# Patient Record
Sex: MALE | Race: WHITE | NOT HISPANIC OR LATINO | Employment: OTHER | URBAN - METROPOLITAN AREA
[De-identification: names, ages, dates, MRNs, and addresses within clinical notes are randomized per-mention and may not be internally consistent; named-entity substitution may affect disease eponyms.]

---

## 2019-01-04 ENCOUNTER — APPOINTMENT (OUTPATIENT)
Dept: CT IMAGING | Facility: HOSPITAL | Age: 64
End: 2019-01-04

## 2019-01-04 ENCOUNTER — HOSPITAL ENCOUNTER (INPATIENT)
Facility: HOSPITAL | Age: 64
LOS: 2 days | Discharge: HOME | End: 2019-01-06
Attending: EMERGENCY MEDICINE | Admitting: INTERNAL MEDICINE

## 2019-01-04 ENCOUNTER — APPOINTMENT (OUTPATIENT)
Dept: GENERAL RADIOLOGY | Facility: HOSPITAL | Age: 64
End: 2019-01-04

## 2019-01-04 DIAGNOSIS — N28.9 RENAL INSUFFICIENCY: ICD-10-CM

## 2019-01-04 DIAGNOSIS — A41.9 SEPSIS, DUE TO UNSPECIFIED ORGANISM: ICD-10-CM

## 2019-01-04 DIAGNOSIS — Z87.19 HISTORY OF CIRRHOSIS OF LIVER: ICD-10-CM

## 2019-01-04 DIAGNOSIS — E87.20 LACTIC ACIDOSIS: ICD-10-CM

## 2019-01-04 DIAGNOSIS — R73.9 HYPERGLYCEMIA: ICD-10-CM

## 2019-01-04 DIAGNOSIS — R40.0 SOMNOLENCE: Primary | ICD-10-CM

## 2019-01-04 PROBLEM — D69.6 THROMBOCYTOPENIA (HCC): Status: ACTIVE | Noted: 2019-01-04

## 2019-01-04 PROBLEM — G47.33 OSA (OBSTRUCTIVE SLEEP APNEA): Status: ACTIVE | Noted: 2019-01-04

## 2019-01-04 PROBLEM — R50.9 FUO (FEVER OF UNKNOWN ORIGIN): Status: ACTIVE | Noted: 2019-01-04

## 2019-01-04 PROBLEM — E11.65 TYPE 2 DIABETES MELLITUS WITH HYPERGLYCEMIA (HCC): Status: ACTIVE | Noted: 2019-01-04

## 2019-01-04 PROBLEM — L97.419 DIABETIC ULCER OF RIGHT HEEL (HCC): Status: ACTIVE | Noted: 2019-01-04

## 2019-01-04 PROBLEM — R79.89 ELEVATED LFTS: Status: ACTIVE | Noted: 2019-01-04

## 2019-01-04 PROBLEM — K74.60 CIRRHOSIS OF LIVER (HCC): Status: ACTIVE | Noted: 2019-01-04

## 2019-01-04 PROBLEM — N17.9 AKI (ACUTE KIDNEY INJURY) (HCC): Status: ACTIVE | Noted: 2019-01-04

## 2019-01-04 PROBLEM — E11.621 DIABETIC ULCER OF RIGHT HEEL (HCC): Status: ACTIVE | Noted: 2019-01-04

## 2019-01-04 PROBLEM — I73.9 PVD (PERIPHERAL VASCULAR DISEASE) (HCC): Status: ACTIVE | Noted: 2019-01-04

## 2019-01-04 PROBLEM — I48.91 A-FIB (HCC): Status: ACTIVE | Noted: 2019-01-04

## 2019-01-04 PROBLEM — E86.0 DEHYDRATION: Status: ACTIVE | Noted: 2019-01-04

## 2019-01-04 PROBLEM — G47.419 NARCOLEPSY: Status: ACTIVE | Noted: 2019-01-04

## 2019-01-04 PROBLEM — I50.9 CHRONIC CONGESTIVE HEART FAILURE (HCC): Status: ACTIVE | Noted: 2019-01-04

## 2019-01-04 LAB
ALBUMIN SERPL-MCNC: 4.67 G/DL (ref 3.2–4.8)
ALBUMIN/GLOB SERPL: 1.2 G/DL (ref 1.5–2.5)
ALP SERPL-CCNC: 174 U/L (ref 25–100)
ALT SERPL W P-5'-P-CCNC: 44 U/L (ref 7–40)
AMMONIA BLD-SCNC: 26 UMOL/L (ref 19–60)
AMPHET+METHAMPHET UR QL: POSITIVE
AMPHETAMINES UR QL: NEGATIVE
ANION GAP SERPL CALCULATED.3IONS-SCNC: 14 MMOL/L (ref 3–11)
ARTERIAL PATENCY WRIST A: ABNORMAL
AST SERPL-CCNC: 37 U/L (ref 0–33)
ATMOSPHERIC PRESS: ABNORMAL MMHG
BARBITURATES UR QL SCN: NEGATIVE
BASE EXCESS BLDA CALC-SCNC: 3.4 MMOL/L (ref 0–2)
BASOPHILS # BLD AUTO: 0.02 10*3/MM3 (ref 0–0.2)
BASOPHILS NFR BLD AUTO: 0.2 % (ref 0–1)
BDY SITE: ABNORMAL
BENZODIAZ UR QL SCN: NEGATIVE
BILIRUB SERPL-MCNC: 1.7 MG/DL (ref 0.3–1.2)
BILIRUB UR QL STRIP: NEGATIVE
BODY TEMPERATURE: 37 C
BUN BLD-MCNC: 70 MG/DL (ref 9–23)
BUN/CREAT SERPL: 34.1 (ref 7–25)
BUPRENORPHINE SERPL-MCNC: NEGATIVE NG/ML
CALCIUM SPEC-SCNC: 10.7 MG/DL (ref 8.7–10.4)
CANNABINOIDS SERPL QL: NEGATIVE
CHLORIDE SERPL-SCNC: 80 MMOL/L (ref 99–109)
CLARITY UR: CLEAR
CO2 BLDA-SCNC: 28.6 MMOL/L (ref 23–27)
CO2 SERPL-SCNC: 29 MMOL/L (ref 20–31)
COCAINE UR QL: NEGATIVE
COHGB MFR BLD: 1.2 % (ref 0–2)
COLOR UR: YELLOW
CREAT BLD-MCNC: 2.05 MG/DL (ref 0.6–1.3)
D-LACTATE SERPL-SCNC: 2.9 MMOL/L (ref 0.5–2)
DEPRECATED RDW RBC AUTO: 44 FL (ref 37–54)
EOSINOPHIL # BLD AUTO: 0.01 10*3/MM3 (ref 0–0.3)
EOSINOPHIL NFR BLD AUTO: 0.1 % (ref 0–3)
ERYTHROCYTE [DISTWIDTH] IN BLOOD BY AUTOMATED COUNT: 14.8 % (ref 11.3–14.5)
FLUAV AG NPH QL: NEGATIVE
FLUBV AG NPH QL IA: NEGATIVE
GFR SERPL CREATININE-BSD FRML MDRD: 33 ML/MIN/1.73
GLOBULIN UR ELPH-MCNC: 3.9 GM/DL
GLUCOSE BLD-MCNC: 580 MG/DL (ref 70–100)
GLUCOSE UR STRIP-MCNC: ABNORMAL MG/DL
HCO3 BLDA-SCNC: 27.4 MMOL/L (ref 20–26)
HCT VFR BLD AUTO: 48.2 % (ref 38.9–50.9)
HCT VFR BLD CALC: 51.3 %
HGB BLD-MCNC: 16.7 G/DL (ref 13.1–17.5)
HGB BLDA-MCNC: 16.7 G/DL (ref 13.5–17.5)
HGB UR QL STRIP.AUTO: NEGATIVE
HOROWITZ INDEX BLD+IHG-RTO: 21 %
IMM GRANULOCYTES # BLD AUTO: 0.06 10*3/MM3 (ref 0–0.03)
IMM GRANULOCYTES NFR BLD AUTO: 0.5 % (ref 0–0.6)
KETONES UR QL STRIP: NEGATIVE
LEUKOCYTE ESTERASE UR QL STRIP.AUTO: NEGATIVE
LYMPHOCYTES # BLD AUTO: 0.85 10*3/MM3 (ref 0.6–4.8)
LYMPHOCYTES NFR BLD AUTO: 7.2 % (ref 24–44)
MCH RBC QN AUTO: 28.4 PG (ref 27–31)
MCHC RBC AUTO-ENTMCNC: 34.6 G/DL (ref 32–36)
MCV RBC AUTO: 81.8 FL (ref 80–99)
METHADONE UR QL SCN: NEGATIVE
METHGB BLD QL: 1 % (ref 0–1.5)
MODALITY: ABNORMAL
MONOCYTES # BLD AUTO: 0.4 10*3/MM3 (ref 0–1)
MONOCYTES NFR BLD AUTO: 3.4 % (ref 0–12)
NEUTROPHILS # BLD AUTO: 10.6 10*3/MM3 (ref 1.5–8.3)
NEUTROPHILS NFR BLD AUTO: 89.1 % (ref 41–71)
NITRITE UR QL STRIP: NEGATIVE
NOTE: ABNORMAL
OPIATES UR QL: NEGATIVE
OXYCODONE UR QL SCN: NEGATIVE
OXYHGB MFR BLDV: 88.7 % (ref 94–99)
PCO2 BLDA: 38.5 MM HG
PCO2 TEMP ADJ BLD: 38.5 MM HG (ref 35–48)
PCP UR QL SCN: NEGATIVE
PH BLDA: 7.46 PH UNITS (ref 7.35–7.45)
PH UR STRIP.AUTO: 5.5 [PH] (ref 5–8)
PH, TEMP CORRECTED: 7.46 PH UNITS
PLATELET # BLD AUTO: 131 10*3/MM3 (ref 150–450)
PMV BLD AUTO: 12 FL (ref 6–12)
PO2 BLDA: 61.2 MM HG (ref 83–108)
PO2 TEMP ADJ BLD: 61.2 MM HG (ref 83–108)
POTASSIUM BLD-SCNC: 3.7 MMOL/L (ref 3.5–5.5)
PROCALCITONIN SERPL-MCNC: 0.58 NG/ML
PROPOXYPH UR QL: NEGATIVE
PROT SERPL-MCNC: 8.6 G/DL (ref 5.7–8.2)
PROT UR QL STRIP: NEGATIVE
RBC # BLD AUTO: 5.89 10*6/MM3 (ref 4.2–5.76)
SODIUM BLD-SCNC: 123 MMOL/L (ref 132–146)
SP GR UR STRIP: 1.02 (ref 1–1.03)
TRICYCLICS UR QL SCN: NEGATIVE
TROPONIN I SERPL-MCNC: 0.04 NG/ML (ref 0–0.07)
UROBILINOGEN UR QL STRIP: ABNORMAL
VENTILATOR MODE: ABNORMAL
WBC NRBC COR # BLD: 11.88 10*3/MM3 (ref 3.5–10.8)

## 2019-01-04 PROCEDURE — 81003 URINALYSIS AUTO W/O SCOPE: CPT | Performed by: EMERGENCY MEDICINE

## 2019-01-04 PROCEDURE — 82140 ASSAY OF AMMONIA: CPT | Performed by: EMERGENCY MEDICINE

## 2019-01-04 PROCEDURE — 87150 DNA/RNA AMPLIFIED PROBE: CPT | Performed by: EMERGENCY MEDICINE

## 2019-01-04 PROCEDURE — 70450 CT HEAD/BRAIN W/O DYE: CPT

## 2019-01-04 PROCEDURE — 80306 DRUG TEST PRSMV INSTRMNT: CPT | Performed by: EMERGENCY MEDICINE

## 2019-01-04 PROCEDURE — 71045 X-RAY EXAM CHEST 1 VIEW: CPT

## 2019-01-04 PROCEDURE — 87186 SC STD MICRODIL/AGAR DIL: CPT | Performed by: EMERGENCY MEDICINE

## 2019-01-04 PROCEDURE — 87804 INFLUENZA ASSAY W/OPTIC: CPT | Performed by: EMERGENCY MEDICINE

## 2019-01-04 PROCEDURE — 99285 EMERGENCY DEPT VISIT HI MDM: CPT

## 2019-01-04 PROCEDURE — 84484 ASSAY OF TROPONIN QUANT: CPT

## 2019-01-04 PROCEDURE — 85007 BL SMEAR W/DIFF WBC COUNT: CPT | Performed by: EMERGENCY MEDICINE

## 2019-01-04 PROCEDURE — 36415 COLL VENOUS BLD VENIPUNCTURE: CPT | Performed by: EMERGENCY MEDICINE

## 2019-01-04 PROCEDURE — 25010000002 PIPERACILLIN SOD-TAZOBACTAM PER 1 G: Performed by: EMERGENCY MEDICINE

## 2019-01-04 PROCEDURE — 82962 GLUCOSE BLOOD TEST: CPT

## 2019-01-04 PROCEDURE — 36600 WITHDRAWAL OF ARTERIAL BLOOD: CPT

## 2019-01-04 PROCEDURE — 99223 1ST HOSP IP/OBS HIGH 75: CPT | Performed by: INTERNAL MEDICINE

## 2019-01-04 PROCEDURE — 87147 CULTURE TYPE IMMUNOLOGIC: CPT | Performed by: EMERGENCY MEDICINE

## 2019-01-04 PROCEDURE — 93005 ELECTROCARDIOGRAM TRACING: CPT | Performed by: EMERGENCY MEDICINE

## 2019-01-04 PROCEDURE — 25010000002 VANCOMYCIN 10 G RECONSTITUTED SOLUTION: Performed by: EMERGENCY MEDICINE

## 2019-01-04 PROCEDURE — 83605 ASSAY OF LACTIC ACID: CPT | Performed by: EMERGENCY MEDICINE

## 2019-01-04 PROCEDURE — 82805 BLOOD GASES W/O2 SATURATION: CPT

## 2019-01-04 PROCEDURE — 85025 COMPLETE CBC W/AUTO DIFF WBC: CPT | Performed by: EMERGENCY MEDICINE

## 2019-01-04 PROCEDURE — 80053 COMPREHEN METABOLIC PANEL: CPT | Performed by: EMERGENCY MEDICINE

## 2019-01-04 PROCEDURE — 84145 PROCALCITONIN (PCT): CPT | Performed by: EMERGENCY MEDICINE

## 2019-01-04 PROCEDURE — 87040 BLOOD CULTURE FOR BACTERIA: CPT | Performed by: EMERGENCY MEDICINE

## 2019-01-04 RX ORDER — POTASSIUM CHLORIDE 750 MG/1
10 TABLET, FILM COATED, EXTENDED RELEASE ORAL 2 TIMES DAILY
COMMUNITY
End: 2019-01-30 | Stop reason: SDUPTHER

## 2019-01-04 RX ORDER — GABAPENTIN 300 MG/1
300 CAPSULE ORAL 2 TIMES DAILY
COMMUNITY
End: 2019-01-30 | Stop reason: SDUPTHER

## 2019-01-04 RX ORDER — BUMETANIDE 2 MG/1
2 TABLET ORAL 2 TIMES DAILY
COMMUNITY
End: 2019-01-30 | Stop reason: SDUPTHER

## 2019-01-04 RX ORDER — LEVOTHYROXINE SODIUM 0.07 MG/1
75 TABLET ORAL DAILY
COMMUNITY
End: 2019-01-30 | Stop reason: SDUPTHER

## 2019-01-04 RX ORDER — ASPIRIN 81 MG/1
81 TABLET, CHEWABLE ORAL DAILY
COMMUNITY
End: 2019-01-30

## 2019-01-04 RX ORDER — MORPHINE SULFATE 30 MG/1
30 TABLET ORAL EVERY 6 HOURS PRN
COMMUNITY
End: 2019-01-30

## 2019-01-04 RX ORDER — ACETAMINOPHEN 500 MG
1000 TABLET ORAL ONCE
Status: COMPLETED | OUTPATIENT
Start: 2019-01-04 | End: 2019-01-04

## 2019-01-04 RX ORDER — METOLAZONE 5 MG/1
5 TABLET ORAL DAILY PRN
COMMUNITY
End: 2019-01-30 | Stop reason: SDUPTHER

## 2019-01-04 RX ORDER — PRAMIPEXOLE DIHYDROCHLORIDE 1 MG/1
1 TABLET ORAL 2 TIMES DAILY
COMMUNITY
End: 2019-01-30 | Stop reason: SDUPTHER

## 2019-01-04 RX ORDER — LEVOCETIRIZINE DIHYDROCHLORIDE 5 MG/1
5 TABLET, FILM COATED ORAL EVERY EVENING
COMMUNITY
End: 2019-01-30 | Stop reason: SDUPTHER

## 2019-01-04 RX ORDER — SPIRONOLACTONE 100 MG/1
100 TABLET, FILM COATED ORAL DAILY
COMMUNITY
End: 2019-01-30 | Stop reason: SDUPTHER

## 2019-01-04 RX ORDER — TRAZODONE HYDROCHLORIDE 100 MG/1
100 TABLET ORAL NIGHTLY PRN
COMMUNITY
End: 2019-01-30 | Stop reason: SDUPTHER

## 2019-01-04 RX ORDER — SODIUM CHLORIDE 0.9 % (FLUSH) 0.9 %
10 SYRINGE (ML) INJECTION AS NEEDED
Status: DISCONTINUED | OUTPATIENT
Start: 2019-01-04 | End: 2019-01-06 | Stop reason: HOSPADM

## 2019-01-04 RX ADMIN — VANCOMYCIN HYDROCHLORIDE 2750 MG: 10 INJECTION, POWDER, LYOPHILIZED, FOR SOLUTION INTRAVENOUS at 23:02

## 2019-01-04 RX ADMIN — SODIUM CHLORIDE 1000 ML: 9 INJECTION, SOLUTION INTRAVENOUS at 21:45

## 2019-01-04 RX ADMIN — ACETAMINOPHEN 1000 MG: 500 TABLET, FILM COATED ORAL at 22:00

## 2019-01-04 RX ADMIN — TAZOBACTAM SODIUM AND PIPERACILLIN SODIUM 3.38 G: 375; 3 INJECTION, SOLUTION INTRAVENOUS at 22:08

## 2019-01-05 ENCOUNTER — APPOINTMENT (OUTPATIENT)
Dept: CT IMAGING | Facility: HOSPITAL | Age: 64
End: 2019-01-05

## 2019-01-05 ENCOUNTER — APPOINTMENT (OUTPATIENT)
Dept: CARDIOLOGY | Facility: HOSPITAL | Age: 64
End: 2019-01-05
Attending: INTERNAL MEDICINE

## 2019-01-05 PROBLEM — G93.40 ENCEPHALOPATHY ACUTE: Status: ACTIVE | Noted: 2019-01-05

## 2019-01-05 LAB
ALBUMIN SERPL-MCNC: 3.92 G/DL (ref 3.2–4.8)
ALBUMIN/GLOB SERPL: 1.2 G/DL (ref 1.5–2.5)
ALP SERPL-CCNC: 122 U/L (ref 25–100)
ALT SERPL W P-5'-P-CCNC: 32 U/L (ref 7–40)
ANION GAP SERPL CALCULATED.3IONS-SCNC: 12 MMOL/L (ref 3–11)
AST SERPL-CCNC: 31 U/L (ref 0–33)
BACTERIA BLD CULT: ABNORMAL
BASOPHILS # BLD AUTO: 0.01 10*3/MM3 (ref 0–0.2)
BASOPHILS NFR BLD AUTO: 0.1 % (ref 0–1)
BH CV ECHO MEAS - AO ROOT AREA (BSA CORRECTED): 1.2
BH CV ECHO MEAS - AO ROOT AREA: 7.5 CM^2
BH CV ECHO MEAS - AO ROOT DIAM: 3.1 CM
BH CV ECHO MEAS - BSA(HAYCOCK): 2.7 M^2
BH CV ECHO MEAS - BSA: 2.6 M^2
BH CV ECHO MEAS - BZI_BMI: 35.9 KILOGRAMS/M^2
BH CV ECHO MEAS - BZI_METRIC_HEIGHT: 190.5 CM
BH CV ECHO MEAS - BZI_METRIC_WEIGHT: 130.2 KG
BH CV ECHO MEAS - EDV(CUBED): 65 ML
BH CV ECHO MEAS - EDV(MOD-SP4): 111 ML
BH CV ECHO MEAS - EDV(TEICH): 70.9 ML
BH CV ECHO MEAS - EF(CUBED): 62.8 %
BH CV ECHO MEAS - EF(MOD-SP4): 40.5 %
BH CV ECHO MEAS - EF(TEICH): 54.9 %
BH CV ECHO MEAS - ESV(CUBED): 24.2 ML
BH CV ECHO MEAS - ESV(MOD-SP4): 66 ML
BH CV ECHO MEAS - ESV(TEICH): 32 ML
BH CV ECHO MEAS - FS: 28.1 %
BH CV ECHO MEAS - IVS/LVPW: 0.6
BH CV ECHO MEAS - IVSD: 0.7 CM
BH CV ECHO MEAS - LA DIMENSION: 3.3 CM
BH CV ECHO MEAS - LA/AO: 1.1
BH CV ECHO MEAS - LAD MAJOR: 6.3 CM
BH CV ECHO MEAS - LAT PEAK E' VEL: 5.7 CM/SEC
BH CV ECHO MEAS - LATERAL E/E' RATIO: 17.6
BH CV ECHO MEAS - LV DIASTOLIC VOL/BSA (35-75): 43.4 ML/M^2
BH CV ECHO MEAS - LV MASS(C)D: 117.1 GRAMS
BH CV ECHO MEAS - LV MASS(C)DI: 45.8 GRAMS/M^2
BH CV ECHO MEAS - LV MAX PG: 2.9 MMHG
BH CV ECHO MEAS - LV MEAN PG: 1.2 MMHG
BH CV ECHO MEAS - LV SYSTOLIC VOL/BSA (12-30): 25.8 ML/M^2
BH CV ECHO MEAS - LV V1 MAX: 85.4 CM/SEC
BH CV ECHO MEAS - LV V1 MEAN: 48.2 CM/SEC
BH CV ECHO MEAS - LV V1 VTI: 13.5 CM
BH CV ECHO MEAS - LVIDD: 4 CM
BH CV ECHO MEAS - LVIDS: 2.9 CM
BH CV ECHO MEAS - LVLD AP4: 8 CM
BH CV ECHO MEAS - LVLS AP4: 7.1 CM
BH CV ECHO MEAS - LVOT AREA (M): 3.1 CM^2
BH CV ECHO MEAS - LVOT AREA: 3.1 CM^2
BH CV ECHO MEAS - LVOT DIAM: 2 CM
BH CV ECHO MEAS - LVPWD: 1.2 CM
BH CV ECHO MEAS - MED PEAK E' VEL: 5.3 CM/SEC
BH CV ECHO MEAS - MEDIAL E/E' RATIO: 19
BH CV ECHO MEAS - MV A MAX VEL: 68.6 CM/SEC
BH CV ECHO MEAS - MV E MAX VEL: 102.2 CM/SEC
BH CV ECHO MEAS - MV E/A: 1.5
BH CV ECHO MEAS - RVDD: 2 CM
BH CV ECHO MEAS - SI(CUBED): 16 ML/M^2
BH CV ECHO MEAS - SI(LVOT): 16.4 ML/M^2
BH CV ECHO MEAS - SI(MOD-SP4): 17.6 ML/M^2
BH CV ECHO MEAS - SI(TEICH): 15.2 ML/M^2
BH CV ECHO MEAS - SV(CUBED): 40.9 ML
BH CV ECHO MEAS - SV(LVOT): 42 ML
BH CV ECHO MEAS - SV(MOD-SP4): 45 ML
BH CV ECHO MEAS - SV(TEICH): 38.9 ML
BH CV ECHO MEASUREMENTS AVERAGE E/E' RATIO: 18.58
BH CV XLRA - RV BASE: 3.4 CM
BH CV XLRA - RV LENGTH: 6.3 CM
BH CV XLRA - RV MID: 2.6 CM
BILIRUB SERPL-MCNC: 1.1 MG/DL (ref 0.3–1.2)
BUN BLD-MCNC: 57 MG/DL (ref 9–23)
BUN/CREAT SERPL: 37.7 (ref 7–25)
CALCIUM SPEC-SCNC: 9.8 MG/DL (ref 8.7–10.4)
CHLORIDE SERPL-SCNC: 95 MMOL/L (ref 99–109)
CO2 SERPL-SCNC: 28 MMOL/L (ref 20–31)
CREAT BLD-MCNC: 1.51 MG/DL (ref 0.6–1.3)
CREAT UR-MCNC: 50.6 MG/DL
D-LACTATE SERPL-SCNC: 1.9 MMOL/L (ref 0.5–2)
DEPRECATED RDW RBC AUTO: 43.9 FL (ref 37–54)
EOSINOPHIL # BLD AUTO: 0.02 10*3/MM3 (ref 0–0.3)
EOSINOPHIL NFR BLD AUTO: 0.2 % (ref 0–3)
EOSINOPHIL SPEC QL MICRO: 0 % EOS/100 CELLS (ref 0–0)
ERYTHROCYTE [DISTWIDTH] IN BLOOD BY AUTOMATED COUNT: 14.7 % (ref 11.3–14.5)
GFR SERPL CREATININE-BSD FRML MDRD: 47 ML/MIN/1.73
GLOBULIN UR ELPH-MCNC: 3.3 GM/DL
GLUCOSE BLD-MCNC: 163 MG/DL (ref 70–100)
GLUCOSE BLDC GLUCOMTR-MCNC: 122 MG/DL (ref 70–130)
GLUCOSE BLDC GLUCOMTR-MCNC: 124 MG/DL (ref 70–130)
GLUCOSE BLDC GLUCOMTR-MCNC: 217 MG/DL (ref 70–130)
GLUCOSE BLDC GLUCOMTR-MCNC: 219 MG/DL (ref 70–130)
GLUCOSE BLDC GLUCOMTR-MCNC: 222 MG/DL (ref 70–130)
GLUCOSE BLDC GLUCOMTR-MCNC: 237 MG/DL (ref 70–130)
GLUCOSE BLDC GLUCOMTR-MCNC: 293 MG/DL (ref 70–130)
GLUCOSE BLDC GLUCOMTR-MCNC: 356 MG/DL (ref 70–130)
GLUCOSE BLDC GLUCOMTR-MCNC: 383 MG/DL (ref 70–130)
GLUCOSE BLDC GLUCOMTR-MCNC: 398 MG/DL (ref 70–130)
GLUCOSE BLDC GLUCOMTR-MCNC: 408 MG/DL (ref 70–130)
GLUCOSE BLDC GLUCOMTR-MCNC: 78 MG/DL (ref 70–130)
HBA1C MFR BLD: 11.7 % (ref 4.8–5.6)
HCT VFR BLD AUTO: 45.9 % (ref 38.9–50.9)
HGB BLD-MCNC: 15.7 G/DL (ref 13.1–17.5)
HOLD SPECIMEN: NORMAL
IMM GRANULOCYTES # BLD AUTO: 0.05 10*3/MM3 (ref 0–0.03)
IMM GRANULOCYTES NFR BLD AUTO: 0.4 % (ref 0–0.6)
LYMPHOCYTES # BLD AUTO: 0.73 10*3/MM3 (ref 0.6–4.8)
LYMPHOCYTES NFR BLD AUTO: 6.3 % (ref 24–44)
MAXIMAL PREDICTED HEART RATE: 156 BPM
MCH RBC QN AUTO: 27.9 PG (ref 27–31)
MCHC RBC AUTO-ENTMCNC: 34.2 G/DL (ref 32–36)
MCV RBC AUTO: 81.7 FL (ref 80–99)
MONOCYTES # BLD AUTO: 0.63 10*3/MM3 (ref 0–1)
MONOCYTES NFR BLD AUTO: 5.5 % (ref 0–12)
MRSA DNA SPEC QL NAA+PROBE: NEGATIVE
NEUTROPHILS # BLD AUTO: 10.09 10*3/MM3 (ref 1.5–8.3)
NEUTROPHILS NFR BLD AUTO: 87.5 % (ref 41–71)
OSMOLALITY UR: 407 MOSM/KG (ref 300–1100)
PLATELET # BLD AUTO: 112 10*3/MM3 (ref 150–450)
PMV BLD AUTO: 10.6 FL (ref 6–12)
POTASSIUM BLD-SCNC: 3.3 MMOL/L (ref 3.5–5.5)
PROCALCITONIN SERPL-MCNC: 0.66 NG/ML
PROT SERPL-MCNC: 7.2 G/DL (ref 5.7–8.2)
PROT UR-MCNC: 22 MG/DL (ref 1–14)
RBC # BLD AUTO: 5.62 10*6/MM3 (ref 4.2–5.76)
SODIUM BLD-SCNC: 135 MMOL/L (ref 132–146)
SODIUM UR-SCNC: 19 MMOL/L (ref 30–90)
STRESS TARGET HR: 133 BPM
TROPONIN I SERPL-MCNC: 0.05 NG/ML
TROPONIN I SERPL-MCNC: 0.08 NG/ML
WBC NRBC COR # BLD: 11.53 10*3/MM3 (ref 3.5–10.8)

## 2019-01-05 PROCEDURE — 25010000002 HEPARIN (PORCINE) PER 1000 UNITS: Performed by: INTERNAL MEDICINE

## 2019-01-05 PROCEDURE — 82570 ASSAY OF URINE CREATININE: CPT | Performed by: NURSE PRACTITIONER

## 2019-01-05 PROCEDURE — 99233 SBSQ HOSP IP/OBS HIGH 50: CPT | Performed by: INTERNAL MEDICINE

## 2019-01-05 PROCEDURE — 93306 TTE W/DOPPLER COMPLETE: CPT | Performed by: INTERNAL MEDICINE

## 2019-01-05 PROCEDURE — 84300 ASSAY OF URINE SODIUM: CPT | Performed by: NURSE PRACTITIONER

## 2019-01-05 PROCEDURE — 82962 GLUCOSE BLOOD TEST: CPT

## 2019-01-05 PROCEDURE — 85025 COMPLETE CBC W/AUTO DIFF WBC: CPT | Performed by: INTERNAL MEDICINE

## 2019-01-05 PROCEDURE — 83935 ASSAY OF URINE OSMOLALITY: CPT | Performed by: NURSE PRACTITIONER

## 2019-01-05 PROCEDURE — 92610 EVALUATE SWALLOWING FUNCTION: CPT

## 2019-01-05 PROCEDURE — 25010000002 SULFUR HEXAFLUORIDE MICROSPH 60.7-25 MG RECONSTITUTED SUSPENSION: Performed by: INTERNAL MEDICINE

## 2019-01-05 PROCEDURE — 93306 TTE W/DOPPLER COMPLETE: CPT

## 2019-01-05 PROCEDURE — 87147 CULTURE TYPE IMMUNOLOGIC: CPT | Performed by: PHYSICIAN ASSISTANT

## 2019-01-05 PROCEDURE — 83605 ASSAY OF LACTIC ACID: CPT | Performed by: EMERGENCY MEDICINE

## 2019-01-05 PROCEDURE — 86335 IMMUNFIX E-PHORSIS/URINE/CSF: CPT | Performed by: NURSE PRACTITIONER

## 2019-01-05 PROCEDURE — 74176 CT ABD & PELVIS W/O CONTRAST: CPT

## 2019-01-05 PROCEDURE — 83036 HEMOGLOBIN GLYCOSYLATED A1C: CPT | Performed by: INTERNAL MEDICINE

## 2019-01-05 PROCEDURE — 84156 ASSAY OF PROTEIN URINE: CPT | Performed by: NURSE PRACTITIONER

## 2019-01-05 PROCEDURE — 87070 CULTURE OTHR SPECIMN AEROBIC: CPT | Performed by: PHYSICIAN ASSISTANT

## 2019-01-05 PROCEDURE — 87077 CULTURE AEROBIC IDENTIFY: CPT | Performed by: PHYSICIAN ASSISTANT

## 2019-01-05 PROCEDURE — 87075 CULTR BACTERIA EXCEPT BLOOD: CPT | Performed by: PHYSICIAN ASSISTANT

## 2019-01-05 PROCEDURE — 63710000001 INSULIN DETEMIR PER 5 UNITS: Performed by: INTERNAL MEDICINE

## 2019-01-05 PROCEDURE — 84145 PROCALCITONIN (PCT): CPT | Performed by: INTERNAL MEDICINE

## 2019-01-05 PROCEDURE — 87186 SC STD MICRODIL/AGAR DIL: CPT | Performed by: PHYSICIAN ASSISTANT

## 2019-01-05 PROCEDURE — 25010000003 AMPICILLIN-SULBACTAM PER 1.5 G: Performed by: PHYSICIAN ASSISTANT

## 2019-01-05 PROCEDURE — 80053 COMPREHEN METABOLIC PANEL: CPT | Performed by: INTERNAL MEDICINE

## 2019-01-05 PROCEDURE — 87205 SMEAR GRAM STAIN: CPT | Performed by: NURSE PRACTITIONER

## 2019-01-05 PROCEDURE — 84484 ASSAY OF TROPONIN QUANT: CPT | Performed by: INTERNAL MEDICINE

## 2019-01-05 PROCEDURE — 25010000002 PIPERACILLIN SOD-TAZOBACTAM PER 1 G

## 2019-01-05 PROCEDURE — 63710000001 INSULIN LISPRO (HUMAN) PER 5 UNITS: Performed by: INTERNAL MEDICINE

## 2019-01-05 PROCEDURE — 87641 MR-STAPH DNA AMP PROBE: CPT | Performed by: PHYSICIAN ASSISTANT

## 2019-01-05 PROCEDURE — 71250 CT THORAX DX C-: CPT

## 2019-01-05 PROCEDURE — 25010000002 VANCOMYCIN 10 G RECONSTITUTED SOLUTION

## 2019-01-05 PROCEDURE — 87205 SMEAR GRAM STAIN: CPT | Performed by: PHYSICIAN ASSISTANT

## 2019-01-05 PROCEDURE — 25010000003 POTASSIUM CHLORIDE 10 MEQ/100ML SOLUTION: Performed by: INTERNAL MEDICINE

## 2019-01-05 RX ORDER — ONDANSETRON HYDROCHLORIDE 8 MG/1
8 TABLET, FILM COATED ORAL EVERY 8 HOURS PRN
COMMUNITY
End: 2019-01-30 | Stop reason: SDUPTHER

## 2019-01-05 RX ORDER — LEVOTHYROXINE SODIUM 0.07 MG/1
75 TABLET ORAL DAILY
Status: DISCONTINUED | OUTPATIENT
Start: 2019-01-05 | End: 2019-01-06 | Stop reason: HOSPADM

## 2019-01-05 RX ORDER — POTASSIUM CHLORIDE 1.5 G/1.77G
40 POWDER, FOR SOLUTION ORAL AS NEEDED
Status: DISCONTINUED | OUTPATIENT
Start: 2019-01-05 | End: 2019-01-06 | Stop reason: HOSPADM

## 2019-01-05 RX ORDER — LACTULOSE 10 G/15ML
30 SOLUTION ORAL 3 TIMES DAILY
Status: DISCONTINUED | OUTPATIENT
Start: 2019-01-05 | End: 2019-01-06

## 2019-01-05 RX ORDER — BACITRACIN ZINC 500 [USP'U]/G
OINTMENT TOPICAL DAILY PRN
COMMUNITY
End: 2020-01-24

## 2019-01-05 RX ORDER — POTASSIUM CHLORIDE 7.45 MG/ML
10 INJECTION INTRAVENOUS
Status: DISCONTINUED | OUTPATIENT
Start: 2019-01-05 | End: 2019-01-06 | Stop reason: HOSPADM

## 2019-01-05 RX ORDER — ACETAMINOPHEN 325 MG/1
650 TABLET ORAL EVERY 4 HOURS PRN
Status: DISCONTINUED | OUTPATIENT
Start: 2019-01-05 | End: 2019-01-06 | Stop reason: HOSPADM

## 2019-01-05 RX ORDER — DEXTROAMPHETAMINE SACCHARATE, AMPHETAMINE ASPARTATE, DEXTROAMPHETAMINE SULFATE AND AMPHETAMINE SULFATE 7.5; 7.5; 7.5; 7.5 MG/1; MG/1; MG/1; MG/1
60 TABLET ORAL
Status: DISCONTINUED | OUTPATIENT
Start: 2019-01-05 | End: 2019-01-05

## 2019-01-05 RX ORDER — DEXTROAMPHETAMINE SACCHARATE, AMPHETAMINE ASPARTATE, DEXTROAMPHETAMINE SULFATE AND AMPHETAMINE SULFATE 7.5; 7.5; 7.5; 7.5 MG/1; MG/1; MG/1; MG/1
15 TABLET ORAL EVERY EVENING
Status: DISCONTINUED | OUTPATIENT
Start: 2019-01-05 | End: 2019-01-05

## 2019-01-05 RX ORDER — MAGNESIUM SULFATE HEPTAHYDRATE 40 MG/ML
4 INJECTION, SOLUTION INTRAVENOUS AS NEEDED
Status: DISCONTINUED | OUTPATIENT
Start: 2019-01-05 | End: 2019-01-06 | Stop reason: HOSPADM

## 2019-01-05 RX ORDER — SODIUM CHLORIDE 9 MG/ML
75 INJECTION, SOLUTION INTRAVENOUS ONCE
Status: COMPLETED | OUTPATIENT
Start: 2019-01-05 | End: 2019-01-05

## 2019-01-05 RX ORDER — DEXTROAMPHETAMINE SACCHARATE, AMPHETAMINE ASPARTATE MONOHYDRATE, DEXTROAMPHETAMINE SULFATE AND AMPHETAMINE SULFATE 3.75; 3.75; 3.75; 3.75 MG/1; MG/1; MG/1; MG/1
30 CAPSULE, EXTENDED RELEASE ORAL EVERY EVENING
Status: DISCONTINUED | OUTPATIENT
Start: 2019-01-05 | End: 2019-01-05

## 2019-01-05 RX ORDER — NICOTINE POLACRILEX 4 MG
15 LOZENGE BUCCAL
Status: DISCONTINUED | OUTPATIENT
Start: 2019-01-05 | End: 2019-01-06 | Stop reason: HOSPADM

## 2019-01-05 RX ORDER — SODIUM CHLORIDE 0.9 % (FLUSH) 0.9 %
3-10 SYRINGE (ML) INJECTION AS NEEDED
Status: DISCONTINUED | OUTPATIENT
Start: 2019-01-05 | End: 2019-01-06 | Stop reason: HOSPADM

## 2019-01-05 RX ORDER — DEXTROAMPHETAMINE SACCHARATE, AMPHETAMINE ASPARTATE, DEXTROAMPHETAMINE SULFATE AND AMPHETAMINE SULFATE 7.5; 7.5; 7.5; 7.5 MG/1; MG/1; MG/1; MG/1
60 TABLET ORAL
Status: DISCONTINUED | OUTPATIENT
Start: 2019-01-06 | End: 2019-01-05

## 2019-01-05 RX ORDER — ACETAMINOPHEN 650 MG
TABLET, EXTENDED RELEASE ORAL DAILY
Status: DISCONTINUED | OUTPATIENT
Start: 2019-01-05 | End: 2019-01-06 | Stop reason: HOSPADM

## 2019-01-05 RX ORDER — MAGNESIUM SULFATE HEPTAHYDRATE 40 MG/ML
2 INJECTION, SOLUTION INTRAVENOUS AS NEEDED
Status: DISCONTINUED | OUTPATIENT
Start: 2019-01-05 | End: 2019-01-06 | Stop reason: HOSPADM

## 2019-01-05 RX ORDER — POTASSIUM CHLORIDE 750 MG/1
40 CAPSULE, EXTENDED RELEASE ORAL AS NEEDED
Status: DISCONTINUED | OUTPATIENT
Start: 2019-01-05 | End: 2019-01-06 | Stop reason: HOSPADM

## 2019-01-05 RX ORDER — VENLAFAXINE 37.5 MG/1
150 TABLET ORAL 2 TIMES DAILY WITH MEALS
Status: DISCONTINUED | OUTPATIENT
Start: 2019-01-05 | End: 2019-01-06 | Stop reason: HOSPADM

## 2019-01-05 RX ORDER — DEXTROAMPHETAMINE SACCHARATE, AMPHETAMINE ASPARTATE, DEXTROAMPHETAMINE SULFATE AND AMPHETAMINE SULFATE 7.5; 7.5; 7.5; 7.5 MG/1; MG/1; MG/1; MG/1
30 TABLET ORAL
Status: DISCONTINUED | OUTPATIENT
Start: 2019-01-06 | End: 2019-01-05

## 2019-01-05 RX ORDER — DEXTROSE MONOHYDRATE 25 G/50ML
25-50 INJECTION, SOLUTION INTRAVENOUS
Status: DISCONTINUED | OUTPATIENT
Start: 2019-01-05 | End: 2019-01-06 | Stop reason: HOSPADM

## 2019-01-05 RX ORDER — SODIUM CHLORIDE 0.9 % (FLUSH) 0.9 %
3 SYRINGE (ML) INJECTION EVERY 12 HOURS SCHEDULED
Status: DISCONTINUED | OUTPATIENT
Start: 2019-01-05 | End: 2019-01-06 | Stop reason: HOSPADM

## 2019-01-05 RX ORDER — DEXTROAMPHETAMINE SACCHARATE, AMPHETAMINE ASPARTATE MONOHYDRATE, DEXTROAMPHETAMINE SULFATE AND AMPHETAMINE SULFATE 3.75; 3.75; 3.75; 3.75 MG/1; MG/1; MG/1; MG/1
30 CAPSULE, EXTENDED RELEASE ORAL EVERY EVENING
Status: DISCONTINUED | OUTPATIENT
Start: 2019-01-05 | End: 2019-01-06 | Stop reason: HOSPADM

## 2019-01-05 RX ORDER — SIMETHICONE 125 MG
125 TABLET,CHEWABLE ORAL EVERY 8 HOURS PRN
COMMUNITY
End: 2020-01-24

## 2019-01-05 RX ORDER — DEXTROSE MONOHYDRATE 25 G/50ML
25 INJECTION, SOLUTION INTRAVENOUS
Status: DISCONTINUED | OUTPATIENT
Start: 2019-01-05 | End: 2019-01-06 | Stop reason: HOSPADM

## 2019-01-05 RX ORDER — PRAMIPEXOLE DIHYDROCHLORIDE 0.25 MG/1
1 TABLET ORAL 3 TIMES DAILY
Status: DISCONTINUED | OUTPATIENT
Start: 2019-01-05 | End: 2019-01-06 | Stop reason: HOSPADM

## 2019-01-05 RX ORDER — GABAPENTIN 300 MG/1
300 CAPSULE ORAL 2 TIMES DAILY
Status: DISCONTINUED | OUTPATIENT
Start: 2019-01-05 | End: 2019-01-06 | Stop reason: HOSPADM

## 2019-01-05 RX ORDER — HEPARIN SODIUM 5000 [USP'U]/ML
5000 INJECTION, SOLUTION INTRAVENOUS; SUBCUTANEOUS EVERY 8 HOURS SCHEDULED
Status: DISCONTINUED | OUTPATIENT
Start: 2019-01-05 | End: 2019-01-06 | Stop reason: HOSPADM

## 2019-01-05 RX ORDER — ASPIRIN 81 MG/1
81 TABLET, CHEWABLE ORAL DAILY
Status: DISCONTINUED | OUTPATIENT
Start: 2019-01-05 | End: 2019-01-06 | Stop reason: HOSPADM

## 2019-01-05 RX ORDER — DEXTROAMPHETAMINE SACCHARATE, AMPHETAMINE ASPARTATE, DEXTROAMPHETAMINE SULFATE AND AMPHETAMINE SULFATE 7.5; 7.5; 7.5; 7.5 MG/1; MG/1; MG/1; MG/1
30 TABLET ORAL EVERY EVENING
Status: DISCONTINUED | OUTPATIENT
Start: 2019-01-05 | End: 2019-01-05

## 2019-01-05 RX ORDER — DEXTROAMPHETAMINE SACCHARATE, AMPHETAMINE ASPARTATE MONOHYDRATE, DEXTROAMPHETAMINE SULFATE AND AMPHETAMINE SULFATE 3.75; 3.75; 3.75; 3.75 MG/1; MG/1; MG/1; MG/1
60 CAPSULE, EXTENDED RELEASE ORAL EVERY MORNING
Status: DISCONTINUED | OUTPATIENT
Start: 2019-01-06 | End: 2019-01-06 | Stop reason: HOSPADM

## 2019-01-05 RX ADMIN — DEXTROAMPHETAMINE SACCHARATE, AMPHETAMINE ASPARTATE MONOHYDRATE, DEXTROAMPHETAMINE SULFATE AND AMPHETAMINE SULFATE 60 MG: 3.75; 3.75; 3.75; 3.75 CAPSULE, EXTENDED RELEASE ORAL at 12:52

## 2019-01-05 RX ADMIN — HEPARIN SODIUM 5000 UNITS: 5000 INJECTION INTRAVENOUS; SUBCUTANEOUS at 05:05

## 2019-01-05 RX ADMIN — SODIUM CHLORIDE, PRESERVATIVE FREE 3 ML: 5 INJECTION INTRAVENOUS at 10:11

## 2019-01-05 RX ADMIN — LEVOTHYROXINE SODIUM 75 MCG: 75 TABLET ORAL at 05:05

## 2019-01-05 RX ADMIN — SODIUM CHLORIDE, PRESERVATIVE FREE 3 ML: 5 INJECTION INTRAVENOUS at 22:04

## 2019-01-05 RX ADMIN — SULFUR HEXAFLUORIDE 3 ML: KIT at 17:15

## 2019-01-05 RX ADMIN — VANCOMYCIN HYDROCHLORIDE 1500 MG: 10 INJECTION, POWDER, LYOPHILIZED, FOR SOLUTION INTRAVENOUS at 13:12

## 2019-01-05 RX ADMIN — POTASSIUM CHLORIDE 40 MEQ: 750 CAPSULE, EXTENDED RELEASE ORAL at 16:43

## 2019-01-05 RX ADMIN — VENLAFAXINE 150 MG: 37.5 TABLET ORAL at 16:43

## 2019-01-05 RX ADMIN — SODIUM CHLORIDE 1000 ML: 9 INJECTION, SOLUTION INTRAVENOUS at 00:55

## 2019-01-05 RX ADMIN — HEPARIN SODIUM 5000 UNITS: 5000 INJECTION INTRAVENOUS; SUBCUTANEOUS at 21:58

## 2019-01-05 RX ADMIN — PRAMIPEXOLE DIHYDROCHLORIDE 1 MG: 0.25 TABLET ORAL at 14:41

## 2019-01-05 RX ADMIN — INSULIN DETEMIR 20 UNITS: 100 INJECTION, SOLUTION SUBCUTANEOUS at 22:07

## 2019-01-05 RX ADMIN — SODIUM CHLORIDE 4 UNITS/HR: 9 INJECTION, SOLUTION INTRAVENOUS at 02:03

## 2019-01-05 RX ADMIN — PRAMIPEXOLE DIHYDROCHLORIDE 1 MG: 0.25 TABLET ORAL at 21:58

## 2019-01-05 RX ADMIN — TAZOBACTAM SODIUM AND PIPERACILLIN SODIUM 4.5 G: 500; 4 INJECTION, SOLUTION INTRAVENOUS at 13:13

## 2019-01-05 RX ADMIN — AMPICILLIN SODIUM AND SULBACTAM SODIUM 3 G: 2; 1 INJECTION, POWDER, FOR SOLUTION INTRAMUSCULAR; INTRAVENOUS at 17:46

## 2019-01-05 RX ADMIN — SODIUM CHLORIDE 75 ML/HR: 9 INJECTION, SOLUTION INTRAVENOUS at 02:02

## 2019-01-05 RX ADMIN — HEPARIN SODIUM 5000 UNITS: 5000 INJECTION INTRAVENOUS; SUBCUTANEOUS at 14:41

## 2019-01-05 RX ADMIN — LACTULOSE 30 G: 10 SOLUTION ORAL at 14:40

## 2019-01-05 RX ADMIN — POTASSIUM CHLORIDE 10 MEQ: 7.46 INJECTION, SOLUTION INTRAVENOUS at 11:18

## 2019-01-05 RX ADMIN — POVIDONE IODINE 10%: 100 LIQUID TOPICAL at 12:09

## 2019-01-05 RX ADMIN — INSULIN LISPRO 6 UNITS: 100 INJECTION, SOLUTION INTRAVENOUS; SUBCUTANEOUS at 16:41

## 2019-01-05 RX ADMIN — TAZOBACTAM SODIUM AND PIPERACILLIN SODIUM 4.5 G: 500; 4 INJECTION, SOLUTION INTRAVENOUS at 05:06

## 2019-01-05 RX ADMIN — LACTULOSE 30 G: 10 SOLUTION ORAL at 21:57

## 2019-01-05 RX ADMIN — SODIUM CHLORIDE, PRESERVATIVE FREE 3 ML: 5 INJECTION INTRAVENOUS at 02:03

## 2019-01-05 RX ADMIN — PRAMIPEXOLE DIHYDROCHLORIDE 1 MG: 0.25 TABLET ORAL at 02:04

## 2019-01-05 RX ADMIN — GABAPENTIN 300 MG: 300 CAPSULE ORAL at 21:58

## 2019-01-05 RX ADMIN — POTASSIUM CHLORIDE 20 MEQ: 750 CAPSULE, EXTENDED RELEASE ORAL at 21:57

## 2019-01-05 RX ADMIN — DEXTROAMPHETAMINE SACCHARATE, AMPHETAMINE ASPARTATE MONOHYDRATE, DEXTROAMPHETAMINE SULFATE AND AMPHETAMINE SULFATE 30 MG: 3.75; 3.75; 3.75; 3.75 CAPSULE, EXTENDED RELEASE ORAL at 16:43

## 2019-01-05 NOTE — THERAPY EVALUATION
Acute Care - Speech Language Pathology   Swallow Initial Evaluation [facility]     Patient Name: Abe Phillips  : 1955  MRN: [id number]  Today's Date: 2019               Admit Date: 2019    Visit Dx:     ICD-10-CM ICD-9-CM   1. Somnolence R40.0 780.09   2. Hyperglycemia R73.9 790.29   3. Lactic acidosis E87.2 276.2   4. Sepsis, due to unspecified organism (CMS/HCC) A41.9 038.9     995.91   5. Renal insufficiency N28.9 593.9   6. History of cirrhosis of liver Z87.19 V12.79     Patient Active Problem List   Diagnosis   • Sepsis (CMS/HCC)   • FUO (fever of unknown origin)   • Type 2 diabetes mellitus with hyperglycemia (CMS/HCC)   • OMAR (acute kidney injury) (CMS/HCC)   • Lactic acidosis   • Dehydration   • Elevated LFTs   • Thrombocytopenia (CMS/HCC)   • PVD (peripheral vascular disease) (CMS/HCC)   • JAIME (obstructive sleep apnea)   • A-fib (CMS/HCC)   • Cirrhosis of liver (CMS/HCC)   • Chronic congestive heart failure (CMS/HCC)   • Diabetic ulcer of right heel (CMS/HCC)   • Narcolepsy   • Encephalopathy acute     Past Medical History:   Diagnosis Date   • A-fib (CMS/HCC)    • ADD (attention deficit disorder)    • Atrial flutter (CMS/HCC)    • Cellulitis    • CHF (congestive heart failure) (CMS/HCC)    • Cirrhosis of liver (CMS/HCC)    • Constipation    • Depression    • Diabetes mellitus (CMS/HCC)    • Diabetic ulcer of right foot (CMS/HCC)    • Disease of thyroid gland    • Edema    • Fatty liver    • Hepatitis B    • Hypokalemia    • Insomnia    • Lymphedema    • Narcolepsy    • Neuropathy    • Obesity    • JAIME on CPAP    • PVD (peripheral vascular disease) (CMS/HCC)    • RLS (restless legs syndrome)    • Skin cancer    • Tardive dyskinesia    • Tremor of both hands    • Yeast infection      Past Surgical History:   Procedure Laterality Date   • ACHILLES TENDON REPAIR     • CARDIAC ABLATION     • CHOLECYSTECTOMY     • LASER ABLATION      VEIN RLE        SWALLOW EVALUATION (last 72 hours)      SLP  Adult Swallow Evaluation     Row Name 01/05/19 1200                   Rehab Evaluation    Document Type  evaluation  -AW        Subjective Information  no complaints  -AW        Patient Observations  alert;cooperative  -AW        Patient/Family Observations  wife and dtr present  -AW        Patient Effort  excellent  -AW        Symptoms Noted During/After Treatment  none  -AW           General Information    Patient Profile Reviewed  yes  -AW        Pertinent History Of Current Problem  sepsis  -AW        Current Method of Nutrition  NPO  -AW        Precautions/Limitations, Vision  WFL  -AW        Precautions/Limitations, Hearing  WFL  -AW        Prior Level of Function-Communication  WFL  -AW        Prior Level of Function-Swallowing  no diet consistency restrictions  -AW        Plans/Goals Discussed with  patient and family  -AW        Barriers to Rehab  none identified  -AW        Patient's Goals for Discharge  return to PO diet  -AW           Pain Assessment    Additional Documentation  Pain Scale: Numbers Pre/Post-Treatment (Group)  -AW           Pain Scale: Numbers Pre/Post-Treatment    Pain Scale: Numbers, Pretreatment  0/10 - no pain  -AW        Pain Scale: Numbers, Post-Treatment  0/10 - no pain  -AW           Oral Motor and Function    Dentition Assessment  natural, present and adequate  -AW        Secretion Management  WNL/WFL  -AW        Mucosal Quality  moist, healthy  -AW        Volitional Swallow  WFL  -AW        Volitional Cough  WFL  -AW           Oral Musculature and Cranial Nerve Assessment    Oral Motor General Assessment  WFL  -AW           General Eating/Swallowing Observations    Respiratory Support Currently in Use  room air  -AW        Eating/Swallowing Skills  self-fed  -AW        Positioning During Eating  upright 90 degree;upright in bed  -AW        Utensils Used  cup;straw;spoon  -AW        Consistencies Trialed  regular textures;pureed;thin liquids  -AW           Clinical Swallow Eval     Oral Prep Phase  WFL  -AW        Oral Transit  WFL  -AW        Oral Residue  WFL  -AW        Pharyngeal Phase  WFL  -AW        Esophageal Phase  unremarkable  -AW        Clinical Swallow Evaluation Summary  Pt shows no s/s pharyngeal dysphagia. Resume regular/thin diet.  -AW           Clinical Impression    SLP Swallowing Diagnosis  functional oral phase;functional pharyngeal phase  -AW        Functional Impact  no impact on function  -AW        Swallow Criteria for Skilled Therapeutic Interventions Met  no problems identified which require skilled intervention  -AW           Recommendations    Therapy Frequency (Swallow)  evaluation only  -AW        SLP Diet Recommendation  regular textures;thin liquids  -AW        SLP Rec. for Method of Medication Administration  meds whole;with thin liquids  -AW          User Key  (r) = Recorded By, (t) = Taken By, (c) = Cosigned By    Initials Name Effective Dates    Anisa Norris MS CCC-SLP 06/22/15 -           EDUCATION  The patient has been educated in the following areas:   Dysphagia (Swallowing Impairment).    SLP Recommendation and Plan  SLP Swallowing Diagnosis: functional oral phase, functional pharyngeal phase  SLP Diet Recommendation: regular textures, thin liquids              Swallow Criteria for Skilled Therapeutic Interventions Met: no problems identified which require skilled intervention        Therapy Frequency (Swallow): evaluation only          Plan of Care Reviewed With: patient, family  Plan of Care Review  Plan of Care Reviewed With: patient, family  Progress: improving           Time Calculation:   Time Calculation- SLP     Row Name 01/05/19 1218             Time Calculation- SLP    SLP Start Time  1130  -      SLP Received On  01/05/19  -        User Key  (r) = Recorded By, (t) = Taken By, (c) = Cosigned By    Initials Name Provider Type    Anisa Norris MS CCC-SLP Speech and Language Pathologist          Therapy Charges for Today      Code Description Service Date Service Provider Modifiers Qty    [id number]  ST EVAL ORAL PHARYNG SWALLOW 3 1/5/2019 Anisa Sampson, MS CCC-SLP GN 1               Anisa Sampson MS CCC-SLP  1/5/2019

## 2019-01-05 NOTE — PLAN OF CARE
Problem: Patient Care Overview  Goal: Plan of Care Review  Outcome: Ongoing (interventions implemented as appropriate)   01/05/19 1015   Coping/Psychosocial   Plan of Care Reviewed With patient;other (see comments)  (Daniel RN)   Plan of Care Review   Progress no change   OTHER   Outcome Summary WOC nurse consulted to assess wound right heel. Pt has chronic diabetic ulcer right heel. Wound cleaned with NS, painted with Betadine, covered with foam dressing. Black heel boots to be applied. Pt has mild redness bilateral groin regions; Z-guard in place at this time. Suggest using Inter-dry to groin areas, changing Q 5 days and prn soiling. Pt on waffle mattress. WOC nurse will f/u. Please contact WOC nurse as needed for concerns.

## 2019-01-05 NOTE — PLAN OF CARE
Problem: Patient Care Overview  Goal: Plan of Care Review  Outcome: Ongoing (interventions implemented as appropriate)   01/05/19 1217   Coping/Psychosocial   Plan of Care Reviewed With patient;family   Plan of Care Review   Progress improving   SLP evaluation completed. Will sign-off dysphagia. Please see note for further details and recommendations.

## 2019-01-05 NOTE — PROGRESS NOTES
[facility] Medicine Services  PROGRESS NOTE    Patient Name: Abe Phillips  : 1955  MRN: [id number]    Date of Admission: 2019  Length of Stay: 1  Primary Care Physician: Provider, No Known    Subjective   Subjective     CC:  sepsis    HPI:  Patient somnolent this morning. Febrile on presentation but none since admission. Tachycardia improving. Patient very somnolent this morning.     Review of Systems   Unable to obtain due AMS    Objective   Objective     Vital Signs:   Temp:  [96.5 °F (35.8 °C)-101.3 °F (38.5 °C)] 96.5 °F (35.8 °C)  Heart Rate:  [] 80  Resp:  [16-18] 16  BP: ()/(54-91) 123/66        Physical Exam:  General: drowsy and falls asleep during exam/talking  Head: AT/NC  Eyes: no scleral icterus, PERRL  Neck: trachea midline  CV: RRR, no murmurs, no LE edema  Lungs: CTAB, no wheezing or crackles. Normal respiratory effort  Abd: Soft, non-tender, non-distedned, bowel sounds normoactive.  Neuro: No gross focal deficits. CN II-XII intact  Psych: Oriented to person and place.  Falls asleep during conversation.   Skin: no lesions or rashes noted on exposed arms and legs      Results Reviewed:  I have personally reviewed current lab, radiology, and data and agree.    Results from last 7 days   Lab Units  19   0816  19   2142   WBC 10*3/mm3  11.53*  11.88*   HEMOGLOBIN g/dL  15.7  16.7   HEMATOCRIT %  45.9  48.2   PLATELETS 10*3/mm3  112*  131*     Results from last 7 days   Lab Units  19   0816  19   2156  19   2142   SODIUM mmol/L  135   --   123*   POTASSIUM mmol/L  3.3*   --   3.7   CHLORIDE mmol/L  95*   --   80*   CO2 mmol/L  28.0   --   29.0   BUN mg/dL  57*   --   70*   CREATININE mg/dL  1.51*   --   2.05*   GLUCOSE mg/dL  163*   --   580*   CALCIUM mg/dL  9.8   --   10.7*   ALT (SGPT) U/L  32   --   44*   AST (SGOT) U/L  31   --   37*   TROPONIN I ng/mL  0.083*  0.04   --      Estimated Creatinine Clearance: 72.7 mL/min (A)  (by C-G formula based on SCr of 1.51 mg/dL (H)).    No results found for: BNP    Microbiology Results Abnormal     Procedure Component Value - Date/Time    Blood Culture ID, PCR - Blood, Arm, Right [[id number]]  (Abnormal) Collected:  01/04/19 2130    Lab Status:  Final result Specimen:  Blood from Arm, Right Updated:  01/05/19 1108     BCID, PCR Streptococcus agalactiae (Group B). Identification by BCID PCR.    Blood Culture - Blood, Arm, Left [[id number]] Collected:  01/04/19 2135    Lab Status:  Preliminary result Specimen:  Blood from Arm, Left Updated:  01/05/19 1000     Blood Culture No growth at less than 24 hours    Blood Culture - Blood, Arm, Right [[id number]]  (Abnormal) Collected:  01/04/19 2130    Lab Status:  Preliminary result Specimen:  Blood from Arm, Right Updated:  01/05/19 0957     Blood Culture Abnormal Stain     Gram Stain Anaerobic Bottle Gram positive cocci in chains    Influenza Antigen, Rapid - Swab, Nasopharynx [[id number]]  (Normal) Collected:  01/04/19 2159    Lab Status:  Final result Specimen:  Swab from Nasopharynx Updated:  01/04/19 2225     Influenza A Ag, EIA Negative     Influenza B Ag, EIA Negative          Imaging Results (last 24 hours)     Procedure Component Value Units Date/Time    CT Abdomen Pelvis Without Contrast [[id number]] Collected:  01/05/19 0025     Updated:  01/05/19 0108    Narrative:       EXAM:    CT Abdomen and Pelvis Without Contrast     EXAM DATE/TIME:    1/5/2019 12:25 AM     CLINICAL HISTORY:    63 years old, male; Sepsis, unspecified organism; Acidosis; Disorder of kidney   and ureter, unspecified; Somnolence; Hyperglycemia, unspecified; Personal   history of other diseases of the digestive system; Signs and symptoms; Fever;   Additional info: Fuo, abdomen with diffuse tenderness     TECHNIQUE:    Axial computed tomography images of the abdomen and pelvis without contrast.    All CT scans at this facility use at least one of these dose optimization    techniques: automated exposure control; mA and/or kV adjustment per patient   size (includes targeted exams where dose is matched to clinical indication); or   iterative reconstruction.    Coronal and sagittal reformatted images were created and reviewed.     COMPARISON:    No relevant prior studies available.     FINDINGS:    Lower thorax:  Lung bases and chest findings reported separately.     ABDOMEN:    Liver:  Hepatic cirrhosis. No definite focal hepatic lesion on noncontrast   imaging.    Gallbladder and bile ducts:  Previous cholecystectomy. No biliary ductal   dilatation.     Pancreas: No acute abnormality. No ductal dilation.    Spleen:  Prominent spleen measuring 16 cm in length.    Adrenals: No acute abnormality. No mass.    Kidneys and ureters: No acute abnormality. No obstructing calculi. No   hydronephrosis.    Stomach and bowel:  No significant large or small bowel distention. Moderate   amount of stool within the proximal colon. No evidence of diverticulitis.    Appendix: No findings to suggest acute appendicitis.     PELVIS:    Bladder: No acute abnormality. No stones.    Reproductive: Unremarkable as visualized.     ABDOMEN and PELVIS:    Intraperitoneal space: No significant fluid collection. No free air.    Bones/joints: No acute osseous abnormality. No dislocation.    Soft tissues:  Bilateral gynecomastia.    Vasculature:  Gastroesophageal varices. Upper abdominal venous collaterals.   Recanalized umbilical vein. Aorta has normal caliber.   Lymph nodes: No enlarged lymph nodes.       Impression:       1. Hepatic cirrhosis and portal hypertension with splenomegaly and venous   collaterals.   2. Previous cholecystectomy.   3. Additional findings as described above.     THIS DOCUMENT HAS BEEN ELECTRONICALLY SIGNED BY JOY HAUSER JR. MD    CT Chest Without Contrast [[id number]] Collected:  01/05/19 0025     Updated:  01/05/19 0102    Narrative:       EXAM:    CT Chest Without Contrast     EXAM  DATE/TIME:    1/5/2019 12:25 AM     CLINICAL HISTORY:    63 years old, male; Sepsis, unspecified organism; Acidosis; Disorder of kidney   and ureter, unspecified; Somnolence; Hyperglycemia, unspecified; Personal   history of other diseases of the digestive system; Signs and symptoms; Fever;   Additional info: Fuo     TECHNIQUE:    Axial computed tomography images of the chest without intravenous contrast.    All CT scans at this facility use at least one of these dose optimization   techniques: automated exposure control; mA and/or kV adjustment per patient   size (includes targeted exams where dose is matched to clinical indication); or   iterative reconstruction.    Coronal and sagittal reformatted images were created and reviewed.     COMPARISON:    CR XR CHEST 1 VW 1/4/2019 9:58 PM     FINDINGS:    Lungs: No significant or acute findings. No consolidation.     Pleural space: No pneumothorax. No significant pleural effusion.    Heart:  Heart size is normal. Pericardial calcifications suggestive of prior   pericarditis.    Aorta: No acute abnormality. No aortic aneurysm.    Lymph nodes: No enlarged lymph nodes.    Bones/joints: No acute osseous abnormality. No acute fracture.    Soft tissues:  Bilateral gynecomastia.       Impression:       1. No evidence of acute cardiopulmonary disease.   2. Pericardial calcifications suggestive of prior pericarditis.     THIS DOCUMENT HAS BEEN ELECTRONICALLY SIGNED BY JOY HAUSER JR. MD    CT Head Without Contrast [[id number]] Collected:  01/04/19 2239     Updated:  01/04/19 2300    Narrative:       EXAM:    CT Head Without Contrast     EXAM DATE/TIME:    1/4/2019 10:39 PM     CLINICAL HISTORY:    63 years old, male; Signs and symptoms; Altered mental status/memory loss;   Confusion or disorientation; Additional info: Confusion/delirium, altered loc,   unexplained     TECHNIQUE:    Axial computed tomography images of the head/brain without contrast.    All CT scans at this  facility use at least one of these dose optimization   techniques: automated exposure control; mA and/or kV adjustment per patient   size (includes targeted exams where dose is matched to clinical indication); or   iterative reconstruction.     COMPARISON:    No relevant prior studies available.     FINDINGS:    Brain: No acute abnormality. Mild diffuse atrophy. No edema or mass effect. No   hemorrhage.     Ventricles: No acute abnormality. No significant ventriculomegaly.    Bones/joints: No acute osseous abnormality. No acute fracture.    Sinuses: No significant or acute abnormality. No air-fluid levels.    Mastoid air cells: No acute abnormality. No significant mastoid effusion.    Soft tissues: No significant soft tissue abnormalities.       Impression:       No evidence of acute intracranial abnormality.     THIS DOCUMENT HAS BEEN ELECTRONICALLY SIGNED BY JOY HAUSER JR. MD    XR Chest 1 View [[id number]] Collected:  01/04/19 2204     Updated:  01/04/19 2256    Narrative:       EXAM:    XR Chest, 1 View     EXAM DATE/TIME:    1/4/2019 10:04 PM     CLINICAL HISTORY:    63 years old, male; Signs and symptoms; Other: Tachycardia     TECHNIQUE:    XR of the chest, 1 view.     COMPARISON:    No relevant prior studies available.     FINDINGS:    Tubes, catheters and devices:  Monitor leads project over the chest.    Lungs: No significant or acute findings. No consolidation.    Pleural space: Unremarkable. No pleural effusion. No pneumothorax.    Heart/Mediastinum: Heart size is normal.    Bones/joints: No acute osseous abnormality.       Impression:       No evidence of acute cardiopulmonary disease.     THIS DOCUMENT HAS BEEN ELECTRONICALLY SIGNED BY JOY HAUSER JR. MD               I have reviewed the medications:    Current Facility-Administered Medications:   •  [START ON 1/6/2019] ! Vancomycin trough 1/6 at 1130.  Please hold vancomycin dose 1/6 at 1200 until pharmacy can evaluate level, , Does not apply, Once,  Shaan Mahoney, [facility]  •  acetaminophen (TYLENOL) tablet 650 mg, 650 mg, Oral, Q4H PRN, Amber Butler DO  •  amphetamine-dextroamphetamine XR (ADDERALL XR) 24 hr capsule 30 mg (PATIENT SUPPLED), 30 mg, Oral, Q PM, Diamond Woo MD  •  amphetamine-dextroamphetamine XR (ADDERALL XR) 24 hr capsule 30 mg (PATIENT SUPPLIED), 30 mg, Oral, Q PM, Raghu Mendez [facility]  •  [START ON 1/6/2019] amphetamine-dextroamphetamine XR (ADDERALL XR) 24 hr capsule 60 mg (PATIENT SUPPLIED), 60 mg, Oral, QAM, Diamond Woo MD, 60 mg at 01/05/19 1252  •  aspirin chewable tablet 81 mg, 81 mg, Oral, Daily, Amber Butler DO, Stopped at 01/05/19 0935  •  dextrose (D50W) 25 g/ 50mL Intravenous Solution 25 g, 25 g, Intravenous, Q15 Min PRN, Diamond Woo MD  •  dextrose (D50W) 25 g/ 50mL Intravenous Solution 25-50 mL, 25-50 mL, Intravenous, Q30 Min PRN, Amber Butler DO  •  dextrose (GLUTOSE) oral gel 15 g, 15 g, Oral, Q15 Min PRN, Diamond Woo MD  •  gabapentin (NEURONTIN) capsule 300 mg, 300 mg, Oral, BID, Amber Butler DO, Stopped at 01/05/19 0935  •  glucagon (human recombinant) (GLUCAGEN DIAGNOSTIC) injection 1 mg, 1 mg, Subcutaneous, PRN, Diamond Woo MD  •  heparin (porcine) 5000 UNIT/ML injection 5,000 Units, 5,000 Units, Subcutaneous, Q8H, Amber Butler DO, 5,000 Units at 01/05/19 0505  •  Influenza Vac Subunit Quad (FLUCELVAX) injection 0.5 mL, 0.5 mL, Intramuscular, During Hospitalization, Amber Butler DO  •  insulin regular (humuLIN R,novoLIN R) injection 0-7 Units, 0-7 Units, Subcutaneous, Q6H, Diamond Woo MD  •  lactulose enema, 300 mL, Rectal, TID, Diamond Woo MD  •  levothyroxine (SYNTHROID, LEVOTHROID) tablet 75 mcg, 75 mcg, Oral, Daily, Amber Butler DO, 75 mcg at 01/05/19 0505  •  Magnesium Sulfate 2 gram Bolus, followed by 8 gram infusion (total Mg dose 10 grams)- Mg less than or equal to 1mg/dL, 2 g, Intravenous, PRN **OR** Magnesium Sulfate 2 gram / 50mL  Infusion (GIVE X 3 BAGS TO EQUAL 6GM TOTAL DOSE) - Mg 1.1 - 1.5 mg/dl, 2 g, Intravenous, PRN **OR** Magnesium Sulfate 4 gram infusion- Mg 1.6-1.9 mg/dL, 4 g, Intravenous, PRN, Carrie, Amber G, DO  •  Pharmacy to Dose Vancomycin + Zosyn, , Does not apply, Continuous PRN, Carrie, Amber G, DO  •  piperacillin-tazobactam (ZOSYN) 4.5 g in iso-osmotic dextrose 100 mL IVPB (premix), 4.5 g, Intravenous, Q8H, Shaan Mahoney, RPH, 4.5 g at 01/05/19 1313  •  potassium chloride (MICRO-K) CR capsule 40 mEq, 40 mEq, Oral, PRN **OR** potassium chloride (KLOR-CON) packet 40 mEq, 40 mEq, Oral, PRN **OR** potassium chloride 10 mEq in 100 mL IVPB, 10 mEq, Intravenous, Q1H PRN, Carrie, Amber G, DO, Last Rate: 100 mL/hr at 01/05/19 1118, 10 mEq at 01/05/19 1118  •  povidone-iodine (BETADINE) external solution, , Topical, Daily, Diamond Woo MD  •  pramipexole (MIRAPEX) tablet 1 mg, 1 mg, Oral, TID, Carrie, Amber G, DO, Stopped at 01/05/19 0936  •  [COMPLETED] Insert peripheral IV, , , Once **AND** sodium chloride 0.9 % flush 10 mL, 10 mL, Intravenous, PRN, Ruth Ann Felder MD  •  sodium chloride 0.9 % flush 3 mL, 3 mL, Intravenous, Q12H, Carrie, Amber G, DO, 3 mL at 01/05/19 1011  •  sodium chloride 0.9 % flush 3-10 mL, 3-10 mL, Intravenous, PRN, Carrie, Amber G, DO  •  vancomycin 1500 mg/500 mL 0.9% NS IVPB ([facility]), 12 mg/kg, Intravenous, Q12H, Shaan Mahoney, RPH, 1,500 mg at 01/05/19 1312  •  venlafaxine (EFFEXOR) tablet 150 mg, 150 mg, Oral, BID With Meals, Amber Butler DO, Stopped at 01/05/19 1010      Assessment/Plan   Assessment / Plan     Active Hospital Problems    Diagnosis Date Noted   • **Sepsis (CMS/[facility]) [A41.9] 01/04/2019   • Encephalopathy acute [G93.40] 01/05/2019   • FUO (fever of unknown origin) [R50.9] 01/04/2019   • Type 2 diabetes mellitus with hyperglycemia (CMS/[facility]) [E11.65] 01/04/2019   • OMAR (acute kidney injury) (CMS/[facility]) [N17.9] 01/04/2019   • Lactic acidosis [E87.2] 01/04/2019   •  Dehydration [E86.0] 01/04/2019   • Elevated LFTs [R94.5] 01/04/2019   • Thrombocytopenia (CMS/HCC) [D69.6] 01/04/2019   • PVD (peripheral vascular disease) (CMS/HCC) [I73.9] 01/04/2019   • JAIME (obstructive sleep apnea) [G47.33] 01/04/2019   • A-fib (CMS/HCC) [I48.91] 01/04/2019   • Cirrhosis of liver (CMS/HCC) [K74.60] 01/04/2019   • Chronic congestive heart failure (CMS/HCC) [I50.9] 01/04/2019   • Diabetic ulcer of right heel (CMS/HCC) [E11.621, L97.419] 01/04/2019   • Narcolepsy [G47.419] 01/04/2019          Brief Hospital Course to date:  Abe Phillips is a 63 y.o. male with cirrhosis who presents with encephalopathy and hyperglycemia.     Sepsis  - Noted to be febrile, hypotensive and tachycardic on presentation. WBC was elevated. Procal elevated.  - Unknown source. Blood cultures growing gram positive cocci x 1 bottle.   - Continue vanc and zosyn.   - CT chest/abd/pelvis negative for source of infection.  - Echo ordered to evaluate for endocarditis   - Consult ID.     Encephalopathy  Narcolepsy  - Sepsis vs. Hepatic encephalopathy vs. Narcolepsy. Wife states that patient does when he is having narcolepsy. Has been out of some of his amphetamines for this since he recently moved here.   - Will continue lactulose given history of cirrhosis.   - Will start patient supplied amphetamines. Limited quantity as patient needs refills and not in formulary. May need to start ritalin while in the hospital     Decompensated cirrhosis with varices and ascites  Chronic hepatitis B  - Patient reports chronic hep B as cause of cirrhosis.   - On diuretics at home. Will hold in setting of sepsis.   - Start lactulose for encephalopathy.     OMAR:  - Creatinine elevated. No records available for comparison as patient recently moved form [place].   - repeat BMP in the am.   - will attempt to obtain records from OSH.     Lactic acidosis  - resolved with fluids, treatment.     Hyperglycemia  - Discontinued to insulin ggt. Will start FSBG  qAC/hs.   - SSI.  - Levemir tonight. May require significant does of insulin as patient on 115u qam and 120uqpm    JAIME  - CPAP    DVT Prophylaxis:  SQH    Disposition: I expect the patient to be discharged TBD    CODE STATUS:   Code Status and Medical Interventions:   Ordered at: 01/05/19 0030     Level Of Support Discussed With:    Patient     Code Status:    CPR     Medical Interventions (Level of Support Prior to Arrest):    Full         Electronically signed by Diamond Woo MD, 01/05/19, 1:32 PM.

## 2019-01-05 NOTE — H&P
[facility] Medicine Services  HISTORY AND PHYSICAL    Patient Name: Abe Phillips  : 1955  MRN: [id number]  Primary Care Physician: Provider, No Known  Date of admission: 2019      Subjective   Subjective     Chief Complaint:  Altered Mental Status and Hyperglycemia    HPI:  Abe Phillips is a 63 y.o. male who presented to the ED with complaints of altered mental status and hyperglycemia.  All information for H&P obtained from son-in law.  Family reports that patient moved from [place] to [place] last week to be near his daughter.  Yesterday he developed some urinary incontinence that has continued today.  Today he went shopping with his family and was doing well until around 1900 when he became confused, generalized pallor, developed tremors in his upper extremities, and became more somnolent.  Family recorded a blood glucose of 597 and gave him some of his wife's insulin (30 units of Novolog at 1915).  Family states that he forgot to bring his insulin when he moved, and has not taken any insulin in the last four days.  CT of the head is negative, chest xray is negative, UA is negative.  Upon arrival patient had a fever of 101.3.  Family is unsure of any sick contacts.  He triggered sepsis in the ED and was started on vancomycin and zosyn.  Patient is being admitted to the Hospitalist for further evaluation and management.    Review of Systems   Unable to perform ROS: Mental status change   Constitutional: Positive for fever.   Genitourinary:        Incontinence   Psychiatric/Behavioral: Positive for confusion.        Otherwise 10-system ROS reviewed and is negative except as mentioned in the HPI.    Personal History     Past Medical History:   Diagnosis Date   • A-fib (CMS/HCC)    • ADD (attention deficit disorder)    • Atrial flutter (CMS/HCC)    • Cellulitis    • CHF (congestive heart failure) (CMS/HCC)    • Cirrhosis of liver (CMS/HCC)    • Constipation    • Depression    •  Diabetes mellitus (CMS/HCC)    • Diabetic ulcer of right foot (CMS/HCC)    • Disease of thyroid gland    • Edema    • Fatty liver    • Hepatitis B    • Hypokalemia    • Insomnia    • Lymphedema    • Narcolepsy    • Neuropathy    • Obesity    • JAIME on CPAP    • PVD (peripheral vascular disease) (CMS/HCC)    • RLS (restless legs syndrome)    • Skin cancer    • Tardive dyskinesia    • Tremor of both hands    • Yeast infection        Past Surgical History:   Procedure Laterality Date   • ACHILLES TENDON REPAIR     • CARDIAC ABLATION     • CHOLECYSTECTOMY     • LASER ABLATION      VEIN RLE       Family History: family history is not on file. Otherwise pertinent FHx was reviewed and unremarkable.     Social History:  reports that  has never smoked. He does not have any smokeless tobacco history on file. He reports that he drinks alcohol. He reports that he does not use drugs.  Social History     Social History Narrative   • Not on file       Medications:    Available home medication information reviewed.    (Not in a hospital admission)    No Known Allergies    Objective   Objective     Vital Signs:   Temp:  [98.3 °F (36.8 °C)-101.3 °F (38.5 °C)] 101.3 °F (38.5 °C)  Heart Rate:  [100-111] 100  Resp:  [16] 16  BP: (104-135)/(54-91) 104/74        Physical Exam   Constitutional: He is oriented to person, place, and time. He appears well-developed. No distress.   HENT:   Head: Normocephalic.   Eyes: Pupils are equal, round, and reactive to light.   Neck: Normal range of motion.   Cardiovascular: Regular rhythm, normal heart sounds and intact distal pulses. Exam reveals no gallop and no friction rub.   No murmur heard.  tachycardia   Pulmonary/Chest: Effort normal and breath sounds normal. No stridor. No respiratory distress. He has no wheezes. He has no rales.   Abdominal: Soft. Bowel sounds are normal. He exhibits no distension and no mass. There is no tenderness. There is no rebound and no guarding.   Musculoskeletal:  Normal range of motion. He exhibits edema. He exhibits no tenderness or deformity.   Neurological: He is oriented to person, place, and time.   Somnolent, will open eyes to voice and immediately falls back to sleep.  Is oriented x 3 but is unable to carry on a conversation, follows simple commands, moves all extremities   Skin: Skin is warm and dry. No rash noted. He is not diaphoretic.   chronic venostasis dermatitis and chronic edema to BLE.  Left heel with a quarter sized ulcer, no erythema or drainage.        Results Reviewed:  I have personally reviewed current lab, radiology, and data and agree.    Results from last 7 days   Lab Units  01/04/19   2142   WBC 10*3/mm3  11.88*   HEMOGLOBIN g/dL  16.7   HEMATOCRIT %  48.2   PLATELETS 10*3/mm3  131*     Results from last 7 days   Lab Units  01/04/19   2156  01/04/19   2142   SODIUM mmol/L   --   123*   POTASSIUM mmol/L   --   3.7   CHLORIDE mmol/L   --   80*   CO2 mmol/L   --   29.0   BUN mg/dL   --   70*   CREATININE mg/dL   --   2.05*   GLUCOSE mg/dL   --   580*   CALCIUM mg/dL   --   10.7*   ALT (SGPT) U/L   --   44*   AST (SGOT) U/L   --   37*   TROPONIN I ng/mL  0.04   --      Estimated Creatinine Clearance: 54.1 mL/min (A) (by C-G formula based on SCr of 2.05 mg/dL (H)).  Brief Urine Lab Results  (Last result in the past 365 days)      Color   Clarity   Blood   Leuk Est   Nitrite   Protein   CREAT   Urine HCG        01/04/19 2155 Yellow Clear Negative Negative Negative Negative             No results found for: BNP  Imaging Results (last 24 hours)     Procedure Component Value Units Date/Time    CT Head Without Contrast [[id number]] Collected:  01/04/19 2239     Updated:  01/04/19 2300    Narrative:       EXAM:    CT Head Without Contrast     EXAM DATE/TIME:    1/4/2019 10:39 PM     CLINICAL HISTORY:    63 years old, male; Signs and symptoms; Altered mental status/memory loss;   Confusion or disorientation; Additional info: Confusion/delirium, altered loc,    unexplained     TECHNIQUE:    Axial computed tomography images of the head/brain without contrast.    All CT scans at this facility use at least one of these dose optimization   techniques: automated exposure control; mA and/or kV adjustment per patient   size (includes targeted exams where dose is matched to clinical indication); or   iterative reconstruction.     COMPARISON:    No relevant prior studies available.     FINDINGS:    Brain: No acute abnormality. Mild diffuse atrophy. No edema or mass effect. No   hemorrhage.     Ventricles: No acute abnormality. No significant ventriculomegaly.    Bones/joints: No acute osseous abnormality. No acute fracture.    Sinuses: No significant or acute abnormality. No air-fluid levels.    Mastoid air cells: No acute abnormality. No significant mastoid effusion.    Soft tissues: No significant soft tissue abnormalities.       Impression:       No evidence of acute intracranial abnormality.     THIS DOCUMENT HAS BEEN ELECTRONICALLY SIGNED BY JOY HAUSER JR. MD    XR Chest 1 View [[id number]] Collected:  01/04/19 2204     Updated:  01/04/19 2256    Narrative:       EXAM:    XR Chest, 1 View     EXAM DATE/TIME:    1/4/2019 10:04 PM     CLINICAL HISTORY:    63 years old, male; Signs and symptoms; Other: Tachycardia     TECHNIQUE:    XR of the chest, 1 view.     COMPARISON:    No relevant prior studies available.     FINDINGS:    Tubes, catheters and devices:  Monitor leads project over the chest.    Lungs: No significant or acute findings. No consolidation.    Pleural space: Unremarkable. No pleural effusion. No pneumothorax.    Heart/Mediastinum: Heart size is normal.    Bones/joints: No acute osseous abnormality.       Impression:       No evidence of acute cardiopulmonary disease.     THIS DOCUMENT HAS BEEN ELECTRONICALLY SIGNED BY JOY HAUSER JR. MD             Assessment/Plan   Assessment / Plan     Active Hospital Problems    Diagnosis Date Noted   • **Sepsis (CMS/[facility])  [A41.9] 01/04/2019   • FUO (fever of unknown origin) [R50.9] 01/04/2019     Priority: High   • OMAR (acute kidney injury) (CMS/HCC) [N17.9] 01/04/2019     Priority: High   • Type 2 diabetes mellitus with hyperglycemia (CMS/HCC) [E11.65] 01/04/2019     Priority: Medium   • Lactic acidosis [E87.2] 01/04/2019     Priority: Medium   • Dehydration [E86.0] 01/04/2019     Priority: Medium   • Encephalopathy acute [G93.40] 01/05/2019   • Elevated LFTs [R94.5] 01/04/2019   • Thrombocytopenia (CMS/HCC) [D69.6] 01/04/2019   • PVD (peripheral vascular disease) (CMS/HCC) [I73.9] 01/04/2019   • JAIME (obstructive sleep apnea) [G47.33] 01/04/2019   • A-fib (CMS/HCC) [I48.91] 01/04/2019   • Cirrhosis of liver (CMS/HCC) [K74.60] 01/04/2019   • Chronic congestive heart failure (CMS/HCC) [I50.9] 01/04/2019   • Diabetic ulcer of right heel (CMS/HCC) [E11.621, L97.419] 01/04/2019   • Narcolepsy [G47.419] 01/04/2019       ASSESSMENT AND PLAN:    1.  SIRS   -BC x 2   -vancomycin and zosyn   -IV fluids   -procalcitonin and lactic acid stat   -cbc, cmp in the am    2.  FUO   -consult ID in the am   -vancomycin and zosyn   -BC x 2   -CT of the chest   -CT of the abdomen and pelvis   -am labs   -??secondary to mirapex withdrawal.  Restart home meds    3.  Encephalopathy   -CT of the head is negative   -neuro checks   -npo    4.  OMAR   -was bolused 2 liters in the ED   -IV fluids   -urine studies   -cmp in the am     5.  Dehydration   -was bolused 2 liters of NS in the ED   -IV fluids   -am labs    6.  Lactic Acidosis   -2 liters of NS in the ED   -recheck lactic acid   -IV fluids    7.  Hyperglycemia with T2DM   -insulin gtt   -a1c in the am   -fsbg q 1 hour    8.  Elevated LFT's   -CT of the abdomen and pelvis    9.  History of cirrhosis of the liver   -ammonia wnl    10.  JAIME   -cpap   -hypoxic on abg but satting 94% on room air, mixed sample??    11.  Narcolepsy   -adderall    12.  Diabetic ulcer of the right heel   -consult WOC in the  am    13.  Afib   -not on anticoagulation    14.  Thrombocytopenia   -cbc in the am and monitor   -likely secondary to cirrhosis    15.  Chronic CHF   -strict I&O's      DVT prophylaxis:  heparin    CODE STATUS:    Code Status and Medical Interventions:   Ordered at: 01/05/19 0030     Level Of Support Discussed With:    Patient     Code Status:    CPR     Medical Interventions (Level of Support Prior to Arrest):    Full       Admission Status:  I believe this patient meets INPATIENT status due to the need for care which can only be reasonably provided in an hospital setting such as possible need for aggressive/expedited ancillary services and/or consultation services, IV medications, close physician monitoring, and/or procedures.  In such, I feel patient’s risk for adverse outcomes and need for care warrant INPATIENT evaluation and predict the patient’s care encounter to likely last beyond 2 midnights.      Electronically signed by ELMA Barker, 01/04/19, 11:55 PM.        Brief Attending Admission Attestation     I have seen and examined the patient, performing an independent face-to-face diagnostic evaluation with plan of care reviewed and developed with the advanced practice clinician (APC).      Brief Summary Statement/HPI:   Abe Phillips is a 63 y.o. male a past medical history significant for type 2 diabetes mellitus, hypothyroidism on narcolepsy, restless leg syndrome on Mirapex, cirrhosis of the liver, congestive heart failure who presents to the ED due to altered mental status and elevated blood glucose.  Patient is encephalopathic and largely noncontributory to history of present illness, history of present illness provided by son-in-law at bedside.  Son-in-law states that the patient has recently moved from [place] within the last few days.  Patient has reportedly not been taking any of his home medication since his move.  Yesterday, the patient was noted to have urinary incontinence which is  atypical.  Today the patient was noted to have tremors, confusion and urinary incontinence.  Patient's blood glucose was measured at home and he was found to have reading of 597.  The patient was given 30 units of NovoLog (prescribed to spouse) brought to the ED.    Attending Physical Exam:  Constitutional: Confused, awake  Eyes: PERRLA, sclerae anicteric, no conjunctival injection  HENT: NCAT, mucous membranes dry  Neck: Supple, no thyromegaly, no lymphadenopathy, trachea midline  Respiratory: Clear to auscultation bilaterally, nonlabored respirations   Cardiovascular: RRR, no murmurs, rubs, or gallops, palpable pedal pulses bilaterally  Gastrointestinal: Positive bowel sounds, soft, tender to palpation in the lower abdominal region, obese abdomen  Musculoskeletal: Bilateral ankle edema, no clubbing or cyanosis to extremities  Psychiatric: Confused  Neurologic: Oriented to person only, able to move upper and lower extremities spontaneously, otherwise unable to cooperate with neurologic exam. Cranial Nerves grossly intact, speech unintelligible  Skin: Chronic venous stasis findings on lower extremities, erythematous, cold to touch.  Ulcer to right heel without drainage        Brief Assessment/Plan :  See above for further detailed assessment and plan developed with APC which I have reviewed and/or edited.      Electronically signed by Amber Butler DO, 01/05/19, 12:39 AM.

## 2019-01-05 NOTE — ED PROVIDER NOTES
Subjective   Mr. Abe Phillips is a 63-year-old male presenting to the emergency department with complaints of hyperglycemia. The patient has slightly altered mental status, and thus the family provides the majority of the history. They state that the patient recently moved from [place] to [place] in order to be closer to his daughter, as his medical condition has been gradually declining. He forgot to bring his insulin, and as a result had not taken any in four days. He had a relatively normal day today prior to around 19:00. Earlier today, he had run some errands, eaten regularly, and displayed normal mentation, per family. However, around 19:00, he became incontinent and somnolent. His family also reports noticing tremors in the upper extremities and generalized pallor. They recorded a blood glucose level of 597 mg/dL, prompting them to give him a dosage of his wife's insulin (different from his current insulin treatment), 30 units of Novolog at 19:15. He denies any recent fevers, cough, dysuria, nausea, vomiting, abdominal pain, or chest pain. He has a history of fatty liver and liver cirrhosis. There are no other acute complaints at this time.        History provided by:  Patient  History limited by:  Mental status change  Hyperglycemia   Blood sugar level PTA:  597  Severity:  Moderate  Onset quality:  Gradual  Duration:  4 days  Timing:  Constant  Progression:  Worsening  Chronicity:  New  Diabetes status:  Controlled with insulin  Time since last antidiabetic medication:  4 days  Context: noncompliance    Relieved by:  None tried  Ineffective treatments:  None tried  Associated symptoms: fatigue and fever    Associated symptoms: no abdominal pain, no chest pain, no dysuria, no nausea and no vomiting    Risk factors: obesity        Review of Systems   Unable to perform ROS: Mental status change   Constitutional: Positive for fatigue and fever.   HENT: Negative.    Respiratory: Negative.  Negative for cough.   "  Cardiovascular: Negative.  Negative for chest pain.   Gastrointestinal: Negative.  Negative for abdominal pain, nausea and vomiting.   Genitourinary: Negative for dysuria.        Urinary incontinence   Musculoskeletal: Negative.    Skin: Positive for pallor.   Neurological: Positive for tremors.   Psychiatric/Behavioral:        Somnolent       Past Medical History:   Diagnosis Date   • A-fib (CMS/HCC)    • ADD (attention deficit disorder)    • Atrial flutter (CMS/HCC)    • Cellulitis    • CHF (congestive heart failure) (CMS/HCC)    • Cirrhosis of liver (CMS/HCC)    • Constipation    • Depression    • Diabetes mellitus (CMS/HCC)    • Diabetic ulcer of right foot (CMS/HCC)    • Disease of thyroid gland    • Edema    • Fatty liver    • Hepatitis B    • Hypokalemia    • Insomnia    • Lymphedema    • Narcolepsy    • Neuropathy    • Obesity    • JAIME on CPAP    • PVD (peripheral vascular disease) (CMS/HCC)    • RLS (restless legs syndrome)    • Skin cancer    • Tardive dyskinesia    • Tremor of both hands    • Yeast infection        No Known Allergies    Past Surgical History:   Procedure Laterality Date   • ACHILLES TENDON REPAIR     • CARDIAC ABLATION     • CHOLECYSTECTOMY     • LASER ABLATION      VEIN RLE       History reviewed. No pertinent family history.    Social History     Socioeconomic History   • Marital status:      Spouse name: Not on file   • Number of children: Not on file   • Years of education: Not on file   • Highest education level: Not on file   Tobacco Use   • Smoking status: Never Smoker   Substance and Sexual Activity   • Alcohol use: Yes     Comment: \"3 SCOTCH AND 2 BEERS PER WEEK\"   • Drug use: No         Objective   Physical Exam   Constitutional: He is oriented to person, place, and time. He appears well-developed and well-nourished. No distress.   Patient is somnolent, intermittently awake and answers questions. Often have to ask questions multiple times before the patient answers.  "   HENT:   Head: Normocephalic and atraumatic.   Nose: Nose normal.   Eyes: Conjunctivae are normal. No scleral icterus.   Neck: Normal range of motion. Neck supple.   Cardiovascular: Regular rhythm and normal heart sounds. Tachycardia present.   No murmur heard.  Pulmonary/Chest: Effort normal and breath sounds normal. No respiratory distress.   Abdominal: Soft. Bowel sounds are normal. There is no tenderness.   Musculoskeletal: Normal range of motion.   To the bilateral lower extremities, there is chronic venostasis dermatitis and chronic edema likely secondary to diabetes.   Neurological: He is alert and oriented to person, place, and time.   Patient answers all orientation questions correctly. Intermittent shaking of the upper extremities, particularly during episodes of increased somnolence.   Skin: Skin is warm and dry.   Extremities are all cool, most likely secondary to exposure. Dressing on the right foot is soaked, son-in-law states that this is urine. The underlying wound does not appear infected. There is no redness, swelling, or purulent drainage.   Psychiatric: He has a normal mood and affect. His behavior is normal.   Nursing note and vitals reviewed.      Critical Care  Performed by: Ruth Ann Felder MD  Authorized by: Ruth Ann Felder MD     Critical care provider statement:     Critical care time (minutes):  35    Critical care time was exclusive of:  Separately billable procedures and treating other patients    Critical care was necessary to treat or prevent imminent or life-threatening deterioration of the following conditions:  Sepsis and dehydration    Critical care was time spent personally by me on the following activities:  Blood draw for specimens, development of treatment plan with patient or surrogate, discussions with consultants, evaluation of patient's response to treatment, examination of patient, obtaining history from patient or surrogate, ordering and performing treatments  and interventions, ordering and review of laboratory studies, ordering and review of radiographic studies, pulse oximetry, re-evaluation of patient's condition and review of old charts             ED Course     Recent Results (from the past 24 hour(s))   Comprehensive Metabolic Panel    Collection Time: 01/04/19  9:42 PM   Result Value Ref Range    Glucose 580 (C) 70 - 100 mg/dL    BUN 70 (H) 9 - 23 mg/dL    Creatinine 2.05 (H) 0.60 - 1.30 mg/dL    Sodium 123 (L) 132 - 146 mmol/L    Potassium 3.7 3.5 - 5.5 mmol/L    Chloride 80 (C) 99 - 109 mmol/L    CO2 29.0 20.0 - 31.0 mmol/L    Calcium 10.7 (H) 8.7 - 10.4 mg/dL    Total Protein 8.6 (H) 5.7 - 8.2 g/dL    Albumin 4.67 3.20 - 4.80 g/dL    ALT (SGPT) 44 (H) 7 - 40 U/L    AST (SGOT) 37 (H) 0 - 33 U/L    Alkaline Phosphatase 174 (H) 25 - 100 U/L    Total Bilirubin 1.7 (H) 0.3 - 1.2 mg/dL    eGFR Non African Amer 33 (L) >60 mL/min/1.73    Globulin 3.9 gm/dL    A/G Ratio 1.2 (L) 1.5 - 2.5 g/dL    BUN/Creatinine Ratio 34.1 (H) 7.0 - 25.0    Anion Gap 14.0 (H) 3.0 - 11.0 mmol/L   CBC Auto Differential    Collection Time: 01/04/19  9:42 PM   Result Value Ref Range    WBC 11.88 (H) 3.50 - 10.80 10*3/mm3    RBC 5.89 (H) 4.20 - 5.76 10*6/mm3    Hemoglobin 16.7 13.1 - 17.5 g/dL    Hematocrit 48.2 38.9 - 50.9 %    MCV 81.8 80.0 - 99.0 fL    MCH 28.4 27.0 - 31.0 pg    MCHC 34.6 32.0 - 36.0 g/dL    RDW 14.8 (H) 11.3 - 14.5 %    RDW-SD 44.0 37.0 - 54.0 fl    MPV 12.0 6.0 - 12.0 fL    Platelets 131 (L) 150 - 450 10*3/mm3    Neutrophil % 89.1 (H) 41.0 - 71.0 %    Lymphocyte % 7.2 (L) 24.0 - 44.0 %    Monocyte % 3.4 0.0 - 12.0 %    Eosinophil % 0.1 0.0 - 3.0 %    Basophil % 0.2 0.0 - 1.0 %    Immature Grans % 0.5 0.0 - 0.6 %    Neutrophils, Absolute 10.60 (H) 1.50 - 8.30 10*3/mm3    Lymphocytes, Absolute 0.85 0.60 - 4.80 10*3/mm3    Monocytes, Absolute 0.40 0.00 - 1.00 10*3/mm3    Eosinophils, Absolute 0.01 0.00 - 0.30 10*3/mm3    Basophils, Absolute 0.02 0.00 - 0.20 10*3/mm3     Immature Grans, Absolute 0.06 (H) 0.00 - 0.03 10*3/mm3   Lactic Acid, Plasma    Collection Time: 01/04/19  9:42 PM   Result Value Ref Range    Lactate 2.9 (C) 0.5 - 2.0 mmol/L   Procalcitonin    Collection Time: 01/04/19  9:42 PM   Result Value Ref Range    Procalcitonin 0.58 (H) <=0.25 ng/mL   Ammonia    Collection Time: 01/04/19  9:42 PM   Result Value Ref Range    Ammonia 26 19 - 60 umol/L   Blood Gas, Arterial With Co-Ox    Collection Time: 01/04/19  9:46 PM   Result Value Ref Range    Site Left Radial     Cam's Test N/A     pH, Arterial 7.460 (H) 7.350 - 7.450 pH units    pCO2, Arterial 38.5 mm Hg    pO2, Arterial 61.2 (L) 83.0 - 108.0 mm Hg    HCO3, Arterial 27.4 (H) 20.0 - 26.0 mmol/L    Base Excess, Arterial 3.4 (H) 0.0 - 2.0 mmol/L    Hemoglobin, Blood Gas 16.7 13.5 - 17.5 g/dL    Hematocrit, Blood Gas 51.3 %    Oxyhemoglobin 88.7 (L) 94 - 99 %    Methemoglobin 1.00 0.00 - 1.50 %    Carboxyhemoglobin 1.2 0 - 2 %    CO2 Content 28.6 (H) 23 - 27 mmol/L    Temperature 37.0 C    Barometric Pressure for Blood Gas  mmHg    Modality Room Air     FIO2 21 %    Ventilator Mode       Note      pH, Temp Corrected 7.460 pH Units    pCO2, Temperature Corrected 38.5 35 - 48 mm Hg    pO2, Temperature Corrected 61.2 (L) 83 - 108 mm Hg   Urinalysis With Culture If Indicated - Urine, Catheter    Collection Time: 01/04/19  9:55 PM   Result Value Ref Range    Color, UA Yellow Yellow, Straw    Appearance, UA Clear Clear    pH, UA 5.5 5.0 - 8.0    Specific Gravity, UA 1.019 1.001 - 1.030    Glucose, UA >=1000 mg/dL (3+) (A) Negative    Ketones, UA Negative Negative    Bilirubin, UA Negative Negative    Blood, UA Negative Negative    Protein, UA Negative Negative    Leuk Esterase, UA Negative Negative    Nitrite, UA Negative Negative    Urobilinogen, UA 0.2 E.U./dL 0.2 - 1.0 E.U./dL   Urine Drug Screen - Urine, Catheter    Collection Time: 01/04/19  9:55 PM   Result Value Ref Range    THC, Screen, Urine Negative Negative     Phencyclidine (PCP), Urine Negative Negative    Cocaine Screen, Urine Negative Negative    Methamphetamine, Urine Negative Negative    Opiate Screen Negative Negative    Amphetamine Screen, Urine Positive (A) Negative    Benzodiazepine Screen, Urine Negative Negative    Tricyclic Antidepressants Screen Negative Negative    Methadone Screen, Urine Negative Negative    Barbiturates Screen, Urine Negative Negative    Oxycodone Screen, Urine Negative Negative    Propoxyphene Screen Negative Negative    Buprenorphine, Screen, Urine Negative Negative   POC Troponin, Rapid    Collection Time: 01/04/19  9:56 PM   Result Value Ref Range    Troponin I 0.04 0.00 - 0.07 ng/mL   Influenza Antigen, Rapid - Swab, Nasopharynx    Collection Time: 01/04/19  9:59 PM   Result Value Ref Range    Influenza A Ag, EIA Negative Negative    Influenza B Ag, EIA Negative Negative     Note: In addition to lab results from this visit, the labs listed above may include labs taken at another facility or during a different encounter within the last 24 hours. Please correlate lab times with ED admission and discharge times for further clarification of the services performed during this visit.    CT Head Without Contrast   Final Result   No evidence of acute intracranial abnormality.       THIS DOCUMENT HAS BEEN ELECTRONICALLY SIGNED BY JOY HAUSER JR. MD      XR Chest 1 View   Final Result   No evidence of acute cardiopulmonary disease.       THIS DOCUMENT HAS BEEN ELECTRONICALLY SIGNED BY JOY HAUSER JR. MD        Vitals:    01/04/19 2200 01/04/19 2215 01/04/19 2230 01/04/19 2300   BP: 112/54  110/84 104/74   BP Location:       Patient Position:       Pulse: 101  100    Resp:       Temp:       TempSrc:       SpO2: 97% 97% 100%    Weight:       Height:         Medications   sodium chloride 0.9 % flush 10 mL (not administered)   vancomycin 2750 mg/500 mL 0.9% NS IVPB ([facility]) (2,750 mg Intravenous New Bag 1/4/19 2302)   sodium chloride 0.9 % bolus 1,000  mL (not administered)   sodium chloride 0.9 % bolus 1,000 mL (0 mL Intravenous Stopped 1/4/19 2245)   acetaminophen (TYLENOL) tablet 1,000 mg (1,000 mg Oral Given 1/4/19 2200)   piperacillin-tazobactam (ZOSYN) 3.375 g in iso-osmotic dextrose 50 ml (premix) (0 g Intravenous Stopped 1/4/19 2238)     ECG/EMG Results (last 24 hours)     ** No results found for the last 24 hours. **                        MDM  Number of Diagnoses or Management Options  History of cirrhosis of liver: new and requires workup  Hyperglycemia: new and requires workup  Lactic acidosis: new and requires workup  Renal insufficiency: new and requires workup  Sepsis, due to unspecified organism (CMS/HCC): new and requires workup  Somnolence: new and requires workup  Diagnosis management comments: No definitive acute infectious source was identified to account for the patient's fever.    Temperature was recorded 101.3 rectally.    Influenza screen is negative, chest x-ray does not show any acute disease, and urine does not appear consistent with infection.    Laboratory evaluation shows hyperglycemia, the patient is not in DKA.    Lab evaluation does show a lactic acidosis.    CT scan of the head does not show any acute abnormalities.     Blood cultures have been ordered, 2 L of IV fluids ordered, and broad-spectrum antibodies in the form of Zosyn and vancomycin.    I discussed the patient with the hospitalist, Dr. Mcleod, who will admit to telemetry.       Amount and/or Complexity of Data Reviewed  Clinical lab tests: reviewed and ordered  Tests in the radiology section of CPT®: ordered and reviewed  Obtain history from someone other than the patient: yes  Review and summarize past medical records: yes  Discuss the patient with other providers: yes  Independent visualization of images, tracings, or specimens: yes    Risk of Complications, Morbidity, and/or Mortality  Presenting problems: high  Diagnostic procedures: high  Management options:  high    Critical Care  Total time providing critical care: 30-74 minutes    Patient Progress  Patient progress: stable      Final diagnoses:   Somnolence   Hyperglycemia   Lactic acidosis   Sepsis, due to unspecified organism (CMS/HCC)   Renal insufficiency   History of cirrhosis of liver       Documentation assistance provided by jemima Bailey.  Information recorded by the scribe was done at my direction and has been verified and validated by me.     Gustavo Bailey  01/04/19 2202       Gustavo Bailey  01/04/19 2202       Ruth Ann Felder MD  01/04/19 6786

## 2019-01-05 NOTE — PROGRESS NOTES
"Pharmacokinetic Consult - Vancomycin Dosing  Abe Phillips is a 63 y.o. male who has been consulted for vancomycin dosing for sepsis (goal trough 15-20 mcg/mL).  Ht - 193 cm (75.98\")  Wt - 131 kg (287 lb 14.4 oz)    Current Antimicrobial Therapy  Zosyn 4.5 grams IV q8h (extended infusion)   Vancomycin 1500 mg IV q12h    Allergies  Patient has no known allergies.    Microbiology and Radiology  Microbiology Results (last 10 days)       Procedure Component Value - Date/Time    Influenza Antigen, Rapid - Swab, Nasopharynx [[id number]]  (Normal) Collected:  01/04/19 2159    Lab Status:  Final result Specimen:  Swab from Nasopharynx Updated:  01/04/19 2225     Influenza A Ag, EIA Negative     Influenza B Ag, EIA Negative          Relevant clinical data and objective history reviewed:  Results from last 7 days   Lab Units  01/04/19   2142   BUN mg/dL  70*   CREATININE mg/dL  2.05*    Estimated Creatinine Clearance: 53.6 mL/min (A) (by C-G formula based on SCr of 2.05 mg/dL (H)).   Intake & Output (last 3 days)         01/02 0701 - 01/03 0700 01/03 0701 - 01/04 0700 01/04 0701 - 01/05 0700    IV Piggyback   1050    Total Intake(mL/kg)   1050 (8)    Net   +1050                 Asessment/Plan  1. Will give vancomycin 2750 mg IV once (given in ED 1/4 at 2302)                                                followed by      Vancomycin 1500 mg IV q 12 hours    2. Vancomycin trough is scheduled 1/6 at 1130 prior to the 4th dose    Shaan Mahoney, PharmD, BCPS  1/5/2019  1:38 AM   "

## 2019-01-05 NOTE — PLAN OF CARE
Problem: Patient Care Overview  Goal: Plan of Care Review  Outcome: Ongoing (interventions implemented as appropriate)   01/05/19 5293   Coping/Psychosocial   Plan of Care Reviewed With patient   Plan of Care Review   Progress improving   OTHER   Outcome Summary Pt's mentation much better later in the day. Pt very lethargic this AM. Pt's insulin gtt d/c'd this afternoon. MD Chilo added lactulose. Adderall taken for narcolepsy. Pt on waffle mattress and has black boots on. Potassium replaced today. VSS. RN will continue to monitor.        Problem: Fall Risk (Adult)  Goal: Identify Related Risk Factors and Signs and Symptoms  Outcome: Ongoing (interventions implemented as appropriate)    Goal: Absence of Fall  Outcome: Ongoing (interventions implemented as appropriate)      Problem: Skin Injury Risk (Adult)  Goal: Identify Related Risk Factors and Signs and Symptoms  Outcome: Ongoing (interventions implemented as appropriate)    Goal: Skin Health and Integrity  Outcome: Ongoing (interventions implemented as appropriate)      Problem: Infection, Risk/Actual (Adult)  Goal: Identify Related Risk Factors and Signs and Symptoms  Outcome: Ongoing (interventions implemented as appropriate)    Goal: Infection Prevention/Resolution  Outcome: Ongoing (interventions implemented as appropriate)
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
## 2019-01-06 VITALS
HEIGHT: 75 IN | OXYGEN SATURATION: 100 % | WEIGHT: 292.8 LBS | SYSTOLIC BLOOD PRESSURE: 120 MMHG | HEART RATE: 65 BPM | TEMPERATURE: 98 F | BODY MASS INDEX: 36.41 KG/M2 | RESPIRATION RATE: 19 BRPM | DIASTOLIC BLOOD PRESSURE: 79 MMHG

## 2019-01-06 LAB
ALBUMIN SERPL-MCNC: 3.33 G/DL (ref 3.2–4.8)
ALBUMIN/GLOB SERPL: 1.2 G/DL (ref 1.5–2.5)
ALP SERPL-CCNC: 111 U/L (ref 25–100)
ALT SERPL W P-5'-P-CCNC: 32 U/L (ref 7–40)
ANION GAP SERPL CALCULATED.3IONS-SCNC: 9 MMOL/L (ref 3–11)
AST SERPL-CCNC: 32 U/L (ref 0–33)
BILIRUB SERPL-MCNC: 1 MG/DL (ref 0.3–1.2)
BUN BLD-MCNC: 38 MG/DL (ref 9–23)
BUN/CREAT SERPL: 31.7 (ref 7–25)
CALCIUM SPEC-SCNC: 8.4 MG/DL (ref 8.7–10.4)
CHLORIDE SERPL-SCNC: 94 MMOL/L (ref 99–109)
CO2 SERPL-SCNC: 22 MMOL/L (ref 20–31)
CREAT BLD-MCNC: 1.2 MG/DL (ref 0.6–1.3)
CRP SERPL-MCNC: 14.43 MG/DL (ref 0–1)
DEPRECATED RDW RBC AUTO: 45.1 FL (ref 37–54)
ERYTHROCYTE [DISTWIDTH] IN BLOOD BY AUTOMATED COUNT: 15 % (ref 11.3–14.5)
ERYTHROCYTE [SEDIMENTATION RATE] IN BLOOD: 83 MM/HR (ref 0–20)
GFR SERPL CREATININE-BSD FRML MDRD: 61 ML/MIN/1.73
GLOBULIN UR ELPH-MCNC: 2.9 GM/DL
GLUCOSE BLD-MCNC: 261 MG/DL (ref 70–100)
GLUCOSE BLDC GLUCOMTR-MCNC: 266 MG/DL (ref 70–130)
GLUCOSE BLDC GLUCOMTR-MCNC: 275 MG/DL (ref 70–130)
GLUCOSE BLDC GLUCOMTR-MCNC: 279 MG/DL (ref 70–130)
HCT VFR BLD AUTO: 39.2 % (ref 38.9–50.9)
HGB BLD-MCNC: 13.3 G/DL (ref 13.1–17.5)
MCH RBC QN AUTO: 28.1 PG (ref 27–31)
MCHC RBC AUTO-ENTMCNC: 33.9 G/DL (ref 32–36)
MCV RBC AUTO: 82.7 FL (ref 80–99)
PLATELET # BLD AUTO: 115 10*3/MM3 (ref 150–450)
PMV BLD AUTO: 11.8 FL (ref 6–12)
POTASSIUM BLD-SCNC: 3.5 MMOL/L (ref 3.5–5.5)
POTASSIUM BLD-SCNC: 3.5 MMOL/L (ref 3.5–5.5)
PROT SERPL-MCNC: 6.2 G/DL (ref 5.7–8.2)
RBC # BLD AUTO: 4.74 10*6/MM3 (ref 4.2–5.76)
SODIUM BLD-SCNC: 125 MMOL/L (ref 132–146)
WBC NRBC COR # BLD: 8.48 10*3/MM3 (ref 3.5–10.8)

## 2019-01-06 PROCEDURE — 80053 COMPREHEN METABOLIC PANEL: CPT | Performed by: INTERNAL MEDICINE

## 2019-01-06 PROCEDURE — 63710000001 INSULIN LISPRO (HUMAN) PER 5 UNITS: Performed by: INTERNAL MEDICINE

## 2019-01-06 PROCEDURE — 94799 UNLISTED PULMONARY SVC/PX: CPT

## 2019-01-06 PROCEDURE — 82962 GLUCOSE BLOOD TEST: CPT

## 2019-01-06 PROCEDURE — 99239 HOSP IP/OBS DSCHRG MGMT >30: CPT | Performed by: INTERNAL MEDICINE

## 2019-01-06 PROCEDURE — 85652 RBC SED RATE AUTOMATED: CPT | Performed by: PHYSICIAN ASSISTANT

## 2019-01-06 PROCEDURE — 86140 C-REACTIVE PROTEIN: CPT | Performed by: PHYSICIAN ASSISTANT

## 2019-01-06 PROCEDURE — 85027 COMPLETE CBC AUTOMATED: CPT | Performed by: INTERNAL MEDICINE

## 2019-01-06 PROCEDURE — 84132 ASSAY OF SERUM POTASSIUM: CPT | Performed by: INTERNAL MEDICINE

## 2019-01-06 PROCEDURE — 25010000003 AMPICILLIN-SULBACTAM PER 1.5 G: Performed by: PHYSICIAN ASSISTANT

## 2019-01-06 PROCEDURE — 25010000002 HEPARIN (PORCINE) PER 1000 UNITS: Performed by: INTERNAL MEDICINE

## 2019-01-06 RX ORDER — AMOXICILLIN AND CLAVULANATE POTASSIUM 875; 125 MG/1; MG/1
1 TABLET, FILM COATED ORAL 2 TIMES DAILY
Qty: 24 TABLET | Refills: 0 | Status: SHIPPED | OUTPATIENT
Start: 2019-01-06 | End: 2019-01-18

## 2019-01-06 RX ORDER — SIMETHICONE 80 MG
40 TABLET,CHEWABLE ORAL 3 TIMES DAILY PRN
Status: DISCONTINUED | OUTPATIENT
Start: 2019-01-06 | End: 2019-01-06 | Stop reason: HOSPADM

## 2019-01-06 RX ADMIN — AMPICILLIN SODIUM AND SULBACTAM SODIUM 3 G: 2; 1 INJECTION, POWDER, FOR SOLUTION INTRAMUSCULAR; INTRAVENOUS at 00:11

## 2019-01-06 RX ADMIN — AMPICILLIN SODIUM AND SULBACTAM SODIUM 3 G: 2; 1 INJECTION, POWDER, FOR SOLUTION INTRAMUSCULAR; INTRAVENOUS at 11:19

## 2019-01-06 RX ADMIN — GABAPENTIN 300 MG: 300 CAPSULE ORAL at 08:10

## 2019-01-06 RX ADMIN — POVIDONE IODINE 10%: 100 LIQUID TOPICAL at 08:12

## 2019-01-06 RX ADMIN — POTASSIUM CHLORIDE 40 MEQ: 750 CAPSULE, EXTENDED RELEASE ORAL at 06:14

## 2019-01-06 RX ADMIN — SIMETHICONE CHEW TAB 80 MG 40 MG: 80 TABLET ORAL at 15:32

## 2019-01-06 RX ADMIN — INSULIN LISPRO 5 UNITS: 100 INJECTION, SOLUTION INTRAVENOUS; SUBCUTANEOUS at 08:10

## 2019-01-06 RX ADMIN — AMPICILLIN SODIUM AND SULBACTAM SODIUM 3 G: 2; 1 INJECTION, POWDER, FOR SOLUTION INTRAMUSCULAR; INTRAVENOUS at 06:14

## 2019-01-06 RX ADMIN — PRAMIPEXOLE DIHYDROCHLORIDE 1 MG: 0.25 TABLET ORAL at 08:10

## 2019-01-06 RX ADMIN — POTASSIUM CHLORIDE 40 MEQ: 1.5 POWDER, FOR SOLUTION ORAL at 11:17

## 2019-01-06 RX ADMIN — HEPARIN SODIUM 5000 UNITS: 5000 INJECTION INTRAVENOUS; SUBCUTANEOUS at 06:14

## 2019-01-06 RX ADMIN — INSULIN LISPRO 4 UNITS: 100 INJECTION, SOLUTION INTRAVENOUS; SUBCUTANEOUS at 08:11

## 2019-01-06 RX ADMIN — DEXTROAMPHETAMINE SACCHARATE, AMPHETAMINE ASPARTATE MONOHYDRATE, DEXTROAMPHETAMINE SULFATE AND AMPHETAMINE SULFATE 60 MG: 3.75; 3.75; 3.75; 3.75 CAPSULE, EXTENDED RELEASE ORAL at 06:17

## 2019-01-06 RX ADMIN — VENLAFAXINE 150 MG: 37.5 TABLET ORAL at 08:10

## 2019-01-06 RX ADMIN — ASPIRIN 81 MG 81 MG: 81 TABLET ORAL at 08:09

## 2019-01-06 RX ADMIN — INSULIN LISPRO 5 UNITS: 100 INJECTION, SOLUTION INTRAVENOUS; SUBCUTANEOUS at 11:58

## 2019-01-06 RX ADMIN — SODIUM CHLORIDE, PRESERVATIVE FREE 3 ML: 5 INJECTION INTRAVENOUS at 08:13

## 2019-01-06 RX ADMIN — HEPARIN SODIUM 5000 UNITS: 5000 INJECTION INTRAVENOUS; SUBCUTANEOUS at 13:32

## 2019-01-06 RX ADMIN — INSULIN LISPRO 4 UNITS: 100 INJECTION, SOLUTION INTRAVENOUS; SUBCUTANEOUS at 11:58

## 2019-01-06 RX ADMIN — SIMETHICONE CHEW TAB 80 MG 40 MG: 80 TABLET ORAL at 11:19

## 2019-01-06 RX ADMIN — LEVOTHYROXINE SODIUM 75 MCG: 75 TABLET ORAL at 06:14

## 2019-01-06 NOTE — PROGRESS NOTES
"[facility] Progress Note    Date of Admission: 2019      Antibiotics:  Unasyn    CC:   Chief Complaint   Patient presents with   • Hyperglycemia       S: No further fever,  Some pain and swelling R leg.  Wants to go home. No n/v/d. Hemodynamically stable.    O:  /72   Pulse 72   Temp 97.9 °F (36.6 °C) (Oral)   Resp 18   Ht 190.5 cm (75\")   Wt 133 kg (292 lb 12.8 oz)   SpO2 100%   BMI 36.60 kg/m²   Temp (24hrs), Av.9 °F (36.6 °C), Min:97.6 °F (36.4 °C), Max:98.2 °F (36.8 °C)      PE:     GENERAL: Awake and alert, in no acute distress.   HEENT: Normocephalic, atraumatic.  PERRL. EOMI. No conjunctival injection. No icterus. Oropharynx clear without evidence of thrush or exudate. No evidence of peridontal disease.    NECK: Supple without nuchal rigidity. No mass.  LYMPH: No cervical, axillary or inguinal lymphadenopathy.  HEART: RRR; No murmur, rubs, gallops.   LUNGS: Clear to auscultation bilaterally without wheezing, rales, rhonchi. Normal respiratory effort. Nonlabored. No dullness.  ABDOMEN: Soft, nontender, nondistended. Positive bowel sounds. No rebound or guarding. NO mass or HSM.  EXT:  No cyanosis, clubbing or edema. No cord.  : Genitalia generally unremarkable.  Without Krishnamurthy catheter.  MSK: Bilateral lower legs with erythema, warmth, tenderness, lymphedema and venous stasis disease.  SKIN: abdominal wounds healed with scarring.  Right heel with ulcer through dermis, no drainage, nor surrounding erythema. No purulence , some warmth and swelling and tenderness of RLE.  NEURO: Oriented to PPT. No focal deficits on motor/sensory exam at arms/legs.  PSYCHIATRIC: Normal insight and judgement. Cooperative with PE      Laboratory Data    Results from last 7 days   Lab Units  19   0150  19   0816  19   2142   WBC 10*3/mm3  8.48  11.53*  11.88*   HEMOGLOBIN g/dL  13.3  15.7  16.7   HEMATOCRIT %  39.2  45.9  48.2   PLATELETS 10*3/mm3  115*  112*  131*     Results from last 7 days   Lab " Units  01/06/19   0150   SODIUM mmol/L  125*   POTASSIUM mmol/L  3.5  3.5   CHLORIDE mmol/L  94*   CO2 mmol/L  22.0   BUN mg/dL  38*   CREATININE mg/dL  1.20   GLUCOSE mg/dL  261*   CALCIUM mg/dL  8.4*     Results from last 7 days   Lab Units  01/06/19   0150   ALK PHOS U/L  111*   BILIRUBIN mg/dL  1.0   ALT (SGPT) U/L  32   AST (SGOT) U/L  32     Results from last 7 days   Lab Units  01/06/19   0150   SED RATE mm/hr  83*     Results from last 7 days   Lab Units  01/06/19   0150   CRP mg/dL  14.43*       Estimated Creatinine Clearance: 91.5 mL/min (by C-G formula based on SCr of 1.2 mg/dL).      Microbiology:  Blood cx 1/2 with GBS  Wound cx scant gnr    Radiology:  Imaging Results (last 24 hours)     ** No results found for the last 24 hours. **          PROBLEM LIST:   -Severe Sepsis POA with acute hypoxemic respiratory failure, ARF, Fever, leukocytosis.  Likely secondary to bacteremia  -Grp B Strep bacteremia.  Source unclear possibly from right heel wound vs UTI vs GI source.  More likely skin source from chronic right heel diabetic wound  -Poorly healing right heel diabetic ulcer through dermis  -BLE lymphedema/venous stasis disease/?cellulitis  -Acute hypoxemic respiratory failure, secondary to above  -ARF with Unclear CKD 3, secondary to above, improved  -Leukocytosis/neutrophilia, secondary to above, improved  -Fever, secondary to above, imrpoved  -Liver cirrhosis with thrombocytopenia/elevated LFTs secondary to Hep B  -H/o Hep B/no active infection--maybe 5 alcoholic drinks a week.  -Afib/flutter/baby ASA  -H/o viral pericarditis 30 years ago  -ADD/Adderall  -Obesity  -T2DM/uncontrolled  -Narcolepsy  -JAIME/CPAP  -Tardive dyskinesia/tremors  -PVD  -Obesity     ASSESSMENT: Patient is a 63 y.o. male with h/o Afib/flutter/baby ASA, ADD/Adderall, CHF, liver cirrhosis, T2DM, chronic lymphedema, Hep B, Narcolepsy, JAIME/CPAP, Obesity, Tardive dyskinesia/tremors, and PVD who presented to [facility] ED with altered mental  status changes and hyperglycemia.  He had a hectic fever on arrival of 102.8.  His radiographic studies were negative for acute process.  His blood cultures are positive in 1 of 2 sets with Grp B Strep.  He has bilateral LE lymphedema/venous stasis disease,    Much improved today with normal wbc and no fevers.    Further exam today c/w RLE cellulitis and feel source of GBS bacteremia and GNR in wound cx just colonization.  Prior h/o osteo other foot.  Review of R heel wound doesn't appear c/w osteomyelitis but he does have elevated ESR/crp and can do oral augmentin 875/ 125 po bid x 12 days and f/u with me 2-3 weeks.     PLAN/RECOMMENDATIONS:     Stable on Unasyn 3 GM IV Q6H and ok to switch to po augmentin 875/125 mg po bid x 12 days upon d/c.  -Consider MRI of right foot as outpt if wound worsens    Can f/u with Dr. Levy 2-3 weeks    UM:  D/w Dr. Woo and d/c planning on po abx.    I spent 40 min on case today with 30 min counseling and coordination of care in treatment of sepsis/ GBS bacteremia d/w pt, Dr. Woo and d/c planning and treatment plan discussed.        Pavan Levy MD  1/6/2019

## 2019-01-06 NOTE — PLAN OF CARE
Problem: Patient Care Overview  Goal: Plan of Care Review  Outcome: Ongoing (interventions implemented as appropriate)   01/06/19 7766   Coping/Psychosocial   Plan of Care Reviewed With patient   Plan of Care Review   Progress improving   OTHER   Outcome Summary Pt rested during the night, VSS. NP notified of pt's FSBG >400. See orders. Pt had BM after c/o constipation. IV abx continued. Wearing home CPAP while asleep. Will continue to monitor.

## 2019-01-06 NOTE — DISCHARGE SUMMARY
[facility] Medicine Services  DISCHARGE SUMMARY    Patient Name: Abe Phillips  : 1955  MRN: [id number]    Date of Admission: 2019  Date of Discharge:  2018  Primary Care Physician: Provider, No Known    Consults     Date and Time Order Name Status Description    2019 0126 Inpatient Infectious Diseases Consult Completed           Hospital Course     Presenting Problem:   Sepsis (CMS/[facility]) [A41.9]    Active Hospital Problems    Diagnosis Date Noted   • **Sepsis (CMS/[facility]) [A41.9] 2019   • Encephalopathy acute [G93.40] 2019   • FUO (fever of unknown origin) [R50.9] 2019   • Type 2 diabetes mellitus with hyperglycemia (CMS/[facility]) [E11.65] 2019   • OMAR (acute kidney injury) (CMS/HCC) [N17.9] 2019   • Lactic acidosis [E87.2] 2019   • Dehydration [E86.0] 2019   • Elevated LFTs [R94.5] 2019   • Thrombocytopenia (CMS/[facility]) [D69.6] 2019   • PVD (peripheral vascular disease) (CMS/HCC) [I73.9] 2019   • JAIME (obstructive sleep apnea) [G47.33] 2019   • A-fib (CMS/HCC) [I48.91] 2019   • Cirrhosis of liver (CMS/HCC) [K74.60] 2019   • Chronic congestive heart failure (CMS/HCC) [I50.9] 2019   • Diabetic ulcer of right heel (CMS/HCC) [E11.621, L97.419] 2019   • Narcolepsy [G47.419] 2019      Resolved Hospital Problems   No resolved problems to display.          Hospital Course:  Abe Phillips is a 63 y.o. male with history of cirrhosis, type II DM, atrial fibrillation, narcolepsy, heart failure and diabetic ulcer presents with fever and hypotension. He was noted to be somnolent and confused on presentation. He was started on broad spectrum antibiotics. Admission labs were notable for elevated procal, leukocytosis, elevated troponin and hyperglycemia. EKG was without ST or T wave changes and patient was without any chest pain so likely secondary to sepsis. He was fluid recussitated with resolution of his  hypotension. Fever resolved. He was started on IV insulin for his sugars with improvement and was transitioned to long acting insulin when appropriate. Restarted home amphetamines for narcolepsy with improvement in mental status and somnolence. CT abd pelvis was negative for intra abd source of infection. CT chest negative for pneumonia. UA negative for UTI.     Blood cultures were positive for Strep Agalactiae. Echo was negative for endocarditis. ID was consulted and he was switched to unasyn. ESR and CRP were elevated and that is concerning for osteomyelitis given diabetic foot ulcer though it did not appear to be infected on exam. Wound culture growing GNR which is likely colonization per ID. Suspect that the cause of the patient's underlying sepsis was cellulitis as patient had some increased warmth on exam. ID recommended transition to PO Augmentin x 12 days for total course of 14 days. Okay for discharge per ID.     On discharge, patient is stable and afebrile. He is at his baseline mental status. He has an appointment with Dr. Womack for his narcolepsy and JAIME on 1/7/2018. He also has scheduled appointment with Dr. Alvarez on 1/14/19 to establish care. He will need follow-up with Dr. Levy in 2 weeks for follow-up of sepsis and diabetic foot ulcer. He will consider MRI as outpatient at that time.       Discharge Follow Up Recommendations for labs/diagnostics:  --- Follow-up with Dr. Womack on 1/7/19 for narcolepsy and JAIME  --- Follow-up with Dr. Alvarez to establish care with PCP on 1/14/2018  --- Follow-up with Dr. Levy for follow-up of sepsis, cellulitis    Day of Discharge     HPI:   Mental status improved with amphetamines. Multiple BMS after lactulose and now has some gas. Requesting simethicone. Denies any fevers or chills. Feeling much better.     Review of Systems  General: denies fevers or chills  CV: denies chest pain  Resp: denies shortness of breath  Abd: denies abd pain,  nausea      Otherwise ROS is negative except as mentioned in the HPI.    Vital Signs:   Temp:  [97.6 °F (36.4 °C)-98.2 °F (36.8 °C)] 97.9 °F (36.6 °C)  Heart Rate:  [70-87] 72  Resp:  [16-18] 18  BP: (111-126)/(61-74) 122/72  FiO2 (%):  [21 %] 21 %     Physical Exam:  General: No acute distress, much more awake and alert than yesterday  Eyes: no scleral icterus  ENT: moist mucous membranes  CV: RRR, no murmurs, no LE edema  Lungs: CTAB, no wheezing or crackles. Normal respiratory effort  Abd: Soft, non-tender, non-distedned, bowel sounds normoactive   Neuro: No gross focal deficits. CN II-XII intact  Psych: Alert and oriented x 3, following commands  Skin: Bilateral chronic venous stasis, discoloration. No significant erythema noted. No tenderness with palpation. Right heel ulceration without erythema or purulent drainage.     Pertinent  and/or Most Recent Results     Results from last 7 days   Lab Units  01/06/19   0150  01/05/19   0816  01/04/19   2142   WBC 10*3/mm3  8.48  11.53*  11.88*   HEMOGLOBIN g/dL  13.3  15.7  16.7   HEMATOCRIT %  39.2  45.9  48.2   PLATELETS 10*3/mm3  115*  112*  131*   SODIUM mmol/L  125*  135  123*   POTASSIUM mmol/L  3.5  3.5  3.3*  3.7   CHLORIDE mmol/L  94*  95*  80*   CO2 mmol/L  22.0  28.0  29.0   BUN mg/dL  38*  57*  70*   CREATININE mg/dL  1.20  1.51*  2.05*   GLUCOSE mg/dL  261*  163*  580*   CALCIUM mg/dL  8.4*  9.8  10.7*     Results from last 7 days   Lab Units  01/06/19   0150 01/05/19   0816  01/04/19   2142   BILIRUBIN mg/dL  1.0  1.1  1.7*   ALK PHOS U/L  111*  122*  174*   ALT (SGPT) U/L  32  32  44*   AST (SGOT) U/L  32  31  37*           Invalid input(s): TG, LDLCALC, LDLREALC  Results from last 7 days   Lab Units  01/05/19   1502  01/05/19   0816  01/04/19   2156   HEMOGLOBIN A1C %   --   11.70*   --    TROPONIN I ng/mL  0.052*  0.083*  0.04       Brief Urine Lab Results  (Last result in the past 365 days)      Color   Clarity   Blood   Leuk Est   Nitrite   Protein    CREAT   Urine HCG        01/05/19 1344             50.6             Microbiology Results Abnormal     Procedure Component Value - Date/Time    Anaerobic Culture - Swab, Foot, Right [[id number]] Collected:  01/05/19 1730    Lab Status:  Preliminary result Specimen:  Swab from Foot, Right Updated:  01/06/19 1438     Culture No anaerobes isolated    Wound Culture - Wound, Foot, Right [[id number]]  (Abnormal) Collected:  01/05/19 1731    Lab Status:  Preliminary result Specimen:  Wound from Foot, Right Updated:  01/06/19 1102     Wound Culture Scant growth (1+) Gram Negative Bacilli     Gram Stain Few (2+) WBCs seen      Few (2+) Epithelial cells seen      Moderate (3+) Gram positive cocci in pairs    Blood Culture - Blood, Arm, Right [[id number]]  (Abnormal) Collected:  01/04/19 2130    Lab Status:  Preliminary result Specimen:  Blood from Arm, Right Updated:  01/06/19 0803     Blood Culture Streptococcus agalactiae (Group B)     Isolated from Anaerobic Bottle     STREP GROUPING B     Gram Stain Anaerobic Bottle Gram positive cocci in chains    Blood Culture - Blood, Arm, Left [[id number]] Collected:  01/04/19 2135    Lab Status:  Preliminary result Specimen:  Blood from Arm, Left Updated:  01/05/19 2200     Blood Culture No growth at 24 hours    MRSA Screen, PCR - Swab, Nares [[id number]]  (Normal) Collected:  01/05/19 1730    Lab Status:  Final result Specimen:  Swab from Nares Updated:  01/05/19 1906     MRSA, PCR Negative    Narrative:       MRSA Negative    Eosinophil Smear - Urine, Urine, Clean Catch [[id number]]  (Normal) Collected:  01/05/19 1344    Lab Status:  Final result Specimen:  Urine, Clean Catch Updated:  01/05/19 1505     Eosinophil Smear 0 % EOS/100 Cells     Narrative:       No eosinophil seen    Blood Culture ID, PCR - Blood, Arm, Right [[id number]]  (Abnormal) Collected:  01/04/19 2130    Lab Status:  Final result Specimen:  Blood from Arm, Right Updated:  01/05/19 1108     BCID, PCR Streptococcus  agalactiae (Group B). Identification by BCID PCR.    Influenza Antigen, Rapid - Swab, Nasopharynx [[id number]]  (Normal) Collected:  01/04/19 2159    Lab Status:  Final result Specimen:  Swab from Nasopharynx Updated:  01/04/19 2225     Influenza A Ag, EIA Negative     Influenza B Ag, EIA Negative          Imaging Results (all)     Procedure Component Value Units Date/Time    CT Abdomen Pelvis Without Contrast [[id number]] Collected:  01/05/19 0025     Updated:  01/05/19 0108    Narrative:       EXAM:    CT Abdomen and Pelvis Without Contrast     EXAM DATE/TIME:    1/5/2019 12:25 AM     CLINICAL HISTORY:    63 years old, male; Sepsis, unspecified organism; Acidosis; Disorder of kidney   and ureter, unspecified; Somnolence; Hyperglycemia, unspecified; Personal   history of other diseases of the digestive system; Signs and symptoms; Fever;   Additional info: Fuo, abdomen with diffuse tenderness     TECHNIQUE:    Axial computed tomography images of the abdomen and pelvis without contrast.    All CT scans at this facility use at least one of these dose optimization   techniques: automated exposure control; mA and/or kV adjustment per patient   size (includes targeted exams where dose is matched to clinical indication); or   iterative reconstruction.    Coronal and sagittal reformatted images were created and reviewed.     COMPARISON:    No relevant prior studies available.     FINDINGS:    Lower thorax:  Lung bases and chest findings reported separately.     ABDOMEN:    Liver:  Hepatic cirrhosis. No definite focal hepatic lesion on noncontrast   imaging.    Gallbladder and bile ducts:  Previous cholecystectomy. No biliary ductal   dilatation.     Pancreas: No acute abnormality. No ductal dilation.    Spleen:  Prominent spleen measuring 16 cm in length.    Adrenals: No acute abnormality. No mass.    Kidneys and ureters: No acute abnormality. No obstructing calculi. No   hydronephrosis.    Stomach and bowel:  No significant  large or small bowel distention. Moderate   amount of stool within the proximal colon. No evidence of diverticulitis.    Appendix: No findings to suggest acute appendicitis.     PELVIS:    Bladder: No acute abnormality. No stones.    Reproductive: Unremarkable as visualized.     ABDOMEN and PELVIS:    Intraperitoneal space: No significant fluid collection. No free air.    Bones/joints: No acute osseous abnormality. No dislocation.    Soft tissues:  Bilateral gynecomastia.    Vasculature:  Gastroesophageal varices. Upper abdominal venous collaterals.   Recanalized umbilical vein. Aorta has normal caliber.   Lymph nodes: No enlarged lymph nodes.       Impression:       1. Hepatic cirrhosis and portal hypertension with splenomegaly and venous   collaterals.   2. Previous cholecystectomy.   3. Additional findings as described above.     THIS DOCUMENT HAS BEEN ELECTRONICALLY SIGNED BY JOY HAUSER JR. MD    CT Chest Without Contrast [[id number]] Collected:  01/05/19 0025     Updated:  01/05/19 0102    Narrative:       EXAM:    CT Chest Without Contrast     EXAM DATE/TIME:    1/5/2019 12:25 AM     CLINICAL HISTORY:    63 years old, male; Sepsis, unspecified organism; Acidosis; Disorder of kidney   and ureter, unspecified; Somnolence; Hyperglycemia, unspecified; Personal   history of other diseases of the digestive system; Signs and symptoms; Fever;   Additional info: Fuo     TECHNIQUE:    Axial computed tomography images of the chest without intravenous contrast.    All CT scans at this facility use at least one of these dose optimization   techniques: automated exposure control; mA and/or kV adjustment per patient   size (includes targeted exams where dose is matched to clinical indication); or   iterative reconstruction.    Coronal and sagittal reformatted images were created and reviewed.     COMPARISON:    CR XR CHEST 1 VW 1/4/2019 9:58 PM     FINDINGS:    Lungs: No significant or acute findings. No consolidation.      Pleural space: No pneumothorax. No significant pleural effusion.    Heart:  Heart size is normal. Pericardial calcifications suggestive of prior   pericarditis.    Aorta: No acute abnormality. No aortic aneurysm.    Lymph nodes: No enlarged lymph nodes.    Bones/joints: No acute osseous abnormality. No acute fracture.    Soft tissues:  Bilateral gynecomastia.       Impression:       1. No evidence of acute cardiopulmonary disease.   2. Pericardial calcifications suggestive of prior pericarditis.     THIS DOCUMENT HAS BEEN ELECTRONICALLY SIGNED BY JOY HAUSER JR. MD    CT Head Without Contrast [[id number]] Collected:  01/04/19 2239     Updated:  01/04/19 2300    Narrative:       EXAM:    CT Head Without Contrast     EXAM DATE/TIME:    1/4/2019 10:39 PM     CLINICAL HISTORY:    63 years old, male; Signs and symptoms; Altered mental status/memory loss;   Confusion or disorientation; Additional info: Confusion/delirium, altered loc,   unexplained     TECHNIQUE:    Axial computed tomography images of the head/brain without contrast.    All CT scans at this facility use at least one of these dose optimization   techniques: automated exposure control; mA and/or kV adjustment per patient   size (includes targeted exams where dose is matched to clinical indication); or   iterative reconstruction.     COMPARISON:    No relevant prior studies available.     FINDINGS:    Brain: No acute abnormality. Mild diffuse atrophy. No edema or mass effect. No   hemorrhage.     Ventricles: No acute abnormality. No significant ventriculomegaly.    Bones/joints: No acute osseous abnormality. No acute fracture.    Sinuses: No significant or acute abnormality. No air-fluid levels.    Mastoid air cells: No acute abnormality. No significant mastoid effusion.    Soft tissues: No significant soft tissue abnormalities.       Impression:       No evidence of acute intracranial abnormality.     THIS DOCUMENT HAS BEEN ELECTRONICALLY SIGNED BY JOY  JR. KINJAL HAUSER    XR Chest 1 View [[id number]] Collected:  01/04/19 2204     Updated:  01/04/19 2256    Narrative:       EXAM:    XR Chest, 1 View     EXAM DATE/TIME:    1/4/2019 10:04 PM     CLINICAL HISTORY:    63 years old, male; Signs and symptoms; Other: Tachycardia     TECHNIQUE:    XR of the chest, 1 view.     COMPARISON:    No relevant prior studies available.     FINDINGS:    Tubes, catheters and devices:  Monitor leads project over the chest.    Lungs: No significant or acute findings. No consolidation.    Pleural space: Unremarkable. No pleural effusion. No pneumothorax.    Heart/Mediastinum: Heart size is normal.    Bones/joints: No acute osseous abnormality.       Impression:       No evidence of acute cardiopulmonary disease.     THIS DOCUMENT HAS BEEN ELECTRONICALLY SIGNED BY JOY HAUSER JR. MD                    Results for orders placed during the hospital encounter of 01/04/19   Adult Transthoracic Echo Complete W/ Cont if Necessary Per Protocol    Narrative · Left ventricular systolic function is hyperdynamic (EF > 70).           Order Current Status    Immunofixation, Urine - Urine, Clean Catch In process    Anaerobic Culture - Swab, Foot, Right Preliminary result    Blood Culture - Blood, Arm, Left Preliminary result    Blood Culture - Blood, Arm, Right Preliminary result    Wound Culture - Wound, Foot, Right Preliminary result        Discharge Details        Discharge Medications      New Medications      Instructions Start Date   amoxicillin-clavulanate 875-125 MG per tablet  Commonly known as:  AUGMENTIN   1 tablet, Oral, 2 Times Daily         Continue These Medications      Instructions Start Date   ADDERALL PO   Oral, 2 Times Daily, 2 TAB - 30MG IN AM 2 TAB - 15MG IN PM       aspirin 81 MG chewable tablet   81 mg, Oral, Daily, 2 TABLETS       bacitracin 500 UNIT/GM ointment   Topical, Daily PRN      bumetanide 2 MG tablet  Commonly known as:  BUMEX   2 mg, Oral, 2 Times Daily      Charcoal  260 MG capsule   Oral, As Needed      gabapentin 300 MG capsule  Commonly known as:  NEURONTIN   300 mg, Oral, 2 Times Daily      insulin regular 100 UNIT/ML injection  Commonly known as:  humuLIN R,novoLIN R   Subcutaneous, 2 Times Daily Before Meals, 115 UNITS IN  UNITS IN PM       levocetirizine 5 MG tablet  Commonly known as:  XYZAL   5 mg, Oral, Every Evening      levothyroxine 75 MCG tablet  Commonly known as:  SYNTHROID, LEVOTHROID   75 mcg, Oral, Daily      metOLazone 5 MG tablet  Commonly known as:  ZAROXOLYN   5 mg, Oral, Daily PRN      Morphine 30 MG tablet  Commonly known as:  MSIR   30 mg, Oral, Every 6 Hours PRN      ondansetron 8 MG tablet  Commonly known as:  ZOFRAN   8 mg, Oral, Every 8 Hours PRN      potassium chloride 10 MEQ CR tablet  Commonly known as:  K-DUR   10 mEq, Oral, 2 Times Daily, 3 TABLETS       pramipexole 1 MG tablet  Commonly known as:  MIRAPEX   1 mg, Oral, 2 Times Daily      simethicone 125 MG chewable tablet  Commonly known as:  MYLICON   125 mg, Oral, Every 8 Hours PRN      spironolactone 100 MG tablet  Commonly known as:  ALDACTONE   100 mg, Oral, Daily      traZODone 100 MG tablet  Commonly known as:  DESYREL   100 mg, Oral, Nightly PRN      VENLAFAXINE HCL PO   150 mg, Oral, Every Night at Bedtime             No Known Allergies      Discharge Disposition:  Home or Self Care    Discharge Diet:  Diet Order   Procedures   • Diet Regular; Thin; Cardiac, Consistent Carbohydrate         Discharge Activity:   Activity Instructions     Activity as Tolerated          CODE STATUS:    Code Status and Medical Interventions:   Ordered at: 01/05/19 0030     Level Of Support Discussed With:    Patient     Code Status:    CPR     Medical Interventions (Level of Support Prior to Arrest):    Full         Future Appointments   Date Time Provider Department Center   1/7/2019 10:30 AM Fernando Womack MD [facility] None   1/14/2019 10:45 AM Erika Alvarez MD [facility] None       Additional Instructions for the Follow-ups that You Need to Schedule     Discharge Follow-up with Specified Provider: Dr. Laura Levy; 2 Weeks   As directed      To:  Dr. Laura Levy    Follow Up:  2 Weeks               Time Spent on Discharge:  45 minutes    Electronically signed by Diamond Woo MD, 01/06/19, 2:52 PM.

## 2019-01-07 ENCOUNTER — CONSULT (OUTPATIENT)
Dept: SLEEP MEDICINE | Facility: HOSPITAL | Age: 64
End: 2019-01-07

## 2019-01-07 ENCOUNTER — READMISSION MANAGEMENT (OUTPATIENT)
Dept: CALL CENTER | Facility: HOSPITAL | Age: 64
End: 2019-01-07

## 2019-01-07 ENCOUNTER — HOSPITAL ENCOUNTER (OUTPATIENT)
Dept: SLEEP MEDICINE | Facility: HOSPITAL | Age: 64
Discharge: HOME | End: 2019-01-07
Attending: INTERNAL MEDICINE | Admitting: INTERNAL MEDICINE

## 2019-01-07 VITALS
WEIGHT: 296.2 LBS | SYSTOLIC BLOOD PRESSURE: 120 MMHG | DIASTOLIC BLOOD PRESSURE: 62 MMHG | OXYGEN SATURATION: 92 % | HEIGHT: 76 IN | HEART RATE: 72 BPM | BODY MASS INDEX: 36.07 KG/M2

## 2019-01-07 VITALS
WEIGHT: 295.42 LBS | OXYGEN SATURATION: 87 % | HEART RATE: 85 BPM | HEIGHT: 76 IN | DIASTOLIC BLOOD PRESSURE: 85 MMHG | SYSTOLIC BLOOD PRESSURE: 131 MMHG | BODY MASS INDEX: 35.97 KG/M2

## 2019-01-07 DIAGNOSIS — G47.33 OSA (OBSTRUCTIVE SLEEP APNEA): ICD-10-CM

## 2019-01-07 DIAGNOSIS — G25.81 RESTLESS LEGS SYNDROME (RLS): Primary | ICD-10-CM

## 2019-01-07 DIAGNOSIS — G47.419 PRIMARY NARCOLEPSY WITHOUT CATAPLEXY: ICD-10-CM

## 2019-01-07 DIAGNOSIS — E66.01 MORBIDLY OBESE (HCC): ICD-10-CM

## 2019-01-07 PROBLEM — N17.9 AKI (ACUTE KIDNEY INJURY) (HCC): Status: RESOLVED | Noted: 2019-01-04 | Resolved: 2019-01-07

## 2019-01-07 PROBLEM — G93.40 ENCEPHALOPATHY ACUTE: Status: RESOLVED | Noted: 2019-01-05 | Resolved: 2019-01-07

## 2019-01-07 PROBLEM — E86.0 DEHYDRATION: Status: RESOLVED | Noted: 2019-01-04 | Resolved: 2019-01-07

## 2019-01-07 PROBLEM — I10 ESSENTIAL HYPERTENSION: Status: ACTIVE | Noted: 2019-01-07

## 2019-01-07 PROBLEM — R50.9 FUO (FEVER OF UNKNOWN ORIGIN): Status: RESOLVED | Noted: 2019-01-04 | Resolved: 2019-01-07

## 2019-01-07 PROBLEM — E87.20 LACTIC ACIDOSIS: Status: RESOLVED | Noted: 2019-01-04 | Resolved: 2019-01-07

## 2019-01-07 PROBLEM — A41.9 SEPSIS (HCC): Status: RESOLVED | Noted: 2019-01-04 | Resolved: 2019-01-07

## 2019-01-07 LAB
BACTERIA SPEC AEROBE CULT: ABNORMAL
BACTERIA SPEC AEROBE CULT: ABNORMAL
GLUCOSE BLDC GLUCOMTR-MCNC: >599 MG/DL (ref 70–130)
GRAM STN SPEC: ABNORMAL
STREP GROUPING: ABNORMAL

## 2019-01-07 PROCEDURE — 95810 POLYSOM 6/> YRS 4/> PARAM: CPT

## 2019-01-07 PROCEDURE — 95810 POLYSOM 6/> YRS 4/> PARAM: CPT | Performed by: INTERNAL MEDICINE

## 2019-01-07 PROCEDURE — 99204 OFFICE O/P NEW MOD 45 MIN: CPT | Performed by: INTERNAL MEDICINE

## 2019-01-07 RX ORDER — DEXTROAMPHETAMINE SACCHARATE, AMPHETAMINE ASPARTATE, DEXTROAMPHETAMINE SULFATE AND AMPHETAMINE SULFATE 7.5; 7.5; 7.5; 7.5 MG/1; MG/1; MG/1; MG/1
60 TABLET ORAL EVERY MORNING
Qty: 60 TABLET | Refills: 0 | Status: SHIPPED | OUTPATIENT
Start: 2019-02-07 | End: 2019-02-15 | Stop reason: SDUPTHER

## 2019-01-07 RX ORDER — DEXTROAMPHETAMINE SACCHARATE, AMPHETAMINE ASPARTATE, DEXTROAMPHETAMINE SULFATE AND AMPHETAMINE SULFATE 3.75; 3.75; 3.75; 3.75 MG/1; MG/1; MG/1; MG/1
30 TABLET ORAL DAILY
Qty: 60 TABLET | Refills: 0 | Status: SHIPPED | OUTPATIENT
Start: 2019-02-07 | End: 2019-01-30

## 2019-01-07 RX ORDER — DEXTROAMPHETAMINE SACCHARATE, AMPHETAMINE ASPARTATE, DEXTROAMPHETAMINE SULFATE AND AMPHETAMINE SULFATE 7.5; 7.5; 7.5; 7.5 MG/1; MG/1; MG/1; MG/1
60 TABLET ORAL EVERY MORNING
Qty: 60 TABLET | Refills: 0 | Status: SHIPPED | OUTPATIENT
Start: 2019-01-07 | End: 2019-01-30

## 2019-01-07 RX ORDER — DEXTROAMPHETAMINE SACCHARATE, AMPHETAMINE ASPARTATE, DEXTROAMPHETAMINE SULFATE AND AMPHETAMINE SULFATE 3.75; 3.75; 3.75; 3.75 MG/1; MG/1; MG/1; MG/1
30 TABLET ORAL DAILY
Qty: 60 TABLET | Refills: 0 | Status: SHIPPED | OUTPATIENT
Start: 2019-01-07 | End: 2019-02-06
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
## 2019-01-08 ENCOUNTER — READMISSION MANAGEMENT (OUTPATIENT)
Dept: CALL CENTER | Facility: HOSPITAL | Age: 64
End: 2019-01-08

## 2019-01-08 NOTE — OUTREACH NOTE
Sepsis Week 1 Survey      Responses   Facility patient discharged from?  [facility]   Does the patient have one of the following disease processes/diagnoses(primary or secondary)?  Sepsis   Is there a successful TCM telephone encounter documented?  No   Week 1 attempt successful?  Yes   Call start time  1122   Call end time  1127   Discharge diagnosis  fever unknown origin, sepsis, acute encephalopathy, T2DM, dehydration   Is patient permission given to speak with other caregiver?  Yes   List who call center can speak with  Caryn, spouse   Person spoke with today (if not patient) and relationship  Caryn   Meds reviewed with patient/caregiver?  Yes   Is the patient having any side effects they believe may be caused by any medication additions or changes?  No   Does the patient have all medications related to this admission filled (includes all antibiotics, inhalers, nebulizers,steroids,etc.)  Yes   Is the patient taking all medications as directed (includes completed medication regime)?  Yes   Does the patient have a primary care provider?   Yes   Comments regarding PCP  Dr. Alvarez   Does the patient have an appointment with their PCP within 7 days of discharge?  Greater than 7 days   What is preventing the patient from scheduling follow up appointments within 7 days of discharge?  Earlier appointment not available   Nursing Interventions  Verified appointment date/time/provider   Has the patient kept scheduled appointments due by today?  Yes   Comments  Had 2 appts 1/7,  had sleep study last night   Has home health visited the patient within 72 hours of discharge?  N/A   Psychosocial issues?  No   Did the patient receive a copy of their discharge instructions?  Yes   Nursing interventions  Reviewed instructions with patient   What is the patient's perception of their health status since discharge?  Improving   Nursing interventions  Nurse provided patient education   Is the patient/caregiver able to teach back  Sepsis?  S - Shivering,fever or very cold, E - Extreme pain or generalized discomfort (worst ever,especially abdomen), P - Pale or discolored skin, S - Sleepy, difficult to arouse,confused, I -   I feel like I might die-a feeling of hopelessness, S - Short of breath   Nursing interventions  Nurse provided reassurance to patient   Is patient/caregiver able to teach back steps to recovery at home?  Set small, achievable goals for return to baseline health, Rest and regain strength, Talk about feelings with family/friends   Is the patient/caregiver able to teach back signs and symptoms of worsening condition:  Fever, Hyperthermia, Rapid heart rate (>90), Shortness of breath/rapid respiratory rate, Altered mental status(confusion/coma), Edema   Is the patient/caregiver able to teach back the hierarchy of who to call/visit for symptoms/problems? PCP, Specialist, Home health nurse, Urgent Care, ED, 911  Yes   Week 1 call completed?  Yes   Wrap up additional comments  Daughter is a nurse practitioner.          Zoey Min RN

## 2019-01-08 NOTE — OUTREACH NOTE
Prep Survey      Responses   Facility patient discharged from?  [facility]   Is patient eligible?  Yes   Discharge diagnosis  fever unknown origin, sepsis, acute encephalopathy, T2DM, dehydration   Does the patient have one of the following disease processes/diagnoses(primary or secondary)?  Sepsis   Does the patient have Home health ordered?  No   Is there a DME ordered?  No   Prep survey completed?  Yes          Kait Jacob RN

## 2019-01-09 LAB
BACTERIA SPEC AEROBE CULT: NORMAL
INTERPRETATION UR IFE-IMP: NORMAL

## 2019-01-10 LAB
BACTERIA SPEC AEROBE CULT: ABNORMAL
GRAM STN SPEC: ABNORMAL
ISOLATED FROM: ABNORMAL
STREP GROUPING: ABNORMAL

## 2019-01-12 DIAGNOSIS — G47.419 PRIMARY NARCOLEPSY WITHOUT CATAPLEXY: ICD-10-CM

## 2019-01-12 DIAGNOSIS — I48.91 ATRIAL FIBRILLATION, UNSPECIFIED TYPE (HCC): ICD-10-CM

## 2019-01-12 DIAGNOSIS — G47.33 OSA (OBSTRUCTIVE SLEEP APNEA): Primary | ICD-10-CM

## 2019-01-12 DIAGNOSIS — I10 ESSENTIAL HYPERTENSION: ICD-10-CM

## 2019-01-12 LAB — BACTERIA SPEC ANAEROBE CULT: NORMAL

## 2019-01-14 ENCOUNTER — TELEPHONE (OUTPATIENT)
Dept: INTERNAL MEDICINE | Facility: CLINIC | Age: 64
End: 2019-01-14

## 2019-01-15 ENCOUNTER — READMISSION MANAGEMENT (OUTPATIENT)
Dept: CALL CENTER | Facility: HOSPITAL | Age: 64
End: 2019-01-15

## 2019-01-15 NOTE — OUTREACH NOTE
Sepsis Week 2 Survey      Responses   Facility patient discharged from?  [facility]   Does the patient have one of the following disease processes/diagnoses(primary or secondary)?  Sepsis   Week 2 attempt successful?  No   Unsuccessful attempts  Attempt 1          Helen Alvarez RN

## 2019-01-16 ENCOUNTER — READMISSION MANAGEMENT (OUTPATIENT)
Dept: CALL CENTER | Facility: HOSPITAL | Age: 64
End: 2019-01-16

## 2019-01-16 RX ORDER — IBUPROFEN 200 MG
TABLET ORAL
Qty: 100 EACH | Refills: 1 | Status: ON HOLD | OUTPATIENT
Start: 2019-01-16 | End: 2020-02-15 | Stop reason: SDUPTHER

## 2019-01-16 RX ORDER — IBUPROFEN 200 MG
TABLET ORAL
Qty: 100 EACH | Refills: 1 | Status: SHIPPED | OUTPATIENT
Start: 2019-01-16 | End: 2019-01-16 | Stop reason: SDUPTHER

## 2019-01-21 ENCOUNTER — READMISSION MANAGEMENT (OUTPATIENT)
Dept: CALL CENTER | Facility: HOSPITAL | Age: 64
End: 2019-01-21

## 2019-01-21 ENCOUNTER — TRANSCRIBE ORDERS (OUTPATIENT)
Dept: LAB | Facility: HOSPITAL | Age: 64
End: 2019-01-21

## 2019-01-21 ENCOUNTER — LAB (OUTPATIENT)
Dept: LAB | Facility: HOSPITAL | Age: 64
End: 2019-01-21

## 2019-01-21 DIAGNOSIS — I89.0 OBLITERATION OF LYMPHATIC VESSEL: ICD-10-CM

## 2019-01-21 DIAGNOSIS — I82.701 CHRONIC EMBOLISM AND THROMBOSIS OF VEIN OF RIGHT UPPER EXTREMITY: ICD-10-CM

## 2019-01-21 DIAGNOSIS — L97.411 ULCER OF RIGHT HEEL, LIMITED TO BREAKDOWN OF SKIN (HCC): ICD-10-CM

## 2019-01-21 DIAGNOSIS — L97.411 ULCER OF RIGHT HEEL, LIMITED TO BREAKDOWN OF SKIN (HCC): Primary | ICD-10-CM

## 2019-01-21 LAB
ALBUMIN SERPL-MCNC: 3.71 G/DL (ref 3.2–4.8)
ALBUMIN/GLOB SERPL: 1.3 G/DL (ref 1.5–2.5)
ALP SERPL-CCNC: 166 U/L (ref 25–100)
ALT SERPL W P-5'-P-CCNC: 33 U/L (ref 7–40)
ANION GAP SERPL CALCULATED.3IONS-SCNC: 6 MMOL/L (ref 3–11)
AST SERPL-CCNC: 35 U/L (ref 0–33)
BASOPHILS # BLD AUTO: 0.02 10*3/MM3 (ref 0–0.2)
BASOPHILS NFR BLD AUTO: 0.3 % (ref 0–1)
BILIRUB SERPL-MCNC: 0.7 MG/DL (ref 0.3–1.2)
BUN BLD-MCNC: 22 MG/DL (ref 9–23)
BUN/CREAT SERPL: 22.7 (ref 7–25)
CALCIUM SPEC-SCNC: 8.7 MG/DL (ref 8.7–10.4)
CHLORIDE SERPL-SCNC: 100 MMOL/L (ref 99–109)
CO2 SERPL-SCNC: 32 MMOL/L (ref 20–31)
CREAT BLD-MCNC: 0.97 MG/DL (ref 0.6–1.3)
CRP SERPL-MCNC: 1.85 MG/DL (ref 0–1)
DEPRECATED RDW RBC AUTO: 49.8 FL (ref 37–54)
EOSINOPHIL # BLD AUTO: 0.14 10*3/MM3 (ref 0–0.3)
EOSINOPHIL NFR BLD AUTO: 2.2 % (ref 0–3)
ERYTHROCYTE [DISTWIDTH] IN BLOOD BY AUTOMATED COUNT: 15.7 % (ref 11.3–14.5)
ERYTHROCYTE [SEDIMENTATION RATE] IN BLOOD: 93 MM/HR (ref 0–20)
GFR SERPL CREATININE-BSD FRML MDRD: 78 ML/MIN/1.73
GLOBULIN UR ELPH-MCNC: 2.9 GM/DL
GLUCOSE BLD-MCNC: 115 MG/DL (ref 70–100)
HCT VFR BLD AUTO: 41.8 % (ref 38.9–50.9)
HGB BLD-MCNC: 13.3 G/DL (ref 13.1–17.5)
IMM GRANULOCYTES # BLD AUTO: 0.07 10*3/MM3 (ref 0–0.03)
IMM GRANULOCYTES NFR BLD AUTO: 1.1 % (ref 0–0.6)
LYMPHOCYTES # BLD AUTO: 1.49 10*3/MM3 (ref 0.6–4.8)
LYMPHOCYTES NFR BLD AUTO: 23.4 % (ref 24–44)
MCH RBC QN AUTO: 27.9 PG (ref 27–31)
MCHC RBC AUTO-ENTMCNC: 31.8 G/DL (ref 32–36)
MCV RBC AUTO: 87.8 FL (ref 80–99)
MONOCYTES # BLD AUTO: 0.53 10*3/MM3 (ref 0–1)
MONOCYTES NFR BLD AUTO: 8.3 % (ref 0–12)
NEUTROPHILS # BLD AUTO: 4.2 10*3/MM3 (ref 1.5–8.3)
NEUTROPHILS NFR BLD AUTO: 65.8 % (ref 41–71)
PLATELET # BLD AUTO: 163 10*3/MM3 (ref 150–450)
PMV BLD AUTO: 10.8 FL (ref 6–12)
POTASSIUM BLD-SCNC: 4.3 MMOL/L (ref 3.5–5.5)
PROT SERPL-MCNC: 6.6 G/DL (ref 5.7–8.2)
RBC # BLD AUTO: 4.76 10*6/MM3 (ref 4.2–5.76)
SODIUM BLD-SCNC: 138 MMOL/L (ref 132–146)
WBC NRBC COR # BLD: 6.38 10*3/MM3 (ref 3.5–10.8)

## 2019-01-21 PROCEDURE — 85652 RBC SED RATE AUTOMATED: CPT

## 2019-01-21 PROCEDURE — 86140 C-REACTIVE PROTEIN: CPT

## 2019-01-21 PROCEDURE — 36415 COLL VENOUS BLD VENIPUNCTURE: CPT

## 2019-01-21 PROCEDURE — 80053 COMPREHEN METABOLIC PANEL: CPT

## 2019-01-21 PROCEDURE — 85025 COMPLETE CBC W/AUTO DIFF WBC: CPT

## 2019-01-21 NOTE — OUTREACH NOTE
Sepsis Week 3 Survey      Responses   Facility patient discharged from?  [facility]   Does the patient have one of the following disease processes/diagnoses(primary or secondary)?  Sepsis   Week 3 attempt successful?  No   Unsuccessful attempts  Attempt 1          Kaye Umanzor RN

## 2019-01-22 ENCOUNTER — READMISSION MANAGEMENT (OUTPATIENT)
Dept: CALL CENTER | Facility: HOSPITAL | Age: 64
End: 2019-01-22

## 2019-01-22 NOTE — OUTREACH NOTE
Sepsis Week 3 Survey      Responses   Facility patient discharged from?  [facility]   Does the patient have one of the following disease processes/diagnoses(primary or secondary)?  Sepsis   Week 3 attempt successful?  No   Unsuccessful attempts  Attempt 2          Isaura Min RN

## 2019-01-30 ENCOUNTER — OFFICE VISIT (OUTPATIENT)
Dept: INTERNAL MEDICINE | Facility: CLINIC | Age: 64
End: 2019-01-30

## 2019-01-30 VITALS
HEIGHT: 76 IN | DIASTOLIC BLOOD PRESSURE: 70 MMHG | BODY MASS INDEX: 35.97 KG/M2 | HEART RATE: 68 BPM | SYSTOLIC BLOOD PRESSURE: 110 MMHG

## 2019-01-30 DIAGNOSIS — I73.9 PVD (PERIPHERAL VASCULAR DISEASE) (HCC): Primary | ICD-10-CM

## 2019-01-30 DIAGNOSIS — R60.9 EDEMA, UNSPECIFIED TYPE: ICD-10-CM

## 2019-01-30 DIAGNOSIS — I50.9 CHRONIC CONGESTIVE HEART FAILURE, UNSPECIFIED HEART FAILURE TYPE (HCC): ICD-10-CM

## 2019-01-30 DIAGNOSIS — D69.6 THROMBOCYTOPENIA (HCC): ICD-10-CM

## 2019-01-30 DIAGNOSIS — I89.0 LYMPHEDEMA: ICD-10-CM

## 2019-01-30 DIAGNOSIS — G25.81 RLS (RESTLESS LEGS SYNDROME): ICD-10-CM

## 2019-01-30 DIAGNOSIS — K75.81 NASH (NONALCOHOLIC STEATOHEPATITIS): ICD-10-CM

## 2019-01-30 DIAGNOSIS — G47.419 PRIMARY NARCOLEPSY WITHOUT CATAPLEXY: ICD-10-CM

## 2019-01-30 DIAGNOSIS — I42.5 RESTRICTIVE CARDIOMYOPATHY (HCC): ICD-10-CM

## 2019-01-30 DIAGNOSIS — R79.89 ELEVATED LFTS: ICD-10-CM

## 2019-01-30 DIAGNOSIS — E11.65 TYPE 2 DIABETES MELLITUS WITH HYPERGLYCEMIA, WITH LONG-TERM CURRENT USE OF INSULIN (HCC): ICD-10-CM

## 2019-01-30 DIAGNOSIS — Z79.4 TYPE 2 DIABETES MELLITUS WITH HYPERGLYCEMIA, WITH LONG-TERM CURRENT USE OF INSULIN (HCC): ICD-10-CM

## 2019-01-30 DIAGNOSIS — Z99.89 OSA ON CPAP: ICD-10-CM

## 2019-01-30 DIAGNOSIS — G47.00 INSOMNIA, UNSPECIFIED TYPE: ICD-10-CM

## 2019-01-30 DIAGNOSIS — I10 ESSENTIAL HYPERTENSION: ICD-10-CM

## 2019-01-30 DIAGNOSIS — K74.60 CIRRHOSIS OF LIVER WITHOUT ASCITES, UNSPECIFIED HEPATIC CIRRHOSIS TYPE (HCC): ICD-10-CM

## 2019-01-30 DIAGNOSIS — I48.91 ATRIAL FIBRILLATION, UNSPECIFIED TYPE (HCC): ICD-10-CM

## 2019-01-30 DIAGNOSIS — K76.0 FATTY LIVER: ICD-10-CM

## 2019-01-30 DIAGNOSIS — G47.33 OSA ON CPAP: ICD-10-CM

## 2019-01-30 LAB
BILIRUB BLD-MCNC: NEGATIVE MG/DL
CLARITY, POC: CLEAR
COLOR UR: YELLOW
GLUCOSE UR STRIP-MCNC: ABNORMAL MG/DL
KETONES UR QL: NEGATIVE
LEUKOCYTE EST, POC: NEGATIVE
NITRITE UR-MCNC: NEGATIVE MG/ML
PH UR: 7 [PH] (ref 5–8)
PROT UR STRIP-MCNC: NEGATIVE MG/DL
RBC # UR STRIP: NEGATIVE /UL
SP GR UR: 1.01 (ref 1–1.03)
UROBILINOGEN UR QL: NORMAL

## 2019-01-30 PROCEDURE — 82043 UR ALBUMIN QUANTITATIVE: CPT | Performed by: INTERNAL MEDICINE

## 2019-01-30 PROCEDURE — 99204 OFFICE O/P NEW MOD 45 MIN: CPT | Performed by: INTERNAL MEDICINE

## 2019-01-30 PROCEDURE — 81003 URINALYSIS AUTO W/O SCOPE: CPT | Performed by: INTERNAL MEDICINE

## 2019-01-30 PROCEDURE — 82570 ASSAY OF URINE CREATININE: CPT | Performed by: INTERNAL MEDICINE

## 2019-01-30 RX ORDER — KETOCONAZOLE 20 MG/G
CREAM TOPICAL 2 TIMES DAILY PRN
Qty: 60 G | Refills: 2 | Status: SHIPPED | OUTPATIENT
Start: 2019-01-30 | End: 2020-07-24

## 2019-01-30 RX ORDER — BUMETANIDE 2 MG/1
4 TABLET ORAL 2 TIMES DAILY
Qty: 360 TABLET | Refills: 1 | Status: SHIPPED | OUTPATIENT
Start: 2019-01-30 | End: 2019-10-29 | Stop reason: SDUPTHER

## 2019-01-30 RX ORDER — PRAMIPEXOLE DIHYDROCHLORIDE 1 MG/1
1 TABLET ORAL 2 TIMES DAILY
Qty: 180 TABLET | Refills: 1 | Status: SHIPPED | OUTPATIENT
Start: 2019-01-30 | End: 2019-07-29 | Stop reason: SDUPTHER

## 2019-01-30 RX ORDER — POTASSIUM CHLORIDE 750 MG/1
30 TABLET, FILM COATED, EXTENDED RELEASE ORAL 2 TIMES DAILY
Qty: 540 TABLET | Refills: 1 | Status: SHIPPED | OUTPATIENT
Start: 2019-01-30 | End: 2019-09-03 | Stop reason: SDUPTHER

## 2019-01-30 RX ORDER — TRAZODONE HYDROCHLORIDE 100 MG/1
100 TABLET ORAL NIGHTLY PRN
Qty: 90 TABLET | Refills: 0 | Status: SHIPPED | OUTPATIENT
Start: 2019-01-30 | End: 2019-05-01 | Stop reason: SDUPTHER

## 2019-01-30 RX ORDER — LEVOTHYROXINE SODIUM 0.07 MG/1
75 TABLET ORAL DAILY
Qty: 90 TABLET | Refills: 1 | Status: SHIPPED | OUTPATIENT
Start: 2019-01-30 | End: 2019-09-11 | Stop reason: SDUPTHER

## 2019-01-30 RX ORDER — LEVOCETIRIZINE DIHYDROCHLORIDE 5 MG/1
5 TABLET, FILM COATED ORAL EVERY EVENING
Qty: 90 TABLET | Refills: 1 | Status: SHIPPED | OUTPATIENT
Start: 2019-01-30 | End: 2021-01-01 | Stop reason: HOSPADM

## 2019-01-30 RX ORDER — ONDANSETRON HYDROCHLORIDE 8 MG/1
8 TABLET, FILM COATED ORAL EVERY 8 HOURS PRN
Qty: 20 TABLET | Refills: 2 | Status: SHIPPED | OUTPATIENT
Start: 2019-01-30 | End: 2020-07-24

## 2019-01-30 RX ORDER — SPIRONOLACTONE 100 MG/1
100 TABLET, FILM COATED ORAL DAILY
Qty: 90 TABLET | Refills: 1 | Status: SHIPPED | OUTPATIENT
Start: 2019-01-30 | End: 2019-07-29 | Stop reason: SDUPTHER

## 2019-01-30 RX ORDER — GABAPENTIN 300 MG/1
900 CAPSULE ORAL 2 TIMES DAILY
Qty: 540 CAPSULE | Refills: 0 | Status: SHIPPED | OUTPATIENT
Start: 2019-01-30 | End: 2019-06-12 | Stop reason: SDUPTHER

## 2019-01-30 RX ORDER — METOLAZONE 5 MG/1
5 TABLET ORAL DAILY PRN
Qty: 90 TABLET | Refills: 1 | Status: SHIPPED | OUTPATIENT
Start: 2019-01-30 | End: 2020-06-10 | Stop reason: SDUPTHER

## 2019-01-30 RX ORDER — VENLAFAXINE HYDROCHLORIDE 150 MG/1
150 CAPSULE, EXTENDED RELEASE ORAL NIGHTLY
Qty: 90 CAPSULE | Refills: 1 | Status: SHIPPED | OUTPATIENT
Start: 2019-01-30 | End: 2019-12-13 | Stop reason: SDUPTHER

## 2019-01-30 NOTE — PROGRESS NOTES
Patient is a 63 y.o. male who is here to establish care for chronic conditions.  Chief Complaint   Patient presents with   • Hypertension   • Diabetes         HPI:    Here to establish care.  Recently in hospital for sepsis sec to cellulitis/diabetic toe ulcer.  Followed by wound care and off antibiotics.  Seen by Dr Levy post hospitalization.     Has had DM for 7 years.  Was seen by Dr Polk yesterday.  Last HBAIC 11.7.  No change in meds.  AM FSBS in the .    Has chronic edema.  Compliant with fluid pills which helps with swelling.    History:    Patient Active Problem List   Diagnosis   • Type 2 diabetes mellitus with hyperglycemia (CMS/HCC)   • Elevated LFTs   • Thrombocytopenia (CMS/HCC)   • PVD (peripheral vascular disease) (CMS/HCC)   • A-fib (CMS/HCC)   • Cirrhosis of liver (CMS/HCC)   • Chronic congestive heart failure (CMS/HCC)   • Diabetic ulcer of right heel (CMS/HCC)   • Primary narcolepsy without cataplexy   • Essential hypertension   • Morbidly obese (CMS/HCC)   • Restless legs syndrome (RLS)   • ADD (attention deficit disorder)   • Depression   • Diabetes mellitus (CMS/HCC)   • Edema   • MCCAULEY (nonalcoholic steatohepatitis)   • Hepatitis B   • Insomnia   • Lymphedema   • Narcolepsy   • Obesity   • JAIME on CPAP   • RLS (restless legs syndrome)   • Tremor of both hands   • Restrictive cardiomyopathy (CMS/HCC)       Past Medical History:   Diagnosis Date   • A-fib (CMS/HCC)    • ADD (attention deficit disorder)    • Atrial flutter (CMS/HCC)    • Cellulitis    • CHF (congestive heart failure) (CMS/HCC)    • Cirrhosis of liver (CMS/HCC)    • Constipation    • Depression    • Diabetes mellitus (CMS/HCC)    • Diabetic ulcer of right foot (CMS/HCC)    • Disease of thyroid gland    • Edema    • Fatty liver    • Hepatitis B    • Hypokalemia    • Insomnia    • Lymphedema    • Narcolepsy    • Neuropathy    • Obesity    • JAIME on CPAP    • PVD (peripheral vascular disease) (CMS/HCC)    • RLS (restless legs  "syndrome)    • Skin cancer    • Tardive dyskinesia    • Tremor of both hands    • Yeast infection        Past Surgical History:   Procedure Laterality Date   • ACHILLES TENDON REPAIR     • CARDIAC ABLATION     • CHOLECYSTECTOMY     • LASER ABLATION      VEIN RLE       Current Outpatient Medications on File Prior to Visit   Medication Sig   • amphetamine-dextroamphetamine (ADDERALL) 15 MG tablet Take 2 tablets by mouth Daily for 30 days. In afternoon   • [START ON 2/7/2019] amphetamine-dextroamphetamine (ADDERALL) 30 MG tablet Take 2 tablets by mouth Every Morning for 30 days.   • bacitracin 500 UNIT/GM ointment Apply  topically to the appropriate area as directed Daily As Needed for Wound Care.   • Charcoal 260 MG capsule Take  by mouth As Needed.   • insulin regular (humuLIN R,novoLIN R) 100 UNIT/ML injection Inject  under the skin into the appropriate area as directed 2 (Two) Times a Day Before Meals. 115 UNITS IN AM  100 UNITS IN PM   • simethicone (MYLICON) 125 MG chewable tablet Chew 125 mg Every 8 (Eight) Hours As Needed for Flatulence.   • Insulin Syringe 29G X 1/2\" 1 ML misc Use one each to inject insulin twice daily Ell.9     No current facility-administered medications on file prior to visit.        No family history on file.    Social History     Socioeconomic History   • Marital status:      Spouse name: Not on file   • Number of children: Not on file   • Years of education: Not on file   • Highest education level: Not on file   Social Needs   • Financial resource strain: Not on file   • Food insecurity - worry: Not on file   • Food insecurity - inability: Not on file   • Transportation needs - medical: Not on file   • Transportation needs - non-medical: Not on file   Occupational History   • Not on file   Tobacco Use   • Smoking status: Never Smoker   • Smokeless tobacco: Never Used   Substance and Sexual Activity   • Alcohol use: Yes     Comment: \"3 SCOTCH AND 2 BEERS PER WEEK\"   • Drug use: No " "  • Sexual activity: Not on file   Other Topics Concern   • Not on file   Social History Narrative   • Not on file         Review of Systems   Constitutional: Positive for fatigue and unexpected weight change. Negative for chills and fever.   HENT: Negative for congestion, ear pain, hearing loss, rhinorrhea, sinus pressure, sore throat and trouble swallowing.    Eyes: Negative for discharge and itching.   Respiratory: Positive for shortness of breath. Negative for cough and chest tightness.    Cardiovascular: Positive for leg swelling. Negative for chest pain and palpitations.   Gastrointestinal: Negative for abdominal pain, blood in stool, constipation, diarrhea and vomiting.        Colonoscopy 3/16   Endocrine: Positive for cold intolerance. Negative for polydipsia and polyuria.   Genitourinary: Negative for difficulty urinating, dysuria, enuresis, frequency, hematuria and urgency.   Musculoskeletal: Positive for arthralgias, back pain and gait problem. Negative for joint swelling.   Skin: Negative for rash and wound.   Allergic/Immunologic: Negative for immunocompromised state.   Neurological: Positive for weakness and light-headedness. Negative for dizziness, syncope, numbness and headaches.   Hematological: Does not bruise/bleed easily.   Psychiatric/Behavioral: Negative for behavioral problems, dysphoric mood and sleep disturbance. The patient is not nervous/anxious.        /70   Pulse 68   Ht 193 cm (75.98\")   BMI 35.97 kg/m²       Physical Exam   Constitutional: He is oriented to person, place, and time. He appears well-developed and well-nourished.   HENT:   Head: Normocephalic and atraumatic.   Right Ear: External ear normal.   Left Ear: External ear normal.   Mouth/Throat: Oropharynx is clear and moist.   Left Cerumen   Eyes: Conjunctivae and EOM are normal.   Neck: Normal range of motion. Neck supple.   Cardiovascular: Normal rate and regular rhythm.   Slight diminished heart sounds " "  Pulmonary/Chest: Effort normal and breath sounds normal.   Abdominal: Soft. Bowel sounds are normal.   Musculoskeletal:   Right Foot in boot, skin not examined, followed by wound care   Lymphadenopathy:     He has no cervical adenopathy.   Neurological: He is alert and oriented to person, place, and time.   Skin: Skin is warm and dry.   Groin yeast   Psychiatric: He has a normal mood and affect. His behavior is normal. Thought content normal.       Procedure:      Discussion/Summary:    DM-per ENDO, check microabumin/Cr  Chronic LE edema-stable on current diuretics  Hx of CHF-\" \"  Hep B hx/FLD-f/u Dr Murcia  Neuropathy-on Gabapentin  Narcolepsy-on Adderall  JAIME-cont CPAP  Hx of Afib-no recurrence s/p Ablation  Hypothyroid-TSH on rtc  Tinea-rx ketoconazole  Left Cerumen-debrox  Other-PSA on rtc    Prior records reviewed  Labs dw patient, will start low dose ARB      Current Outpatient Medications:   •  amphetamine-dextroamphetamine (ADDERALL) 15 MG tablet, Take 2 tablets by mouth Daily for 30 days. In afternoon, Disp: 60 tablet, Rfl: 0  •  [START ON 2/7/2019] amphetamine-dextroamphetamine (ADDERALL) 30 MG tablet, Take 2 tablets by mouth Every Morning for 30 days., Disp: 60 tablet, Rfl: 0  •  bacitracin 500 UNIT/GM ointment, Apply  topically to the appropriate area as directed Daily As Needed for Wound Care., Disp: , Rfl:   •  Charcoal 260 MG capsule, Take  by mouth As Needed., Disp: , Rfl:   •  insulin regular (humuLIN R,novoLIN R) 100 UNIT/ML injection, Inject  under the skin into the appropriate area as directed 2 (Two) Times a Day Before Meals. 115 UNITS IN  UNITS IN PM, Disp: , Rfl:   •  simethicone (MYLICON) 125 MG chewable tablet, Chew 125 mg Every 8 (Eight) Hours As Needed for Flatulence., Disp: , Rfl:   •  aspirin 81 MG tablet, Take 1 tablet by mouth Daily., Disp: 90 tablet, Rfl: 1  •  bumetanide (BUMEX) 2 MG tablet, Take 2 tablets by mouth 2 (Two) Times a Day., Disp: 360 tablet, Rfl: 1  •  gabapentin " "(NEURONTIN) 300 MG capsule, Take 3 capsules by mouth 2 (Two) Times a Day., Disp: 540 capsule, Rfl: 0  •  Insulin Syringe 29G X 1/2\" 1 ML misc, Use one each to inject insulin twice daily Ell.9, Disp: 100 each, Rfl: 1  •  ketoconazole (NIZORAL) 2 % cream, Apply  topically to the appropriate area as directed 2 (Two) Times a Day As Needed for Itching., Disp: 60 g, Rfl: 2  •  levocetirizine (XYZAL) 5 MG tablet, Take 1 tablet by mouth Every Evening., Disp: 90 tablet, Rfl: 1  •  levothyroxine (SYNTHROID, LEVOTHROID) 75 MCG tablet, Take 1 tablet by mouth Daily., Disp: 90 tablet, Rfl: 1  •  losartan (COZAAR) 25 MG tablet, Take 1 tablet by mouth Every Night. Needs kidney test 1-2 weeks after starting, Disp: 90 tablet, Rfl: 3  •  metOLazone (ZAROXOLYN) 5 MG tablet, Take 1 tablet by mouth Daily As Needed (edema/fluid overload)., Disp: 90 tablet, Rfl: 1  •  ondansetron (ZOFRAN) 8 MG tablet, Take 1 tablet by mouth Every 8 (Eight) Hours As Needed for Nausea or Vomiting., Disp: 20 tablet, Rfl: 2  •  potassium chloride (K-DUR) 10 MEQ CR tablet, Take 3 tablets by mouth 2 (Two) Times a Day. 3 TABLETS, Disp: 540 tablet, Rfl: 1  •  pramipexole (MIRAPEX) 1 MG tablet, Take 1 tablet by mouth 2 (Two) Times a Day., Disp: 180 tablet, Rfl: 1  •  spironolactone (ALDACTONE) 100 MG tablet, Take 1 tablet by mouth Daily., Disp: 90 tablet, Rfl: 1  •  traZODone (DESYREL) 100 MG tablet, Take 1 tablet by mouth At Night As Needed for Sleep., Disp: 90 tablet, Rfl: 0  •  venlafaxine XR (EFFEXOR-XR) 150 MG 24 hr capsule, Take 1 capsule by mouth Every Night., Disp: 90 capsule, Rfl: 1        Abe was seen today for hypertension and diabetes.    Diagnoses and all orders for this visit:    PVD (peripheral vascular disease) (CMS/[facility])    Essential hypertension  -     Microalbumin / Creatinine Urine Ratio - Urine, Clean Catch  -     POC Urinalysis Dipstick, Automated    Chronic congestive heart failure, unspecified heart failure type (CMS/HCC)  -     losartan " (COZAAR) 25 MG tablet; Take 1 tablet by mouth Every Night. Needs kidney test 1-2 weeks after starting    Atrial fibrillation, unspecified type (CMS/HCC)    JAIME on CPAP    Fatty liver  -     Ambulatory Referral to Gastroenterology    Cirrhosis of liver without ascites, unspecified hepatic cirrhosis type (CMS/HCC)  -     Ambulatory Referral to Gastroenterology    Type 2 diabetes mellitus with hyperglycemia, with long-term current use of insulin (CMS/HCC)  -     Microalbumin / Creatinine Urine Ratio - Urine, Clean Catch  -     POC Urinalysis Dipstick, Automated  -     losartan (COZAAR) 25 MG tablet; Take 1 tablet by mouth Every Night. Needs kidney test 1-2 weeks after starting  -     Basic Metabolic Panel; Future    Thrombocytopenia (CMS/HCC)    RLS (restless legs syndrome)    Primary narcolepsy without cataplexy    Lymphedema    Insomnia, unspecified type    Elevated LFTs    Edema, unspecified type    Observation and evaluation of  for suspected skin and subcutaneous tissue condition ruled out  -     Ambulatory Referral to Dermatology    Restrictive cardiomyopathy (CMS/HCC)    MCCAULEY (nonalcoholic steatohepatitis)    Other orders  -     ketoconazole (NIZORAL) 2 % cream; Apply  topically to the appropriate area as directed 2 (Two) Times a Day As Needed for Itching.

## 2019-02-01 LAB
CREAT 24H UR-MCNC: 29.5 MG/DL
MICROALBUMIN UR-MCNC: 11.7 UG/ML
MICROALBUMIN/CREAT UR: 39.7 MG/G CREAT (ref 0–30)

## 2019-02-01 RX ORDER — LOSARTAN POTASSIUM 25 MG/1
25 TABLET ORAL NIGHTLY
Qty: 90 TABLET | Refills: 3 | Status: SHIPPED | OUTPATIENT
Start: 2019-02-01 | End: 2019-02-27

## 2019-02-03 ENCOUNTER — HOSPITAL ENCOUNTER (OUTPATIENT)
Dept: SLEEP MEDICINE | Facility: HOSPITAL | Age: 64
Discharge: HOME | End: 2019-02-03
Admitting: INTERNAL MEDICINE

## 2019-02-03 VITALS
HEIGHT: 76 IN | BODY MASS INDEX: 37.67 KG/M2 | SYSTOLIC BLOOD PRESSURE: 141 MMHG | OXYGEN SATURATION: 92 % | HEART RATE: 95 BPM | WEIGHT: 309.38 LBS | DIASTOLIC BLOOD PRESSURE: 69 MMHG

## 2019-02-03 DIAGNOSIS — G47.33 OSA (OBSTRUCTIVE SLEEP APNEA): ICD-10-CM

## 2019-02-03 DIAGNOSIS — I10 ESSENTIAL HYPERTENSION: ICD-10-CM

## 2019-02-03 DIAGNOSIS — G47.419 PRIMARY NARCOLEPSY WITHOUT CATAPLEXY: ICD-10-CM

## 2019-02-03 PROCEDURE — 95811 POLYSOM 6/>YRS CPAP 4/> PARM: CPT | Performed by: INTERNAL MEDICINE

## 2019-02-03 PROCEDURE — 95811 POLYSOM 6/>YRS CPAP 4/> PARM: CPT

## 2019-02-04 DIAGNOSIS — Z99.89 OSA ON CPAP: Primary | ICD-10-CM

## 2019-02-04 DIAGNOSIS — I10 ESSENTIAL HYPERTENSION: ICD-10-CM

## 2019-02-04 DIAGNOSIS — E66.01 MORBIDLY OBESE (HCC): ICD-10-CM

## 2019-02-04 DIAGNOSIS — I48.91 ATRIAL FIBRILLATION, UNSPECIFIED TYPE (HCC): ICD-10-CM

## 2019-02-04 DIAGNOSIS — G47.33 OSA ON CPAP: Primary | ICD-10-CM

## 2019-02-05 NOTE — PROGRESS NOTES
Subjective:     Encounter Date:02/08/2019    Patient ID: Abe Phillips Jr. is a 63 y.o.  white male from [place], [place], retired  and , Robin Capone father.  He recently moved here from [place], [place].    SELF REFERRED  INTERNIST: Anibal Maria MD  PULMONOLOGIST: Fernando Womack MD, [facility]  NEUROLOGIST: ELMA Jenkins  ELECTROPHYSIOLOGIST: Jalen Scott MD ([place])  REMOTE CARDIOLOGIST: Mathew Cox MD ([place])  GASTROENTEROLOGIST: Jalen Rashid MD ([place])  WOUND CARE: [facility], Winifred Pedersen MD  ENDOCRINOLOGIST: Dustin Hendricks MD    Chief Complaint:   Chief Complaint   Patient presents with   • Atrial Fibrillation   • Chronic congestive heart failure     Problem List:  1. Paroxysmal atrial fibrillation:  a. Diagnosed 2016, CHADsVasc 4, anticoagulated with Eliquis  b. ECV, 2016  c. Atrial fibrillation and flutter ablation, [place] ([place]  d. CHADsVasc 4, anticoagulated with ASA  e. NSR on ECG, February 2019  2. Chronic congestive heart failure (HFpEF):  a. Echocardiogram, July 2016, LVEF 0.58 ([place]  b. Echocardiogram, January 2017, LVEF 0.50 ([place]  c. Echocardiogram, 4/12/2018: Mild concentric LVH, EF 55-60 percent, trivial TR  d. Echocardiogram, 1/5/2019: EF greater than 0.70, LV wall thickness is consistent with posterior asymmetric hypertrophy, trace MR, physiologic TR, no pericardial effusion  3. Hypertensive cardiovascular disease:  a. Remote stress test and negative LHC, approximately 2016, in [place]-data deficit  b. Residual class I symptoms  4. Hypertension  5. Hyperlipidemia; not on statin therapy, patient wishes to not take statins  6. Type 2 diabetes mellitus; hemoglobin A1c 7.9% July 2016; 8%, autumn 2018; 11.7%, January 2019 with RLE foot ulcer since May 2018 (seeing wound care)  7. Hypothyroidism on replacement therapy  8. Peripheral vascular disease  9. Severe obstructive sleep apnea with CPAP with sleep study 2/4/2019 pending  10. Restless  leg syndrome  11. Narcolepsy  12. Anxiety and depression  13. Moderate obesity: BMI 35.9  14. Hepatic cirrhosis and portal hypertension with splenomegaly  15. History of hepatitis B antibody 30 years ago  16. History of duodenal ulcer  17. Encephalopathy/sepsis/dehydration with 2-day [facility] admission, January 2019, with fever of unknown origin, echocardiogram negative for endocarditis  18. 60-pound intentional weight loss over past 2 years, February 2019  19. Surgical history:  a. Cholecystectomy  b. LHC  c. Atrial fibrillation/flutter ablation  d. Hemorrhoid surgery  e. Liver biopsy    No Known Allergies      Current Outpatient Medications:   •  amphetamine-dextroamphetamine (ADDERALL) 15 MG tablet, Take 15 mg by mouth Daily. 2 tabs daily, Disp: , Rfl:   •  amphetamine-dextroamphetamine (ADDERALL) 30 MG tablet, Take 2 tablets by mouth Every Morning for 30 days., Disp: 60 tablet, Rfl: 0  •  aspirin 81 MG tablet, Take 1 tablet by mouth Daily. (Patient taking differently: Take 162 mg by mouth Daily.), Disp: 90 tablet, Rfl: 1  •  bacitracin 500 UNIT/GM ointment, Apply  topically to the appropriate area as directed Daily As Needed for Wound Care., Disp: , Rfl:   •  bumetanide (BUMEX) 2 MG tablet, Take 2 tablets by mouth 2 (Two) Times a Day., Disp: 360 tablet, Rfl: 1  •  Charcoal 260 MG capsule, Take 2 tablets by mouth As Needed., Disp: , Rfl:   •  clotrimazole (ANTI-FUNGAL) 1 % cream, Apply 1 application topically to the appropriate area as directed 4 (Four) Times a Day As Needed., Disp: , Rfl:   •  Docusate Calcium (STOOL SOFTENER PO), Take 1 tablet by mouth As Needed., Disp: , Rfl:   •  gabapentin (NEURONTIN) 300 MG capsule, Take 3 capsules by mouth 2 (Two) Times a Day., Disp: 540 capsule, Rfl: 0  •  ibuprofen (ADVIL,MOTRIN) 200 MG tablet, Take 200 mg by mouth Every 6 (Six) Hours As Needed for Mild Pain  (4 tabs every 6 hours prn)., Disp: , Rfl:   •  insulin regular (humuLIN R,novoLIN R) 100 UNIT/ML injection, Inject   "under the skin into the appropriate area as directed 2 (Two) Times a Day Before Meals. 115 UNITS IN  UNITS IN PM, Disp: , Rfl:   •  Insulin Syringe 29G X 1/2\" 1 ML misc, Use one each to inject insulin twice daily Ell.9, Disp: 100 each, Rfl: 1  •  ketoconazole (NIZORAL) 2 % cream, Apply  topically to the appropriate area as directed 2 (Two) Times a Day As Needed for Itching., Disp: 60 g, Rfl: 2  •  levocetirizine (XYZAL) 5 MG tablet, Take 1 tablet by mouth Every Evening., Disp: 90 tablet, Rfl: 1  •  levothyroxine (SYNTHROID, LEVOTHROID) 75 MCG tablet, Take 1 tablet by mouth Daily., Disp: 90 tablet, Rfl: 1  •  losartan (COZAAR) 25 MG tablet, Take 1 tablet by mouth Every Night. Needs kidney test 1-2 weeks after starting, Disp: 90 tablet, Rfl: 3  •  metOLazone (ZAROXOLYN) 5 MG tablet, Take 1 tablet by mouth Daily As Needed (edema/fluid overload)., Disp: 90 tablet, Rfl: 1  •  ondansetron (ZOFRAN) 8 MG tablet, Take 1 tablet by mouth Every 8 (Eight) Hours As Needed for Nausea or Vomiting., Disp: 20 tablet, Rfl: 2  •  polyethylene glycol (MIRALAX) powder, Take 17 g by mouth As Needed., Disp: , Rfl:   •  potassium chloride (K-DUR) 10 MEQ CR tablet, Take 3 tablets by mouth 2 (Two) Times a Day. 3 TABLETS, Disp: 540 tablet, Rfl: 1  •  pramipexole (MIRAPEX) 1 MG tablet, Take 1 tablet by mouth 2 (Two) Times a Day., Disp: 180 tablet, Rfl: 1  •  simethicone (MYLICON) 125 MG chewable tablet, Chew 125 mg Every 8 (Eight) Hours As Needed for Flatulence., Disp: , Rfl:   •  spironolactone (ALDACTONE) 100 MG tablet, Take 1 tablet by mouth Daily., Disp: 90 tablet, Rfl: 1  •  traZODone (DESYREL) 100 MG tablet, Take 1 tablet by mouth At Night As Needed for Sleep., Disp: 90 tablet, Rfl: 0  •  venlafaxine XR (EFFEXOR-XR) 150 MG 24 hr capsule, Take 1 capsule by mouth Every Night., Disp: 90 capsule, Rfl: 1    History of Present Illness  This is a 63-year-old white male who presents to establish cardiology care due to his move to [place] " from [place], [place].  He has a history of atrial fibrillation and was diagnosed in approximately 2016.  He had an external cardioversion and apparently required 3 shocks to convert him to sinus rhythm.  He later went into atrial fibrillation and flutter and had an ablation on 8/30/2016.  He has not had any episodes of atrial fibrillation since that time.  He states that he is very symptomatic with his atrial fibrillation and develops extreme fatigue whenever he is in atrial fibrillation. He has not been on anticoagulation except for aspirin, he says because he has had cirrhosis and some varices from a hepatitis B antibody that was discovered 35 years ago, and there was concern for bleeding if on anticoagulants long-term.  He had viral pericarditis about 30 years ago and was told he has a calcified heart.  He has a history of CHF and continues to have lower extremity edema.  When he was in [place], he would sometimes visit the heart failure clinic for extra diuretics.  He has type 2 diabetes mellitus that is poorly controlled with last hemoglobin A1c of 11.7% in December 2018; several months prior, it was 8%. He is being managed for a RLE foot ulcer that he has had since May 2018. Since moving to [place], he has been followed at [facility] for this.  He had an episode in January 2019 of encephalopathy and fever of unknown origin and was ruled out for endocarditis. He apparently was admitted to the hospital for hyperosmolar nonketotic coma.  At that time, he did not realize that he had become very dehydrated and developed confusion and had a syncopal episode in the restroom.  When EMS came to his home, they were not able to find a blood pressure or pulse, but the patient was able to speak with the EMS staff.  When he was brought to the ED, his blood pressure was found to be 40/30.  His glucose level at that time was greater than 580.  A few days prior to presenting to the ED, he had run out of his insulin  "while traveling. His echocardiogram was acceptable in January 2019.  He has narcolepsy and obstructive sleep apnea and is compliant with CPAP.  He is scheduled to get a new mask for his CPAP machine soon.  He denies any MIs and had a heart catheterization around the time that he was diagnosed with atrial fibrillation and was told that his coronaries were normal-data deficit.  He believes that he had a stress test before that time.  He has known hyperlipidemia but states that he does not want to take a statin medication; he is willing to try a PCSK9 inhibitor.  His father had marked difficulties with lower extremity weakness and apparently even wasting in the setting of chronic lumbar spine disease while on a statin.  He does not engage in much strenuous physical activity but does elaine after his grandchildren around without difficulty.  He denies any smoking history, valvular dysfunction, cardiomegaly, rheumatic fever, COPD, asthma, CVAs, TIAs, seizures, DVTs, PEs, pain, shortness of breath, edema, dictations, presyncope, syncope, or claudication.    Cardiovascular Disease Risk Factors  hyptertension, hyperlipidemia, diabetes mellitus, obesity, increased age, male gender    Social History     Socioeconomic History   • Marital status:      Spouse name: Not on file   • Number of children: 3   • Years of education: Not on file   • Highest education level: Not on file   Social Needs   • Financial resource strain: Not on file   • Food insecurity - worry: Not on file   • Food insecurity - inability: Not on file   • Transportation needs - medical: Not on file   • Transportation needs - non-medical: Not on file   Occupational History   • Not on file   Tobacco Use   • Smoking status: Never Smoker   • Smokeless tobacco: Never Used   Substance and Sexual Activity   • Alcohol use: Yes     Comment: \"3 SCOTCH AND 2 BEERS PER WEEK\"   • Drug use: No   • Sexual activity: Defer   Other Topics Concern   • Not on file   Social " History Narrative   • Not on file       Family History   Problem Relation Age of Onset   • Heart failure Mother         CHF   • Clotting disorder Father    • No Known Problems Sister    • No Known Problems Brother    • No Known Problems Brother    · Father:  from a PE after orthopedic surgery, diabetes mellitus  · Mother:  from CHF  · 2 brothers and one sister: Healthy  · 3 children: Healthy    Review of Systems   Constitution: Positive for malaise/fatigue.   HENT: Negative.    Eyes: Negative.    Cardiovascular: Positive for leg swelling. Negative for chest pain, claudication, dyspnea on exertion, irregular heartbeat, near-syncope, orthopnea, palpitations, paroxysmal nocturnal dyspnea and syncope.   Respiratory: Positive for sleep disturbances due to breathing and snoring. Negative for shortness of breath.    Endocrine:        T2DM with last hemoglobin A1c 11.7%   Hematologic/Lymphatic: Negative.    Skin: Positive for dry skin and poor wound healing.   Musculoskeletal: Positive for muscle weakness.   Gastrointestinal:        History of hepatitis B antibody, cirrhosis   Genitourinary: Positive for bladder incontinence, frequency and urgency.   Neurological: Positive for excessive daytime sleepiness. Negative for focal weakness, light-headedness and seizures.   Psychiatric/Behavioral: The patient is nervous/anxious.    Allergic/Immunologic: Negative.       Obtained and negative except as outlined in problem list and HPI.      ECG 12 Lead  Date/Time: 2019 10:27 AM  Performed by: Ld Orozco MD  Authorized by: Ld Orozco MD   Rhythm comments: Sinus rhythm with first-degree AV block, pulmonary disease pattern, left anterior fascicular block, abnormal ECG, 98 bpm,  ms,  ms,  ms                 Objective:       Vitals:    19 0831 19 0841 19 0842 19 0843   BP: 120/71 115/65 123/69 116/64   BP Location: Left arm Left arm Right arm Right arm   Patient  "Position: Sitting Standing Standing Sitting   Pulse: 98 101 100 98   SpO2: 99%      Weight: 134 kg (295 lb)      Height: 193 cm (76\")        Body mass index is 35.91 kg/m².     Physical Exam   Constitutional: He is oriented to person, place, and time. He appears well-developed and well-nourished.   HENT:   Mouth/Throat: Uvula is midline, oropharynx is clear and moist and mucous membranes are normal. He does not have dentures. No oral lesions. Abnormal dentition (several missing teeth). No dental abscesses, uvula swelling, lacerations or dental caries.   Eyes:   Fundoscopic exam:       The right eye shows AV nicking. The right eye shows no exudate and no hemorrhage.        The left eye shows AV nicking. The left eye shows no exudate and no hemorrhage.   Neck: No JVD present. Carotid bruit is not present. No thyromegaly present.   Cardiovascular: Regular rhythm, S1 normal and S2 normal. Exam reveals no gallop, no S3 and no friction rub.   Murmur heard.   Medium-pitched early systolic murmur is present with a grade of 1/6 at the upper right sternal border.  Pulses:       Carotid pulses are 1+ on the right side, and 1+ on the left side.       Radial pulses are 1+ on the right side, and 1+ on the left side.        Femoral pulses are 1+ on the right side, and 1+ on the left side.       Popliteal pulses are 1+ on the right side, and 1+ on the left side.        Dorsalis pedis pulses are 1+ on the right side, and 1+ on the left side.        Posterior tibial pulses are 1+ on the right side, and 1+ on the left side.   Pulmonary/Chest: Effort normal. He has decreased breath sounds. He has no wheezes. He has no rhonchi. He has no rales.   Abdominal: Soft. He exhibits no mass. There is no hepatosplenomegaly. There is no tenderness. There is no guarding.   Bowel sounds audible x4   Musculoskeletal: Normal range of motion. He exhibits no edema.   Lymphadenopathy:     He has no cervical adenopathy.   Neurological: He is alert and " oriented to person, place, and time.   Skin: Skin is warm, dry and intact. No rash noted.   Vitals reviewed.      Lab Review:   Results for orders placed or performed in visit on 01/30/19   Microalbumin / Creatinine Urine Ratio - Urine, Clean Catch   Result Value Ref Range    Creatinine, Urine 29.5 Not Estab. mg/dL    Microalbumin, Urine 11.7 Not Estab. ug/mL    Microalbumin/Creatinine Ratio 39.7 (H) 0.0 - 30.0 mg/g creat   POC Urinalysis Dipstick, Automated   Result Value Ref Range    Color Yellow Yellow, Straw, Dark Yellow, Dana    Clarity, UA Clear Clear    Specific Gravity  1.010 1.005 - 1.030    pH, Urine 7.0 5.0 - 8.0    Leukocytes Negative Negative    Nitrite, UA Negative Negative    Protein, POC Negative Negative mg/dL    Glucose, UA 50 mg/dL (A) Negative, 1000 mg/dL (3+) mg/dL    Ketones, UA Negative Negative    Urobilinogen, UA Normal Normal    Bilirubin Negative Negative    Blood, UA Negative Negative   · CT chest, 1/5/2019:  1. No evidence of acute cardiopulmonary disease.   2. Pericardial calcifications suggestive of prior pericarditis    · CT abdomen/pelvis, 1/5/2019:  1. Hepatic cirrhosis and portal hypertension with splenomegaly and venous   collaterals.   2. Previous cholecystectomy.   3. Additional findings as described above    · ECG, 4/12/2018: Normal sinus rhythm, left axis deviation, low voltage QRS, cannot rule out anterior infarct, age undetermined, abnormal ECG, 80 bpm,  ms, QRS 90 ms,  ms     · CT head 1/4/2019:No evidence of acute intracranial abnormality.     · Chest x-ray, 1/4/2019: No evidence of acute cardiac pulmonary disease    · 6/21/18: CMP: Glucose 246, sodium 137, potassium 4.2, chloride 96, carbon dioxide 30, June 27, creatinine 1.03, GFR 73, calcium 9.1    Assessment:   Asymptomatic patient with history of atrial fibrillation and is status post atrial fibrillation and flutter ablation in 2016 in [place].  Due to concern regarding possible esophageal varices, the  patient has been anticoagulated with aspirin only since his ablation.  His diabetes mellitus is uncontrolled with hemoglobin A1c of 11.7%.  Hypertension is controlled, and his recent echocardiogram in January 2019 was acceptable.  He has had tachycardia, and we will order Bystolic 2.5 mg daily.  We will also obtain a fasting lipid panel and assess the ASCVD 10-year risk after obtaining the results.  He does not want to take statin medications but would be open to a PCSK9 inhibitor or Zetia.  He is in normal sinus rhythm on ECG in office today.     Diagnosis Plan   1. Atrial fibrillation, unspecified type (CMS/HCC)  NSR on ECG in office today   2. Chronic congestive heart failure, unspecified heart failure type (CMS/HCC)  Stable; No recurrent angina pectoris or CHF on current activity schedule; continue current treatment     3. Essential hypertension  Bystolic 2.5 mg daily   4. Type 2 diabetes mellitus with hyperglycemia, with long-term current use of insulin (CMS/HCC)  Hemoglobin A1c 11.7%    5.      Hyperlipidemia           FLP       Plan:       1. Patient to continue current medications and close follow up with the above providers.  2. Tentative cardiology follow up in June 2019, or patient may return sooner PRN.  3. Bystolic 2.5 mg daily  4. FLP ordered  5. 1 800 card    Scribed for Ld Orozco MD by Elizabeth Gonzalez, ELMA. 2/8/2019  12:36 PM    ILd MD, [facility], personally performed the services described in this documentation as scribed by the above named individual in my presence, and it is both accurate and complete. At 12:51 PM on 02/08/2019

## 2019-02-08 ENCOUNTER — CONSULT (OUTPATIENT)
Dept: CARDIOLOGY | Facility: CLINIC | Age: 64
End: 2019-02-08

## 2019-02-08 VITALS
HEART RATE: 98 BPM | OXYGEN SATURATION: 99 % | HEIGHT: 76 IN | DIASTOLIC BLOOD PRESSURE: 64 MMHG | WEIGHT: 295 LBS | SYSTOLIC BLOOD PRESSURE: 116 MMHG | BODY MASS INDEX: 35.92 KG/M2

## 2019-02-08 DIAGNOSIS — I50.9 CHRONIC CONGESTIVE HEART FAILURE, UNSPECIFIED HEART FAILURE TYPE (HCC): ICD-10-CM

## 2019-02-08 DIAGNOSIS — E11.65 TYPE 2 DIABETES MELLITUS WITH HYPERGLYCEMIA, WITH LONG-TERM CURRENT USE OF INSULIN (HCC): ICD-10-CM

## 2019-02-08 DIAGNOSIS — E78.5 HYPERLIPIDEMIA, UNSPECIFIED HYPERLIPIDEMIA TYPE: ICD-10-CM

## 2019-02-08 DIAGNOSIS — I10 ESSENTIAL HYPERTENSION: ICD-10-CM

## 2019-02-08 DIAGNOSIS — E78.2 MIXED HYPERLIPIDEMIA: ICD-10-CM

## 2019-02-08 DIAGNOSIS — I48.91 ATRIAL FIBRILLATION, UNSPECIFIED TYPE (HCC): Primary | ICD-10-CM

## 2019-02-08 DIAGNOSIS — Z79.4 TYPE 2 DIABETES MELLITUS WITH HYPERGLYCEMIA, WITH LONG-TERM CURRENT USE OF INSULIN (HCC): ICD-10-CM

## 2019-02-08 PROCEDURE — 93000 ELECTROCARDIOGRAM COMPLETE: CPT | Performed by: INTERNAL MEDICINE

## 2019-02-08 PROCEDURE — 99204 OFFICE O/P NEW MOD 45 MIN: CPT | Performed by: INTERNAL MEDICINE

## 2019-02-08 RX ORDER — POLYETHYLENE GLYCOL 3350 17 G/17G
17 POWDER, FOR SOLUTION ORAL AS NEEDED
COMMUNITY
End: 2020-01-01

## 2019-02-08 RX ORDER — CLOTRIMAZOLE 1 %
1 CREAM (GRAM) TOPICAL 4 TIMES DAILY PRN
COMMUNITY
End: 2020-07-24

## 2019-02-08 RX ORDER — DEXTROAMPHETAMINE SACCHARATE, AMPHETAMINE ASPARTATE, DEXTROAMPHETAMINE SULFATE AND AMPHETAMINE SULFATE 3.75; 3.75; 3.75; 3.75 MG/1; MG/1; MG/1; MG/1
15 TABLET ORAL DAILY
COMMUNITY
End: 2019-02-15 | Stop reason: SDUPTHER

## 2019-02-08 RX ORDER — IBUPROFEN 200 MG
200 TABLET ORAL EVERY 6 HOURS PRN
COMMUNITY
End: 2019-03-04 | Stop reason: HOSPADM

## 2019-02-08 RX ORDER — NEBIVOLOL 2.5 MG/1
2.5 TABLET ORAL DAILY
Qty: 30 TABLET | Refills: 11 | Status: SHIPPED | OUTPATIENT
Start: 2019-02-08 | End: 2019-07-11

## 2019-02-15 ENCOUNTER — LAB (OUTPATIENT)
Dept: LAB | Facility: HOSPITAL | Age: 64
End: 2019-02-15

## 2019-02-15 ENCOUNTER — APPOINTMENT (OUTPATIENT)
Dept: LAB | Facility: HOSPITAL | Age: 64
End: 2019-02-15

## 2019-02-15 DIAGNOSIS — I50.9 CHRONIC CONGESTIVE HEART FAILURE, UNSPECIFIED HEART FAILURE TYPE (HCC): ICD-10-CM

## 2019-02-15 DIAGNOSIS — I10 ESSENTIAL HYPERTENSION: ICD-10-CM

## 2019-02-15 DIAGNOSIS — E11.65 TYPE 2 DIABETES MELLITUS WITH HYPERGLYCEMIA, WITH LONG-TERM CURRENT USE OF INSULIN (HCC): ICD-10-CM

## 2019-02-15 DIAGNOSIS — E78.5 HYPERLIPIDEMIA, UNSPECIFIED HYPERLIPIDEMIA TYPE: ICD-10-CM

## 2019-02-15 DIAGNOSIS — Z79.4 TYPE 2 DIABETES MELLITUS WITH HYPERGLYCEMIA, WITH LONG-TERM CURRENT USE OF INSULIN (HCC): ICD-10-CM

## 2019-02-15 DIAGNOSIS — Z79.899 HIGH RISK MEDICATION USE: Primary | ICD-10-CM

## 2019-02-15 DIAGNOSIS — I48.91 ATRIAL FIBRILLATION, UNSPECIFIED TYPE (HCC): ICD-10-CM

## 2019-02-15 LAB
ANION GAP SERPL CALCULATED.3IONS-SCNC: 6 MMOL/L (ref 3–11)
ARTICHOKE IGE QN: 125 MG/DL (ref 0–130)
BUN BLD-MCNC: 42 MG/DL (ref 9–23)
BUN/CREAT SERPL: 27.1 (ref 7–25)
CALCIUM SPEC-SCNC: 9.4 MG/DL (ref 8.7–10.4)
CHLORIDE SERPL-SCNC: 97 MMOL/L (ref 99–109)
CHOLEST SERPL-MCNC: 210 MG/DL (ref 0–200)
CO2 SERPL-SCNC: 26 MMOL/L (ref 20–31)
CREAT BLD-MCNC: 1.55 MG/DL (ref 0.6–1.3)
GFR SERPL CREATININE-BSD FRML MDRD: 45 ML/MIN/1.73
GLUCOSE BLD-MCNC: 221 MG/DL (ref 70–100)
HDLC SERPL-MCNC: 68 MG/DL (ref 40–60)
POTASSIUM BLD-SCNC: 4.4 MMOL/L (ref 3.5–5.5)
SODIUM BLD-SCNC: 129 MMOL/L (ref 132–146)
TRIGL SERPL-MCNC: 124 MG/DL (ref 0–150)

## 2019-02-15 PROCEDURE — 80061 LIPID PANEL: CPT

## 2019-02-15 PROCEDURE — 80048 BASIC METABOLIC PNL TOTAL CA: CPT

## 2019-02-15 PROCEDURE — 36415 COLL VENOUS BLD VENIPUNCTURE: CPT

## 2019-02-15 RX ORDER — DEXTROAMPHETAMINE SACCHARATE, AMPHETAMINE ASPARTATE, DEXTROAMPHETAMINE SULFATE AND AMPHETAMINE SULFATE 7.5; 7.5; 7.5; 7.5 MG/1; MG/1; MG/1; MG/1
60 TABLET ORAL EVERY MORNING
Qty: 60 TABLET | Refills: 0 | Status: SHIPPED | OUTPATIENT
Start: 2019-02-15 | End: 2019-03-17

## 2019-02-15 RX ORDER — DEXTROAMPHETAMINE SACCHARATE, AMPHETAMINE ASPARTATE, DEXTROAMPHETAMINE SULFATE AND AMPHETAMINE SULFATE 3.75; 3.75; 3.75; 3.75 MG/1; MG/1; MG/1; MG/1
30 TABLET ORAL DAILY
Qty: 60 TABLET | Refills: 0 | Status: SHIPPED | OUTPATIENT
Start: 2019-02-15 | End: 2019-04-05 | Stop reason: SDUPTHER

## 2019-02-15 NOTE — TELEPHONE ENCOUNTER
REFILL REQUEST    ADDERALL 45 MG (15 MG AND 30 MG)    LOV 01/07/19  NOV 04/09/19    THALIA IN CHART; PLEASE ADVISE

## 2019-02-18 RX ORDER — EZETIMIBE 10 MG/1
10 TABLET ORAL DAILY
Qty: 30 TABLET | Refills: 11 | Status: SHIPPED | OUTPATIENT
Start: 2019-02-18 | End: 2019-12-27

## 2019-02-25 ENCOUNTER — TELEPHONE (OUTPATIENT)
Dept: INTERNAL MEDICINE | Facility: CLINIC | Age: 64
End: 2019-02-25

## 2019-02-25 ENCOUNTER — TELEPHONE (OUTPATIENT)
Dept: SLEEP MEDICINE | Facility: HOSPITAL | Age: 64
End: 2019-02-25

## 2019-02-25 DIAGNOSIS — Z99.89 OSA ON CPAP: Primary | ICD-10-CM

## 2019-02-25 DIAGNOSIS — G47.33 OSA ON CPAP: Primary | ICD-10-CM

## 2019-02-27 ENCOUNTER — OFFICE VISIT (OUTPATIENT)
Dept: INTERNAL MEDICINE | Facility: CLINIC | Age: 64
End: 2019-02-27

## 2019-02-27 ENCOUNTER — APPOINTMENT (OUTPATIENT)
Dept: LAB | Facility: HOSPITAL | Age: 64
End: 2019-02-27

## 2019-02-27 VITALS
WEIGHT: 315 LBS | HEART RATE: 68 BPM | BODY MASS INDEX: 38.36 KG/M2 | SYSTOLIC BLOOD PRESSURE: 118 MMHG | HEIGHT: 76 IN | DIASTOLIC BLOOD PRESSURE: 60 MMHG

## 2019-02-27 DIAGNOSIS — I10 ESSENTIAL HYPERTENSION: ICD-10-CM

## 2019-02-27 DIAGNOSIS — D69.6 THROMBOCYTOPENIA (HCC): ICD-10-CM

## 2019-02-27 DIAGNOSIS — I48.91 ATRIAL FIBRILLATION, UNSPECIFIED TYPE (HCC): ICD-10-CM

## 2019-02-27 DIAGNOSIS — K74.60 CIRRHOSIS OF LIVER WITHOUT ASCITES, UNSPECIFIED HEPATIC CIRRHOSIS TYPE (HCC): ICD-10-CM

## 2019-02-27 DIAGNOSIS — I89.0 LYMPHEDEMA: ICD-10-CM

## 2019-02-27 DIAGNOSIS — E11.65 TYPE 2 DIABETES MELLITUS WITH HYPERGLYCEMIA, WITH LONG-TERM CURRENT USE OF INSULIN (HCC): ICD-10-CM

## 2019-02-27 DIAGNOSIS — E78.2 MIXED HYPERLIPIDEMIA: ICD-10-CM

## 2019-02-27 DIAGNOSIS — I42.5 RESTRICTIVE CARDIOMYOPATHY (HCC): Primary | ICD-10-CM

## 2019-02-27 DIAGNOSIS — K75.81 NASH (NONALCOHOLIC STEATOHEPATITIS): ICD-10-CM

## 2019-02-27 DIAGNOSIS — I50.9 CHRONIC CONGESTIVE HEART FAILURE, UNSPECIFIED HEART FAILURE TYPE (HCC): ICD-10-CM

## 2019-02-27 DIAGNOSIS — E66.01 MORBIDLY OBESE (HCC): ICD-10-CM

## 2019-02-27 DIAGNOSIS — F51.01 PRIMARY INSOMNIA: ICD-10-CM

## 2019-02-27 DIAGNOSIS — Z79.4 TYPE 2 DIABETES MELLITUS WITH HYPERGLYCEMIA, WITH LONG-TERM CURRENT USE OF INSULIN (HCC): ICD-10-CM

## 2019-02-27 LAB
ANION GAP SERPL CALCULATED.3IONS-SCNC: 11 MMOL/L (ref 3–11)
BUN BLD-MCNC: 51 MG/DL (ref 9–23)
BUN/CREAT SERPL: 31.5 (ref 7–25)
CALCIUM SPEC-SCNC: 9.1 MG/DL (ref 8.7–10.4)
CHLORIDE SERPL-SCNC: 95 MMOL/L (ref 99–109)
CO2 SERPL-SCNC: 27 MMOL/L (ref 20–31)
CREAT BLD-MCNC: 1.62 MG/DL (ref 0.6–1.3)
GFR SERPL CREATININE-BSD FRML MDRD: 43 ML/MIN/1.73
GLUCOSE BLD-MCNC: 286 MG/DL (ref 70–100)
POTASSIUM BLD-SCNC: 3.8 MMOL/L (ref 3.5–5.5)
SODIUM BLD-SCNC: 133 MMOL/L (ref 132–146)

## 2019-02-27 PROCEDURE — 99214 OFFICE O/P EST MOD 30 MIN: CPT | Performed by: INTERNAL MEDICINE

## 2019-02-27 PROCEDURE — 36415 COLL VENOUS BLD VENIPUNCTURE: CPT | Performed by: INTERNAL MEDICINE

## 2019-02-27 PROCEDURE — 80048 BASIC METABOLIC PNL TOTAL CA: CPT | Performed by: INTERNAL MEDICINE

## 2019-02-27 NOTE — PROGRESS NOTES
Patient is a 63 y.o. male who is here for edema.  Chief Complaint   Patient presents with   • Edema         HPI:    Here for f/u.  Despite Bumex 4 mg bid his UOP is decreased and weight is increased.  Breathing is ok.  More MERCADO.  More edema.  FSBS are pretty good.  Still on aldactone.  Occasional dizziness.  No abdominal pains.  Has taken a few doses Ibuprofen.  History:    Patient Active Problem List   Diagnosis   • Type 2 diabetes mellitus with hyperglycemia (CMS/HCC)   • Elevated LFTs   • Thrombocytopenia (CMS/HCC)   • PVD (peripheral vascular disease) (CMS/HCC)   • A-fib (CMS/HCC)   • Cirrhosis of liver (CMS/HCC)   • Chronic congestive heart failure (CMS/HCC)   • Diabetic ulcer of right heel (CMS/HCC)   • Primary narcolepsy without cataplexy   • Essential hypertension   • Morbidly obese (CMS/HCC)   • Restless legs syndrome (RLS)   • ADD (attention deficit disorder)   • Depression   • Diabetes mellitus (CMS/HCC)   • Edema   • MCCAULEY (nonalcoholic steatohepatitis)   • Hepatitis B   • Insomnia   • Lymphedema   • Narcolepsy   • Obesity   • JAIME on CPAP   • RLS (restless legs syndrome)   • Tremor of both hands   • Restrictive cardiomyopathy (CMS/HCC)   • Mixed hyperlipidemia       Past Medical History:   Diagnosis Date   • A-fib (CMS/HCC)    • ADD (attention deficit disorder)    • Atrial flutter (CMS/HCC)    • Cellulitis    • CHF (congestive heart failure) (CMS/HCC)    • Cirrhosis of liver (CMS/HCC)    • Constipation    • Depression    • Diabetes mellitus (CMS/HCC)    • Diabetic ulcer of right foot (CMS/HCC)    • Disease of thyroid gland    • Edema    • Fatty liver    • Hepatitis B    • Hyperlipidemia    • Hypokalemia    • Insomnia    • Lymphedema    • Narcolepsy    • Neuropathy    • Obesity    • JAIME on CPAP    • PVD (peripheral vascular disease) (CMS/HCC)    • RLS (restless legs syndrome)    • Skin cancer    • Tardive dyskinesia    • Tremor of both hands    • Yeast infection        Past Surgical History:   Procedure  Laterality Date   • ACHILLES TENDON REPAIR     • CARDIAC ABLATION     • CHOLECYSTECTOMY     • LASER ABLATION      VEIN RLE       Current Outpatient Medications on File Prior to Visit   Medication Sig   • amphetamine-dextroamphetamine (ADDERALL) 15 MG tablet Take 2 tablets by mouth Daily. 2 tabs daily in afternoon   • amphetamine-dextroamphetamine (ADDERALL) 30 MG tablet Take 2 tablets by mouth Every Morning for 30 days.   • aspirin 81 MG tablet Take 1 tablet by mouth Daily. (Patient taking differently: Take 162 mg by mouth Daily.)   • bacitracin 500 UNIT/GM ointment Apply  topically to the appropriate area as directed Daily As Needed for Wound Care.   • bumetanide (BUMEX) 2 MG tablet Take 2 tablets by mouth 2 (Two) Times a Day.   • Charcoal 260 MG capsule Take 2 tablets by mouth As Needed.   • clotrimazole (ANTI-FUNGAL) 1 % cream Apply 1 application topically to the appropriate area as directed 4 (Four) Times a Day As Needed.   • Docusate Calcium (STOOL SOFTENER PO) Take 1 tablet by mouth As Needed.   • ezetimibe (ZETIA) 10 MG tablet Take 1 tablet by mouth Daily.   • gabapentin (NEURONTIN) 300 MG capsule Take 3 capsules by mouth 2 (Two) Times a Day.   • ibuprofen (ADVIL,MOTRIN) 200 MG tablet Take 200 mg by mouth Every 6 (Six) Hours As Needed for Mild Pain  (4 tabs every 6 hours prn).   • insulin regular (humuLIN R,novoLIN R) 100 UNIT/ML injection Inject  under the skin into the appropriate area as directed 2 (Two) Times a Day Before Meals. 115 UNITS IN AM  100 UNITS IN PM   • ketoconazole (NIZORAL) 2 % cream Apply  topically to the appropriate area as directed 2 (Two) Times a Day As Needed for Itching.   • levocetirizine (XYZAL) 5 MG tablet Take 1 tablet by mouth Every Evening.   • levothyroxine (SYNTHROID, LEVOTHROID) 75 MCG tablet Take 1 tablet by mouth Daily.   • metOLazone (ZAROXOLYN) 5 MG tablet Take 1 tablet by mouth Daily As Needed (edema/fluid overload).   • nebivolol (BYSTOLIC) 2.5 MG tablet Take 1 tablet  "by mouth Daily.   • ondansetron (ZOFRAN) 8 MG tablet Take 1 tablet by mouth Every 8 (Eight) Hours As Needed for Nausea or Vomiting.   • polyethylene glycol (MIRALAX) powder Take 17 g by mouth As Needed.   • potassium chloride (K-DUR) 10 MEQ CR tablet Take 3 tablets by mouth 2 (Two) Times a Day. 3 TABLETS   • pramipexole (MIRAPEX) 1 MG tablet Take 1 tablet by mouth 2 (Two) Times a Day.   • simethicone (MYLICON) 125 MG chewable tablet Chew 125 mg Every 8 (Eight) Hours As Needed for Flatulence.   • spironolactone (ALDACTONE) 100 MG tablet Take 1 tablet by mouth Daily.   • traZODone (DESYREL) 100 MG tablet Take 1 tablet by mouth At Night As Needed for Sleep.   • venlafaxine XR (EFFEXOR-XR) 150 MG 24 hr capsule Take 1 capsule by mouth Every Night.   • Insulin Syringe 29G X 1/2\" 1 ML misc Use one each to inject insulin twice daily Ell.9   • [DISCONTINUED] losartan (COZAAR) 25 MG tablet Take 1 tablet by mouth Every Night. Needs kidney test 1-2 weeks after starting     No current facility-administered medications on file prior to visit.        Family History   Problem Relation Age of Onset   • Heart failure Mother         CHF   • Clotting disorder Father    • No Known Problems Sister    • No Known Problems Brother    • No Known Problems Brother        Social History     Socioeconomic History   • Marital status:      Spouse name: Not on file   • Number of children: Not on file   • Years of education: Not on file   • Highest education level: Not on file   Social Needs   • Financial resource strain: Not on file   • Food insecurity - worry: Not on file   • Food insecurity - inability: Not on file   • Transportation needs - medical: Not on file   • Transportation needs - non-medical: Not on file   Occupational History   • Not on file   Tobacco Use   • Smoking status: Never Smoker   • Smokeless tobacco: Never Used   Substance and Sexual Activity   • Alcohol use: Yes     Comment: \"3 SCOTCH AND 2 BEERS PER WEEK\"   • Drug " "use: No   • Sexual activity: Defer   Other Topics Concern   • Not on file   Social History Narrative   • Not on file         Review of Systems   Constitutional: Positive for fatigue and unexpected weight change. Negative for chills and fever.   HENT: Negative for congestion, ear pain, hearing loss, rhinorrhea, sinus pressure, sore throat and trouble swallowing.    Eyes: Negative for discharge and itching.   Respiratory: Positive for shortness of breath. Negative for cough and chest tightness.    Cardiovascular: Positive for leg swelling. Negative for chest pain and palpitations.   Gastrointestinal: Negative for abdominal pain, blood in stool, constipation, diarrhea and vomiting.        Colonoscopy 3/16   Endocrine: Positive for cold intolerance. Negative for polydipsia and polyuria.   Genitourinary: Negative for difficulty urinating, dysuria, enuresis, frequency, hematuria and urgency.   Musculoskeletal: Positive for arthralgias, back pain and gait problem. Negative for joint swelling.   Skin: Negative for rash and wound.   Allergic/Immunologic: Negative for immunocompromised state.   Neurological: Positive for weakness and light-headedness. Negative for dizziness, syncope, numbness and headaches.   Hematological: Does not bruise/bleed easily.   Psychiatric/Behavioral: Negative for behavioral problems, dysphoric mood and sleep disturbance. The patient is not nervous/anxious.        /60 (BP Location: Left arm, Patient Position: Sitting)   Pulse 68   Ht 193 cm (75.98\")   Wt (!) 144 kg (317 lb)   BMI 38.60 kg/m²       Physical Exam   Constitutional: He is oriented to person, place, and time. He appears well-developed and well-nourished.   HENT:   Head: Normocephalic and atraumatic.   Right Ear: External ear normal.   Left Ear: External ear normal.   Mouth/Throat: Oropharynx is clear and moist.   Left Cerumen   Eyes: Conjunctivae and EOM are normal.   Neck: Normal range of motion. Neck supple. " "  Cardiovascular: Normal rate and regular rhythm.   Slight diminished heart sounds   Pulmonary/Chest: Effort normal and breath sounds normal.   Abdominal: Soft. Bowel sounds are normal.   Musculoskeletal: He exhibits edema.   Lymphadenopathy:     He has no cervical adenopathy.   Neurological: He is alert and oriented to person, place, and time.   Skin: Skin is warm and dry.   Groin yeast   Psychiatric: He has a normal mood and affect. His behavior is normal. Thought content normal.       Procedure:      Discussion/Summary:    DM-per ENDO  Chronic LE edema-dc aldactone and cut Bumex to 4 mg qam with the metolazone, weight qd  Hx of CHF-\" \"  Hep B hx/FLD-f/u Dr Murcia  Neuropathy-on Gabapentin  Narcolepsy-on Adderall  JAIME-cont CPAP  Hx of Afib-no recurrence s/p Ablation  Hypothyroid-TSH on rtc  Tinea-rx ketoconazole  Left Cerumen-debrox prn  Elevated Cr-would have thought that DC of ARB would have improved his Cr,  Will try to improve perfusion pressure to kidneys by dc aldactone and low dose bystolic and recheck next week, if not improved will send to Nephrology  Other-PSA on rtc     Prior records reviewed    Labs noted and dw patient        Current Outpatient Medications:   •  amphetamine-dextroamphetamine (ADDERALL) 15 MG tablet, Take 2 tablets by mouth Daily. 2 tabs daily in afternoon, Disp: 60 tablet, Rfl: 0  •  amphetamine-dextroamphetamine (ADDERALL) 30 MG tablet, Take 2 tablets by mouth Every Morning for 30 days., Disp: 60 tablet, Rfl: 0  •  aspirin 81 MG tablet, Take 1 tablet by mouth Daily. (Patient taking differently: Take 162 mg by mouth Daily.), Disp: 90 tablet, Rfl: 1  •  bacitracin 500 UNIT/GM ointment, Apply  topically to the appropriate area as directed Daily As Needed for Wound Care., Disp: , Rfl:   •  bumetanide (BUMEX) 2 MG tablet, Take 2 tablets by mouth 2 (Two) Times a Day., Disp: 360 tablet, Rfl: 1  •  Charcoal 260 MG capsule, Take 2 tablets by mouth As Needed., Disp: , Rfl:   •  clotrimazole " (ANTI-FUNGAL) 1 % cream, Apply 1 application topically to the appropriate area as directed 4 (Four) Times a Day As Needed., Disp: , Rfl:   •  Docusate Calcium (STOOL SOFTENER PO), Take 1 tablet by mouth As Needed., Disp: , Rfl:   •  ezetimibe (ZETIA) 10 MG tablet, Take 1 tablet by mouth Daily., Disp: 30 tablet, Rfl: 11  •  gabapentin (NEURONTIN) 300 MG capsule, Take 3 capsules by mouth 2 (Two) Times a Day., Disp: 540 capsule, Rfl: 0  •  ibuprofen (ADVIL,MOTRIN) 200 MG tablet, Take 200 mg by mouth Every 6 (Six) Hours As Needed for Mild Pain  (4 tabs every 6 hours prn)., Disp: , Rfl:   •  insulin regular (humuLIN R,novoLIN R) 100 UNIT/ML injection, Inject  under the skin into the appropriate area as directed 2 (Two) Times a Day Before Meals. 115 UNITS IN  UNITS IN PM, Disp: , Rfl:   •  ketoconazole (NIZORAL) 2 % cream, Apply  topically to the appropriate area as directed 2 (Two) Times a Day As Needed for Itching., Disp: 60 g, Rfl: 2  •  levocetirizine (XYZAL) 5 MG tablet, Take 1 tablet by mouth Every Evening., Disp: 90 tablet, Rfl: 1  •  levothyroxine (SYNTHROID, LEVOTHROID) 75 MCG tablet, Take 1 tablet by mouth Daily., Disp: 90 tablet, Rfl: 1  •  metOLazone (ZAROXOLYN) 5 MG tablet, Take 1 tablet by mouth Daily As Needed (edema/fluid overload)., Disp: 90 tablet, Rfl: 1  •  nebivolol (BYSTOLIC) 2.5 MG tablet, Take 1 tablet by mouth Daily., Disp: 30 tablet, Rfl: 11  •  ondansetron (ZOFRAN) 8 MG tablet, Take 1 tablet by mouth Every 8 (Eight) Hours As Needed for Nausea or Vomiting., Disp: 20 tablet, Rfl: 2  •  polyethylene glycol (MIRALAX) powder, Take 17 g by mouth As Needed., Disp: , Rfl:   •  potassium chloride (K-DUR) 10 MEQ CR tablet, Take 3 tablets by mouth 2 (Two) Times a Day. 3 TABLETS, Disp: 540 tablet, Rfl: 1  •  pramipexole (MIRAPEX) 1 MG tablet, Take 1 tablet by mouth 2 (Two) Times a Day., Disp: 180 tablet, Rfl: 1  •  simethicone (MYLICON) 125 MG chewable tablet, Chew 125 mg Every 8 (Eight) Hours As  "Needed for Flatulence., Disp: , Rfl:   •  spironolactone (ALDACTONE) 100 MG tablet, Take 1 tablet by mouth Daily., Disp: 90 tablet, Rfl: 1  •  traZODone (DESYREL) 100 MG tablet, Take 1 tablet by mouth At Night As Needed for Sleep., Disp: 90 tablet, Rfl: 0  •  venlafaxine XR (EFFEXOR-XR) 150 MG 24 hr capsule, Take 1 capsule by mouth Every Night., Disp: 90 capsule, Rfl: 1  •  Insulin Syringe 29G X 1/2\" 1 ML misc, Use one each to inject insulin twice daily Ell.9, Disp: 100 each, Rfl: 1        Abe was seen today for edema.    Diagnoses and all orders for this visit:    Restrictive cardiomyopathy (CMS/HCC)    Mixed hyperlipidemia    Essential hypertension  -     Basic Metabolic Panel; Future    Chronic congestive heart failure, unspecified heart failure type (CMS/HCC)    Atrial fibrillation, unspecified type (CMS/HCC)    MCCAULEY (nonalcoholic steatohepatitis)    Morbidly obese (CMS/HCC)    Cirrhosis of liver without ascites, unspecified hepatic cirrhosis type (CMS/HCC)    Type 2 diabetes mellitus with hyperglycemia, with long-term current use of insulin (CMS/HCC)    Thrombocytopenia (CMS/HCC)    Lymphedema    Primary insomnia        "

## 2019-03-02 PROCEDURE — 99284 EMERGENCY DEPT VISIT MOD MDM: CPT

## 2019-03-02 PROCEDURE — 36415 COLL VENOUS BLD VENIPUNCTURE: CPT

## 2019-03-03 ENCOUNTER — APPOINTMENT (OUTPATIENT)
Dept: GENERAL RADIOLOGY | Facility: HOSPITAL | Age: 64
End: 2019-03-03

## 2019-03-03 ENCOUNTER — APPOINTMENT (OUTPATIENT)
Dept: CARDIOLOGY | Facility: HOSPITAL | Age: 64
End: 2019-03-03

## 2019-03-03 ENCOUNTER — HOSPITAL ENCOUNTER (OUTPATIENT)
Facility: HOSPITAL | Age: 64
Setting detail: OBSERVATION
Discharge: HOME | End: 2019-03-04
Attending: EMERGENCY MEDICINE | Admitting: INTERNAL MEDICINE

## 2019-03-03 DIAGNOSIS — E87.6 HYPOKALEMIA: ICD-10-CM

## 2019-03-03 DIAGNOSIS — M79.89 SWELLING OF RIGHT LOWER EXTREMITY: ICD-10-CM

## 2019-03-03 DIAGNOSIS — Z74.09 IMPAIRED FUNCTIONAL MOBILITY, BALANCE, GAIT, AND ENDURANCE: ICD-10-CM

## 2019-03-03 DIAGNOSIS — L03.115 CELLULITIS OF RIGHT LOWER EXTREMITY: Primary | ICD-10-CM

## 2019-03-03 LAB
ALBUMIN SERPL-MCNC: 4.05 G/DL (ref 3.2–4.8)
ALBUMIN SERPL-MCNC: 4.44 G/DL (ref 3.2–4.8)
ALBUMIN/GLOB SERPL: 1.3 G/DL (ref 1.5–2.5)
ALBUMIN/GLOB SERPL: 1.3 G/DL (ref 1.5–2.5)
ALP SERPL-CCNC: 101 U/L (ref 25–100)
ALP SERPL-CCNC: 114 U/L (ref 25–100)
ALT SERPL W P-5'-P-CCNC: 23 U/L (ref 7–40)
ALT SERPL W P-5'-P-CCNC: 28 U/L (ref 7–40)
ANION GAP SERPL CALCULATED.3IONS-SCNC: 9 MMOL/L (ref 3–11)
ANION GAP SERPL CALCULATED.3IONS-SCNC: 9 MMOL/L (ref 3–11)
AST SERPL-CCNC: 27 U/L (ref 0–33)
AST SERPL-CCNC: 31 U/L (ref 0–33)
BASOPHILS # BLD AUTO: 0.03 10*3/MM3 (ref 0–0.2)
BASOPHILS # BLD AUTO: 0.04 10*3/MM3 (ref 0–0.2)
BASOPHILS NFR BLD AUTO: 0.4 % (ref 0–1)
BASOPHILS NFR BLD AUTO: 0.5 % (ref 0–1)
BILIRUB SERPL-MCNC: 0.9 MG/DL (ref 0.3–1.2)
BILIRUB SERPL-MCNC: 1.4 MG/DL (ref 0.3–1.2)
BILIRUB UR QL STRIP: NEGATIVE
BUN BLD-MCNC: 38 MG/DL (ref 9–23)
BUN BLD-MCNC: 41 MG/DL (ref 9–23)
BUN/CREAT SERPL: 32.8 (ref 7–25)
BUN/CREAT SERPL: 35.8 (ref 7–25)
CALCIUM SPEC-SCNC: 9.2 MG/DL (ref 8.7–10.4)
CALCIUM SPEC-SCNC: 9.8 MG/DL (ref 8.7–10.4)
CHLORIDE SERPL-SCNC: 94 MMOL/L (ref 99–109)
CHLORIDE SERPL-SCNC: 97 MMOL/L (ref 99–109)
CLARITY UR: CLEAR
CO2 SERPL-SCNC: 27 MMOL/L (ref 20–31)
CO2 SERPL-SCNC: 29 MMOL/L (ref 20–31)
COLOR UR: YELLOW
CREAT BLD-MCNC: 1.06 MG/DL (ref 0.6–1.3)
CREAT BLD-MCNC: 1.25 MG/DL (ref 0.6–1.3)
D-LACTATE SERPL-SCNC: 1.1 MMOL/L (ref 0.5–2)
DEPRECATED RDW RBC AUTO: 47.1 FL (ref 37–54)
DEPRECATED RDW RBC AUTO: 47.2 FL (ref 37–54)
EOSINOPHIL # BLD AUTO: 0.06 10*3/MM3 (ref 0–0.3)
EOSINOPHIL # BLD AUTO: 0.09 10*3/MM3 (ref 0–0.3)
EOSINOPHIL NFR BLD AUTO: 0.8 % (ref 0–3)
EOSINOPHIL NFR BLD AUTO: 1.1 % (ref 0–3)
ERYTHROCYTE [DISTWIDTH] IN BLOOD BY AUTOMATED COUNT: 14.6 % (ref 11.3–14.5)
ERYTHROCYTE [DISTWIDTH] IN BLOOD BY AUTOMATED COUNT: 14.7 % (ref 11.3–14.5)
GFR SERPL CREATININE-BSD FRML MDRD: 58 ML/MIN/1.73
GFR SERPL CREATININE-BSD FRML MDRD: 70 ML/MIN/1.73
GLOBULIN UR ELPH-MCNC: 3.2 GM/DL
GLOBULIN UR ELPH-MCNC: 3.4 GM/DL
GLUCOSE BLD-MCNC: 46 MG/DL (ref 70–100)
GLUCOSE BLD-MCNC: 71 MG/DL (ref 70–100)
GLUCOSE BLDC GLUCOMTR-MCNC: 112 MG/DL (ref 70–130)
GLUCOSE BLDC GLUCOMTR-MCNC: 235 MG/DL (ref 70–130)
GLUCOSE BLDC GLUCOMTR-MCNC: 241 MG/DL (ref 70–130)
GLUCOSE BLDC GLUCOMTR-MCNC: 288 MG/DL (ref 70–130)
GLUCOSE BLDC GLUCOMTR-MCNC: 447 MG/DL (ref 70–130)
GLUCOSE BLDC GLUCOMTR-MCNC: 47 MG/DL (ref 70–130)
GLUCOSE BLDC GLUCOMTR-MCNC: 76 MG/DL (ref 70–130)
GLUCOSE UR STRIP-MCNC: NEGATIVE MG/DL
HCT VFR BLD AUTO: 35 % (ref 38.9–50.9)
HCT VFR BLD AUTO: 36.1 % (ref 38.9–50.9)
HGB BLD-MCNC: 11.6 G/DL (ref 13.1–17.5)
HGB BLD-MCNC: 12.2 G/DL (ref 13.1–17.5)
HGB UR QL STRIP.AUTO: NEGATIVE
IMM GRANULOCYTES # BLD AUTO: 0.17 10*3/MM3 (ref 0–0.05)
IMM GRANULOCYTES # BLD AUTO: 0.21 10*3/MM3 (ref 0–0.05)
IMM GRANULOCYTES NFR BLD AUTO: 2.2 % (ref 0–0.6)
IMM GRANULOCYTES NFR BLD AUTO: 2.5 % (ref 0–0.6)
INR PPP: 1.31 (ref 0.85–1.16)
KETONES UR QL STRIP: NEGATIVE
LEUKOCYTE ESTERASE UR QL STRIP.AUTO: NEGATIVE
LYMPHOCYTES # BLD AUTO: 1.02 10*3/MM3 (ref 0.6–4.8)
LYMPHOCYTES # BLD AUTO: 1.28 10*3/MM3 (ref 0.6–4.8)
LYMPHOCYTES NFR BLD AUTO: 12.4 % (ref 24–44)
LYMPHOCYTES NFR BLD AUTO: 16.6 % (ref 24–44)
MAGNESIUM SERPL-MCNC: 2 MG/DL (ref 1.3–2.7)
MCH RBC QN AUTO: 29 PG (ref 27–31)
MCH RBC QN AUTO: 29.8 PG (ref 27–31)
MCHC RBC AUTO-ENTMCNC: 33.1 G/DL (ref 32–36)
MCHC RBC AUTO-ENTMCNC: 33.8 G/DL (ref 32–36)
MCV RBC AUTO: 87.5 FL (ref 80–99)
MCV RBC AUTO: 88 FL (ref 80–99)
MONOCYTES # BLD AUTO: 0.53 10*3/MM3 (ref 0–1)
MONOCYTES # BLD AUTO: 1.02 10*3/MM3 (ref 0–1)
MONOCYTES NFR BLD AUTO: 13.2 % (ref 0–12)
MONOCYTES NFR BLD AUTO: 6.4 % (ref 0–12)
NEUTROPHILS # BLD AUTO: 5.14 10*3/MM3 (ref 1.5–8.3)
NEUTROPHILS # BLD AUTO: 6.35 10*3/MM3 (ref 1.5–8.3)
NEUTROPHILS NFR BLD AUTO: 66.8 % (ref 41–71)
NEUTROPHILS NFR BLD AUTO: 77.1 % (ref 41–71)
NITRITE UR QL STRIP: NEGATIVE
PH UR STRIP.AUTO: 6 [PH] (ref 5–8)
PLATELET # BLD AUTO: 131 10*3/MM3 (ref 150–450)
PLATELET # BLD AUTO: 140 10*3/MM3 (ref 150–450)
PMV BLD AUTO: 10.7 FL (ref 6–12)
PMV BLD AUTO: 10.7 FL (ref 6–12)
POTASSIUM BLD-SCNC: 2.7 MMOL/L (ref 3.5–5.5)
POTASSIUM BLD-SCNC: 2.9 MMOL/L (ref 3.5–5.5)
POTASSIUM BLD-SCNC: 3.3 MMOL/L (ref 3.5–5.5)
POTASSIUM BLD-SCNC: 3.6 MMOL/L (ref 3.5–5.5)
PROCALCITONIN SERPL-MCNC: 0.24 NG/ML
PROT SERPL-MCNC: 7.2 G/DL (ref 5.7–8.2)
PROT SERPL-MCNC: 7.8 G/DL (ref 5.7–8.2)
PROT UR QL STRIP: NEGATIVE
PROTHROMBIN TIME: 15.7 SECONDS (ref 11.2–14.3)
RBC # BLD AUTO: 4 10*6/MM3 (ref 4.2–5.76)
RBC # BLD AUTO: 4.1 10*6/MM3 (ref 4.2–5.76)
SODIUM BLD-SCNC: 132 MMOL/L (ref 132–146)
SODIUM BLD-SCNC: 133 MMOL/L (ref 132–146)
SP GR UR STRIP: 1.01 (ref 1–1.03)
TROPONIN I SERPL-MCNC: 0 NG/ML (ref 0–0.07)
UROBILINOGEN UR QL STRIP: NORMAL
WBC NRBC COR # BLD: 7.7 10*3/MM3 (ref 3.5–10.8)
WBC NRBC COR # BLD: 8.24 10*3/MM3 (ref 3.5–10.8)

## 2019-03-03 PROCEDURE — G0378 HOSPITAL OBSERVATION PER HR: HCPCS

## 2019-03-03 PROCEDURE — 80053 COMPREHEN METABOLIC PANEL: CPT | Performed by: EMERGENCY MEDICINE

## 2019-03-03 PROCEDURE — 94660 CPAP INITIATION&MGMT: CPT

## 2019-03-03 PROCEDURE — 99223 1ST HOSP IP/OBS HIGH 75: CPT | Performed by: INTERNAL MEDICINE

## 2019-03-03 PROCEDURE — 85025 COMPLETE CBC W/AUTO DIFF WBC: CPT | Performed by: INTERNAL MEDICINE

## 2019-03-03 PROCEDURE — 81003 URINALYSIS AUTO W/O SCOPE: CPT | Performed by: EMERGENCY MEDICINE

## 2019-03-03 PROCEDURE — 93971 EXTREMITY STUDY: CPT | Performed by: INTERNAL MEDICINE

## 2019-03-03 PROCEDURE — 25010000002 VANCOMYCIN 10 G RECONSTITUTED SOLUTION

## 2019-03-03 PROCEDURE — 25010000002 ENOXAPARIN PER 10 MG: Performed by: INTERNAL MEDICINE

## 2019-03-03 PROCEDURE — 85025 COMPLETE CBC W/AUTO DIFF WBC: CPT | Performed by: EMERGENCY MEDICINE

## 2019-03-03 PROCEDURE — 25010000002 MAGNESIUM SULFATE IN D5W 1G/100ML (PREMIX) 1-5 GM/100ML-% SOLUTION: Performed by: INTERNAL MEDICINE

## 2019-03-03 PROCEDURE — 82962 GLUCOSE BLOOD TEST: CPT

## 2019-03-03 PROCEDURE — 84132 ASSAY OF SERUM POTASSIUM: CPT | Performed by: INTERNAL MEDICINE

## 2019-03-03 PROCEDURE — 96374 THER/PROPH/DIAG INJ IV PUSH: CPT

## 2019-03-03 PROCEDURE — 83605 ASSAY OF LACTIC ACID: CPT | Performed by: EMERGENCY MEDICINE

## 2019-03-03 PROCEDURE — 96372 THER/PROPH/DIAG INJ SC/IM: CPT

## 2019-03-03 PROCEDURE — 25010000002 VANCOMYCIN 1 G RECONSTITUTED SOLUTION: Performed by: EMERGENCY MEDICINE

## 2019-03-03 PROCEDURE — 63710000001 INSULIN LISPRO (HUMAN) PER 5 UNITS: Performed by: INTERNAL MEDICINE

## 2019-03-03 PROCEDURE — 84145 PROCALCITONIN (PCT): CPT | Performed by: EMERGENCY MEDICINE

## 2019-03-03 PROCEDURE — 25010000003 CEFTRIAXONE PER 250 MG: Performed by: EMERGENCY MEDICINE

## 2019-03-03 PROCEDURE — 84484 ASSAY OF TROPONIN QUANT: CPT

## 2019-03-03 PROCEDURE — 80053 COMPREHEN METABOLIC PANEL: CPT | Performed by: INTERNAL MEDICINE

## 2019-03-03 PROCEDURE — 85610 PROTHROMBIN TIME: CPT | Performed by: EMERGENCY MEDICINE

## 2019-03-03 PROCEDURE — 73590 X-RAY EXAM OF LOWER LEG: CPT

## 2019-03-03 PROCEDURE — 87040 BLOOD CULTURE FOR BACTERIA: CPT | Performed by: EMERGENCY MEDICINE

## 2019-03-03 PROCEDURE — 94799 UNLISTED PULMONARY SVC/PX: CPT

## 2019-03-03 PROCEDURE — 93971 EXTREMITY STUDY: CPT

## 2019-03-03 PROCEDURE — 83735 ASSAY OF MAGNESIUM: CPT | Performed by: INTERNAL MEDICINE

## 2019-03-03 RX ORDER — DOCUSATE SODIUM 100 MG/1
100 CAPSULE, LIQUID FILLED ORAL 2 TIMES DAILY PRN
Status: DISCONTINUED | OUTPATIENT
Start: 2019-03-03 | End: 2019-03-04 | Stop reason: HOSPADM

## 2019-03-03 RX ORDER — NICOTINE POLACRILEX 4 MG
15 LOZENGE BUCCAL
Status: DISCONTINUED | OUTPATIENT
Start: 2019-03-03 | End: 2019-03-04 | Stop reason: HOSPADM

## 2019-03-03 RX ORDER — ASPIRIN 81 MG/1
81 TABLET, CHEWABLE ORAL DAILY
Status: DISCONTINUED | OUTPATIENT
Start: 2019-03-03 | End: 2019-03-04 | Stop reason: HOSPADM

## 2019-03-03 RX ORDER — DEXTROSE MONOHYDRATE 25 G/50ML
25 INJECTION, SOLUTION INTRAVENOUS
Status: DISCONTINUED | OUTPATIENT
Start: 2019-03-03 | End: 2019-03-04 | Stop reason: HOSPADM

## 2019-03-03 RX ORDER — MAGNESIUM SULFATE 1 G/100ML
1 INJECTION INTRAVENOUS AS NEEDED
Status: DISCONTINUED | OUTPATIENT
Start: 2019-03-03 | End: 2019-03-04 | Stop reason: HOSPADM

## 2019-03-03 RX ORDER — DOCUSATE SODIUM 100 MG/1
100 CAPSULE, LIQUID FILLED ORAL DAILY PRN
Status: DISCONTINUED | OUTPATIENT
Start: 2019-03-03 | End: 2019-03-03 | Stop reason: SDUPTHER

## 2019-03-03 RX ORDER — NEBIVOLOL 2.5 MG/1
2.5 TABLET ORAL
Status: DISCONTINUED | OUTPATIENT
Start: 2019-03-03 | End: 2019-03-04 | Stop reason: HOSPADM

## 2019-03-03 RX ORDER — BUMETANIDE 0.25 MG/ML
2 INJECTION INTRAMUSCULAR; INTRAVENOUS DAILY
Status: DISCONTINUED | OUTPATIENT
Start: 2019-03-03 | End: 2019-03-03

## 2019-03-03 RX ORDER — VENLAFAXINE HYDROCHLORIDE 75 MG/1
150 CAPSULE, EXTENDED RELEASE ORAL NIGHTLY
Status: DISCONTINUED | OUTPATIENT
Start: 2019-03-03 | End: 2019-03-04 | Stop reason: HOSPADM

## 2019-03-03 RX ORDER — LEVOTHYROXINE SODIUM 0.07 MG/1
75 TABLET ORAL DAILY
Status: DISCONTINUED | OUTPATIENT
Start: 2019-03-03 | End: 2019-03-04 | Stop reason: HOSPADM

## 2019-03-03 RX ORDER — SPIRONOLACTONE 25 MG/1
100 TABLET ORAL DAILY
Status: DISCONTINUED | OUTPATIENT
Start: 2019-03-03 | End: 2019-03-04 | Stop reason: HOSPADM

## 2019-03-03 RX ORDER — TRAZODONE HYDROCHLORIDE 50 MG/1
50 TABLET ORAL NIGHTLY PRN
Status: DISCONTINUED | OUTPATIENT
Start: 2019-03-03 | End: 2019-03-04

## 2019-03-03 RX ORDER — BUMETANIDE 1 MG/1
4 TABLET ORAL 2 TIMES DAILY
Status: DISCONTINUED | OUTPATIENT
Start: 2019-03-03 | End: 2019-03-04 | Stop reason: HOSPADM

## 2019-03-03 RX ORDER — CEFTRIAXONE SODIUM 1 G/50ML
1 INJECTION, SOLUTION INTRAVENOUS
Status: DISCONTINUED | OUTPATIENT
Start: 2019-03-04 | End: 2019-03-04

## 2019-03-03 RX ORDER — PRAMIPEXOLE DIHYDROCHLORIDE 1 MG/1
1 TABLET ORAL EVERY 12 HOURS SCHEDULED
Status: DISCONTINUED | OUTPATIENT
Start: 2019-03-03 | End: 2019-03-04 | Stop reason: HOSPADM

## 2019-03-03 RX ORDER — CEFTRIAXONE SODIUM 2 G/50ML
2 INJECTION, SOLUTION INTRAVENOUS ONCE
Status: COMPLETED | OUTPATIENT
Start: 2019-03-03 | End: 2019-03-03

## 2019-03-03 RX ORDER — POTASSIUM CHLORIDE 1.5 G/1.77G
40 POWDER, FOR SOLUTION ORAL AS NEEDED
Status: DISCONTINUED | OUTPATIENT
Start: 2019-03-03 | End: 2019-03-04 | Stop reason: HOSPADM

## 2019-03-03 RX ORDER — POTASSIUM CHLORIDE 7.45 MG/ML
10 INJECTION INTRAVENOUS
Status: DISCONTINUED | OUTPATIENT
Start: 2019-03-03 | End: 2019-03-04 | Stop reason: HOSPADM

## 2019-03-03 RX ORDER — GABAPENTIN 300 MG/1
900 CAPSULE ORAL 2 TIMES DAILY
Status: DISCONTINUED | OUTPATIENT
Start: 2019-03-03 | End: 2019-03-04 | Stop reason: HOSPADM

## 2019-03-03 RX ORDER — POTASSIUM CHLORIDE 1.5 G/1.77G
40 POWDER, FOR SOLUTION ORAL ONCE
Status: DISCONTINUED | OUTPATIENT
Start: 2019-03-03 | End: 2019-03-04 | Stop reason: HOSPADM

## 2019-03-03 RX ORDER — PANTOPRAZOLE SODIUM 40 MG/1
40 TABLET, DELAYED RELEASE ORAL
Status: DISCONTINUED | OUTPATIENT
Start: 2019-03-03 | End: 2019-03-04 | Stop reason: HOSPADM

## 2019-03-03 RX ORDER — SODIUM CHLORIDE 0.9 % (FLUSH) 0.9 %
10 SYRINGE (ML) INJECTION AS NEEDED
Status: DISCONTINUED | OUTPATIENT
Start: 2019-03-03 | End: 2019-03-04 | Stop reason: HOSPADM

## 2019-03-03 RX ORDER — POTASSIUM CHLORIDE 750 MG/1
20 CAPSULE, EXTENDED RELEASE ORAL ONCE
Status: COMPLETED | OUTPATIENT
Start: 2019-03-03 | End: 2019-03-03

## 2019-03-03 RX ORDER — SODIUM CHLORIDE 0.9 % (FLUSH) 0.9 %
1-10 SYRINGE (ML) INJECTION AS NEEDED
Status: DISCONTINUED | OUTPATIENT
Start: 2019-03-03 | End: 2019-03-04 | Stop reason: HOSPADM

## 2019-03-03 RX ORDER — MAGNESIUM SULFATE 1 G/100ML
1 INJECTION INTRAVENOUS ONCE
Status: COMPLETED | OUTPATIENT
Start: 2019-03-03 | End: 2019-03-03

## 2019-03-03 RX ORDER — SODIUM CHLORIDE 0.9 % (FLUSH) 0.9 %
3 SYRINGE (ML) INJECTION EVERY 12 HOURS SCHEDULED
Status: DISCONTINUED | OUTPATIENT
Start: 2019-03-03 | End: 2019-03-04 | Stop reason: HOSPADM

## 2019-03-03 RX ORDER — MAGNESIUM SULFATE HEPTAHYDRATE 40 MG/ML
2 INJECTION, SOLUTION INTRAVENOUS AS NEEDED
Status: DISCONTINUED | OUTPATIENT
Start: 2019-03-03 | End: 2019-03-04 | Stop reason: HOSPADM

## 2019-03-03 RX ORDER — POTASSIUM CHLORIDE 750 MG/1
40 CAPSULE, EXTENDED RELEASE ORAL ONCE
Status: DISCONTINUED | OUTPATIENT
Start: 2019-03-03 | End: 2019-03-03

## 2019-03-03 RX ORDER — POTASSIUM CHLORIDE 750 MG/1
40 CAPSULE, EXTENDED RELEASE ORAL AS NEEDED
Status: DISCONTINUED | OUTPATIENT
Start: 2019-03-03 | End: 2019-03-04 | Stop reason: HOSPADM

## 2019-03-03 RX ORDER — MAGNESIUM SULFATE HEPTAHYDRATE 40 MG/ML
4 INJECTION, SOLUTION INTRAVENOUS AS NEEDED
Status: DISCONTINUED | OUTPATIENT
Start: 2019-03-03 | End: 2019-03-04 | Stop reason: HOSPADM

## 2019-03-03 RX ADMIN — MAGNESIUM SULFATE HEPTAHYDRATE 1 G: 1 INJECTION, SOLUTION INTRAVENOUS at 07:14

## 2019-03-03 RX ADMIN — VANCOMYCIN HYDROCHLORIDE 1500 MG: 10 INJECTION, POWDER, LYOPHILIZED, FOR SOLUTION INTRAVENOUS at 17:56

## 2019-03-03 RX ADMIN — VENLAFAXINE HYDROCHLORIDE 150 MG: 75 CAPSULE, EXTENDED RELEASE ORAL at 22:12

## 2019-03-03 RX ADMIN — VANCOMYCIN HYDROCHLORIDE 2750 MG: 1 INJECTION, POWDER, LYOPHILIZED, FOR SOLUTION INTRAVENOUS at 02:53

## 2019-03-03 RX ADMIN — BUMETANIDE 4 MG: 1 TABLET ORAL at 22:12

## 2019-03-03 RX ADMIN — GABAPENTIN 900 MG: 300 CAPSULE ORAL at 08:10

## 2019-03-03 RX ADMIN — BUMETANIDE 2 MG: 0.25 INJECTION INTRAMUSCULAR; INTRAVENOUS at 08:11

## 2019-03-03 RX ADMIN — POTASSIUM CHLORIDE 40 MEQ: 750 CAPSULE, EXTENDED RELEASE ORAL at 07:12

## 2019-03-03 RX ADMIN — POTASSIUM CHLORIDE 20 MEQ: 750 CAPSULE, EXTENDED RELEASE ORAL at 02:59

## 2019-03-03 RX ADMIN — PANTOPRAZOLE SODIUM 40 MG: 40 TABLET, DELAYED RELEASE ORAL at 09:12

## 2019-03-03 RX ADMIN — INSULIN LISPRO 4 UNITS: 100 INJECTION, SOLUTION INTRAVENOUS; SUBCUTANEOUS at 12:25

## 2019-03-03 RX ADMIN — GABAPENTIN 900 MG: 300 CAPSULE ORAL at 22:12

## 2019-03-03 RX ADMIN — POTASSIUM CHLORIDE 40 MEQ: 750 CAPSULE, EXTENDED RELEASE ORAL at 12:25

## 2019-03-03 RX ADMIN — CEFTRIAXONE SODIUM 1 G: 1 INJECTION, SOLUTION INTRAVENOUS at 23:43

## 2019-03-03 RX ADMIN — POTASSIUM CHLORIDE 40 MEQ: 750 CAPSULE, EXTENDED RELEASE ORAL at 17:07

## 2019-03-03 RX ADMIN — SODIUM CHLORIDE, PRESERVATIVE FREE 3 ML: 5 INJECTION INTRAVENOUS at 22:15

## 2019-03-03 RX ADMIN — ASPIRIN 81 MG 81 MG: 81 TABLET ORAL at 08:10

## 2019-03-03 RX ADMIN — CEFTRIAXONE SODIUM 2 G: 2 INJECTION, SOLUTION INTRAVENOUS at 01:09

## 2019-03-03 RX ADMIN — PRAMIPEXOLE DIHYDROCHLORIDE 1 MG: 1 TABLET ORAL at 22:13

## 2019-03-03 RX ADMIN — SPIRONOLACTONE 100 MG: 25 TABLET ORAL at 08:11

## 2019-03-03 RX ADMIN — LEVOTHYROXINE SODIUM 75 MCG: 75 TABLET ORAL at 05:28

## 2019-03-03 RX ADMIN — PRAMIPEXOLE DIHYDROCHLORIDE 1 MG: 1 TABLET ORAL at 08:10

## 2019-03-03 RX ADMIN — ENOXAPARIN SODIUM 40 MG: 40 INJECTION SUBCUTANEOUS at 08:10

## 2019-03-03 RX ADMIN — SODIUM CHLORIDE, PRESERVATIVE FREE 3 ML: 5 INJECTION INTRAVENOUS at 08:14

## 2019-03-04 VITALS
HEIGHT: 76 IN | RESPIRATION RATE: 20 BRPM | DIASTOLIC BLOOD PRESSURE: 67 MMHG | HEART RATE: 76 BPM | BODY MASS INDEX: 37.9 KG/M2 | OXYGEN SATURATION: 94 % | TEMPERATURE: 97.7 F | SYSTOLIC BLOOD PRESSURE: 117 MMHG | WEIGHT: 311.2 LBS

## 2019-03-04 LAB
ANION GAP SERPL CALCULATED.3IONS-SCNC: 12 MMOL/L (ref 3–11)
BH CV ECHO MEAS - BSA(HAYCOCK): 2.8 M^2
BH CV ECHO MEAS - BSA: 2.7 M^2
BH CV ECHO MEAS - BZI_BMI: 37.9 KILOGRAMS/M^2
BH CV ECHO MEAS - BZI_METRIC_HEIGHT: 193 CM
BH CV ECHO MEAS - BZI_METRIC_WEIGHT: 141.1 KG
BH CV LOWER VASCULAR LEFT COMMON FEMORAL AUGMENT: NORMAL
BH CV LOWER VASCULAR LEFT COMMON FEMORAL COMPETENT: NORMAL
BH CV LOWER VASCULAR LEFT COMMON FEMORAL COMPRESS: NORMAL
BH CV LOWER VASCULAR LEFT COMMON FEMORAL PHASIC: NORMAL
BH CV LOWER VASCULAR LEFT COMMON FEMORAL SPONT: NORMAL
BH CV LOWER VASCULAR RIGHT COMMON FEMORAL AUGMENT: NORMAL
BH CV LOWER VASCULAR RIGHT COMMON FEMORAL COMPETENT: NORMAL
BH CV LOWER VASCULAR RIGHT COMMON FEMORAL COMPRESS: NORMAL
BH CV LOWER VASCULAR RIGHT COMMON FEMORAL PHASIC: NORMAL
BH CV LOWER VASCULAR RIGHT COMMON FEMORAL SPONT: NORMAL
BH CV LOWER VASCULAR RIGHT DISTAL FEMORAL COMPRESS: NORMAL
BH CV LOWER VASCULAR RIGHT GASTRONEMIUS COMPRESS: NORMAL
BH CV LOWER VASCULAR RIGHT GREATER SAPH AK COMPRESS: NORMAL
BH CV LOWER VASCULAR RIGHT GREATER SAPH BK COMPRESS: NORMAL
BH CV LOWER VASCULAR RIGHT LESSER SAPH COMPRESS: NORMAL
BH CV LOWER VASCULAR RIGHT MID FEMORAL AUGMENT: NORMAL
BH CV LOWER VASCULAR RIGHT MID FEMORAL COMPETENT: NORMAL
BH CV LOWER VASCULAR RIGHT MID FEMORAL COMPRESS: NORMAL
BH CV LOWER VASCULAR RIGHT MID FEMORAL PHASIC: NORMAL
BH CV LOWER VASCULAR RIGHT MID FEMORAL SPONT: NORMAL
BH CV LOWER VASCULAR RIGHT PERONEAL COMPRESS: NORMAL
BH CV LOWER VASCULAR RIGHT POPLITEAL AUGMENT: NORMAL
BH CV LOWER VASCULAR RIGHT POPLITEAL COMPETENT: NORMAL
BH CV LOWER VASCULAR RIGHT POPLITEAL COMPRESS: NORMAL
BH CV LOWER VASCULAR RIGHT POPLITEAL PHASIC: NORMAL
BH CV LOWER VASCULAR RIGHT POPLITEAL SPONT: NORMAL
BH CV LOWER VASCULAR RIGHT POSTERIOR TIBIAL COMPRESS: NORMAL
BH CV LOWER VASCULAR RIGHT PROFUNDA FEMORAL COMPRESS: NORMAL
BH CV LOWER VASCULAR RIGHT PROXIMAL FEMORAL COMPRESS: NORMAL
BH CV LOWER VASCULAR RIGHT SAPHENOFEMORAL JUNCTION AUGMENT: NORMAL
BH CV LOWER VASCULAR RIGHT SAPHENOFEMORAL JUNCTION COMPETENT: NORMAL
BH CV LOWER VASCULAR RIGHT SAPHENOFEMORAL JUNCTION COMPRESS: NORMAL
BH CV LOWER VASCULAR RIGHT SAPHENOFEMORAL JUNCTION PHASIC: NORMAL
BH CV LOWER VASCULAR RIGHT SAPHENOFEMORAL JUNCTION SPONT: NORMAL
BUN BLD-MCNC: 38 MG/DL (ref 9–23)
BUN/CREAT SERPL: 28.6 (ref 7–25)
CALCIUM SPEC-SCNC: 9.2 MG/DL (ref 8.7–10.4)
CHLORIDE SERPL-SCNC: 94 MMOL/L (ref 99–109)
CO2 SERPL-SCNC: 25 MMOL/L (ref 20–31)
CREAT BLD-MCNC: 1.33 MG/DL (ref 0.6–1.3)
DEPRECATED RDW RBC AUTO: 48.1 FL (ref 37–54)
ERYTHROCYTE [DISTWIDTH] IN BLOOD BY AUTOMATED COUNT: 14.9 % (ref 11.3–14.5)
GFR SERPL CREATININE-BSD FRML MDRD: 54 ML/MIN/1.73
GLUCOSE BLD-MCNC: 215 MG/DL (ref 70–100)
GLUCOSE BLDC GLUCOMTR-MCNC: 228 MG/DL (ref 70–130)
GLUCOSE BLDC GLUCOMTR-MCNC: 251 MG/DL (ref 70–130)
GLUCOSE BLDC GLUCOMTR-MCNC: 305 MG/DL (ref 70–130)
GLUCOSE BLDC GLUCOMTR-MCNC: 321 MG/DL (ref 70–130)
HBA1C MFR BLD: 7.9 % (ref 4.8–5.6)
HCT VFR BLD AUTO: 34.3 % (ref 38.9–50.9)
HGB BLD-MCNC: 11.1 G/DL (ref 13.1–17.5)
MCH RBC QN AUTO: 28.6 PG (ref 27–31)
MCHC RBC AUTO-ENTMCNC: 32.4 G/DL (ref 32–36)
MCV RBC AUTO: 88.4 FL (ref 80–99)
PLATELET # BLD AUTO: 129 10*3/MM3 (ref 150–450)
PMV BLD AUTO: 11.1 FL (ref 6–12)
POTASSIUM BLD-SCNC: 3.7 MMOL/L (ref 3.5–5.5)
RBC # BLD AUTO: 3.88 10*6/MM3 (ref 4.2–5.76)
SODIUM BLD-SCNC: 131 MMOL/L (ref 132–146)
TSH SERPL DL<=0.05 MIU/L-ACNC: 2.17 MIU/ML (ref 0.35–5.35)
WBC NRBC COR # BLD: 5.95 10*3/MM3 (ref 3.5–10.8)

## 2019-03-04 PROCEDURE — 82962 GLUCOSE BLOOD TEST: CPT

## 2019-03-04 PROCEDURE — 25010000002 CEFTRIAXONE PER 250 MG: Performed by: INTERNAL MEDICINE

## 2019-03-04 PROCEDURE — 25010000002 VANCOMYCIN 10 G RECONSTITUTED SOLUTION

## 2019-03-04 PROCEDURE — G0378 HOSPITAL OBSERVATION PER HR: HCPCS

## 2019-03-04 PROCEDURE — 97110 THERAPEUTIC EXERCISES: CPT

## 2019-03-04 PROCEDURE — 83036 HEMOGLOBIN GLYCOSYLATED A1C: CPT | Performed by: INTERNAL MEDICINE

## 2019-03-04 PROCEDURE — 94799 UNLISTED PULMONARY SVC/PX: CPT

## 2019-03-04 PROCEDURE — 96372 THER/PROPH/DIAG INJ SC/IM: CPT

## 2019-03-04 PROCEDURE — 97162 PT EVAL MOD COMPLEX 30 MIN: CPT

## 2019-03-04 PROCEDURE — 97530 THERAPEUTIC ACTIVITIES: CPT

## 2019-03-04 PROCEDURE — 99239 HOSP IP/OBS DSCHRG MGMT >30: CPT | Performed by: INTERNAL MEDICINE

## 2019-03-04 PROCEDURE — 29581 APPL MULTLAYER CMPRN SYS LEG: CPT

## 2019-03-04 PROCEDURE — 63710000001 INSULIN LISPRO (HUMAN) PER 5 UNITS: Performed by: INTERNAL MEDICINE

## 2019-03-04 PROCEDURE — 94660 CPAP INITIATION&MGMT: CPT

## 2019-03-04 PROCEDURE — 97116 GAIT TRAINING THERAPY: CPT

## 2019-03-04 PROCEDURE — 84443 ASSAY THYROID STIM HORMONE: CPT | Performed by: INTERNAL MEDICINE

## 2019-03-04 PROCEDURE — 80048 BASIC METABOLIC PNL TOTAL CA: CPT | Performed by: INTERNAL MEDICINE

## 2019-03-04 PROCEDURE — 25010000002 ENOXAPARIN PER 10 MG: Performed by: INTERNAL MEDICINE

## 2019-03-04 PROCEDURE — 85027 COMPLETE CBC AUTOMATED: CPT | Performed by: INTERNAL MEDICINE

## 2019-03-04 RX ORDER — DEXTROAMPHETAMINE SACCHARATE, AMPHETAMINE ASPARTATE, DEXTROAMPHETAMINE SULFATE AND AMPHETAMINE SULFATE 3.75; 3.75; 3.75; 3.75 MG/1; MG/1; MG/1; MG/1
30 TABLET ORAL DAILY
Status: DISCONTINUED | OUTPATIENT
Start: 2019-03-04 | End: 2019-03-04 | Stop reason: HOSPADM

## 2019-03-04 RX ORDER — DEXTROSE MONOHYDRATE 25 G/50ML
25 INJECTION, SOLUTION INTRAVENOUS
Status: DISCONTINUED | OUTPATIENT
Start: 2019-03-04 | End: 2019-03-04 | Stop reason: HOSPADM

## 2019-03-04 RX ORDER — DEXTROAMPHETAMINE SACCHARATE, AMPHETAMINE ASPARTATE, DEXTROAMPHETAMINE SULFATE AND AMPHETAMINE SULFATE 3.75; 3.75; 3.75; 3.75 MG/1; MG/1; MG/1; MG/1
15 TABLET ORAL DAILY
Status: DISCONTINUED | OUTPATIENT
Start: 2019-03-04 | End: 2019-03-04

## 2019-03-04 RX ORDER — TRAZODONE HYDROCHLORIDE 100 MG/1
100 TABLET ORAL NIGHTLY
Status: DISCONTINUED | OUTPATIENT
Start: 2019-03-04 | End: 2019-03-04 | Stop reason: HOSPADM

## 2019-03-04 RX ORDER — TRAZODONE HYDROCHLORIDE 50 MG/1
50 TABLET ORAL NIGHTLY
Status: DISCONTINUED | OUTPATIENT
Start: 2019-03-04 | End: 2019-03-04

## 2019-03-04 RX ORDER — NICOTINE POLACRILEX 4 MG
15 LOZENGE BUCCAL
Status: DISCONTINUED | OUTPATIENT
Start: 2019-03-04 | End: 2019-03-04 | Stop reason: HOSPADM

## 2019-03-04 RX ORDER — DEXTROAMPHETAMINE SACCHARATE, AMPHETAMINE ASPARTATE, DEXTROAMPHETAMINE SULFATE AND AMPHETAMINE SULFATE 7.5; 7.5; 7.5; 7.5 MG/1; MG/1; MG/1; MG/1
60 TABLET ORAL DAILY
Status: DISCONTINUED | OUTPATIENT
Start: 2019-03-04 | End: 2019-03-04 | Stop reason: HOSPADM

## 2019-03-04 RX ADMIN — SPIRONOLACTONE 100 MG: 25 TABLET ORAL at 08:16

## 2019-03-04 RX ADMIN — PANTOPRAZOLE SODIUM 40 MG: 40 TABLET, DELAYED RELEASE ORAL at 05:15

## 2019-03-04 RX ADMIN — ASPIRIN 81 MG 81 MG: 81 TABLET ORAL at 08:16

## 2019-03-04 RX ADMIN — NEBIVOLOL HYDROCHLORIDE 2.5 MG: 2.5 TABLET ORAL at 08:16

## 2019-03-04 RX ADMIN — ENOXAPARIN SODIUM 40 MG: 40 INJECTION SUBCUTANEOUS at 08:14

## 2019-03-04 RX ADMIN — LEVOTHYROXINE SODIUM 75 MCG: 75 TABLET ORAL at 05:15

## 2019-03-04 RX ADMIN — PRAMIPEXOLE DIHYDROCHLORIDE 1 MG: 1 TABLET ORAL at 08:16

## 2019-03-04 RX ADMIN — SODIUM CHLORIDE, PRESERVATIVE FREE 3 ML: 5 INJECTION INTRAVENOUS at 08:15

## 2019-03-04 RX ADMIN — BUMETANIDE 4 MG: 1 TABLET ORAL at 08:15

## 2019-03-04 RX ADMIN — GABAPENTIN 900 MG: 300 CAPSULE ORAL at 08:16

## 2019-03-04 RX ADMIN — VANCOMYCIN HYDROCHLORIDE 1500 MG: 10 INJECTION, POWDER, LYOPHILIZED, FOR SOLUTION INTRAVENOUS at 05:15

## 2019-03-05 ENCOUNTER — READMISSION MANAGEMENT (OUTPATIENT)
Dept: CALL CENTER | Facility: HOSPITAL | Age: 64
End: 2019-03-05

## 2019-03-05 ENCOUNTER — TRANSITIONAL CARE MANAGEMENT TELEPHONE ENCOUNTER (OUTPATIENT)
Dept: INTERNAL MEDICINE | Facility: CLINIC | Age: 64
End: 2019-03-05

## 2019-03-05 NOTE — OUTREACH NOTE
Prep Survey      Responses   Facility patient discharged from?  [facility]   Is patient eligible?  Yes   Discharge diagnosis  Increasing edema of RLE, Cellulitis of right lower extremity r/o by ID, Chronic congestive heart failure, Depression, Hepatitis B, Multiple other dx   Does the patient have one of the following disease processes/diagnoses(primary or secondary)?  CHF   Does the patient have Home health ordered?  No   Is there a DME ordered?  No   Comments regarding appointments  Multiple follow up appts. Thursday Mar 7, 2019 3:00 PM Dr. Maria.  Needs BMP 3/6 or early on 3/7    Medication alerts for this patient  Stop Ibuprofen.   General alerts for this patient  Get BMP to take results to appt with Dr. Maria on 3/7   Prep survey completed?  Yes          Maddi Baltazar RN

## 2019-03-06 ENCOUNTER — READMISSION MANAGEMENT (OUTPATIENT)
Dept: CALL CENTER | Facility: HOSPITAL | Age: 64
End: 2019-03-06

## 2019-03-06 NOTE — OUTREACH NOTE
CHF Week 1 Survey      Responses   Facility patient discharged from?  [place]   Does the patient have one of the following disease processes/diagnoses(primary or secondary)?  CHF   Is there a successful TCM telephone encounter documented?  No   CHF Week 1 attempt successful?  No   Unsuccessful attempts  Attempt 1          Alexa Avila, RN

## 2019-03-07 ENCOUNTER — LAB (OUTPATIENT)
Dept: LAB | Facility: HOSPITAL | Age: 64
End: 2019-03-07

## 2019-03-07 ENCOUNTER — READMISSION MANAGEMENT (OUTPATIENT)
Dept: CALL CENTER | Facility: HOSPITAL | Age: 64
End: 2019-03-07

## 2019-03-07 ENCOUNTER — OFFICE VISIT (OUTPATIENT)
Dept: INTERNAL MEDICINE | Facility: CLINIC | Age: 64
End: 2019-03-07

## 2019-03-07 VITALS
DIASTOLIC BLOOD PRESSURE: 76 MMHG | SYSTOLIC BLOOD PRESSURE: 126 MMHG | HEIGHT: 76 IN | WEIGHT: 312 LBS | BODY MASS INDEX: 37.99 KG/M2 | HEART RATE: 76 BPM

## 2019-03-07 DIAGNOSIS — I10 ESSENTIAL HYPERTENSION: ICD-10-CM

## 2019-03-07 DIAGNOSIS — K75.81 NASH (NONALCOHOLIC STEATOHEPATITIS): ICD-10-CM

## 2019-03-07 DIAGNOSIS — I73.9 PVD (PERIPHERAL VASCULAR DISEASE) (HCC): ICD-10-CM

## 2019-03-07 DIAGNOSIS — K74.60 CIRRHOSIS OF LIVER WITHOUT ASCITES, UNSPECIFIED HEPATIC CIRRHOSIS TYPE (HCC): ICD-10-CM

## 2019-03-07 DIAGNOSIS — I48.0 PAROXYSMAL ATRIAL FIBRILLATION (HCC): ICD-10-CM

## 2019-03-07 DIAGNOSIS — E11.65 TYPE 2 DIABETES MELLITUS WITH HYPERGLYCEMIA, WITH LONG-TERM CURRENT USE OF INSULIN (HCC): ICD-10-CM

## 2019-03-07 DIAGNOSIS — R60.0 LOCALIZED EDEMA: ICD-10-CM

## 2019-03-07 DIAGNOSIS — Z79.4 TYPE 2 DIABETES MELLITUS WITH HYPERGLYCEMIA, WITH LONG-TERM CURRENT USE OF INSULIN (HCC): ICD-10-CM

## 2019-03-07 DIAGNOSIS — G25.81 RLS (RESTLESS LEGS SYNDROME): ICD-10-CM

## 2019-03-07 DIAGNOSIS — I50.9 CHRONIC CONGESTIVE HEART FAILURE, UNSPECIFIED HEART FAILURE TYPE (HCC): ICD-10-CM

## 2019-03-07 DIAGNOSIS — R79.89 ELEVATED LFTS: ICD-10-CM

## 2019-03-07 DIAGNOSIS — N18.9 CHRONIC KIDNEY DISEASE, UNSPECIFIED CKD STAGE: ICD-10-CM

## 2019-03-07 DIAGNOSIS — G25.81 RESTLESS LEGS SYNDROME (RLS): ICD-10-CM

## 2019-03-07 DIAGNOSIS — G47.419 PRIMARY NARCOLEPSY WITHOUT CATAPLEXY: ICD-10-CM

## 2019-03-07 DIAGNOSIS — I42.5 RESTRICTIVE CARDIOMYOPATHY (HCC): Primary | ICD-10-CM

## 2019-03-07 DIAGNOSIS — F51.01 PRIMARY INSOMNIA: ICD-10-CM

## 2019-03-07 DIAGNOSIS — F98.8 ATTENTION DEFICIT DISORDER (ADD) WITHOUT HYPERACTIVITY: ICD-10-CM

## 2019-03-07 DIAGNOSIS — F32.89 OTHER DEPRESSION: ICD-10-CM

## 2019-03-07 DIAGNOSIS — E66.01 MORBIDLY OBESE (HCC): ICD-10-CM

## 2019-03-07 DIAGNOSIS — E78.2 MIXED HYPERLIPIDEMIA: ICD-10-CM

## 2019-03-07 LAB
ANION GAP SERPL CALCULATED.3IONS-SCNC: 11 MMOL/L (ref 3–11)
BUN BLD-MCNC: 50 MG/DL (ref 9–23)
BUN/CREAT SERPL: 36.5 (ref 7–25)
CALCIUM SPEC-SCNC: 9.2 MG/DL (ref 8.7–10.4)
CHLORIDE SERPL-SCNC: 92 MMOL/L (ref 99–109)
CO2 SERPL-SCNC: 27 MMOL/L (ref 20–31)
CREAT BLD-MCNC: 1.37 MG/DL (ref 0.6–1.3)
GFR SERPL CREATININE-BSD FRML MDRD: 52 ML/MIN/1.73
GLUCOSE BLD-MCNC: 311 MG/DL (ref 70–100)
POTASSIUM BLD-SCNC: 4.4 MMOL/L (ref 3.5–5.5)
SODIUM BLD-SCNC: 130 MMOL/L (ref 132–146)

## 2019-03-07 PROCEDURE — 36415 COLL VENOUS BLD VENIPUNCTURE: CPT

## 2019-03-07 PROCEDURE — 99495 TRANSJ CARE MGMT MOD F2F 14D: CPT | Performed by: INTERNAL MEDICINE

## 2019-03-07 PROCEDURE — 80048 BASIC METABOLIC PNL TOTAL CA: CPT

## 2019-03-07 NOTE — OUTREACH NOTE
CHF Week 1 Survey      Responses   Facility patient discharged from?  [place]   Does the patient have one of the following disease processes/diagnoses(primary or secondary)?  CHF   Is there a successful TCM telephone encounter documented?  No   CHF Week 1 attempt successful?  Yes   Call start time  1140   Call end time  1145   General alerts for this patient  Dtr is Nurse Practioner.    Discharge diagnosis  Increasing edema of RLE, Cellulitis of right lower extremity r/o by ID, Chronic congestive heart failure, Depression, Hepatitis B, Multiple other dx   Meds reviewed with patient/caregiver?  Yes   Is the patient having any side effects they believe may be caused by any medication additions or changes?  No   Is the patient taking all medications as directed (includes completed medication regime)?  Yes   Does the patient have a primary care provider?   Yes   Has the patient kept scheduled appointments due by today?  N/A   Psychosocial issues?  No   Did the patient receive a copy of their discharge instructions?  Yes   Nursing interventions  Reviewed instructions with patient   What is the patient's perception of their health status since discharge?  Returned to baseline/stable   Nursing interventions  Nurse provided patient education   Is the patient weighing daily?  No   Does the patient have scales?  Yes   Daily weight interventions  Education provided on importance of daily weight   Is the patient able to teach back Heart Failure diet management?  Yes   Is the patient able to teach back Heart Failure Zones?  Yes   Is the patient able to teach back signs and symptoms of worsening condition? (i.e. weight gain, shortness of air, etc.)  Yes   Is the patient/caregiver able to teach back the hierarchy of who to call/visit for symptoms/problems? PCP, Specialist, Home health nurse, Urgent Care, ED, 911  Yes    CHF Week 1 call completed?  Yes          Alexa Avila RN

## 2019-03-07 NOTE — PROGRESS NOTES
Transitional Care Follow Up Visit  Subjective     Abe Phillips Jr. is a 63 y.o. male who presents for a transitional care management visit.    Within 48 business hours after discharge our office contacted him via telephone to coordinate his care and needs.      I reviewed and discussed the details of that call along with the discharge summary, hospital problems, inpatient lab results, inpatient diagnostic studies, and consultation reports with Abe.     Current outpatient and discharge medications have been reconciled for the patient.    Date of TCM Phone Call 3/6/2019   [facility]   Date of Admission 3/3/2019   Date of Discharge 3/4/2019   Discharge Disposition Home or Self Care     Risk for Readmission (LACE) Score: 11 (3/4/2019  6:00 AM)      History of Present Illness     Course During Hospital Stay:  Was hospitalized sec to LE edema, R>L.  Initially felt to have recurrent cellulitis but after seen by ID, not felt to be infected but secondary to fluid retention.  Now back on Bumex 4 mg bid.  Weight is down 7 pounds since last seen.  No dizziness or lightheadedness.  No increase in SOB or baseline MERCADO.      The following portions of the patient's history were reviewed and updated as appropriate: allergies, current medications, past family history, past medical history, past social history, past surgical history and problem list.    Review of Systems   Constitutional: Positive for fatigue and unexpected weight change. Negative for chills and fever.   HENT: Negative for congestion, ear pain, hearing loss, rhinorrhea, sinus pressure, sore throat and trouble swallowing.    Eyes: Negative for discharge and itching.   Respiratory: Positive for shortness of breath. Negative for cough and chest tightness.    Cardiovascular: Positive for leg swelling. Negative for chest pain and palpitations.   Gastrointestinal: Negative for abdominal pain, blood in stool, constipation, diarrhea and vomiting.         Colonoscopy 3/16   Endocrine: Positive for cold intolerance. Negative for polydipsia and polyuria.   Genitourinary: Negative for difficulty urinating, dysuria, enuresis, frequency, hematuria and urgency.   Musculoskeletal: Positive for arthralgias, back pain and gait problem. Negative for joint swelling.   Skin: Negative for rash and wound.   Allergic/Immunologic: Negative for immunocompromised state.   Neurological: Positive for weakness and light-headedness. Negative for dizziness, syncope, numbness and headaches.   Hematological: Does not bruise/bleed easily.   Psychiatric/Behavioral: Negative for behavioral problems, dysphoric mood and sleep disturbance. The patient is not nervous/anxious.        Objective   Physical Exam   Constitutional: He is oriented to person, place, and time. He appears well-developed and well-nourished.   HENT:   Head: Normocephalic and atraumatic.   Right Ear: External ear normal.   Left Ear: External ear normal.   Mouth/Throat: Oropharynx is clear and moist.   Left Cerumen   Eyes: Conjunctivae and EOM are normal.   Neck: Normal range of motion. Neck supple.   Cardiovascular: Normal rate and regular rhythm.   Slight diminished heart sounds   Pulmonary/Chest: Effort normal and breath sounds normal.   Abdominal: Soft. Bowel sounds are normal.   Musculoskeletal: He exhibits edema.   Lymphadenopathy:     He has no cervical adenopathy.   Neurological: He is alert and oriented to person, place, and time.   Skin: Skin is warm and dry.   Groin yeast   Psychiatric: He has a normal mood and affect. His behavior is normal. Thought content normal.       Assessment/Plan   Abe was seen today for edema.    Diagnoses and all orders for this visit:    Restrictive cardiomyopathy (CMS/HCC)    PVD (peripheral vascular disease) (CMS/HCC)    Mixed hyperlipidemia    Essential hypertension    Chronic congestive heart failure, unspecified heart failure type (CMS/HCC)    Paroxysmal atrial fibrillation  "(CMS/HCC)    MCCAULEY (nonalcoholic steatohepatitis)    Morbidly obese (CMS/HCC)    Cirrhosis of liver without ascites, unspecified hepatic cirrhosis type (CMS/HCC)    Type 2 diabetes mellitus with hyperglycemia, with long-term current use of insulin (CMS/HCC)    RLS (restless legs syndrome)    Restless legs syndrome (RLS)    Primary narcolepsy without cataplexy    Primary insomnia    Elevated LFTs    Localized edema    Other depression    Attention deficit disorder (ADD) without hyperactivity    Chronic kidney disease, unspecified CKD stage  -     Ambulatory Referral to Nephrology    DM-per ENDO  Chronic LE edema-advised to weigh daily and if needed add metolazone  Hx of CHF-\" \"  Hep B hx/FLD-f/u Dr Murcia  Neuropathy-on Gabapentin  Narcolepsy-on Adderall  JAIME-cont CPAP  Hx of Afib-no recurrence s/p Ablation  Hypothyroid-TSH on rtc  Tinea-rx ketoconazole  Left Cerumen-debrox prn  Elevated Cr-will schedule f/u with Nephrology  Other-PSA on rtc     Prior records reviewed     Labs noted and dw patient                   "

## 2019-03-07 NOTE — OUTREACH NOTE
Multiple attempts have been made to contact pt to complete DIXON call and confirm appt.  All attempts have been documented. The patient has a [facility] hospital follow up scheduled with Dr. Maria 3/7/19 and TCM is applicable. Thank you.

## 2019-03-08 LAB
BACTERIA SPEC AEROBE CULT: NORMAL
BACTERIA SPEC AEROBE CULT: NORMAL

## 2019-03-13 ENCOUNTER — LAB (OUTPATIENT)
Dept: LAB | Facility: HOSPITAL | Age: 64
End: 2019-03-13

## 2019-03-13 ENCOUNTER — TRANSCRIBE ORDERS (OUTPATIENT)
Dept: LAB | Facility: HOSPITAL | Age: 64
End: 2019-03-13

## 2019-03-13 DIAGNOSIS — L97.412 ULCER OF RIGHT HEEL, WITH FAT LAYER EXPOSED (HCC): ICD-10-CM

## 2019-03-13 DIAGNOSIS — L03.115 CELLULITIS OF RIGHT FOOT: Primary | ICD-10-CM

## 2019-03-13 DIAGNOSIS — E11.621 DIABETIC FOOT ULCER WITH OSTEOMYELITIS (HCC): ICD-10-CM

## 2019-03-13 DIAGNOSIS — L97.509 DIABETIC FOOT ULCER WITH OSTEOMYELITIS (HCC): ICD-10-CM

## 2019-03-13 DIAGNOSIS — I89.0 OBLITERATION OF LYMPHATIC VESSEL: ICD-10-CM

## 2019-03-13 DIAGNOSIS — M86.9 DIABETIC FOOT ULCER WITH OSTEOMYELITIS (HCC): ICD-10-CM

## 2019-03-13 DIAGNOSIS — I87.2 PERIPHERAL VENOUS INSUFFICIENCY: ICD-10-CM

## 2019-03-13 DIAGNOSIS — E11.69 DIABETIC FOOT ULCER WITH OSTEOMYELITIS (HCC): ICD-10-CM

## 2019-03-13 DIAGNOSIS — L03.115 CELLULITIS OF RIGHT FOOT: ICD-10-CM

## 2019-03-13 LAB
ANION GAP SERPL CALCULATED.3IONS-SCNC: 7 MMOL/L (ref 3–11)
BASOPHILS # BLD AUTO: 0.04 10*3/MM3 (ref 0–0.2)
BASOPHILS NFR BLD AUTO: 0.7 % (ref 0–1)
BUN BLD-MCNC: 39 MG/DL (ref 9–23)
BUN/CREAT SERPL: 28.7 (ref 7–25)
CALCIUM SPEC-SCNC: 10 MG/DL (ref 8.7–10.4)
CHLORIDE SERPL-SCNC: 96 MMOL/L (ref 99–109)
CO2 SERPL-SCNC: 32 MMOL/L (ref 20–31)
CREAT BLD-MCNC: 1.36 MG/DL (ref 0.6–1.3)
CRP SERPL-MCNC: 1.39 MG/DL (ref 0–1)
DEPRECATED RDW RBC AUTO: 45.5 FL (ref 37–54)
EOSINOPHIL # BLD AUTO: 0.07 10*3/MM3 (ref 0–0.3)
EOSINOPHIL NFR BLD AUTO: 1.2 % (ref 0–3)
ERYTHROCYTE [DISTWIDTH] IN BLOOD BY AUTOMATED COUNT: 14.1 % (ref 11.3–14.5)
ERYTHROCYTE [SEDIMENTATION RATE] IN BLOOD: 73 MM/HR (ref 0–20)
GFR SERPL CREATININE-BSD FRML MDRD: 53 ML/MIN/1.73
GLUCOSE BLD-MCNC: 309 MG/DL (ref 70–100)
HCT VFR BLD AUTO: 39.1 % (ref 38.9–50.9)
HGB BLD-MCNC: 12.9 G/DL (ref 13.1–17.5)
IMM GRANULOCYTES # BLD AUTO: 0.09 10*3/MM3 (ref 0–0.05)
IMM GRANULOCYTES NFR BLD AUTO: 1.6 % (ref 0–0.6)
LYMPHOCYTES # BLD AUTO: 1.28 10*3/MM3 (ref 0.6–4.8)
LYMPHOCYTES NFR BLD AUTO: 22.8 % (ref 24–44)
MCH RBC QN AUTO: 29.1 PG (ref 27–31)
MCHC RBC AUTO-ENTMCNC: 33 G/DL (ref 32–36)
MCV RBC AUTO: 88.3 FL (ref 80–99)
MONOCYTES # BLD AUTO: 0.57 10*3/MM3 (ref 0–1)
MONOCYTES NFR BLD AUTO: 10.2 % (ref 0–12)
NEUTROPHILS # BLD AUTO: 3.56 10*3/MM3 (ref 1.5–8.3)
NEUTROPHILS NFR BLD AUTO: 63.5 % (ref 41–71)
PLATELET # BLD AUTO: 174 10*3/MM3 (ref 150–450)
PMV BLD AUTO: 9.9 FL (ref 6–12)
POTASSIUM BLD-SCNC: 4.5 MMOL/L (ref 3.5–5.5)
RBC # BLD AUTO: 4.43 10*6/MM3 (ref 4.2–5.76)
SODIUM BLD-SCNC: 135 MMOL/L (ref 132–146)
WBC NRBC COR # BLD: 5.61 10*3/MM3 (ref 3.5–10.8)

## 2019-03-13 PROCEDURE — 85025 COMPLETE CBC W/AUTO DIFF WBC: CPT

## 2019-03-13 PROCEDURE — 80048 BASIC METABOLIC PNL TOTAL CA: CPT

## 2019-03-13 PROCEDURE — 86140 C-REACTIVE PROTEIN: CPT

## 2019-03-13 PROCEDURE — 36415 COLL VENOUS BLD VENIPUNCTURE: CPT

## 2019-03-15 ENCOUNTER — READMISSION MANAGEMENT (OUTPATIENT)
Dept: CALL CENTER | Facility: HOSPITAL | Age: 64
End: 2019-03-15

## 2019-03-15 NOTE — OUTREACH NOTE
CHF Week 2 Survey      Responses   Facility patient discharged from?  [place]   Does the patient have one of the following disease processes/diagnoses(primary or secondary)?  CHF   Week 2 attempt successful?  No   Unsuccessful attempts  Attempt 1          Sobia Pinedo RN

## 2019-03-18 ENCOUNTER — READMISSION MANAGEMENT (OUTPATIENT)
Dept: CALL CENTER | Facility: HOSPITAL | Age: 64
End: 2019-03-18

## 2019-03-18 NOTE — OUTREACH NOTE
CHF Week 2 Survey      Responses   Facility patient discharged from?  [place]   Does the patient have one of the following disease processes/diagnoses(primary or secondary)?  CHF   Week 2 attempt successful?  Yes   Call start time  0757   Call end time  0759   Meds reviewed with patient/caregiver?  Yes   Is the patient taking all medications as directed (includes completed medication regime)?  Yes   Has the patient kept scheduled appointments due by today?  Yes   What is the patient's perception of their health status since discharge?  Improving   Is the patient weighing daily?  Yes   Is the patient able to teach back Heart Failure Zones?  Yes   CHF Week 2 call completed?  Yes          Mari Bernard, RN

## 2019-03-25 ENCOUNTER — READMISSION MANAGEMENT (OUTPATIENT)
Dept: CALL CENTER | Facility: HOSPITAL | Age: 64
End: 2019-03-25

## 2019-03-25 NOTE — OUTREACH NOTE
CHF Week 3 Survey      Responses   Facility patient discharged from?  [place]   Does the patient have one of the following disease processes/diagnoses(primary or secondary)?  CHF   Week 3 attempt successful?  Yes   Call start time  1648   Call end time  1651   Meds reviewed with patient/caregiver?  Yes   Is the patient taking all medications as directed (includes completed medication regime)?  Yes   Has the patient kept scheduled appointments due by today?  Yes   What is the patient's perception of their health status since discharge?  Improving   Is the patient weighing daily?  Yes   Is the patient able to teach back Heart Failure Zones?  Yes [green zone]   CHF Week 3 call completed?  Yes   Revoked  No further contact(revokes)-requires comment   Wrap up additional comments  he is doing very well, no new issues daughter is nurse practitioner          Sobia Pinedo RN

## 2019-04-01 ENCOUNTER — TELEPHONE (OUTPATIENT)
Dept: PULMONOLOGY | Facility: CLINIC | Age: 64
End: 2019-04-01

## 2019-04-05 RX ORDER — DEXTROAMPHETAMINE SACCHARATE, AMPHETAMINE ASPARTATE, DEXTROAMPHETAMINE SULFATE AND AMPHETAMINE SULFATE 3.75; 3.75; 3.75; 3.75 MG/1; MG/1; MG/1; MG/1
30 TABLET ORAL DAILY
Qty: 60 TABLET | Refills: 0 | Status: SHIPPED | OUTPATIENT
Start: 2019-04-05 | End: 2019-06-06 | Stop reason: SDUPTHER

## 2019-04-05 RX ORDER — DEXTROAMPHETAMINE SACCHARATE, AMPHETAMINE ASPARTATE, DEXTROAMPHETAMINE SULFATE AND AMPHETAMINE SULFATE 7.5; 7.5; 7.5; 7.5 MG/1; MG/1; MG/1; MG/1
60 TABLET ORAL EVERY MORNING
Qty: 60 TABLET | Refills: 0 | Status: SHIPPED | OUTPATIENT
Start: 2019-04-05 | End: 2019-06-06 | Stop reason: SDUPTHER

## 2019-04-09 ENCOUNTER — OFFICE VISIT (OUTPATIENT)
Dept: SLEEP MEDICINE | Facility: HOSPITAL | Age: 64
End: 2019-04-09

## 2019-04-09 VITALS
WEIGHT: 315 LBS | HEIGHT: 76 IN | DIASTOLIC BLOOD PRESSURE: 65 MMHG | OXYGEN SATURATION: 98 % | HEART RATE: 81 BPM | SYSTOLIC BLOOD PRESSURE: 144 MMHG | BODY MASS INDEX: 38.36 KG/M2

## 2019-04-09 DIAGNOSIS — G47.33 OSA (OBSTRUCTIVE SLEEP APNEA): Primary | ICD-10-CM

## 2019-04-09 PROCEDURE — 99213 OFFICE O/P EST LOW 20 MIN: CPT | Performed by: NURSE PRACTITIONER

## 2019-04-09 NOTE — PROGRESS NOTES
Subjective: Follow-up        Chief Complaint:   Chief Complaint   Patient presents with   • Follow-up       HPI:    Abe Phillips Jr. is a 63 y.o. male here for follow-up of obstructive sleep apnea.  Patient was last seen 1/7/2019.  Patient has a long-standing history of severe sleep apnea as well as narcolepsy.  Patient also takes Mirapex for restless legs with relief.  Patient did have another sleep study 2/13/2019 that showed sleep apnea that was well controlled with a CPAP of 13.  Patient is now sleeping 6 hours nightly and does not feel refreshed upon awakening.  Patient has an [place] of 20/24.  Patient's biggest complaint seems to be the pressure.  He did call several times and we did lower the minimum pressure to 9 and this did seem to help.  Have explained that the machine is going up to 14 to try and control his apneas.  He is now using a different mask and thinks he will be able to tolerate.    His Adderall is up-to-date for narcolepsy and he does not need follow-up today.  Patient does have some nights that he did not use his CPAP.  Patient has been moving into a new home and did not take his CPAP with him on all nights.  Patient states he is now in his new home and does have a night stand and can be compliant with CPAP therapy.  Current medications are:   Current Outpatient Medications:   •  amphetamine-dextroamphetamine (ADDERALL) 15 MG tablet, Take 2 tablets by mouth Daily. 2 tabs daily in afternoon, Disp: 60 tablet, Rfl: 0  •  amphetamine-dextroamphetamine (ADDERALL) 30 MG tablet, Take 2 tablets by mouth Every Morning., Disp: 60 tablet, Rfl: 0  •  aspirin 81 MG tablet, Take 1 tablet by mouth Daily. (Patient taking differently: Take 162 mg by mouth Daily.), Disp: 90 tablet, Rfl: 1  •  bacitracin 500 UNIT/GM ointment, Apply  topically to the appropriate area as directed Daily As Needed for Wound Care., Disp: , Rfl:   •  bumetanide (BUMEX) 2 MG tablet, Take 2 tablets by mouth 2 (Two) Times a Day., Disp:  "360 tablet, Rfl: 1  •  Charcoal 260 MG capsule, Take 2 tablets by mouth As Needed., Disp: , Rfl:   •  clotrimazole (ANTI-FUNGAL) 1 % cream, Apply 1 application topically to the appropriate area as directed 4 (Four) Times a Day As Needed., Disp: , Rfl:   •  ezetimibe (ZETIA) 10 MG tablet, Take 1 tablet by mouth Daily., Disp: 30 tablet, Rfl: 11  •  gabapentin (NEURONTIN) 300 MG capsule, Take 3 capsules by mouth 2 (Two) Times a Day., Disp: 540 capsule, Rfl: 0  •  insulin regular (humuLIN R,novoLIN R) 100 UNIT/ML injection, Inject  under the skin into the appropriate area as directed 2 (Two) Times a Day Before Meals. 115 UNITS IN  UNITS IN PM, Disp: , Rfl:   •  Insulin Syringe 29G X 1/2\" 1 ML misc, Use one each to inject insulin twice daily Ell.9, Disp: 100 each, Rfl: 1  •  ketoconazole (NIZORAL) 2 % cream, Apply  topically to the appropriate area as directed 2 (Two) Times a Day As Needed for Itching., Disp: 60 g, Rfl: 2  •  levocetirizine (XYZAL) 5 MG tablet, Take 1 tablet by mouth Every Evening., Disp: 90 tablet, Rfl: 1  •  levothyroxine (SYNTHROID, LEVOTHROID) 75 MCG tablet, Take 1 tablet by mouth Daily., Disp: 90 tablet, Rfl: 1  •  metOLazone (ZAROXOLYN) 5 MG tablet, Take 1 tablet by mouth Daily As Needed (edema/fluid overload)., Disp: 90 tablet, Rfl: 1  •  ondansetron (ZOFRAN) 8 MG tablet, Take 1 tablet by mouth Every 8 (Eight) Hours As Needed for Nausea or Vomiting., Disp: 20 tablet, Rfl: 2  •  polyethylene glycol (MIRALAX) powder, Take 17 g by mouth As Needed., Disp: , Rfl:   •  potassium chloride (K-DUR) 10 MEQ CR tablet, Take 3 tablets by mouth 2 (Two) Times a Day. 3 TABLETS, Disp: 540 tablet, Rfl: 1  •  pramipexole (MIRAPEX) 1 MG tablet, Take 1 tablet by mouth 2 (Two) Times a Day., Disp: 180 tablet, Rfl: 1  •  spironolactone (ALDACTONE) 100 MG tablet, Take 1 tablet by mouth Daily., Disp: 90 tablet, Rfl: 1  •  traZODone (DESYREL) 100 MG tablet, Take 1 tablet by mouth At Night As Needed for Sleep., Disp: " 90 tablet, Rfl: 0  •  venlafaxine XR (EFFEXOR-XR) 150 MG 24 hr capsule, Take 1 capsule by mouth Every Night., Disp: 90 capsule, Rfl: 1  •  Docusate Calcium (STOOL SOFTENER PO), Take 1 tablet by mouth As Needed., Disp: , Rfl:   •  nebivolol (BYSTOLIC) 2.5 MG tablet, Take 1 tablet by mouth Daily., Disp: 30 tablet, Rfl: 11  •  simethicone (MYLICON) 125 MG chewable tablet, Chew 125 mg Every 8 (Eight) Hours As Needed for Flatulence., Disp: , Rfl: .      The patient's relevant past medical, surgical, family and social history were reviewed and updated in Epic as appropriate.       Review of Systems   Respiratory: Positive for apnea and shortness of breath.    Cardiovascular: Positive for palpitations.   Gastrointestinal: Positive for abdominal pain.   Musculoskeletal: Positive for arthralgias.   Neurological: Positive for numbness.   Psychiatric/Behavioral: Positive for dysphoric mood and sleep disturbance.   All other systems reviewed and are negative.        Objective:    Physical Exam   Constitutional: He is oriented to person, place, and time. He appears well-developed and well-nourished.   HENT:   Head: Normocephalic and atraumatic.   Mouth/Throat: Oropharynx is clear and moist.   Mallampati 4 anatomy   Eyes: Conjunctivae are normal.   Neck: Neck supple. No thyromegaly present.   Cardiovascular: Normal rate and regular rhythm.   Pulmonary/Chest: Effort normal and breath sounds normal.   Lymphadenopathy:     He has no cervical adenopathy.   Neurological: He is alert and oriented to person, place, and time.   Skin: Skin is warm and dry.   Psychiatric: He has a normal mood and affect. His behavior is normal. Judgment and thought content normal.   Nursing note and vitals reviewed.    35/55 days of use.  Greater than 4-hour use 43.6%.  90% pressure 14.2 cm H2O.  AHI 3.1.  Download reviewed with patient.    ASSESSMENT/PLAN    Abe was seen today for follow-up.    Diagnoses and all orders for this visit:    JAIME  (obstructive sleep apnea)  -     CPAP Therapy            1. Counseled patient regarding multimodal approach with healthy nutrition, healthy sleep, regular physical activity, social activities, counseling, and medications. Encouraged to practice lateral sleep position. Avoid alcohol and sedatives close to bedtime.  2.   Return to clinic 4 months with Dr. Dr. Womack for follow-up on narcolepsy and Adderall therapy.  I have reviewed the results of my evaluation and impression and discussed my recommendations in detail with the patient.      Signed by  Norma Tyler, APRN    April 9, 2019      CC: Anibal Maria MD          No ref. provider found

## 2019-04-29 ENCOUNTER — LAB (OUTPATIENT)
Dept: LAB | Facility: HOSPITAL | Age: 64
End: 2019-04-29

## 2019-04-29 ENCOUNTER — TRANSCRIBE ORDERS (OUTPATIENT)
Dept: LAB | Facility: HOSPITAL | Age: 64
End: 2019-04-29

## 2019-04-29 DIAGNOSIS — N18.9 CHRONIC KIDNEY DISEASE, UNSPECIFIED CKD STAGE: ICD-10-CM

## 2019-04-29 DIAGNOSIS — N18.9 CHRONIC KIDNEY DISEASE, UNSPECIFIED CKD STAGE: Primary | ICD-10-CM

## 2019-04-29 LAB
ALBUMIN SERPL-MCNC: 3.6 G/DL (ref 3.5–5.2)
ANION GAP SERPL CALCULATED.3IONS-SCNC: 10 MMOL/L
BACTERIA UR QL AUTO: NORMAL /HPF
BASOPHILS # BLD AUTO: 0.02 10*3/MM3 (ref 0–0.2)
BASOPHILS NFR BLD AUTO: 0.4 % (ref 0–1.5)
BUN BLD-MCNC: 31 MG/DL (ref 8–23)
BUN/CREAT SERPL: 28.4 (ref 7–25)
CALCIUM SPEC-SCNC: 9.3 MG/DL (ref 8.6–10.5)
CHLORIDE SERPL-SCNC: 93 MMOL/L (ref 98–107)
CO2 SERPL-SCNC: 30 MMOL/L (ref 22–29)
CREAT BLD-MCNC: 1.09 MG/DL (ref 0.76–1.27)
CREAT UR-MCNC: 52.4 MG/DL
DEPRECATED RDW RBC AUTO: 43.7 FL (ref 37–54)
EOSINOPHIL # BLD AUTO: 0.06 10*3/MM3 (ref 0–0.4)
EOSINOPHIL NFR BLD AUTO: 1.1 % (ref 0.3–6.2)
ERYTHROCYTE [DISTWIDTH] IN BLOOD BY AUTOMATED COUNT: 13.5 % (ref 12.3–15.4)
GFR SERPL CREATININE-BSD FRML MDRD: 68 ML/MIN/1.73
GLUCOSE BLD-MCNC: 273 MG/DL (ref 65–99)
HCT VFR BLD AUTO: 41.3 % (ref 37.5–51)
HGB BLD-MCNC: 13.5 G/DL (ref 13–17.7)
HYALINE CASTS UR QL AUTO: NORMAL /LPF
IMM GRANULOCYTES # BLD AUTO: 0.03 10*3/MM3 (ref 0–0.05)
IMM GRANULOCYTES NFR BLD AUTO: 0.6 % (ref 0–0.5)
LYMPHOCYTES # BLD AUTO: 1.17 10*3/MM3 (ref 0.7–3.1)
LYMPHOCYTES NFR BLD AUTO: 21.5 % (ref 19.6–45.3)
MCH RBC QN AUTO: 28.7 PG (ref 26.6–33)
MCHC RBC AUTO-ENTMCNC: 32.7 G/DL (ref 31.5–35.7)
MCV RBC AUTO: 87.7 FL (ref 79–97)
MONOCYTES # BLD AUTO: 0.43 10*3/MM3 (ref 0.1–0.9)
MONOCYTES NFR BLD AUTO: 7.9 % (ref 5–12)
NEUTROPHILS # BLD AUTO: 3.72 10*3/MM3 (ref 1.7–7)
NEUTROPHILS NFR BLD AUTO: 68.5 % (ref 42.7–76)
PHOSPHATE SERPL-MCNC: 3.2 MG/DL (ref 2.5–4.5)
PLATELET # BLD AUTO: 139 10*3/MM3 (ref 140–450)
PMV BLD AUTO: 11.7 FL (ref 6–12)
POTASSIUM BLD-SCNC: 4.4 MMOL/L (ref 3.5–5.2)
PROT UR-MCNC: 8 MG/DL
RBC # BLD AUTO: 4.71 10*6/MM3 (ref 4.14–5.8)
RBC # UR: NORMAL /HPF
REF LAB TEST METHOD: NORMAL
SODIUM BLD-SCNC: 133 MMOL/L (ref 136–145)
SQUAMOUS #/AREA URNS HPF: NORMAL /HPF
WBC NRBC COR # BLD: 5.43 10*3/MM3 (ref 3.4–10.8)
WBC UR QL AUTO: NORMAL /HPF

## 2019-04-29 PROCEDURE — 81015 MICROSCOPIC EXAM OF URINE: CPT

## 2019-04-29 PROCEDURE — 80069 RENAL FUNCTION PANEL: CPT

## 2019-04-29 PROCEDURE — 81003 URINALYSIS AUTO W/O SCOPE: CPT

## 2019-04-29 PROCEDURE — 84156 ASSAY OF PROTEIN URINE: CPT

## 2019-04-29 PROCEDURE — 85025 COMPLETE CBC W/AUTO DIFF WBC: CPT

## 2019-04-29 PROCEDURE — 82570 ASSAY OF URINE CREATININE: CPT

## 2019-04-29 PROCEDURE — 36415 COLL VENOUS BLD VENIPUNCTURE: CPT

## 2019-04-30 LAB
BILIRUB UR QL STRIP: NEGATIVE
CLARITY UR: CLEAR
COLOR UR: YELLOW
GLUCOSE UR STRIP-MCNC: ABNORMAL MG/DL
HGB UR QL STRIP.AUTO: NEGATIVE
KETONES UR QL STRIP: NEGATIVE
LEUKOCYTE ESTERASE UR QL STRIP.AUTO: NEGATIVE
NITRITE UR QL STRIP: NEGATIVE
PH UR STRIP.AUTO: 6 [PH] (ref 5–8)
PROT UR QL STRIP: NEGATIVE
SP GR UR STRIP: 1.01 (ref 1–1.03)
UROBILINOGEN UR QL STRIP: ABNORMAL

## 2019-05-01 RX ORDER — TRAZODONE HYDROCHLORIDE 100 MG/1
100 TABLET ORAL NIGHTLY PRN
Qty: 90 TABLET | Refills: 0 | Status: SHIPPED | OUTPATIENT
Start: 2019-05-01 | End: 2019-08-30 | Stop reason: SDUPTHER

## 2019-05-10 ENCOUNTER — OFFICE VISIT (OUTPATIENT)
Dept: INTERNAL MEDICINE | Facility: CLINIC | Age: 64
End: 2019-05-10

## 2019-05-10 VITALS
SYSTOLIC BLOOD PRESSURE: 124 MMHG | HEIGHT: 76 IN | HEART RATE: 68 BPM | WEIGHT: 315 LBS | BODY MASS INDEX: 38.36 KG/M2 | DIASTOLIC BLOOD PRESSURE: 64 MMHG

## 2019-05-10 DIAGNOSIS — Z79.4 TYPE 2 DIABETES MELLITUS WITH OTHER SPECIFIED COMPLICATION, WITH LONG-TERM CURRENT USE OF INSULIN (HCC): ICD-10-CM

## 2019-05-10 DIAGNOSIS — F98.8 ATTENTION DEFICIT DISORDER (ADD) WITHOUT HYPERACTIVITY: ICD-10-CM

## 2019-05-10 DIAGNOSIS — I73.9 PVD (PERIPHERAL VASCULAR DISEASE) (HCC): ICD-10-CM

## 2019-05-10 DIAGNOSIS — D69.6 THROMBOCYTOPENIA (HCC): ICD-10-CM

## 2019-05-10 DIAGNOSIS — E78.2 MIXED HYPERLIPIDEMIA: ICD-10-CM

## 2019-05-10 DIAGNOSIS — I50.9 CHRONIC CONGESTIVE HEART FAILURE, UNSPECIFIED HEART FAILURE TYPE (HCC): ICD-10-CM

## 2019-05-10 DIAGNOSIS — G25.81 RESTLESS LEGS SYNDROME (RLS): ICD-10-CM

## 2019-05-10 DIAGNOSIS — E11.69 TYPE 2 DIABETES MELLITUS WITH OTHER SPECIFIED COMPLICATION, WITH LONG-TERM CURRENT USE OF INSULIN (HCC): ICD-10-CM

## 2019-05-10 DIAGNOSIS — K75.81 NASH (NONALCOHOLIC STEATOHEPATITIS): ICD-10-CM

## 2019-05-10 DIAGNOSIS — Z12.5 SCREENING FOR PROSTATE CANCER: ICD-10-CM

## 2019-05-10 DIAGNOSIS — I48.0 PAROXYSMAL ATRIAL FIBRILLATION (HCC): ICD-10-CM

## 2019-05-10 DIAGNOSIS — R60.0 LOCALIZED EDEMA: ICD-10-CM

## 2019-05-10 DIAGNOSIS — F32.89 OTHER DEPRESSION: ICD-10-CM

## 2019-05-10 DIAGNOSIS — I10 ESSENTIAL HYPERTENSION: ICD-10-CM

## 2019-05-10 DIAGNOSIS — I42.5 RESTRICTIVE CARDIOMYOPATHY (HCC): Primary | ICD-10-CM

## 2019-05-10 DIAGNOSIS — K74.60 CIRRHOSIS OF LIVER WITHOUT ASCITES, UNSPECIFIED HEPATIC CIRRHOSIS TYPE (HCC): ICD-10-CM

## 2019-05-10 DIAGNOSIS — I89.0 LYMPHEDEMA: ICD-10-CM

## 2019-05-10 PROBLEM — L03.115 CELLULITIS OF RIGHT LOWER EXTREMITY: Status: RESOLVED | Noted: 2019-03-03 | Resolved: 2019-05-10

## 2019-05-10 LAB
ALBUMIN SERPL-MCNC: 4.1 G/DL (ref 3.5–5.2)
ALBUMIN/GLOB SERPL: 1.1 G/DL
ALP SERPL-CCNC: 106 U/L (ref 39–117)
ALT SERPL W P-5'-P-CCNC: 26 U/L (ref 1–41)
ANION GAP SERPL CALCULATED.3IONS-SCNC: 13 MMOL/L
AST SERPL-CCNC: 32 U/L (ref 1–40)
BILIRUB SERPL-MCNC: 0.6 MG/DL (ref 0.2–1.2)
BUN BLD-MCNC: 31 MG/DL (ref 8–23)
BUN/CREAT SERPL: 26.5 (ref 7–25)
CALCIUM SPEC-SCNC: 9.3 MG/DL (ref 8.6–10.5)
CHLORIDE SERPL-SCNC: 92 MMOL/L (ref 98–107)
CHOLEST SERPL-MCNC: 168 MG/DL (ref 0–200)
CO2 SERPL-SCNC: 29 MMOL/L (ref 22–29)
CREAT BLD-MCNC: 1.17 MG/DL (ref 0.76–1.27)
DEPRECATED RDW RBC AUTO: 44.5 FL (ref 37–54)
ERYTHROCYTE [DISTWIDTH] IN BLOOD BY AUTOMATED COUNT: 13.7 % (ref 12.3–15.4)
GFR SERPL CREATININE-BSD FRML MDRD: 63 ML/MIN/1.73
GLOBULIN UR ELPH-MCNC: 3.8 GM/DL
GLUCOSE BLD-MCNC: 92 MG/DL (ref 65–99)
HCT VFR BLD AUTO: 44.9 % (ref 37.5–51)
HDLC SERPL-MCNC: 65 MG/DL (ref 40–60)
HGB BLD-MCNC: 14 G/DL (ref 13–17.7)
LDLC SERPL CALC-MCNC: 84 MG/DL (ref 0–100)
LDLC/HDLC SERPL: 1.3 {RATIO}
MCH RBC QN AUTO: 28.1 PG (ref 26.6–33)
MCHC RBC AUTO-ENTMCNC: 31.2 G/DL (ref 31.5–35.7)
MCV RBC AUTO: 90.2 FL (ref 79–97)
PLATELET # BLD AUTO: 164 10*3/MM3 (ref 140–450)
PMV BLD AUTO: 12.3 FL (ref 6–12)
POTASSIUM BLD-SCNC: 4.1 MMOL/L (ref 3.5–5.2)
PROT SERPL-MCNC: 7.9 G/DL (ref 6–8.5)
PSA SERPL-MCNC: 0.13 NG/ML (ref 0–4)
RBC # BLD AUTO: 4.98 10*6/MM3 (ref 4.14–5.8)
SODIUM BLD-SCNC: 134 MMOL/L (ref 136–145)
TRIGL SERPL-MCNC: 93 MG/DL (ref 0–150)
TSH SERPL DL<=0.05 MIU/L-ACNC: 2.88 MIU/ML (ref 0.27–4.2)
VIT B12 BLD-MCNC: 702 PG/ML (ref 211–946)
VLDLC SERPL-MCNC: 18.6 MG/DL (ref 5–40)
WBC NRBC COR # BLD: 7.27 10*3/MM3 (ref 3.4–10.8)

## 2019-05-10 PROCEDURE — 85027 COMPLETE CBC AUTOMATED: CPT | Performed by: INTERNAL MEDICINE

## 2019-05-10 PROCEDURE — 80053 COMPREHEN METABOLIC PANEL: CPT | Performed by: INTERNAL MEDICINE

## 2019-05-10 PROCEDURE — 84443 ASSAY THYROID STIM HORMONE: CPT | Performed by: INTERNAL MEDICINE

## 2019-05-10 PROCEDURE — 82607 VITAMIN B-12: CPT | Performed by: INTERNAL MEDICINE

## 2019-05-10 PROCEDURE — G0103 PSA SCREENING: HCPCS | Performed by: INTERNAL MEDICINE

## 2019-05-10 PROCEDURE — 99214 OFFICE O/P EST MOD 30 MIN: CPT | Performed by: INTERNAL MEDICINE

## 2019-05-10 PROCEDURE — 80061 LIPID PANEL: CPT | Performed by: INTERNAL MEDICINE

## 2019-05-10 NOTE — PROGRESS NOTES
Patient is a 63 y.o. male who is here for a follow up of chronic conditions.  Chief Complaint   Patient presents with   • Hyperlipidemia   • Hypertension   • Diabetes         HPI:    Here for f/u.  Doing pretty good.  Last HBAIC was 9 on last check.  Energy level is ok.  Breathing well.  Edema is improved.  No nausea or emesis.  Some constipation. Depression under good control.     History:    Patient Active Problem List   Diagnosis   • Type 2 diabetes mellitus with hyperglycemia (CMS/HCC)   • Elevated LFTs   • Thrombocytopenia (CMS/HCC)   • PVD (peripheral vascular disease) (CMS/HCC)   • A-fib (CMS/HCC)   • Cirrhosis of liver (CMS/HCC)   • Chronic congestive heart failure (CMS/HCC)   • Diabetic ulcer of right heel (CMS/HCC)   • Primary narcolepsy without cataplexy   • Essential hypertension   • Morbidly obese (CMS/HCC)   • Restless legs syndrome (RLS)   • ADD (attention deficit disorder)   • Depression   • Diabetes mellitus (CMS/HCC)   • Edema   • MCCAULEY (nonalcoholic steatohepatitis)   • Hepatitis B   • Insomnia   • Lymphedema   • Narcolepsy   • Obesity   • JAIME on CPAP   • RLS (restless legs syndrome)   • Tremor of both hands   • Restrictive cardiomyopathy (CMS/HCC)   • Mixed hyperlipidemia       Past Medical History:   Diagnosis Date   • A-fib (CMS/HCC)    • ADD (attention deficit disorder)    • Atrial flutter (CMS/HCC)    • Cellulitis    • CHF (congestive heart failure) (CMS/HCC)    • Cirrhosis of liver (CMS/HCC)    • Constipation    • Depression    • Diabetes mellitus (CMS/HCC)    • Diabetic ulcer of right foot (CMS/HCC)    • Disease of thyroid gland    • Edema    • Fatty liver    • Hepatitis B    • Hyperlipidemia    • Hypokalemia    • Insomnia    • Lymphedema    • Narcolepsy    • Neuropathy    • Obesity    • JAIME on CPAP    • PVD (peripheral vascular disease) (CMS/HCC)    • RLS (restless legs syndrome)    • Skin cancer    • Tardive dyskinesia    • Tremor of both hands    • Yeast infection        Past Surgical  History:   Procedure Laterality Date   • ACHILLES TENDON REPAIR     • CARDIAC ABLATION     • CHOLECYSTECTOMY     • LASER ABLATION      VEIN RLE       Current Outpatient Medications on File Prior to Visit   Medication Sig   • amphetamine-dextroamphetamine (ADDERALL) 15 MG tablet Take 2 tablets by mouth Daily. 2 tabs daily in afternoon   • amphetamine-dextroamphetamine (ADDERALL) 30 MG tablet Take 2 tablets by mouth Every Morning.   • aspirin 81 MG tablet Take 1 tablet by mouth Daily. (Patient taking differently: Take 162 mg by mouth Daily.)   • bacitracin 500 UNIT/GM ointment Apply  topically to the appropriate area as directed Daily As Needed for Wound Care.   • bumetanide (BUMEX) 2 MG tablet Take 2 tablets by mouth 2 (Two) Times a Day.   • Charcoal 260 MG capsule Take 2 tablets by mouth As Needed.   • clotrimazole (ANTI-FUNGAL) 1 % cream Apply 1 application topically to the appropriate area as directed 4 (Four) Times a Day As Needed.   • Docusate Calcium (STOOL SOFTENER PO) Take 1 tablet by mouth As Needed.   • ezetimibe (ZETIA) 10 MG tablet Take 1 tablet by mouth Daily.   • gabapentin (NEURONTIN) 300 MG capsule Take 3 capsules by mouth 2 (Two) Times a Day.   • insulin regular (humuLIN R,novoLIN R) 100 UNIT/ML injection Inject  under the skin into the appropriate area as directed 2 (Two) Times a Day Before Meals. 115 UNITS IN AM  100 UNITS IN PM   • ketoconazole (NIZORAL) 2 % cream Apply  topically to the appropriate area as directed 2 (Two) Times a Day As Needed for Itching.   • levocetirizine (XYZAL) 5 MG tablet Take 1 tablet by mouth Every Evening.   • levothyroxine (SYNTHROID, LEVOTHROID) 75 MCG tablet Take 1 tablet by mouth Daily.   • metOLazone (ZAROXOLYN) 5 MG tablet Take 1 tablet by mouth Daily As Needed (edema/fluid overload).   • nebivolol (BYSTOLIC) 2.5 MG tablet Take 1 tablet by mouth Daily.   • ondansetron (ZOFRAN) 8 MG tablet Take 1 tablet by mouth Every 8 (Eight) Hours As Needed for Nausea or  "Vomiting.   • polyethylene glycol (MIRALAX) powder Take 17 g by mouth As Needed.   • potassium chloride (K-DUR) 10 MEQ CR tablet Take 3 tablets by mouth 2 (Two) Times a Day. 3 TABLETS   • pramipexole (MIRAPEX) 1 MG tablet Take 1 tablet by mouth 2 (Two) Times a Day.   • simethicone (MYLICON) 125 MG chewable tablet Chew 125 mg Every 8 (Eight) Hours As Needed for Flatulence.   • spironolactone (ALDACTONE) 100 MG tablet Take 1 tablet by mouth Daily.   • traZODone (DESYREL) 100 MG tablet TAKE 1 TABLET BY MOUTH AT NIGHT AS NEEDED FOR SLEEP.   • venlafaxine XR (EFFEXOR-XR) 150 MG 24 hr capsule Take 1 capsule by mouth Every Night.   • Insulin Syringe 29G X 1/2\" 1 ML misc Use one each to inject insulin twice daily Ell.9     No current facility-administered medications on file prior to visit.        Family History   Problem Relation Age of Onset   • Heart failure Mother         CHF   • Clotting disorder Father    • No Known Problems Sister    • No Known Problems Brother    • No Known Problems Brother        Social History     Socioeconomic History   • Marital status:      Spouse name: Not on file   • Number of children: Not on file   • Years of education: Not on file   • Highest education level: Not on file   Tobacco Use   • Smoking status: Never Smoker   • Smokeless tobacco: Never Used   Substance and Sexual Activity   • Alcohol use: Yes     Comment: \"3 SCOTCH AND 2 BEERS PER WEEK\"   • Drug use: No   • Sexual activity: Defer         Review of Systems   Constitutional: Positive for fatigue and unexpected weight change. Negative for chills and fever.   HENT: Negative for congestion, ear pain, hearing loss, rhinorrhea, sinus pressure, sore throat and trouble swallowing.    Eyes: Negative for discharge and itching.   Respiratory: Positive for shortness of breath. Negative for cough and chest tightness.    Cardiovascular: Positive for leg swelling. Negative for chest pain and palpitations.   Gastrointestinal: Negative for " "abdominal pain, blood in stool, constipation, diarrhea and vomiting.        Colonoscopy 3/16   Endocrine: Positive for cold intolerance. Negative for polydipsia and polyuria.   Genitourinary: Negative for difficulty urinating, dysuria, enuresis, frequency, hematuria and urgency.   Musculoskeletal: Positive for arthralgias, back pain and gait problem. Negative for joint swelling.   Skin: Negative for rash and wound.   Allergic/Immunologic: Negative for immunocompromised state.   Neurological: Positive for weakness and light-headedness. Negative for dizziness, syncope, numbness and headaches.   Hematological: Does not bruise/bleed easily.   Psychiatric/Behavioral: Negative for behavioral problems, dysphoric mood and sleep disturbance. The patient is not nervous/anxious.        /64 (BP Location: Left arm, Patient Position: Sitting)   Pulse 68   Ht 193 cm (75.98\")   Wt (!) 143 kg (316 lb)   BMI 38.48 kg/m²       Physical Exam   Constitutional: He is oriented to person, place, and time. He appears well-developed and well-nourished.   HENT:   Head: Normocephalic and atraumatic.   Right Ear: External ear normal.   Left Ear: External ear normal.   Mouth/Throat: Oropharynx is clear and moist.   Left Cerumen   Eyes: Conjunctivae and EOM are normal.   Neck: Normal range of motion. Neck supple.   Cardiovascular: Normal rate and regular rhythm.   Slight diminished heart sounds   Pulmonary/Chest: Effort normal and breath sounds normal.   Abdominal: Soft. Bowel sounds are normal.   Musculoskeletal: He exhibits edema.   Lymphadenopathy:     He has no cervical adenopathy.   Neurological: He is alert and oriented to person, place, and time.   Skin: Skin is warm and dry.   Psychiatric: He has a normal mood and affect. His behavior is normal. Thought content normal.       Procedure:      Discussion/Summary:    DM-per ENDO  Chronic LE edema-advised to weigh daily and if needed add metolazone  Hx of CHF-\" \"  Hep B hx/FLD-f/u  " [place]  Neuropathy-on Gabapentin  Narcolepsy-on Adderall  JAIME-cont CPAP  Hx of Afib-no recurrence s/p Ablation  Hypothyroid-TSH today  Left Cerumen-debrox prn  Elevated Cr-will schedule f/u with Nephrology  Other-PSA      Prior records reviewed     Labs noted and dw patient, will add pravachol          Current Outpatient Medications:   •  amphetamine-dextroamphetamine (ADDERALL) 15 MG tablet, Take 2 tablets by mouth Daily. 2 tabs daily in afternoon, Disp: 60 tablet, Rfl: 0  •  amphetamine-dextroamphetamine (ADDERALL) 30 MG tablet, Take 2 tablets by mouth Every Morning., Disp: 60 tablet, Rfl: 0  •  aspirin 81 MG tablet, Take 1 tablet by mouth Daily. (Patient taking differently: Take 162 mg by mouth Daily.), Disp: 90 tablet, Rfl: 1  •  bacitracin 500 UNIT/GM ointment, Apply  topically to the appropriate area as directed Daily As Needed for Wound Care., Disp: , Rfl:   •  bumetanide (BUMEX) 2 MG tablet, Take 2 tablets by mouth 2 (Two) Times a Day., Disp: 360 tablet, Rfl: 1  •  Charcoal 260 MG capsule, Take 2 tablets by mouth As Needed., Disp: , Rfl:   •  clotrimazole (ANTI-FUNGAL) 1 % cream, Apply 1 application topically to the appropriate area as directed 4 (Four) Times a Day As Needed., Disp: , Rfl:   •  Docusate Calcium (STOOL SOFTENER PO), Take 1 tablet by mouth As Needed., Disp: , Rfl:   •  ezetimibe (ZETIA) 10 MG tablet, Take 1 tablet by mouth Daily., Disp: 30 tablet, Rfl: 11  •  gabapentin (NEURONTIN) 300 MG capsule, Take 3 capsules by mouth 2 (Two) Times a Day., Disp: 540 capsule, Rfl: 0  •  insulin regular (humuLIN R,novoLIN R) 100 UNIT/ML injection, Inject  under the skin into the appropriate area as directed 2 (Two) Times a Day Before Meals. 115 UNITS IN  UNITS IN PM, Disp: , Rfl:   •  ketoconazole (NIZORAL) 2 % cream, Apply  topically to the appropriate area as directed 2 (Two) Times a Day As Needed for Itching., Disp: 60 g, Rfl: 2  •  levocetirizine (XYZAL) 5 MG tablet, Take 1 tablet by mouth Every  "Evening., Disp: 90 tablet, Rfl: 1  •  levothyroxine (SYNTHROID, LEVOTHROID) 75 MCG tablet, Take 1 tablet by mouth Daily., Disp: 90 tablet, Rfl: 1  •  metOLazone (ZAROXOLYN) 5 MG tablet, Take 1 tablet by mouth Daily As Needed (edema/fluid overload)., Disp: 90 tablet, Rfl: 1  •  nebivolol (BYSTOLIC) 2.5 MG tablet, Take 1 tablet by mouth Daily., Disp: 30 tablet, Rfl: 11  •  ondansetron (ZOFRAN) 8 MG tablet, Take 1 tablet by mouth Every 8 (Eight) Hours As Needed for Nausea or Vomiting., Disp: 20 tablet, Rfl: 2  •  polyethylene glycol (MIRALAX) powder, Take 17 g by mouth As Needed., Disp: , Rfl:   •  potassium chloride (K-DUR) 10 MEQ CR tablet, Take 3 tablets by mouth 2 (Two) Times a Day. 3 TABLETS, Disp: 540 tablet, Rfl: 1  •  pramipexole (MIRAPEX) 1 MG tablet, Take 1 tablet by mouth 2 (Two) Times a Day., Disp: 180 tablet, Rfl: 1  •  simethicone (MYLICON) 125 MG chewable tablet, Chew 125 mg Every 8 (Eight) Hours As Needed for Flatulence., Disp: , Rfl:   •  spironolactone (ALDACTONE) 100 MG tablet, Take 1 tablet by mouth Daily., Disp: 90 tablet, Rfl: 1  •  traZODone (DESYREL) 100 MG tablet, TAKE 1 TABLET BY MOUTH AT NIGHT AS NEEDED FOR SLEEP., Disp: 90 tablet, Rfl: 0  •  venlafaxine XR (EFFEXOR-XR) 150 MG 24 hr capsule, Take 1 capsule by mouth Every Night., Disp: 90 capsule, Rfl: 1  •  Insulin Syringe 29G X 1/2\" 1 ML misc, Use one each to inject insulin twice daily Ell.9, Disp: 100 each, Rfl: 1  •  pravastatin (PRAVACHOL) 20 MG tablet, Take 1 tablet by mouth Every Night., Disp: 90 tablet, Rfl: 3        Abe was seen today for hyperlipidemia, hypertension and diabetes.    Diagnoses and all orders for this visit:    Restrictive cardiomyopathy (CMS/HCC)    PVD (peripheral vascular disease) (CMS/HCC)    Mixed hyperlipidemia  -     pravastatin (PRAVACHOL) 20 MG tablet; Take 1 tablet by mouth Every Night.    Essential hypertension  -     CBC (No Diff)  -     Comprehensive Metabolic Panel  -     Lipid Panel  -     " TSH    Chronic congestive heart failure, unspecified heart failure type (CMS/HCC)    Paroxysmal atrial fibrillation (CMS/HCC)    MCCAULEY (nonalcoholic steatohepatitis)    Cirrhosis of liver without ascites, unspecified hepatic cirrhosis type (CMS/HCC)    Type 2 diabetes mellitus with other specified complication, with long-term current use of insulin (CMS/HCC)    Thrombocytopenia (CMS/HCC)    Restless legs syndrome (RLS)    Lymphedema    Localized edema    Other depression  -     Vitamin B12    Attention deficit disorder (ADD) without hyperactivity  -     Vitamin B12    Screening for prostate cancer  -     PSA Screen

## 2019-05-11 RX ORDER — PRAVASTATIN SODIUM 20 MG
20 TABLET ORAL NIGHTLY
Qty: 90 TABLET | Refills: 3 | Status: SHIPPED | OUTPATIENT
Start: 2019-05-11 | End: 2019-09-21 | Stop reason: SDUPTHER

## 2019-06-06 ENCOUNTER — TELEPHONE (OUTPATIENT)
Dept: PULMONOLOGY | Facility: CLINIC | Age: 64
End: 2019-06-06

## 2019-06-06 RX ORDER — DEXTROAMPHETAMINE SACCHARATE, AMPHETAMINE ASPARTATE, DEXTROAMPHETAMINE SULFATE AND AMPHETAMINE SULFATE 3.75; 3.75; 3.75; 3.75 MG/1; MG/1; MG/1; MG/1
30 TABLET ORAL DAILY
Qty: 60 TABLET | Refills: 0 | Status: SHIPPED | OUTPATIENT
Start: 2019-06-06 | End: 2019-07-22 | Stop reason: SDUPTHER

## 2019-06-06 RX ORDER — DEXTROAMPHETAMINE SACCHARATE, AMPHETAMINE ASPARTATE, DEXTROAMPHETAMINE SULFATE AND AMPHETAMINE SULFATE 7.5; 7.5; 7.5; 7.5 MG/1; MG/1; MG/1; MG/1
60 TABLET ORAL EVERY MORNING
Qty: 60 TABLET | Refills: 0 | Status: SHIPPED | OUTPATIENT
Start: 2019-06-06 | End: 2019-07-11 | Stop reason: SDUPTHER

## 2019-06-06 NOTE — TELEPHONE ENCOUNTER
PT NEEDS REFILLS ON ADDERALL 15MG TAB 1PO QD AND ADDERALL 30MG TAB 1 EVERY MORNING AT On license of [facility] [phone]

## 2019-06-06 NOTE — TELEPHONE ENCOUNTER
PRESCRIPTIONS ESCRIBED TO PHARMACY BY PROVIDER.   CALLED PATIENT TO ADVISE AND PATIENT VERBALIZED UNDERSTANDING  06/06/19 [facility]

## 2019-06-11 RX ORDER — GABAPENTIN 300 MG/1
900 CAPSULE ORAL 2 TIMES DAILY
Qty: 540 CAPSULE | Refills: 0 | Status: CANCELLED | OUTPATIENT
Start: 2019-06-11

## 2019-06-12 RX ORDER — GABAPENTIN 300 MG/1
900 CAPSULE ORAL 2 TIMES DAILY
Qty: 540 CAPSULE | Refills: 0 | Status: SHIPPED | OUTPATIENT
Start: 2019-06-12 | End: 2019-09-18 | Stop reason: SDUPTHER

## 2019-06-14 DIAGNOSIS — F98.8 ATTENTION DEFICIT DISORDER (ADD) WITHOUT HYPERACTIVITY: ICD-10-CM

## 2019-06-14 DIAGNOSIS — I10 ESSENTIAL HYPERTENSION: Primary | ICD-10-CM

## 2019-06-14 DIAGNOSIS — F32.89 OTHER DEPRESSION: ICD-10-CM

## 2019-06-14 DIAGNOSIS — R60.0 LOCALIZED EDEMA: ICD-10-CM

## 2019-06-14 DIAGNOSIS — I48.0 PAROXYSMAL ATRIAL FIBRILLATION (HCC): ICD-10-CM

## 2019-07-01 NOTE — PROGRESS NOTES
Subjective:     Encounter Date:07/11/2019      Patient ID: Abe Phillips Jr. is a 63 y.o.  white male from [place], [place], retired  and , Robin Capone father.  He recently moved here from [place], [place].     SELF REFERRED  INTERNIST: Anibal Maria MD  PULMONOLOGIST: Fernando Womack MD, [facility]  NEUROLOGIST: ELMA Jenkins  ELECTROPHYSIOLOGIST: Jalen Scott MD ([place])  REMOTE CARDIOLOGIST: Mathew Cox MD ([place])  GASTROENTEROLOGIST: Jalen Rashid MD ([place])  WOUND CARE: [facility], Winifred Pedersen MD  ENDOCRINOLOGIST: Dustin Hendricks MD.    Chief Complaint:   Chief Complaint   Patient presents with   • PVD     Problem List:  1. Paroxysmal atrial fibrillation:  a. Diagnosed 2016, CHADsVasc 4, anticoagulated with Eliquis  b. ECV, 2016  c. Atrial fibrillation and flutter ablation, 8/30/2016 ([place])  d. CHADsVasc 4, anticoagulated with ASA  e. NSR on ECG, February 2019  2. Chronic congestive heart failure (HFpEF):  a. Echocardiogram, July 2016, LVEF 0.58 ([place]  b. Echocardiogram, January 2017, LVEF 0.50 ([place]  c. Echocardiogram, 4/12/2018: Mild concentric LVH, EF 55-60 percent, trivial TR  d. Echocardiogram, 1/5/2019: EF greater than 0.70, LV wall thickness is consistent with posterior asymmetric hypertrophy, trace MR, physiologic TR, no pericardial effusion  3. Hypertensive cardiovascular disease:  a. Remote stress test and negative LHC, approximately 2016, in [place]-data deficit  b. Residual class I symptoms  4. Hypertension  5. Hyperlipidemia; not on statin therapy, patient wishes to not take statins  6. Type 2 diabetes mellitus; hemoglobin A1c 7.9% July 2016; 8%, autumn 2018; 11.7%, January 2019 with RLE foot ulcer since May 2018 (seeing wound care), 7.9% March 2019  7. Hypothyroidism on replacement therapy  8. Peripheral vascular disease  9. Obstructive sleep apnea with CPAP with sleep study 2/4/2019: Negative for significant JAIME, but had very little REM  sleep  10. Restless leg syndrome  11. Narcolepsy  12. Anxiety and depression  13. Moderate obesity: BMI 38.5  14. Hepatic cirrhosis and portal hypertension with splenomegaly  15. History of hepatitis B antibody 30 years ago  16. History of duodenal ulcer  17. Encephalopathy/sepsis/dehydration with 2-day [facility] admission, January 2019, with fever of unknown origin, echocardiogram negative for endocarditis  18. 60-pound intentional weight loss over past 2 years, February 2019  19. Surgical history:  a. Cholecystectomy  b. LHC  c. Atrial fibrillation/flutter ablation  d. Hemorrhoid surgery  e. Liver biopsy  No Known Allergies      Current Outpatient Medications:   •  amphetamine-dextroamphetamine (ADDERALL) 15 MG tablet, Take 2 tablets by mouth Daily. 2 tabs daily in afternoon, Disp: 60 tablet, Rfl: 0  •  aspirin 81 MG tablet, Take 1 tablet by mouth Daily. (Patient taking differently: Take 162 mg by mouth Daily.), Disp: 90 tablet, Rfl: 1  •  bacitracin 500 UNIT/GM ointment, Apply  topically to the appropriate area as directed Daily As Needed for Wound Care., Disp: , Rfl:   •  bumetanide (BUMEX) 2 MG tablet, Take 2 tablets by mouth 2 (Two) Times a Day., Disp: 360 tablet, Rfl: 1  •  Charcoal 260 MG capsule, Take 2 tablets by mouth As Needed., Disp: , Rfl:   •  clotrimazole (ANTI-FUNGAL) 1 % cream, Apply 1 application topically to the appropriate area as directed 4 (Four) Times a Day As Needed., Disp: , Rfl:   •  Docusate Calcium (STOOL SOFTENER PO), Take 1 tablet by mouth As Needed., Disp: , Rfl:   •  ezetimibe (ZETIA) 10 MG tablet, Take 1 tablet by mouth Daily., Disp: 30 tablet, Rfl: 11  •  gabapentin (NEURONTIN) 300 MG capsule, Take 3 capsules by mouth 2 (Two) Times a Day., Disp: 540 capsule, Rfl: 0  •  insulin regular (humuLIN R,novoLIN R) 100 UNIT/ML injection, Inject  under the skin into the appropriate area as directed 2 (Two) Times a Day Before Meals. 115 UNITS IN  UNITS IN PM, Disp: , Rfl:   •  Insulin Syringe  "29G X 1/2\" 1 ML misc, Use one each to inject insulin twice daily Ell.9, Disp: 100 each, Rfl: 1  •  ketoconazole (NIZORAL) 2 % cream, Apply  topically to the appropriate area as directed 2 (Two) Times a Day As Needed for Itching., Disp: 60 g, Rfl: 2  •  levocetirizine (XYZAL) 5 MG tablet, Take 1 tablet by mouth Every Evening., Disp: 90 tablet, Rfl: 1  •  levothyroxine (SYNTHROID, LEVOTHROID) 75 MCG tablet, Take 1 tablet by mouth Daily., Disp: 90 tablet, Rfl: 1  •  metOLazone (ZAROXOLYN) 5 MG tablet, Take 1 tablet by mouth Daily As Needed (edema/fluid overload)., Disp: 90 tablet, Rfl: 1  •  ondansetron (ZOFRAN) 8 MG tablet, Take 1 tablet by mouth Every 8 (Eight) Hours As Needed for Nausea or Vomiting., Disp: 20 tablet, Rfl: 2  •  polyethylene glycol (MIRALAX) powder, Take 17 g by mouth As Needed., Disp: , Rfl:   •  potassium chloride (K-DUR) 10 MEQ CR tablet, Take 3 tablets by mouth 2 (Two) Times a Day. 3 TABLETS, Disp: 540 tablet, Rfl: 1  •  pramipexole (MIRAPEX) 1 MG tablet, Take 1 tablet by mouth 2 (Two) Times a Day., Disp: 180 tablet, Rfl: 1  •  pravastatin (PRAVACHOL) 20 MG tablet, Take 1 tablet by mouth Every Night., Disp: 90 tablet, Rfl: 3  •  simethicone (MYLICON) 125 MG chewable tablet, Chew 125 mg Every 8 (Eight) Hours As Needed for Flatulence., Disp: , Rfl:   •  spironolactone (ALDACTONE) 100 MG tablet, Take 1 tablet by mouth Daily., Disp: 90 tablet, Rfl: 1  •  traZODone (DESYREL) 100 MG tablet, TAKE 1 TABLET BY MOUTH AT NIGHT AS NEEDED FOR SLEEP., Disp: 90 tablet, Rfl: 0  •  venlafaxine XR (EFFEXOR-XR) 150 MG 24 hr capsule, Take 1 capsule by mouth Every Night., Disp: 90 capsule, Rfl: 1    HISTORY OF PRESENT ILLNESS:  Patient is here after a 5-month hiatus.  He denies any chest pain, increased shortness of breath, palpitations, dizziness, presyncope, or syncope.  He has lower extremity edema, but he feels that this has been better lately.  He tries to prop up his feet at night if he is watching television.  " "He has some complaints of constipation and was encouraged to use MiraLAX.  He is trying to adhere to a healthy diet eating more fish and vegetables.  He plans on trying to increase his physical activity so he can lose some weight.  His last hemoglobin A1c with his endocrinologist was 8.9% in June 2019.  He will follow-up with him in September 2019. He recently decided to be a  for extra money. He is looking forward to his 64th birthday in a couple weeks.       Review of Systems   Eyes: Positive for blurred vision.   Cardiovascular: Positive for leg swelling.   Gastrointestinal: Positive for constipation.   Neurological: Positive for dizziness.      All other systems reviewed and otherwise negative.    Procedures       Objective:       Vitals:    07/11/19 0929 07/11/19 0935   BP: 142/79 101/76   BP Location: Left arm Left arm   Patient Position: Sitting Standing   Pulse: 77 87   Weight: (!) 142 kg (312 lb)    Height: 193 cm (76\")    Recheck blood pressure left arm sitting was 124/70  Body mass index is 37.98 kg/m².  Last weight February 2019 was 295 pounds  Physical Exam   Constitutional: He is oriented to person, place, and time. He appears well-developed and well-nourished.   Neck: No JVD present. Carotid bruit is not present. No thyromegaly present.   Cardiovascular: Regular rhythm, S1 normal and S2 normal. Exam reveals no gallop, no S3 and no friction rub.   Murmur heard.   Medium-pitched harsh early systolic murmur is present with a grade of 1/6 at the upper right sternal border.  Pulses:       Dorsalis pedis pulses are 2+ on the right side, and 2+ on the left side.        Posterior tibial pulses are 2+ on the right side, and 2+ on the left side.   Pulmonary/Chest: Effort normal. He has decreased breath sounds. He has no wheezes. He has no rhonchi. He has no rales.   Abdominal: Soft. He exhibits no mass. There is no hepatosplenomegaly. There is no tenderness. There is no guarding.   Bowel sounds " audible x4   Musculoskeletal: Normal range of motion. He exhibits edema (1+).   Lymphadenopathy:     He has no cervical adenopathy.   Neurological: He is alert and oriented to person, place, and time.   Skin: Skin is warm, dry and intact. No rash noted.   Vitals reviewed.        Lab Review:   Lab Results   Component Value Date    GLUCOSE 92 05/10/2019    BUN 31 (H) 05/10/2019    CREATININE 1.17 05/10/2019    EGFRIFNONA 63 05/10/2019    BCR 26.5 (H) 05/10/2019    CO2 29.0 05/10/2019    CALCIUM 9.3 05/10/2019    ALBUMIN 4.10 05/10/2019    AST 32 05/10/2019    ALT 26 05/10/2019       Lab Results   Component Value Date    WBC 7.27 05/10/2019    HGB 14.0 05/10/2019    HCT 44.9 05/10/2019    MCV 90.2 05/10/2019     05/10/2019       Lab Results   Component Value Date    HGBA1C 7.90 (H) 03/04/2019       Lab Results   Component Value Date    TSH 2.880 05/10/2019       Lab Results   Component Value Date    CHOL 168 05/10/2019    CHOL 210 (H) 02/15/2019     Lab Results   Component Value Date    TRIG 93 05/10/2019    TRIG 124 02/15/2019     Lab Results   Component Value Date    HDL 65 (H) 05/10/2019    HDL 68 (H) 02/15/2019     Lab Results   Component Value Date    LDL 84 05/10/2019     02/15/2019         Assessment:     Overall continued acceptable course with no interim cardiopulmonary complaints with acceptable functional status. We will defer additional diagnostic or therapeutic intervention from a cardiac perspective at this time.  I encouraged the patient to increase his physical activity and to decrease his portion size and watch carbohydrate intake.  I also encouraged the patient to follow-up with his endocrinologist regarding his elevated hemoglobin A1c levels. I would recommend Jardiance 10mg daily for his uncontrolled T2DM but will defer this to his endocrinologist.        Diagnosis Plan   1. Paroxysmal atrial fibrillation (CMS/HCC)  Stable, continue ASA    2. Chronic congestive heart failure,  unspecified heart failure type (CMS/[facility])  Stable, continue Bumex and spironolactone, metolazone PRN   3. Essential hypertension   Controlled, continue Bumex, spironolactone   4. Type 2 diabetes mellitus with hyperglycemia, with long-term current use of insulin (CMS/[facility])  Recommend Jardiance 10mg daily but will defer this to his endocrinologist          Plan:         1. Patient to continue current medications and close follow up with the above providers.  2. Tentative cardiology follow up in 6 months or patient may return sooner PRN.  3. Recommend Jardiance 10mg daily but will defer this to his endocrinologist  4. Encouraged the patient to adhere to a low carb, low fat diet and to increase physical activity    Electronically signed by ELMA Bradley, 07/11/19, 10:29 AM.

## 2019-07-11 ENCOUNTER — OFFICE VISIT (OUTPATIENT)
Dept: CARDIOLOGY | Facility: CLINIC | Age: 64
End: 2019-07-11

## 2019-07-11 VITALS
SYSTOLIC BLOOD PRESSURE: 101 MMHG | WEIGHT: 312 LBS | BODY MASS INDEX: 37.99 KG/M2 | HEIGHT: 76 IN | HEART RATE: 87 BPM | DIASTOLIC BLOOD PRESSURE: 76 MMHG

## 2019-07-11 DIAGNOSIS — I48.0 PAROXYSMAL ATRIAL FIBRILLATION (HCC): Primary | ICD-10-CM

## 2019-07-11 DIAGNOSIS — E11.65 TYPE 2 DIABETES MELLITUS WITH HYPERGLYCEMIA, WITH LONG-TERM CURRENT USE OF INSULIN (HCC): ICD-10-CM

## 2019-07-11 DIAGNOSIS — Z79.4 TYPE 2 DIABETES MELLITUS WITH HYPERGLYCEMIA, WITH LONG-TERM CURRENT USE OF INSULIN (HCC): ICD-10-CM

## 2019-07-11 DIAGNOSIS — I50.9 CHRONIC CONGESTIVE HEART FAILURE, UNSPECIFIED HEART FAILURE TYPE (HCC): ICD-10-CM

## 2019-07-11 DIAGNOSIS — I10 ESSENTIAL HYPERTENSION: ICD-10-CM

## 2019-07-11 PROCEDURE — 99214 OFFICE O/P EST MOD 30 MIN: CPT | Performed by: NURSE PRACTITIONER

## 2019-07-22 RX ORDER — DEXTROAMPHETAMINE SACCHARATE, AMPHETAMINE ASPARTATE, DEXTROAMPHETAMINE SULFATE AND AMPHETAMINE SULFATE 3.75; 3.75; 3.75; 3.75 MG/1; MG/1; MG/1; MG/1
30 TABLET ORAL DAILY
Qty: 60 TABLET | Refills: 0 | Status: SHIPPED | OUTPATIENT
Start: 2019-07-22 | End: 2019-08-01

## 2019-07-26 ENCOUNTER — LAB (OUTPATIENT)
Dept: LAB | Facility: HOSPITAL | Age: 64
End: 2019-07-26

## 2019-07-26 ENCOUNTER — TRANSCRIBE ORDERS (OUTPATIENT)
Dept: LAB | Facility: HOSPITAL | Age: 64
End: 2019-07-26

## 2019-07-26 DIAGNOSIS — N18.9 CHRONIC KIDNEY DISEASE, UNSPECIFIED CKD STAGE: ICD-10-CM

## 2019-07-26 DIAGNOSIS — N18.9 CHRONIC KIDNEY DISEASE, UNSPECIFIED CKD STAGE: Primary | ICD-10-CM

## 2019-07-26 DIAGNOSIS — E55.9 VITAMIN D DEFICIENCY: ICD-10-CM

## 2019-07-26 PROCEDURE — 36415 COLL VENOUS BLD VENIPUNCTURE: CPT

## 2019-07-26 PROCEDURE — 84166 PROTEIN E-PHORESIS/URINE/CSF: CPT

## 2019-07-26 PROCEDURE — 84165 PROTEIN E-PHORESIS SERUM: CPT

## 2019-07-26 PROCEDURE — 80069 RENAL FUNCTION PANEL: CPT

## 2019-07-26 PROCEDURE — 84155 ASSAY OF PROTEIN SERUM: CPT

## 2019-07-26 PROCEDURE — 83970 ASSAY OF PARATHORMONE: CPT

## 2019-07-26 PROCEDURE — 82306 VITAMIN D 25 HYDROXY: CPT

## 2019-07-26 PROCEDURE — 84156 ASSAY OF PROTEIN URINE: CPT

## 2019-07-26 PROCEDURE — 81001 URINALYSIS AUTO W/SCOPE: CPT

## 2019-07-26 PROCEDURE — 82570 ASSAY OF URINE CREATININE: CPT | Performed by: INTERNAL MEDICINE

## 2019-07-27 LAB
25(OH)D3 SERPL-MCNC: 30 NG/ML (ref 30–100)
ALBUMIN SERPL-MCNC: 3.7 G/DL (ref 3.5–5.2)
ANION GAP SERPL CALCULATED.3IONS-SCNC: 14.8 MMOL/L (ref 5–15)
BACTERIA UR QL AUTO: NORMAL /HPF
BILIRUB UR QL STRIP: NEGATIVE
BUN BLD-MCNC: 38 MG/DL (ref 8–23)
BUN/CREAT SERPL: 26.4 (ref 7–25)
CALCIUM SPEC-SCNC: 9.9 MG/DL (ref 8.6–10.5)
CHLORIDE SERPL-SCNC: 90 MMOL/L (ref 98–107)
CLARITY UR: CLEAR
CO2 SERPL-SCNC: 27.2 MMOL/L (ref 22–29)
COLOR UR: YELLOW
CREAT BLD-MCNC: 1.44 MG/DL (ref 0.76–1.27)
CREAT UR-MCNC: 18.5 MG/DL
GFR SERPL CREATININE-BSD FRML MDRD: 49 ML/MIN/1.73
GLUCOSE BLD-MCNC: 317 MG/DL (ref 65–99)
GLUCOSE UR STRIP-MCNC: NEGATIVE MG/DL
HGB UR QL STRIP.AUTO: NEGATIVE
HYALINE CASTS UR QL AUTO: NORMAL /LPF
KETONES UR QL STRIP: NEGATIVE
LEUKOCYTE ESTERASE UR QL STRIP.AUTO: NEGATIVE
NITRITE UR QL STRIP: NEGATIVE
PH UR STRIP.AUTO: 7 [PH] (ref 5–8)
PHOSPHATE SERPL-MCNC: 4 MG/DL (ref 2.5–4.5)
POTASSIUM BLD-SCNC: 3.7 MMOL/L (ref 3.5–5.2)
PROT UR QL STRIP: NEGATIVE
PROT UR-MCNC: <4 MG/DL
PTH-INTACT SERPL-MCNC: 42 PG/ML (ref 15–65)
RBC # UR: NORMAL /HPF
REF LAB TEST METHOD: NORMAL
SODIUM BLD-SCNC: 132 MMOL/L (ref 136–145)
SP GR UR STRIP: 1.01 (ref 1–1.03)
SQUAMOUS #/AREA URNS HPF: NORMAL /HPF
UROBILINOGEN UR QL STRIP: NORMAL
WBC UR QL AUTO: NORMAL /HPF

## 2019-07-29 ENCOUNTER — TELEPHONE (OUTPATIENT)
Dept: SLEEP MEDICINE | Facility: HOSPITAL | Age: 64
End: 2019-07-29

## 2019-07-29 LAB
ALBUMIN SERPL-MCNC: 3.4 G/DL (ref 2.9–4.4)
ALBUMIN/GLOB SERPL: 0.9 {RATIO} (ref 0.7–1.7)
ALPHA1 GLOB FLD ELPH-MCNC: 0.3 G/DL (ref 0–0.4)
ALPHA2 GLOB SERPL ELPH-MCNC: 0.9 G/DL (ref 0.4–1)
B-GLOBULIN SERPL ELPH-MCNC: 1.4 G/DL (ref 0.7–1.3)
GAMMA GLOB SERPL ELPH-MCNC: 1 G/DL (ref 0.4–1.8)
GLOBULIN SER CALC-MCNC: 3.6 G/DL (ref 2.2–3.9)
Lab: ABNORMAL
M-SPIKE: ABNORMAL G/DL
PROT PATTERN SERPL ELPH-IMP: ABNORMAL
PROT SERPL-MCNC: 7 G/DL (ref 6–8.5)

## 2019-07-29 RX ORDER — ASPIRIN 81 MG/1
TABLET ORAL
Qty: 90 TABLET | Refills: 1 | Status: SHIPPED | OUTPATIENT
Start: 2019-07-29

## 2019-07-29 RX ORDER — SPIRONOLACTONE 100 MG/1
TABLET, FILM COATED ORAL
Qty: 90 TABLET | Refills: 1 | Status: SHIPPED | OUTPATIENT
Start: 2019-07-29 | End: 2020-06-08

## 2019-07-29 RX ORDER — PRAMIPEXOLE DIHYDROCHLORIDE 1 MG/1
TABLET ORAL
Qty: 180 TABLET | Refills: 1 | Status: SHIPPED | OUTPATIENT
Start: 2019-07-29 | End: 2020-03-24 | Stop reason: SDUPTHER

## 2019-07-30 ENCOUNTER — TRANSCRIBE ORDERS (OUTPATIENT)
Dept: ADMINISTRATIVE | Facility: HOSPITAL | Age: 64
End: 2019-07-30

## 2019-07-30 DIAGNOSIS — M54.9 ACUTE BACK PAIN, UNSPECIFIED BACK LOCATION, UNSPECIFIED BACK PAIN LATERALITY: Primary | ICD-10-CM

## 2019-07-30 LAB
ALBUMIN 24H MFR UR ELPH: 60.8 %
ALPHA1 GLOB 24H MFR UR ELPH: 10.1 %
ALPHA2 GLOB 24H MFR UR ELPH: 6.5 %
B-GLOBULIN MFR UR ELPH: 12.2 %
GAMMA GLOB 24H MFR UR ELPH: 10.4 %
Lab: NORMAL
M-SPIKE, UR: NORMAL %
PROT UR-MCNC: <4 MG/DL

## 2019-07-31 ENCOUNTER — TELEPHONE (OUTPATIENT)
Dept: SLEEP MEDICINE | Facility: HOSPITAL | Age: 64
End: 2019-07-31

## 2019-08-01 RX ORDER — DEXTROAMPHETAMINE SACCHARATE, AMPHETAMINE ASPARTATE, DEXTROAMPHETAMINE SULFATE AND AMPHETAMINE SULFATE 7.5; 7.5; 7.5; 7.5 MG/1; MG/1; MG/1; MG/1
TABLET ORAL
Qty: 180 TABLET | Refills: 0 | Status: SHIPPED | OUTPATIENT
Start: 2019-08-01 | End: 2019-08-01 | Stop reason: RX

## 2019-08-01 RX ORDER — DEXTROAMPHETAMINE SACCHARATE, AMPHETAMINE ASPARTATE, DEXTROAMPHETAMINE SULFATE AND AMPHETAMINE SULFATE 3.75; 3.75; 3.75; 3.75 MG/1; MG/1; MG/1; MG/1
30 TABLET ORAL DAILY
Qty: 120 TABLET | Refills: 0 | Status: SHIPPED | OUTPATIENT
Start: 2019-08-01 | End: 2019-09-26

## 2019-08-01 RX ORDER — DEXTROAMPHETAMINE SACCHARATE, AMPHETAMINE ASPARTATE, DEXTROAMPHETAMINE SULFATE AND AMPHETAMINE SULFATE 7.5; 7.5; 7.5; 7.5 MG/1; MG/1; MG/1; MG/1
60 TABLET ORAL EVERY MORNING
Qty: 120 TABLET | Refills: 0 | Status: SHIPPED | OUTPATIENT
Start: 2019-08-01 | End: 2019-09-26

## 2019-08-30 RX ORDER — TRAZODONE HYDROCHLORIDE 100 MG/1
100 TABLET ORAL NIGHTLY PRN
Qty: 90 TABLET | Refills: 0 | Status: SHIPPED | OUTPATIENT
Start: 2019-08-30 | End: 2019-11-17 | Stop reason: SDUPTHER

## 2019-09-03 RX ORDER — POTASSIUM CHLORIDE 750 MG/1
TABLET, FILM COATED, EXTENDED RELEASE ORAL
Qty: 540 TABLET | Refills: 1 | Status: SHIPPED | OUTPATIENT
Start: 2019-09-03 | End: 2019-12-18

## 2019-09-06 ENCOUNTER — HOSPITAL ENCOUNTER (EMERGENCY)
Facility: HOSPITAL | Age: 64
Discharge: HOME | End: 2019-09-07
Attending: EMERGENCY MEDICINE | Admitting: EMERGENCY MEDICINE

## 2019-09-06 ENCOUNTER — APPOINTMENT (OUTPATIENT)
Dept: GENERAL RADIOLOGY | Facility: HOSPITAL | Age: 64
End: 2019-09-06

## 2019-09-06 DIAGNOSIS — N28.9 RENAL INSUFFICIENCY: ICD-10-CM

## 2019-09-06 DIAGNOSIS — E87.6 HYPOKALEMIA: ICD-10-CM

## 2019-09-06 DIAGNOSIS — L97.511 SKIN ULCER OF RIGHT FOOT, LIMITED TO BREAKDOWN OF SKIN (HCC): ICD-10-CM

## 2019-09-06 DIAGNOSIS — I73.9 PERIPHERAL VASCULAR DISEASE (HCC): ICD-10-CM

## 2019-09-06 DIAGNOSIS — E11.65 TYPE 2 DIABETES MELLITUS WITH HYPERGLYCEMIA, WITH LONG-TERM CURRENT USE OF INSULIN (HCC): ICD-10-CM

## 2019-09-06 DIAGNOSIS — Z79.4 TYPE 2 DIABETES MELLITUS WITH HYPERGLYCEMIA, WITH LONG-TERM CURRENT USE OF INSULIN (HCC): ICD-10-CM

## 2019-09-06 DIAGNOSIS — R53.1 GENERAL WEAKNESS: Primary | ICD-10-CM

## 2019-09-06 LAB
ALBUMIN SERPL-MCNC: 3.9 G/DL (ref 3.5–5.2)
ALBUMIN/GLOB SERPL: 1 G/DL
ALP SERPL-CCNC: 154 U/L (ref 39–117)
ALT SERPL W P-5'-P-CCNC: 32 U/L (ref 1–41)
ANION GAP SERPL CALCULATED.3IONS-SCNC: 14 MMOL/L (ref 5–15)
AST SERPL-CCNC: 46 U/L (ref 1–40)
BASOPHILS # BLD AUTO: 0.05 10*3/MM3 (ref 0–0.2)
BASOPHILS NFR BLD AUTO: 0.6 % (ref 0–1.5)
BILIRUB SERPL-MCNC: 0.6 MG/DL (ref 0.2–1.2)
BUN BLD-MCNC: 57 MG/DL (ref 8–23)
BUN/CREAT SERPL: 38 (ref 7–25)
CALCIUM SPEC-SCNC: 9.6 MG/DL (ref 8.6–10.5)
CHLORIDE SERPL-SCNC: 85 MMOL/L (ref 98–107)
CO2 SERPL-SCNC: 31 MMOL/L (ref 22–29)
CREAT BLD-MCNC: 1.5 MG/DL (ref 0.76–1.27)
DEPRECATED RDW RBC AUTO: 42.6 FL (ref 37–54)
EOSINOPHIL # BLD AUTO: 0.12 10*3/MM3 (ref 0–0.4)
EOSINOPHIL NFR BLD AUTO: 1.5 % (ref 0.3–6.2)
ERYTHROCYTE [DISTWIDTH] IN BLOOD BY AUTOMATED COUNT: 13.7 % (ref 12.3–15.4)
GFR SERPL CREATININE-BSD FRML MDRD: 47 ML/MIN/1.73
GLOBULIN UR ELPH-MCNC: 4 GM/DL
GLUCOSE BLD-MCNC: 260 MG/DL (ref 65–99)
HCT VFR BLD AUTO: 43.6 % (ref 37.5–51)
HGB BLD-MCNC: 14.9 G/DL (ref 13–17.7)
IMM GRANULOCYTES # BLD AUTO: 0.31 10*3/MM3 (ref 0–0.05)
IMM GRANULOCYTES NFR BLD AUTO: 3.9 % (ref 0–0.5)
LYMPHOCYTES # BLD AUTO: 1.62 10*3/MM3 (ref 0.7–3.1)
LYMPHOCYTES NFR BLD AUTO: 20.5 % (ref 19.6–45.3)
MCH RBC QN AUTO: 29.3 PG (ref 26.6–33)
MCHC RBC AUTO-ENTMCNC: 34.2 G/DL (ref 31.5–35.7)
MCV RBC AUTO: 85.7 FL (ref 79–97)
MONOCYTES # BLD AUTO: 0.75 10*3/MM3 (ref 0.1–0.9)
MONOCYTES NFR BLD AUTO: 9.5 % (ref 5–12)
NEUTROPHILS # BLD AUTO: 5.06 10*3/MM3 (ref 1.7–7)
NEUTROPHILS NFR BLD AUTO: 64 % (ref 42.7–76)
NRBC BLD AUTO-RTO: 0 /100 WBC (ref 0–0.2)
NT-PROBNP SERPL-MCNC: 108.9 PG/ML (ref 5–900)
PLATELET # BLD AUTO: 165 10*3/MM3 (ref 140–450)
PMV BLD AUTO: 11.6 FL (ref 6–12)
POTASSIUM BLD-SCNC: 3.2 MMOL/L (ref 3.5–5.2)
PROT SERPL-MCNC: 7.9 G/DL (ref 6–8.5)
RBC # BLD AUTO: 5.09 10*6/MM3 (ref 4.14–5.8)
SODIUM BLD-SCNC: 130 MMOL/L (ref 136–145)
TROPONIN T SERPL-MCNC: 0.03 NG/ML (ref 0–0.03)
TSH SERPL DL<=0.05 MIU/L-ACNC: 3.31 UIU/ML (ref 0.27–4.2)
WBC NRBC COR # BLD: 7.91 10*3/MM3 (ref 3.4–10.8)

## 2019-09-06 PROCEDURE — 93005 ELECTROCARDIOGRAM TRACING: CPT | Performed by: EMERGENCY MEDICINE

## 2019-09-06 PROCEDURE — 71045 X-RAY EXAM CHEST 1 VIEW: CPT

## 2019-09-06 PROCEDURE — 84443 ASSAY THYROID STIM HORMONE: CPT | Performed by: EMERGENCY MEDICINE

## 2019-09-06 PROCEDURE — 87070 CULTURE OTHR SPECIMN AEROBIC: CPT | Performed by: EMERGENCY MEDICINE

## 2019-09-06 PROCEDURE — 80053 COMPREHEN METABOLIC PANEL: CPT | Performed by: EMERGENCY MEDICINE

## 2019-09-06 PROCEDURE — 81003 URINALYSIS AUTO W/O SCOPE: CPT | Performed by: EMERGENCY MEDICINE

## 2019-09-06 PROCEDURE — 83880 ASSAY OF NATRIURETIC PEPTIDE: CPT | Performed by: EMERGENCY MEDICINE

## 2019-09-06 PROCEDURE — 87077 CULTURE AEROBIC IDENTIFY: CPT | Performed by: EMERGENCY MEDICINE

## 2019-09-06 PROCEDURE — 99284 EMERGENCY DEPT VISIT MOD MDM: CPT

## 2019-09-06 PROCEDURE — 85025 COMPLETE CBC W/AUTO DIFF WBC: CPT | Performed by: EMERGENCY MEDICINE

## 2019-09-06 PROCEDURE — 36415 COLL VENOUS BLD VENIPUNCTURE: CPT

## 2019-09-06 PROCEDURE — 84484 ASSAY OF TROPONIN QUANT: CPT | Performed by: EMERGENCY MEDICINE

## 2019-09-06 PROCEDURE — 87205 SMEAR GRAM STAIN: CPT | Performed by: EMERGENCY MEDICINE

## 2019-09-06 PROCEDURE — 87186 SC STD MICRODIL/AGAR DIL: CPT | Performed by: EMERGENCY MEDICINE

## 2019-09-06 RX ORDER — PRAMIPEXOLE DIHYDROCHLORIDE 1 MG/1
1 TABLET ORAL ONCE
Status: COMPLETED | OUTPATIENT
Start: 2019-09-06 | End: 2019-09-06

## 2019-09-06 RX ORDER — PRAMIPEXOLE DIHYDROCHLORIDE 1 MG/1
1 TABLET ORAL ONCE
Status: DISCONTINUED | OUTPATIENT
Start: 2019-09-06 | End: 2019-09-06

## 2019-09-06 RX ORDER — POTASSIUM CHLORIDE 750 MG/1
20 CAPSULE, EXTENDED RELEASE ORAL ONCE
Status: COMPLETED | OUTPATIENT
Start: 2019-09-06 | End: 2019-09-06

## 2019-09-06 RX ORDER — SODIUM CHLORIDE 0.9 % (FLUSH) 0.9 %
10 SYRINGE (ML) INJECTION AS NEEDED
Status: DISCONTINUED | OUTPATIENT
Start: 2019-09-06 | End: 2019-09-07 | Stop reason: HOSPADM

## 2019-09-06 RX ADMIN — POTASSIUM CHLORIDE 20 MEQ: 750 CAPSULE, EXTENDED RELEASE ORAL at 23:27

## 2019-09-06 RX ADMIN — PRAMIPEXOLE DIHYDROCHLORIDE 1 MG: 1 TABLET ORAL at 23:27

## 2019-09-07 VITALS
HEIGHT: 76 IN | TEMPERATURE: 98.1 F | WEIGHT: 315 LBS | DIASTOLIC BLOOD PRESSURE: 79 MMHG | RESPIRATION RATE: 16 BRPM | SYSTOLIC BLOOD PRESSURE: 135 MMHG | HEART RATE: 69 BPM | BODY MASS INDEX: 38.36 KG/M2 | OXYGEN SATURATION: 99 %

## 2019-09-07 LAB
BILIRUB UR QL STRIP: NEGATIVE
CLARITY UR: CLEAR
COLOR UR: YELLOW
GLUCOSE UR STRIP-MCNC: NEGATIVE MG/DL
HGB UR QL STRIP.AUTO: NEGATIVE
KETONES UR QL STRIP: NEGATIVE
LEUKOCYTE ESTERASE UR QL STRIP.AUTO: NEGATIVE
NITRITE UR QL STRIP: NEGATIVE
PH UR STRIP.AUTO: 6.5 [PH] (ref 5–8)
PROT UR QL STRIP: NEGATIVE
SP GR UR STRIP: 1.01 (ref 1–1.03)
TROPONIN T SERPL-MCNC: 0.02 NG/ML (ref 0–0.03)
UROBILINOGEN UR QL STRIP: NORMAL

## 2019-09-07 PROCEDURE — 84484 ASSAY OF TROPONIN QUANT: CPT | Performed by: EMERGENCY MEDICINE

## 2019-09-07 NOTE — ED PROVIDER NOTES
"d    [facility] EMERGENCY DEPARTMENT    eMERGENCY dEPARTMENT eNCOUnter      Pt Name: Abe Phillips Jr.  MRN: [id number]  YOB: 1955  Date of evaluation: 9/6/2019  Provider: Shaan Dooley DO    CHIEF COMPLAINT       Chief Complaint   Patient presents with   • Fatigue         HISTORY OF PRESENT ILLNESS  (Location/Symptom, Timing/Onset, Context/Setting, Quality, Duration, Modifying Factors, Severity.)   Abe Phillips Jr. is a 64 y.o. male who presents to the emergency department with c/o fatigue and generalized weakness.  Patient reports that over the past 3-4 days he's been uncharacteristically weak and fatigued.  He states he has just had a \"lack of energy\" and notes he can barely walk anywhere without becoming particularly lethargic.  The patient is unsure what exactly may be causing this.  He denies any associated chest pain, shortness of breath, fevers, chills, cough, or congestion.  He does note that a diabetic foot ulcer on his right heel (that he's been dealing with for the past year and a half with specialists down in [place], [place]) has been oozing a little blood recently.  He also adds that he's been taking more Neurontin than he usual does recently (up to about 1200 mg bid).  Other past medical history includes congestive heart failure, atrial fibrillation status post ablation, hyperlipidemia, obstructive sleep apnea on CPAP, insomnia, cellulitis, and attention deficit disorder.      Nursing notes were reviewed.    REVIEW OF SYSTEMS    (2-9 systems for level 4, 10 or more for level 5)   ROS:  General:  No fevers, no chills, + weakness, + fatigue  Cardiovascular:  No chest pain, no palpitations  Respiratory:  No shortness of breath, no cough, no wheezing  Gastrointestinal:  No pain, no nausea, no vomiting, no diarrhea  Musculoskeletal:  No muscle pain, no joint pain  Skin:  No rash, no easy bruising, + diabetic ulcer (right heel)  Neurologic:  No speech problems, no headache, " no extremity numbness, no extremity tingling  Psychiatric:  No anxiety  Genitourinary:  No dysuria, no hematuria    Except as noted above the remainder of the review of systems was reviewed and negative.       PAST MEDICAL HISTORY     Past Medical History:   Diagnosis Date   • A-fib (CMS/HCC)    • ADD (attention deficit disorder)    • Atrial flutter (CMS/HCC)    • Cellulitis    • CHF (congestive heart failure) (CMS/HCC)    • Cirrhosis of liver (CMS/HCC)    • Constipation    • Depression    • Diabetes mellitus (CMS/HCC)    • Diabetic ulcer of right foot (CMS/HCC)    • Disease of thyroid gland    • Edema    • Fatty liver    • Hepatitis B    • Hyperlipidemia    • Hypokalemia    • Insomnia    • Lymphedema    • Narcolepsy    • Neuropathy    • Obesity    • JAIME on CPAP    • PVD (peripheral vascular disease) (CMS/HCC)    • RLS (restless legs syndrome)    • Skin cancer    • Tardive dyskinesia    • Tremor of both hands    • Yeast infection          SURGICAL HISTORY       Past Surgical History:   Procedure Laterality Date   • ACHILLES TENDON REPAIR     • CARDIAC ABLATION     • CHOLECYSTECTOMY     • LASER ABLATION      VEIN RLE         CURRENT MEDICATIONS     No current facility-administered medications for this encounter.     Current Outpatient Medications:   •  ACCU-CHEK COREY PLUS test strip, 2 (Two) Times a Day., Disp: , Rfl: 4  •  amphetamine-dextroamphetamine (ADDERALL) 15 MG tablet, Take 2 tablets by mouth Daily for 30 days., Disp: 60 tablet, Rfl: 0  •  [START ON 10/24/2019] amphetamine-dextroamphetamine (ADDERALL) 15 MG tablet, Take 2 tablets by mouth Daily for 30 days., Disp: 60 tablet, Rfl: 0  •  [START ON 11/21/2019] amphetamine-dextroamphetamine (ADDERALL) 15 MG tablet, Take 2 tablets by mouth Daily for 30 days., Disp: 60 tablet, Rfl: 0  •  amphetamine-dextroamphetamine (ADDERALL) 30 MG tablet, Take 2 tablets by mouth Daily for 30 days., Disp: 60 tablet, Rfl: 0  •  [START ON 10/24/2019] amphetamine-dextroamphetamine  "(ADDERALL) 30 MG tablet, Take 2 tablets by mouth Daily for 30 days., Disp: 60 tablet, Rfl: 0  •  [START ON 11/21/2019] amphetamine-dextroamphetamine (ADDERALL) 30 MG tablet, Take 2 tablets by mouth Daily for 30 days., Disp: 60 tablet, Rfl: 0  •  aspirin 81 MG EC tablet, TAKE 1 TABLET BY MOUTH EVERY DAY, Disp: 90 tablet, Rfl: 1  •  bacitracin 500 UNIT/GM ointment, Apply  topically to the appropriate area as directed Daily As Needed for Wound Care., Disp: , Rfl:   •  bumetanide (BUMEX) 2 MG tablet, Take 2 tablets by mouth 2 (Two) Times a Day., Disp: 360 tablet, Rfl: 1  •  clotrimazole (ANTI-FUNGAL) 1 % cream, Apply 1 application topically to the appropriate area as directed 4 (Four) Times a Day As Needed., Disp: , Rfl:   •  Docusate Calcium (STOOL SOFTENER PO), Take 1 tablet by mouth As Needed., Disp: , Rfl:   •  ezetimibe (ZETIA) 10 MG tablet, Take 1 tablet by mouth Daily., Disp: 30 tablet, Rfl: 11  •  gabapentin (NEURONTIN) 300 MG capsule, TAKE 3 CAPSULES BY MOUTH 2 (TWO) TIMES A DAY., Disp: 540 capsule, Rfl: 0  •  insulin regular (humuLIN R,novoLIN R) 100 UNIT/ML injection, Inject  under the skin into the appropriate area as directed 2 (Two) Times a Day Before Meals. 115 UNITS IN  UNITS IN PM, Disp: , Rfl:   •  Insulin Syringe 29G X 1/2\" 1 ML misc, Use one each to inject insulin twice daily Ell.9, Disp: 100 each, Rfl: 1  •  ketoconazole (NIZORAL) 2 % cream, Apply  topically to the appropriate area as directed 2 (Two) Times a Day As Needed for Itching., Disp: 60 g, Rfl: 2  •  levocetirizine (XYZAL) 5 MG tablet, Take 1 tablet by mouth Every Evening., Disp: 90 tablet, Rfl: 1  •  levothyroxine (SYNTHROID, LEVOTHROID) 75 MCG tablet, TAKE 1 TABLET BY MOUTH EVERY DAY, Disp: 90 tablet, Rfl: 1  •  metOLazone (ZAROXOLYN) 5 MG tablet, Take 1 tablet by mouth Daily As Needed (edema/fluid overload)., Disp: 90 tablet, Rfl: 1  •  ondansetron (ZOFRAN) 8 MG tablet, Take 1 tablet by mouth Every 8 (Eight) Hours As Needed for " "Nausea or Vomiting., Disp: 20 tablet, Rfl: 2  •  polyethylene glycol (MIRALAX) powder, Take 17 g by mouth As Needed., Disp: , Rfl:   •  potassium chloride (K-DUR) 10 MEQ CR tablet, TAKE 3 TABLETS BY MOUTH 2 (TWO) TIMES A DAY, Disp: 540 tablet, Rfl: 1  •  pramipexole (MIRAPEX) 1 MG tablet, TAKE 1 TABLET BY MOUTH TWICE A DAY, Disp: 180 tablet, Rfl: 1  •  pravastatin (PRAVACHOL) 40 MG tablet, Take 1 tablet by mouth Every Night., Disp: 90 tablet, Rfl: 3  •  simethicone (MYLICON) 125 MG chewable tablet, Chew 125 mg Every 8 (Eight) Hours As Needed for Flatulence., Disp: , Rfl:   •  spironolactone (ALDACTONE) 100 MG tablet, TAKE 1 TABLET BY MOUTH EVERY DAY, Disp: 90 tablet, Rfl: 1  •  traZODone (DESYREL) 100 MG tablet, Take 1 tablet by mouth At Night As Needed for Sleep., Disp: 90 tablet, Rfl: 0  •  venlafaxine XR (EFFEXOR-XR) 150 MG 24 hr capsule, Take 1 capsule by mouth Every Night., Disp: 90 capsule, Rfl: 1    ALLERGIES     Patient has no known allergies.    FAMILY HISTORY       Family History   Problem Relation Age of Onset   • Heart failure Mother         CHF   • Clotting disorder Father    • No Known Problems Sister    • No Known Problems Brother    • No Known Problems Brother           SOCIAL HISTORY       Social History     Socioeconomic History   • Marital status:      Spouse name: Not on file   • Number of children: Not on file   • Years of education: Not on file   • Highest education level: Not on file   Tobacco Use   • Smoking status: Never Smoker   • Smokeless tobacco: Never Used   Substance and Sexual Activity   • Alcohol use: Yes     Comment: \"3 SCOTCH AND 2 BEERS PER WEEK\"   • Drug use: No   • Sexual activity: Defer         PHYSICAL EXAM    (up to 7 for level 4, 8 or more for level 5)     Vitals:    09/07/19 0045 09/07/19 0100 09/07/19 0300 09/07/19 0330   BP:  128/64  135/79   Pulse: 79 72 66 69   Resp:    16   Temp:       TempSrc:       SpO2:    99%   Weight:       Height:           Physical " Exam  General: Patient is awake, alert, oriented, in no acute distress, nontoxic appearing.  He is conversational without dyspnea.  HEENT: Pupils are equally round and reactive to light, EOMI, conjunctivae clear, sclerae white, there is no injection no icterus.  Oral mucosa is moist, no exudate. Uvula is midline  Neck: Neck is supple, full range of motion, trachea midline  Cardiac: Heart regular rate, rhythm, mild 1/6 systolic ejection murmur, no rubs, or gallops  Lungs: Decreased breath sounds bilaterally, Lungs are clear to auscultation, there is no wheezing, rhonchi, or rales. There is no use of accessory muscles.  Chest wall: There is no tenderness to palpation over the chest wall or over ribs  Abdomen: Abdomen is soft, nontender, nondistended. There is no firm or pulsatile masses, no rebound rigidity or guarding.   Musculoskeletal: 5 out of 5 strength in all 4 extremities.  No focal muscle deficits are appreciated  Neuro: No focal neurological deficits.  Motor intact, sensory intact, level of consciousness is normal, cerebellar function is normal, reflexes are grossly normal. Good  strength bilaterally. No unilateral weakness.   Dermatology: Skin is warm and dry. Bilateral lower extremities have venous stasis changes with generalized 1+ edema.  On the right heel there is a small, pinpoint (2 mm) superficial ulceration with very mild drainage.  The left foot is without ulceration.  Psych: Mentation is grossly normal, cognition is grossly normal. Affect is appropriate.      DIAGNOSTIC RESULTS     EKG: All EKG's are interpreted by the Emergency Department Physician who either signs or Co-signs this chart in the absence of a cardiologist.    ECG 12 Lead   Final Result   Test Reason : weakness   Blood Pressure : **/** mmHG   Vent. Rate : 080 BPM     Atrial Rate : 080 BPM      P-R Int : 190 ms          QRS Dur : 100 ms       QT Int : 388 ms       P-R-T Axes : 059 -57 -19 degrees      QTc Int : 447 ms      Sinus  rhythm with premature supraventricular complexes and with occasional       premature ventricular complexes   Left axis deviation   Pulmonary disease pattern   Nonspecific ST abnormality   Abnormal ECG   When compared with ECG of 05-JAN-2019 00:12,   premature ventricular complexes are now present   premature supraventricular complexes are now present   Confirmed by BOGDAN SCOTT MD ([id number]) on 9/6/2019 9:53:58 PM      Referred By:  EDMD           Confirmed By:BOGDAN SCOTT MD          RADIOLOGY:   Non-plain film images such as CT, Ultrasound and MRI are read by the radiologist. Plain radiographic images are visualized and preliminarily interpreted by the emergency physician with the below findings:      [] Radiologist's Report Reviewed:  XR Chest 1 View   Final Result   No acute cardiopulmonary findings.      Signer Name: Mathew Gomez MD    Signed: 9/6/2019 10:11 PM    Workstation Name: [email or web address]     [facility]            ED BEDSIDE ULTRASOUND:   Performed by ED Physician - none    LABS:    I have reviewed and interpreted all of the currently available lab results from this visit (if applicable):  Results for orders placed or performed during the hospital encounter of 09/06/19   Wound Culture - Swab, Foot, Right   Result Value Ref Range    Wound Culture Light growth (2+) Enterobacter cloacae complex (A)     Wound Culture Light growth (2+) Normal Skin Corin     Gram Stain No WBCs or organisms seen        Susceptibility    Enterobacter cloacae complex - LIZZETH     Cefazolin >=64 Resistant ug/ml     Cefepime <=1 Susceptible ug/ml     Ceftazidime <=1 Susceptible ug/ml     Ceftriaxone <=1 Susceptible ug/ml     Gentamicin <=1 Susceptible ug/ml     Levofloxacin <=0.12 Susceptible ug/ml     Piperacillin + Tazobactam <=4 Susceptible ug/ml     Tetracycline 2 Susceptible ug/ml     Trimethoprim + Sulfamethoxazole <=20 Susceptible ug/ml   Comprehensive Metabolic Panel   Result Value Ref Range    Glucose  260 (H) 65 - 99 mg/dL    BUN 57 (H) 8 - 23 mg/dL    Creatinine 1.50 (H) 0.76 - 1.27 mg/dL    Sodium 130 (L) 136 - 145 mmol/L    Potassium 3.2 (L) 3.5 - 5.2 mmol/L    Chloride 85 (L) 98 - 107 mmol/L    CO2 31.0 (H) 22.0 - 29.0 mmol/L    Calcium 9.6 8.6 - 10.5 mg/dL    Total Protein 7.9 6.0 - 8.5 g/dL    Albumin 3.90 3.50 - 5.20 g/dL    ALT (SGPT) 32 1 - 41 U/L    AST (SGOT) 46 (H) 1 - 40 U/L    Alkaline Phosphatase 154 (H) 39 - 117 U/L    Total Bilirubin 0.6 0.2 - 1.2 mg/dL    eGFR Non African Amer 47 (L) >60 mL/min/1.73    Globulin 4.0 gm/dL    A/G Ratio 1.0 g/dL    BUN/Creatinine Ratio 38.0 (H) 7.0 - 25.0    Anion Gap 14.0 5.0 - 15.0 mmol/L   Urinalysis With Microscopic If Indicated (No Culture) - Urine, Clean Catch   Result Value Ref Range    Color, UA Yellow Yellow, Straw    Appearance, UA Clear Clear    pH, UA 6.5 5.0 - 8.0    Specific Gravity, UA 1.010 1.001 - 1.030    Glucose, UA Negative Negative    Ketones, UA Negative Negative    Bilirubin, UA Negative Negative    Blood, UA Negative Negative    Protein, UA Negative Negative    Leuk Esterase, UA Negative Negative    Nitrite, UA Negative Negative    Urobilinogen, UA 1.0 E.U./dL 0.2 - 1.0 E.U./dL   BNP   Result Value Ref Range    proBNP 108.9 5.0 - 900.0 pg/mL   Troponin   Result Value Ref Range    Troponin T 0.031 (C) 0.000 - 0.030 ng/mL   TSH   Result Value Ref Range    TSH 3.310 0.270 - 4.200 uIU/mL   CBC Auto Differential   Result Value Ref Range    WBC 7.91 3.40 - 10.80 10*3/mm3    RBC 5.09 4.14 - 5.80 10*6/mm3    Hemoglobin 14.9 13.0 - 17.7 g/dL    Hematocrit 43.6 37.5 - 51.0 %    MCV 85.7 79.0 - 97.0 fL    MCH 29.3 26.6 - 33.0 pg    MCHC 34.2 31.5 - 35.7 g/dL    RDW 13.7 12.3 - 15.4 %    RDW-SD 42.6 37.0 - 54.0 fl    MPV 11.6 6.0 - 12.0 fL    Platelets 165 140 - 450 10*3/mm3    Neutrophil % 64.0 42.7 - 76.0 %    Lymphocyte % 20.5 19.6 - 45.3 %    Monocyte % 9.5 5.0 - 12.0 %    Eosinophil % 1.5 0.3 - 6.2 %    Basophil % 0.6 0.0 - 1.5 %    Immature Grans  % 3.9 (H) 0.0 - 0.5 %    Neutrophils, Absolute 5.06 1.70 - 7.00 10*3/mm3    Lymphocytes, Absolute 1.62 0.70 - 3.10 10*3/mm3    Monocytes, Absolute 0.75 0.10 - 0.90 10*3/mm3    Eosinophils, Absolute 0.12 0.00 - 0.40 10*3/mm3    Basophils, Absolute 0.05 0.00 - 0.20 10*3/mm3    Immature Grans, Absolute 0.31 (H) 0.00 - 0.05 10*3/mm3    nRBC 0.0 0.0 - 0.2 /100 WBC   Troponin   Result Value Ref Range    Troponin T 0.018 0.000 - 0.030 ng/mL        All other labs were within normal range or not returned as of this dictation.      EMERGENCY DEPARTMENT COURSE and DIFFERENTIAL DIAGNOSIS/MDM:   Vitals:    Vitals:    09/07/19 0045 09/07/19 0100 09/07/19 0300 09/07/19 0330   BP:  128/64  135/79   Pulse: 79 72 66 69   Resp:    16   Temp:       TempSrc:       SpO2:    99%   Weight:       Height:           ED Course as of Oct 02 2047   Fri Sep 06, 2019   2204 Care Team:  INTERNIST: Anibal Maria MD  PULMONOLOGIST: Fernando Womack MD, [facility]  NEUROLOGIST: ELMA Jenkisn  ELECTROPHYSIOLOGIST: Jalen Scott MD ([place])  REMOTE CARDIOLOGIST: Mathew Cox MD ([place])  GASTROENTEROLOGIST: Jalen Rashid MD ([place])  WOUND CARE: [facility], Winifred Pedersen MD  ENDOCRINOLOGIST: Dustin Hendricks MD.  [AP]   Mon Sep 30, 2019   0815 Patient called back secondary to receiving letter regarding wound culture results.  Wound culture positive for Enterobacter cloacae.  He is currently undergoing wound care with [facility] (Dr. Kennedy ), and will be seeing them again today.  He just finished a 10-day course of doxycycline, is beginning to have reddened areas above where his foot is wrapped, he is following up today.  Advised to follow-up with wound care today as scheduled, he was told that Dr. Kennedy could call over for further results today and for sensitivities if necessary.  Return to the emergency department if you change worsening symptoms.  [TG]      ED Course User Index  [AP] Shaan Dooley DO  [TG] Tito Ervin  YVETTE Lucas   !    Patient with generalized weakness, worsened over the last 1 to 2 days, does have multiple chronic comorbidities and follows with multiple specialist.  No active chest pain or difficulty breathing, just has issues with exertional fatigue.  Does have peripheral vascular disease, his right heel does have a small opening concerning for a recurrent ulceration in this area.  He has an appointment on Monday with his podiatrist.  We did obtain IV, labs and imaging, patient does have hyperglycemia, slightly worsening renal insufficiency, hypokalemia which we did replace in the ED.  Patient is on multiple diuretic medications for history of chronic edema which is likely causing his renal insufficiency, could be a little on the dehydrated side.  We did replace his potassium, initial troponin 0 0.031, no active chest pain.  No acute ischemic changes on his EKG.  Patient was updated on these results, we discussed obtaining a repeat troponin which he agrees, repeat troponin is negative in the normal range.  We discussed with his multiple chronic comorbidities admission to the hospital for reevaluation, recheck of his electrolytes and kidney function, gentle fluid hydration.  Patient states he would prefer to go home and follow-up with his PCP for repeat blood work and evaluation, will take half dose of his Bumex in the morning make sure he is taking his potassium supplementation.  I had multiple conversations with the patient regarding admission to the hospital at least for a couple days for reevaluation and therapies, he does not want to stay, understands the risk he is taking by leaving the hospital but he states he will return if he has any worsening symptoms or concerns.  He is a reliable patient I feel he will return if he has a change of mind or any worsening symptoms.  His daughter is a neurology specialist at our facility he will keep a close watch on him as well.  Return precautions discussed, patient  verbally understands the return precautions we discussed at the bedside.  He remains alert and oriented is capable of making his decisions with good insight and clarity. The patient will follow-up with their PCP in 1-2 days for reevaluation.  If the patient or family members have any further concerns or patient has any worsening symptoms they will return to the ED for reevaluation.      MEDICATIONS ADMINISTERED IN ED:  Medications   potassium chloride (MICRO-K) CR capsule 20 mEq (20 mEq Oral Given 9/6/19 2327)   pramipexole (MIRAPEX) tablet 1 mg (1 mg Oral Given 9/6/19 2327)       PROCEDURES:  Procedures    CRITICAL CARE TIME    Total Critical Care time was 0 minutes, excluding separately reportable procedures.   There was a high probability of clinically significant/life threatening deterioration in the patient's condition which required my urgent intervention.      FINAL IMPRESSION      1. General weakness    2. Renal insufficiency    3. Peripheral vascular disease (CMS/[facility])    4. Type 2 diabetes mellitus with hyperglycemia, with long-term current use of insulin (CMS/[facility])    5. Skin ulcer of right foot, limited to breakdown of skin (CMS/[facility])    6. Hypokalemia          DISPOSITION/PLAN     ED Disposition     ED Disposition Condition Comment    Discharge Stable           PATIENT REFERRED TO:  Anibal Maria MD  [address]  [facility] [address]  [place] [address]  [phone]    In 2 days  For reevaluation, recheck of your electrolytes, kidney function    [facility] Emergency Department  [address] [facility] [address] [phone]    If symptoms worsen    Your podiatry specialist    Go to   On Monday as planned for reevaluation of your right heel ulceration      DISCHARGE MEDICATIONS:     Medication List      CONTINUE taking these medications    ANTI-FUNGAL 1 % cream  Generic drug:  clotrimazole     aspirin 81 MG EC tablet  TAKE 1 TABLET BY MOUTH EVERY DAY     bacitracin 500  "UNIT/GM ointment     bumetanide 2 MG tablet  Commonly known as:  BUMEX  Take 2 tablets by mouth 2 (Two) Times a Day.     ezetimibe 10 MG tablet  Commonly known as:  ZETIA  Take 1 tablet by mouth Daily.     insulin regular 100 UNIT/ML injection  Commonly known as:  humuLIN R,novoLIN R     Insulin Syringe 29G X 1/2\" 1 ML misc  Use one each to inject insulin twice daily Ell.9     ketoconazole 2 % cream  Commonly known as:  NIZORAL  Apply  topically to the appropriate area as directed 2 (Two) Times a Day   As Needed for Itching.     levocetirizine 5 MG tablet  Commonly known as:  XYZAL  Take 1 tablet by mouth Every Evening.     metOLazone 5 MG tablet  Commonly known as:  ZAROXOLYN  Take 1 tablet by mouth Daily As Needed (edema/fluid overload).     MIRALAX powder  Generic drug:  polyethylene glycol     ondansetron 8 MG tablet  Commonly known as:  ZOFRAN  Take 1 tablet by mouth Every 8 (Eight) Hours As Needed for Nausea or   Vomiting.     potassium chloride 10 MEQ CR tablet  Commonly known as:  K-DUR  TAKE 3 TABLETS BY MOUTH 2 (TWO) TIMES A DAY     pramipexole 1 MG tablet  Commonly known as:  MIRAPEX  TAKE 1 TABLET BY MOUTH TWICE A DAY     simethicone 125 MG chewable tablet  Commonly known as:  MYLICON     spironolactone 100 MG tablet  Commonly known as:  ALDACTONE  TAKE 1 TABLET BY MOUTH EVERY DAY     STOOL SOFTENER PO     traZODone 100 MG tablet  Commonly known as:  DESYREL  Take 1 tablet by mouth At Night As Needed for Sleep.     venlafaxine  MG 24 hr capsule  Commonly known as:  EFFEXOR-XR  Take 1 capsule by mouth Every Night.            Documentation assistance provided by Chino Kowalski acting as scribe for Dr. Shaan Dooley.     The scribe's documentation has been prepared under my direction and personally reviewed by me in its entirety.  I confirm that the note above accurately reflects all work, treatment, procedures, and medical decision making performed by me.      Comment: Please note this report has " been produced using speech recognition software.      Shaan Dooley DO  Attending Emergency Physician                 Chino Kowalski  09/06/19 2207       Chino Kowalski  09/06/19 2355       Shaan Dooley DO  09/07/19 0321       Shaan Dooley,   10/02/19 2048

## 2019-09-07 NOTE — DISCHARGE INSTRUCTIONS
Please continue to take it easy as discussed, take your Bumex with a half dose during 1 of your twice daily dosages, continue to take your potassium supplementation.  Make sure you follow-up with your PCP as soon as possible for reevaluation, recheck of your blood work, kidney function.  Also follow-up with your foot specialist on Monday as planned.  If you have any worsening symptoms, change your mind, please come back to the emergency department for reevaluation.

## 2019-09-08 ENCOUNTER — TELEPHONE (OUTPATIENT)
Dept: EMERGENCY DEPT | Facility: HOSPITAL | Age: 64
End: 2019-09-08

## 2019-09-08 LAB
BACTERIA SPEC AEROBE CULT: ABNORMAL
BACTERIA SPEC AEROBE CULT: ABNORMAL
GRAM STN SPEC: ABNORMAL

## 2019-09-10 ENCOUNTER — TELEPHONE (OUTPATIENT)
Dept: INTERNAL MEDICINE | Facility: CLINIC | Age: 64
End: 2019-09-10

## 2019-09-10 ENCOUNTER — OFFICE VISIT (OUTPATIENT)
Dept: INTERNAL MEDICINE | Facility: CLINIC | Age: 64
End: 2019-09-10

## 2019-09-10 VITALS
HEIGHT: 76 IN | BODY MASS INDEX: 38.6 KG/M2 | SYSTOLIC BLOOD PRESSURE: 100 MMHG | DIASTOLIC BLOOD PRESSURE: 64 MMHG | HEART RATE: 72 BPM

## 2019-09-10 DIAGNOSIS — R60.0 LOCALIZED EDEMA: ICD-10-CM

## 2019-09-10 DIAGNOSIS — I50.9 CHRONIC CONGESTIVE HEART FAILURE, UNSPECIFIED HEART FAILURE TYPE (HCC): ICD-10-CM

## 2019-09-10 DIAGNOSIS — G25.81 RLS (RESTLESS LEGS SYNDROME): ICD-10-CM

## 2019-09-10 DIAGNOSIS — G47.419 PRIMARY NARCOLEPSY WITHOUT CATAPLEXY: ICD-10-CM

## 2019-09-10 DIAGNOSIS — I42.5 RESTRICTIVE CARDIOMYOPATHY (HCC): Primary | ICD-10-CM

## 2019-09-10 DIAGNOSIS — Z23 NEED FOR INFLUENZA VACCINATION: ICD-10-CM

## 2019-09-10 DIAGNOSIS — E78.2 MIXED HYPERLIPIDEMIA: ICD-10-CM

## 2019-09-10 DIAGNOSIS — F32.89 OTHER DEPRESSION: ICD-10-CM

## 2019-09-10 DIAGNOSIS — E78.2 MIXED HYPERLIPIDEMIA: Primary | ICD-10-CM

## 2019-09-10 DIAGNOSIS — K75.81 NASH (NONALCOHOLIC STEATOHEPATITIS): ICD-10-CM

## 2019-09-10 DIAGNOSIS — F98.8 ATTENTION DEFICIT DISORDER (ADD) WITHOUT HYPERACTIVITY: ICD-10-CM

## 2019-09-10 DIAGNOSIS — Z79.4 TYPE 2 DIABETES MELLITUS WITH HYPERGLYCEMIA, WITH LONG-TERM CURRENT USE OF INSULIN (HCC): ICD-10-CM

## 2019-09-10 DIAGNOSIS — I10 ESSENTIAL HYPERTENSION: ICD-10-CM

## 2019-09-10 DIAGNOSIS — E11.65 TYPE 2 DIABETES MELLITUS WITH HYPERGLYCEMIA, WITH LONG-TERM CURRENT USE OF INSULIN (HCC): ICD-10-CM

## 2019-09-10 PROCEDURE — 90653 IIV ADJUVANT VACCINE IM: CPT | Performed by: INTERNAL MEDICINE

## 2019-09-10 PROCEDURE — 99214 OFFICE O/P EST MOD 30 MIN: CPT | Performed by: INTERNAL MEDICINE

## 2019-09-10 PROCEDURE — G0008 ADMIN INFLUENZA VIRUS VAC: HCPCS | Performed by: INTERNAL MEDICINE

## 2019-09-10 RX ORDER — BLOOD SUGAR DIAGNOSTIC
STRIP MISCELLANEOUS 2 TIMES DAILY
Refills: 4 | COMMUNITY
Start: 2019-08-27 | End: 2020-02-15 | Stop reason: HOSPADM

## 2019-09-10 NOTE — PROGRESS NOTES
"Patient is a 64 y.o. male who is here for a follow up of hypertension and diabetes.  Chief Complaint   Patient presents with   • Hypertension   • Diabetes         HPI:    Here for mgmt of HTN.  Recently in ER for fatigue.  BP readings good.  Fluid mgmt is better with prn metolazone.  Occasional dizziness.  Has baseline MERCADO.  Edema is doing \"pretty good\".  Continues to be followed by multiple specialist.  FSBS \"pretty good\".  Last AIC was 9 per ENDO.  Has a wound on his right foot and now on a boot.      History:     Patient Active Problem List   Diagnosis   • Type 2 diabetes mellitus with hyperglycemia (CMS/HCC)   • Elevated LFTs   • Thrombocytopenia (CMS/HCC)   • PVD (peripheral vascular disease) (CMS/HCC)   • A-fib (CMS/HCC)   • Cirrhosis of liver (CMS/HCC)   • Chronic congestive heart failure (CMS/HCC)   • Diabetic ulcer of right heel (CMS/HCC)   • Primary narcolepsy without cataplexy   • Essential hypertension   • Morbidly obese (CMS/HCC)   • Restless legs syndrome (RLS)   • ADD (attention deficit disorder)   • Depression   • Diabetes mellitus (CMS/HCC)   • Edema   • MCCAULEY (nonalcoholic steatohepatitis)   • Hepatitis B   • Insomnia   • Lymphedema   • Narcolepsy   • Obesity   • JAIME on CPAP   • RLS (restless legs syndrome)   • Tremor of both hands   • Restrictive cardiomyopathy (CMS/HCC)   • Mixed hyperlipidemia       Past Medical History:   Diagnosis Date   • A-fib (CMS/HCC)    • ADD (attention deficit disorder)    • Atrial flutter (CMS/HCC)    • Cellulitis    • CHF (congestive heart failure) (CMS/HCC)    • Cirrhosis of liver (CMS/HCC)    • Constipation    • Depression    • Diabetes mellitus (CMS/HCC)    • Diabetic ulcer of right foot (CMS/HCC)    • Disease of thyroid gland    • Edema    • Fatty liver    • Hepatitis B    • Hyperlipidemia    • Hypokalemia    • Insomnia    • Lymphedema    • Narcolepsy    • Neuropathy    • Obesity    • JAIME on CPAP    • PVD (peripheral vascular disease) (CMS/HCC)    • RLS (restless " legs syndrome)    • Skin cancer    • Tardive dyskinesia    • Tremor of both hands    • Yeast infection        Past Surgical History:   Procedure Laterality Date   • ACHILLES TENDON REPAIR     • CARDIAC ABLATION     • CHOLECYSTECTOMY     • LASER ABLATION      VEIN RLE       Current Outpatient Medications on File Prior to Visit   Medication Sig   • amphetamine-dextroamphetamine (ADDERALL) 15 MG tablet Take 2 tablets by mouth Daily. 2 tablets in afternoon (Patient taking differently: Take 30 mg by mouth. 2 tablets in afternoon)   • amphetamine-dextroamphetamine (ADDERALL) 30 MG tablet Take 2 tablets by mouth Every Morning.   • aspirin 81 MG EC tablet TAKE 1 TABLET BY MOUTH EVERY DAY   • bacitracin 500 UNIT/GM ointment Apply  topically to the appropriate area as directed Daily As Needed for Wound Care.   • bumetanide (BUMEX) 2 MG tablet Take 2 tablets by mouth 2 (Two) Times a Day.   • clotrimazole (ANTI-FUNGAL) 1 % cream Apply 1 application topically to the appropriate area as directed 4 (Four) Times a Day As Needed.   • Docusate Calcium (STOOL SOFTENER PO) Take 1 tablet by mouth As Needed.   • ezetimibe (ZETIA) 10 MG tablet Take 1 tablet by mouth Daily.   • gabapentin (NEURONTIN) 300 MG capsule Take 3 capsules by mouth 2 (Two) Times a Day.   • insulin regular (humuLIN R,novoLIN R) 100 UNIT/ML injection Inject  under the skin into the appropriate area as directed 2 (Two) Times a Day Before Meals. 115 UNITS IN AM  100 UNITS IN PM   • ketoconazole (NIZORAL) 2 % cream Apply  topically to the appropriate area as directed 2 (Two) Times a Day As Needed for Itching.   • levocetirizine (XYZAL) 5 MG tablet Take 1 tablet by mouth Every Evening.   • levothyroxine (SYNTHROID, LEVOTHROID) 75 MCG tablet Take 1 tablet by mouth Daily.   • metOLazone (ZAROXOLYN) 5 MG tablet Take 1 tablet by mouth Daily As Needed (edema/fluid overload).   • ondansetron (ZOFRAN) 8 MG tablet Take 1 tablet by mouth Every 8 (Eight) Hours As Needed for Nausea  "or Vomiting.   • polyethylene glycol (MIRALAX) powder Take 17 g by mouth As Needed.   • potassium chloride (K-DUR) 10 MEQ CR tablet TAKE 3 TABLETS BY MOUTH 2 (TWO) TIMES A DAY   • pramipexole (MIRAPEX) 1 MG tablet TAKE 1 TABLET BY MOUTH TWICE A DAY   • pravastatin (PRAVACHOL) 20 MG tablet Take 1 tablet by mouth Every Night.   • simethicone (MYLICON) 125 MG chewable tablet Chew 125 mg Every 8 (Eight) Hours As Needed for Flatulence.   • spironolactone (ALDACTONE) 100 MG tablet TAKE 1 TABLET BY MOUTH EVERY DAY   • traZODone (DESYREL) 100 MG tablet Take 1 tablet by mouth At Night As Needed for Sleep.   • venlafaxine XR (EFFEXOR-XR) 150 MG 24 hr capsule Take 1 capsule by mouth Every Night.   • ACCU-CHEK COREY PLUS test strip 2 (Two) Times a Day.   • Insulin Syringe 29G X 1/2\" 1 ML misc Use one each to inject insulin twice daily Ell.9   • [DISCONTINUED] Charcoal 260 MG capsule Take 2 tablets by mouth As Needed.     No current facility-administered medications on file prior to visit.        Family History   Problem Relation Age of Onset   • Heart failure Mother         CHF   • Clotting disorder Father    • No Known Problems Sister    • No Known Problems Brother    • No Known Problems Brother        Social History     Socioeconomic History   • Marital status:      Spouse name: Not on file   • Number of children: Not on file   • Years of education: Not on file   • Highest education level: Not on file   Tobacco Use   • Smoking status: Never Smoker   • Smokeless tobacco: Never Used   Substance and Sexual Activity   • Alcohol use: Yes     Comment: \"3 SCOTCH AND 2 BEERS PER WEEK\"   • Drug use: No   • Sexual activity: Defer         Review of Systems   Constitutional: Positive for fatigue. Negative for chills and fever.   HENT: Negative for congestion, ear pain, hearing loss, rhinorrhea, sinus pressure, sore throat and trouble swallowing.    Eyes: Negative for discharge and itching.   Respiratory: Positive for shortness of " "breath. Negative for cough and chest tightness.    Cardiovascular: Positive for leg swelling. Negative for chest pain and palpitations.   Gastrointestinal: Negative for abdominal pain, blood in stool, constipation, diarrhea and vomiting.        Colonoscopy 3/16   Endocrine: Positive for cold intolerance. Negative for polydipsia and polyuria.   Genitourinary: Negative for difficulty urinating, dysuria, enuresis, frequency, hematuria and urgency.   Musculoskeletal: Positive for arthralgias, back pain and gait problem. Negative for joint swelling.   Skin: Positive for wound. Negative for rash.   Allergic/Immunologic: Negative for immunocompromised state.   Neurological: Positive for weakness and light-headedness. Negative for dizziness, syncope, numbness and headaches.   Hematological: Does not bruise/bleed easily.   Psychiatric/Behavioral: Negative for behavioral problems, dysphoric mood and sleep disturbance. The patient is not nervous/anxious.        /64   Pulse 72   Ht 193 cm (75.98\")   BMI 38.60 kg/m²       Physical Exam   Constitutional: He is oriented to person, place, and time. He appears well-developed and well-nourished.   HENT:   Head: Normocephalic and atraumatic.   Right Ear: External ear normal.   Left Ear: External ear normal.   Mouth/Throat: Oropharynx is clear and moist.   Eyes: Conjunctivae and EOM are normal.   Neck: Normal range of motion. Neck supple.   Cardiovascular: Normal rate and regular rhythm.   Slight diminished heart sounds   Pulmonary/Chest: Effort normal and breath sounds normal.   Abdominal: Soft. Bowel sounds are normal.   Musculoskeletal: He exhibits edema.   Has a right LE boot   Lymphadenopathy:     He has no cervical adenopathy.   Neurological: He is alert and oriented to person, place, and time.   Skin: Skin is warm and dry.   Psychiatric: He has a normal mood and affect. His behavior is normal. Thought content normal.       Procedure:      Discussion/Summary:    DM-per " "ENDO, counseled on low carb, uncontrolled  Chronic LE edema-advised to weigh daily and if needed add metolazone, stable  CHF-\" \"  Hep B hx/FLD-f/u Dr Murcia  Neuropathy-on Gabapentin, stable  Narcolepsy-on Adderall, stable  JAIME-cont CPAP  Hx of Afib-no recurrence s/p Ablation  Hypothyroid-TSH 9/6 at goal  Depression-controlled on Effexor  RLS-stable on Mirapex  Elevated Cr-stable advised to ensure adequate hydration  Hyperlipidemia-cont statin, labs on rtc     Labs noted and dw patient, flu shot today    Current Outpatient Medications:   •  amphetamine-dextroamphetamine (ADDERALL) 15 MG tablet, Take 2 tablets by mouth Daily. 2 tablets in afternoon (Patient taking differently: Take 30 mg by mouth. 2 tablets in afternoon), Disp: 120 tablet, Rfl: 0  •  amphetamine-dextroamphetamine (ADDERALL) 30 MG tablet, Take 2 tablets by mouth Every Morning., Disp: 120 tablet, Rfl: 0  •  aspirin 81 MG EC tablet, TAKE 1 TABLET BY MOUTH EVERY DAY, Disp: 90 tablet, Rfl: 1  •  bacitracin 500 UNIT/GM ointment, Apply  topically to the appropriate area as directed Daily As Needed for Wound Care., Disp: , Rfl:   •  bumetanide (BUMEX) 2 MG tablet, Take 2 tablets by mouth 2 (Two) Times a Day., Disp: 360 tablet, Rfl: 1  •  clotrimazole (ANTI-FUNGAL) 1 % cream, Apply 1 application topically to the appropriate area as directed 4 (Four) Times a Day As Needed., Disp: , Rfl:   •  Docusate Calcium (STOOL SOFTENER PO), Take 1 tablet by mouth As Needed., Disp: , Rfl:   •  ezetimibe (ZETIA) 10 MG tablet, Take 1 tablet by mouth Daily., Disp: 30 tablet, Rfl: 11  •  gabapentin (NEURONTIN) 300 MG capsule, Take 3 capsules by mouth 2 (Two) Times a Day., Disp: 540 capsule, Rfl: 0  •  insulin regular (humuLIN R,novoLIN R) 100 UNIT/ML injection, Inject  under the skin into the appropriate area as directed 2 (Two) Times a Day Before Meals. 115 UNITS IN  UNITS IN PM, Disp: , Rfl:   •  ketoconazole (NIZORAL) 2 % cream, Apply  topically to the appropriate " "area as directed 2 (Two) Times a Day As Needed for Itching., Disp: 60 g, Rfl: 2  •  levocetirizine (XYZAL) 5 MG tablet, Take 1 tablet by mouth Every Evening., Disp: 90 tablet, Rfl: 1  •  levothyroxine (SYNTHROID, LEVOTHROID) 75 MCG tablet, Take 1 tablet by mouth Daily., Disp: 90 tablet, Rfl: 1  •  metOLazone (ZAROXOLYN) 5 MG tablet, Take 1 tablet by mouth Daily As Needed (edema/fluid overload)., Disp: 90 tablet, Rfl: 1  •  ondansetron (ZOFRAN) 8 MG tablet, Take 1 tablet by mouth Every 8 (Eight) Hours As Needed for Nausea or Vomiting., Disp: 20 tablet, Rfl: 2  •  polyethylene glycol (MIRALAX) powder, Take 17 g by mouth As Needed., Disp: , Rfl:   •  potassium chloride (K-DUR) 10 MEQ CR tablet, TAKE 3 TABLETS BY MOUTH 2 (TWO) TIMES A DAY, Disp: 540 tablet, Rfl: 1  •  pramipexole (MIRAPEX) 1 MG tablet, TAKE 1 TABLET BY MOUTH TWICE A DAY, Disp: 180 tablet, Rfl: 1  •  pravastatin (PRAVACHOL) 20 MG tablet, Take 1 tablet by mouth Every Night., Disp: 90 tablet, Rfl: 3  •  simethicone (MYLICON) 125 MG chewable tablet, Chew 125 mg Every 8 (Eight) Hours As Needed for Flatulence., Disp: , Rfl:   •  spironolactone (ALDACTONE) 100 MG tablet, TAKE 1 TABLET BY MOUTH EVERY DAY, Disp: 90 tablet, Rfl: 1  •  traZODone (DESYREL) 100 MG tablet, Take 1 tablet by mouth At Night As Needed for Sleep., Disp: 90 tablet, Rfl: 0  •  venlafaxine XR (EFFEXOR-XR) 150 MG 24 hr capsule, Take 1 capsule by mouth Every Night., Disp: 90 capsule, Rfl: 1  •  ACCU-CHEK COREY PLUS test strip, 2 (Two) Times a Day., Disp: , Rfl: 4  •  Insulin Syringe 29G X 1/2\" 1 ML misc, Use one each to inject insulin twice daily Ell.9, Disp: 100 each, Rfl: 1        Abe was seen today for hypertension and diabetes.    Diagnoses and all orders for this visit:    Restrictive cardiomyopathy (CMS/HCC)    Mixed hyperlipidemia    Essential hypertension    Chronic congestive heart failure, unspecified heart failure type (CMS/HCC)    MCCAULEY (nonalcoholic steatohepatitis)    Type 2 " diabetes mellitus with hyperglycemia, with long-term current use of insulin (CMS/[facility])    RLS (restless legs syndrome)    Primary narcolepsy without cataplexy    Localized edema    Other depression    Attention deficit disorder (ADD) without hyperactivity    Need for influenza vaccination  -     Fluad Tri 65yr+

## 2019-09-10 NOTE — TELEPHONE ENCOUNTER
Patient wanted to know if you would put an order in for him to get labs to check prostate and cholesterol? Patient said he will go to a [facility] and have them drawn.

## 2019-09-11 RX ORDER — LEVOTHYROXINE SODIUM 0.07 MG/1
TABLET ORAL
Qty: 90 TABLET | Refills: 1 | Status: SHIPPED | OUTPATIENT
Start: 2019-09-11 | End: 2020-05-19

## 2019-09-18 RX ORDER — GABAPENTIN 300 MG/1
900 CAPSULE ORAL 2 TIMES DAILY
Qty: 540 CAPSULE | Refills: 0 | Status: SHIPPED | OUTPATIENT
Start: 2019-09-18 | End: 2020-04-25 | Stop reason: SDUPTHER

## 2019-09-20 ENCOUNTER — APPOINTMENT (OUTPATIENT)
Dept: LAB | Facility: HOSPITAL | Age: 64
End: 2019-09-20

## 2019-09-20 PROCEDURE — 80061 LIPID PANEL: CPT | Performed by: INTERNAL MEDICINE

## 2019-09-20 PROCEDURE — 36415 COLL VENOUS BLD VENIPUNCTURE: CPT | Performed by: INTERNAL MEDICINE

## 2019-09-20 PROCEDURE — 82607 VITAMIN B-12: CPT | Performed by: INTERNAL MEDICINE

## 2019-09-20 PROCEDURE — 80053 COMPREHEN METABOLIC PANEL: CPT | Performed by: INTERNAL MEDICINE

## 2019-09-20 PROCEDURE — 85027 COMPLETE CBC AUTOMATED: CPT | Performed by: INTERNAL MEDICINE

## 2019-09-20 PROCEDURE — 84443 ASSAY THYROID STIM HORMONE: CPT | Performed by: INTERNAL MEDICINE

## 2019-09-21 DIAGNOSIS — E78.2 MIXED HYPERLIPIDEMIA: ICD-10-CM

## 2019-09-21 LAB
ALBUMIN SERPL-MCNC: 3.7 G/DL (ref 3.5–5.2)
ALBUMIN/GLOB SERPL: 1 G/DL
ALP SERPL-CCNC: 104 U/L (ref 39–117)
ALT SERPL W P-5'-P-CCNC: 21 U/L (ref 1–41)
ANION GAP SERPL CALCULATED.3IONS-SCNC: 12 MMOL/L (ref 5–15)
AST SERPL-CCNC: 29 U/L (ref 1–40)
BILIRUB SERPL-MCNC: 0.5 MG/DL (ref 0.2–1.2)
BUN BLD-MCNC: 22 MG/DL (ref 8–23)
BUN/CREAT SERPL: 19.8 (ref 7–25)
CALCIUM SPEC-SCNC: 9.4 MG/DL (ref 8.6–10.5)
CHLORIDE SERPL-SCNC: 93 MMOL/L (ref 98–107)
CHOLEST SERPL-MCNC: 167 MG/DL (ref 0–200)
CO2 SERPL-SCNC: 29 MMOL/L (ref 22–29)
CREAT BLD-MCNC: 1.11 MG/DL (ref 0.76–1.27)
DEPRECATED RDW RBC AUTO: 44.8 FL (ref 37–54)
ERYTHROCYTE [DISTWIDTH] IN BLOOD BY AUTOMATED COUNT: 13.7 % (ref 12.3–15.4)
GFR SERPL CREATININE-BSD FRML MDRD: 66 ML/MIN/1.73
GLOBULIN UR ELPH-MCNC: 3.7 GM/DL
GLUCOSE BLD-MCNC: 126 MG/DL (ref 65–99)
HCT VFR BLD AUTO: 40.3 % (ref 37.5–51)
HDLC SERPL-MCNC: 52 MG/DL (ref 40–60)
HGB BLD-MCNC: 12.9 G/DL (ref 13–17.7)
LDLC SERPL CALC-MCNC: 93 MG/DL (ref 0–100)
LDLC/HDLC SERPL: 1.78 {RATIO}
MCH RBC QN AUTO: 28.7 PG (ref 26.6–33)
MCHC RBC AUTO-ENTMCNC: 32 G/DL (ref 31.5–35.7)
MCV RBC AUTO: 89.8 FL (ref 79–97)
PLATELET # BLD AUTO: 188 10*3/MM3 (ref 140–450)
PMV BLD AUTO: 10.6 FL (ref 6–12)
POTASSIUM BLD-SCNC: 4.4 MMOL/L (ref 3.5–5.2)
PROT SERPL-MCNC: 7.4 G/DL (ref 6–8.5)
RBC # BLD AUTO: 4.49 10*6/MM3 (ref 4.14–5.8)
SODIUM BLD-SCNC: 134 MMOL/L (ref 136–145)
TRIGL SERPL-MCNC: 112 MG/DL (ref 0–150)
TSH SERPL DL<=0.05 MIU/L-ACNC: 2.52 UIU/ML (ref 0.27–4.2)
VIT B12 BLD-MCNC: 659 PG/ML (ref 211–946)
VLDLC SERPL-MCNC: 22.4 MG/DL (ref 5–40)
WBC NRBC COR # BLD: 5.37 10*3/MM3 (ref 3.4–10.8)

## 2019-09-21 RX ORDER — PRAVASTATIN SODIUM 40 MG
40 TABLET ORAL NIGHTLY
Qty: 90 TABLET | Refills: 3 | Status: SHIPPED | OUTPATIENT
Start: 2019-09-21 | End: 2020-01-01

## 2019-09-26 ENCOUNTER — OFFICE VISIT (OUTPATIENT)
Dept: SLEEP MEDICINE | Facility: HOSPITAL | Age: 64
End: 2019-09-26

## 2019-09-26 VITALS
HEIGHT: 75 IN | OXYGEN SATURATION: 97 % | DIASTOLIC BLOOD PRESSURE: 66 MMHG | SYSTOLIC BLOOD PRESSURE: 126 MMHG | BODY MASS INDEX: 39.17 KG/M2 | HEART RATE: 79 BPM | WEIGHT: 315 LBS

## 2019-09-26 DIAGNOSIS — G47.419 PRIMARY NARCOLEPSY WITHOUT CATAPLEXY: ICD-10-CM

## 2019-09-26 DIAGNOSIS — G47.33 OSA (OBSTRUCTIVE SLEEP APNEA): Primary | ICD-10-CM

## 2019-09-26 PROCEDURE — 99213 OFFICE O/P EST LOW 20 MIN: CPT | Performed by: NURSE PRACTITIONER

## 2019-09-26 RX ORDER — DEXTROAMPHETAMINE SACCHARATE, AMPHETAMINE ASPARTATE, DEXTROAMPHETAMINE SULFATE AND AMPHETAMINE SULFATE 3.75; 3.75; 3.75; 3.75 MG/1; MG/1; MG/1; MG/1
30 TABLET ORAL DAILY
Qty: 60 TABLET | Refills: 0 | Status: SHIPPED | OUTPATIENT
Start: 2019-09-26 | End: 2019-10-26

## 2019-09-26 RX ORDER — DEXTROAMPHETAMINE SACCHARATE, AMPHETAMINE ASPARTATE, DEXTROAMPHETAMINE SULFATE AND AMPHETAMINE SULFATE 7.5; 7.5; 7.5; 7.5 MG/1; MG/1; MG/1; MG/1
60 TABLET ORAL DAILY
Qty: 60 TABLET | Refills: 0 | Status: SHIPPED | OUTPATIENT
Start: 2019-09-26 | End: 2019-10-26

## 2019-09-26 RX ORDER — DEXTROAMPHETAMINE SACCHARATE, AMPHETAMINE ASPARTATE, DEXTROAMPHETAMINE SULFATE AND AMPHETAMINE SULFATE 3.75; 3.75; 3.75; 3.75 MG/1; MG/1; MG/1; MG/1
30 TABLET ORAL DAILY
Qty: 60 TABLET | Refills: 0 | Status: SHIPPED | OUTPATIENT
Start: 2019-11-21 | End: 2019-11-25 | Stop reason: SDUPTHER

## 2019-09-26 RX ORDER — DEXTROAMPHETAMINE SACCHARATE, AMPHETAMINE ASPARTATE, DEXTROAMPHETAMINE SULFATE AND AMPHETAMINE SULFATE 3.75; 3.75; 3.75; 3.75 MG/1; MG/1; MG/1; MG/1
30 TABLET ORAL DAILY
Qty: 60 TABLET | Refills: 0 | Status: SHIPPED | OUTPATIENT
Start: 2019-10-24 | End: 2019-11-23

## 2019-09-26 RX ORDER — DEXTROAMPHETAMINE SACCHARATE, AMPHETAMINE ASPARTATE, DEXTROAMPHETAMINE SULFATE AND AMPHETAMINE SULFATE 7.5; 7.5; 7.5; 7.5 MG/1; MG/1; MG/1; MG/1
60 TABLET ORAL DAILY
Qty: 60 TABLET | Refills: 0 | Status: SHIPPED | OUTPATIENT
Start: 2019-11-21 | End: 2019-11-25 | Stop reason: SDUPTHER

## 2019-09-26 RX ORDER — DEXTROAMPHETAMINE SACCHARATE, AMPHETAMINE ASPARTATE, DEXTROAMPHETAMINE SULFATE AND AMPHETAMINE SULFATE 7.5; 7.5; 7.5; 7.5 MG/1; MG/1; MG/1; MG/1
60 TABLET ORAL DAILY
Qty: 60 TABLET | Refills: 0 | Status: SHIPPED | OUTPATIENT
Start: 2019-10-24 | End: 2019-11-23

## 2019-09-26 NOTE — PROGRESS NOTES
"    Chief Complaint:   Chief Complaint   Patient presents with   • Follow-up       HPI:    Abe Phillips Jr. is a 64 y.o. male here for follow-up of med refill.  Patient was last seen 4/9/2019.  Patient does have a diagnosis of narcolepsy.  Patient sleeps 5 to 8 hours nightly and initially is refreshed upon awakening.  Patient has an [place] score of 12/24.  Patient does take Adderall with good relief to 30 mg tablets in the morning and 215 mg tablets in the afternoon.  Patient is happy with this dose and is having no untoward side effects.        Current medications are:   Current Outpatient Medications:   •  ACCU-CHEK COREY PLUS test strip, 2 (Two) Times a Day., Disp: , Rfl: 4  •  aspirin 81 MG EC tablet, TAKE 1 TABLET BY MOUTH EVERY DAY, Disp: 90 tablet, Rfl: 1  •  bacitracin 500 UNIT/GM ointment, Apply  topically to the appropriate area as directed Daily As Needed for Wound Care., Disp: , Rfl:   •  bumetanide (BUMEX) 2 MG tablet, Take 2 tablets by mouth 2 (Two) Times a Day., Disp: 360 tablet, Rfl: 1  •  clotrimazole (ANTI-FUNGAL) 1 % cream, Apply 1 application topically to the appropriate area as directed 4 (Four) Times a Day As Needed., Disp: , Rfl:   •  Docusate Calcium (STOOL SOFTENER PO), Take 1 tablet by mouth As Needed., Disp: , Rfl:   •  ezetimibe (ZETIA) 10 MG tablet, Take 1 tablet by mouth Daily., Disp: 30 tablet, Rfl: 11  •  gabapentin (NEURONTIN) 300 MG capsule, TAKE 3 CAPSULES BY MOUTH 2 (TWO) TIMES A DAY., Disp: 540 capsule, Rfl: 0  •  insulin regular (humuLIN R,novoLIN R) 100 UNIT/ML injection, Inject  under the skin into the appropriate area as directed 2 (Two) Times a Day Before Meals. 115 UNITS IN  UNITS IN PM, Disp: , Rfl:   •  Insulin Syringe 29G X 1/2\" 1 ML misc, Use one each to inject insulin twice daily Ell.9, Disp: 100 each, Rfl: 1  •  ketoconazole (NIZORAL) 2 % cream, Apply  topically to the appropriate area as directed 2 (Two) Times a Day As Needed for Itching., Disp: 60 g, Rfl: 2  • "  levocetirizine (XYZAL) 5 MG tablet, Take 1 tablet by mouth Every Evening., Disp: 90 tablet, Rfl: 1  •  levothyroxine (SYNTHROID, LEVOTHROID) 75 MCG tablet, TAKE 1 TABLET BY MOUTH EVERY DAY, Disp: 90 tablet, Rfl: 1  •  metOLazone (ZAROXOLYN) 5 MG tablet, Take 1 tablet by mouth Daily As Needed (edema/fluid overload)., Disp: 90 tablet, Rfl: 1  •  ondansetron (ZOFRAN) 8 MG tablet, Take 1 tablet by mouth Every 8 (Eight) Hours As Needed for Nausea or Vomiting., Disp: 20 tablet, Rfl: 2  •  polyethylene glycol (MIRALAX) powder, Take 17 g by mouth As Needed., Disp: , Rfl:   •  potassium chloride (K-DUR) 10 MEQ CR tablet, TAKE 3 TABLETS BY MOUTH 2 (TWO) TIMES A DAY, Disp: 540 tablet, Rfl: 1  •  pramipexole (MIRAPEX) 1 MG tablet, TAKE 1 TABLET BY MOUTH TWICE A DAY, Disp: 180 tablet, Rfl: 1  •  pravastatin (PRAVACHOL) 40 MG tablet, Take 1 tablet by mouth Every Night., Disp: 90 tablet, Rfl: 3  •  simethicone (MYLICON) 125 MG chewable tablet, Chew 125 mg Every 8 (Eight) Hours As Needed for Flatulence., Disp: , Rfl:   •  spironolactone (ALDACTONE) 100 MG tablet, TAKE 1 TABLET BY MOUTH EVERY DAY, Disp: 90 tablet, Rfl: 1  •  traZODone (DESYREL) 100 MG tablet, Take 1 tablet by mouth At Night As Needed for Sleep., Disp: 90 tablet, Rfl: 0  •  venlafaxine XR (EFFEXOR-XR) 150 MG 24 hr capsule, Take 1 capsule by mouth Every Night., Disp: 90 capsule, Rfl: 1  •  amphetamine-dextroamphetamine (ADDERALL) 15 MG tablet, Take 2 tablets by mouth Daily for 30 days., Disp: 60 tablet, Rfl: 0  •  [START ON 10/24/2019] amphetamine-dextroamphetamine (ADDERALL) 15 MG tablet, Take 2 tablets by mouth Daily for 30 days., Disp: 60 tablet, Rfl: 0  •  [START ON 11/21/2019] amphetamine-dextroamphetamine (ADDERALL) 15 MG tablet, Take 2 tablets by mouth Daily for 30 days., Disp: 60 tablet, Rfl: 0  •  amphetamine-dextroamphetamine (ADDERALL) 30 MG tablet, Take 2 tablets by mouth Daily for 30 days., Disp: 60 tablet, Rfl: 0  •  [START ON 10/24/2019]  amphetamine-dextroamphetamine (ADDERALL) 30 MG tablet, Take 2 tablets by mouth Daily for 30 days., Disp: 60 tablet, Rfl: 0  •  [START ON 11/21/2019] amphetamine-dextroamphetamine (ADDERALL) 30 MG tablet, Take 2 tablets by mouth Daily for 30 days., Disp: 60 tablet, Rfl: 0.      The patient's relevant past medical, surgical, family and social history were reviewed and updated in Epic as appropriate.       Review of Systems   Eyes: Positive for visual disturbance.   Respiratory: Positive for apnea and shortness of breath.    Cardiovascular: Positive for chest pain and palpitations.   Musculoskeletal: Positive for arthralgias.   Neurological: Positive for numbness.   Psychiatric/Behavioral: Positive for dysphoric mood and sleep disturbance.   All other systems reviewed and are negative.        Objective:    Physical Exam   Constitutional: He is oriented to person, place, and time. He appears well-developed and well-nourished.   HENT:   Head: Normocephalic and atraumatic.   Mouth/Throat: Oropharynx is clear and moist.   Class 4 airway   Eyes: Conjunctivae are normal.   Neck: Neck supple. No thyromegaly present.   Cardiovascular: Normal rate and regular rhythm.   Pulmonary/Chest: Effort normal and breath sounds normal.   Lymphadenopathy:     He has no cervical adenopathy.   Neurological: He is alert and oriented to person, place, and time.   Skin: Skin is warm and dry.   Psychiatric: He has a normal mood and affect. His behavior is normal. Judgment and thought content normal.   Nursing note and vitals reviewed.        ASSESSMENT/PLAN    Abe was seen today for follow-up.    Diagnoses and all orders for this visit:    JAIME (obstructive sleep apnea)  -     PAP Therapy    Primary narcolepsy without cataplexy            1. Counseled patient regarding multimodal approach with healthy nutrition, healthy sleep, regular physical activity, social activities, counseling, and medications. Encouraged to practice lateral sleep  position. Avoid alcohol and sedatives close to bedtime.  2. Adderall 30 mg 2 tablets in the morning and 2 tablets 50 mg in the afternoon has been sent electronically.  Patient to follow-up in 3 months.    I have reviewed the results of my evaluation and impression and discussed my recommendations in detail with the patient.      Signed by  Norma Tyler, ELMA    September 27, 2019      CC: Anibal Maria MD          No ref. provider found

## 2019-10-29 RX ORDER — BUMETANIDE 2 MG/1
TABLET ORAL
Qty: 348 TABLET | Refills: 1 | Status: SHIPPED | OUTPATIENT
Start: 2019-10-29 | End: 2020-06-03

## 2019-11-18 RX ORDER — TRAZODONE HYDROCHLORIDE 100 MG/1
TABLET ORAL
Qty: 90 TABLET | Refills: 0 | Status: SHIPPED | OUTPATIENT
Start: 2019-11-18 | End: 2020-03-10 | Stop reason: SDUPTHER

## 2019-11-20 ENCOUNTER — LAB (OUTPATIENT)
Dept: LAB | Facility: HOSPITAL | Age: 64
End: 2019-11-20

## 2019-11-20 ENCOUNTER — TRANSCRIBE ORDERS (OUTPATIENT)
Dept: LAB | Facility: HOSPITAL | Age: 64
End: 2019-11-20

## 2019-11-20 DIAGNOSIS — N18.9 CHRONIC KIDNEY DISEASE, UNSPECIFIED CKD STAGE: Primary | ICD-10-CM

## 2019-11-20 DIAGNOSIS — N18.9 CHRONIC KIDNEY DISEASE, UNSPECIFIED CKD STAGE: ICD-10-CM

## 2019-11-20 PROCEDURE — 81001 URINALYSIS AUTO W/SCOPE: CPT

## 2019-11-20 PROCEDURE — 80069 RENAL FUNCTION PANEL: CPT

## 2019-11-20 PROCEDURE — 84166 PROTEIN E-PHORESIS/URINE/CSF: CPT

## 2019-11-20 PROCEDURE — 36415 COLL VENOUS BLD VENIPUNCTURE: CPT

## 2019-11-20 PROCEDURE — 85025 COMPLETE CBC W/AUTO DIFF WBC: CPT

## 2019-11-20 PROCEDURE — 84156 ASSAY OF PROTEIN URINE: CPT

## 2019-11-21 LAB
ALBUMIN SERPL-MCNC: 3.7 G/DL (ref 3.5–5.2)
ANION GAP SERPL CALCULATED.3IONS-SCNC: 16.6 MMOL/L (ref 5–15)
BACTERIA UR QL AUTO: NORMAL /HPF
BASOPHILS # BLD AUTO: 0.06 10*3/MM3 (ref 0–0.2)
BASOPHILS NFR BLD AUTO: 1 % (ref 0–1.5)
BILIRUB UR QL STRIP: NEGATIVE
BUN BLD-MCNC: 28 MG/DL (ref 8–23)
BUN/CREAT SERPL: 24.3 (ref 7–25)
CALCIUM SPEC-SCNC: 9.2 MG/DL (ref 8.6–10.5)
CHLORIDE SERPL-SCNC: 86 MMOL/L (ref 98–107)
CLARITY UR: CLEAR
CO2 SERPL-SCNC: 24.4 MMOL/L (ref 22–29)
COLOR UR: YELLOW
CREAT BLD-MCNC: 1.15 MG/DL (ref 0.76–1.27)
DEPRECATED RDW RBC AUTO: 41.5 FL (ref 37–54)
EOSINOPHIL # BLD AUTO: 0.08 10*3/MM3 (ref 0–0.4)
EOSINOPHIL NFR BLD AUTO: 1.3 % (ref 0.3–6.2)
ERYTHROCYTE [DISTWIDTH] IN BLOOD BY AUTOMATED COUNT: 12.7 % (ref 12.3–15.4)
GFR SERPL CREATININE-BSD FRML MDRD: 64 ML/MIN/1.73
GLUCOSE BLD-MCNC: 405 MG/DL (ref 65–99)
GLUCOSE UR STRIP-MCNC: ABNORMAL MG/DL
HCT VFR BLD AUTO: 44.2 % (ref 37.5–51)
HGB BLD-MCNC: 14.4 G/DL (ref 13–17.7)
HGB UR QL STRIP.AUTO: NEGATIVE
HYALINE CASTS UR QL AUTO: NORMAL /LPF
IMM GRANULOCYTES # BLD AUTO: 0.07 10*3/MM3 (ref 0–0.05)
IMM GRANULOCYTES NFR BLD AUTO: 1.2 % (ref 0–0.5)
KETONES UR QL STRIP: NEGATIVE
LEUKOCYTE ESTERASE UR QL STRIP.AUTO: NEGATIVE
LYMPHOCYTES # BLD AUTO: 1.37 10*3/MM3 (ref 0.7–3.1)
LYMPHOCYTES NFR BLD AUTO: 22.6 % (ref 19.6–45.3)
MCH RBC QN AUTO: 29.2 PG (ref 26.6–33)
MCHC RBC AUTO-ENTMCNC: 32.6 G/DL (ref 31.5–35.7)
MCV RBC AUTO: 89.7 FL (ref 79–97)
MONOCYTES # BLD AUTO: 0.63 10*3/MM3 (ref 0.1–0.9)
MONOCYTES NFR BLD AUTO: 10.4 % (ref 5–12)
NEUTROPHILS # BLD AUTO: 3.86 10*3/MM3 (ref 1.7–7)
NEUTROPHILS NFR BLD AUTO: 63.5 % (ref 42.7–76)
NITRITE UR QL STRIP: NEGATIVE
NRBC BLD AUTO-RTO: 0 /100 WBC (ref 0–0.2)
PH UR STRIP.AUTO: 6.5 [PH] (ref 5–8)
PHOSPHATE SERPL-MCNC: 2.9 MG/DL (ref 2.5–4.5)
PLATELET # BLD AUTO: 142 10*3/MM3 (ref 140–450)
PMV BLD AUTO: 12 FL (ref 6–12)
POTASSIUM BLD-SCNC: 4.2 MMOL/L (ref 3.5–5.2)
PROT UR QL STRIP: NEGATIVE
RBC # BLD AUTO: 4.93 10*6/MM3 (ref 4.14–5.8)
RBC # UR: NORMAL /HPF
REF LAB TEST METHOD: NORMAL
SODIUM BLD-SCNC: 127 MMOL/L (ref 136–145)
SP GR UR STRIP: 1.02 (ref 1–1.03)
SQUAMOUS #/AREA URNS HPF: NORMAL /HPF
UROBILINOGEN UR QL STRIP: ABNORMAL
WBC NRBC COR # BLD: 6.07 10*3/MM3 (ref 3.4–10.8)
WBC UR QL AUTO: NORMAL /HPF

## 2019-11-25 LAB
ALBUMIN 24H MFR UR ELPH: 65.6 %
ALPHA1 GLOB 24H MFR UR ELPH: 5.9 %
ALPHA2 GLOB 24H MFR UR ELPH: 8.4 %
B-GLOBULIN MFR UR ELPH: 14.6 %
GAMMA GLOB 24H MFR UR ELPH: 5.5 %
Lab: NORMAL
M-SPIKE, UR: NORMAL %
PROT UR-MCNC: 7.4 MG/DL

## 2019-11-25 RX ORDER — DEXTROAMPHETAMINE SACCHARATE, AMPHETAMINE ASPARTATE, DEXTROAMPHETAMINE SULFATE AND AMPHETAMINE SULFATE 3.75; 3.75; 3.75; 3.75 MG/1; MG/1; MG/1; MG/1
30 TABLET ORAL DAILY
Qty: 60 TABLET | Refills: 0 | Status: SHIPPED | OUTPATIENT
Start: 2019-11-25 | End: 2019-12-25

## 2019-11-25 RX ORDER — DEXTROAMPHETAMINE SACCHARATE, AMPHETAMINE ASPARTATE, DEXTROAMPHETAMINE SULFATE AND AMPHETAMINE SULFATE 7.5; 7.5; 7.5; 7.5 MG/1; MG/1; MG/1; MG/1
60 TABLET ORAL DAILY
Qty: 60 TABLET | Refills: 0 | Status: SHIPPED | OUTPATIENT
Start: 2019-11-25 | End: 2019-12-25

## 2019-11-25 NOTE — TELEPHONE ENCOUNTER
Adderall was escribed on 19 for effective date of 19 but they  @ 60 days so both medications are  and need renewal. THALIA is in chart.

## 2019-11-26 NOTE — TELEPHONE ENCOUNTER
Advised patient that prescriptions were sent to his pharmacy. Patient verbalized understanding and appreciation.

## 2019-12-13 ENCOUNTER — TELEPHONE (OUTPATIENT)
Dept: INTERNAL MEDICINE | Facility: CLINIC | Age: 64
End: 2019-12-13

## 2019-12-13 RX ORDER — VENLAFAXINE HYDROCHLORIDE 150 MG/1
150 CAPSULE, EXTENDED RELEASE ORAL NIGHTLY
Qty: 90 CAPSULE | Refills: 1 | Status: SHIPPED | OUTPATIENT
Start: 2019-12-13 | End: 2020-06-08

## 2019-12-13 NOTE — TELEPHONE ENCOUNTER
[facility] called and stated that pt has a prescription for effexor xr 150 mg that is at [facility] pharmacy but it is . Pt is requesting a prescription for efexor xr 150 mg to be sent to [facility] in [facility].

## 2019-12-18 RX ORDER — POTASSIUM CHLORIDE 750 MG/1
TABLET, FILM COATED, EXTENDED RELEASE ORAL
Qty: 540 TABLET | Refills: 0 | Status: SHIPPED | OUTPATIENT
Start: 2019-12-18 | End: 2020-04-25 | Stop reason: SDUPTHER

## 2019-12-27 ENCOUNTER — HOSPITAL ENCOUNTER (INPATIENT)
Facility: HOSPITAL | Age: 64
LOS: 2 days | Discharge: HOME | End: 2019-12-29
Attending: EMERGENCY MEDICINE | Admitting: INTERNAL MEDICINE

## 2019-12-27 ENCOUNTER — APPOINTMENT (OUTPATIENT)
Dept: GENERAL RADIOLOGY | Facility: HOSPITAL | Age: 64
End: 2019-12-27

## 2019-12-27 DIAGNOSIS — R53.1 WEAKNESS: ICD-10-CM

## 2019-12-27 DIAGNOSIS — Z79.899 MEDICATION MANAGEMENT: Primary | ICD-10-CM

## 2019-12-27 DIAGNOSIS — E11.65 UNCONTROLLED TYPE 2 DIABETES MELLITUS WITH HYPERGLYCEMIA (HCC): Primary | ICD-10-CM

## 2019-12-27 DIAGNOSIS — E87.1 HYPONATREMIA: ICD-10-CM

## 2019-12-27 DIAGNOSIS — R41.82 ALTERED MENTAL STATUS, UNSPECIFIED ALTERED MENTAL STATUS TYPE: ICD-10-CM

## 2019-12-27 PROBLEM — D72.829 LEUKOCYTOSIS: Status: ACTIVE | Noted: 2019-12-27

## 2019-12-27 PROBLEM — N17.9 AKI (ACUTE KIDNEY INJURY) (HCC): Status: ACTIVE | Noted: 2019-12-27

## 2019-12-27 LAB
ALBUMIN SERPL-MCNC: 3.4 G/DL (ref 3.5–5.2)
ALBUMIN SERPL-MCNC: 3.5 G/DL (ref 3.5–5.2)
ALBUMIN/GLOB SERPL: 0.8 G/DL
ALBUMIN/GLOB SERPL: 0.8 G/DL
ALP SERPL-CCNC: 175 U/L (ref 39–117)
ALP SERPL-CCNC: 195 U/L (ref 39–117)
ALT SERPL W P-5'-P-CCNC: 26 U/L (ref 1–41)
ALT SERPL W P-5'-P-CCNC: 29 U/L (ref 1–41)
ANION GAP SERPL CALCULATED.3IONS-SCNC: 14 MMOL/L (ref 5–15)
ANION GAP SERPL CALCULATED.3IONS-SCNC: 20.2 MMOL/L (ref 5–15)
ARTERIAL PATENCY WRIST A: ABNORMAL
AST SERPL-CCNC: 28 U/L (ref 1–40)
AST SERPL-CCNC: 31 U/L (ref 1–40)
ATMOSPHERIC PRESS: ABNORMAL MM[HG]
B-OH-BUTYR SERPL-SCNC: 0.24 MMOL/L (ref 0.02–0.27)
BASE EXCESS BLDA CALC-SCNC: 5.6 MMOL/L (ref 0–2)
BASOPHILS # BLD AUTO: 0.06 10*3/MM3 (ref 0–0.2)
BASOPHILS # BLD AUTO: 0.07 10*3/MM3 (ref 0–0.2)
BASOPHILS NFR BLD AUTO: 0.4 % (ref 0–1.5)
BASOPHILS NFR BLD AUTO: 0.5 % (ref 0–1.5)
BDY SITE: ABNORMAL
BILIRUB SERPL-MCNC: 1.4 MG/DL (ref 0.2–1.2)
BILIRUB SERPL-MCNC: 1.5 MG/DL (ref 0.2–1.2)
BILIRUB UR QL STRIP: NEGATIVE
BODY TEMPERATURE: 37 C
BUN BLD-MCNC: 40 MG/DL (ref 8–23)
BUN BLD-MCNC: 40 MG/DL (ref 8–23)
BUN/CREAT SERPL: 25.6 (ref 7–25)
BUN/CREAT SERPL: 26.3 (ref 7–25)
CALCIUM SPEC-SCNC: 9.6 MG/DL (ref 8.6–10.5)
CALCIUM SPEC-SCNC: 9.7 MG/DL (ref 8.6–10.5)
CHLORIDE SERPL-SCNC: 74 MMOL/L (ref 98–107)
CHLORIDE SERPL-SCNC: 78 MMOL/L (ref 98–107)
CHOLEST SERPL-MCNC: 160 MG/DL (ref 0–200)
CLARITY UR: CLEAR
CO2 BLDA-SCNC: 32 MMOL/L (ref 22–33)
CO2 SERPL-SCNC: 26.8 MMOL/L (ref 22–29)
CO2 SERPL-SCNC: 29 MMOL/L (ref 22–29)
COHGB MFR BLD: 1.3 % (ref 0–2)
COLOR UR: YELLOW
CREAT BLD-MCNC: 1.52 MG/DL (ref 0.76–1.27)
CREAT BLD-MCNC: 1.56 MG/DL (ref 0.76–1.27)
DEPRECATED RDW RBC AUTO: 39 FL (ref 37–54)
DEPRECATED RDW RBC AUTO: 42.1 FL (ref 37–54)
EOSINOPHIL # BLD AUTO: 0.02 10*3/MM3 (ref 0–0.4)
EOSINOPHIL # BLD AUTO: 0.05 10*3/MM3 (ref 0–0.4)
EOSINOPHIL NFR BLD AUTO: 0.1 % (ref 0.3–6.2)
EOSINOPHIL NFR BLD AUTO: 0.4 % (ref 0.3–6.2)
ERYTHROCYTE [DISTWIDTH] IN BLOOD BY AUTOMATED COUNT: 12.4 % (ref 12.3–15.4)
ERYTHROCYTE [DISTWIDTH] IN BLOOD BY AUTOMATED COUNT: 13.1 % (ref 12.3–15.4)
GFR SERPL CREATININE-BSD FRML MDRD: 45 ML/MIN/1.73
GFR SERPL CREATININE-BSD FRML MDRD: 46 ML/MIN/1.73
GLOBULIN UR ELPH-MCNC: 4.3 GM/DL
GLOBULIN UR ELPH-MCNC: 4.6 GM/DL
GLUCOSE BLD-MCNC: 587 MG/DL (ref 65–99)
GLUCOSE BLD-MCNC: 634 MG/DL (ref 65–99)
GLUCOSE BLDC GLUCOMTR-MCNC: 462 MG/DL (ref 70–130)
GLUCOSE BLDC GLUCOMTR-MCNC: 574 MG/DL (ref 70–130)
GLUCOSE UR STRIP-MCNC: ABNORMAL MG/DL
HBA1C MFR BLD: 13.6 % (ref 4.8–5.6)
HCO3 BLDA-SCNC: 30.6 MMOL/L (ref 20–26)
HCT VFR BLD AUTO: 45.4 % (ref 37.5–51)
HCT VFR BLD AUTO: 45.5 % (ref 37.5–51)
HCT VFR BLD CALC: 45.8 %
HDLC SERPL-MCNC: 60 MG/DL (ref 40–60)
HGB BLD-MCNC: 15 G/DL (ref 13–17.7)
HGB BLD-MCNC: 15.2 G/DL (ref 13–17.7)
HGB BLDA-MCNC: 15 G/DL (ref 13.5–17.5)
HGB UR QL STRIP.AUTO: NEGATIVE
HOLD SPECIMEN: NORMAL
HOLD SPECIMEN: NORMAL
HOROWITZ INDEX BLD+IHG-RTO: 21 %
IMM GRANULOCYTES # BLD AUTO: 0.14 10*3/MM3 (ref 0–0.05)
IMM GRANULOCYTES # BLD AUTO: 0.16 10*3/MM3 (ref 0–0.05)
IMM GRANULOCYTES NFR BLD AUTO: 1 % (ref 0–0.5)
IMM GRANULOCYTES NFR BLD AUTO: 1.1 % (ref 0–0.5)
KETONES UR QL STRIP: NEGATIVE
LDLC SERPL CALC-MCNC: 78 MG/DL (ref 0–100)
LDLC/HDLC SERPL: 1.3 {RATIO}
LEUKOCYTE ESTERASE UR QL STRIP.AUTO: NEGATIVE
LYMPHOCYTES # BLD AUTO: 0.96 10*3/MM3 (ref 0.7–3.1)
LYMPHOCYTES # BLD AUTO: 1.27 10*3/MM3 (ref 0.7–3.1)
LYMPHOCYTES NFR BLD AUTO: 6.6 % (ref 19.6–45.3)
LYMPHOCYTES NFR BLD AUTO: 9.2 % (ref 19.6–45.3)
MAGNESIUM SERPL-MCNC: 1.8 MG/DL (ref 1.6–2.4)
MCH RBC QN AUTO: 29 PG (ref 26.6–33)
MCH RBC QN AUTO: 29.4 PG (ref 26.6–33)
MCHC RBC AUTO-ENTMCNC: 33 G/DL (ref 31.5–35.7)
MCHC RBC AUTO-ENTMCNC: 33.5 G/DL (ref 31.5–35.7)
MCV RBC AUTO: 87.8 FL (ref 79–97)
MCV RBC AUTO: 88 FL (ref 79–97)
METHGB BLD QL: 1 % (ref 0–1.5)
MODALITY: ABNORMAL
MONOCYTES # BLD AUTO: 1.16 10*3/MM3 (ref 0.1–0.9)
MONOCYTES # BLD AUTO: 1.16 10*3/MM3 (ref 0.1–0.9)
MONOCYTES NFR BLD AUTO: 8 % (ref 5–12)
MONOCYTES NFR BLD AUTO: 8.4 % (ref 5–12)
NEUTROPHILS # BLD AUTO: 11.16 10*3/MM3 (ref 1.7–7)
NEUTROPHILS # BLD AUTO: 12.11 10*3/MM3 (ref 1.7–7)
NEUTROPHILS NFR BLD AUTO: 80.5 % (ref 42.7–76)
NEUTROPHILS NFR BLD AUTO: 83.8 % (ref 42.7–76)
NITRITE UR QL STRIP: NEGATIVE
NOTE: ABNORMAL
NRBC BLD AUTO-RTO: 0 /100 WBC (ref 0–0.2)
NRBC BLD AUTO-RTO: 0 /100 WBC (ref 0–0.2)
OXYHGB MFR BLDV: 92.2 % (ref 94–99)
PCO2 BLDA: 44.8 MM HG
PCO2 TEMP ADJ BLD: 44.8 MM HG (ref 35–48)
PH BLDA: 7.44 PH UNITS (ref 7.35–7.45)
PH UR STRIP.AUTO: 6 [PH] (ref 5–8)
PH, TEMP CORRECTED: 7.44 PH UNITS
PLATELET # BLD AUTO: 157 10*3/MM3 (ref 140–450)
PLATELET # BLD AUTO: 164 10*3/MM3 (ref 140–450)
PMV BLD AUTO: 11.7 FL (ref 6–12)
PMV BLD AUTO: 12.4 FL (ref 6–12)
PO2 BLDA: 75.2 MM HG (ref 83–108)
PO2 TEMP ADJ BLD: 75.2 MM HG (ref 83–108)
POTASSIUM BLD-SCNC: 3.9 MMOL/L (ref 3.5–5.2)
POTASSIUM BLD-SCNC: 3.9 MMOL/L (ref 3.5–5.2)
PROCALCITONIN SERPL-MCNC: 0.58 NG/ML (ref 0.1–0.25)
PROT SERPL-MCNC: 7.7 G/DL (ref 6–8.5)
PROT SERPL-MCNC: 8.1 G/DL (ref 6–8.5)
PROT UR QL STRIP: NEGATIVE
RBC # BLD AUTO: 5.17 10*6/MM3 (ref 4.14–5.8)
RBC # BLD AUTO: 5.17 10*6/MM3 (ref 4.14–5.8)
SODIUM BLD-SCNC: 121 MMOL/L (ref 136–145)
SODIUM BLD-SCNC: 121 MMOL/L (ref 136–145)
SP GR UR STRIP: 1.02 (ref 1–1.03)
TRIGL SERPL-MCNC: 109 MG/DL (ref 0–150)
TROPONIN T SERPL-MCNC: 0.04 NG/ML (ref 0–0.03)
TSH SERPL DL<=0.05 MIU/L-ACNC: 0.76 UIU/ML (ref 0.27–4.2)
UROBILINOGEN UR QL STRIP: ABNORMAL
VENTILATOR MODE: ABNORMAL
VLDLC SERPL-MCNC: 21.8 MG/DL (ref 5–40)
WBC NRBC COR # BLD: 13.85 10*3/MM3 (ref 3.4–10.8)
WBC NRBC COR # BLD: 14.47 10*3/MM3 (ref 3.4–10.8)
WHOLE BLOOD HOLD SPECIMEN: NORMAL
WHOLE BLOOD HOLD SPECIMEN: NORMAL

## 2019-12-27 PROCEDURE — 82805 BLOOD GASES W/O2 SATURATION: CPT

## 2019-12-27 PROCEDURE — 80053 COMPREHEN METABOLIC PANEL: CPT | Performed by: NURSE PRACTITIONER

## 2019-12-27 PROCEDURE — 82962 GLUCOSE BLOOD TEST: CPT

## 2019-12-27 PROCEDURE — 85025 COMPLETE CBC W/AUTO DIFF WBC: CPT

## 2019-12-27 PROCEDURE — 83735 ASSAY OF MAGNESIUM: CPT

## 2019-12-27 PROCEDURE — 63710000001 INSULIN LISPRO (HUMAN) PER 5 UNITS: Performed by: EMERGENCY MEDICINE

## 2019-12-27 PROCEDURE — 87070 CULTURE OTHR SPECIMN AEROBIC: CPT | Performed by: NURSE PRACTITIONER

## 2019-12-27 PROCEDURE — 99284 EMERGENCY DEPT VISIT MOD MDM: CPT

## 2019-12-27 PROCEDURE — 80053 COMPREHEN METABOLIC PANEL: CPT

## 2019-12-27 PROCEDURE — 82010 KETONE BODYS QUAN: CPT

## 2019-12-27 PROCEDURE — 87205 SMEAR GRAM STAIN: CPT | Performed by: NURSE PRACTITIONER

## 2019-12-27 PROCEDURE — 93005 ELECTROCARDIOGRAM TRACING: CPT

## 2019-12-27 PROCEDURE — 99223 1ST HOSP IP/OBS HIGH 75: CPT | Performed by: INTERNAL MEDICINE

## 2019-12-27 PROCEDURE — 84484 ASSAY OF TROPONIN QUANT: CPT

## 2019-12-27 PROCEDURE — 99285 EMERGENCY DEPT VISIT HI MDM: CPT

## 2019-12-27 PROCEDURE — 71045 X-RAY EXAM CHEST 1 VIEW: CPT

## 2019-12-27 PROCEDURE — 93005 ELECTROCARDIOGRAM TRACING: CPT | Performed by: EMERGENCY MEDICINE

## 2019-12-27 PROCEDURE — 81003 URINALYSIS AUTO W/O SCOPE: CPT | Performed by: EMERGENCY MEDICINE

## 2019-12-27 PROCEDURE — 80061 LIPID PANEL: CPT | Performed by: NURSE PRACTITIONER

## 2019-12-27 PROCEDURE — 87186 SC STD MICRODIL/AGAR DIL: CPT | Performed by: NURSE PRACTITIONER

## 2019-12-27 PROCEDURE — 83036 HEMOGLOBIN GLYCOSYLATED A1C: CPT | Performed by: PHYSICIAN ASSISTANT

## 2019-12-27 PROCEDURE — 84145 PROCALCITONIN (PCT): CPT | Performed by: PHYSICIAN ASSISTANT

## 2019-12-27 PROCEDURE — 36600 WITHDRAWAL OF ARTERIAL BLOOD: CPT

## 2019-12-27 PROCEDURE — 85025 COMPLETE CBC W/AUTO DIFF WBC: CPT | Performed by: NURSE PRACTITIONER

## 2019-12-27 PROCEDURE — 84443 ASSAY THYROID STIM HORMONE: CPT | Performed by: NURSE PRACTITIONER

## 2019-12-27 RX ORDER — SODIUM CHLORIDE 0.9 % (FLUSH) 0.9 %
10 SYRINGE (ML) INJECTION AS NEEDED
Status: DISCONTINUED | OUTPATIENT
Start: 2019-12-27 | End: 2019-12-29 | Stop reason: HOSPADM

## 2019-12-27 RX ORDER — DEXTROSE MONOHYDRATE 25 G/50ML
12.5 INJECTION, SOLUTION INTRAVENOUS AS NEEDED
Status: DISCONTINUED | OUTPATIENT
Start: 2019-12-27 | End: 2019-12-29 | Stop reason: HOSPADM

## 2019-12-27 RX ORDER — SODIUM CHLORIDE 0.9 % (FLUSH) 0.9 %
10 SYRINGE (ML) INJECTION EVERY 12 HOURS SCHEDULED
Status: CANCELLED | OUTPATIENT
Start: 2019-12-27

## 2019-12-27 RX ORDER — ACETAMINOPHEN 325 MG/1
650 TABLET ORAL EVERY 4 HOURS PRN
Status: CANCELLED | OUTPATIENT
Start: 2019-12-27

## 2019-12-27 RX ORDER — CHOLECALCIFEROL (VITAMIN D3) 125 MCG
5 CAPSULE ORAL NIGHTLY PRN
Status: CANCELLED | OUTPATIENT
Start: 2019-12-27

## 2019-12-27 RX ORDER — ONDANSETRON 2 MG/ML
4 INJECTION INTRAMUSCULAR; INTRAVENOUS EVERY 6 HOURS PRN
Status: CANCELLED | OUTPATIENT
Start: 2019-12-27

## 2019-12-27 RX ORDER — SODIUM CHLORIDE 0.9 % (FLUSH) 0.9 %
10 SYRINGE (ML) INJECTION EVERY 12 HOURS SCHEDULED
Status: DISCONTINUED | OUTPATIENT
Start: 2019-12-27 | End: 2019-12-29 | Stop reason: HOSPADM

## 2019-12-27 RX ORDER — DEXTROSE AND SODIUM CHLORIDE 5; .45 G/100ML; G/100ML
250 INJECTION, SOLUTION INTRAVENOUS CONTINUOUS PRN
Status: DISCONTINUED | OUTPATIENT
Start: 2019-12-27 | End: 2019-12-29 | Stop reason: HOSPADM

## 2019-12-27 RX ORDER — HEPARIN SODIUM 5000 [USP'U]/ML
5000 INJECTION, SOLUTION INTRAVENOUS; SUBCUTANEOUS EVERY 12 HOURS SCHEDULED
Status: DISCONTINUED | OUTPATIENT
Start: 2019-12-28 | End: 2019-12-29 | Stop reason: HOSPADM

## 2019-12-27 RX ORDER — SODIUM CHLORIDE AND POTASSIUM CHLORIDE 300; 900 MG/100ML; MG/100ML
250 INJECTION, SOLUTION INTRAVENOUS CONTINUOUS PRN
Status: DISCONTINUED | OUTPATIENT
Start: 2019-12-27 | End: 2019-12-29 | Stop reason: HOSPADM

## 2019-12-27 RX ORDER — NICOTINE POLACRILEX 4 MG
15 LOZENGE BUCCAL
Status: CANCELLED | OUTPATIENT
Start: 2019-12-27

## 2019-12-27 RX ORDER — SODIUM CHLORIDE 9 MG/ML
75 INJECTION, SOLUTION INTRAVENOUS CONTINUOUS
Status: CANCELLED | OUTPATIENT
Start: 2019-12-27 | End: 2019-12-28

## 2019-12-27 RX ORDER — DEXTROSE, SODIUM CHLORIDE, AND POTASSIUM CHLORIDE 5; .45; .15 G/100ML; G/100ML; G/100ML
250 INJECTION INTRAVENOUS CONTINUOUS PRN
Status: DISCONTINUED | OUTPATIENT
Start: 2019-12-27 | End: 2019-12-29 | Stop reason: HOSPADM

## 2019-12-27 RX ORDER — DEXTROSE, SODIUM CHLORIDE, AND POTASSIUM CHLORIDE 5; .45; .3 G/100ML; G/100ML; G/100ML
250 INJECTION INTRAVENOUS CONTINUOUS PRN
Status: DISCONTINUED | OUTPATIENT
Start: 2019-12-27 | End: 2019-12-29 | Stop reason: HOSPADM

## 2019-12-27 RX ORDER — SODIUM CHLORIDE 450 MG/100ML
250 INJECTION, SOLUTION INTRAVENOUS CONTINUOUS PRN
Status: DISCONTINUED | OUTPATIENT
Start: 2019-12-27 | End: 2019-12-29 | Stop reason: HOSPADM

## 2019-12-27 RX ORDER — SODIUM CHLORIDE 0.9 % (FLUSH) 0.9 %
10 SYRINGE (ML) INJECTION AS NEEDED
Status: CANCELLED | OUTPATIENT
Start: 2019-12-27

## 2019-12-27 RX ORDER — ACETAMINOPHEN 325 MG/1
650 TABLET ORAL EVERY 4 HOURS PRN
Status: DISCONTINUED | OUTPATIENT
Start: 2019-12-27 | End: 2019-12-29 | Stop reason: HOSPADM

## 2019-12-27 RX ORDER — SODIUM CHLORIDE AND POTASSIUM CHLORIDE 150; 450 MG/100ML; MG/100ML
250 INJECTION, SOLUTION INTRAVENOUS CONTINUOUS PRN
Status: DISCONTINUED | OUTPATIENT
Start: 2019-12-27 | End: 2019-12-29 | Stop reason: HOSPADM

## 2019-12-27 RX ORDER — SODIUM CHLORIDE AND POTASSIUM CHLORIDE 150; 900 MG/100ML; MG/100ML
250 INJECTION, SOLUTION INTRAVENOUS CONTINUOUS PRN
Status: DISCONTINUED | OUTPATIENT
Start: 2019-12-27 | End: 2019-12-29 | Stop reason: HOSPADM

## 2019-12-27 RX ORDER — ONDANSETRON 2 MG/ML
4 INJECTION INTRAMUSCULAR; INTRAVENOUS EVERY 6 HOURS PRN
Status: DISCONTINUED | OUTPATIENT
Start: 2019-12-27 | End: 2019-12-29 | Stop reason: HOSPADM

## 2019-12-27 RX ORDER — ACETAMINOPHEN 160 MG/5ML
650 SOLUTION ORAL EVERY 4 HOURS PRN
Status: CANCELLED | OUTPATIENT
Start: 2019-12-27

## 2019-12-27 RX ORDER — ACETAMINOPHEN 160 MG/5ML
650 SOLUTION ORAL EVERY 4 HOURS PRN
Status: DISCONTINUED | OUTPATIENT
Start: 2019-12-27 | End: 2019-12-29 | Stop reason: HOSPADM

## 2019-12-27 RX ORDER — HEPARIN SODIUM 5000 [USP'U]/ML
5000 INJECTION, SOLUTION INTRAVENOUS; SUBCUTANEOUS EVERY 12 HOURS SCHEDULED
Status: CANCELLED | OUTPATIENT
Start: 2019-12-27

## 2019-12-27 RX ORDER — ACETAMINOPHEN 650 MG/1
650 SUPPOSITORY RECTAL EVERY 4 HOURS PRN
Status: DISCONTINUED | OUTPATIENT
Start: 2019-12-27 | End: 2019-12-29 | Stop reason: HOSPADM

## 2019-12-27 RX ORDER — SODIUM CHLORIDE 9 MG/ML
250 INJECTION, SOLUTION INTRAVENOUS CONTINUOUS PRN
Status: DISCONTINUED | OUTPATIENT
Start: 2019-12-27 | End: 2019-12-29 | Stop reason: HOSPADM

## 2019-12-27 RX ORDER — SODIUM CHLORIDE 0.9 % (FLUSH) 0.9 %
10 SYRINGE (ML) INJECTION ONCE AS NEEDED
Status: DISCONTINUED | OUTPATIENT
Start: 2019-12-27 | End: 2019-12-29 | Stop reason: HOSPADM

## 2019-12-27 RX ORDER — DEXTROSE MONOHYDRATE 25 G/50ML
25 INJECTION, SOLUTION INTRAVENOUS
Status: CANCELLED | OUTPATIENT
Start: 2019-12-27

## 2019-12-27 RX ORDER — CHOLECALCIFEROL (VITAMIN D3) 125 MCG
5 CAPSULE ORAL NIGHTLY PRN
Status: DISCONTINUED | OUTPATIENT
Start: 2019-12-27 | End: 2019-12-29 | Stop reason: HOSPADM

## 2019-12-27 RX ORDER — SODIUM CHLORIDE 9 MG/ML
10 INJECTION, SOLUTION INTRAVENOUS CONTINUOUS PRN
Status: DISCONTINUED | OUTPATIENT
Start: 2019-12-27 | End: 2019-12-29 | Stop reason: HOSPADM

## 2019-12-27 RX ADMIN — INSULIN LISPRO 8 UNITS: 100 INJECTION, SOLUTION INTRAVENOUS; SUBCUTANEOUS at 19:42

## 2019-12-27 RX ADMIN — SODIUM CHLORIDE 1000 ML: 9 INJECTION, SOLUTION INTRAVENOUS at 19:44

## 2019-12-28 LAB
ALBUMIN SERPL-MCNC: 3 G/DL (ref 3.5–5.2)
ALBUMIN/GLOB SERPL: 0.7 G/DL
ALP SERPL-CCNC: 174 U/L (ref 39–117)
ALT SERPL W P-5'-P-CCNC: 23 U/L (ref 1–41)
ANION GAP SERPL CALCULATED.3IONS-SCNC: 11 MMOL/L (ref 5–15)
ANION GAP SERPL CALCULATED.3IONS-SCNC: 12 MMOL/L (ref 5–15)
ANION GAP SERPL CALCULATED.3IONS-SCNC: 13 MMOL/L (ref 5–15)
ANION GAP SERPL CALCULATED.3IONS-SCNC: 13 MMOL/L (ref 5–15)
AST SERPL-CCNC: 22 U/L (ref 1–40)
B PARAPERT DNA SPEC QL NAA+PROBE: NOT DETECTED
B PERT DNA SPEC QL NAA+PROBE: NOT DETECTED
BASOPHILS # BLD AUTO: 0.05 10*3/MM3 (ref 0–0.2)
BASOPHILS NFR BLD AUTO: 0.6 % (ref 0–1.5)
BILIRUB SERPL-MCNC: 1 MG/DL (ref 0.2–1.2)
BUN BLD-MCNC: 34 MG/DL (ref 8–23)
BUN BLD-MCNC: 37 MG/DL (ref 8–23)
BUN BLD-MCNC: 37 MG/DL (ref 8–23)
BUN BLD-MCNC: 39 MG/DL (ref 8–23)
BUN/CREAT SERPL: 30 (ref 7–25)
BUN/CREAT SERPL: 30.1 (ref 7–25)
BUN/CREAT SERPL: 31.9 (ref 7–25)
BUN/CREAT SERPL: 35.8 (ref 7–25)
C PNEUM DNA NPH QL NAA+NON-PROBE: NOT DETECTED
CALCIUM SPEC-SCNC: 8.6 MG/DL (ref 8.6–10.5)
CALCIUM SPEC-SCNC: 8.7 MG/DL (ref 8.6–10.5)
CALCIUM SPEC-SCNC: 9 MG/DL (ref 8.6–10.5)
CALCIUM SPEC-SCNC: 9.5 MG/DL (ref 8.6–10.5)
CHLORIDE SERPL-SCNC: 86 MMOL/L (ref 98–107)
CHLORIDE SERPL-SCNC: 91 MMOL/L (ref 98–107)
CHLORIDE SERPL-SCNC: 92 MMOL/L (ref 98–107)
CHLORIDE SERPL-SCNC: 94 MMOL/L (ref 98–107)
CO2 SERPL-SCNC: 23 MMOL/L (ref 22–29)
CO2 SERPL-SCNC: 25 MMOL/L (ref 22–29)
CO2 SERPL-SCNC: 26 MMOL/L (ref 22–29)
CO2 SERPL-SCNC: 30 MMOL/L (ref 22–29)
CREAT BLD-MCNC: 0.95 MG/DL (ref 0.76–1.27)
CREAT BLD-MCNC: 1.16 MG/DL (ref 0.76–1.27)
CREAT BLD-MCNC: 1.23 MG/DL (ref 0.76–1.27)
CREAT BLD-MCNC: 1.3 MG/DL (ref 0.76–1.27)
D-LACTATE SERPL-SCNC: 1 MMOL/L (ref 0.5–2)
DEPRECATED RDW RBC AUTO: 40.8 FL (ref 37–54)
EOSINOPHIL # BLD AUTO: 0.07 10*3/MM3 (ref 0–0.4)
EOSINOPHIL NFR BLD AUTO: 0.8 % (ref 0.3–6.2)
ERYTHROCYTE [DISTWIDTH] IN BLOOD BY AUTOMATED COUNT: 13 % (ref 12.3–15.4)
FLUAV H1 2009 PAND RNA NPH QL NAA+PROBE: NOT DETECTED
FLUAV H1 HA GENE NPH QL NAA+PROBE: NOT DETECTED
FLUAV H3 RNA NPH QL NAA+PROBE: NOT DETECTED
FLUAV SUBTYP SPEC NAA+PROBE: NOT DETECTED
FLUBV RNA ISLT QL NAA+PROBE: NOT DETECTED
GFR SERPL CREATININE-BSD FRML MDRD: 55 ML/MIN/1.73
GFR SERPL CREATININE-BSD FRML MDRD: 59 ML/MIN/1.73
GFR SERPL CREATININE-BSD FRML MDRD: 63 ML/MIN/1.73
GFR SERPL CREATININE-BSD FRML MDRD: 80 ML/MIN/1.73
GLOBULIN UR ELPH-MCNC: 4.3 GM/DL
GLUCOSE BLD-MCNC: 223 MG/DL (ref 65–99)
GLUCOSE BLD-MCNC: 275 MG/DL (ref 65–99)
GLUCOSE BLD-MCNC: 275 MG/DL (ref 65–99)
GLUCOSE BLD-MCNC: 411 MG/DL (ref 65–99)
GLUCOSE BLDC GLUCOMTR-MCNC: 231 MG/DL (ref 70–130)
GLUCOSE BLDC GLUCOMTR-MCNC: 272 MG/DL (ref 70–130)
GLUCOSE BLDC GLUCOMTR-MCNC: 284 MG/DL (ref 70–130)
GLUCOSE BLDC GLUCOMTR-MCNC: 293 MG/DL (ref 70–130)
GLUCOSE BLDC GLUCOMTR-MCNC: 313 MG/DL (ref 70–130)
GLUCOSE BLDC GLUCOMTR-MCNC: 351 MG/DL (ref 70–130)
GLUCOSE BLDC GLUCOMTR-MCNC: 377 MG/DL (ref 70–130)
GLUCOSE BLDC GLUCOMTR-MCNC: 378 MG/DL (ref 70–130)
HADV DNA SPEC NAA+PROBE: NOT DETECTED
HCOV 229E RNA SPEC QL NAA+PROBE: NOT DETECTED
HCOV HKU1 RNA SPEC QL NAA+PROBE: NOT DETECTED
HCOV NL63 RNA SPEC QL NAA+PROBE: NOT DETECTED
HCOV OC43 RNA SPEC QL NAA+PROBE: NOT DETECTED
HCT VFR BLD AUTO: 43.6 % (ref 37.5–51)
HGB BLD-MCNC: 14.5 G/DL (ref 13–17.7)
HMPV RNA NPH QL NAA+NON-PROBE: NOT DETECTED
HPIV1 RNA SPEC QL NAA+PROBE: NOT DETECTED
HPIV2 RNA SPEC QL NAA+PROBE: NOT DETECTED
HPIV3 RNA NPH QL NAA+PROBE: NOT DETECTED
HPIV4 P GENE NPH QL NAA+PROBE: NOT DETECTED
IMM GRANULOCYTES # BLD AUTO: 0.09 10*3/MM3 (ref 0–0.05)
IMM GRANULOCYTES NFR BLD AUTO: 1 % (ref 0–0.5)
LYMPHOCYTES # BLD AUTO: 1.11 10*3/MM3 (ref 0.7–3.1)
LYMPHOCYTES NFR BLD AUTO: 12.6 % (ref 19.6–45.3)
M PNEUMO IGG SER IA-ACNC: NOT DETECTED
MAGNESIUM SERPL-MCNC: 1.9 MG/DL (ref 1.6–2.4)
MAGNESIUM SERPL-MCNC: 1.9 MG/DL (ref 1.6–2.4)
MAGNESIUM SERPL-MCNC: 2 MG/DL (ref 1.6–2.4)
MAGNESIUM SERPL-MCNC: 2 MG/DL (ref 1.6–2.4)
MCH RBC QN AUTO: 29 PG (ref 26.6–33)
MCHC RBC AUTO-ENTMCNC: 33.3 G/DL (ref 31.5–35.7)
MCV RBC AUTO: 87.2 FL (ref 79–97)
MONOCYTES # BLD AUTO: 0.78 10*3/MM3 (ref 0.1–0.9)
MONOCYTES NFR BLD AUTO: 8.8 % (ref 5–12)
NEUTROPHILS # BLD AUTO: 6.73 10*3/MM3 (ref 1.7–7)
NEUTROPHILS NFR BLD AUTO: 76.2 % (ref 42.7–76)
NRBC BLD AUTO-RTO: 0 /100 WBC (ref 0–0.2)
OSMOLALITY SERPL: 302 MOSM/KG (ref 275–295)
PHOSPHATE SERPL-MCNC: 2.6 MG/DL (ref 2.5–4.5)
PHOSPHATE SERPL-MCNC: 2.7 MG/DL (ref 2.5–4.5)
PHOSPHATE SERPL-MCNC: 2.8 MG/DL (ref 2.5–4.5)
PHOSPHATE SERPL-MCNC: 2.8 MG/DL (ref 2.5–4.5)
PLATELET # BLD AUTO: 143 10*3/MM3 (ref 140–450)
PMV BLD AUTO: 11.5 FL (ref 6–12)
POTASSIUM BLD-SCNC: 3.1 MMOL/L (ref 3.5–5.2)
POTASSIUM BLD-SCNC: 3.2 MMOL/L (ref 3.5–5.2)
POTASSIUM BLD-SCNC: 3.3 MMOL/L (ref 3.5–5.2)
POTASSIUM BLD-SCNC: 3.5 MMOL/L (ref 3.5–5.2)
PROT SERPL-MCNC: 7.3 G/DL (ref 6–8.5)
RBC # BLD AUTO: 5 10*6/MM3 (ref 4.14–5.8)
RHINOVIRUS RNA SPEC NAA+PROBE: NOT DETECTED
RSV RNA NPH QL NAA+NON-PROBE: NOT DETECTED
SODIUM BLD-SCNC: 128 MMOL/L (ref 136–145)
SODIUM BLD-SCNC: 128 MMOL/L (ref 136–145)
SODIUM BLD-SCNC: 130 MMOL/L (ref 136–145)
SODIUM BLD-SCNC: 130 MMOL/L (ref 136–145)
TROPONIN T SERPL-MCNC: 0.03 NG/ML (ref 0–0.03)
WBC NRBC COR # BLD: 8.83 10*3/MM3 (ref 3.4–10.8)

## 2019-12-28 PROCEDURE — 99233 SBSQ HOSP IP/OBS HIGH 50: CPT | Performed by: INTERNAL MEDICINE

## 2019-12-28 PROCEDURE — 83605 ASSAY OF LACTIC ACID: CPT | Performed by: PHYSICIAN ASSISTANT

## 2019-12-28 PROCEDURE — 83930 ASSAY OF BLOOD OSMOLALITY: CPT | Performed by: PHYSICIAN ASSISTANT

## 2019-12-28 PROCEDURE — 82962 GLUCOSE BLOOD TEST: CPT

## 2019-12-28 PROCEDURE — 0100U HC BIOFIRE FILMARRAY RESP PANEL 2: CPT | Performed by: PHYSICIAN ASSISTANT

## 2019-12-28 PROCEDURE — 84100 ASSAY OF PHOSPHORUS: CPT | Performed by: PHYSICIAN ASSISTANT

## 2019-12-28 PROCEDURE — 84484 ASSAY OF TROPONIN QUANT: CPT | Performed by: PHYSICIAN ASSISTANT

## 2019-12-28 PROCEDURE — 25810000003 SODIUM CHLORIDE 0.9 % WITH KCL 40 MEQ/L 40-0.9 MEQ/L-% SOLUTION: Performed by: NURSE PRACTITIONER

## 2019-12-28 PROCEDURE — 25010000002 PIPERACILLIN SOD-TAZOBACTAM PER 1 G: Performed by: INTERNAL MEDICINE

## 2019-12-28 PROCEDURE — 63710000001 INSULIN LISPRO (HUMAN) PER 5 UNITS: Performed by: NURSE PRACTITIONER

## 2019-12-28 PROCEDURE — 25010000003 POTASSIUM CHLORIDE 10 MEQ/100ML SOLUTION: Performed by: NURSE PRACTITIONER

## 2019-12-28 PROCEDURE — 25010000002 HEPARIN (PORCINE) PER 1000 UNITS: Performed by: PHYSICIAN ASSISTANT

## 2019-12-28 PROCEDURE — 83735 ASSAY OF MAGNESIUM: CPT | Performed by: PHYSICIAN ASSISTANT

## 2019-12-28 PROCEDURE — 85025 COMPLETE CBC W/AUTO DIFF WBC: CPT | Performed by: PHYSICIAN ASSISTANT

## 2019-12-28 PROCEDURE — 80053 COMPREHEN METABOLIC PANEL: CPT | Performed by: PHYSICIAN ASSISTANT

## 2019-12-28 RX ORDER — POTASSIUM CHLORIDE 7.45 MG/ML
10 INJECTION INTRAVENOUS
Status: DISCONTINUED | OUTPATIENT
Start: 2019-12-28 | End: 2019-12-29 | Stop reason: HOSPADM

## 2019-12-28 RX ORDER — DEXTROSE MONOHYDRATE 25 G/50ML
25 INJECTION, SOLUTION INTRAVENOUS
Status: DISCONTINUED | OUTPATIENT
Start: 2019-12-28 | End: 2019-12-29 | Stop reason: HOSPADM

## 2019-12-28 RX ORDER — GABAPENTIN 300 MG/1
900 CAPSULE ORAL 2 TIMES DAILY
Status: DISCONTINUED | OUTPATIENT
Start: 2019-12-28 | End: 2019-12-29 | Stop reason: HOSPADM

## 2019-12-28 RX ORDER — TRAZODONE HYDROCHLORIDE 50 MG/1
100 TABLET ORAL NIGHTLY PRN
Status: DISCONTINUED | OUTPATIENT
Start: 2019-12-28 | End: 2019-12-29 | Stop reason: HOSPADM

## 2019-12-28 RX ORDER — DEXTROAMPHETAMINE SACCHARATE, AMPHETAMINE ASPARTATE, DEXTROAMPHETAMINE SULFATE AND AMPHETAMINE SULFATE 5; 5; 5; 5 MG/1; MG/1; MG/1; MG/1
60 TABLET ORAL EVERY MORNING
COMMUNITY
End: 2020-01-09 | Stop reason: SDUPTHER

## 2019-12-28 RX ORDER — DEXTROAMPHETAMINE SACCHARATE, AMPHETAMINE ASPARTATE, DEXTROAMPHETAMINE SULFATE AND AMPHETAMINE SULFATE 2.5; 2.5; 2.5; 2.5 MG/1; MG/1; MG/1; MG/1
30 TABLET ORAL DAILY
COMMUNITY
End: 2020-01-09 | Stop reason: SDUPTHER

## 2019-12-28 RX ORDER — ATORVASTATIN CALCIUM 10 MG/1
10 TABLET, FILM COATED ORAL NIGHTLY
Status: DISCONTINUED | OUTPATIENT
Start: 2019-12-28 | End: 2019-12-29 | Stop reason: HOSPADM

## 2019-12-28 RX ORDER — POTASSIUM CHLORIDE 1.5 G/1.77G
40 POWDER, FOR SOLUTION ORAL AS NEEDED
Status: DISCONTINUED | OUTPATIENT
Start: 2019-12-28 | End: 2019-12-29 | Stop reason: HOSPADM

## 2019-12-28 RX ORDER — VENLAFAXINE HYDROCHLORIDE 75 MG/1
150 CAPSULE, EXTENDED RELEASE ORAL NIGHTLY
Status: DISCONTINUED | OUTPATIENT
Start: 2019-12-28 | End: 2019-12-29 | Stop reason: HOSPADM

## 2019-12-28 RX ORDER — PRAMIPEXOLE DIHYDROCHLORIDE 1 MG/1
1 TABLET ORAL 2 TIMES DAILY
Status: DISCONTINUED | OUTPATIENT
Start: 2019-12-28 | End: 2019-12-29 | Stop reason: HOSPADM

## 2019-12-28 RX ORDER — POTASSIUM CHLORIDE 750 MG/1
40 CAPSULE, EXTENDED RELEASE ORAL AS NEEDED
Status: DISCONTINUED | OUTPATIENT
Start: 2019-12-28 | End: 2019-12-29 | Stop reason: HOSPADM

## 2019-12-28 RX ORDER — LEVOTHYROXINE SODIUM 0.07 MG/1
75 TABLET ORAL
Status: DISCONTINUED | OUTPATIENT
Start: 2019-12-28 | End: 2019-12-29 | Stop reason: HOSPADM

## 2019-12-28 RX ORDER — ASPIRIN 81 MG/1
81 TABLET ORAL DAILY
Status: DISCONTINUED | OUTPATIENT
Start: 2019-12-28 | End: 2019-12-29 | Stop reason: HOSPADM

## 2019-12-28 RX ORDER — NICOTINE POLACRILEX 4 MG
15 LOZENGE BUCCAL
Status: DISCONTINUED | OUTPATIENT
Start: 2019-12-28 | End: 2019-12-29 | Stop reason: HOSPADM

## 2019-12-28 RX ORDER — DOCUSATE SODIUM 100 MG/1
100 CAPSULE, LIQUID FILLED ORAL AS NEEDED
Status: DISCONTINUED | OUTPATIENT
Start: 2019-12-28 | End: 2019-12-29 | Stop reason: HOSPADM

## 2019-12-28 RX ORDER — CETIRIZINE HYDROCHLORIDE 10 MG/1
10 TABLET ORAL DAILY
Status: DISCONTINUED | OUTPATIENT
Start: 2019-12-28 | End: 2019-12-29 | Stop reason: HOSPADM

## 2019-12-28 RX ORDER — SODIUM CHLORIDE AND POTASSIUM CHLORIDE 300; 900 MG/100ML; MG/100ML
75 INJECTION, SOLUTION INTRAVENOUS CONTINUOUS
Status: DISCONTINUED | OUTPATIENT
Start: 2019-12-28 | End: 2019-12-29 | Stop reason: HOSPADM

## 2019-12-28 RX ORDER — ASPIRIN 81 MG/1
324 TABLET, CHEWABLE ORAL ONCE
Status: COMPLETED | OUTPATIENT
Start: 2019-12-28 | End: 2019-12-28

## 2019-12-28 RX ADMIN — VENLAFAXINE HYDROCHLORIDE 150 MG: 75 CAPSULE, EXTENDED RELEASE ORAL at 21:28

## 2019-12-28 RX ADMIN — INSULIN LISPRO 6 UNITS: 100 INJECTION, SOLUTION INTRAVENOUS; SUBCUTANEOUS at 09:45

## 2019-12-28 RX ADMIN — TAZOBACTAM SODIUM AND PIPERACILLIN SODIUM 3.38 G: 375; 3 INJECTION, SOLUTION INTRAVENOUS at 15:56

## 2019-12-28 RX ADMIN — ASPIRIN 81 MG: 81 TABLET, COATED ORAL at 09:45

## 2019-12-28 RX ADMIN — GABAPENTIN 900 MG: 300 CAPSULE ORAL at 09:45

## 2019-12-28 RX ADMIN — INSULIN LISPRO 4 UNITS: 100 INJECTION, SOLUTION INTRAVENOUS; SUBCUTANEOUS at 13:48

## 2019-12-28 RX ADMIN — POTASSIUM CHLORIDE 40 MEQ: 750 CAPSULE, EXTENDED RELEASE ORAL at 11:06

## 2019-12-28 RX ADMIN — PRAMIPEXOLE DIHYDROCHLORIDE 1 MG: 1 TABLET ORAL at 09:45

## 2019-12-28 RX ADMIN — PRAMIPEXOLE DIHYDROCHLORIDE 1 MG: 1 TABLET ORAL at 01:43

## 2019-12-28 RX ADMIN — TAZOBACTAM SODIUM AND PIPERACILLIN SODIUM 3.38 G: 375; 3 INJECTION, SOLUTION INTRAVENOUS at 00:53

## 2019-12-28 RX ADMIN — DOXYCYCLINE 100 MG: 100 INJECTION, POWDER, LYOPHILIZED, FOR SOLUTION INTRAVENOUS at 11:06

## 2019-12-28 RX ADMIN — VENLAFAXINE HYDROCHLORIDE 150 MG: 75 CAPSULE, EXTENDED RELEASE ORAL at 00:59

## 2019-12-28 RX ADMIN — INSULIN LISPRO 6 UNITS: 100 INJECTION, SOLUTION INTRAVENOUS; SUBCUTANEOUS at 03:52

## 2019-12-28 RX ADMIN — CETIRIZINE HYDROCHLORIDE 10 MG: 10 TABLET, FILM COATED ORAL at 09:46

## 2019-12-28 RX ADMIN — LEVOTHYROXINE SODIUM 75 MCG: 75 TABLET ORAL at 05:28

## 2019-12-28 RX ADMIN — POTASSIUM CHLORIDE 40 MEQ: 750 CAPSULE, EXTENDED RELEASE ORAL at 15:56

## 2019-12-28 RX ADMIN — GABAPENTIN 900 MG: 300 CAPSULE ORAL at 21:26

## 2019-12-28 RX ADMIN — GABAPENTIN 900 MG: 300 CAPSULE ORAL at 00:59

## 2019-12-28 RX ADMIN — POTASSIUM CHLORIDE AND SODIUM CHLORIDE 75 ML/HR: 900; 300 INJECTION, SOLUTION INTRAVENOUS at 03:55

## 2019-12-28 RX ADMIN — SODIUM CHLORIDE, PRESERVATIVE FREE 10 ML: 5 INJECTION INTRAVENOUS at 09:47

## 2019-12-28 RX ADMIN — TAZOBACTAM SODIUM AND PIPERACILLIN SODIUM 3.38 G: 375; 3 INJECTION, SOLUTION INTRAVENOUS at 09:44

## 2019-12-28 RX ADMIN — HEPARIN SODIUM 5000 UNITS: 5000 INJECTION INTRAVENOUS; SUBCUTANEOUS at 09:46

## 2019-12-28 RX ADMIN — ATORVASTATIN CALCIUM 10 MG: 10 TABLET, FILM COATED ORAL at 21:26

## 2019-12-28 RX ADMIN — INSULIN LISPRO 6 UNITS: 100 INJECTION, SOLUTION INTRAVENOUS; SUBCUTANEOUS at 18:36

## 2019-12-28 RX ADMIN — DOXYCYCLINE 100 MG: 100 INJECTION, POWDER, LYOPHILIZED, FOR SOLUTION INTRAVENOUS at 00:53

## 2019-12-28 RX ADMIN — INSULIN HUMAN 13 UNITS/HR: 1 INJECTION, SOLUTION INTRAVENOUS at 02:40

## 2019-12-28 RX ADMIN — DOXYCYCLINE 100 MG: 100 INJECTION, POWDER, LYOPHILIZED, FOR SOLUTION INTRAVENOUS at 21:26

## 2019-12-28 RX ADMIN — ASPIRIN 81 MG 324 MG: 81 TABLET ORAL at 00:59

## 2019-12-28 RX ADMIN — POTASSIUM CHLORIDE 10 MEQ: 7.46 INJECTION, SOLUTION INTRAVENOUS at 02:22

## 2019-12-28 RX ADMIN — SODIUM CHLORIDE 2000 ML: 9 INJECTION, SOLUTION INTRAVENOUS at 00:16

## 2019-12-28 RX ADMIN — HEPARIN SODIUM 5000 UNITS: 5000 INJECTION INTRAVENOUS; SUBCUTANEOUS at 21:26

## 2019-12-28 RX ADMIN — INSULIN LISPRO 8 UNITS: 100 INJECTION, SOLUTION INTRAVENOUS; SUBCUTANEOUS at 21:26

## 2019-12-28 RX ADMIN — PRAMIPEXOLE DIHYDROCHLORIDE 1 MG: 1 TABLET ORAL at 21:28

## 2019-12-28 RX ADMIN — POTASSIUM CHLORIDE AND SODIUM CHLORIDE 75 ML/HR: 900; 300 INJECTION, SOLUTION INTRAVENOUS at 15:56

## 2019-12-29 VITALS
HEIGHT: 76 IN | WEIGHT: 293.4 LBS | BODY MASS INDEX: 35.73 KG/M2 | SYSTOLIC BLOOD PRESSURE: 130 MMHG | OXYGEN SATURATION: 94 % | TEMPERATURE: 97.8 F | RESPIRATION RATE: 18 BRPM | HEART RATE: 67 BPM | DIASTOLIC BLOOD PRESSURE: 83 MMHG

## 2019-12-29 PROBLEM — D72.829 LEUKOCYTOSIS: Status: RESOLVED | Noted: 2019-12-27 | Resolved: 2019-12-29

## 2019-12-29 PROBLEM — E87.1 HYPONATREMIA: Status: RESOLVED | Noted: 2019-12-27 | Resolved: 2019-12-29

## 2019-12-29 LAB
ANION GAP SERPL CALCULATED.3IONS-SCNC: 10 MMOL/L (ref 5–15)
BUN BLD-MCNC: 27 MG/DL (ref 8–23)
BUN/CREAT SERPL: 27.6 (ref 7–25)
CALCIUM SPEC-SCNC: 9.1 MG/DL (ref 8.6–10.5)
CHLORIDE SERPL-SCNC: 99 MMOL/L (ref 98–107)
CO2 SERPL-SCNC: 23 MMOL/L (ref 22–29)
CREAT BLD-MCNC: 0.98 MG/DL (ref 0.76–1.27)
GFR SERPL CREATININE-BSD FRML MDRD: 77 ML/MIN/1.73
GLUCOSE BLD-MCNC: 231 MG/DL (ref 65–99)
GLUCOSE BLDC GLUCOMTR-MCNC: 231 MG/DL (ref 70–130)
GLUCOSE BLDC GLUCOMTR-MCNC: 346 MG/DL (ref 70–130)
MAGNESIUM SERPL-MCNC: 2 MG/DL (ref 1.6–2.4)
PHOSPHATE SERPL-MCNC: 2.1 MG/DL (ref 2.5–4.5)
POTASSIUM BLD-SCNC: 4.5 MMOL/L (ref 3.5–5.2)
PROCALCITONIN SERPL-MCNC: 0.3 NG/ML (ref 0.1–0.25)
SODIUM BLD-SCNC: 132 MMOL/L (ref 136–145)

## 2019-12-29 PROCEDURE — 80048 BASIC METABOLIC PNL TOTAL CA: CPT | Performed by: INTERNAL MEDICINE

## 2019-12-29 PROCEDURE — 83735 ASSAY OF MAGNESIUM: CPT | Performed by: INTERNAL MEDICINE

## 2019-12-29 PROCEDURE — 84100 ASSAY OF PHOSPHORUS: CPT | Performed by: INTERNAL MEDICINE

## 2019-12-29 PROCEDURE — 99239 HOSP IP/OBS DSCHRG MGMT >30: CPT | Performed by: INTERNAL MEDICINE

## 2019-12-29 PROCEDURE — 63710000001 INSULIN DETEMIR PER 5 UNITS: Performed by: INTERNAL MEDICINE

## 2019-12-29 PROCEDURE — 84145 PROCALCITONIN (PCT): CPT | Performed by: INTERNAL MEDICINE

## 2019-12-29 PROCEDURE — 97161 PT EVAL LOW COMPLEX 20 MIN: CPT

## 2019-12-29 PROCEDURE — 25010000002 HEPARIN (PORCINE) PER 1000 UNITS: Performed by: PHYSICIAN ASSISTANT

## 2019-12-29 PROCEDURE — 25010000002 PIPERACILLIN SOD-TAZOBACTAM PER 1 G: Performed by: INTERNAL MEDICINE

## 2019-12-29 PROCEDURE — 82962 GLUCOSE BLOOD TEST: CPT

## 2019-12-29 RX ORDER — DOXYCYCLINE HYCLATE 100 MG/1
100 TABLET, DELAYED RELEASE ORAL 2 TIMES DAILY
Qty: 10 TABLET | Refills: 0 | Status: SHIPPED | OUTPATIENT
Start: 2019-12-29 | End: 2020-01-03

## 2019-12-29 RX ADMIN — INSULIN LISPRO 7 UNITS: 100 INJECTION, SOLUTION INTRAVENOUS; SUBCUTANEOUS at 13:00

## 2019-12-29 RX ADMIN — LEVOTHYROXINE SODIUM 75 MCG: 75 TABLET ORAL at 05:29

## 2019-12-29 RX ADMIN — HEPARIN SODIUM 5000 UNITS: 5000 INJECTION INTRAVENOUS; SUBCUTANEOUS at 09:40

## 2019-12-29 RX ADMIN — GABAPENTIN 900 MG: 300 CAPSULE ORAL at 09:40

## 2019-12-29 RX ADMIN — SODIUM CHLORIDE, PRESERVATIVE FREE 10 ML: 5 INJECTION INTRAVENOUS at 09:48

## 2019-12-29 RX ADMIN — INSULIN LISPRO 4 UNITS: 100 INJECTION, SOLUTION INTRAVENOUS; SUBCUTANEOUS at 09:40

## 2019-12-29 RX ADMIN — INSULIN DETEMIR 5 UNITS: 100 INJECTION, SOLUTION SUBCUTANEOUS at 13:00

## 2019-12-29 RX ADMIN — DOXYCYCLINE 100 MG: 100 INJECTION, POWDER, LYOPHILIZED, FOR SOLUTION INTRAVENOUS at 09:39

## 2019-12-29 RX ADMIN — ASPIRIN 81 MG: 81 TABLET, COATED ORAL at 09:40

## 2019-12-29 RX ADMIN — CETIRIZINE HYDROCHLORIDE 10 MG: 10 TABLET, FILM COATED ORAL at 09:40

## 2019-12-29 RX ADMIN — TAZOBACTAM SODIUM AND PIPERACILLIN SODIUM 3.38 G: 375; 3 INJECTION, SOLUTION INTRAVENOUS at 00:08

## 2019-12-29 RX ADMIN — PRAMIPEXOLE DIHYDROCHLORIDE 1 MG: 1 TABLET ORAL at 09:40

## 2019-12-29 RX ADMIN — TAZOBACTAM SODIUM AND PIPERACILLIN SODIUM 3.38 G: 375; 3 INJECTION, SOLUTION INTRAVENOUS at 09:39

## 2019-12-30 ENCOUNTER — READMISSION MANAGEMENT (OUTPATIENT)
Dept: CALL CENTER | Facility: HOSPITAL | Age: 64
End: 2019-12-30

## 2019-12-30 ENCOUNTER — TRANSITIONAL CARE MANAGEMENT TELEPHONE ENCOUNTER (OUTPATIENT)
Dept: INTERNAL MEDICINE | Facility: CLINIC | Age: 64
End: 2019-12-30

## 2019-12-30 LAB
BACTERIA SPEC AEROBE CULT: ABNORMAL
BACTERIA SPEC AEROBE CULT: ABNORMAL
GRAM STN SPEC: ABNORMAL

## 2019-12-30 NOTE — OUTREACH NOTE
Prep Survey      Responses   Facility patient discharged from?  [place]   Is patient eligible?  Yes   Discharge diagnosis  Uncontrolled DM II with Hyperglycemia, OMAR, mixed HLD, lymphedema, JAIME on CPAP, essential HTN, RLS, Cirrhosis of liver, Chronic CHF, AFib. Primary narcolepsy w/o cataplexy, PVD, diabetic ulcer of right heel   Does the patient have one of the following disease processes/diagnoses(primary or secondary)?  Other   Does the patient have Home health ordered?  No   Is there a DME ordered?  No   Comments regarding appointments  Pt to schedule follow up appointments   Prep survey completed?  Yes          Laura Alston RN

## 2019-12-31 ENCOUNTER — READMISSION MANAGEMENT (OUTPATIENT)
Dept: CALL CENTER | Facility: HOSPITAL | Age: 64
End: 2019-12-31

## 2019-12-31 NOTE — OUTREACH NOTE
Medical Week 1 Survey      Responses   Facility patient discharged from?  [facility]   Does the patient have one of the following disease processes/diagnoses(primary or secondary)?  Other   Is there a successful TCM telephone encounter documented?  Yes          James Fofana RN

## 2019-12-31 NOTE — OUTREACH NOTE
Spoke with pt, feeling a lot better. Foot wound improving, no nausea,, fever, chills. Pt states he is eating well and focused on staying properly hydrated. Pt is working with his Endo MD to obtain insulin pump for better care of diabetes. Confirms receipt and understanding of d/c orders and medications. Declined to sched TCM [facility] with me as he is getting some other appts in order and he will call and sched with Dr Maria.

## 2020-01-01 ENCOUNTER — PATIENT OUTREACH (OUTPATIENT)
Dept: CASE MANAGEMENT | Facility: OTHER | Age: 65
End: 2020-01-01

## 2020-01-01 ENCOUNTER — APPOINTMENT (OUTPATIENT)
Dept: CARDIOLOGY | Facility: HOSPITAL | Age: 65
End: 2020-01-01

## 2020-01-01 ENCOUNTER — DOCUMENTATION (OUTPATIENT)
Dept: PHYSICAL THERAPY | Facility: HOSPITAL | Age: 65
End: 2020-01-01

## 2020-01-01 ENCOUNTER — HOSPITAL ENCOUNTER (OUTPATIENT)
Dept: ULTRASOUND IMAGING | Facility: HOSPITAL | Age: 65
Discharge: HOME | End: 2020-10-12
Admitting: INTERNAL MEDICINE

## 2020-01-01 ENCOUNTER — LAB (OUTPATIENT)
Dept: LAB | Facility: HOSPITAL | Age: 65
End: 2020-01-01

## 2020-01-01 ENCOUNTER — TELEPHONE (OUTPATIENT)
Dept: INTERNAL MEDICINE | Facility: CLINIC | Age: 65
End: 2020-01-01

## 2020-01-01 ENCOUNTER — HOSPITAL ENCOUNTER (EMERGENCY)
Facility: HOSPITAL | Age: 65
Discharge: HOME | End: 2020-10-20
Attending: EMERGENCY MEDICINE | Admitting: EMERGENCY MEDICINE

## 2020-01-01 ENCOUNTER — TELEPHONE (OUTPATIENT)
Dept: ENDOCRINOLOGY | Facility: CLINIC | Age: 65
End: 2020-01-01

## 2020-01-01 ENCOUNTER — HOSPITAL ENCOUNTER (OUTPATIENT)
Dept: MRI IMAGING | Facility: HOSPITAL | Age: 65
Discharge: HOME | End: 2020-10-14
Admitting: OTOLARYNGOLOGY

## 2020-01-01 ENCOUNTER — OFFICE VISIT (OUTPATIENT)
Dept: ORTHOPEDIC SURGERY | Facility: CLINIC | Age: 65
End: 2020-01-01

## 2020-01-01 ENCOUNTER — OFFICE VISIT (OUTPATIENT)
Dept: ENDOCRINOLOGY | Facility: CLINIC | Age: 65
End: 2020-01-01

## 2020-01-01 ENCOUNTER — OFFICE VISIT (OUTPATIENT)
Dept: INTERNAL MEDICINE | Facility: CLINIC | Age: 65
End: 2020-01-01

## 2020-01-01 ENCOUNTER — APPOINTMENT (OUTPATIENT)
Dept: GENERAL RADIOLOGY | Facility: HOSPITAL | Age: 65
End: 2020-01-01

## 2020-01-01 ENCOUNTER — EPISODE CHANGES (OUTPATIENT)
Dept: CASE MANAGEMENT | Facility: OTHER | Age: 65
End: 2020-01-01

## 2020-01-01 VITALS — OXYGEN SATURATION: 99 % | HEIGHT: 76 IN | BODY MASS INDEX: 38.12 KG/M2 | WEIGHT: 313.05 LBS | HEART RATE: 88 BPM

## 2020-01-01 VITALS
TEMPERATURE: 97.6 F | WEIGHT: 310 LBS | SYSTOLIC BLOOD PRESSURE: 139 MMHG | DIASTOLIC BLOOD PRESSURE: 89 MMHG | BODY MASS INDEX: 37.75 KG/M2 | HEIGHT: 76 IN | HEART RATE: 79 BPM | OXYGEN SATURATION: 95 % | RESPIRATION RATE: 14 BRPM

## 2020-01-01 VITALS
HEIGHT: 76 IN | DIASTOLIC BLOOD PRESSURE: 74 MMHG | HEART RATE: 76 BPM | SYSTOLIC BLOOD PRESSURE: 140 MMHG | WEIGHT: 315 LBS | BODY MASS INDEX: 38.36 KG/M2 | TEMPERATURE: 96.9 F

## 2020-01-01 VITALS — HEART RATE: 79 BPM | BODY MASS INDEX: 38.36 KG/M2 | HEIGHT: 76 IN | OXYGEN SATURATION: 94 % | WEIGHT: 315 LBS

## 2020-01-01 VITALS
WEIGHT: 315 LBS | HEART RATE: 92 BPM | HEIGHT: 76 IN | DIASTOLIC BLOOD PRESSURE: 78 MMHG | BODY MASS INDEX: 38.36 KG/M2 | SYSTOLIC BLOOD PRESSURE: 142 MMHG

## 2020-01-01 DIAGNOSIS — E29.1 HYPOGONADISM IN MALE: ICD-10-CM

## 2020-01-01 DIAGNOSIS — E78.2 MIXED HYPERLIPIDEMIA: ICD-10-CM

## 2020-01-01 DIAGNOSIS — K74.60 LIVER CIRRHOSIS SECONDARY TO NASH (HCC): ICD-10-CM

## 2020-01-01 DIAGNOSIS — K75.81 LIVER CIRRHOSIS SECONDARY TO NASH (HCC): ICD-10-CM

## 2020-01-01 DIAGNOSIS — E87.6 HYPOKALEMIA: ICD-10-CM

## 2020-01-01 DIAGNOSIS — L03.115 CELLULITIS OF RIGHT LOWER EXTREMITY: ICD-10-CM

## 2020-01-01 DIAGNOSIS — L97.409 HEEL ULCER DUE TO SECONDARY DM (HCC): ICD-10-CM

## 2020-01-01 DIAGNOSIS — Z12.5 SCREENING FOR PROSTATE CANCER: ICD-10-CM

## 2020-01-01 DIAGNOSIS — R60.0 LOCALIZED EDEMA: ICD-10-CM

## 2020-01-01 DIAGNOSIS — E87.5 HYPERPOTASSEMIA: ICD-10-CM

## 2020-01-01 DIAGNOSIS — E11.65 UNCONTROLLED TYPE 2 DIABETES MELLITUS WITH HYPERGLYCEMIA (HCC): ICD-10-CM

## 2020-01-01 DIAGNOSIS — E78.5 DYSLIPIDEMIA: ICD-10-CM

## 2020-01-01 DIAGNOSIS — E87.5 HYPERPOTASSEMIA: Primary | ICD-10-CM

## 2020-01-01 DIAGNOSIS — E11.649 DIABETIC HYPOGLYCEMIA (HCC): ICD-10-CM

## 2020-01-01 DIAGNOSIS — G47.33 OSA ON CPAP: ICD-10-CM

## 2020-01-01 DIAGNOSIS — K75.81 NASH (NONALCOHOLIC STEATOHEPATITIS): ICD-10-CM

## 2020-01-01 DIAGNOSIS — E29.1 HYPOGONADISM IN MALE: Primary | ICD-10-CM

## 2020-01-01 DIAGNOSIS — I89.0 LYMPHEDEMA: Primary | ICD-10-CM

## 2020-01-01 DIAGNOSIS — D69.6 THROMBOCYTOPENIA (HCC): ICD-10-CM

## 2020-01-01 DIAGNOSIS — R79.89 LFTS ABNORMAL: ICD-10-CM

## 2020-01-01 DIAGNOSIS — Z99.89 OSA ON CPAP: ICD-10-CM

## 2020-01-01 DIAGNOSIS — E13.621 HEEL ULCER DUE TO SECONDARY DM (HCC): ICD-10-CM

## 2020-01-01 DIAGNOSIS — M79.89 PAIN AND SWELLING OF LOWER LEG, RIGHT: Primary | ICD-10-CM

## 2020-01-01 DIAGNOSIS — IMO0002 DIABETES MELLITUS TYPE 2, UNCONTROLLED, WITH COMPLICATIONS: Primary | ICD-10-CM

## 2020-01-01 DIAGNOSIS — G51.0 BELL'S PALSY: ICD-10-CM

## 2020-01-01 DIAGNOSIS — G47.419 PRIMARY NARCOLEPSY WITHOUT CATAPLEXY: ICD-10-CM

## 2020-01-01 DIAGNOSIS — M17.11 PRIMARY OSTEOARTHRITIS OF RIGHT KNEE: Primary | ICD-10-CM

## 2020-01-01 DIAGNOSIS — F32.89 OTHER DEPRESSION: ICD-10-CM

## 2020-01-01 DIAGNOSIS — R70.0 ELEVATED SED RATE: ICD-10-CM

## 2020-01-01 DIAGNOSIS — I10 ESSENTIAL HYPERTENSION: Primary | ICD-10-CM

## 2020-01-01 DIAGNOSIS — I50.32 CHRONIC DIASTOLIC CONGESTIVE HEART FAILURE (HCC): ICD-10-CM

## 2020-01-01 DIAGNOSIS — M79.661 PAIN AND SWELLING OF LOWER LEG, RIGHT: Primary | ICD-10-CM

## 2020-01-01 DIAGNOSIS — E87.1 HYPONATREMIA: ICD-10-CM

## 2020-01-01 LAB
ALBUMIN SERPL-MCNC: 3.9 G/DL (ref 3.5–5.2)
ALBUMIN SERPL-MCNC: 4 G/DL (ref 3.5–5.2)
ALBUMIN/GLOB SERPL: 1 G/DL
ALBUMIN/GLOB SERPL: 1 G/DL
ALP SERPL-CCNC: 125 U/L (ref 39–117)
ALP SERPL-CCNC: 177 U/L (ref 39–117)
ALT SERPL W P-5'-P-CCNC: 26 U/L (ref 1–41)
ALT SERPL W P-5'-P-CCNC: 49 U/L (ref 1–41)
ANION GAP SERPL CALCULATED.3IONS-SCNC: 10.3 MMOL/L (ref 5–15)
ANION GAP SERPL CALCULATED.3IONS-SCNC: 10.9 MMOL/L (ref 5–15)
ANION GAP SERPL CALCULATED.3IONS-SCNC: 12 MMOL/L (ref 5–15)
AST SERPL-CCNC: 33 U/L (ref 1–40)
AST SERPL-CCNC: 56 U/L (ref 1–40)
BASOPHILS # BLD AUTO: 0.05 10*3/MM3 (ref 0–0.2)
BASOPHILS NFR BLD AUTO: 0.5 % (ref 0–1.5)
BH CV GRAFT BRACHIAL PRESSURE LEFT: 140 MMHG
BH CV GRAFT BRACHIAL PRESSURE RIGHT: 140 MMHG
BH CV LEA LEFT PTA PRESSURE: 150 MMHG
BH CV LEA RIGHT ANT TIBIAL A DISTAL EDV: 14 CM/S
BH CV LEA RIGHT ANT TIBIAL A DISTAL PSV: 80 CM/S
BH CV LEA RIGHT ANT TIBIAL A MID EDV: 17 CM/S
BH CV LEA RIGHT ANT TIBIAL A MID PSV: 74 CM/S
BH CV LEA RIGHT ANT TIBIAL A PROX EDV: 14 CM/S
BH CV LEA RIGHT ANT TIBIAL A PROX PSV: 69 CM/S
BH CV LEA RIGHT CFA DISTAL EDV: 22 CM/S
BH CV LEA RIGHT CFA DISTAL PSV: 148 CM/S
BH CV LEA RIGHT CFA PROX EDV: 24 CM/S
BH CV LEA RIGHT CFA PROX PSV: 153 CM/S
BH CV LEA RIGHT DFA PROX PSV: 62 CM/S
BH CV LEA RIGHT DPA PRESSURE: 130 MMHG
BH CV LEA RIGHT POPITEAL A  DISTAL EDV: 21 CM/S
BH CV LEA RIGHT POPITEAL A  DISTAL PSV: 96 CM/S
BH CV LEA RIGHT POPITEAL A  PROX EDV: 26 CM/S
BH CV LEA RIGHT POPITEAL A  PROX PSV: 103 CM/S
BH CV LEA RIGHT PTA DISTAL EDV: 15 CM/S
BH CV LEA RIGHT PTA DISTAL PSV: 76 CM/S
BH CV LEA RIGHT PTA MID EDV: 17 CM/S
BH CV LEA RIGHT PTA MID PSV: 60 CM/S
BH CV LEA RIGHT PTA PRESSURE: 150 MMHG
BH CV LEA RIGHT SFA DISTAL EDV: 22 CM/S
BH CV LEA RIGHT SFA DISTAL PSV: 104 CM/S
BH CV LEA RIGHT SFA MID EDV: 27 CM/S
BH CV LEA RIGHT SFA MID PSV: 116 CM/S
BH CV LEA RIGHT SFA PROX EDV: 25 CM/S
BH CV LEA RIGHT SFA PROX PSV: 126 CM/S
BH CV LOWER ARTERIAL LEFT ABI RATIO: 1
BH CV LOWER ARTERIAL RIGHT ABI RATIO: 1
BH CV LOWER VASCULAR LEFT COMMON FEMORAL AUGMENT: NORMAL
BH CV LOWER VASCULAR LEFT COMMON FEMORAL COMPETENT: NORMAL
BH CV LOWER VASCULAR LEFT COMMON FEMORAL COMPRESS: NORMAL
BH CV LOWER VASCULAR LEFT COMMON FEMORAL PHASIC: NORMAL
BH CV LOWER VASCULAR LEFT COMMON FEMORAL SPONT: NORMAL
BH CV LOWER VASCULAR LEFT SAPHENOFEMORAL JUNCTION COMPRESS: NORMAL
BH CV LOWER VASCULAR RIGHT COMMON FEMORAL AUGMENT: NORMAL
BH CV LOWER VASCULAR RIGHT COMMON FEMORAL COMPETENT: NORMAL
BH CV LOWER VASCULAR RIGHT COMMON FEMORAL COMPRESS: NORMAL
BH CV LOWER VASCULAR RIGHT COMMON FEMORAL PHASIC: NORMAL
BH CV LOWER VASCULAR RIGHT COMMON FEMORAL SPONT: NORMAL
BH CV LOWER VASCULAR RIGHT DISTAL FEMORAL COMPRESS: NORMAL
BH CV LOWER VASCULAR RIGHT GASTRONEMIUS COMPRESS: NORMAL
BH CV LOWER VASCULAR RIGHT GREATER SAPH AK COMPRESS: NORMAL
BH CV LOWER VASCULAR RIGHT GREATER SAPH BK COMPRESS: NORMAL
BH CV LOWER VASCULAR RIGHT MID FEMORAL AUGMENT: NORMAL
BH CV LOWER VASCULAR RIGHT MID FEMORAL COMPETENT: NORMAL
BH CV LOWER VASCULAR RIGHT MID FEMORAL COMPRESS: NORMAL
BH CV LOWER VASCULAR RIGHT MID FEMORAL PHASIC: NORMAL
BH CV LOWER VASCULAR RIGHT MID FEMORAL SPONT: NORMAL
BH CV LOWER VASCULAR RIGHT PERONEAL COMPRESS: NORMAL
BH CV LOWER VASCULAR RIGHT POPLITEAL AUGMENT: NORMAL
BH CV LOWER VASCULAR RIGHT POPLITEAL COMPETENT: NORMAL
BH CV LOWER VASCULAR RIGHT POPLITEAL COMPRESS: NORMAL
BH CV LOWER VASCULAR RIGHT POPLITEAL PHASIC: NORMAL
BH CV LOWER VASCULAR RIGHT POPLITEAL SPONT: NORMAL
BH CV LOWER VASCULAR RIGHT POSTERIOR TIBIAL COMPRESS: NORMAL
BH CV LOWER VASCULAR RIGHT PROXIMAL FEMORAL COMPRESS: NORMAL
BH CV LOWER VASCULAR RIGHT SAPHENOFEMORAL JUNCTION COMPRESS: NORMAL
BILIRUB SERPL-MCNC: 1 MG/DL (ref 0–1.2)
BILIRUB SERPL-MCNC: 1.3 MG/DL (ref 0–1.2)
BUN SERPL-MCNC: 35 MG/DL (ref 8–23)
BUN SERPL-MCNC: 50 MG/DL (ref 8–23)
BUN SERPL-MCNC: 53 MG/DL (ref 8–23)
BUN/CREAT SERPL: 31.8 (ref 7–25)
BUN/CREAT SERPL: 40 (ref 7–25)
BUN/CREAT SERPL: 42.1 (ref 7–25)
CALCIUM SPEC-SCNC: 10.4 MG/DL (ref 8.6–10.5)
CALCIUM SPEC-SCNC: 10.4 MG/DL (ref 8.6–10.5)
CALCIUM SPEC-SCNC: 9.8 MG/DL (ref 8.6–10.5)
CHLORIDE SERPL-SCNC: 86 MMOL/L (ref 98–107)
CHLORIDE SERPL-SCNC: 86 MMOL/L (ref 98–107)
CHLORIDE SERPL-SCNC: 91 MMOL/L (ref 98–107)
CO2 SERPL-SCNC: 32.1 MMOL/L (ref 22–29)
CO2 SERPL-SCNC: 33.7 MMOL/L (ref 22–29)
CO2 SERPL-SCNC: 34 MMOL/L (ref 22–29)
CREAT SERPL-MCNC: 1.1 MG/DL (ref 0.76–1.27)
CREAT SERPL-MCNC: 1.25 MG/DL (ref 0.76–1.27)
CREAT SERPL-MCNC: 1.26 MG/DL (ref 0.76–1.27)
D DIMER PPP FEU-MCNC: <0.27 MCGFEU/ML (ref 0–0.56)
DEPRECATED RDW RBC AUTO: 43.1 FL (ref 37–54)
DEPRECATED RDW RBC AUTO: 43.4 FL (ref 37–54)
EOSINOPHIL # BLD AUTO: 0.12 10*3/MM3 (ref 0–0.4)
EOSINOPHIL NFR BLD AUTO: 1.2 % (ref 0.3–6.2)
ERYTHROCYTE [DISTWIDTH] IN BLOOD BY AUTOMATED COUNT: 13.3 % (ref 12.3–15.4)
ERYTHROCYTE [DISTWIDTH] IN BLOOD BY AUTOMATED COUNT: 13.3 % (ref 12.3–15.4)
ERYTHROCYTE [SEDIMENTATION RATE] IN BLOOD: 89 MM/HR (ref 0–20)
EXPIRATION DATE: NORMAL
GFR SERPL CREATININE-BSD FRML MDRD: 57 ML/MIN/1.73
GFR SERPL CREATININE-BSD FRML MDRD: 58 ML/MIN/1.73
GFR SERPL CREATININE-BSD FRML MDRD: 67 ML/MIN/1.73
GLOBULIN UR ELPH-MCNC: 3.9 GM/DL
GLOBULIN UR ELPH-MCNC: 3.9 GM/DL
GLUCOSE SERPL-MCNC: 133 MG/DL (ref 65–99)
GLUCOSE SERPL-MCNC: 256 MG/DL (ref 65–99)
GLUCOSE SERPL-MCNC: 372 MG/DL (ref 65–99)
HBA1C MFR BLD: 8.3 %
HCT VFR BLD AUTO: 42.8 % (ref 37.5–51)
HCT VFR BLD AUTO: 50.5 % (ref 37.5–51)
HGB BLD-MCNC: 13.7 G/DL (ref 13–17.7)
HGB BLD-MCNC: 16.8 G/DL (ref 13–17.7)
IMM GRANULOCYTES # BLD AUTO: 0.14 10*3/MM3 (ref 0–0.05)
IMM GRANULOCYTES NFR BLD AUTO: 1.4 % (ref 0–0.5)
LYMPHOCYTES # BLD AUTO: 0.88 10*3/MM3 (ref 0.7–3.1)
LYMPHOCYTES NFR BLD AUTO: 8.6 % (ref 19.6–45.3)
Lab: NORMAL
MAGNESIUM SERPL-MCNC: 2 MG/DL (ref 1.6–2.4)
MAGNESIUM SERPL-MCNC: 2.2 MG/DL (ref 1.6–2.4)
MCH RBC QN AUTO: 28.8 PG (ref 26.6–33)
MCH RBC QN AUTO: 29.3 PG (ref 26.6–33)
MCHC RBC AUTO-ENTMCNC: 32 G/DL (ref 31.5–35.7)
MCHC RBC AUTO-ENTMCNC: 33.3 G/DL (ref 31.5–35.7)
MCV RBC AUTO: 88.1 FL (ref 79–97)
MCV RBC AUTO: 90.1 FL (ref 79–97)
MONOCYTES # BLD AUTO: 0.57 10*3/MM3 (ref 0.1–0.9)
MONOCYTES NFR BLD AUTO: 5.6 % (ref 5–12)
NEUTROPHILS NFR BLD AUTO: 8.42 10*3/MM3 (ref 1.7–7)
NEUTROPHILS NFR BLD AUTO: 82.7 % (ref 42.7–76)
NRBC BLD AUTO-RTO: 0 /100 WBC (ref 0–0.2)
NT-PROBNP SERPL-MCNC: 153 PG/ML (ref 0–900)
PLATELET # BLD AUTO: 143 10*3/MM3 (ref 140–450)
PLATELET # BLD AUTO: 158 10*3/MM3 (ref 140–450)
PMV BLD AUTO: 11.6 FL (ref 6–12)
PMV BLD AUTO: 11.6 FL (ref 6–12)
POTASSIUM SERPL-SCNC: 2.6 MMOL/L (ref 3.5–5.2)
POTASSIUM SERPL-SCNC: 3.2 MMOL/L (ref 3.5–5.2)
POTASSIUM SERPL-SCNC: 3.5 MMOL/L (ref 3.5–5.2)
PROT SERPL-MCNC: 7.8 G/DL (ref 6–8.5)
PROT SERPL-MCNC: 7.9 G/DL (ref 6–8.5)
PSA SERPL-MCNC: 0.32 NG/ML (ref 0–4)
RBC # BLD AUTO: 4.75 10*6/MM3 (ref 4.14–5.8)
RBC # BLD AUTO: 5.73 10*6/MM3 (ref 4.14–5.8)
SODIUM SERPL-SCNC: 129 MMOL/L (ref 136–145)
SODIUM SERPL-SCNC: 132 MMOL/L (ref 136–145)
SODIUM SERPL-SCNC: 135 MMOL/L (ref 136–145)
TESTOST FREE SERPL-MCNC: 1.4 PG/ML (ref 6.6–18.1)
TESTOST SERPL-MCNC: 274 NG/DL (ref 264–916)
TSH SERPL DL<=0.05 MIU/L-ACNC: 2.36 UIU/ML (ref 0.27–4.2)
WBC # BLD AUTO: 10.18 10*3/MM3 (ref 3.4–10.8)
WBC # BLD AUTO: 8.02 10*3/MM3 (ref 3.4–10.8)

## 2020-01-01 PROCEDURE — 96361 HYDRATE IV INFUSION ADD-ON: CPT

## 2020-01-01 PROCEDURE — 36415 COLL VENOUS BLD VENIPUNCTURE: CPT

## 2020-01-01 PROCEDURE — 96366 THER/PROPH/DIAG IV INF ADDON: CPT

## 2020-01-01 PROCEDURE — 73564 X-RAY EXAM KNEE 4 OR MORE: CPT | Performed by: ORTHOPAEDIC SURGERY

## 2020-01-01 PROCEDURE — 99203 OFFICE O/P NEW LOW 30 MIN: CPT | Performed by: ORTHOPAEDIC SURGERY

## 2020-01-01 PROCEDURE — 25010000002 ONDANSETRON PER 1 MG: Performed by: EMERGENCY MEDICINE

## 2020-01-01 PROCEDURE — 84402 ASSAY OF FREE TESTOSTERONE: CPT

## 2020-01-01 PROCEDURE — 80048 BASIC METABOLIC PNL TOTAL CA: CPT | Performed by: INTERNAL MEDICINE

## 2020-01-01 PROCEDURE — G0103 PSA SCREENING: HCPCS | Performed by: INTERNAL MEDICINE

## 2020-01-01 PROCEDURE — 83036 HEMOGLOBIN GLYCOSYLATED A1C: CPT | Performed by: INTERNAL MEDICINE

## 2020-01-01 PROCEDURE — G0439 PPPS, SUBSEQ VISIT: HCPCS | Performed by: INTERNAL MEDICINE

## 2020-01-01 PROCEDURE — A9577 INJ MULTIHANCE: HCPCS | Performed by: OTOLARYNGOLOGY

## 2020-01-01 PROCEDURE — 25010000002 VANCOMYCIN 10 G RECONSTITUTED SOLUTION: Performed by: EMERGENCY MEDICINE

## 2020-01-01 PROCEDURE — 95251 CONT GLUC MNTR ANALYSIS I&R: CPT | Performed by: INTERNAL MEDICINE

## 2020-01-01 PROCEDURE — 96375 TX/PRO/DX INJ NEW DRUG ADDON: CPT

## 2020-01-01 PROCEDURE — 85379 FIBRIN DEGRADATION QUANT: CPT | Performed by: EMERGENCY MEDICINE

## 2020-01-01 PROCEDURE — 93971 EXTREMITY STUDY: CPT | Performed by: INTERNAL MEDICINE

## 2020-01-01 PROCEDURE — 93971 EXTREMITY STUDY: CPT

## 2020-01-01 PROCEDURE — 99284 EMERGENCY DEPT VISIT MOD MDM: CPT

## 2020-01-01 PROCEDURE — 73630 X-RAY EXAM OF FOOT: CPT

## 2020-01-01 PROCEDURE — 83880 ASSAY OF NATRIURETIC PEPTIDE: CPT | Performed by: EMERGENCY MEDICINE

## 2020-01-01 PROCEDURE — 80053 COMPREHEN METABOLIC PANEL: CPT | Performed by: EMERGENCY MEDICINE

## 2020-01-01 PROCEDURE — 20610 DRAIN/INJ JOINT/BURSA W/O US: CPT | Performed by: ORTHOPAEDIC SURGERY

## 2020-01-01 PROCEDURE — 0 GADOBENATE DIMEGLUMINE 529 MG/ML SOLUTION: Performed by: OTOLARYNGOLOGY

## 2020-01-01 PROCEDURE — 85652 RBC SED RATE AUTOMATED: CPT | Performed by: EMERGENCY MEDICINE

## 2020-01-01 PROCEDURE — 85027 COMPLETE CBC AUTOMATED: CPT | Performed by: INTERNAL MEDICINE

## 2020-01-01 PROCEDURE — 93926 LOWER EXTREMITY STUDY: CPT | Performed by: INTERNAL MEDICINE

## 2020-01-01 PROCEDURE — 96365 THER/PROPH/DIAG IV INF INIT: CPT

## 2020-01-01 PROCEDURE — 25010000002 HYDROMORPHONE PER 4 MG: Performed by: EMERGENCY MEDICINE

## 2020-01-01 PROCEDURE — 85025 COMPLETE CBC W/AUTO DIFF WBC: CPT | Performed by: EMERGENCY MEDICINE

## 2020-01-01 PROCEDURE — 83735 ASSAY OF MAGNESIUM: CPT | Performed by: EMERGENCY MEDICINE

## 2020-01-01 PROCEDURE — 83735 ASSAY OF MAGNESIUM: CPT | Performed by: INTERNAL MEDICINE

## 2020-01-01 PROCEDURE — 80053 COMPREHEN METABOLIC PANEL: CPT | Performed by: INTERNAL MEDICINE

## 2020-01-01 PROCEDURE — 70543 MRI ORBT/FAC/NCK W/O &W/DYE: CPT

## 2020-01-01 PROCEDURE — 99214 OFFICE O/P EST MOD 30 MIN: CPT | Performed by: INTERNAL MEDICINE

## 2020-01-01 PROCEDURE — 96367 TX/PROPH/DG ADDL SEQ IV INF: CPT

## 2020-01-01 PROCEDURE — 84443 ASSAY THYROID STIM HORMONE: CPT | Performed by: INTERNAL MEDICINE

## 2020-01-01 PROCEDURE — 84403 ASSAY OF TOTAL TESTOSTERONE: CPT

## 2020-01-01 PROCEDURE — 76705 ECHO EXAM OF ABDOMEN: CPT

## 2020-01-01 PROCEDURE — 93926 LOWER EXTREMITY STUDY: CPT

## 2020-01-01 PROCEDURE — 25010000002 CEFTRIAXONE PER 250 MG: Performed by: EMERGENCY MEDICINE

## 2020-01-01 RX ORDER — NEEDLES, DISPOSABLE 25GX5/8"
NEEDLE, DISPOSABLE MISCELLANEOUS
Qty: 6 EACH | Refills: 1 | Status: SHIPPED | OUTPATIENT
Start: 2020-01-01 | End: 2021-01-01

## 2020-01-01 RX ORDER — TRIAMCINOLONE ACETONIDE 40 MG/ML
40 INJECTION, SUSPENSION INTRA-ARTICULAR; INTRAMUSCULAR
Status: COMPLETED | OUTPATIENT
Start: 2020-01-01 | End: 2020-01-01

## 2020-01-01 RX ORDER — LEVOTHYROXINE SODIUM 0.07 MG/1
TABLET ORAL
Qty: 90 TABLET | Refills: 3 | Status: SHIPPED | OUTPATIENT
Start: 2020-01-01

## 2020-01-01 RX ORDER — IBUPROFEN 200 MG
TABLET ORAL
Qty: 500 EACH | Refills: 1 | Status: SHIPPED | OUTPATIENT
Start: 2020-01-01 | End: 2021-01-01

## 2020-01-01 RX ORDER — INSULIN PUMP CONTROLLER
EACH MISCELLANEOUS
Status: ON HOLD | COMMUNITY
Start: 2020-01-01 | End: 2021-01-01

## 2020-01-01 RX ORDER — PROCHLORPERAZINE 25 MG/1
SUPPOSITORY RECTAL
Qty: 3 EACH | Refills: 11 | Status: SHIPPED | OUTPATIENT
Start: 2020-01-01 | End: 2020-01-01 | Stop reason: SDUPTHER

## 2020-01-01 RX ORDER — PRAVASTATIN SODIUM 40 MG
TABLET ORAL
Qty: 87 TABLET | Refills: 2 | Status: SHIPPED | OUTPATIENT
Start: 2020-01-01 | End: 2020-01-01 | Stop reason: SDUPTHER

## 2020-01-01 RX ORDER — HYDROMORPHONE HYDROCHLORIDE 1 MG/ML
0.5 INJECTION, SOLUTION INTRAMUSCULAR; INTRAVENOUS; SUBCUTANEOUS
Status: DISCONTINUED | OUTPATIENT
Start: 2020-01-01 | End: 2020-01-01 | Stop reason: HOSPADM

## 2020-01-01 RX ORDER — AMOXICILLIN 875 MG/1
875 TABLET, COATED ORAL 2 TIMES DAILY
Qty: 20 TABLET | Refills: 0 | Status: SHIPPED | OUTPATIENT
Start: 2020-01-01 | End: 2020-01-01

## 2020-01-01 RX ORDER — PROCHLORPERAZINE 25 MG/1
1 SUPPOSITORY RECTAL
Qty: 1 EACH | Refills: 3 | Status: SHIPPED | OUTPATIENT
Start: 2020-01-01 | End: 2020-01-01 | Stop reason: SDUPTHER

## 2020-01-01 RX ORDER — ROPIVACAINE HYDROCHLORIDE 5 MG/ML
4 INJECTION, SOLUTION EPIDURAL; INFILTRATION; PERINEURAL
Status: COMPLETED | OUTPATIENT
Start: 2020-01-01 | End: 2020-01-01

## 2020-01-01 RX ORDER — PROCHLORPERAZINE 25 MG/1
SUPPOSITORY RECTAL
Qty: 3 EACH | Refills: 11 | Status: ON HOLD | OUTPATIENT
Start: 2020-01-01 | End: 2021-01-01

## 2020-01-01 RX ORDER — PRAMIPEXOLE DIHYDROCHLORIDE 1 MG/1
TABLET ORAL
Qty: 180 TABLET | Refills: 1 | Status: SHIPPED | OUTPATIENT
Start: 2020-01-01 | End: 2021-01-01

## 2020-01-01 RX ORDER — DOXYCYCLINE 100 MG/1
100 CAPSULE ORAL 2 TIMES DAILY
Qty: 20 CAPSULE | Refills: 0 | Status: SHIPPED | OUTPATIENT
Start: 2020-01-01 | End: 2020-01-01

## 2020-01-01 RX ORDER — PROCHLORPERAZINE 25 MG/1
1 SUPPOSITORY RECTAL
Qty: 1 DEVICE | Refills: 0 | Status: SHIPPED | OUTPATIENT
Start: 2020-01-01 | End: 2020-01-01 | Stop reason: SDUPTHER

## 2020-01-01 RX ORDER — POTASSIUM CHLORIDE 750 MG/1
20 TABLET, FILM COATED, EXTENDED RELEASE ORAL 2 TIMES DAILY
Qty: 40 TABLET | Refills: 0 | Status: SHIPPED | OUTPATIENT
Start: 2020-01-01 | End: 2020-01-01

## 2020-01-01 RX ORDER — DOXYCYCLINE HYCLATE 100 MG/1
100 CAPSULE ORAL 2 TIMES DAILY
Qty: 20 CAPSULE | Refills: 0 | Status: SHIPPED | OUTPATIENT
Start: 2020-01-01 | End: 2021-01-01

## 2020-01-01 RX ORDER — SODIUM CHLORIDE 9 MG/ML
125 INJECTION, SOLUTION INTRAVENOUS CONTINUOUS
Status: DISCONTINUED | OUTPATIENT
Start: 2020-01-01 | End: 2020-01-01 | Stop reason: HOSPADM

## 2020-01-01 RX ORDER — HYDROCODONE BITARTRATE AND ACETAMINOPHEN 5; 325 MG/1; MG/1
1 TABLET ORAL EVERY 6 HOURS PRN
Qty: 15 TABLET | Refills: 0 | Status: SHIPPED | OUTPATIENT
Start: 2020-01-01 | End: 2020-01-01

## 2020-01-01 RX ORDER — SODIUM CHLORIDE 0.9 % (FLUSH) 0.9 %
10 SYRINGE (ML) INJECTION AS NEEDED
Status: DISCONTINUED | OUTPATIENT
Start: 2020-01-01 | End: 2020-01-01 | Stop reason: HOSPADM

## 2020-01-01 RX ORDER — POTASSIUM CHLORIDE 1.5 G/1.77G
40 POWDER, FOR SOLUTION ORAL ONCE
Status: COMPLETED | OUTPATIENT
Start: 2020-01-01 | End: 2020-01-01

## 2020-01-01 RX ORDER — INSULIN HUMAN 500 [IU]/ML
INJECTION, SOLUTION SUBCUTANEOUS
COMMUNITY
Start: 2017-01-05 | End: 2020-01-01 | Stop reason: SDUPTHER

## 2020-01-01 RX ORDER — TESTOSTERONE CYPIONATE 200 MG/ML
200 INJECTION, SOLUTION INTRAMUSCULAR
Qty: 10 ML | Refills: 1 | Status: SHIPPED | OUTPATIENT
Start: 2020-01-01 | End: 2021-01-01

## 2020-01-01 RX ORDER — INSULIN HUMAN 500 [IU]/ML
INJECTION, SOLUTION SUBCUTANEOUS
Qty: 30 ML | Refills: 2 | Status: SHIPPED | OUTPATIENT
Start: 2020-01-01 | End: 2021-01-01

## 2020-01-01 RX ORDER — FLUCONAZOLE 100 MG/1
100 TABLET ORAL DAILY
Qty: 7 TABLET | Refills: 0 | Status: SHIPPED | OUTPATIENT
Start: 2020-01-01 | End: 2020-01-01

## 2020-01-01 RX ORDER — PROCHLORPERAZINE 25 MG/1
1 SUPPOSITORY RECTAL
Qty: 1 DEVICE | Refills: 0 | Status: SHIPPED | OUTPATIENT
Start: 2020-01-01 | End: 2021-01-01

## 2020-01-01 RX ORDER — PRAVASTATIN SODIUM 40 MG
40 TABLET ORAL
Qty: 90 TABLET | Refills: 3 | Status: SHIPPED | OUTPATIENT
Start: 2020-01-01

## 2020-01-01 RX ORDER — PROCHLORPERAZINE 25 MG/1
1 SUPPOSITORY RECTAL
Qty: 1 EACH | Refills: 3 | Status: ON HOLD | OUTPATIENT
Start: 2020-01-01 | End: 2021-01-01

## 2020-01-01 RX ORDER — ONDANSETRON 2 MG/ML
4 INJECTION INTRAMUSCULAR; INTRAVENOUS ONCE
Status: COMPLETED | OUTPATIENT
Start: 2020-01-01 | End: 2020-01-01

## 2020-01-01 RX ORDER — DEXTROAMPHETAMINE SACCHARATE, AMPHETAMINE ASPARTATE MONOHYDRATE, DEXTROAMPHETAMINE SULFATE AND AMPHETAMINE SULFATE 7.5; 7.5; 7.5; 7.5 MG/1; MG/1; MG/1; MG/1
60 CAPSULE, EXTENDED RELEASE ORAL EVERY MORNING
Qty: 60 CAPSULE | Refills: 0 | Status: SHIPPED | OUTPATIENT
Start: 2020-01-01 | End: 2021-01-01

## 2020-01-01 RX ORDER — GABAPENTIN 300 MG/1
900 CAPSULE ORAL 2 TIMES DAILY
Qty: 180 CAPSULE | Refills: 2 | Status: SHIPPED | OUTPATIENT
Start: 2020-01-01 | End: 2021-01-01 | Stop reason: SDUPTHER

## 2020-01-01 RX ORDER — SYRINGE WITH NEEDLE, 1 ML 25GX5/8"
SYRINGE, EMPTY DISPOSABLE MISCELLANEOUS
Qty: 6 EACH | Refills: 1 | Status: SHIPPED | OUTPATIENT
Start: 2020-01-01 | End: 2021-01-01

## 2020-01-01 RX ORDER — INSULIN HUMAN 500 [IU]/ML
INJECTION, SOLUTION SUBCUTANEOUS
Qty: 30 ML | Refills: 2 | Status: SHIPPED | OUTPATIENT
Start: 2020-01-01 | End: 2020-01-01 | Stop reason: SDUPTHER

## 2020-01-01 RX ADMIN — ONDANSETRON 4 MG: 2 INJECTION INTRAMUSCULAR; INTRAVENOUS at 08:06

## 2020-01-01 RX ADMIN — TRIAMCINOLONE ACETONIDE 40 MG: 40 INJECTION, SUSPENSION INTRA-ARTICULAR; INTRAMUSCULAR at 07:17

## 2020-01-01 RX ADMIN — SODIUM CHLORIDE 125 ML/HR: 9 INJECTION, SOLUTION INTRAVENOUS at 08:06

## 2020-01-01 RX ADMIN — GADOBENATE DIMEGLUMINE 20 ML: 529 INJECTION, SOLUTION INTRAVENOUS at 15:32

## 2020-01-01 RX ADMIN — TRIAMCINOLONE ACETONIDE 40 MG: 40 INJECTION, SUSPENSION INTRA-ARTICULAR; INTRAMUSCULAR at 15:32

## 2020-01-01 RX ADMIN — VANCOMYCIN HYDROCHLORIDE 2750 MG: 10 INJECTION, POWDER, LYOPHILIZED, FOR SOLUTION INTRAVENOUS at 09:57

## 2020-01-01 RX ADMIN — ROPIVACAINE HYDROCHLORIDE 4 ML: 5 INJECTION, SOLUTION EPIDURAL; INFILTRATION; PERINEURAL at 07:17

## 2020-01-01 RX ADMIN — CEFTRIAXONE SODIUM 1 G: 1 INJECTION, POWDER, FOR SOLUTION INTRAMUSCULAR; INTRAVENOUS at 08:13

## 2020-01-01 RX ADMIN — HYDROMORPHONE HYDROCHLORIDE 0.5 MG: 1 INJECTION, SOLUTION INTRAMUSCULAR; INTRAVENOUS; SUBCUTANEOUS at 08:06

## 2020-01-01 RX ADMIN — ROPIVACAINE HYDROCHLORIDE 4 ML: 5 INJECTION, SOLUTION EPIDURAL; INFILTRATION; PERINEURAL at 15:32

## 2020-01-01 RX ADMIN — POTASSIUM CHLORIDE 40 MEQ: 1.5 FOR SOLUTION ORAL at 10:03

## 2020-01-02 LAB
GLUCOSE BLDC GLUCOMTR-MCNC: >599 MG/DL (ref 70–130)

## 2020-01-06 ENCOUNTER — TELEPHONE (OUTPATIENT)
Dept: SLEEP MEDICINE | Facility: HOSPITAL | Age: 65
End: 2020-01-06

## 2020-01-06 DIAGNOSIS — G47.33 OSA (OBSTRUCTIVE SLEEP APNEA): Primary | ICD-10-CM

## 2020-01-06 NOTE — TELEPHONE ENCOUNTER
----- Message from ELMA Kathleen sent at 1/6/2020 10:11 AM EST -----  Pt would like to switch to pt aides so he and wife can go together, new order with settings has been printed.

## 2020-01-09 ENCOUNTER — READMISSION MANAGEMENT (OUTPATIENT)
Dept: CALL CENTER | Facility: HOSPITAL | Age: 65
End: 2020-01-09

## 2020-01-09 DIAGNOSIS — G47.419 PRIMARY NARCOLEPSY WITHOUT CATAPLEXY: Primary | ICD-10-CM

## 2020-01-09 NOTE — TELEPHONE ENCOUNTER
PATIENT IS REQUESTING REFILL ON ADERRALL  20M AND 15 MG FOR RLS.     WAS SCHEDULE TO HAVE A FU 12/23 BUT NO SHOWED APPT AND IS NOW OUT OF MEDICATION. HE IS SCHEDULED TO COME IN ON 01/27/20 FOR MED FU AND WOULD LIKE REFILL UNTIL THEN    PLEASE SEND TO FARTUN VÁSQUEZ IN CHART

## 2020-01-09 NOTE — OUTREACH NOTE
"Medical Week 2 Survey      Responses   Facility patient discharged from?  [facility]   Does the patient have one of the following disease processes/diagnoses(primary or secondary)?  Other   Week 2 attempt successful?  Yes   Call start time  0846   Discharge diagnosis  Uncontrolled DM II with Hyperglycemia, OMAR, mixed HLD, lymphedema, JAIME on CPAP, essential HTN, RLS, Cirrhosis of liver, Chronic CHF, AFib. Primary narcolepsy w/o cataplexy, PVD, diabetic ulcer of right heel   Call end time  0850   Meds reviewed with patient/caregiver?  Yes   Is the patient taking all medications as directed (includes completed medication regime)?  Yes   Has the patient kept scheduled appointments due by today?  Yes   Comments  Hopeful to get insulin pump soon. Seeing wound care for his diabetic ulcer on rt heel.    What is the patient's perception of their health status since discharge?  Improving   Week 2 Call Completed?  Yes   Wrap up additional comments  Glucose is \"stable\", he had no reading to share with me could not remember.  States rt heel is being aggressively treated at wound care and should be getting skin graft in the next 2 weeks he hopes. Also hopeful to get insulin pump soon.           Anna Granado RN  "

## 2020-01-10 RX ORDER — DEXTROAMPHETAMINE SACCHARATE, AMPHETAMINE ASPARTATE, DEXTROAMPHETAMINE SULFATE AND AMPHETAMINE SULFATE 2.5; 2.5; 2.5; 2.5 MG/1; MG/1; MG/1; MG/1
20 TABLET ORAL DAILY
Qty: 60 TABLET | Refills: 0 | Status: SHIPPED | OUTPATIENT
Start: 2020-01-10 | End: 2020-01-27

## 2020-01-10 RX ORDER — DEXTROAMPHETAMINE SACCHARATE, AMPHETAMINE ASPARTATE, DEXTROAMPHETAMINE SULFATE AND AMPHETAMINE SULFATE 5; 5; 5; 5 MG/1; MG/1; MG/1; MG/1
40 TABLET ORAL EVERY MORNING
Qty: 60 TABLET | Refills: 0 | Status: SHIPPED | OUTPATIENT
Start: 2020-01-10 | End: 2020-01-27

## 2020-01-13 ENCOUNTER — TELEPHONE (OUTPATIENT)
Dept: SLEEP MEDICINE | Facility: HOSPITAL | Age: 65
End: 2020-01-13

## 2020-01-13 NOTE — TELEPHONE ENCOUNTER
Per Dr. Womack, patient's previous prescriptions were adderall 30 MG take twice in the morning and adderall 15 MG take twice in the afternoon. Since patient has already picked up his prescription, Dr. Sosa wanted me to advise the patient to take 3- Adderall 20MG  tablets in the morning and 3-Adderall 20 MG tablets in the afternoon. He wanted me to assure the patient keeps his appointment with him on 1/27/2020 so that they can discuss his dosage. Patient verbalized understanding of this information.

## 2020-01-13 NOTE — TELEPHONE ENCOUNTER
PT STATES THE STRENGTH ON MEDICATION HE PICKED UP IS INCORRECT, PT STATES HE WAS GIVEN 20 & 10 MG OF ADDERALL INSTEAD OF 30 & 15 MG. PT STATES DIRECTIONS ARE CORRENT FOR NUMBER OF TIMES PER DAY ON IT, BUT NOT ENOUGH DOSAGE

## 2020-01-16 ENCOUNTER — LAB (OUTPATIENT)
Dept: LAB | Facility: HOSPITAL | Age: 65
End: 2020-01-16

## 2020-01-16 ENCOUNTER — TRANSCRIBE ORDERS (OUTPATIENT)
Dept: LAB | Facility: HOSPITAL | Age: 65
End: 2020-01-16

## 2020-01-16 DIAGNOSIS — N18.2 CHRONIC KIDNEY DISEASE, STAGE II (MILD): ICD-10-CM

## 2020-01-16 DIAGNOSIS — N18.2 CHRONIC KIDNEY DISEASE, STAGE II (MILD): Primary | ICD-10-CM

## 2020-01-16 LAB
BACTERIA UR QL AUTO: NORMAL /HPF
BILIRUB UR QL STRIP: NEGATIVE
CLARITY UR: CLEAR
COLOR UR: YELLOW
CREAT UR-MCNC: 13.1 MG/DL
GLUCOSE UR STRIP-MCNC: ABNORMAL MG/DL
HGB UR QL STRIP.AUTO: NEGATIVE
HYALINE CASTS UR QL AUTO: NORMAL /LPF
KETONES UR QL STRIP: NEGATIVE
LEUKOCYTE ESTERASE UR QL STRIP.AUTO: NEGATIVE
NITRITE UR QL STRIP: NEGATIVE
PH UR STRIP.AUTO: 7 [PH] (ref 5–8)
PROT UR QL STRIP: NEGATIVE
PROT UR-MCNC: 6 MG/DL
RBC # UR: NORMAL /HPF
REF LAB TEST METHOD: NORMAL
SP GR UR STRIP: 1.01 (ref 1–1.03)
SQUAMOUS #/AREA URNS HPF: NORMAL /HPF
UROBILINOGEN UR QL STRIP: ABNORMAL
WBC UR QL AUTO: NORMAL /HPF

## 2020-01-16 PROCEDURE — 84156 ASSAY OF PROTEIN URINE: CPT

## 2020-01-16 PROCEDURE — 81001 URINALYSIS AUTO W/SCOPE: CPT

## 2020-01-16 PROCEDURE — 82570 ASSAY OF URINE CREATININE: CPT | Performed by: INTERNAL MEDICINE

## 2020-01-16 PROCEDURE — 36415 COLL VENOUS BLD VENIPUNCTURE: CPT

## 2020-01-16 PROCEDURE — 85025 COMPLETE CBC W/AUTO DIFF WBC: CPT

## 2020-01-16 PROCEDURE — 80069 RENAL FUNCTION PANEL: CPT

## 2020-01-17 ENCOUNTER — TELEPHONE (OUTPATIENT)
Dept: INTERNAL MEDICINE | Facility: CLINIC | Age: 65
End: 2020-01-17

## 2020-01-17 LAB
ALBUMIN SERPL-MCNC: 3.7 G/DL (ref 3.5–5.2)
ANION GAP SERPL CALCULATED.3IONS-SCNC: 14.1 MMOL/L (ref 5–15)
BASOPHILS # BLD AUTO: 0.04 10*3/MM3 (ref 0–0.2)
BASOPHILS NFR BLD AUTO: 0.9 % (ref 0–1.5)
BUN BLD-MCNC: 32 MG/DL (ref 8–23)
BUN/CREAT SERPL: 25 (ref 7–25)
CALCIUM SPEC-SCNC: 9.2 MG/DL (ref 8.6–10.5)
CHLORIDE SERPL-SCNC: 87 MMOL/L (ref 98–107)
CO2 SERPL-SCNC: 26.9 MMOL/L (ref 22–29)
CREAT BLD-MCNC: 1.28 MG/DL (ref 0.76–1.27)
DEPRECATED RDW RBC AUTO: 41.1 FL (ref 37–54)
EOSINOPHIL # BLD AUTO: 0.07 10*3/MM3 (ref 0–0.4)
EOSINOPHIL NFR BLD AUTO: 1.5 % (ref 0.3–6.2)
ERYTHROCYTE [DISTWIDTH] IN BLOOD BY AUTOMATED COUNT: 12.8 % (ref 12.3–15.4)
GFR SERPL CREATININE-BSD FRML MDRD: 56 ML/MIN/1.73
GLUCOSE BLD-MCNC: 435 MG/DL (ref 65–99)
HCT VFR BLD AUTO: 43.6 % (ref 37.5–51)
HGB BLD-MCNC: 14.1 G/DL (ref 13–17.7)
IMM GRANULOCYTES # BLD AUTO: 0.03 10*3/MM3 (ref 0–0.05)
IMM GRANULOCYTES NFR BLD AUTO: 0.7 % (ref 0–0.5)
LYMPHOCYTES # BLD AUTO: 1.2 10*3/MM3 (ref 0.7–3.1)
LYMPHOCYTES NFR BLD AUTO: 26.3 % (ref 19.6–45.3)
MCH RBC QN AUTO: 28.7 PG (ref 26.6–33)
MCHC RBC AUTO-ENTMCNC: 32.3 G/DL (ref 31.5–35.7)
MCV RBC AUTO: 88.6 FL (ref 79–97)
MONOCYTES # BLD AUTO: 0.57 10*3/MM3 (ref 0.1–0.9)
MONOCYTES NFR BLD AUTO: 12.5 % (ref 5–12)
NEUTROPHILS # BLD AUTO: 2.65 10*3/MM3 (ref 1.7–7)
NEUTROPHILS NFR BLD AUTO: 58.1 % (ref 42.7–76)
NRBC BLD AUTO-RTO: 0 /100 WBC (ref 0–0.2)
PHOSPHATE SERPL-MCNC: 2.8 MG/DL (ref 2.5–4.5)
PLATELET # BLD AUTO: 139 10*3/MM3 (ref 140–450)
PMV BLD AUTO: 12.2 FL (ref 6–12)
POTASSIUM BLD-SCNC: 4.7 MMOL/L (ref 3.5–5.2)
RBC # BLD AUTO: 4.92 10*6/MM3 (ref 4.14–5.8)
SODIUM BLD-SCNC: 128 MMOL/L (ref 136–145)
WBC NRBC COR # BLD: 4.56 10*3/MM3 (ref 3.4–10.8)

## 2020-01-17 NOTE — TELEPHONE ENCOUNTER
Patient called stating that someone from the office called but did not leave a vm. Pt stated that he called yesterday to see if he could have his labs done early and mentioned he and his wife both have appointments at the office Monday. He was wondering if the call back was in reference to the above. Please advise.      Pt. Call back [phone]

## 2020-01-20 ENCOUNTER — READMISSION MANAGEMENT (OUTPATIENT)
Dept: CALL CENTER | Facility: HOSPITAL | Age: 65
End: 2020-01-20

## 2020-01-20 ENCOUNTER — OFFICE VISIT (OUTPATIENT)
Dept: INTERNAL MEDICINE | Facility: CLINIC | Age: 65
End: 2020-01-20

## 2020-01-20 VITALS
WEIGHT: 297 LBS | DIASTOLIC BLOOD PRESSURE: 76 MMHG | SYSTOLIC BLOOD PRESSURE: 126 MMHG | HEIGHT: 76 IN | HEART RATE: 72 BPM | BODY MASS INDEX: 36.17 KG/M2

## 2020-01-20 DIAGNOSIS — I42.5 RESTRICTIVE CARDIOMYOPATHY (HCC): Primary | ICD-10-CM

## 2020-01-20 DIAGNOSIS — I10 ESSENTIAL HYPERTENSION: ICD-10-CM

## 2020-01-20 DIAGNOSIS — K75.81 NASH (NONALCOHOLIC STEATOHEPATITIS): ICD-10-CM

## 2020-01-20 DIAGNOSIS — F32.89 OTHER DEPRESSION: ICD-10-CM

## 2020-01-20 DIAGNOSIS — R60.0 LOCALIZED EDEMA: ICD-10-CM

## 2020-01-20 DIAGNOSIS — G25.81 RESTLESS LEGS SYNDROME (RLS): ICD-10-CM

## 2020-01-20 DIAGNOSIS — E11.65 UNCONTROLLED TYPE 2 DIABETES MELLITUS WITH HYPERGLYCEMIA (HCC): ICD-10-CM

## 2020-01-20 DIAGNOSIS — F98.8 ATTENTION DEFICIT DISORDER (ADD) WITHOUT HYPERACTIVITY: ICD-10-CM

## 2020-01-20 DIAGNOSIS — E78.2 MIXED HYPERLIPIDEMIA: ICD-10-CM

## 2020-01-20 DIAGNOSIS — E66.01 MORBIDLY OBESE (HCC): ICD-10-CM

## 2020-01-20 DIAGNOSIS — G47.419 PRIMARY NARCOLEPSY WITHOUT CATAPLEXY: ICD-10-CM

## 2020-01-20 DIAGNOSIS — I48.0 PAROXYSMAL ATRIAL FIBRILLATION (HCC): ICD-10-CM

## 2020-01-20 PROCEDURE — 99214 OFFICE O/P EST MOD 30 MIN: CPT | Performed by: INTERNAL MEDICINE

## 2020-01-20 NOTE — PROGRESS NOTES
Patient is a 64 y.o. male who is here for a follow up of hyperlipidemia and hypertension.  Chief Complaint   Patient presents with   • Hyperlipidemia   • Hypertension         HPI:    Here for mgmt of hypothyroid/CHF/hyperlipidemia.  Onset years.  Followed by TYREE for DM.  Last AIC was up of recent.  Difficulty paying for his insulin at times.  Breathing is good.  Energy level is not the best.  Wanting to get an insulin pump.  Trying to watch is diet.  Breathing well.  No CP.  Edema is controlled.  No cough.    History:     Patient Active Problem List   Diagnosis   • Type 2 diabetes mellitus with hyperglycemia (CMS/HCC)   • Elevated LFTs   • Thrombocytopenia (CMS/HCC)   • PVD (peripheral vascular disease) (CMS/HCC)   • A-fib (CMS/HCC)   • Cirrhosis of liver (CMS/HCC)   • Chronic congestive heart failure (CMS/HCC)   • Diabetic ulcer of right heel (CMS/HCC)   • Primary narcolepsy without cataplexy   • Essential hypertension   • Morbidly obese (CMS/HCC)   • Restless legs syndrome (RLS)   • ADD (attention deficit disorder)   • Depression   • Diabetes mellitus (CMS/HCC)   • Edema   • MCCAULEY (nonalcoholic steatohepatitis)   • Hepatitis B   • Insomnia   • Lymphedema   • Narcolepsy   • Obesity   • JAIME on CPAP   • RLS (restless legs syndrome)   • Tremor of both hands   • Restrictive cardiomyopathy (CMS/HCC)   • Mixed hyperlipidemia   • Uncontrolled type 2 diabetes mellitus with hyperglycemia (CMS/HCC)   • OMAR (acute kidney injury) (CMS/HCC)       Past Medical History:   Diagnosis Date   • A-fib (CMS/HCC)    • ADD (attention deficit disorder)    • Atrial flutter (CMS/HCC)    • Cellulitis    • CHF (congestive heart failure) (CMS/HCC)    • Cirrhosis of liver (CMS/HCC)    • Constipation    • Depression    • Diabetes mellitus (CMS/HCC)    • Diabetic ulcer of right foot (CMS/HCC)    • Disease of thyroid gland    • Edema    • Fatty liver    • Hepatitis B    • Hyperlipidemia    • Hypokalemia    • Insomnia    • Lymphedema    •  Narcolepsy    • Neuropathy    • Obesity    • JAIME on CPAP    • PVD (peripheral vascular disease) (CMS/HCC)    • RLS (restless legs syndrome)    • Skin cancer    • Tardive dyskinesia    • Tremor of both hands    • Yeast infection        Past Surgical History:   Procedure Laterality Date   • ACHILLES TENDON REPAIR     • CARDIAC ABLATION     • CHOLECYSTECTOMY     • LASER ABLATION      VEIN RLE       Current Outpatient Medications on File Prior to Visit   Medication Sig   • amphetamine-dextroamphetamine (ADDERALL) 10 MG tablet Take 2 tablets by mouth Daily. afternoons   • amphetamine-dextroamphetamine (ADDERALL) 20 MG tablet Take 2 tablets by mouth Every Morning.   • aspirin 81 MG EC tablet TAKE 1 TABLET BY MOUTH EVERY DAY   • bacitracin 500 UNIT/GM ointment Apply  topically to the appropriate area as directed Daily As Needed for Wound Care.   • bumetanide (BUMEX) 2 MG tablet TAKE 2 TABLETS BY MOUTH TWICE A DAY   • clotrimazole (ANTI-FUNGAL) 1 % cream Apply 1 application topically to the appropriate area as directed 4 (Four) Times a Day As Needed.   • Docusate Calcium (STOOL SOFTENER PO) Take 1 tablet by mouth As Needed.   • gabapentin (NEURONTIN) 300 MG capsule TAKE 3 CAPSULES BY MOUTH 2 (TWO) TIMES A DAY.   • insulin regular (humuLIN R,novoLIN R) 100 UNIT/ML injection Inject  under the skin into the appropriate area as directed 2 (Two) Times a Day Before Meals. 115 UNITS IN AM  100 UNITS IN PM   • ketoconazole (NIZORAL) 2 % cream Apply  topically to the appropriate area as directed 2 (Two) Times a Day As Needed for Itching.   • levocetirizine (XYZAL) 5 MG tablet Take 1 tablet by mouth Every Evening.   • levothyroxine (SYNTHROID, LEVOTHROID) 75 MCG tablet TAKE 1 TABLET BY MOUTH EVERY DAY   • metOLazone (ZAROXOLYN) 5 MG tablet Take 1 tablet by mouth Daily As Needed (edema/fluid overload).   • ondansetron (ZOFRAN) 8 MG tablet Take 1 tablet by mouth Every 8 (Eight) Hours As Needed for Nausea or Vomiting.   • polyethylene  "glycol (MIRALAX) powder Take 17 g by mouth As Needed.   • potassium chloride (K-DUR) 10 MEQ CR tablet TAKE THREE TABLETS BY MOUTH TWICE A DAY   • pramipexole (MIRAPEX) 1 MG tablet TAKE 1 TABLET BY MOUTH TWICE A DAY   • pravastatin (PRAVACHOL) 40 MG tablet Take 1 tablet by mouth Every Night.   • simethicone (MYLICON) 125 MG chewable tablet Chew 125 mg Every 8 (Eight) Hours As Needed for Flatulence.   • spironolactone (ALDACTONE) 100 MG tablet TAKE 1 TABLET BY MOUTH EVERY DAY   • traZODone (DESYREL) 100 MG tablet TAKE 1 TABLET BY MOUTH AT BEDTIME AS NEEDED FOR SLEEP   • venlafaxine XR (EFFEXOR-XR) 150 MG 24 hr capsule Take 1 capsule by mouth Every Night.   • ACCU-CHEK COREY PLUS test strip 2 (Two) Times a Day.   • Insulin Syringe 29G X 1/2\" 1 ML misc Use one each to inject insulin twice daily Ell.9     No current facility-administered medications on file prior to visit.        Family History   Problem Relation Age of Onset   • Heart failure Mother         CHF   • Clotting disorder Father    • No Known Problems Sister    • No Known Problems Brother    • No Known Problems Brother        Social History     Socioeconomic History   • Marital status:      Spouse name: Not on file   • Number of children: Not on file   • Years of education: Not on file   • Highest education level: Not on file   Tobacco Use   • Smoking status: Never Smoker   • Smokeless tobacco: Never Used   Substance and Sexual Activity   • Alcohol use: Yes     Comment: \"3 SCOTCH AND 2 BEERS PER WEEK\"   • Drug use: No   • Sexual activity: Defer         Review of Systems   Constitutional: Positive for fatigue. Negative for chills and fever.   HENT: Negative for congestion, ear pain, hearing loss, rhinorrhea, sinus pressure, sore throat and trouble swallowing.    Eyes: Negative for discharge and itching.   Respiratory: Positive for shortness of breath. Negative for cough and chest tightness.    Cardiovascular: Positive for leg swelling. Negative for " "chest pain and palpitations.   Gastrointestinal: Negative for abdominal pain, blood in stool, constipation, diarrhea and vomiting.        Colonoscopy 3/16   Endocrine: Positive for cold intolerance. Negative for polydipsia and polyuria.   Genitourinary: Negative for difficulty urinating, dysuria, enuresis, frequency, hematuria and urgency.   Musculoskeletal: Positive for arthralgias, back pain and gait problem. Negative for joint swelling.   Skin: Positive for wound (right foot). Negative for rash.   Allergic/Immunologic: Negative for immunocompromised state.   Neurological: Positive for weakness and light-headedness. Negative for dizziness, syncope, numbness and headaches.   Hematological: Does not bruise/bleed easily.   Psychiatric/Behavioral: Negative for behavioral problems, dysphoric mood and sleep disturbance. The patient is not nervous/anxious.        /76   Pulse 72   Ht 193 cm (75.98\")   Wt 135 kg (297 lb)   BMI 36.17 kg/m²       Physical Exam   Constitutional: He is oriented to person, place, and time. He appears well-developed and well-nourished.   HENT:   Head: Normocephalic and atraumatic.   Right Ear: External ear normal.   Left Ear: External ear normal.   Mouth/Throat: Oropharynx is clear and moist.   Eyes: Conjunctivae and EOM are normal.   Neck: Normal range of motion. Neck supple.   Cardiovascular: Normal rate and regular rhythm.   Slight diminished heart sounds   Pulmonary/Chest: Effort normal and breath sounds normal.   Abdominal: Soft. Bowel sounds are normal.   Musculoskeletal: He exhibits edema.   Lymphadenopathy:     He has no cervical adenopathy.   Neurological: He is alert and oriented to person, place, and time.   Skin: Skin is warm and dry.   Followed by wound care   Psychiatric: He has a normal mood and affect. His behavior is normal. Thought content normal.       Procedure:      Discussion/Summary:    DM-per TYREE, counseled on low carb, uncontrolled and awaiting " "Omnipod  Chronic LE edema-advised to weigh daily and if needed add metolazone, stable  CHF-\" \"  Hep B hx/FLD-f/u Dr Murcia  Neuropathy-on Gabapentin, stable  Narcolepsy-on Adderall, stable  JAIME-cont CPAP, stable  Hx of Afib-no recurrence s/p Ablation  Hypothyroid-TSH 12/27 at goal  Depression-controlled on Effexor  RLS-stable on Mirapex  Elevated Cr-stable advised to ensure adequate hydration, f/u NA  Hyperlipidemia-cont statin, labs 12/27 dw patient     Labs noted and dw patient       Current Outpatient Medications:   •  amphetamine-dextroamphetamine (ADDERALL) 10 MG tablet, Take 2 tablets by mouth Daily. afternoons, Disp: 60 tablet, Rfl: 0  •  amphetamine-dextroamphetamine (ADDERALL) 20 MG tablet, Take 2 tablets by mouth Every Morning., Disp: 60 tablet, Rfl: 0  •  aspirin 81 MG EC tablet, TAKE 1 TABLET BY MOUTH EVERY DAY, Disp: 90 tablet, Rfl: 1  •  bacitracin 500 UNIT/GM ointment, Apply  topically to the appropriate area as directed Daily As Needed for Wound Care., Disp: , Rfl:   •  bumetanide (BUMEX) 2 MG tablet, TAKE 2 TABLETS BY MOUTH TWICE A DAY, Disp: 348 tablet, Rfl: 1  •  clotrimazole (ANTI-FUNGAL) 1 % cream, Apply 1 application topically to the appropriate area as directed 4 (Four) Times a Day As Needed., Disp: , Rfl:   •  Docusate Calcium (STOOL SOFTENER PO), Take 1 tablet by mouth As Needed., Disp: , Rfl:   •  gabapentin (NEURONTIN) 300 MG capsule, TAKE 3 CAPSULES BY MOUTH 2 (TWO) TIMES A DAY., Disp: 540 capsule, Rfl: 0  •  insulin regular (humuLIN R,novoLIN R) 100 UNIT/ML injection, Inject  under the skin into the appropriate area as directed 2 (Two) Times a Day Before Meals. 115 UNITS IN  UNITS IN PM, Disp: , Rfl:   •  ketoconazole (NIZORAL) 2 % cream, Apply  topically to the appropriate area as directed 2 (Two) Times a Day As Needed for Itching., Disp: 60 g, Rfl: 2  •  levocetirizine (XYZAL) 5 MG tablet, Take 1 tablet by mouth Every Evening., Disp: 90 tablet, Rfl: 1  •  levothyroxine " "(SYNTHROID, LEVOTHROID) 75 MCG tablet, TAKE 1 TABLET BY MOUTH EVERY DAY, Disp: 90 tablet, Rfl: 1  •  metOLazone (ZAROXOLYN) 5 MG tablet, Take 1 tablet by mouth Daily As Needed (edema/fluid overload)., Disp: 90 tablet, Rfl: 1  •  ondansetron (ZOFRAN) 8 MG tablet, Take 1 tablet by mouth Every 8 (Eight) Hours As Needed for Nausea or Vomiting., Disp: 20 tablet, Rfl: 2  •  polyethylene glycol (MIRALAX) powder, Take 17 g by mouth As Needed., Disp: , Rfl:   •  potassium chloride (K-DUR) 10 MEQ CR tablet, TAKE THREE TABLETS BY MOUTH TWICE A DAY, Disp: 540 tablet, Rfl: 0  •  pramipexole (MIRAPEX) 1 MG tablet, TAKE 1 TABLET BY MOUTH TWICE A DAY, Disp: 180 tablet, Rfl: 1  •  pravastatin (PRAVACHOL) 40 MG tablet, Take 1 tablet by mouth Every Night., Disp: 90 tablet, Rfl: 3  •  simethicone (MYLICON) 125 MG chewable tablet, Chew 125 mg Every 8 (Eight) Hours As Needed for Flatulence., Disp: , Rfl:   •  spironolactone (ALDACTONE) 100 MG tablet, TAKE 1 TABLET BY MOUTH EVERY DAY, Disp: 90 tablet, Rfl: 1  •  traZODone (DESYREL) 100 MG tablet, TAKE 1 TABLET BY MOUTH AT BEDTIME AS NEEDED FOR SLEEP, Disp: 90 tablet, Rfl: 0  •  venlafaxine XR (EFFEXOR-XR) 150 MG 24 hr capsule, Take 1 capsule by mouth Every Night., Disp: 90 capsule, Rfl: 1  •  ACCU-CHEK COREY PLUS test strip, 2 (Two) Times a Day., Disp: , Rfl: 4  •  Insulin Syringe 29G X 1/2\" 1 ML misc, Use one each to inject insulin twice daily Ell.9, Disp: 100 each, Rfl: 1        Abe was seen today for hyperlipidemia and hypertension.    Diagnoses and all orders for this visit:    Restrictive cardiomyopathy (CMS/HCC)    Mixed hyperlipidemia    Essential hypertension    Paroxysmal atrial fibrillation (CMS/HCC)    MCCAULEY (nonalcoholic steatohepatitis)    Morbidly obese (CMS/HCC)    Uncontrolled type 2 diabetes mellitus with hyperglycemia (CMS/HCC)    Restless legs syndrome (RLS)    Primary narcolepsy without cataplexy    Localized edema    Other depression    Attention deficit disorder " (ADD) without hyperactivity

## 2020-01-20 NOTE — OUTREACH NOTE
Medical Week 3 Survey      Responses   Facility patient discharged from?  [facility]   Does the patient have one of the following disease processes/diagnoses(primary or secondary)?  Other   Week 3 attempt successful?  Yes   Call start time  1407   Call end time  1417   Meds reviewed with patient/caregiver?  Yes   Is the patient having any side effects they believe may be caused by any medication additions or changes?  No   Does the patient have all medications ordered at discharge?  Yes   Is the patient taking all medications as directed (includes completed medication regime)?  Yes   Does the patient have a primary care provider?   Yes   Has the patient kept scheduled appointments due by today?  Yes   Has home health visited the patient within 72 hours of discharge?  N/A   Psychosocial issues?  No   Did the patient receive a copy of their discharge instructions?  Yes   Nursing interventions  Reviewed instructions with patient   What is the patient's perception of their health status since discharge?  Improving   Is the patient/caregiver able to teach back signs and symptoms related to disease process for when to call PCP?  Yes   Is the patient/caregiver able to teach back signs and symptoms related to disease process for when to call 911?  Yes   Is the patient/caregiver able to teach back the hierarchy of who to call/visit for symptoms/problems? PCP, Specialist, Home health nurse, Urgent Care, ED, 911  Yes   Week 3 Call Completed?  Yes   Graduated  Yes   Graduated/Revoked comments  States is feeling better-[facility] is managing foot ulcer. States BS are stable. No complaints-states will call if any questions/concerns.          Karoline Krishnamurthy RN

## 2020-01-24 ENCOUNTER — OFFICE VISIT (OUTPATIENT)
Dept: CARDIOLOGY | Facility: CLINIC | Age: 65
End: 2020-01-24

## 2020-01-24 VITALS
HEIGHT: 76 IN | DIASTOLIC BLOOD PRESSURE: 75 MMHG | BODY MASS INDEX: 37.51 KG/M2 | SYSTOLIC BLOOD PRESSURE: 144 MMHG | HEART RATE: 92 BPM | WEIGHT: 308 LBS

## 2020-01-24 DIAGNOSIS — I73.9 PVD (PERIPHERAL VASCULAR DISEASE) (HCC): ICD-10-CM

## 2020-01-24 DIAGNOSIS — I48.0 PAROXYSMAL ATRIAL FIBRILLATION (HCC): ICD-10-CM

## 2020-01-24 DIAGNOSIS — K74.60 CIRRHOSIS OF LIVER WITHOUT ASCITES, UNSPECIFIED HEPATIC CIRRHOSIS TYPE (HCC): ICD-10-CM

## 2020-01-24 DIAGNOSIS — Z79.4 TYPE 2 DIABETES MELLITUS WITH HYPERGLYCEMIA, WITH LONG-TERM CURRENT USE OF INSULIN (HCC): ICD-10-CM

## 2020-01-24 DIAGNOSIS — Z99.89 OSA ON CPAP: ICD-10-CM

## 2020-01-24 DIAGNOSIS — I50.32 CHRONIC DIASTOLIC CONGESTIVE HEART FAILURE (HCC): Primary | ICD-10-CM

## 2020-01-24 DIAGNOSIS — G47.33 OSA ON CPAP: ICD-10-CM

## 2020-01-24 DIAGNOSIS — E11.65 TYPE 2 DIABETES MELLITUS WITH HYPERGLYCEMIA, WITH LONG-TERM CURRENT USE OF INSULIN (HCC): ICD-10-CM

## 2020-01-24 DIAGNOSIS — E78.5 DYSLIPIDEMIA: ICD-10-CM

## 2020-01-24 PROCEDURE — 99214 OFFICE O/P EST MOD 30 MIN: CPT | Performed by: INTERNAL MEDICINE

## 2020-01-24 NOTE — PROGRESS NOTES
Subjective:     Encounter Date:01/24/2020    Patient ID: Abe Phillips Jr. is a 64 y.o.  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  He recently moved here from [place], [place].     SELF REFERRED  INTERNIST: Anibal Maria MD  PULMONOLOGIST: Fernando Womack MD, [facility]  NEUROLOGIST: ELMA Jenkins  ELECTROPHYSIOLOGIST: Jalen Scott MD ([place])  REMOTE CARDIOLOGIST: Mathew Cox MD ([place])  GASTROENTEROLOGIST: Jalen Rashid MD ([place])  WOUND CARE: [facility], Winifredvivi Pedersen MD  ENDOCRINOLOGIST: Dustin Hendricks MD    Chief Complaint:   Chief Complaint   Patient presents with   • Atrial Fibrillation     Problem List:  1. Paroxysmal atrial fibrillation:  a. Diagnosed 2016, CHADsVasc 4, anticoagulated with Eliquis  b. ECV, 2016  c. Atrial fibrillation and flutter ablation, 8/30/2016 ([place]  d. CHADsVasc 4, anticoagulated with ASA  e. NSR on ECG, February 2019  2. Chronic congestive heart failure (HFpEF):  a. Echocardiogram, July 2016, LVEF 0.58 ([place]  b. Echocardiogram, January 2017, LVEF 0.50 [place]  c. Echocardiogram, 4/12/2018: Mild concentric LVH, EF 55-60 percent, trivial TR  d. Echocardiogram, 1/5/2019: EF greater than 0.70, LV wall thickness is consistent with posterior asymmetric hypertrophy, trace MR, physiologic TR, no pericardial effusion  3. Hypertensive cardiovascular disease:  a. Remote stress test and negative LHC, approximately 2016, in [place]-data deficit  b. Residual class I symptoms  4. Hypertension  5. Hyperlipidemia; not on statin therapy, patient wishes to not take statins  6. Type 2 diabetes mellitus; hemoglobin A1c 7.9% July 2016; 8%, autumn 2018; 11.7%, January 2019 with RLE foot ulcer since May 2018 (seeing wound care), 7.9% March 2019, 13.6%, December 2019  7. Hypothyroidism on replacement therapy  8. Peripheral vascular disease  9. Obstructive sleep apnea with CPAP with sleep study  2/4/2019: Negative for significant JAIME, but had very little REM sleep  10. Restless leg syndrome  11. Narcolepsy  12. Anxiety and depression  13. Moderate obesity: BMI 37.5  14. Hepatic cirrhosis and portal hypertension with splenomegaly  15. History of hepatitis B antibody 30 years ago  16. History of duodenal ulcer  17. Encephalopathy/sepsis/dehydration with 2-day [facility] admission, January 2019, with fever of unknown origin, echocardiogram negative for endocarditis  18. 60-pound intentional weight loss over past 2 years, February 2019  19. Surgical history:  a. Cholecystectomy  b. LHC  c. Atrial fibrillation/flutter ablation  d. Hemorrhoid surgery  e. Liver biopsy    No Known Allergies      Current Outpatient Medications:   •  ACCU-CHEK COREY PLUS test strip, 2 (Two) Times a Day., Disp: , Rfl: 4  •  amphetamine-dextroamphetamine (ADDERALL) 10 MG tablet, Take 2 tablets by mouth Daily. afternoons (Patient taking differently: Take 30 mg by mouth Daily. afternoons ), Disp: 60 tablet, Rfl: 0  •  amphetamine-dextroamphetamine (ADDERALL) 20 MG tablet, Take 2 tablets by mouth Every Morning. (Patient taking differently: Take 60 mg by mouth Every Morning.), Disp: 60 tablet, Rfl: 0  •  aspirin 81 MG EC tablet, TAKE 1 TABLET BY MOUTH EVERY DAY, Disp: 90 tablet, Rfl: 1  •  bumetanide (BUMEX) 2 MG tablet, TAKE 2 TABLETS BY MOUTH TWICE A DAY, Disp: 348 tablet, Rfl: 1  •  clotrimazole (ANTI-FUNGAL) 1 % cream, Apply 1 application topically to the appropriate area as directed 4 (Four) Times a Day As Needed., Disp: , Rfl:   •  Docusate Calcium (STOOL SOFTENER PO), Take 1 tablet by mouth As Needed., Disp: , Rfl:   •  gabapentin (NEURONTIN) 300 MG capsule, TAKE 3 CAPSULES BY MOUTH 2 (TWO) TIMES A DAY., Disp: 540 capsule, Rfl: 0  •  insulin regular (humuLIN R,novoLIN R) 100 UNIT/ML injection, Inject  under the skin into the appropriate area as directed 2 (Two) Times a Day Before Meals. 115 UNITS IN  UNITS IN PM, Disp: , Rfl:   •   "Insulin Syringe 29G X 1/2\" 1 ML misc, Use one each to inject insulin twice daily Ell.9, Disp: 100 each, Rfl: 1  •  ketoconazole (NIZORAL) 2 % cream, Apply  topically to the appropriate area as directed 2 (Two) Times a Day As Needed for Itching., Disp: 60 g, Rfl: 2  •  levocetirizine (XYZAL) 5 MG tablet, Take 1 tablet by mouth Every Evening., Disp: 90 tablet, Rfl: 1  •  levothyroxine (SYNTHROID, LEVOTHROID) 75 MCG tablet, TAKE 1 TABLET BY MOUTH EVERY DAY, Disp: 90 tablet, Rfl: 1  •  metOLazone (ZAROXOLYN) 5 MG tablet, Take 1 tablet by mouth Daily As Needed (edema/fluid overload)., Disp: 90 tablet, Rfl: 1  •  ondansetron (ZOFRAN) 8 MG tablet, Take 1 tablet by mouth Every 8 (Eight) Hours As Needed for Nausea or Vomiting., Disp: 20 tablet, Rfl: 2  •  polyethylene glycol (MIRALAX) powder, Take 17 g by mouth As Needed., Disp: , Rfl:   •  potassium chloride (K-DUR) 10 MEQ CR tablet, TAKE THREE TABLETS BY MOUTH TWICE A DAY, Disp: 540 tablet, Rfl: 0  •  pramipexole (MIRAPEX) 1 MG tablet, TAKE 1 TABLET BY MOUTH TWICE A DAY, Disp: 180 tablet, Rfl: 1  •  pravastatin (PRAVACHOL) 40 MG tablet, Take 1 tablet by mouth Every Night., Disp: 90 tablet, Rfl: 3  •  spironolactone (ALDACTONE) 100 MG tablet, TAKE 1 TABLET BY MOUTH EVERY DAY, Disp: 90 tablet, Rfl: 1  •  traZODone (DESYREL) 100 MG tablet, TAKE 1 TABLET BY MOUTH AT BEDTIME AS NEEDED FOR SLEEP (Patient taking differently: 50 mg.), Disp: 90 tablet, Rfl: 0  •  venlafaxine XR (EFFEXOR-XR) 150 MG 24 hr capsule, Take 1 capsule by mouth Every Night., Disp: 90 capsule, Rfl: 1    HISTORY OF PRESENT ILLNESS: Patient returns for scheduled 6-month followup; he was last seen by ELMA Molina on 07/11/2019.  He was seen in the [facility] ED on 10/02/2019 for fatigue, without admission.  He was then admitted to [facility] for 3 days in late December 2019 for uncontrolled type 2 diabetes mellitus with hyperglycemia .  He says that his potassium and sodium were low, and his sugar was high, so that is " "why he ended up in the hospital.  He notes that he had \"fallen into the donut hole\" and was unable to stay on the medicine that he had been on.  He has now been prescribed an insulin pump, and he is supposed to get it next week.  He states the co-pay is still very high, but \"I've got a plan to get that by Monday.\"  Most days, his fluid status is good, and he has no breathing problems.  He notes that he is \"getting more and more active.\"  He has an ulcer on his right heel, which had healed up completely in February 2019, and he had not had any problems with it, but it came back a couple of months ago.  He goes to Wound Care at [facility] every Friday.  It is healing, and he states they want to do skin grafts.  The problem is they want him to stay off of it.  He has gone back to work and is now working for [employer] and [employer] in the evening to early morning hours; he says [employer] is better to work with and \"has a better class of rider.\"  With his activities, he has no chest pain, tightness, or pressure.  Patient otherwise denies chest pain, shortness of breath, PND, edema, palpitations, syncope or presyncope at this time on current activity.        Review of Systems   Cardiovascular: Positive for leg swelling.   Endocrine: Positive for polydipsia.   Skin: Positive for dry skin and poor wound healing.   Genitourinary: Positive for bladder incontinence and frequency.   Allergic/Immunologic: Positive for environmental allergies.      All other systems reviewed and otherwise negative.    Procedures       Objective:       Vitals:    01/24/20 1248 01/24/20 1253   BP: 149/82 144/75   BP Location: Left arm Left arm   Patient Position: Sitting Standing   Pulse: 89 92   Weight: (!) 140 kg (308 lb)    Height: 193 cm (76\")      Body mass index is 37.49 kg/m².   Last weight:  312 lbs.    Physical Exam   Constitutional: He is oriented to person, place, and time. He appears well-developed and well-nourished.   Neck: No JVD present. " Carotid bruit is not present. No thyromegaly present.   Cardiovascular: Regular rhythm, S1 normal and S2 normal. Exam reveals distant heart sounds. Exam reveals no gallop, no S3 and no friction rub.   Murmur heard.   Medium-pitched early systolic murmur is present with a grade of 2/6 at the lower left sternal border.  Pulses:       Carotid pulses are 1+ on the right side, and 1+ on the left side.       Radial pulses are 1+ on the right side, and 1+ on the left side.        Femoral pulses are 1+ on the right side, and 1+ on the left side.       Popliteal pulses are 1+ on the right side, and 1+ on the left side.        Dorsalis pedis pulses are 1+ on the right side, and 1+ on the left side.        Posterior tibial pulses are 1+ on the right side, and 1+ on the left side.   Pulmonary/Chest: Effort normal. He has decreased breath sounds. He has no wheezes. He has no rhonchi. He has no rales.   Abdominal: Soft. He exhibits no mass. There is no hepatosplenomegaly. There is no tenderness. There is no guarding.   Bowel sounds audible x4   Musculoskeletal: Normal range of motion. He exhibits edema (1+).   Lymphadenopathy:     He has no cervical adenopathy.   Neurological: He is alert and oriented to person, place, and time.   Skin: Skin is warm, dry and intact. No rash noted.   Vitals reviewed.        Lab Review:   12/29/2019:  · Magnesium - 2.0  · Phosphorus - 2.1  · Procalcitonin - 0.30  · BMP - glucose 231, BUN 27, creatinine 0.98, sodium 132, potassium 4.5, chloride 99, CO2 - 23, calcium 9.1, GFR 77    01/16/2020:  · Urine protein - 6.0  · Urine creatinine - 13.1  · Urinalysis - unremarkable    Lab Results   Component Value Date    GLUCOSE 435 (C) 01/16/2020    BUN 32 (H) 01/16/2020    CREATININE 1.28 (H) 01/16/2020    EGFRIFNONA 56 (L) 01/16/2020    BCR 25.0 01/16/2020    CO2 26.9 01/16/2020    CALCIUM 9.2 01/16/2020    PROTENTOTREF 7.0 07/26/2019    ALBUMIN 3.70 01/16/2020    LABIL2 0.9 07/26/2019    AST 22 12/28/2019     ALT 23 12/28/2019   Sodium - 128  Potassium - 4.7  Chloride - 87    Lab Results   Component Value Date    WBC 4.56 01/16/2020    HGB 14.1 01/16/2020    HCT 43.6 01/16/2020    MCV 88.6 01/16/2020     (L) 01/16/2020       Lab Results   Component Value Date    HGBA1C 13.60 (H) 12/27/2019       Lab Results   Component Value Date    TSH 0.759 12/27/2019       Lab Results   Component Value Date    CHOL 160 12/27/2019    CHOL 167 09/20/2019     Lab Results   Component Value Date    TRIG 109 12/27/2019    TRIG 112 09/20/2019     Lab Results   Component Value Date    HDL 60 12/27/2019    HDL 52 09/20/2019     Lab Results   Component Value Date    LDL 78 12/27/2019    LDL 93 09/20/2019     Chest x-ray:  · 09/06/2019:  FINDINGS: Single portable AP view(s) of the chest.  The heart and mediastinal contours are normal. The lungs are clear. No pneumothorax or pleural effusion.     IMPRESSION: No acute cardiopulmonary findings.  · 12/27/2019:  FINDINGS: Single portable AP view(s) of the chest.  The heart and mediastinal contours are normal. The lungs are clear. No pneumothorax or pleural effusion.     IMPRESSION: No acute cardiopulmonary findings.        Assessment:   Overall continued acceptable course with no new interim cardiopulmonary complaints with acceptable functional status. We will defer additional diagnostic or therapeutic intervention from a cardiac perspective at this time.       Diagnosis Plan   1. Chronic diastolic congestive heart failure (CMS/HCC)  No recurrent angina pectoris or CHF on current activity schedule; continue current treatment     2. PVD (peripheral vascular disease) (CMS/HCC)  Stable without claudication or TIA symptoms currently   3. Paroxysmal atrial fibrillation (CMS/HCC)  No documented recurrence; normal sinus rhythm on EKG, December 2019   4. JAIME on CPAP  Compliance stressed   5. Cirrhosis of liver without ascites, unspecified hepatic cirrhosis type (CMS/HCC)  Currently compensated  without bleeding diathesis or hepatic encephalopathy and with acceptable appetite   6. Type 2 diabetes mellitus with hyperglycemia, with long-term current use of insulin (CMS/HCC)  Continue close followup and monitoring with Dr. Hendricks; insulin pump should be of immense benefit   7. Dyslipidemia  Recent encouraging lab results; continue pravastatin          Plan:         1. Patient to continue current medications and close follow up with the above providers.  2. Tentative cardiology follow up in July 2020, or patient may return sooner PRN.    Transcribed by Sue Blanco for Dr. Ld Orozco at 1:04 PM on 01/24/2020    I, Ld Orozco MD, [facility], personally performed the services described in this documentation as scribed by the above named individual in my presence, and it is both accurate and complete. At 1:14 PM on 01/24/2020

## 2020-01-27 ENCOUNTER — OFFICE VISIT (OUTPATIENT)
Dept: SLEEP MEDICINE | Facility: HOSPITAL | Age: 65
End: 2020-01-27

## 2020-01-27 VITALS
HEART RATE: 83 BPM | SYSTOLIC BLOOD PRESSURE: 144 MMHG | BODY MASS INDEX: 37.26 KG/M2 | HEIGHT: 76 IN | OXYGEN SATURATION: 99 % | WEIGHT: 306 LBS | DIASTOLIC BLOOD PRESSURE: 64 MMHG

## 2020-01-27 DIAGNOSIS — G47.419 PRIMARY NARCOLEPSY WITHOUT CATAPLEXY: Primary | ICD-10-CM

## 2020-01-27 DIAGNOSIS — G25.81 RESTLESS LEGS SYNDROME (RLS): ICD-10-CM

## 2020-01-27 DIAGNOSIS — Z99.89 OSA ON CPAP: ICD-10-CM

## 2020-01-27 DIAGNOSIS — G47.33 OSA ON CPAP: ICD-10-CM

## 2020-01-27 DIAGNOSIS — E66.01 MORBIDLY OBESE (HCC): ICD-10-CM

## 2020-01-27 PROCEDURE — 99214 OFFICE O/P EST MOD 30 MIN: CPT | Performed by: INTERNAL MEDICINE

## 2020-01-27 RX ORDER — DEXTROAMPHETAMINE SACCHARATE, AMPHETAMINE ASPARTATE, DEXTROAMPHETAMINE SULFATE AND AMPHETAMINE SULFATE 3.75; 3.75; 3.75; 3.75 MG/1; MG/1; MG/1; MG/1
30 TABLET ORAL DAILY
Qty: 60 TABLET | Refills: 0 | Status: SHIPPED | OUTPATIENT
Start: 2020-01-27 | End: 2020-04-02 | Stop reason: SDUPTHER

## 2020-01-27 RX ORDER — DEXTROAMPHETAMINE SACCHARATE, AMPHETAMINE ASPARTATE, DEXTROAMPHETAMINE SULFATE AND AMPHETAMINE SULFATE 7.5; 7.5; 7.5; 7.5 MG/1; MG/1; MG/1; MG/1
60 TABLET ORAL DAILY
Qty: 60 TABLET | Refills: 0 | Status: SHIPPED | OUTPATIENT
Start: 2020-01-27 | End: 2020-04-02 | Stop reason: SDUPTHER

## 2020-01-27 NOTE — PROGRESS NOTES
Subjective:     Chief Complaint:   Chief Complaint   Patient presents with   • Follow-up       HPI:    Abe Phillips Jr. is a 64 y.o. male here for follow-up of severe obstructive sleep apnea and daytime somnolence.    He has a complicated sleep history and I have been having trouble getting records to document this.  He has had problems for over 20 years in regards to poor sleep quality and daytime somnolence.  He saw a neurologist in [facility] named Torey Marcus about 9 years ago and underwent a sleep study and a nap study.  He was apparently diagnosed with severe obstructive sleep apnea as well as separately with narcolepsy.  He has been treated with CPAP since that time.  He was set at 7 cm H2O at the time of his moved to [place] several years ago.  He is also been treated with high dose of Adderall at 60 mg in the morning and 30 mg in the afternoon.  He has been treated with Mirapex for RLS.    The only way he can get insurance to cover these dosages of Adderall is to be prescribed 30 mg x 2 tablets in the morning and 15 mg x 2 tablets in the afternoon.  If he takes any less he says that he is excessively sleepy.    Since his arrival here he underwent a sleep study which revealed the presence of severe obstructive sleep apnea.  Furthermore was felt that his optimal pressure was CPAP of 13.  He has had difficulty with CPAP compliance for various reasons.  Most recently he tells me he has had trouble getting a mask from [employer] and change DME companies to patient aids.  He has not been using his CPAP but says he started back on that recently.  He did not bring his download card today but when we looked online there was no usage through patient aids.    In any event, he says that CPAP is going well now with his new mask.    He was recently hospitalized and on discharge today lowered the dosage of his Adderall on discharge and he says he did worse at those dosages.  These were 40 mg in the  "morning and 20 mg in the afternoon.    Current medications are:   Current Outpatient Medications:   •  ACCU-CHEK COREY PLUS test strip, 2 (Two) Times a Day., Disp: , Rfl: 4  •  aspirin 81 MG EC tablet, TAKE 1 TABLET BY MOUTH EVERY DAY, Disp: 90 tablet, Rfl: 1  •  bumetanide (BUMEX) 2 MG tablet, TAKE 2 TABLETS BY MOUTH TWICE A DAY, Disp: 348 tablet, Rfl: 1  •  clotrimazole (ANTI-FUNGAL) 1 % cream, Apply 1 application topically to the appropriate area as directed 4 (Four) Times a Day As Needed., Disp: , Rfl:   •  Docusate Calcium (STOOL SOFTENER PO), Take 1 tablet by mouth As Needed., Disp: , Rfl:   •  gabapentin (NEURONTIN) 300 MG capsule, TAKE 3 CAPSULES BY MOUTH 2 (TWO) TIMES A DAY., Disp: 540 capsule, Rfl: 0  •  insulin regular (humuLIN R,novoLIN R) 100 UNIT/ML injection, Inject  under the skin into the appropriate area as directed 2 (Two) Times a Day Before Meals. 115 UNITS IN  UNITS IN PM, Disp: , Rfl:   •  Insulin Syringe 29G X 1/2\" 1 ML misc, Use one each to inject insulin twice daily Ell.9, Disp: 100 each, Rfl: 1  •  ketoconazole (NIZORAL) 2 % cream, Apply  topically to the appropriate area as directed 2 (Two) Times a Day As Needed for Itching., Disp: 60 g, Rfl: 2  •  levocetirizine (XYZAL) 5 MG tablet, Take 1 tablet by mouth Every Evening., Disp: 90 tablet, Rfl: 1  •  levothyroxine (SYNTHROID, LEVOTHROID) 75 MCG tablet, TAKE 1 TABLET BY MOUTH EVERY DAY, Disp: 90 tablet, Rfl: 1  •  metOLazone (ZAROXOLYN) 5 MG tablet, Take 1 tablet by mouth Daily As Needed (edema/fluid overload)., Disp: 90 tablet, Rfl: 1  •  ondansetron (ZOFRAN) 8 MG tablet, Take 1 tablet by mouth Every 8 (Eight) Hours As Needed for Nausea or Vomiting., Disp: 20 tablet, Rfl: 2  •  polyethylene glycol (MIRALAX) powder, Take 17 g by mouth As Needed., Disp: , Rfl:   •  potassium chloride (K-DUR) 10 MEQ CR tablet, TAKE THREE TABLETS BY MOUTH TWICE A DAY, Disp: 540 tablet, Rfl: 0  •  pramipexole (MIRAPEX) 1 MG tablet, TAKE 1 TABLET BY MOUTH " TWICE A DAY, Disp: 180 tablet, Rfl: 1  •  pravastatin (PRAVACHOL) 40 MG tablet, Take 1 tablet by mouth Every Night., Disp: 90 tablet, Rfl: 3  •  spironolactone (ALDACTONE) 100 MG tablet, TAKE 1 TABLET BY MOUTH EVERY DAY, Disp: 90 tablet, Rfl: 1  •  traZODone (DESYREL) 100 MG tablet, TAKE 1 TABLET BY MOUTH AT BEDTIME AS NEEDED FOR SLEEP (Patient taking differently: 50 mg.), Disp: 90 tablet, Rfl: 0  •  venlafaxine XR (EFFEXOR-XR) 150 MG 24 hr capsule, Take 1 capsule by mouth Every Night., Disp: 90 capsule, Rfl: 1  •  amphetamine-dextroamphetamine (ADDERALL) 15 MG tablet, Take 2 tablets by mouth Daily., Disp: 60 tablet, Rfl: 0  •  amphetamine-dextroamphetamine (ADDERALL) 30 MG tablet, Take 2 tablets by mouth Daily., Disp: 60 tablet, Rfl: 0.      The patient's relevant past medical, surgical, family and social history were reviewed and updated in Epic as appropriate.     ROS:    Review of Systems   Constitutional: Positive for fatigue.   Respiratory: Positive for apnea.    Psychiatric/Behavioral: Positive for sleep disturbance.         Objective:    Physical Exam   Constitutional: He is oriented to person, place, and time. He appears well-developed and well-nourished.   HENT:   Head: Normocephalic and atraumatic.   Mouth/Throat: Oropharynx is clear and moist.   Class IV airway   Neck: Neck supple. No thyromegaly present.   Cardiovascular: Normal rate and regular rhythm. Exam reveals no gallop and no friction rub.   No murmur heard.  Pulmonary/Chest: Effort normal. No respiratory distress. He has no wheezes. He has no rales.   Musculoskeletal: He exhibits edema.   Neurological: He is alert and oriented to person, place, and time.   Skin: Skin is warm and dry.   Psychiatric: He has a normal mood and affect. His behavior is normal.   Vitals reviewed.        Assessment:    Problem List Items Addressed This Visit        Pulmonary Problems    Severe JAIME on CPAP    Relevant Medications    amphetamine-dextroamphetamine  (ADDERALL) 30 MG tablet    amphetamine-dextroamphetamine (ADDERALL) 15 MG tablet       Other    Primary narcolepsy without cataplexy - Primary    Relevant Medications    amphetamine-dextroamphetamine (ADDERALL) 30 MG tablet    amphetamine-dextroamphetamine (ADDERALL) 15 MG tablet    Morbidly obese (CMS/HCC)    Restless legs syndrome (RLS)          64-year-old male with severe obstructive sleep apnea and historically low CPAP compliance.  He received a diagnosis of narcolepsy in [place] and is on high doses of Adderall.  He is also on Mirapex for RLS.    Plan:     1. I refilled his Adderall.  For insurance purposes he takes 30 mg x 2 tablets in the morning and 15 mg x 2 tablets in the afternoon.  He says they will not cover 30 mg pills x3 but they will cover the previously mentioned dosages.  2. Typically, dosages higher than 40-60 mg of Adderall are not any more effective but he says he notices the difference.  3. I encouraged him to use his CPAP and I will review his download on his follow-up visit.  His CPAP prescription is current.    Discussed in detail with the patient.  He will call prior to his follow up visit for any new problems.    Level of Risk Moderate due to: two stable chronic illnesses and prescription drug management    Signed by  Fernando Wmoack MD

## 2020-02-12 ENCOUNTER — HOSPITAL ENCOUNTER (INPATIENT)
Facility: HOSPITAL | Age: 65
LOS: 2 days | Discharge: HOME | End: 2020-02-15
Attending: EMERGENCY MEDICINE | Admitting: INTERNAL MEDICINE

## 2020-02-12 DIAGNOSIS — E11.01: ICD-10-CM

## 2020-02-12 DIAGNOSIS — E86.0 DEHYDRATION: ICD-10-CM

## 2020-02-12 DIAGNOSIS — R73.9 HYPERGLYCEMIA: Primary | ICD-10-CM

## 2020-02-12 PROCEDURE — 82962 GLUCOSE BLOOD TEST: CPT

## 2020-02-12 PROCEDURE — 99285 EMERGENCY DEPT VISIT HI MDM: CPT

## 2020-02-13 PROBLEM — L97.419 DIABETIC ULCER OF RIGHT HEEL ASSOCIATED WITH TYPE 2 DIABETES MELLITUS (HCC): Status: ACTIVE | Noted: 2020-02-13

## 2020-02-13 PROBLEM — R73.9 HYPERGLYCEMIA: Status: ACTIVE | Noted: 2020-02-13

## 2020-02-13 PROBLEM — E11.621 DIABETIC ULCER OF RIGHT HEEL ASSOCIATED WITH TYPE 2 DIABETES MELLITUS (HCC): Status: ACTIVE | Noted: 2020-02-13

## 2020-02-13 LAB
ALBUMIN SERPL-MCNC: 3.9 G/DL (ref 3.5–5.2)
ALBUMIN SERPL-MCNC: 4.1 G/DL (ref 3.5–5.2)
ALBUMIN/GLOB SERPL: 0.8 G/DL
ALBUMIN/GLOB SERPL: 0.8 G/DL
ALP SERPL-CCNC: 229 U/L (ref 39–117)
ALP SERPL-CCNC: 266 U/L (ref 39–117)
ALT SERPL W P-5'-P-CCNC: 30 U/L (ref 1–41)
ALT SERPL W P-5'-P-CCNC: 32 U/L (ref 1–41)
ANION GAP SERPL CALCULATED.3IONS-SCNC: 15 MMOL/L (ref 5–15)
ANION GAP SERPL CALCULATED.3IONS-SCNC: 19 MMOL/L (ref 5–15)
ANION GAP SERPL CALCULATED.3IONS-SCNC: 20 MMOL/L (ref 5–15)
ARTERIAL PATENCY WRIST A: ABNORMAL
AST SERPL-CCNC: 33 U/L (ref 1–40)
AST SERPL-CCNC: 37 U/L (ref 1–40)
ATMOSPHERIC PRESS: ABNORMAL MM[HG]
B-OH-BUTYR SERPL-SCNC: 0.58 MMOL/L (ref 0.02–0.27)
BASE EXCESS BLDA CALC-SCNC: 4.2 MMOL/L (ref 0–2)
BASOPHILS # BLD AUTO: 0.06 10*3/MM3 (ref 0–0.2)
BASOPHILS NFR BLD AUTO: 1 % (ref 0–1.5)
BDY SITE: ABNORMAL
BILIRUB SERPL-MCNC: 1 MG/DL (ref 0.2–1.2)
BILIRUB SERPL-MCNC: 1.1 MG/DL (ref 0.2–1.2)
BILIRUB UR QL STRIP: NEGATIVE
BODY TEMPERATURE: 37 C
BUN BLD-MCNC: 34 MG/DL (ref 8–23)
BUN BLD-MCNC: 42 MG/DL (ref 8–23)
BUN BLD-MCNC: 44 MG/DL (ref 8–23)
BUN/CREAT SERPL: 27 (ref 7–25)
BUN/CREAT SERPL: 30.6 (ref 7–25)
BUN/CREAT SERPL: 31.8 (ref 7–25)
CALCIUM SPEC-SCNC: 9.3 MG/DL (ref 8.6–10.5)
CALCIUM SPEC-SCNC: 9.6 MG/DL (ref 8.6–10.5)
CALCIUM SPEC-SCNC: 9.6 MG/DL (ref 8.6–10.5)
CHLORIDE SERPL-SCNC: 67 MMOL/L (ref 98–107)
CHLORIDE SERPL-SCNC: 76 MMOL/L (ref 98–107)
CHLORIDE SERPL-SCNC: 89 MMOL/L (ref 98–107)
CHOLEST SERPL-MCNC: 220 MG/DL (ref 0–200)
CK SERPL-CCNC: 206 U/L (ref 20–200)
CLARITY UR: CLEAR
CO2 BLDA-SCNC: 32.5 MMOL/L (ref 22–33)
CO2 SERPL-SCNC: 26 MMOL/L (ref 22–29)
CO2 SERPL-SCNC: 27 MMOL/L (ref 22–29)
CO2 SERPL-SCNC: 29 MMOL/L (ref 22–29)
COHGB MFR BLD: 1.1 % (ref 0–2)
COLOR UR: YELLOW
CREAT BLD-MCNC: 1.11 MG/DL (ref 0.76–1.27)
CREAT BLD-MCNC: 1.32 MG/DL (ref 0.76–1.27)
CREAT BLD-MCNC: 1.63 MG/DL (ref 0.76–1.27)
CREAT UR-MCNC: 13.1 MG/DL
DEPRECATED RDW RBC AUTO: 41.7 FL (ref 37–54)
EOSINOPHIL # BLD AUTO: 0.06 10*3/MM3 (ref 0–0.4)
EOSINOPHIL NFR BLD AUTO: 1 % (ref 0.3–6.2)
ERYTHROCYTE [DISTWIDTH] IN BLOOD BY AUTOMATED COUNT: 13.8 % (ref 12.3–15.4)
GFR SERPL CREATININE-BSD FRML MDRD: 43 ML/MIN/1.73
GFR SERPL CREATININE-BSD FRML MDRD: 54 ML/MIN/1.73
GFR SERPL CREATININE-BSD FRML MDRD: 66 ML/MIN/1.73
GLOBULIN UR ELPH-MCNC: 4.8 GM/DL
GLOBULIN UR ELPH-MCNC: 4.9 GM/DL
GLUCOSE BLD-MCNC: 118 MG/DL (ref 65–99)
GLUCOSE BLD-MCNC: 615 MG/DL (ref 65–99)
GLUCOSE BLD-MCNC: 981 MG/DL (ref 65–99)
GLUCOSE BLDC GLUCOMTR-MCNC: 191 MG/DL (ref 70–130)
GLUCOSE BLDC GLUCOMTR-MCNC: 221 MG/DL (ref 70–130)
GLUCOSE BLDC GLUCOMTR-MCNC: 247 MG/DL (ref 70–130)
GLUCOSE BLDC GLUCOMTR-MCNC: 467 MG/DL (ref 70–130)
GLUCOSE BLDC GLUCOMTR-MCNC: 75 MG/DL (ref 70–130)
GLUCOSE BLDC GLUCOMTR-MCNC: >599 MG/DL (ref 70–130)
GLUCOSE UR STRIP-MCNC: ABNORMAL MG/DL
HBA1C MFR BLD: 14.8 % (ref 4.8–5.6)
HCO3 BLDA-SCNC: 30.9 MMOL/L (ref 20–26)
HCT VFR BLD AUTO: 45 % (ref 37.5–51)
HCT VFR BLD CALC: 48.7 %
HDLC SERPL-MCNC: 46 MG/DL (ref 40–60)
HGB BLD-MCNC: 15.3 G/DL (ref 13–17.7)
HGB BLDA-MCNC: 15.9 G/DL (ref 13.5–17.5)
HGB UR QL STRIP.AUTO: NEGATIVE
HOLD SPECIMEN: NORMAL
HOLD SPECIMEN: NORMAL
HOROWITZ INDEX BLD+IHG-RTO: 21 %
IMM GRANULOCYTES # BLD AUTO: 0.05 10*3/MM3 (ref 0–0.05)
IMM GRANULOCYTES NFR BLD AUTO: 0.9 % (ref 0–0.5)
KETONES UR QL STRIP: NEGATIVE
LDLC SERPL CALC-MCNC: 117 MG/DL (ref 0–100)
LDLC/HDLC SERPL: 2.54 {RATIO}
LEUKOCYTE ESTERASE UR QL STRIP.AUTO: NEGATIVE
LYMPHOCYTES # BLD AUTO: 1.29 10*3/MM3 (ref 0.7–3.1)
LYMPHOCYTES NFR BLD AUTO: 22.6 % (ref 19.6–45.3)
MAGNESIUM SERPL-MCNC: 2.5 MG/DL (ref 1.6–2.4)
MCH RBC QN AUTO: 28.4 PG (ref 26.6–33)
MCHC RBC AUTO-ENTMCNC: 34 G/DL (ref 31.5–35.7)
MCV RBC AUTO: 83.6 FL (ref 79–97)
METHGB BLD QL: 0.9 % (ref 0–1.5)
MODALITY: ABNORMAL
MONOCYTES # BLD AUTO: 0.51 10*3/MM3 (ref 0.1–0.9)
MONOCYTES NFR BLD AUTO: 8.9 % (ref 5–12)
NEUTROPHILS # BLD AUTO: 3.75 10*3/MM3 (ref 1.7–7)
NEUTROPHILS NFR BLD AUTO: 65.6 % (ref 42.7–76)
NITRITE UR QL STRIP: NEGATIVE
NOTE: ABNORMAL
NRBC BLD AUTO-RTO: 0 /100 WBC (ref 0–0.2)
OSMOLALITY SERPL: 307 MOSM/KG (ref 275–295)
OSMOLALITY UR: 344 MOSM/KG (ref 300–1100)
OXYHGB MFR BLDV: 88 % (ref 94–99)
PCO2 BLDA: 52.3 MM HG
PCO2 TEMP ADJ BLD: 52.3 MM HG (ref 35–48)
PH BLDA: 7.38 PH UNITS (ref 7.35–7.45)
PH UR STRIP.AUTO: 6.5 [PH] (ref 5–8)
PH, TEMP CORRECTED: 7.38 PH UNITS
PHOSPHATE SERPL-MCNC: 3.4 MG/DL (ref 2.5–4.5)
PLATELET # BLD AUTO: 191 10*3/MM3 (ref 140–450)
PMV BLD AUTO: 11.6 FL (ref 6–12)
PO2 BLDA: 62 MM HG (ref 83–108)
PO2 TEMP ADJ BLD: 62 MM HG (ref 83–108)
POTASSIUM BLD-SCNC: 2.6 MMOL/L (ref 3.5–5.2)
POTASSIUM BLD-SCNC: 3.3 MMOL/L (ref 3.5–5.2)
POTASSIUM BLD-SCNC: 4.2 MMOL/L (ref 3.5–5.2)
PROT SERPL-MCNC: 8.7 G/DL (ref 6–8.5)
PROT SERPL-MCNC: 9 G/DL (ref 6–8.5)
PROT UR QL STRIP: NEGATIVE
RBC # BLD AUTO: 5.38 10*6/MM3 (ref 4.14–5.8)
SODIUM BLD-SCNC: 113 MMOL/L (ref 136–145)
SODIUM BLD-SCNC: 122 MMOL/L (ref 136–145)
SODIUM BLD-SCNC: 133 MMOL/L (ref 136–145)
SODIUM UR-SCNC: 26 MMOL/L
SP GR UR STRIP: 1.02 (ref 1–1.03)
TRIGL SERPL-MCNC: 285 MG/DL (ref 0–150)
UROBILINOGEN UR QL STRIP: ABNORMAL
VENTILATOR MODE: ABNORMAL
VLDLC SERPL-MCNC: 57 MG/DL
WBC NRBC COR # BLD: 5.72 10*3/MM3 (ref 3.4–10.8)
WHOLE BLOOD HOLD SPECIMEN: NORMAL
WHOLE BLOOD HOLD SPECIMEN: NORMAL

## 2020-02-13 PROCEDURE — 82962 GLUCOSE BLOOD TEST: CPT

## 2020-02-13 PROCEDURE — 83735 ASSAY OF MAGNESIUM: CPT | Performed by: INTERNAL MEDICINE

## 2020-02-13 PROCEDURE — 81003 URINALYSIS AUTO W/O SCOPE: CPT | Performed by: EMERGENCY MEDICINE

## 2020-02-13 PROCEDURE — 85025 COMPLETE CBC W/AUTO DIFF WBC: CPT

## 2020-02-13 PROCEDURE — 82570 ASSAY OF URINE CREATININE: CPT | Performed by: NURSE PRACTITIONER

## 2020-02-13 PROCEDURE — 84300 ASSAY OF URINE SODIUM: CPT | Performed by: NURSE PRACTITIONER

## 2020-02-13 PROCEDURE — 82805 BLOOD GASES W/O2 SATURATION: CPT

## 2020-02-13 PROCEDURE — 83036 HEMOGLOBIN GLYCOSYLATED A1C: CPT | Performed by: INTERNAL MEDICINE

## 2020-02-13 PROCEDURE — 82010 KETONE BODYS QUAN: CPT | Performed by: EMERGENCY MEDICINE

## 2020-02-13 PROCEDURE — 63710000001 INSULIN DETEMIR PER 5 UNITS: Performed by: INTERNAL MEDICINE

## 2020-02-13 PROCEDURE — A9270 NON-COVERED ITEM OR SERVICE: HCPCS | Performed by: INTERNAL MEDICINE

## 2020-02-13 PROCEDURE — 82550 ASSAY OF CK (CPK): CPT | Performed by: NURSE PRACTITIONER

## 2020-02-13 PROCEDURE — 63710000001 INSULIN LISPRO (HUMAN) PER 5 UNITS: Performed by: INTERNAL MEDICINE

## 2020-02-13 PROCEDURE — 83935 ASSAY OF URINE OSMOLALITY: CPT | Performed by: NURSE PRACTITIONER

## 2020-02-13 PROCEDURE — 80053 COMPREHEN METABOLIC PANEL: CPT | Performed by: EMERGENCY MEDICINE

## 2020-02-13 PROCEDURE — 25010000002 CEFTRIAXONE PER 250 MG: Performed by: INTERNAL MEDICINE

## 2020-02-13 PROCEDURE — 80053 COMPREHEN METABOLIC PANEL: CPT | Performed by: INTERNAL MEDICINE

## 2020-02-13 PROCEDURE — 63710000001 INSULIN REGULAR HUMAN PER 5 UNITS: Performed by: PHYSICIAN ASSISTANT

## 2020-02-13 PROCEDURE — 80061 LIPID PANEL: CPT | Performed by: INTERNAL MEDICINE

## 2020-02-13 PROCEDURE — 84100 ASSAY OF PHOSPHORUS: CPT | Performed by: INTERNAL MEDICINE

## 2020-02-13 PROCEDURE — 25010000002 HEPARIN (PORCINE) PER 1000 UNITS: Performed by: INTERNAL MEDICINE

## 2020-02-13 PROCEDURE — 99222 1ST HOSP IP/OBS MODERATE 55: CPT | Performed by: INTERNAL MEDICINE

## 2020-02-13 PROCEDURE — 36600 WITHDRAWAL OF ARTERIAL BLOOD: CPT

## 2020-02-13 PROCEDURE — 83930 ASSAY OF BLOOD OSMOLALITY: CPT | Performed by: NURSE PRACTITIONER

## 2020-02-13 RX ORDER — ACETAMINOPHEN 160 MG/5ML
650 SOLUTION ORAL EVERY 4 HOURS PRN
Status: DISCONTINUED | OUTPATIENT
Start: 2020-02-13 | End: 2020-02-15 | Stop reason: HOSPADM

## 2020-02-13 RX ORDER — POTASSIUM CHLORIDE 750 MG/1
40 CAPSULE, EXTENDED RELEASE ORAL AS NEEDED
Status: DISCONTINUED | OUTPATIENT
Start: 2020-02-13 | End: 2020-02-15 | Stop reason: HOSPADM

## 2020-02-13 RX ORDER — GABAPENTIN 300 MG/1
900 CAPSULE ORAL 2 TIMES DAILY
Status: DISCONTINUED | OUTPATIENT
Start: 2020-02-13 | End: 2020-02-15 | Stop reason: HOSPADM

## 2020-02-13 RX ORDER — TRAMADOL HYDROCHLORIDE 50 MG/1
50 TABLET ORAL EVERY 6 HOURS PRN
Status: DISCONTINUED | OUTPATIENT
Start: 2020-02-13 | End: 2020-02-15 | Stop reason: HOSPADM

## 2020-02-13 RX ORDER — LEVOTHYROXINE SODIUM 0.07 MG/1
75 TABLET ORAL
Status: DISCONTINUED | OUTPATIENT
Start: 2020-02-13 | End: 2020-02-15 | Stop reason: HOSPADM

## 2020-02-13 RX ORDER — DEXTROAMPHETAMINE SACCHARATE, AMPHETAMINE ASPARTATE, DEXTROAMPHETAMINE SULFATE AND AMPHETAMINE SULFATE 5; 5; 5; 5 MG/1; MG/1; MG/1; MG/1
20 TABLET ORAL NIGHTLY
Status: DISCONTINUED | OUTPATIENT
Start: 2020-02-13 | End: 2020-02-15 | Stop reason: HOSPADM

## 2020-02-13 RX ORDER — HEPARIN SODIUM 5000 [USP'U]/ML
5000 INJECTION, SOLUTION INTRAVENOUS; SUBCUTANEOUS EVERY 8 HOURS SCHEDULED
Status: DISCONTINUED | OUTPATIENT
Start: 2020-02-13 | End: 2020-02-15 | Stop reason: HOSPADM

## 2020-02-13 RX ORDER — ACETAMINOPHEN 650 MG/1
650 SUPPOSITORY RECTAL EVERY 4 HOURS PRN
Status: DISCONTINUED | OUTPATIENT
Start: 2020-02-13 | End: 2020-02-15 | Stop reason: HOSPADM

## 2020-02-13 RX ORDER — ONDANSETRON 4 MG/1
4 TABLET, FILM COATED ORAL EVERY 6 HOURS PRN
Status: DISCONTINUED | OUTPATIENT
Start: 2020-02-13 | End: 2020-02-15 | Stop reason: HOSPADM

## 2020-02-13 RX ORDER — POTASSIUM CHLORIDE 750 MG/1
40 CAPSULE, EXTENDED RELEASE ORAL
Status: COMPLETED | OUTPATIENT
Start: 2020-02-13 | End: 2020-02-13

## 2020-02-13 RX ORDER — ACETAMINOPHEN 325 MG/1
650 TABLET ORAL EVERY 4 HOURS PRN
Status: DISCONTINUED | OUTPATIENT
Start: 2020-02-13 | End: 2020-02-15 | Stop reason: HOSPADM

## 2020-02-13 RX ORDER — DEXTROAMPHETAMINE SACCHARATE, AMPHETAMINE ASPARTATE, DEXTROAMPHETAMINE SULFATE AND AMPHETAMINE SULFATE 7.5; 7.5; 7.5; 7.5 MG/1; MG/1; MG/1; MG/1
60 TABLET ORAL EVERY MORNING
Status: DISCONTINUED | OUTPATIENT
Start: 2020-02-14 | End: 2020-02-15 | Stop reason: HOSPADM

## 2020-02-13 RX ORDER — POTASSIUM CHLORIDE 7.45 MG/ML
10 INJECTION INTRAVENOUS
Status: DISCONTINUED | OUTPATIENT
Start: 2020-02-13 | End: 2020-02-15 | Stop reason: HOSPADM

## 2020-02-13 RX ORDER — DEXTROAMPHETAMINE SACCHARATE, AMPHETAMINE ASPARTATE, DEXTROAMPHETAMINE SULFATE AND AMPHETAMINE SULFATE 2.5; 2.5; 2.5; 2.5 MG/1; MG/1; MG/1; MG/1
10 TABLET ORAL NIGHTLY
Status: DISCONTINUED | OUTPATIENT
Start: 2020-02-13 | End: 2020-02-15 | Stop reason: HOSPADM

## 2020-02-13 RX ORDER — VENLAFAXINE HYDROCHLORIDE 75 MG/1
150 CAPSULE, EXTENDED RELEASE ORAL NIGHTLY
Status: DISCONTINUED | OUTPATIENT
Start: 2020-02-13 | End: 2020-02-15 | Stop reason: HOSPADM

## 2020-02-13 RX ORDER — POTASSIUM CHLORIDE 1.5 G/1.77G
40 POWDER, FOR SOLUTION ORAL AS NEEDED
Status: DISCONTINUED | OUTPATIENT
Start: 2020-02-13 | End: 2020-02-15 | Stop reason: HOSPADM

## 2020-02-13 RX ORDER — DOCUSATE SODIUM 100 MG/1
100 CAPSULE, LIQUID FILLED ORAL 2 TIMES DAILY
Status: DISCONTINUED | OUTPATIENT
Start: 2020-02-13 | End: 2020-02-15 | Stop reason: HOSPADM

## 2020-02-13 RX ORDER — SODIUM CHLORIDE 0.9 % (FLUSH) 0.9 %
10 SYRINGE (ML) INJECTION EVERY 12 HOURS SCHEDULED
Status: DISCONTINUED | OUTPATIENT
Start: 2020-02-13 | End: 2020-02-15 | Stop reason: HOSPADM

## 2020-02-13 RX ORDER — SODIUM CHLORIDE 0.9 % (FLUSH) 0.9 %
10 SYRINGE (ML) INJECTION AS NEEDED
Status: DISCONTINUED | OUTPATIENT
Start: 2020-02-13 | End: 2020-02-15 | Stop reason: HOSPADM

## 2020-02-13 RX ORDER — ASPIRIN 81 MG/1
81 TABLET ORAL DAILY
Status: DISCONTINUED | OUTPATIENT
Start: 2020-02-13 | End: 2020-02-15 | Stop reason: HOSPADM

## 2020-02-13 RX ORDER — ONDANSETRON 2 MG/ML
4 INJECTION INTRAMUSCULAR; INTRAVENOUS EVERY 6 HOURS PRN
Status: DISCONTINUED | OUTPATIENT
Start: 2020-02-13 | End: 2020-02-15 | Stop reason: HOSPADM

## 2020-02-13 RX ORDER — SODIUM CHLORIDE 9 MG/ML
100 INJECTION, SOLUTION INTRAVENOUS CONTINUOUS
Status: ACTIVE | OUTPATIENT
Start: 2020-02-13 | End: 2020-02-14

## 2020-02-13 RX ORDER — DEXTROSE MONOHYDRATE 25 G/50ML
25 INJECTION, SOLUTION INTRAVENOUS
Status: DISCONTINUED | OUTPATIENT
Start: 2020-02-13 | End: 2020-02-15 | Stop reason: HOSPADM

## 2020-02-13 RX ORDER — NICOTINE POLACRILEX 4 MG
15 LOZENGE BUCCAL
Status: DISCONTINUED | OUTPATIENT
Start: 2020-02-13 | End: 2020-02-15 | Stop reason: HOSPADM

## 2020-02-13 RX ORDER — PRAVASTATIN SODIUM 40 MG
40 TABLET ORAL NIGHTLY
Status: DISCONTINUED | OUTPATIENT
Start: 2020-02-13 | End: 2020-02-15 | Stop reason: HOSPADM

## 2020-02-13 RX ADMIN — HEPARIN SODIUM 5000 UNITS: 5000 INJECTION, SOLUTION INTRAVENOUS; SUBCUTANEOUS at 12:59

## 2020-02-13 RX ADMIN — INSULIN DETEMIR 40 UNITS: 100 INJECTION, SOLUTION SUBCUTANEOUS at 04:30

## 2020-02-13 RX ADMIN — INSULIN LISPRO 8 UNITS: 100 INJECTION, SOLUTION INTRAVENOUS; SUBCUTANEOUS at 21:53

## 2020-02-13 RX ADMIN — DOCUSATE SODIUM 100 MG: 100 CAPSULE, LIQUID FILLED ORAL at 09:24

## 2020-02-13 RX ADMIN — SODIUM CHLORIDE, PRESERVATIVE FREE 10 ML: 5 INJECTION INTRAVENOUS at 09:22

## 2020-02-13 RX ADMIN — INSULIN LISPRO 40 UNITS: 100 INJECTION, SOLUTION INTRAVENOUS; SUBCUTANEOUS at 03:13

## 2020-02-13 RX ADMIN — LEVOTHYROXINE SODIUM 75 MCG: 75 TABLET ORAL at 07:01

## 2020-02-13 RX ADMIN — INSULIN LISPRO 25 UNITS: 100 INJECTION, SOLUTION INTRAVENOUS; SUBCUTANEOUS at 13:00

## 2020-02-13 RX ADMIN — SODIUM CHLORIDE 1000 ML: 9 INJECTION, SOLUTION INTRAVENOUS at 01:05

## 2020-02-13 RX ADMIN — INSULIN DETEMIR 60 UNITS: 100 INJECTION, SOLUTION SUBCUTANEOUS at 21:53

## 2020-02-13 RX ADMIN — INSULIN LISPRO 25 UNITS: 100 INJECTION, SOLUTION INTRAVENOUS; SUBCUTANEOUS at 09:19

## 2020-02-13 RX ADMIN — SODIUM CHLORIDE 100 ML/HR: 9 INJECTION, SOLUTION INTRAVENOUS at 14:35

## 2020-02-13 RX ADMIN — INSULIN HUMAN 10 UNITS: 100 INJECTION, SOLUTION PARENTERAL at 01:05

## 2020-02-13 RX ADMIN — SODIUM CHLORIDE 1000 ML: 9 INJECTION, SOLUTION INTRAVENOUS at 04:31

## 2020-02-13 RX ADMIN — SODIUM CHLORIDE 1000 ML: 9 INJECTION, SOLUTION INTRAVENOUS at 03:30

## 2020-02-13 RX ADMIN — POTASSIUM CHLORIDE 40 MEQ: 750 CAPSULE, EXTENDED RELEASE ORAL at 17:51

## 2020-02-13 RX ADMIN — INSULIN LISPRO 24 UNITS: 100 INJECTION, SOLUTION INTRAVENOUS; SUBCUTANEOUS at 09:20

## 2020-02-13 RX ADMIN — CEFTRIAXONE SODIUM 2 G: 2 INJECTION, POWDER, FOR SOLUTION INTRAMUSCULAR; INTRAVENOUS at 04:31

## 2020-02-13 RX ADMIN — SODIUM CHLORIDE 100 ML/HR: 9 INJECTION, SOLUTION INTRAVENOUS at 05:43

## 2020-02-13 RX ADMIN — PRAVASTATIN SODIUM 40 MG: 40 TABLET ORAL at 21:54

## 2020-02-13 RX ADMIN — POTASSIUM CHLORIDE 40 MEQ: 750 CAPSULE, EXTENDED RELEASE ORAL at 14:35

## 2020-02-13 RX ADMIN — POTASSIUM CHLORIDE 40 MEQ: 750 CAPSULE, EXTENDED RELEASE ORAL at 21:54

## 2020-02-13 RX ADMIN — ASPIRIN 81 MG: 81 TABLET, COATED ORAL at 09:24

## 2020-02-13 RX ADMIN — INSULIN LISPRO 4 UNITS: 100 INJECTION, SOLUTION INTRAVENOUS; SUBCUTANEOUS at 13:00

## 2020-02-13 RX ADMIN — VENLAFAXINE HYDROCHLORIDE 150 MG: 75 CAPSULE, EXTENDED RELEASE ORAL at 21:54

## 2020-02-13 RX ADMIN — HEPARIN SODIUM 5000 UNITS: 5000 INJECTION, SOLUTION INTRAVENOUS; SUBCUTANEOUS at 07:01

## 2020-02-13 RX ADMIN — HEPARIN SODIUM 5000 UNITS: 5000 INJECTION, SOLUTION INTRAVENOUS; SUBCUTANEOUS at 21:54

## 2020-02-13 RX ADMIN — GABAPENTIN 900 MG: 300 CAPSULE ORAL at 21:54

## 2020-02-13 NOTE — H&P
[facility] Medicine Services  HISTORY AND PHYSICAL    Patient Name: Abe Phillips Jr.  : 1955  MRN: [id number]  Primary Care Physician: Anibal Maria MD  Date of admission: 2020      Subjective   Subjective     Chief Complaint:  Weakness, lethargy, abdominal pain, leg cramps     HPI:  Abe Phillips Jr. is a 64 y.o. male with PMH significant for uncontrolled DM2, CKD, PVD, PAF, MCCAULEY with cirrhosis,  HTN, and HLD who presents to the ED with complaint of weakness, lethargy, and abdominal pain.   He states that he has been out of town for the past two weeks due to a  out of [facility]. He notes that he ran out of his insulin about 3 days ago and has difficulty affording it and therefore was unable to obtain more. Today he notes that he travel back from [place], but then became to weak to get up and was having difficulty staying awake. He notes that his glucometer read to high to read. He does complain of generalized abdominal pain as well as leg cramps.  Upon arrival to the ED, his serum glucose was 981. He was found to be dehydrated and OMAR. ABG is not concerning for acidosis.   He will be admitted to Hospital Medicine for further evaluation.     Review of Systems   Constitutional: Positive for activity change and fatigue. Negative for appetite change, chills, diaphoresis and fever.   HENT: Negative.    Eyes: Negative for visual disturbance.   Respiratory: Negative for cough, chest tightness, shortness of breath and wheezing.    Cardiovascular: Positive for leg swelling. Negative for chest pain and palpitations.   Gastrointestinal: Positive for abdominal pain. Negative for abdominal distention, constipation, diarrhea, nausea and vomiting.   Genitourinary: Negative for difficulty urinating, dysuria, frequency and urgency.   Musculoskeletal: Positive for gait problem. Negative for arthralgias and myalgias.   Skin: Positive for wound (right heel chronic ). Negative for color  change and pallor.   Neurological: Positive for weakness. Negative for dizziness, syncope, speech difficulty, light-headedness and headaches.        Lethargy, difficulty staying awake    Psychiatric/Behavioral: Positive for confusion. The patient is not nervous/anxious.         All other systems reviewed and are negative.     Personal History     Past Medical History:   Diagnosis Date   • A-fib (CMS/HCC)    • ADD (attention deficit disorder)    • Atrial flutter (CMS/HCC)    • Cellulitis    • CHF (congestive heart failure) (CMS/HCC)    • Cirrhosis of liver (CMS/HCC)    • Constipation    • Depression    • Diabetes mellitus (CMS/HCC)    • Diabetic ulcer of right foot (CMS/HCC)    • Disease of thyroid gland    • Edema    • Fatty liver    • Hepatitis B    • Hyperlipidemia    • Hypokalemia    • Insomnia    • Lymphedema    • Narcolepsy    • Neuropathy    • Obesity    • JAIME on CPAP    • PVD (peripheral vascular disease) (CMS/HCC)    • RLS (restless legs syndrome)    • Skin cancer    • Tardive dyskinesia    • Tremor of both hands    • Yeast infection        Past Surgical History:   Procedure Laterality Date   • ACHILLES TENDON REPAIR     • CARDIAC ABLATION     • CHOLECYSTECTOMY     • LASER ABLATION      VEIN RLE       Family History: family history includes Clotting disorder in his father; Heart failure in his mother; No Known Problems in his brother, brother, and sister. Otherwise pertinent FHx was reviewed and unremarkable.     Social History:  reports that he has never smoked. He has never used smokeless tobacco. He reports that he drinks alcohol. He reports that he does not use drugs.  Social History     Social History Narrative   • Not on file       Medications:  Available home medication information reviewed.  Medications Prior to Admission   Medication Sig Dispense Refill Last Dose   • ACCU-CHEK COREY PLUS test strip 2 (Two) Times a Day.  4 Taking   • amphetamine-dextroamphetamine (ADDERALL) 15 MG tablet Take 2  "tablets by mouth Daily. 60 tablet 0    • amphetamine-dextroamphetamine (ADDERALL) 30 MG tablet Take 2 tablets by mouth Daily. 60 tablet 0    • aspirin 81 MG EC tablet TAKE 1 TABLET BY MOUTH EVERY DAY 90 tablet 1 Taking   • bumetanide (BUMEX) 2 MG tablet TAKE 2 TABLETS BY MOUTH TWICE A  tablet 1 Taking   • clotrimazole (ANTI-FUNGAL) 1 % cream Apply 1 application topically to the appropriate area as directed 4 (Four) Times a Day As Needed.   Taking   • Docusate Calcium (STOOL SOFTENER PO) Take 1 tablet by mouth As Needed.   Taking   • gabapentin (NEURONTIN) 300 MG capsule TAKE 3 CAPSULES BY MOUTH 2 (TWO) TIMES A DAY. 540 capsule 0 Taking   • insulin regular (humuLIN R,novoLIN R) 100 UNIT/ML injection Inject  under the skin into the appropriate area as directed 2 (Two) Times a Day Before Meals. 115 UNITS IN AM  100 UNITS IN PM   Taking   • Insulin Syringe 29G X 1/2\" 1 ML misc Use one each to inject insulin twice daily Ell.9 100 each 1 Taking   • ketoconazole (NIZORAL) 2 % cream Apply  topically to the appropriate area as directed 2 (Two) Times a Day As Needed for Itching. 60 g 2 Taking   • levocetirizine (XYZAL) 5 MG tablet Take 1 tablet by mouth Every Evening. 90 tablet 1 Taking   • levothyroxine (SYNTHROID, LEVOTHROID) 75 MCG tablet TAKE 1 TABLET BY MOUTH EVERY DAY 90 tablet 1 Taking   • metOLazone (ZAROXOLYN) 5 MG tablet Take 1 tablet by mouth Daily As Needed (edema/fluid overload). 90 tablet 1 Taking   • ondansetron (ZOFRAN) 8 MG tablet Take 1 tablet by mouth Every 8 (Eight) Hours As Needed for Nausea or Vomiting. 20 tablet 2 Taking   • polyethylene glycol (MIRALAX) powder Take 17 g by mouth As Needed.   Taking   • potassium chloride (K-DUR) 10 MEQ CR tablet TAKE THREE TABLETS BY MOUTH TWICE A  tablet 0 Taking   • pramipexole (MIRAPEX) 1 MG tablet TAKE 1 TABLET BY MOUTH TWICE A  tablet 1 Taking   • pravastatin (PRAVACHOL) 40 MG tablet Take 1 tablet by mouth Every Night. 90 tablet 3 Taking   • " spironolactone (ALDACTONE) 100 MG tablet TAKE 1 TABLET BY MOUTH EVERY DAY 90 tablet 1 Taking   • traZODone (DESYREL) 100 MG tablet TAKE 1 TABLET BY MOUTH AT BEDTIME AS NEEDED FOR SLEEP (Patient taking differently: 50 mg.) 90 tablet 0 Taking   • venlafaxine XR (EFFEXOR-XR) 150 MG 24 hr capsule Take 1 capsule by mouth Every Night. 90 capsule 1 Taking       No Known Allergies    Objective   Objective     Vital Signs:   Temp:  [97.7 °F (36.5 °C)-97.9 °F (36.6 °C)] 97.9 °F (36.6 °C)  Heart Rate:  [75-84] 82  Resp:  [16] 16  BP: (119-130)/(75-83) 130/82        Physical Exam   Constitutional: He appears well-developed and well-nourished. He is easily aroused. No distress.   HENT:   Head: Normocephalic and atraumatic.   Eyes: Pupils are equal, round, and reactive to light.   Neck: Normal range of motion. Neck supple. No JVD present.   Cardiovascular: Normal rate, regular rhythm and intact distal pulses.   Murmur heard.  Pulmonary/Chest: Effort normal. No respiratory distress. He has no wheezes. He has no rales.   Abdominal: Soft. Bowel sounds are normal. He exhibits no distension. There is no tenderness. There is no guarding.   Musculoskeletal: Normal range of motion. He exhibits edema and tenderness.   Venous changed BLE with mild edema    Neurological: He is easily aroused.   drifts to sleep during conversation.  Easily wakens. Alert to self, knows he is in the hospital, thought it was the year 2000. Able to move all extremities    Skin: Skin is warm and dry. Capillary refill takes less than 2 seconds. No rash noted. No erythema.   Right heel wound    Psychiatric: He has a normal mood and affect. His behavior is normal.   Vitals reviewed.       Results Reviewed:  I have personally reviewed current lab and radiology data.    Results from last 7 days   Lab Units 02/13/20  0000   WBC 10*3/mm3 5.72   HEMOGLOBIN g/dL 15.3   HEMATOCRIT % 45.0   PLATELETS 10*3/mm3 191     Results from last 7 days   Lab Units 02/13/20  0000      SODIUM mmol/L 113*   POTASSIUM mmol/L 4.2   CHLORIDE mmol/L 67*   CO2 mmol/L 27.0   BUN mg/dL 44*   CREATININE mg/dL 1.63*   GLUCOSE mg/dL 981*   CALCIUM mg/dL 9.6   ALT (SGPT) U/L 32   AST (SGOT) U/L 37     Estimated Creatinine Clearance: 66.5 mL/min (A) (by C-G formula based on SCr of 1.63 mg/dL (H)).  Brief Urine Lab Results  (Last result in the past 365 days)      Color   Clarity   Blood   Leuk Est   Nitrite   Protein   CREAT   Urine HCG        02/13/20 0124 Yellow Clear Negative Negative Negative Negative             Imaging Results (Last 24 Hours)     ** No results found for the last 24 hours. **        Results for orders placed during the hospital encounter of 01/04/19   Adult Transthoracic Echo Complete W/ Cont if Necessary Per Protocol    Narrative · Left ventricular systolic function is hyperdynamic (EF > 70).          Assessment/Plan   Assessment & Plan     Active Hospital Problems    Diagnosis POA   • Hyperglycemia [R73.9] Yes   • Diabetic ulcer of right heel associated with type 2 diabetes mellitus (CMS/HCC) [E11.621, L97.419] Yes   • Dyslipidemia [E78.5] Yes   • OMAR (acute kidney injury) (CMS/HCC) [N17.9] Yes   • Diabetes mellitus (CMS/HCC) [E11.9] Yes   • Restless legs syndrome (RLS) [G25.81] Yes   • Diabetic ulcer of right heel (CMS/HCC) [E11.621, L97.419] Yes   • PVD (peripheral vascular disease) (CMS/HCC) [I73.9] Yes   • Type 2 diabetes mellitus with hyperglycemia (CMS/HCC) [E11.65] Yes     64 year old male presenting to the ED with complaint of lethargy, abdominal pain, and leg cramps with generalized weakness who is found to have concern for DM2 with hyperglycemia and OMAR    DM2 with hyperglycemia and concern for HHS  -serum osmolality pending   - ABG with pH 7.379  -Humlin R 10 units given IV in the ED as well as 1L IVF bolus  -give additional 1L NS bolus then 100ml/hr  -Humalog 40 units now, check FSBG q 3 hours, may need to start insulin gtt pending repeat BG  -Levemir 40 units  nightly  -Humalog 25 units with meals and SS insulin   -HgA1c in Dec was 13.60  -Follows outpt with Endocrinology, CM for assistance with outpt insulin     OMAR   -IVF  -repeat BMP in am   -known CKD  -Hold Bumex, spironolactone     Hyponatremia   -corrected sodium 127  -continue IVF for now   -serum, urine osmolality   -urine studies     Diabetic Foot Ulcer Right Heel   -follows outpt with wound care, consider consult in am   -Rocephin started, may need to consider ID consult in am     Hypothyroid  -continue levothyroxine     Hyperlipidemia  -continue pravastatin     PAF  -currently rate controlled  -no anticoagulation   CHADSvasc 4  -daily ASA       DVT prophylaxis:  Heparin     CODE STATUS:    Code Status and Medical Interventions:   Ordered at: 02/13/20 0324     Code Status:    CPR     Medical Interventions (Level of Support Prior to Arrest):    Full       Admission Status:  I believe this patient meets observation    Electronically signed by ELMA Erazo, 02/13/20, 3:21 AM.    Patient seen and examined at the bedside.  Patient is a 64-year-old  male with past medical history significant for hypertension, hyperlipidemia, hypothyroidism, diabetes mellitus, peripheral vascular disease, attention deficit disorder, history of atrial fibrillation status post ablation.  Patient was brought to the emergency room for weakness and very high blood sugar.  At the emergency room patient had serum blood glucose over 900.  Evidently patient has not been taking his insulin because he cannot afford the medication.  He tells me that the co-pay for insulin is too high for him so he has not been taking his insulin for a few days now.  Patient also complains of severe weakness and lethargy.  Tells me that he has been urinating a lot and he is experiencing dryness of his mouth.  Of note, patient is also on diuretics for chronic edema of lower extremities.  Denies any fever or chills.  No nausea, vomiting, diarrhea  or abdominal pain.    Physical exam:  Constitutional: No acute distress, drowsy but arousable  HENT: NCAT, mucous membranes moist  Respiratory: Clear to auscultation bilaterally, respiratory effort normal   Cardiovascular: RRR, no murmurs, rubs, or gallopsGastrointestinal: Positive bowel sounds, soft, nontender, nondistended  Musculoskeletal: Obese, 1+ edema of lower extremities bilaterally.  There is a chronic looking ulcer over the right heel.  Psychiatric: Appropriate affect, cooperative  Neurologic: Oriented x 3, strength symmetric in all extremities, Cranial Nerves grossly intact to confrontation, speech clear  Skin: No rashes  Assessment and plan:  *Uncontrolled diabetes mellitus with severe hyperosmolar hyperglycemia.  ABG does not show evidence of ketoacidosis.  Patient has not been taking his insulin because he cannot afford the medication.  Please see the above for more details.  I have discussed the findings and plan of care with the patient in detail.  Patient will be admitted as observation for now.  However given his situation he may require longer states than that.

## 2020-02-13 NOTE — PROGRESS NOTES
Discharge Planning Assessment  [facility]     Patient Name: Abe Phillips Jr.  MRN: [id number]  Today's Date: 2/13/2020    Admit Date: 2/12/2020    Discharge Needs Assessment     Row Name 02/13/20 1622       Living Environment    Lives With  child(joni), adult;spouse    Name(s) of Who Lives With Patient  Pt lives with spouse, Shaila Rubin(daughter) , son in law, and 3 grandchildren    Current Living Arrangements  home/apartment/condo    Primary Care Provided by  self    Provides Primary Care For  no one    Family Caregiver if Needed  child(joni), adult;spouse    Quality of Family Relationships  supportive    Able to Return to Prior Arrangements  yes    Living Arrangement Comments  Spoke with pt in room with permission regarding discharge plan.  Pt resides in [place] with spouse, daughter, son in law, and 3 grandchildren       Resource/Environmental Concerns    Resource/Environmental Concerns  none       Transition Planning    Patient/Family Anticipates Transition to  home with family    Patient/Family Anticipated Services at Transition      Transportation Anticipated  family or friend will provide;car, drives self       Discharge Needs Assessment    Readmission Within the Last 30 Days  no previous admission in last 30 days    Concerns to be Addressed  discharge planning    Equipment Currently Used at Home  glucometer    Equipment Needed After Discharge  glucometer    Discharge Coordination/Progress  Pt reports he has Medicare and [employer].  Pt reports he has had difficulty affording insulin.  Asked [place]) to see.  Plan is home with family at discharge. CM will cont to follow        Discharge Plan     Row Name 02/13/20 1224       Plan    Plan  discharge plan    Patient/Family in Agreement with Plan  yes    Plan Comments  Plan is home with family at discharge.  NISHANT saw pt regarding cost of insuline.  CM/NISHANT will cont to follow    Final Discharge Disposition Code  01 - home or self-care    Row Name 02/13/20  1516       Plan    Plan  SW    Plan Comments  SW spoke with patient about insulin affordability. Patient explains that he hasn't paid his deductible and cannot afford his insulin medication. He explains he was using humulin r u500 (cost of 400) and his doctor switched him to Omni Pod Insulin Pump (cost of 200) however he can't afford it either due his deductible not being meet. NISHANT discussed with  Pharmacy affordable options  however they suggested that patient would have to pay his deductible.        Destination      Coordination has not been started for this encounter.      Durable Medical Equipment      Coordination has not been started for this encounter.      Dialysis/Infusion      Coordination has not been started for this encounter.      Home Medical Care      Coordination has not been started for this encounter.      Therapy      Coordination has not been started for this encounter.      Community Resources      Coordination has not been started for this encounter.        Expected Discharge Date and Time     Expected Discharge Date Expected Discharge Time    Feb 15, 2020         Demographic Summary     Row Name 02/13/20 1782       General Information    General Information Comments  Pt's PCP is Anibal Maria       Contact Information    Permission Granted to Share Info With      Contact Information Obtained for      Contact Information Comments  Shaila Roth(daughter):  [phone] or Caryn Phillips(spouse):  [phone]        Functional Status    No documentation.       Psychosocial    No documentation.       Abuse/Neglect    No documentation.       Legal    No documentation.       Substance Abuse    No documentation.       Patient Forms    No documentation.           Jana Francisco RN

## 2020-02-13 NOTE — PROGRESS NOTES
Continued Stay Note   [place]     Patient Name: Abe Phillips Jr.  MRN: [id number]  Today's Date: 2/13/2020    Admit Date: 2/12/2020    Discharge Plan     Row Name 02/13/20 1516       Plan    Plan  SW    Plan Comments  SW spoke with patient about insulin affordability. Patient explains that he hasn't paid his deductible and cannot afford his insulin medication. He explains he was using humulin r u500 (cost of 400) and his doctor switched him to Omni Pod Insulin Pump (cost of 200) however he can't afford it either due his deductible not being met. Patient explained he cant get samples from his doctor. NISHANT discussed with  Pharmacy affordable options however they suggested that patient would have to pay his deductible. Discussed with patient.        Discharge Codes    No documentation.       Expected Discharge Date and Time     Expected Discharge Date Expected Discharge Time    Feb 15, 2020             JENNY Deleon (Kay)

## 2020-02-13 NOTE — ED PROVIDER NOTES
"Subjective   Mr. Abe Phillips Jr. is a 64 y.o. male who presents to the ED with c/o generalized weakness. He has reportedly been out of town due to a death in the family for the past 3 days and has not been able to use his insulin. EMS reported his blood glucose level was \"too high to read\". He denies chest pain, shortness of breath, nausea, vomiting, or abdominal pain. He has history of diabetes and has a diabetic ulcer on his right foot which is being followed and treated by wound care. There are no other acute symptoms at this time.      History provided by:  Patient and EMS personnel  Weakness - Generalized   Severity:  Moderate  Onset quality:  Sudden  Duration:  1 day  Timing:  Unable to specify  Progression:  Unchanged  Chronicity:  New  Relieved by:  None tried  Worsened by:  Nothing  Ineffective treatments:  None tried  Associated symptoms: no abdominal pain, no chest pain, no nausea, no shortness of breath and no vomiting        Review of Systems   Respiratory: Negative for shortness of breath.    Cardiovascular: Negative for chest pain.   Gastrointestinal: Negative for abdominal pain, nausea and vomiting.       Past Medical History:   Diagnosis Date   • A-fib (CMS/HCC)    • ADD (attention deficit disorder)    • Atrial flutter (CMS/HCC)    • Cellulitis    • CHF (congestive heart failure) (CMS/HCC)    • Cirrhosis of liver (CMS/HCC)    • Constipation    • Depression    • Diabetes mellitus (CMS/HCC)    • Diabetic ulcer of right foot (CMS/HCC)    • Disease of thyroid gland    • Edema    • Fatty liver    • Hepatitis B    • Hyperlipidemia    • Hypokalemia    • Insomnia    • Lymphedema    • Narcolepsy    • Neuropathy    • Obesity    • JAIME on CPAP    • PVD (peripheral vascular disease) (CMS/HCC)    • RLS (restless legs syndrome)    • Skin cancer    • Tardive dyskinesia    • Tremor of both hands    • Yeast infection        No Known Allergies    Past Surgical History:   Procedure Laterality Date   • ACHILLES TENDON " "REPAIR     • CARDIAC ABLATION     • CHOLECYSTECTOMY     • LASER ABLATION      VEIN RLE       Family History   Problem Relation Age of Onset   • Heart failure Mother         CHF   • Clotting disorder Father    • No Known Problems Sister    • No Known Problems Brother    • No Known Problems Brother        Social History     Socioeconomic History   • Marital status:      Spouse name: Not on file   • Number of children: Not on file   • Years of education: Not on file   • Highest education level: Not on file   Tobacco Use   • Smoking status: Never Smoker   • Smokeless tobacco: Never Used   Substance and Sexual Activity   • Alcohol use: Yes     Comment: \"3 SCOTCH AND 2 BEERS PER WEEK\"   • Drug use: No   • Sexual activity: Defer         Objective   Physical Exam   Constitutional: He is oriented to person, place, and time. He appears well-developed and well-nourished. No distress.   Ill appearing. Arousable. Oriented to person, place and situation.    HENT:   Head: Normocephalic and atraumatic.   Nose: Nose normal.   Very dry mucous membranes.   Eyes: Conjunctivae are normal. No scleral icterus.   Neck: Normal range of motion. Neck supple.   Cardiovascular: Normal rate, regular rhythm and normal heart sounds.   No murmur heard.  Pulmonary/Chest: Effort normal and breath sounds normal. No respiratory distress.   Abdominal: Soft. There is no tenderness.   Musculoskeletal: Normal range of motion. He exhibits edema (bilateral lower extremity).   Right foot bandaged secondary to diabetic foot ulcer.   Neurological: He is alert and oriented to person, place, and time.   Skin: Skin is warm and dry.   Psychiatric: His behavior is normal.   Nursing note and vitals reviewed.      Procedures         ED Course  ED Course as of Feb 13 0130   Thu Feb 13, 2020   0129 Patient presents to ED via EMS for weakness.  Found to be hyperglycemic and dehydrated without acidosis on exam.  Case reviewed with Dr. Felder who was in agreement " patient would benefit from admission, hydration and lowering of blood sugar.  IV fluids and insulin bolus provided while in ED.  Hospitalist consulted for admission was agreeable to accept patient to his service.  Patient agreeable to plan of care.    [JG]      ED Course User Index  [JG] Mirza Gary, PA         Recent Results (from the past 24 hour(s))   Comprehensive Metabolic Panel    Collection Time: 02/13/20 12:00 AM   Result Value Ref Range    Glucose 981 (C) 65 - 99 mg/dL    BUN 44 (H) 8 - 23 mg/dL    Creatinine 1.63 (H) 0.76 - 1.27 mg/dL    Sodium 113 (C) 136 - 145 mmol/L    Potassium 4.2 3.5 - 5.2 mmol/L    Chloride 67 (L) 98 - 107 mmol/L    CO2 27.0 22.0 - 29.0 mmol/L    Calcium 9.6 8.6 - 10.5 mg/dL    Total Protein 9.0 (H) 6.0 - 8.5 g/dL    Albumin 4.10 3.50 - 5.20 g/dL    ALT (SGPT) 32 1 - 41 U/L    AST (SGOT) 37 1 - 40 U/L    Alkaline Phosphatase 266 (H) 39 - 117 U/L    Total Bilirubin 1.1 0.2 - 1.2 mg/dL    eGFR Non African Amer 43 (L) >60 mL/min/1.73    Globulin 4.9 gm/dL    A/G Ratio 0.8 g/dL    BUN/Creatinine Ratio 27.0 (H) 7.0 - 25.0    Anion Gap 19.0 (H) 5.0 - 15.0 mmol/L   Light Blue Top    Collection Time: 02/13/20 12:00 AM   Result Value Ref Range    Extra Tube hold for add-on    Green Top (Gel)    Collection Time: 02/13/20 12:00 AM   Result Value Ref Range    Extra Tube Hold for add-ons.    Lavender Top    Collection Time: 02/13/20 12:00 AM   Result Value Ref Range    Extra Tube hold for add-on    Gold Top - SST    Collection Time: 02/13/20 12:00 AM   Result Value Ref Range    Extra Tube Hold for add-ons.    CBC Auto Differential    Collection Time: 02/13/20 12:00 AM   Result Value Ref Range    WBC 5.72 3.40 - 10.80 10*3/mm3    RBC 5.38 4.14 - 5.80 10*6/mm3    Hemoglobin 15.3 13.0 - 17.7 g/dL    Hematocrit 45.0 37.5 - 51.0 %    MCV 83.6 79.0 - 97.0 fL    MCH 28.4 26.6 - 33.0 pg    MCHC 34.0 31.5 - 35.7 g/dL    RDW 13.8 12.3 - 15.4 %    RDW-SD 41.7 37.0 - 54.0 fl    MPV 11.6 6.0 - 12.0 fL     Platelets 191 140 - 450 10*3/mm3    Neutrophil % 65.6 42.7 - 76.0 %    Lymphocyte % 22.6 19.6 - 45.3 %    Monocyte % 8.9 5.0 - 12.0 %    Eosinophil % 1.0 0.3 - 6.2 %    Basophil % 1.0 0.0 - 1.5 %    Immature Grans % 0.9 (H) 0.0 - 0.5 %    Neutrophils, Absolute 3.75 1.70 - 7.00 10*3/mm3    Lymphocytes, Absolute 1.29 0.70 - 3.10 10*3/mm3    Monocytes, Absolute 0.51 0.10 - 0.90 10*3/mm3    Eosinophils, Absolute 0.06 0.00 - 0.40 10*3/mm3    Basophils, Absolute 0.06 0.00 - 0.20 10*3/mm3    Immature Grans, Absolute 0.05 0.00 - 0.05 10*3/mm3    nRBC 0.0 0.0 - 0.2 /100 WBC   Beta Hydroxybutyrate Quantitative    Collection Time: 02/13/20 12:00 AM   Result Value Ref Range    Beta-Hydroxybutyrate Quant 0.578 (H) 0.020 - 0.270 mmol/L   Blood Gas, Arterial With Co-Ox    Collection Time: 02/13/20  1:07 AM   Result Value Ref Range    Site Right Radial     Cam's Test N/A     pH, Arterial 7.379 7.350 - 7.450 pH units    pCO2, Arterial 52.3 mm Hg    pO2, Arterial 62.0 (L) 83.0 - 108.0 mm Hg    HCO3, Arterial 30.9 (H) 20.0 - 26.0 mmol/L    Base Excess, Arterial 4.2 (H) 0.0 - 2.0 mmol/L    Hemoglobin, Blood Gas 15.9 13.5 - 17.5 g/dL    Hematocrit, Blood Gas 48.7 %    Oxyhemoglobin 88.0 (L) 94 - 99 %    Methemoglobin 0.90 0.00 - 1.50 %    Carboxyhemoglobin 1.1 0 - 2 %    CO2 Content 32.5 22 - 33 mmol/L    Temperature 37.0 C    Barometric Pressure for Blood Gas      Modality Room Air     FIO2 21 %    Ventilator Mode       Note      pH, Temp Corrected 7.379 pH Units    pCO2, Temperature Corrected 52.3 (H) 35 - 48 mm Hg    pO2, Temperature Corrected 62.0 (L) 83 - 108 mm Hg     Note: In addition to lab results from this visit, the labs listed above may include labs taken at another facility or during a different encounter within the last 24 hours. Please correlate lab times with ED admission and discharge times for further clarification of the services performed during this visit.    No orders to display     Vitals:    02/12/20 2351   BP:  "121/83   BP Location: Right arm   Patient Position: Lying   Pulse: 75   Resp: 16   Temp: 97.7 °F (36.5 °C)   TempSrc: Oral   SpO2: 93%   Weight: 136 kg (300 lb)   Height: 193 cm (76\")     Medications   sodium chloride 0.9 % flush 10 mL (has no administration in time range)   sodium chloride 0.9 % bolus 1,000 mL (1,000 mL Intravenous New Bag 2/13/20 0105)   insulin regular (humuLIN R,novoLIN R) injection 10 Units (10 Units Intravenous Given 2/13/20 0105)     ECG/EMG Results (last 24 hours)     ** No results found for the last 24 hours. **        No orders to display             MDM  Number of Diagnoses or Management Options  Dehydration: new and requires workup  Hyperglycemia: new and requires workup  Hyperglycemic coma (CMS/HCC): new and requires workup     Amount and/or Complexity of Data Reviewed  Clinical lab tests: reviewed  Decide to obtain previous medical records or to obtain history from someone other than the patient: yes    Risk of Complications, Morbidity, and/or Mortality  Presenting problems: moderate  Diagnostic procedures: moderate  Management options: moderate    Patient Progress  Patient progress: stable      Final diagnoses:   Hyperglycemia   Hyperglycemic coma (CMS/HCC)   Dehydration       Documentation assistance provided by jemima Cleveland.  Information recorded by the scribe was done at my direction and has been verified and validated by me.     Zeus Cleveland  02/13/20 0105       Mirza Gary PA  02/13/20 0130    "

## 2020-02-13 NOTE — PLAN OF CARE
Problem: Patient Care Overview  Goal: Plan of Care Review  2/13/2020 0551 by Racquel Reddy, RN  Flowsheets (Taken 2/13/2020 0515)  Outcome Summary: VSS. Tolerating room air. Pt is lethargic, drifts off to sleep in the middle of sentences. Hyperglycemia being managed by sq insulin. NSR with PVCs  2/13/2020 0543 by Racquel Reddy, RN  Outcome: Ongoing (interventions implemented as appropriate)  Flowsheets  Taken 2/13/2020 0515  Progress: no change  Outcome Summary: VSS. Tolerating room air. Pt is lethargic, drifts off to sleep in the middle of sentences. Hyperglycemia being managed by sq insulin. NSR with PVCs  Taken 2/13/2020 0230  Plan of Care Reviewed With: patient     Problem: Hyperglycemia, Persistent (Adult)  Goal: Signs and Symptoms of Listed Potential Problems Will be Absent, Minimized or Managed (Hyperglycemia, Persistent)  Outcome: Ongoing (interventions implemented as appropriate)  Flowsheets (Taken 2/13/2020 0515)  Problems Assessed (Hyperglycemia): hyperglycemia  Problems Present (Hyperglycemia): hyperglycemia

## 2020-02-13 NOTE — PROGRESS NOTES
"                  Clinical Nutrition   Nutrition Assessment  Reason for Visit:   Identified at risk by screening criteria, MST score 2+    Patient Name: Abe Phillips Jr.  YOB: 1955  MRN: [id number]  Date of Encounter: 02/13/20 10:21 AM  Admission date: 2/12/2020    Nutrition Assessment   Assessment     Admission Diagnosis   Type 2 diabetes mellitus with hyperglycemia (CMS/HCC)  Diabetic ulcer of right heel (CMS/HCC)  OMAR (acute kidney injury) (CMS/HCC)    Applicable PMH/PSxH:   HLD  PVD  RLS  CKD  Afib  MCCAULEY w/ cirrhosis  HTN  ADD  CHF  Depression  Constipation   Hypothyroidism   Skin cancer  Tardive dyskinesia   Cholecystectomy   Cardiac ablation     Reported/Observed/Food/Nutrition Related History:     Pt sitting up in bed eating breakfast at time of RD arrival. Pt reports that he has had a loss of appetite ongoing for \"a few weeks\". Pt reports he has not been feeling well since he has not had insulin for ~ 1 week. Pt stated that his UBW is ~305 lb, but his weight is difficult to determine because he takes multiple diuretics. Pt reports no episodes of N/V, no known food allergies. RD provided pt with menu for future preferences.    Anthropometrics     Height: 193 cm (76\")  Last filed wt: Weight: 131 kg (288 lb 9.6 oz) (02/13/20 0224)  Weight Method: Bed scale    BMI: BMI (Calculated): 35.1  Obese Class II: 35-39.9kg/m2    Ideal Body Weight (IBW) (kg): 93.24    Weight Change   UBW: 305 lb per pt   Pt reports that his weight fluctuates often 2/2 taking diuretics.     Labs reviewed   Yes  Hyponatremia   Hyperglycemia   Hypokalemia   Hypomagnesemia   Results from last 7 days   Lab Units 02/13/20  0335 02/13/20  0000   GLUCOSE mg/dL 615* 981*   BUN mg/dL 42* 44*   CREATININE mg/dL 1.32* 1.63*   SODIUM mmol/L 122* 113*   CHLORIDE mmol/L 76* 67*   POTASSIUM mmol/L 3.3* 4.2   PHOSPHORUS mg/dL 3.4  --    MAGNESIUM mg/dL 2.5*  --    ALT (SGPT) U/L 30 32     Results from last 7 days   Lab Units 02/13/20  0335 " 02/13/20  0000   ALBUMIN g/dL 3.90 4.10   CHOLESTEROL mg/dL 220*  --    TRIGLYCERIDES mg/dL 285*  --      Results from last 7 days   Lab Units 02/13/20  0700 02/13/20  0525 02/13/20  0436 02/13/20  0258 02/13/20  0140 02/12/20  2351   GLUCOSE mg/dL 467* >599* >599* >599* >599* >599*     Lab Results   Lab Value Date/Time    HGBA1C 14.80 (H) 02/13/2020 0335    HGBA1C 13.60 (H) 12/27/2019 1803     Medications reviewed   Pertinent: rocephin, colace, neurontin, insulin  GTT: IVF @ 100 mL/hr  PRN: [email or web address]     Current Nutrition Prescription     PO: Diet Regular; Consistent Carbohydrate    Intake: Insufficient data     Nutrition Diagnosis     2/13  Problem Inadequate oral intake   Etiology Hyperglycemia; OMAR   Signs/Symptoms Pt reports ongoing poor appetite for a few weeks      2/13  Problem Increased nutrient needs; protein    Etiology Wound healing    Signs/Symptoms Diabetic ulcer of R foot      Nutrition Intervention   1.  Follow treatment progress, Care plan reviewed  2. Advise alternate selection, Advised available snacks, Interview for preferences, Menu provided, Encourage intake   3. Premier Protein Daily with dinner     Goal:   General: Nutrition support treatment, Improve nutrition related labs  PO: Increase intake  Additional goals: > 67% of meals, promote wound healing, A1c <7%    Monitoring/Evaluation:   Per protocol, PO intake, Supplement intake, Pertinent labs, Weight, Skin status, Symptoms    Will Continue to follow per protocol    Shari La RD  Time Spent: 35 minutes

## 2020-02-13 NOTE — NURSING NOTE
WOC nurse consult for wound to right heel diabetic foot ulcer.  Patient stated he has had this wound since May of 2019.  He receives care at [facility] wound care center.  From what he describes it sounds like collagen to wound base then hydrofiber 4x4's and kerlix.  He continues to walk on it and does not have any pressure relieving device.  Cleaned with wound , applied honey, and foam dressing.  Wound base appears to be 100% granulation tissues, undermining .3 - .5cm.  Callused periwound is trimmed down.  Blood Sugars are very high, he states he usually can control them.  Will order PT wound care for further wound care treatment options.  Also ordered patient a bed with bed extender from [facility] [phone], as patient is over 185 cm.  No further WOC nurse need at this time.  Reconsult if needed.  Thank you   - CKD without UDAY creatinine at baseline - CAD s/p stents, remote hx of MI  - ASA held last admission 2/2 GIB  - Presents with NSTEMI  - c/w ASA, Plavix  - Atorvastatin 80mg QHS  - Metoprolol tartrate 25mg PO BID

## 2020-02-13 NOTE — PROGRESS NOTES
Assuming care of Mr. Phillips this AM after early morning admission by Dr. Dubois. The current assessment and plan is as follows:      [facility]  DM type 2, uncontrolled, a1c 14.8%  - mildly hyperosmolar, did have ketones but not acidotic  - reports he takes 100U of U500 insulin BID at home; has been out for multiple weeks due to being in the donut hole with his insurance, has been using OTC from [facility] but was [place] of the [place] and has been out altogether  - says he uses [employer] for his U500 making it durable medical equipment that helps with his coverage, have asked CM to help with affordability at home  - follows with endocrine outpatient  - adjust insulin to 60U levemir and 20U humalog premeal for more balanced regimen, continue accuchecks with SSI    Acute kidney injury, prerenal, improving  - severely volume depleted due to osmotic diuresis from severely elevated BG  - cont IVF's  - cont to hold bumex and aldactone  - cont IVF's    Hypokalemia  - replacing, likely moving intracellular related to getting insulin, monitor    Hyponatremia  - improving with IVF's (corrected Na was ~125 on arrival and up to 133 now)    Diabetic foot ulcer of the RT heel  - has outpatient WC  - WOC to see here  - no signs of systemic infection, DC rocephin, will need opt follow up    Narcolepsy  - takes adderal at home, we do not have on formulary and he is out of it at home; if he can get a refill we will allow to use his own supply     Hypothyroidism  HLD  pAfib  - cont home meds    Complex needs  - patient is chronically ill with multiple comorbidities and has been out of numerous medications at home, will need to make sure he has safe discharge planning once ready for DC

## 2020-02-14 LAB
ANION GAP SERPL CALCULATED.3IONS-SCNC: 9 MMOL/L (ref 5–15)
BASOPHILS # BLD AUTO: 0.05 10*3/MM3 (ref 0–0.2)
BASOPHILS NFR BLD AUTO: 0.9 % (ref 0–1.5)
BUN BLD-MCNC: 21 MG/DL (ref 8–23)
BUN/CREAT SERPL: 23.3 (ref 7–25)
CALCIUM SPEC-SCNC: 9 MG/DL (ref 8.6–10.5)
CHLORIDE SERPL-SCNC: 96 MMOL/L (ref 98–107)
CO2 SERPL-SCNC: 29 MMOL/L (ref 22–29)
CREAT BLD-MCNC: 0.9 MG/DL (ref 0.76–1.27)
DEPRECATED RDW RBC AUTO: 43.3 FL (ref 37–54)
EOSINOPHIL # BLD AUTO: 0.09 10*3/MM3 (ref 0–0.4)
EOSINOPHIL NFR BLD AUTO: 1.6 % (ref 0.3–6.2)
ERYTHROCYTE [DISTWIDTH] IN BLOOD BY AUTOMATED COUNT: 14.5 % (ref 12.3–15.4)
GFR SERPL CREATININE-BSD FRML MDRD: 85 ML/MIN/1.73
GLUCOSE BLD-MCNC: 172 MG/DL (ref 65–99)
GLUCOSE BLDC GLUCOMTR-MCNC: 134 MG/DL (ref 70–130)
GLUCOSE BLDC GLUCOMTR-MCNC: 219 MG/DL (ref 70–130)
GLUCOSE BLDC GLUCOMTR-MCNC: 255 MG/DL (ref 70–130)
GLUCOSE BLDC GLUCOMTR-MCNC: 288 MG/DL (ref 70–130)
HCT VFR BLD AUTO: 45.9 % (ref 37.5–51)
HGB BLD-MCNC: 15.3 G/DL (ref 13–17.7)
IMM GRANULOCYTES # BLD AUTO: 0.03 10*3/MM3 (ref 0–0.05)
IMM GRANULOCYTES NFR BLD AUTO: 0.5 % (ref 0–0.5)
LYMPHOCYTES # BLD AUTO: 1.37 10*3/MM3 (ref 0.7–3.1)
LYMPHOCYTES NFR BLD AUTO: 24.4 % (ref 19.6–45.3)
MAGNESIUM SERPL-MCNC: 2.1 MG/DL (ref 1.6–2.4)
MCH RBC QN AUTO: 28.6 PG (ref 26.6–33)
MCHC RBC AUTO-ENTMCNC: 33.3 G/DL (ref 31.5–35.7)
MCV RBC AUTO: 85.8 FL (ref 79–97)
MONOCYTES # BLD AUTO: 0.52 10*3/MM3 (ref 0.1–0.9)
MONOCYTES NFR BLD AUTO: 9.3 % (ref 5–12)
NEUTROPHILS # BLD AUTO: 3.56 10*3/MM3 (ref 1.7–7)
NEUTROPHILS NFR BLD AUTO: 63.3 % (ref 42.7–76)
NRBC BLD AUTO-RTO: 0 /100 WBC (ref 0–0.2)
PHOSPHATE SERPL-MCNC: 1.5 MG/DL (ref 2.5–4.5)
PHOSPHATE SERPL-MCNC: 1.8 MG/DL (ref 2.5–4.5)
PLATELET # BLD AUTO: 161 10*3/MM3 (ref 140–450)
PMV BLD AUTO: 10.8 FL (ref 6–12)
POTASSIUM BLD-SCNC: 4 MMOL/L (ref 3.5–5.2)
RBC # BLD AUTO: 5.35 10*6/MM3 (ref 4.14–5.8)
SODIUM BLD-SCNC: 134 MMOL/L (ref 136–145)
WBC NRBC COR # BLD: 5.62 10*3/MM3 (ref 3.4–10.8)

## 2020-02-14 PROCEDURE — 85025 COMPLETE CBC W/AUTO DIFF WBC: CPT | Performed by: INTERNAL MEDICINE

## 2020-02-14 PROCEDURE — 84100 ASSAY OF PHOSPHORUS: CPT | Performed by: INTERNAL MEDICINE

## 2020-02-14 PROCEDURE — 82962 GLUCOSE BLOOD TEST: CPT

## 2020-02-14 PROCEDURE — G0108 DIAB MANAGE TRN  PER INDIV: HCPCS

## 2020-02-14 PROCEDURE — 83735 ASSAY OF MAGNESIUM: CPT | Performed by: INTERNAL MEDICINE

## 2020-02-14 PROCEDURE — 80048 BASIC METABOLIC PNL TOTAL CA: CPT | Performed by: INTERNAL MEDICINE

## 2020-02-14 PROCEDURE — 99232 SBSQ HOSP IP/OBS MODERATE 35: CPT | Performed by: INTERNAL MEDICINE

## 2020-02-14 PROCEDURE — 63710000001 INSULIN DETEMIR PER 5 UNITS: Performed by: INTERNAL MEDICINE

## 2020-02-14 PROCEDURE — 25010000002 HEPARIN (PORCINE) PER 1000 UNITS: Performed by: INTERNAL MEDICINE

## 2020-02-14 RX ORDER — PRAMIPEXOLE DIHYDROCHLORIDE 0.25 MG/1
1 TABLET ORAL EVERY 12 HOURS SCHEDULED
Status: DISCONTINUED | OUTPATIENT
Start: 2020-02-14 | End: 2020-02-14

## 2020-02-14 RX ORDER — PRAMIPEXOLE DIHYDROCHLORIDE 0.25 MG/1
1 TABLET ORAL EVERY 12 HOURS SCHEDULED
Status: DISCONTINUED | OUTPATIENT
Start: 2020-02-14 | End: 2020-02-15 | Stop reason: HOSPADM

## 2020-02-14 RX ADMIN — POTASSIUM & SODIUM PHOSPHATES POWDER PACK 280-160-250 MG 2 PACKET: 280-160-250 PACK at 23:44

## 2020-02-14 RX ADMIN — HEPARIN SODIUM 5000 UNITS: 5000 INJECTION, SOLUTION INTRAVENOUS; SUBCUTANEOUS at 05:45

## 2020-02-14 RX ADMIN — DEXTROAMPHETAMINE SACCHARATE, AMPHETAMINE ASPARTATE MONOHYDRATE, DEXTROAMPHETAMINE SULFATE AND AMPHETAMINE SULFATE 60 MG: 7.5; 7.5; 7.5; 7.5 TABLET ORAL at 08:32

## 2020-02-14 RX ADMIN — GABAPENTIN 900 MG: 300 CAPSULE ORAL at 20:29

## 2020-02-14 RX ADMIN — DOCUSATE SODIUM 100 MG: 100 CAPSULE, LIQUID FILLED ORAL at 08:33

## 2020-02-14 RX ADMIN — INSULIN LISPRO 8 UNITS: 100 INJECTION, SOLUTION INTRAVENOUS; SUBCUTANEOUS at 21:09

## 2020-02-14 RX ADMIN — PRAMIPEXOLE DIHYDROCHLORIDE 1 MG: 0.25 TABLET ORAL at 18:04

## 2020-02-14 RX ADMIN — DEXTROAMPHETAMINE SACCHARATE, AMPHETAMINE ASPARTATE MONOHYDRATE, DEXTROAMPHETAMINE SULFATE AND AMPHETAMINE SULFATE 20 MG: 5; 5; 5; 5 TABLET ORAL at 20:29

## 2020-02-14 RX ADMIN — POTASSIUM & SODIUM PHOSPHATES POWDER PACK 280-160-250 MG 2 PACKET: 280-160-250 PACK at 12:34

## 2020-02-14 RX ADMIN — ASPIRIN 81 MG: 81 TABLET, COATED ORAL at 08:33

## 2020-02-14 RX ADMIN — HEPARIN SODIUM 5000 UNITS: 5000 INJECTION, SOLUTION INTRAVENOUS; SUBCUTANEOUS at 20:29

## 2020-02-14 RX ADMIN — INSULIN LISPRO 12 UNITS: 100 INJECTION, SOLUTION INTRAVENOUS; SUBCUTANEOUS at 17:48

## 2020-02-14 RX ADMIN — HEPARIN SODIUM 5000 UNITS: 5000 INJECTION, SOLUTION INTRAVENOUS; SUBCUTANEOUS at 13:22

## 2020-02-14 RX ADMIN — DEXTROAMPHETAMINE SACCHARATE, AMPHETAMINE ASPARTATE MONOHYDRATE, DEXTROAMPHETAMINE SULFATE AND AMPHETAMINE SULFATE 10 MG: 2.5; 2.5; 2.5; 2.5 TABLET ORAL at 20:29

## 2020-02-14 RX ADMIN — INSULIN LISPRO 12 UNITS: 100 INJECTION, SOLUTION INTRAVENOUS; SUBCUTANEOUS at 12:35

## 2020-02-14 RX ADMIN — INSULIN DETEMIR 70 UNITS: 100 INJECTION, SOLUTION SUBCUTANEOUS at 21:09

## 2020-02-14 RX ADMIN — DOCUSATE SODIUM 100 MG: 100 CAPSULE, LIQUID FILLED ORAL at 20:29

## 2020-02-14 RX ADMIN — LEVOTHYROXINE SODIUM 75 MCG: 75 TABLET ORAL at 05:46

## 2020-02-14 RX ADMIN — SODIUM CHLORIDE, PRESERVATIVE FREE 10 ML: 5 INJECTION INTRAVENOUS at 08:34

## 2020-02-14 RX ADMIN — GABAPENTIN 900 MG: 300 CAPSULE ORAL at 08:33

## 2020-02-14 RX ADMIN — PRAVASTATIN SODIUM 40 MG: 40 TABLET ORAL at 20:29

## 2020-02-14 RX ADMIN — VENLAFAXINE HYDROCHLORIDE 150 MG: 75 CAPSULE, EXTENDED RELEASE ORAL at 20:29

## 2020-02-14 NOTE — PROGRESS NOTES
Continued Stay Note  [facility]     Patient Name: Abe Phillips Jr.  MRN: [id number]  Today's Date: 2/14/2020    Admit Date: 2/12/2020    Discharge Plan     Row Name 02/14/20 1647       Plan    Plan  discharge plan    Patient/Family in Agreement with Plan  yes    Plan Comments  Plan is home with family at discharge.  NISHANT spoke with pt regarding medication cost/financial assistance.  Pt denies further discharge needs. CM will cont to follow    Final Discharge Disposition Code  01 - home or self-care    Row Name 02/14/20 1612       Plan    Plan  SW    Plan Comments  SW met with patient to discuss medication affordability. Diabetes educator was present and discussing affordable options for patient. Patient expressed that he could possible ask for assistance from his family and friends to assist with the cost. Diabetes educator discussed with patient affordable options as well. Patient discussed needing financial assistance with his hospital. SW provide patient with a financial assistance application for [facility]. Patient explained that he already applied. SW contacted financial assistance for [facility] at [phone] spoke with rep who confirmed no application has been submitted. Patient is aware that he will need to submit financial assistance for [facility] if he wishes to receive assistance with his hospital cost.    Row Name 02/14/20 1516       Plan    Plan  discharge plan    Patient/Family in Agreement with Plan  yes        Discharge Codes    No documentation.       Expected Discharge Date and Time     Expected Discharge Date Expected Discharge Time    Feb 15, 2020             Jana Francisco RN

## 2020-02-14 NOTE — PLAN OF CARE
Problem: Skin Injury Risk (Adult)  Goal: Identify Related Risk Factors and Signs and Symptoms  Outcome: Ongoing (interventions implemented as appropriate)  Flowsheets (Taken 2/14/2020 6426)  Related Risk Factors (Skin Injury Risk): other (see comments)  Note:   Right heel diabetic ulcer, [facility] wound care notified, he will she patient tomorrow am.

## 2020-02-14 NOTE — PROGRESS NOTES
[facility]  PROGRESS NOTE    Patient Name: Abe Phillips Jr.  : 1955  MRN: [id number]    Date of Admission: 2020  Primary Care Physician: Anibal Maria MD    Subjective   Subjective     CC:  Follow up HHS, electrolyte abnormalities    HPI:  No complaints this AM. Still attempting to figure out good insulin plan going home. DM educator and SW with patient this AM.    Review of Systems  Gen- No fevers, chills  CV- No chest pain, palpitations  Resp- No cough, dyspnea  GI- No N/V/D, abd pain    Objective   Objective     Vital Signs:   Temp:  [97.8 °F (36.6 °C)-98.1 °F (36.7 °C)] 98.1 °F (36.7 °C)  Heart Rate:  [78] 78  Resp:  [16] 16  BP: (115-143)/(70-86) 143/86        Physical Exam:  Constitutional: No acute distress, awake, alert, sitting up in bed  HENT: NCAT, mucous membranes moist  Respiratory: Clear to auscultation bilaterally, respiratory effort normal   Cardiovascular: RRR, palpable radial pulses bilaterally  Gastrointestinal: Positive bowel sounds, soft, nontender, nondistended  Musculoskeletal: Chronic venous stasis changes BL LE's  Psychiatric: Appropriate affect, cooperative  Neurologic: Speech clear  Skin: No rashes      Results Reviewed:  Results from last 7 days   Lab Units 20  0920 20  0000   WBC 10*3/mm3 5.62 5.72   HEMOGLOBIN g/dL 15.3 15.3   HEMATOCRIT % 45.9 45.0   PLATELETS 10*3/mm3 161 191     Results from last 7 days   Lab Units 20  0920 20  1335 20  0335 20  0000   SODIUM mmol/L 134* 133* 122* 113*   POTASSIUM mmol/L 4.0 2.6* 3.3* 4.2   CHLORIDE mmol/L 96* 89* 76* 67*   CO2 mmol/L 29.0 29.0 26.0 27.0   BUN mg/dL 21 34* 42* 44*   CREATININE mg/dL 0.90 1.11 1.32* 1.63*   GLUCOSE mg/dL 172* 118* 615* 981*   CALCIUM mg/dL 9.0 9.3 9.6 9.6   ALT (SGPT) U/L  --   --  30 32   AST (SGOT) U/L  --   --  33 37     Estimated Creatinine Clearance: 120.4 mL/min (by C-G formula based on SCr of 0.9  mg/dL).    Microbiology Results Abnormal     None          Imaging Results (Last 24 Hours)     ** No results found for the last 24 hours. **          Results for orders placed during the hospital encounter of 01/04/19   Adult Transthoracic Echo Complete W/ Cont if Necessary Per Protocol    Narrative · Left ventricular systolic function is hyperdynamic (EF > 70).          I have reviewed the medications:  Scheduled Meds:  amphetamine-dextroamphetamine 10 mg Oral Nightly   amphetamine-dextroamphetamine 20 mg Oral Nightly   amphetamine-dextroamphetamine 60 mg Oral QAM   aspirin 81 mg Oral Daily   docusate sodium 100 mg Oral BID   gabapentin 900 mg Oral BID   heparin (porcine) 5,000 Units Subcutaneous Q8H   insulin detemir 60 Units Subcutaneous Nightly   insulin lispro 0-24 Units Subcutaneous 4x Daily With Meals & Nightly   insulin lispro 20 Units Subcutaneous TID With Meals   levothyroxine 75 mcg Oral Q AM   pravastatin 40 mg Oral Nightly   sodium chloride 10 mL Intravenous Q12H   venlafaxine  mg Oral Nightly     Continuous Infusions:   PRN Meds:.•  acetaminophen **OR** acetaminophen **OR** acetaminophen  •  dextrose  •  dextrose  •  glucagon (human recombinant)  •  ondansetron **OR** ondansetron  •  potassium & sodium phosphates **OR** potassium & sodium phosphates  •  potassium chloride **OR** potassium chloride **OR** potassium chloride  •  sodium chloride  •  sodium chloride  •  traMADol    Assessment/Plan   Assessment & Plan     Active Hospital Problems    Diagnosis  POA   • Hyperglycemia [R73.9]  Yes   • Diabetic ulcer of right heel associated with type 2 diabetes mellitus (CMS/Formerly [facility]) [E11.621, L97.419]  Yes   • Dyslipidemia [E78.5]  Yes   • OMAR (acute kidney injury) (CMS/Formerly [facility]) [N17.9]  Yes   • Diabetes mellitus (CMS/Formerly [facility]) [E11.9]  Yes   • Restless legs syndrome (RLS) [G25.81]  Yes   • Diabetic ulcer of right heel (CMS/Formerly [facility]) [E11.621, L97.419]  Yes   • PVD (peripheral vascular disease) (CMS/Formerly [facility]) [I73.9]  Yes   •  Type 2 diabetes mellitus with hyperglycemia (CMS/Formerly [facility]) [E11.65]  Yes      Resolved Hospital Problems   No resolved problems to display.        Brief Hospital Course to date:  Abe Phillips Jr. is a 64 y.o. male with longstanding uncontrolled DM2 on U500 insulin at home who fell into the donut hole and was out of his U500, getting by with OTC [employer] insulin but could no longer afford that either and had not been taking for a while at home admitted with HHS now well controlled and correcting resultant electrolyte abnormalities planned home soon once good insulin regimen determined and electrolytes stable    Assessment/Plan    [facility]  DM type 2, uncontrolled, a1c 14.8%  - reports taking 100U of U500 insulin BID at home; has been out for multiple weeks, was using OTC insulin from [employer] but went out of town and was out of that as well  - currently cannot afford U500 omnipods until he meets his deductible  - SW and DM educator helping with insulin plan for DC: possible insulins include lantus, toujeo (U300), and humalog - plan for Rx of some combination of those at DC  - At DC need to Rx accucheck guide me meter, strips, lancets  - opt follow up with his endocrinologist Dr. Dustin Hendricks  - inc levemir to 70U HS and premeal to 23U humalog  - cont accuchecks with SSI    Acute kidney injury, prerenal, improving  - severely volume depleted due to osmotic diuresis from severely elevated BG, s/p IVF's  - cont to hold bumex and aldactone until DC     Hypokalemia  Hypomagnesemia  - resolved     Hypophosphatemia  - replace and recheck lytes in the AM     Diabetic foot ulcer of the RT heel  - has outpatient WC  - no signs of systemic infection currently     Narcolepsy  - home adderal, patient supplied     Hypothyroidism  HLD  pAfib  - cont home meds    DVT Prophylaxis:  subq heparin    Disposition: I expect the patient to be discharged tomorrow if electrolytes stable and BG appropriate for safe DC.    CODE STATUS:   Code Status and  Medical Interventions:   Ordered at: 02/13/20 0324     Code Status:    CPR     Medical Interventions (Level of Support Prior to Arrest):    Full         Electronically signed by Nirav Fregoso DO, 02/14/20, 5:43 PM.

## 2020-02-14 NOTE — PLAN OF CARE
Problem: Patient Care Overview  Goal: Plan of Care Review  Flowsheets  Taken 2/13/2020 0515 by Racquel Reddy, RN  Progress: no change  Taken 2/13/2020 2050 by Trinity Moore, RN  Plan of Care Reviewed With: patient  Taken 2/14/2020 0511 by Trinity Moore, RN  Outcome Summary: VSS. Pt resting well tongiht. Pt on home CPAP. No other complaints at this time.

## 2020-02-14 NOTE — PROGRESS NOTES
Continued Stay Note  [facility]     Patient Name: Abe Phillips Jr.  MRN: [id number]  Today's Date: 2/14/2020    Admit Date: 2/12/2020    Discharge Plan     Row Name 02/14/20 1612       Plan    Plan  SW    Plan Comments  SW met with patient to discuss medication affordability. Diabetes educator was present and discussing affordable options for patient. Patient expressed that he could possible ask for assistance from his family and friends to assist with the cost. Diabetes educator discussed with patient affordable options as well. Patient was agreeable.     Patient discussed needing financial assistance with his impatient hospital cost. SW provide patient with a financial assistance application for [employer]. Patient explained that he already applied. SW contacted financial assistance for [facility] at [phone] spoke with rep who confirmed no application has been submitted. Patient is aware that he will need to submit financial assistance for [employer] if he wishes to receive assistance with his hospital cost.    Row Name 02/14/20 1516       Plan    Plan  discharge plan    Patient/Family in Agreement with Plan  yes        Discharge Codes    No documentation.       Expected Discharge Date and Time     Expected Discharge Date Expected Discharge Time    Feb 15, 2020             JENNY Deleon (Kay)

## 2020-02-14 NOTE — CONSULTS
Diabetes Education    Patient Name:  Abe Phillips Jr.  YOB: 1955  MRN: [id number]  Admit Date:  2/12/2020        Saw Mr. Phillips at bedside for diabetes education. Noted from notes that pt has had difficulty affording insulin. Mr. Phillips states he has been taking Novolin N insulin from [employer] ([employer]) brand, which costs $24.88 a vial, but he goes through a vial in 3 days because his dose is 100 units BID. He states he has been prescribed an Omnipod pump with R U500 insulin, but has not been able to get it because he has not yet met deductible this year so the start up costs to start the pump were too expensive. Hospitalist in room during visit, pt needs short term plan for insulin at discharge-let Dr. Fregoso know that I would include info on preferred insulins in my note; Novolin N or 70/30 not a good option for this pt due to his dose being so high, not more affordable for him.  present in room also during visit and states she has spoken with our retail pharmacy and while U500 could be filled by our pharmacy, pt would still owe his deductible. After pt spend much time on the phone with insurance company, we determined that preferred insulins are:  Lantus  Toujeo  Humalog  Toujeo is concentrated U300 glargine and could be a good option for basal due to concentration and pt history of higher doses of insulin.  Pt has been on basal/bolus regimen in the past.   Pt sees Dr. Dustin Hendricks for endocrinology/diabetes management-recommended that he call Dr. Hendricks on Monday for follow up appt ASAP to discuss all changes made to insulin regimen and plan for the future.  Provided pt with Omnipod Medicare Access 1800 number so that he can call for assistance/questions about how to obtain omnipod possibly with less expense, also discussed w/ him importance of follow up with endo so that he continues to have access to insulin.   Spoke w/ pt's primary nurse and updated.   Pt states he has been using his  wife's glucometer at home because his broke. Preferred meter for Medicare is Accucheck-provided pt with voucher for free Accucheck Guide Me meter.  Pt will need prescription at discharge for Accucheck Guide Me meter, strips, and lancets.  Also discussed w/ him the option of store brand meter such as [employer] ReliOn as a possible more affordable option.  Provided pt with outpatient DM education phone number as well and encouraged pt to call for appointment to attend diabetes education class. No further ed provided at this time as pt was  stressed after phone call.  Spent a total of 2 hours with patient.   Thank you for this consult, on call diabetes ed number for Saturday is [phone].       Electronically signed by:  Anisa Urbano RN  02/14/20 1:59 PM

## 2020-02-15 VITALS
OXYGEN SATURATION: 97 % | HEART RATE: 88 BPM | TEMPERATURE: 97.8 F | BODY MASS INDEX: 38.36 KG/M2 | WEIGHT: 315 LBS | DIASTOLIC BLOOD PRESSURE: 80 MMHG | HEIGHT: 76 IN | SYSTOLIC BLOOD PRESSURE: 112 MMHG | RESPIRATION RATE: 17 BRPM

## 2020-02-15 LAB
ANION GAP SERPL CALCULATED.3IONS-SCNC: 9 MMOL/L (ref 5–15)
BUN BLD-MCNC: 19 MG/DL (ref 8–23)
BUN/CREAT SERPL: 24.7 (ref 7–25)
CALCIUM SPEC-SCNC: 9 MG/DL (ref 8.6–10.5)
CHLORIDE SERPL-SCNC: 96 MMOL/L (ref 98–107)
CO2 SERPL-SCNC: 23 MMOL/L (ref 22–29)
CREAT BLD-MCNC: 0.77 MG/DL (ref 0.76–1.27)
GFR SERPL CREATININE-BSD FRML MDRD: 101 ML/MIN/1.73
GLUCOSE BLD-MCNC: 220 MG/DL (ref 65–99)
GLUCOSE BLDC GLUCOMTR-MCNC: 195 MG/DL (ref 70–130)
MAGNESIUM SERPL-MCNC: 1.8 MG/DL (ref 1.6–2.4)
PHOSPHATE SERPL-MCNC: 2.2 MG/DL (ref 2.5–4.5)
POTASSIUM BLD-SCNC: 4 MMOL/L (ref 3.5–5.2)
SODIUM BLD-SCNC: 128 MMOL/L (ref 136–145)

## 2020-02-15 PROCEDURE — 97597 DBRDMT OPN WND 1ST 20 CM/<: CPT

## 2020-02-15 PROCEDURE — 84100 ASSAY OF PHOSPHORUS: CPT | Performed by: INTERNAL MEDICINE

## 2020-02-15 PROCEDURE — 82962 GLUCOSE BLOOD TEST: CPT

## 2020-02-15 PROCEDURE — 83735 ASSAY OF MAGNESIUM: CPT | Performed by: INTERNAL MEDICINE

## 2020-02-15 PROCEDURE — 99239 HOSP IP/OBS DSCHRG MGMT >30: CPT | Performed by: INTERNAL MEDICINE

## 2020-02-15 PROCEDURE — 25010000002 HEPARIN (PORCINE) PER 1000 UNITS: Performed by: INTERNAL MEDICINE

## 2020-02-15 PROCEDURE — 80048 BASIC METABOLIC PNL TOTAL CA: CPT | Performed by: INTERNAL MEDICINE

## 2020-02-15 PROCEDURE — 97162 PT EVAL MOD COMPLEX 30 MIN: CPT

## 2020-02-15 RX ORDER — IBUPROFEN 200 MG
TABLET ORAL
Qty: 200 EACH | Refills: 1 | Status: SHIPPED | OUTPATIENT
Start: 2020-02-15 | End: 2020-06-02 | Stop reason: SDUPTHER

## 2020-02-15 RX ORDER — BLOOD SUGAR DIAGNOSTIC
1 STRIP MISCELLANEOUS 3 TIMES DAILY
Qty: 100 EACH | Refills: 0 | Status: SHIPPED | OUTPATIENT
Start: 2020-02-15 | End: 2021-01-01

## 2020-02-15 RX ORDER — INSULIN GLARGINE 100 [IU]/ML
70 INJECTION, SOLUTION SUBCUTANEOUS NIGHTLY
Qty: 21 ML | Refills: 0 | Status: SHIPPED | OUTPATIENT
Start: 2020-02-15 | End: 2020-03-16

## 2020-02-15 RX ORDER — BLOOD-GLUCOSE METER
1 EACH MISCELLANEOUS
Qty: 1 KIT | Refills: 0 | Status: SHIPPED | OUTPATIENT
Start: 2020-02-15 | End: 2020-10-09

## 2020-02-15 RX ORDER — LANCETS
1 EACH MISCELLANEOUS
Qty: 100 EACH | Refills: 0 | Status: SHIPPED | OUTPATIENT
Start: 2020-02-15 | End: 2020-10-09

## 2020-02-15 RX ADMIN — LEVOTHYROXINE SODIUM 75 MCG: 75 TABLET ORAL at 06:01

## 2020-02-15 RX ADMIN — ASPIRIN 81 MG: 81 TABLET, COATED ORAL at 08:29

## 2020-02-15 RX ADMIN — DEXTROAMPHETAMINE SACCHARATE, AMPHETAMINE ASPARTATE MONOHYDRATE, DEXTROAMPHETAMINE SULFATE AND AMPHETAMINE SULFATE 60 MG: 7.5; 7.5; 7.5; 7.5 TABLET ORAL at 08:29

## 2020-02-15 RX ADMIN — DOCUSATE SODIUM 100 MG: 100 CAPSULE, LIQUID FILLED ORAL at 08:29

## 2020-02-15 RX ADMIN — GABAPENTIN 900 MG: 300 CAPSULE ORAL at 08:29

## 2020-02-15 RX ADMIN — INSULIN LISPRO 4 UNITS: 100 INJECTION, SOLUTION INTRAVENOUS; SUBCUTANEOUS at 08:30

## 2020-02-15 RX ADMIN — PRAMIPEXOLE DIHYDROCHLORIDE 1 MG: 0.25 TABLET ORAL at 06:01

## 2020-02-15 RX ADMIN — POTASSIUM & SODIUM PHOSPHATES POWDER PACK 280-160-250 MG 2 PACKET: 280-160-250 PACK at 08:30

## 2020-02-15 RX ADMIN — HEPARIN SODIUM 5000 UNITS: 5000 INJECTION, SOLUTION INTRAVENOUS; SUBCUTANEOUS at 06:01

## 2020-02-15 NOTE — DISCHARGE SUMMARY
[facility] Medicine Services  DISCHARGE SUMMARY    Patient Name: Abe Phillips Jr.  : 1955  MRN: [id number]    Date of Admission: 2020 11:46 PM  Date of Discharge:  2/15/2020  Primary Care Physician: Anibal Maria MD    Consults     No orders found from 2020 to 2020.          Hospital Course     Presenting Problem:   Hyperglycemia [R73.9]    Active Hospital Problems    Diagnosis  POA   • Hyperglycemia [R73.9]  Yes   • Diabetic ulcer of right heel associated with type 2 diabetes mellitus (CMS/Formerly [facility]) [E11.621, L97.419]  Yes   • Dyslipidemia [E78.5]  Yes   • OMAR (acute kidney injury) (CMS/[facility]ly [facility]) [N17.9]  Yes   • Diabetes mellitus (CMS/HCC) [E11.9]  Yes   • Restless legs syndrome (RLS) [G25.81]  Yes   • Diabetic ulcer of right heel (CMS/[facility]ly [facility]) [E11.621, L97.419]  Yes   • PVD (peripheral vascular disease) (CMS/[facility]ly [facility]) [I73.9]  Yes   • Type 2 diabetes mellitus with hyperglycemia (CMS/[facility]ly [facility]) [E11.65]  Yes      Resolved Hospital Problems   No resolved problems to display.          Hospital Course:  Abe Phillips Jr. is a 64 y.o. male with longstanding uncontrolled DM2 on U500 insulin at home who fell into the donut hole and was out of his U500, getting by with OTC [employer] insulin but an out and went on vacation without insulin. He presented with weakness, lethargy, cramps, was found to be in HHS with BG >800 and severely volume depleted. He was corrected with insulin and IVF's requiring multiple replacements for electrolyte abnormalities. His BG was reasonably controlled here with close to half of his prescribed total insulin needs suggesting poor dietary or medication adherence. SW and the DM educator assisted with finding preferred insulin for him at home and he was discharged with lantus basal insulin and humalog bolus as a bridge until he can follow up with his endocrinologist for further insulin plans.    HHS  DM type 2, uncontrolled, a1c 14.8%  - reports taking 100U of U500  insulin BID at home; has been out for multiple weeks, was using OTC insulin from [facility] but went out of town and was out of that as well  - currently cannot afford U500 omnipods until he meets his deductible  - SW and DM educator asisted with insulin plan for DC: lantus and humalog  - Rx accucheck guide me meter, strips, lancets  - opt follow up with his endocrinologist Dr. Dustin Hendricks within 1 week     Acute kidney injury, prerenal, improving  - improved with IVF's and medical management     Hypokalemia  Hypomagnesemia  Hypophosphatemia     Diabetic foot ulcer of the RT heel  - has outpatient WC  - no signs of systemic infection currently     Narcolepsy  Hypothyroidism  HLD  pAfib    Discharge Follow Up Recommendations for outpatient labs/diagnostics:   Endocrinology within 1 week, future plan for insulin going forward  PCP 1 week post hospital follow up  Resume wound care for heel ulcer    Day of Discharge     HPI:   No complaints. Eager to get out before 11 to go to a grandchild's birthday party. Understands insulin plan and need to follow up with his endocrinologist.    Review of Systems  Gen- No fevers, chills  CV- No chest pain, palpitations  Resp- No cough, dyspnea  GI- No N/V/D, abd pain    Vital Signs:   Temp:  [97.8 °F (36.6 °C)-98 °F (36.7 °C)] 97.8 °F (36.6 °C)  Heart Rate:  [88] 88  Resp:  [17] 17  BP: (112-139)/(72-80) 112/80     Physical Exam:  Constitutional: No acute distress, awake, alert, sitting up in bed  HENT: NCAT, mucous membranes moist  Respiratory: Clear to auscultation bilaterally, respiratory effort normal   Cardiovascular: RRR, palpable radial pulses bilaterally  Gastrointestinal: Positive bowel sounds, soft, nontender, nondistended  Musculoskeletal: Chronic venous stasis changes BL LE's    Pertinent  and/or Most Recent Results     Results from last 7 days   Lab Units 02/15/20  0544 02/14/20  0920 02/13/20  1335 02/13/20  0335 02/13/20  0000   WBC 10*3/mm3  --  5.62  --   --  5.72      HEMOGLOBIN g/dL  --  15.3  --   --  15.3   HEMATOCRIT %  --  45.9  --   --  45.0   PLATELETS 10*3/mm3  --  161  --   --  191   SODIUM mmol/L 128* 134* 133* 122* 113*   POTASSIUM mmol/L 4.0 4.0 2.6* 3.3* 4.2   CHLORIDE mmol/L 96* 96* 89* 76* 67*   CO2 mmol/L 23.0 29.0 29.0 26.0 27.0   BUN mg/dL 19 21 34* 42* 44*   CREATININE mg/dL 0.77 0.90 1.11 1.32* 1.63*   GLUCOSE mg/dL 220* 172* 118* 615* 981*   CALCIUM mg/dL 9.0 9.0 9.3 9.6 9.6     Results from last 7 days   Lab Units 02/13/20  0335 02/13/20  0000   BILIRUBIN mg/dL 1.0 1.1   ALK PHOS U/L 229* 266*   ALT (SGPT) U/L 30 32   AST (SGOT) U/L 33 37     Results from last 7 days   Lab Units 02/13/20  0335   CHOLESTEROL mg/dL 220*   TRIGLYCERIDES mg/dL 285*   HDL CHOL mg/dL 46     Results from last 7 days   Lab Units 02/13/20  0335   HEMOGLOBIN A1C % 14.80*       Brief Urine Lab Results  (Last result in the past 365 days)      Color   Clarity   Blood   Leuk Est   Nitrite   Protein   CREAT   Urine HCG        02/13/20 0124             13.1       02/13/20 0124 Yellow Clear Negative Negative Negative Negative               Microbiology Results Abnormal     None          Imaging Results (All)     None          Results for orders placed during the hospital encounter of 03/03/19   Duplex Venous Lower Extremity - Right CAR    Narrative · Normal right lower extremity venous duplex scan. No evidence of DVT.          Results for orders placed during the hospital encounter of 03/03/19   Duplex Venous Lower Extremity - Right CAR    Narrative · Normal right lower extremity venous duplex scan. No evidence of DVT.          Results for orders placed during the hospital encounter of 01/04/19   Adult Transthoracic Echo Complete W/ Cont if Necessary Per Protocol    Narrative · Left ventricular systolic function is hyperdynamic (EF > 70).            Discharge Details        Discharge Medications      New Medications      Instructions Start Date   ACCU-CHEK FASTCLIX LANCETS misc   1 each,  "Other, 4 Times Daily After Meals & at Bedtime      ACCU-CHEK GUIDE w/Device kit   1 each, Does not apply, 4 Times Daily After Meals & at Bedtime      Alcohol Pads 70 % pads   1 swab, Does not apply, 3 Times Daily      insulin glargine 100 UNIT/ML injection  Commonly known as:  LANTUS   70 Units, Subcutaneous, Nightly      insulin lispro 100 UNIT/ML injection  Commonly known as:  HUMALOG   20 Units, Subcutaneous, 3 Times Daily Before Meals         Changes to Medications      Instructions Start Date   amphetamine-dextroamphetamine 30 MG tablet  Commonly known as:  ADDERALL  What changed:  additional instructions   60 mg, Oral, Daily      amphetamine-dextroamphetamine 15 MG tablet  Commonly known as:  ADDERALL  What changed:  additional instructions   30 mg, Oral, Daily      glucose blood test strip  Commonly known as:  ACCU-CHEK GUIDE  What changed:    · how much to take  · how to take this  · when to take this  · additional instructions   1 each, Other, 4 Times Daily After Meals & at Bedtime, Use as instructed      Insulin Syringe 29G X 1/2\" 1 ML misc  What changed:  additional instructions   Use one each to inject insulin four times daily Ell.9      traZODone 100 MG tablet  Commonly known as:  DESYREL  What changed:    · how much to take  · how to take this  · when to take this  · additional instructions   TAKE 1 TABLET BY MOUTH AT BEDTIME AS NEEDED FOR SLEEP         Continue These Medications      Instructions Start Date   ANTI-FUNGAL 1 % cream  Generic drug:  clotrimazole   1 application, Topical, 4 Times Daily PRN      aspirin 81 MG EC tablet   TAKE 1 TABLET BY MOUTH EVERY DAY      bumetanide 2 MG tablet  Commonly known as:  BUMEX   TAKE 2 TABLETS BY MOUTH TWICE A DAY      gabapentin 300 MG capsule  Commonly known as:  NEURONTIN   900 mg, Oral, 2 Times Daily      ketoconazole 2 % cream  Commonly known as:  NIZORAL   Topical, 2 Times Daily PRN      levocetirizine 5 MG tablet  Commonly known as:  XYZAL   5 mg, " Oral, Every Evening      levothyroxine 75 MCG tablet  Commonly known as:  SYNTHROID, LEVOTHROID   TAKE 1 TABLET BY MOUTH EVERY DAY      metOLazone 5 MG tablet  Commonly known as:  ZAROXOLYN   5 mg, Oral, Daily PRN      MIRALAX powder  Generic drug:  polyethylene glycol   17 g, Oral, As Needed      ondansetron 8 MG tablet  Commonly known as:  ZOFRAN   8 mg, Oral, Every 8 Hours PRN      potassium chloride 10 MEQ CR tablet  Commonly known as:  K-DUR   TAKE THREE TABLETS BY MOUTH TWICE A DAY      pramipexole 1 MG tablet  Commonly known as:  MIRAPEX   TAKE 1 TABLET BY MOUTH TWICE A DAY      pravastatin 40 MG tablet  Commonly known as:  PRAVACHOL   40 mg, Oral, Nightly      spironolactone 100 MG tablet  Commonly known as:  ALDACTONE   TAKE 1 TABLET BY MOUTH EVERY DAY      STOOL SOFTENER PO   1 tablet, Oral, As Needed      venlafaxine  MG 24 hr capsule  Commonly known as:  EFFEXOR-XR   150 mg, Oral, Nightly         Stop These Medications    insulin regular 100 UNIT/ML injection  Commonly known as:  humuLIN R,novoLIN R            No Known Allergies      Discharge Disposition:  Home or Self Care    Diet:  Hospital:No active diet order       CODE STATUS:    Code Status and Medical Interventions:   Ordered at: 02/13/20 0324     Code Status:    CPR     Medical Interventions (Level of Support Prior to Arrest):    Full       Future Appointments   Date Time Provider Department Center   5/8/2020 10:45 AM Anibal Maria MD [facility] None   7/24/2020 11:45 AM Ld Orozco MD [facility] None       Additional Instructions for the Follow-ups that You Need to Schedule     Discharge Follow-up with PCP   As directed       Currently Documented PCP:    Anibal Maria MD    PCP Phone Number:    [phone]     Follow Up Details:  1 week         Discharge Follow-up with Specified Provider: Dr. Dustin Hendricks (endocrinology); 1 Week   As directed      To:  Dr. Dustin Hendricks (endocrinology)    Follow Up:  1 Week                      Time Spent on Discharge:  34 minutes    Electronically signed by Nirav Fregoso DO, 02/15/20, 5:43 PM.

## 2020-02-15 NOTE — PLAN OF CARE
Problem: Patient Care Overview  Goal: Plan of Care Review  Outcome: Ongoing (interventions implemented as appropriate)  Flowsheets  Taken 2/15/2020 0412  Progress: no change  Outcome Summary: VSS. Pt on 3L NC tongiht. pt has had no complaints of pain tonight. Pt is now resting well. No other complaints at this time. Will continue to monitor.  Taken 2/14/2020 2029  Plan of Care Reviewed With: patient

## 2020-02-15 NOTE — THERAPY EVALUATION
Acute Care - Wound/Debridement Initial Evaluation  [facility]     Patient Name: Abe Phillips Jr.  : 1955  MRN: [id number]  Today's Date: 2/15/2020                Admit Date: 2020    Visit Dx:    ICD-10-CM ICD-9-CM   1. Hyperglycemia R73.9 790.29   2. Hyperglycemic coma (CMS/HCC) E11.01 250.30   3. Dehydration E86.0 276.51       Patient Active Problem List   Diagnosis   • Type 2 diabetes mellitus with hyperglycemia (CMS/HCC)   • Elevated LFTs   • Thrombocytopenia (CMS/HCC)   • PVD (peripheral vascular disease) (CMS/HCC)   • A-fib (CMS/HCC)   • Cirrhosis of liver (CMS/HCC)   • Chronic congestive heart failure (CMS/HCC)   • Diabetic ulcer of right heel (CMS/HCC)   • Primary narcolepsy without cataplexy   • Essential hypertension   • Morbidly obese (CMS/HCC)   • Restless legs syndrome (RLS)   • ADD (attention deficit disorder)   • Depression   • Diabetes mellitus (CMS/HCC)   • Edema   • MCCAULEY (nonalcoholic steatohepatitis)   • Hepatitis B   • Insomnia   • Lymphedema   • Obesity   • Severe JAIME on CPAP   • RLS (restless legs syndrome)   • Tremor of both hands   • Restrictive cardiomyopathy (CMS/HCC)   • Mixed hyperlipidemia   • Uncontrolled type 2 diabetes mellitus with hyperglycemia (CMS/HCC)   • OMAR (acute kidney injury) (CMS/HCC)   • Dyslipidemia   • Hyperglycemia   • Diabetic ulcer of right heel associated with type 2 diabetes mellitus (CMS/HCC)        Past Medical History:   Diagnosis Date   • A-fib (CMS/HCC)    • ADD (attention deficit disorder)    • Atrial flutter (CMS/HCC)    • Cellulitis    • CHF (congestive heart failure) (CMS/HCC)    • Cirrhosis of liver (CMS/HCC)    • Constipation    • Depression    • Diabetes mellitus (CMS/HCC)    • Diabetic ulcer of right foot (CMS/HCC)    • Disease of thyroid gland    • Edema    • Fatty liver    • Hepatitis B    • Hyperlipidemia    • Hypokalemia    • Insomnia    • Lymphedema    • Narcolepsy    • Neuropathy    • Obesity    • JAIME on CPAP    • PVD (peripheral  vascular disease) (CMS/HCC)    • RLS (restless legs syndrome)    • Skin cancer    • Tardive dyskinesia    • Tremor of both hands    • Yeast infection         Past Surgical History:   Procedure Laterality Date   • ACHILLES TENDON REPAIR     • CARDIAC ABLATION     • CHOLECYSTECTOMY     • LASER ABLATION      VEIN RLE           Wound 12/27/19 2334 Right heel Diabetic Ulcer (Active)   Wound Image   2/15/2020  8:00 AM   Dressing Appearance dry;intact 2/15/2020  8:00 AM   Closure None 2/14/2020  8:29 PM   Base red;slough;yellow 2/15/2020  8:00 AM   Periwound dry;intact 2/15/2020  8:00 AM   Periwound Temperature cool 2/15/2020  8:00 AM   Periwound Skin Turgor soft 2/15/2020  8:00 AM   Edges rolled/closed 2/15/2020  8:00 AM   Wound Length (cm) 0.8 cm 2/15/2020  8:00 AM   Wound Width (cm) 0.8 cm 2/15/2020  8:00 AM   Wound Depth (cm) 0.5 cm 2/15/2020  8:00 AM   Drainage Characteristics/Odor serosanguineous;yellow 2/15/2020  8:00 AM   Drainage Amount scant 2/15/2020  8:00 AM   Care, Wound irrigated with;sterile normal saline;debrided 2/15/2020  8:00 AM   Dressing Care, Wound foam;other (see comments) 2/15/2020  8:00 AM   Periwound Care, Wound dry periwound area maintained 2/15/2020  8:00 AM         WOUND DEBRIDEMENT  Total area of Debridement: ~2cm2  Debridement Site 1  Location- Site 1: R heel plantar surface  Selective Debridement- Site 1: Wound Surface <20cmsq  Instruments- Site 1: #10, scapel  Excised Tissue Description- Site 1: moderate, slough, other (comment)(periwound callus )  Bleeding- Site 1: none                  PT ASSESSMENT (last 12 hours)      Physical Therapy Evaluation     Row Name 02/15/20 0800          PT Evaluation Time/Intention    Subjective Information  no complaints  -     Document Type  evaluation;wound care;therapy note (daily note)  -     Mode of Treatment  physical therapy;individual therapy  -     Row Name 02/15/20 0800          General Information    Patient Profile Reviewed?  yes  -      Patient Observations  alert;cooperative;agree to therapy  -     Pertinent History of Current Functional Problem  Pt with chronic R heel ulceration   -     Risks Reviewed  patient:;increased discomfort  -     Benefits Reviewed  patient:;improve skin integrity  -     Barriers to Rehab  medically complex;previous functional deficit;physical barrier  -     Row Name 02/15/20 0800          Cognitive Assessment/Intervention- PT/OT    Orientation Status (Cognition)  oriented x 3  -MF     Follows Commands (Cognition)  WNL  -MF     Row Name 02/15/20 0800          Pain Assessment    Additional Documentation  Pain Scale: FACES Pre/Post-Treatment (Group)  -MF     Row Name 02/15/20 0800          Pain Scale: Numbers Pre/Post-Treatment    Pain Location - Side  Right  -MF     Pain Location  foot  -MF     Pain Intervention(s)  Repositioned  -     Row Name 02/15/20 0800          Wound 12/27/19 2334 Right heel Diabetic Ulcer    Wound - Properties Group Date first assessed: 12/27/19  -AYLIN Time first assessed: 2334  -AYLIN Present on Hospital Admission: Y  -AYLIN Side: Right  -AYLIN Location: heel  -AYLIN Primary Wound Type: Diabetic ulc  -AYLIN    Wound Image  Images linked: 1  -MF     Base  red;slough;yellow  -MF     Periwound  dry;intact  -MF     Periwound Temperature  cool  -MF     Periwound Skin Turgor  soft  -MF     Edges  rolled/closed  -MF     Wound Length (cm)  0.8 cm after light debridement of periwound callus  -MF     Wound Width (cm)  0.8 cm  -MF     Wound Depth (cm)  0.5 cm  -MF     Drainage Characteristics/Odor  serosanguineous;yellow  -MF     Drainage Amount  scant  -MF     Care, Wound  irrigated with;sterile normal saline;debrided  -MF     Periwound Care, Wound  dry periwound area maintained  -     Row Name 02/15/20 0800          Plan of Care Review    Outcome Summary  Pt presents with chronic R heel ulceration. PT was able to lightly debridement moderate amount of periwound callus / crust to help improve healing potential.   PT covered wound with jeannette and optifoam and educated pt on benefit of off loading shoe / inserts to help decrease pressure over wound to increase healing potential.   -     Row Name 02/15/20 0800          Physical Therapy Clinical Impression    PT Diagnosis (PT Clinical Impression)  R heel DM ulceration  -     Patient/Family Goals Statement (PT Clinical Impression)  heal wound   -     Criteria for Skilled Interventions Met (PT Clinical Impression)  yes;treatment indicated  -     Pathology/Pathophysiology Noted (Describe Specifically for Each System)  integumentary  -     Impairments Found (describe specific impairments)  integumentary integrity  -     Rehab Potential (PT Clinical Summary)  good, to achieve stated therapy goals  -     Care Plan Review (PT)  evaluation/treatment results reviewed;care plan/treatment goals reviewed;current/potential barriers reviewed;risks/benefits reviewed;patient/other agree to care plan  -     Row Name 02/15/20 0800          Physical Therapy Goals    Wound Care Goal Selection (PT)  wound care, PT goal 1  -     Additional Documentation  Wound Care Goal Selection (PT) (Row)  -     Row Name 02/15/20 0800          Wound Care Goal 1 (PT)    Wound Care Goal 1 (PT)  Decrease wound size by 20% as evidence of wound healing   -     Time Frame (Wound Care Goal 1, PT)  10 days  -     Row Name 02/15/20 0800          Positioning and Restraints    Pre-Treatment Position  in bed  -     Post Treatment Position  bed  -     In Bed  supine;call light within reach  -       User Key  (r) = Recorded By, (t) = Taken By, (c) = Cosigned By    Initials Name Provider Type    MF Delta Bhandari, PT Physical Therapist    Lewis Cadena RN Registered Nurse            Recommendation and Plan  Planned Therapy Interventions (PT Eval): wound care, patient/family education              Outcome Summary: Pt presents with chronic R heel ulceration. PT was able to lightly  debridement moderate amount of periwound callus / crust to help improve healing potential.  PT covered wound with jeannette and optifoam and educated pt on benefit of off loading shoe / inserts to help decrease pressure over wound to increase healing potential.             Time Calculation  PT Charges     Row Name 02/15/20 0800             Time Calculation    Start Time  0800  -      PT Goal Re-Cert Due Date  02/25/20  -        User Key  (r) = Recorded By, (t) = Taken By, (c) = Cosigned By    Initials Name Provider Type     Delta Bhandari, PT Physical Therapist            Therapy Charges for Today     Code Description Service Date Service Provider Modifiers Qty    05579075761 HC AL DEBRIDE OPEN WOUND UP TO 20CM 2/15/2020 Delta Bhandari, PT GP 1    [id number]  PT EVAL MOD COMPLEXITY 4 2/15/2020 Delta Bhandari, PT GP 1    [id number]  AL DEBRIDE OPEN WOUND UP TO 20CM 2/15/2020 Delta Bhandari, PT GP 1            PT G-Codes  AM-PAC 6 Clicks Score (PT): 18       Delta Bhandari, PT  2/15/2020

## 2020-02-15 NOTE — PLAN OF CARE
Problem: Patient Care Overview  Goal: Plan of Care Review  2/15/2020 1103 by Delta Bhandari, PT  Outcome: Unable to achieve outcome(s) by discharge  Flowsheets  Taken 2/15/2020 0800 by Anisa Wayne  Plan of Care Reviewed With: patient  Taken 2/15/2020 0800 by Delta Bhandari, PT  Outcome Summary: Pt presents with chronic R heel ulceration. PT was able to lightly debridement moderate amount of periwound callus / crust to help improve healing potential.  PT covered wound with jeannette and optifoam and educated pt on benefit of off loading shoe / inserts to help decrease pressure over wound to increase healing potential.

## 2020-02-16 ENCOUNTER — READMISSION MANAGEMENT (OUTPATIENT)
Dept: CALL CENTER | Facility: HOSPITAL | Age: 65
End: 2020-02-16

## 2020-02-16 RX ORDER — BLOOD SUGAR DIAGNOSTIC
1 STRIP MISCELLANEOUS
Qty: 90 EACH | Refills: 0 | Status: SHIPPED | OUTPATIENT
Start: 2020-02-16 | End: 2020-07-24 | Stop reason: SDUPTHER

## 2020-02-16 RX ORDER — LANCETS
1 EACH MISCELLANEOUS
Qty: 100 EACH | Refills: 0 | Status: SHIPPED | OUTPATIENT
Start: 2020-02-16 | End: 2020-07-24 | Stop reason: SDUPTHER

## 2020-02-16 RX ORDER — BLOOD-GLUCOSE METER
1 EACH MISCELLANEOUS
Qty: 1 KIT | Refills: 0 | Status: SHIPPED | OUTPATIENT
Start: 2020-02-16 | End: 2020-07-24 | Stop reason: SDUPTHER

## 2020-02-16 RX ORDER — LANCETS
1 EACH MISCELLANEOUS
Qty: 102 EACH | Refills: 0 | Status: SHIPPED | OUTPATIENT
Start: 2020-02-16 | End: 2020-07-24 | Stop reason: SDUPTHER

## 2020-02-17 ENCOUNTER — TRANSITIONAL CARE MANAGEMENT TELEPHONE ENCOUNTER (OUTPATIENT)
Dept: CALL CENTER | Facility: HOSPITAL | Age: 65
End: 2020-02-17

## 2020-02-17 ENCOUNTER — READMISSION MANAGEMENT (OUTPATIENT)
Dept: CALL CENTER | Facility: HOSPITAL | Age: 65
End: 2020-02-17

## 2020-02-17 NOTE — OUTREACH NOTE
Patient doing well. Tolerating PO. Working to schedule follow up appointments with PCP and Dr. Hendricks.  States he is going back to his original insulin d/t cost. Encouraged him to call endocrinology/PCP if desires to change from AVS discharge insulin orders.

## 2020-02-17 NOTE — OUTREACH NOTE
Medical Week 1 Survey      Responses   Facility patient discharged from?  [facility]   Does the patient have one of the following disease processes/diagnoses(primary or secondary)?  Other   Is there a successful TCM telephone encounter documented?  Yes          Yecenia Gomes RN

## 2020-02-17 NOTE — OUTREACH NOTE
Prep Survey      Responses   Facility patient discharged from?  [facility]   Is patient eligible?  Yes   Discharge diagnosis  Hyperglycemia, T2DM uncontrolled    Does the patient have one of the following disease processes/diagnoses(primary or secondary)?  Other   Does the patient have Home health ordered?  No   Is there a DME ordered?  No   Medication alerts for this patient  having difficulty affording insulin   Prep survey completed?  Yes          Kait Jacob RN

## 2020-02-19 ENCOUNTER — TELEPHONE (OUTPATIENT)
Dept: INTERNAL MEDICINE | Facility: CLINIC | Age: 65
End: 2020-02-19

## 2020-02-19 NOTE — TELEPHONE ENCOUNTER
Called pt to advise about hospital f/u. He did not want to schedule at this time his wife stated he was doing much better and would let us know if they needed anything

## 2020-02-25 ENCOUNTER — READMISSION MANAGEMENT (OUTPATIENT)
Dept: CALL CENTER | Facility: HOSPITAL | Age: 65
End: 2020-02-25

## 2020-02-25 NOTE — OUTREACH NOTE
Medical Week 2 Survey      Responses   Facility patient discharged from?  [facility]   Does the patient have one of the following disease processes/diagnoses(primary or secondary)?  Other   Week 2 attempt successful?  No   Unsuccessful attempts  Attempt 1          Alexa Avila RN

## 2020-02-26 ENCOUNTER — READMISSION MANAGEMENT (OUTPATIENT)
Dept: CALL CENTER | Facility: HOSPITAL | Age: 65
End: 2020-02-26

## 2020-02-26 NOTE — OUTREACH NOTE
Medical Week 2 Survey      Responses   Facility patient discharged from?  [facility]   Does the patient have one of the following disease processes/diagnoses(primary or secondary)?  Other   Week 2 attempt successful?  Yes   Call start time  1511   Discharge diagnosis  Hyperglycemia, T2DM uncontrolled    Call end time  1513   Is the patient taking all medications as directed (includes completed medication regime)?  Yes   Has the patient kept scheduled appointments due by today?  Yes   Psychosocial issues?  No   Comments  Pt denies cramping/weakness. Glucose levels doing well per pt.    What is the patient's perception of their health status since discharge?  Improving   Week 2 Call Completed?  Yes   Wrap up additional comments  quick call, pt's endo doc beeped in on phone and pt needed to answer          Alexa Avila RN

## 2020-03-05 ENCOUNTER — READMISSION MANAGEMENT (OUTPATIENT)
Dept: CALL CENTER | Facility: HOSPITAL | Age: 65
End: 2020-03-05

## 2020-03-05 NOTE — OUTREACH NOTE
Medical Week 3 Survey      Responses   [facility] patient discharged from?  [facility]   Does the patient have one of the following disease processes/diagnoses(primary or secondary)?  Other   Week 3 attempt successful?  Yes   Call start time  1050   Call end time  1055   Discharge diagnosis  Hyperglycemia, T2DM uncontrolled    Is patient permission given to speak with other caregiver?  Yes   List who call center can speak with  Caryn, wife or Daughter, Anisa Larkin reviewed with patient/caregiver?  Yes   Is the patient taking all medications as directed (includes completed medication regime)?  Yes   Has the patient kept scheduled appointments due by today?  No   What is preventing the patient from keeping their appointments?  Doesn't understand importance   Nursing Interventions  Advised patient to keep appointment, Educated on importance of keeping appointment   Has home health visited the patient within 72 hours of discharge?  N/A   Psychosocial issues?  No   What is the patient's perception of their health status since discharge?  Improving   Week 3 Call Completed?  Yes   Wrap up additional comments  Patient has not followed up with anyone since hospitalization. Encouraged him to seek his PCP or endocrinologist and counseled why those appts are so important. Fasting glucose 84. Rt heel diabetic wound still present after months of treatment, states sees wound care every Friday and it is getting smaller.           Anna Granado RN

## 2020-03-10 DIAGNOSIS — R32 URINARY INCONTINENCE, UNSPECIFIED TYPE: Primary | ICD-10-CM

## 2020-03-10 RX ORDER — TRAZODONE HYDROCHLORIDE 50 MG/1
50 TABLET ORAL NIGHTLY PRN
Qty: 30 TABLET | Refills: 5 | Status: SHIPPED | OUTPATIENT
Start: 2020-03-10 | End: 2020-06-08

## 2020-03-24 RX ORDER — PRAMIPEXOLE DIHYDROCHLORIDE 1 MG/1
1 TABLET ORAL 2 TIMES DAILY
Qty: 180 TABLET | Refills: 1 | Status: SHIPPED | OUTPATIENT
Start: 2020-03-24 | End: 2020-01-01

## 2020-03-24 NOTE — TELEPHONE ENCOUNTER
PATIENT CALLED AND STATED THAT PHARMACY SENT FAX REQUESTING REFILL ON RX, pramipexole (MIRAPEX) 1 MG tablet. PLEASE ADVISE.    FARTUN EUGENE CONFIRMED    PATIENT CALLBACK # [phone]

## 2020-03-27 ENCOUNTER — TELEPHONE (OUTPATIENT)
Dept: INTERNAL MEDICINE | Facility: CLINIC | Age: 65
End: 2020-03-27

## 2020-03-27 NOTE — TELEPHONE ENCOUNTER
Patient would like to have a work excuse from Dr. Maria that states he is safer to self- quarantine than work as he has diabetes and liver complications. Patient stated with this letter his company will provide him assistance.  Patient stated he is an [employer] .    Please call and advise. Patient call back [phone]    Yes would write letter stating that due to his underlying medical condition, I do not recommend him working with the general public

## 2020-04-02 DIAGNOSIS — Z99.89 OSA ON CPAP: ICD-10-CM

## 2020-04-02 DIAGNOSIS — G47.33 OSA ON CPAP: ICD-10-CM

## 2020-04-02 DIAGNOSIS — G47.419 PRIMARY NARCOLEPSY WITHOUT CATAPLEXY: ICD-10-CM

## 2020-04-02 RX ORDER — DEXTROAMPHETAMINE SACCHARATE, AMPHETAMINE ASPARTATE, DEXTROAMPHETAMINE SULFATE AND AMPHETAMINE SULFATE 7.5; 7.5; 7.5; 7.5 MG/1; MG/1; MG/1; MG/1
60 TABLET ORAL DAILY
Qty: 60 TABLET | Refills: 0 | Status: SHIPPED | OUTPATIENT
Start: 2020-04-02 | End: 2020-05-27 | Stop reason: SDUPTHER

## 2020-04-02 RX ORDER — DEXTROAMPHETAMINE SACCHARATE, AMPHETAMINE ASPARTATE, DEXTROAMPHETAMINE SULFATE AND AMPHETAMINE SULFATE 3.75; 3.75; 3.75; 3.75 MG/1; MG/1; MG/1; MG/1
30 TABLET ORAL DAILY
Qty: 60 TABLET | Refills: 0 | Status: SHIPPED | OUTPATIENT
Start: 2020-04-02 | End: 2020-04-08

## 2020-04-02 NOTE — TELEPHONE ENCOUNTER
PT REQUEST REFILL ON ADDERALL 30 MG X2 FOR MORNING AND ADDERALL 15 MG X2 FOR AFTERNOON. PT USES [facility].

## 2020-04-06 ENCOUNTER — TELEPHONE (OUTPATIENT)
Dept: SLEEP MEDICINE | Facility: HOSPITAL | Age: 65
End: 2020-04-06

## 2020-04-06 NOTE — TELEPHONE ENCOUNTER
FARTUN CALLED TO CLARIFY ADDERALL PRESCRIPTION. TWO PRESCRIPTIONS WERE SENT ON 4/2/20. ONE WAS FOR ADDERALL 15MG 2 TIMES A DAY AND THE OTHER IS FOR ADDERALL 30MG 2 TIMES A DAY. ARE THE DOSAGE AND DIRECTIONS CORRECT?

## 2020-04-08 NOTE — TELEPHONE ENCOUNTER
I spoke to [facility] and to Mr. Phillips and he agreed that his dose will be reduced to 30 mg twice daily for a total of 60 mg daily.    Nothing you need to do at this point, just ALONA.  I will document this in the chart.

## 2020-04-08 NOTE — TELEPHONE ENCOUNTER
THAT WAS THE INFORMATION THAT WAS GIVEN TO ME BY THE PHARMACIST. I WILL LET THEM KNOW THAT DOSAGE AND INSTRUCTIONS THAT WERE SENT ARE CORRECT.

## 2020-04-08 NOTE — TELEPHONE ENCOUNTER
Onelia,    Your message to me needs more content.  Please help me with these communications so unnecessary time is not wasted.    I do not understand what the question is.  I ordered 30 mg 2 tablets once daily and 15 mg 2 tablets once daily.  That is what I have ordered before and that is what is in my note.  Why is Susie questioning this dosage?  That would be useful content to help answer the question and, in fact, with that knowledge you could probably easily answer this question for me.  If I change the dosage for a specific reason that will be documented in the chart.    I think they are questioning the dosage simply because it is very high but the reason for that is also clearly documented in my last note and nothing has changed.    Thanks    AT

## 2020-04-08 NOTE — TELEPHONE ENCOUNTER
"LAURENT, A PHARMACIST AT [facility], STATES THAT THEY CAN NOT FILL HIS PRESCRIPTIONS FOR MORE THAN 60 MG A DAY. SHE SAID HIS PREVIOUS PRESCRIPTIONS WERE SPREAD OUT AND \"FLEW UNDER THE RADAR.\" PLEASE ADVISE.   "

## 2020-04-25 RX ORDER — GABAPENTIN 300 MG/1
900 CAPSULE ORAL 2 TIMES DAILY
Qty: 540 CAPSULE | Refills: 0 | Status: SHIPPED | OUTPATIENT
Start: 2020-04-25 | End: 2020-07-29

## 2020-04-25 RX ORDER — POTASSIUM CHLORIDE 750 MG/1
30 TABLET, FILM COATED, EXTENDED RELEASE ORAL 2 TIMES DAILY
Qty: 540 TABLET | Refills: 3 | Status: SHIPPED | OUTPATIENT
Start: 2020-04-25 | End: 2020-08-17 | Stop reason: SDUPTHER

## 2020-05-08 ENCOUNTER — OFFICE VISIT (OUTPATIENT)
Dept: INTERNAL MEDICINE | Facility: CLINIC | Age: 65
End: 2020-05-08

## 2020-05-08 VITALS
HEIGHT: 76 IN | WEIGHT: 315 LBS | DIASTOLIC BLOOD PRESSURE: 78 MMHG | SYSTOLIC BLOOD PRESSURE: 140 MMHG | BODY MASS INDEX: 38.36 KG/M2 | HEART RATE: 68 BPM | TEMPERATURE: 96.9 F

## 2020-05-08 DIAGNOSIS — F98.8 ATTENTION DEFICIT DISORDER (ADD) WITHOUT HYPERACTIVITY: ICD-10-CM

## 2020-05-08 DIAGNOSIS — E66.01 MORBIDLY OBESE (HCC): ICD-10-CM

## 2020-05-08 DIAGNOSIS — G25.81 RLS (RESTLESS LEGS SYNDROME): ICD-10-CM

## 2020-05-08 DIAGNOSIS — E66.01 CLASS 3 SEVERE OBESITY DUE TO EXCESS CALORIES WITH SERIOUS COMORBIDITY AND BODY MASS INDEX (BMI) OF 40.0 TO 44.9 IN ADULT (HCC): ICD-10-CM

## 2020-05-08 DIAGNOSIS — K75.81 NASH (NONALCOHOLIC STEATOHEPATITIS): ICD-10-CM

## 2020-05-08 DIAGNOSIS — I10 ESSENTIAL HYPERTENSION: ICD-10-CM

## 2020-05-08 DIAGNOSIS — E11.65 UNCONTROLLED TYPE 2 DIABETES MELLITUS WITH HYPERGLYCEMIA (HCC): ICD-10-CM

## 2020-05-08 DIAGNOSIS — E78.2 MIXED HYPERLIPIDEMIA: ICD-10-CM

## 2020-05-08 DIAGNOSIS — I50.32 CHRONIC DIASTOLIC CONGESTIVE HEART FAILURE (HCC): ICD-10-CM

## 2020-05-08 DIAGNOSIS — F32.89 OTHER DEPRESSION: ICD-10-CM

## 2020-05-08 DIAGNOSIS — E11.69 TYPE 2 DIABETES MELLITUS WITH OTHER SPECIFIED COMPLICATION, WITH LONG-TERM CURRENT USE OF INSULIN (HCC): ICD-10-CM

## 2020-05-08 DIAGNOSIS — E78.5 DYSLIPIDEMIA: ICD-10-CM

## 2020-05-08 DIAGNOSIS — I73.9 PVD (PERIPHERAL VASCULAR DISEASE) (HCC): Primary | ICD-10-CM

## 2020-05-08 DIAGNOSIS — I89.0 LYMPHEDEMA: ICD-10-CM

## 2020-05-08 DIAGNOSIS — I48.0 PAROXYSMAL ATRIAL FIBRILLATION (HCC): ICD-10-CM

## 2020-05-08 DIAGNOSIS — Z79.4 TYPE 2 DIABETES MELLITUS WITH OTHER SPECIFIED COMPLICATION, WITH LONG-TERM CURRENT USE OF INSULIN (HCC): ICD-10-CM

## 2020-05-08 PROCEDURE — 99214 OFFICE O/P EST MOD 30 MIN: CPT | Performed by: INTERNAL MEDICINE

## 2020-05-08 NOTE — PROGRESS NOTES
Patient is a 64 y.o. male who is here for a follow up of hyperlipidemia,hyertension and diabetes.  Chief Complaint   Patient presents with   • Hyperlipidemia   • Hypertension   • Diabetes         HPI:    Here for mgmt of HTN and hyperlipidemia and DM.  Onset years.  Compliant with meds.  His weight up which he reports is related to his Myrbetriq.  BP has been good.  Breathing is ok.  Some MERCADO.  FSBS in the low to mid 200s.  Still awaiting omnipod.  Eating more due to staying home more.      History:     Patient Active Problem List   Diagnosis   • Type 2 diabetes mellitus with hyperglycemia (CMS/HCC)   • Elevated LFTs   • Thrombocytopenia (CMS/HCC)   • PVD (peripheral vascular disease) (CMS/HCC)   • A-fib (CMS/HCC)   • Cirrhosis of liver (CMS/HCC)   • Chronic congestive heart failure (CMS/HCC)   • Diabetic ulcer of right heel (CMS/HCC)   • Primary narcolepsy without cataplexy   • Essential hypertension   • Morbidly obese (CMS/HCC)   • Restless legs syndrome (RLS)   • ADD (attention deficit disorder)   • Depression   • Diabetes mellitus (CMS/HCC)   • Edema   • MCCAULEY (nonalcoholic steatohepatitis)   • Hepatitis B   • Insomnia   • Lymphedema   • Obesity   • Severe JAIME on CPAP   • RLS (restless legs syndrome)   • Tremor of both hands   • Restrictive cardiomyopathy (CMS/HCC)   • Mixed hyperlipidemia   • Uncontrolled type 2 diabetes mellitus with hyperglycemia (CMS/HCC)   • OMAR (acute kidney injury) (CMS/HCC)   • Dyslipidemia   • Hyperglycemia   • Diabetic ulcer of right heel associated with type 2 diabetes mellitus (CMS/HCC)       Past Medical History:   Diagnosis Date   • A-fib (CMS/HCC)    • ADD (attention deficit disorder)    • Atrial flutter (CMS/HCC)    • Cellulitis    • CHF (congestive heart failure) (CMS/HCC)    • Cirrhosis of liver (CMS/HCC)    • Constipation    • Depression    • Diabetes mellitus (CMS/HCC)    • Diabetic ulcer of right foot (CMS/HCC)    • Disease of thyroid gland    • Edema    • Fatty liver    •  Hepatitis B    • Hyperlipidemia    • Hypokalemia    • Insomnia    • Lymphedema    • Narcolepsy    • Neuropathy    • Obesity    • JAIME on CPAP    • PVD (peripheral vascular disease) (CMS/HCC)    • RLS (restless legs syndrome)    • Skin cancer    • Tardive dyskinesia    • Tremor of both hands    • Yeast infection        Past Surgical History:   Procedure Laterality Date   • ACHILLES TENDON REPAIR     • CARDIAC ABLATION     • CHOLECYSTECTOMY     • LASER ABLATION      VEIN RLE       Current Outpatient Medications on File Prior to Visit   Medication Sig   • amphetamine-dextroamphetamine (ADDERALL) 30 MG tablet Take 2 tablets by mouth Daily.   • aspirin 81 MG EC tablet TAKE 1 TABLET BY MOUTH EVERY DAY   • bumetanide (BUMEX) 2 MG tablet TAKE 2 TABLETS BY MOUTH TWICE A DAY   • clotrimazole (ANTI-FUNGAL) 1 % cream Apply 1 application topically to the appropriate area as directed 4 (Four) Times a Day As Needed.   • Docusate Calcium (STOOL SOFTENER PO) Take 1 tablet by mouth As Needed.   • gabapentin (NEURONTIN) 300 MG capsule Take 3 capsules by mouth 2 (Two) Times a Day.   • ketoconazole (NIZORAL) 2 % cream Apply  topically to the appropriate area as directed 2 (Two) Times a Day As Needed for Itching.   • levocetirizine (XYZAL) 5 MG tablet Take 1 tablet by mouth Every Evening.   • levothyroxine (SYNTHROID, LEVOTHROID) 75 MCG tablet TAKE 1 TABLET BY MOUTH EVERY DAY   • metOLazone (ZAROXOLYN) 5 MG tablet Take 1 tablet by mouth Daily As Needed (edema/fluid overload).   • ondansetron (ZOFRAN) 8 MG tablet Take 1 tablet by mouth Every 8 (Eight) Hours As Needed for Nausea or Vomiting.   • polyethylene glycol (MIRALAX) powder Take 17 g by mouth As Needed.   • potassium chloride (K-DUR) 10 MEQ CR tablet Take 3 tablets by mouth 2 (Two) Times a Day.   • pramipexole (MIRAPEX) 1 MG tablet Take 1 tablet by mouth 2 (Two) Times a Day.   • pravastatin (PRAVACHOL) 40 MG tablet Take 1 tablet by mouth Every Night.   • spironolactone (ALDACTONE)  "100 MG tablet TAKE 1 TABLET BY MOUTH EVERY DAY   • traZODone (DESYREL) 50 MG tablet Take 1 tablet by mouth At Night As Needed for Sleep. for sleep   • venlafaxine XR (EFFEXOR-XR) 150 MG 24 hr capsule Take 1 capsule by mouth Every Night.   • ACCU-CHEK FASTCLIX LANCETS misc 1 each by Other route 4 (Four) Times a Day After Meals & at Bedtime.   • ACCU-CHEK FASTCLIX LANCETS misc 1 each by Other route 4 (Four) Times a Day After Meals & at Bedtime. Dx E11.9   • ACCU-CHEK FASTCLIX LANCETS misc 1 each by Other route 4 (Four) Times a Day After Meals & at Bedtime. Dx E11.9   • Alcohol Swabs (ALCOHOL PADS) 70 % pads 1 swab 3 (Three) Times a Day.   • Alcohol Swabs (ALCOHOL PADS) 70 % pads 1 swab 4 (Four) Times a Day After Meals & at Bedtime. Dx E11.9   • Blood Glucose Monitoring Suppl (ACCU-CHEK GUIDE) w/Device kit 1 each 4 (Four) Times a Day After Meals & at Bedtime.   • Blood Glucose Monitoring Suppl (ACCU-CHEK GUIDE) w/Device kit 1 each 4 (Four) Times a Day After Meals & at Bedtime. Dx E11.9   • glucose blood (ACCU-CHEK GUIDE) test strip 1 each by Other route 4 (Four) Times a Day After Meals & at Bedtime. Use as instructed   • glucose blood (ACCU-CHEK GUIDE) test strip 1 each by Other route 4 (Four) Times a Day After Meals & at Bedtime. Use as instructed Dx E11.9   • glucose blood (ACCU-CHEK GUIDE) test strip 1 each by Other route 4 (Four) Times a Day After Meals & at Bedtime. Use as instructed Dx E11.9   • Insulin Syringe 29G X 1/2\" 1 ML misc Use one each to inject insulin four times daily Ell.9     No current facility-administered medications on file prior to visit.        Family History   Problem Relation Age of Onset   • Heart failure Mother         CHF   • Clotting disorder Father    • No Known Problems Sister    • No Known Problems Brother    • No Known Problems Brother        Social History     Socioeconomic History   • Marital status:      Spouse name: Not on file   • Number of children: Not on file   • Years " "of education: Not on file   • Highest education level: Not on file   Tobacco Use   • Smoking status: Never Smoker   • Smokeless tobacco: Never Used   Substance and Sexual Activity   • Alcohol use: Yes     Comment: \"3 SCOTCH AND 2 BEERS PER WEEK\"   • Drug use: No   • Sexual activity: Defer         Review of Systems   Constitutional: Positive for fatigue. Negative for chills and fever.   HENT: Negative for congestion, ear pain, hearing loss, rhinorrhea, sinus pressure, sore throat and trouble swallowing.    Eyes: Negative for discharge and itching.   Respiratory: Positive for shortness of breath. Negative for cough and chest tightness.    Cardiovascular: Positive for leg swelling. Negative for chest pain and palpitations.   Gastrointestinal: Negative for abdominal pain, blood in stool, constipation, diarrhea and vomiting.        Colonoscopy 3/16   Endocrine: Positive for cold intolerance. Negative for polydipsia and polyuria.   Genitourinary: Negative for difficulty urinating, dysuria, enuresis, frequency, hematuria and urgency.   Musculoskeletal: Positive for arthralgias, back pain and gait problem. Negative for joint swelling.   Skin: Positive for wound (right foot). Negative for rash.   Allergic/Immunologic: Negative for immunocompromised state.   Neurological: Positive for weakness and light-headedness. Negative for dizziness, syncope, numbness and headaches.   Hematological: Does not bruise/bleed easily.   Psychiatric/Behavioral: Negative for behavioral problems, dysphoric mood and sleep disturbance. The patient is not nervous/anxious.        /78   Pulse 68   Temp 96.9 °F (36.1 °C) (Temporal)   Ht 193 cm (75.98\")   Wt (!) 150 kg (330 lb)   BMI 40.19 kg/m²       Physical Exam   Constitutional: He is oriented to person, place, and time. He appears well-developed and well-nourished.   HENT:   Head: Normocephalic and atraumatic.   Right Ear: External ear normal.   Left Ear: External ear normal. " "  Mouth/Throat: Oropharynx is clear and moist.   Eyes: Conjunctivae and EOM are normal.   Neck: Normal range of motion. Neck supple.   Cardiovascular: Normal rate and regular rhythm.   Slight diminished heart sounds   Pulmonary/Chest: Effort normal and breath sounds normal.   Abdominal: Soft. Bowel sounds are normal.   Musculoskeletal: He exhibits edema.   Lymphadenopathy:     He has no cervical adenopathy.   Neurological: He is alert and oriented to person, place, and time.   Skin: Skin is warm and dry.   Followed by wound care   Psychiatric: He has a normal mood and affect. His behavior is normal. Thought content normal.       Procedure:      Discussion/Summary:    DM-f/u ENDO, counseled on low carb, uncontrolled  Chronic LE edema-advised to weigh daily and if needed add metolazone  CHF-\" \"  Hep B hx/FLD-f/u Dr Murcia  Neuropathy-on Gabapentin, attempt to decrease so will assist with the edema  Narcolepsy-on Adderall, controlled  JAIME-cont CPAP, stable  Hx of Afib-no recurrence s/p Ablation  Hypothyroid-TSH 12/27 at goal, recheck on rtc  Depression-stable on Effexor  RLS-stable on Mirapex  Elevated Cr-stable advised to ensure adequate hydration  Hyperlipidemia-cont statin, labs 2/13 dw patient, recheck on rtc     Hospital Labs noted and dw patient       Current Outpatient Medications:   •  amphetamine-dextroamphetamine (ADDERALL) 30 MG tablet, Take 2 tablets by mouth Daily., Disp: 60 tablet, Rfl: 0  •  aspirin 81 MG EC tablet, TAKE 1 TABLET BY MOUTH EVERY DAY, Disp: 90 tablet, Rfl: 1  •  bumetanide (BUMEX) 2 MG tablet, TAKE 2 TABLETS BY MOUTH TWICE A DAY, Disp: 348 tablet, Rfl: 1  •  clotrimazole (ANTI-FUNGAL) 1 % cream, Apply 1 application topically to the appropriate area as directed 4 (Four) Times a Day As Needed., Disp: , Rfl:   •  Docusate Calcium (STOOL SOFTENER PO), Take 1 tablet by mouth As Needed., Disp: , Rfl:   •  gabapentin (NEURONTIN) 300 MG capsule, Take 3 capsules by mouth 2 (Two) Times a Day., Disp: " 540 capsule, Rfl: 0  •  ketoconazole (NIZORAL) 2 % cream, Apply  topically to the appropriate area as directed 2 (Two) Times a Day As Needed for Itching., Disp: 60 g, Rfl: 2  •  levocetirizine (XYZAL) 5 MG tablet, Take 1 tablet by mouth Every Evening., Disp: 90 tablet, Rfl: 1  •  levothyroxine (SYNTHROID, LEVOTHROID) 75 MCG tablet, TAKE 1 TABLET BY MOUTH EVERY DAY, Disp: 90 tablet, Rfl: 1  •  metOLazone (ZAROXOLYN) 5 MG tablet, Take 1 tablet by mouth Daily As Needed (edema/fluid overload)., Disp: 90 tablet, Rfl: 1  •  ondansetron (ZOFRAN) 8 MG tablet, Take 1 tablet by mouth Every 8 (Eight) Hours As Needed for Nausea or Vomiting., Disp: 20 tablet, Rfl: 2  •  polyethylene glycol (MIRALAX) powder, Take 17 g by mouth As Needed., Disp: , Rfl:   •  potassium chloride (K-DUR) 10 MEQ CR tablet, Take 3 tablets by mouth 2 (Two) Times a Day., Disp: 540 tablet, Rfl: 3  •  pramipexole (MIRAPEX) 1 MG tablet, Take 1 tablet by mouth 2 (Two) Times a Day., Disp: 180 tablet, Rfl: 1  •  pravastatin (PRAVACHOL) 40 MG tablet, Take 1 tablet by mouth Every Night., Disp: 90 tablet, Rfl: 3  •  spironolactone (ALDACTONE) 100 MG tablet, TAKE 1 TABLET BY MOUTH EVERY DAY, Disp: 90 tablet, Rfl: 1  •  traZODone (DESYREL) 50 MG tablet, Take 1 tablet by mouth At Night As Needed for Sleep. for sleep, Disp: 30 tablet, Rfl: 5  •  venlafaxine XR (EFFEXOR-XR) 150 MG 24 hr capsule, Take 1 capsule by mouth Every Night., Disp: 90 capsule, Rfl: 1  •  ACCU-CHEK FASTCLIX LANCETS misc, 1 each by Other route 4 (Four) Times a Day After Meals & at Bedtime., Disp: 100 each, Rfl: 0  •  ACCU-CHEK FASTCLIX LANCETS misc, 1 each by Other route 4 (Four) Times a Day After Meals & at Bedtime. Dx E11.9, Disp: 100 each, Rfl: 0  •  ACCU-CHEK FASTCLIX LANCETS misc, 1 each by Other route 4 (Four) Times a Day After Meals & at Bedtime. Dx E11.9, Disp: 102 each, Rfl: 0  •  Alcohol Swabs (ALCOHOL PADS) 70 % pads, 1 swab 3 (Three) Times a Day., Disp: 100 each, Rfl: 0  •  Alcohol  "Swabs (ALCOHOL PADS) 70 % pads, 1 swab 4 (Four) Times a Day After Meals & at Bedtime. Dx E11.9, Disp: 90 each, Rfl: 0  •  Blood Glucose Monitoring Suppl (ACCU-CHEK GUIDE) w/Device kit, 1 each 4 (Four) Times a Day After Meals & at Bedtime., Disp: 1 kit, Rfl: 0  •  Blood Glucose Monitoring Suppl (ACCU-CHEK GUIDE) w/Device kit, 1 each 4 (Four) Times a Day After Meals & at Bedtime. Dx E11.9, Disp: 1 kit, Rfl: 0  •  glucose blood (ACCU-CHEK GUIDE) test strip, 1 each by Other route 4 (Four) Times a Day After Meals & at Bedtime. Use as instructed, Disp: 1 each, Rfl: 0  •  glucose blood (ACCU-CHEK GUIDE) test strip, 1 each by Other route 4 (Four) Times a Day After Meals & at Bedtime. Use as instructed Dx E11.9, Disp: 1 each, Rfl: 0  •  glucose blood (ACCU-CHEK GUIDE) test strip, 1 each by Other route 4 (Four) Times a Day After Meals & at Bedtime. Use as instructed Dx E11.9, Disp: 100 each, Rfl: 0  •  Insulin Syringe 29G X 1/2\" 1 ML misc, Use one each to inject insulin four times daily Ell.9, Disp: 200 each, Rfl: 1        Abe was seen today for hyperlipidemia, hypertension and diabetes.    Diagnoses and all orders for this visit:    PVD (peripheral vascular disease) (CMS/[facility])    Mixed hyperlipidemia    Essential hypertension    Chronic diastolic congestive heart failure (CMS/[facility])    Paroxysmal atrial fibrillation (CMS/[facility])    Class 3 severe obesity due to excess calories with serious comorbidity and body mass index (BMI) of 40.0 to 44.9 in adult (CMS/[facility])    Morbidly obese (CMS/[facility])    MCCAULEY (nonalcoholic steatohepatitis)    Uncontrolled type 2 diabetes mellitus with hyperglycemia (CMS/[facility])    Type 2 diabetes mellitus with other specified complication, with long-term current use of insulin (CMS/[facility])    RLS (restless legs syndrome)    Lymphedema    Dyslipidemia    Other depression    Attention deficit disorder (ADD) without hyperactivity        "

## 2020-05-12 ENCOUNTER — APPOINTMENT (OUTPATIENT)
Dept: GENERAL RADIOLOGY | Facility: HOSPITAL | Age: 65
End: 2020-05-12

## 2020-05-12 ENCOUNTER — HOSPITAL ENCOUNTER (EMERGENCY)
Facility: HOSPITAL | Age: 65
Discharge: HOME | End: 2020-05-13
Attending: EMERGENCY MEDICINE | Admitting: EMERGENCY MEDICINE

## 2020-05-12 DIAGNOSIS — R60.9 PERIPHERAL EDEMA: Primary | ICD-10-CM

## 2020-05-12 DIAGNOSIS — R06.00 DYSPNEA, UNSPECIFIED TYPE: ICD-10-CM

## 2020-05-12 LAB
ALBUMIN SERPL-MCNC: 3.7 G/DL (ref 3.5–5.2)
ALBUMIN/GLOB SERPL: 1 G/DL
ALP SERPL-CCNC: 142 U/L (ref 39–117)
ALT SERPL W P-5'-P-CCNC: 23 U/L (ref 1–41)
ANION GAP SERPL CALCULATED.3IONS-SCNC: 14 MMOL/L (ref 5–15)
AST SERPL-CCNC: 31 U/L (ref 1–40)
BASOPHILS # BLD AUTO: 0.03 10*3/MM3 (ref 0–0.2)
BASOPHILS NFR BLD AUTO: 0.5 % (ref 0–1.5)
BILIRUB SERPL-MCNC: 0.4 MG/DL (ref 0.2–1.2)
BUN BLD-MCNC: 44 MG/DL (ref 8–23)
BUN/CREAT SERPL: 28.2 (ref 7–25)
CALCIUM SPEC-SCNC: 9.2 MG/DL (ref 8.6–10.5)
CHLORIDE SERPL-SCNC: 92 MMOL/L (ref 98–107)
CO2 SERPL-SCNC: 26 MMOL/L (ref 22–29)
CREAT BLD-MCNC: 1.56 MG/DL (ref 0.76–1.27)
DEPRECATED RDW RBC AUTO: 44.4 FL (ref 37–54)
EOSINOPHIL # BLD AUTO: 0.08 10*3/MM3 (ref 0–0.4)
EOSINOPHIL NFR BLD AUTO: 1.2 % (ref 0.3–6.2)
ERYTHROCYTE [DISTWIDTH] IN BLOOD BY AUTOMATED COUNT: 13.4 % (ref 12.3–15.4)
GFR SERPL CREATININE-BSD FRML MDRD: 45 ML/MIN/1.73
GLOBULIN UR ELPH-MCNC: 3.6 GM/DL
GLUCOSE BLD-MCNC: 169 MG/DL (ref 65–99)
HCT VFR BLD AUTO: 36.8 % (ref 37.5–51)
HGB BLD-MCNC: 11.4 G/DL (ref 13–17.7)
HOLD SPECIMEN: NORMAL
HOLD SPECIMEN: NORMAL
IMM GRANULOCYTES # BLD AUTO: 0.19 10*3/MM3 (ref 0–0.05)
IMM GRANULOCYTES NFR BLD AUTO: 2.9 % (ref 0–0.5)
LYMPHOCYTES # BLD AUTO: 1.2 10*3/MM3 (ref 0.7–3.1)
LYMPHOCYTES NFR BLD AUTO: 18.3 % (ref 19.6–45.3)
MCH RBC QN AUTO: 28.2 PG (ref 26.6–33)
MCHC RBC AUTO-ENTMCNC: 31 G/DL (ref 31.5–35.7)
MCV RBC AUTO: 91.1 FL (ref 79–97)
MONOCYTES # BLD AUTO: 0.56 10*3/MM3 (ref 0.1–0.9)
MONOCYTES NFR BLD AUTO: 8.5 % (ref 5–12)
NEUTROPHILS # BLD AUTO: 4.49 10*3/MM3 (ref 1.7–7)
NEUTROPHILS NFR BLD AUTO: 68.6 % (ref 42.7–76)
NRBC BLD AUTO-RTO: 0 /100 WBC (ref 0–0.2)
NT-PROBNP SERPL-MCNC: 220.5 PG/ML (ref 5–900)
PLATELET # BLD AUTO: 146 10*3/MM3 (ref 140–450)
PMV BLD AUTO: 11.5 FL (ref 6–12)
POTASSIUM BLD-SCNC: 4.3 MMOL/L (ref 3.5–5.2)
PROT SERPL-MCNC: 7.3 G/DL (ref 6–8.5)
RBC # BLD AUTO: 4.04 10*6/MM3 (ref 4.14–5.8)
SODIUM BLD-SCNC: 132 MMOL/L (ref 136–145)
TROPONIN T SERPL-MCNC: 0.02 NG/ML (ref 0–0.03)
WBC NRBC COR # BLD: 6.55 10*3/MM3 (ref 3.4–10.8)
WHOLE BLOOD HOLD SPECIMEN: NORMAL
WHOLE BLOOD HOLD SPECIMEN: NORMAL

## 2020-05-12 PROCEDURE — 71045 X-RAY EXAM CHEST 1 VIEW: CPT

## 2020-05-12 PROCEDURE — 85025 COMPLETE CBC W/AUTO DIFF WBC: CPT | Performed by: EMERGENCY MEDICINE

## 2020-05-12 PROCEDURE — 93005 ELECTROCARDIOGRAM TRACING: CPT | Performed by: EMERGENCY MEDICINE

## 2020-05-12 PROCEDURE — 99284 EMERGENCY DEPT VISIT MOD MDM: CPT

## 2020-05-12 PROCEDURE — 93005 ELECTROCARDIOGRAM TRACING: CPT

## 2020-05-12 PROCEDURE — 83880 ASSAY OF NATRIURETIC PEPTIDE: CPT | Performed by: EMERGENCY MEDICINE

## 2020-05-12 PROCEDURE — 80053 COMPREHEN METABOLIC PANEL: CPT | Performed by: EMERGENCY MEDICINE

## 2020-05-12 PROCEDURE — 84484 ASSAY OF TROPONIN QUANT: CPT | Performed by: EMERGENCY MEDICINE

## 2020-05-12 RX ORDER — SODIUM CHLORIDE 0.9 % (FLUSH) 0.9 %
10 SYRINGE (ML) INJECTION AS NEEDED
Status: DISCONTINUED | OUTPATIENT
Start: 2020-05-12 | End: 2020-05-13 | Stop reason: HOSPADM

## 2020-05-13 VITALS
TEMPERATURE: 97.9 F | HEIGHT: 76 IN | OXYGEN SATURATION: 100 % | RESPIRATION RATE: 18 BRPM | HEART RATE: 83 BPM | SYSTOLIC BLOOD PRESSURE: 114 MMHG | WEIGHT: 315 LBS | BODY MASS INDEX: 38.36 KG/M2 | DIASTOLIC BLOOD PRESSURE: 88 MMHG

## 2020-05-13 LAB — TROPONIN T SERPL-MCNC: 0.02 NG/ML (ref 0–0.03)

## 2020-05-13 PROCEDURE — 93005 ELECTROCARDIOGRAM TRACING: CPT | Performed by: EMERGENCY MEDICINE

## 2020-05-13 PROCEDURE — 84484 ASSAY OF TROPONIN QUANT: CPT | Performed by: EMERGENCY MEDICINE

## 2020-05-13 NOTE — ED PROVIDER NOTES
Subjective   64-year-old male presents with complaint of increased lower extremity swelling and shortness of breath.  He describes the symptoms as progressively worsening over the last 1 to 2 weeks.  He reports a known history of congestive heart failure has required IV diuresis in the past.  He currently takes p.o. diuresis at home in the form of Bumex and reports that he has been taking this as prescribed.  He reports when walking short dense distances or walking up steps that he will have increased shortness of breath.  He denies fever, body aches, chest pain, cough.  He denies any definitive sick contacts, or recent travel.  He denies any chest pain, no abdominal pain, no nausea or vomiting, no change in bowel or urinary function.  No other reported aggravating, alleviating, or associated symptoms.          Review of Systems   Constitutional: Negative for chills, fatigue and fever.   HENT: Negative for congestion, ear pain, postnasal drip, sinus pressure and sore throat.    Eyes: Negative for pain, redness and visual disturbance.   Respiratory: Positive for shortness of breath. Negative for cough and chest tightness.    Cardiovascular: Negative for chest pain, palpitations and leg swelling.   Gastrointestinal: Negative for abdominal pain, anal bleeding, blood in stool, diarrhea, nausea and vomiting.   Endocrine: Negative for polydipsia and polyuria.   Genitourinary: Negative for difficulty urinating, dysuria, frequency and urgency.   Musculoskeletal: Negative for arthralgias, back pain and neck pain.   Skin: Negative for pallor and rash.        Bilateral  lower extremity edema   Allergic/Immunologic: Negative for environmental allergies and immunocompromised state.   Neurological: Negative for dizziness, weakness and headaches.   Hematological: Negative for adenopathy.   Psychiatric/Behavioral: Negative for confusion, self-injury and suicidal ideas. The patient is not nervous/anxious.    All other systems  "reviewed and are negative.      Past Medical History:   Diagnosis Date   • A-fib (CMS/HCC)    • ADD (attention deficit disorder)    • Atrial flutter (CMS/HCC)    • Cellulitis    • CHF (congestive heart failure) (CMS/HCC)    • Cirrhosis of liver (CMS/HCC)    • Constipation    • Depression    • Diabetes mellitus (CMS/HCC)    • Diabetic ulcer of right foot (CMS/HCC)    • Disease of thyroid gland    • Edema    • Fatty liver    • Hepatitis B    • Hyperlipidemia    • Hypokalemia    • Insomnia    • Lymphedema    • Narcolepsy    • Neuropathy    • Obesity    • JAIME on CPAP    • PVD (peripheral vascular disease) (CMS/HCC)    • RLS (restless legs syndrome)    • Skin cancer    • Tardive dyskinesia    • Tremor of both hands    • Yeast infection        No Known Allergies    Past Surgical History:   Procedure Laterality Date   • ACHILLES TENDON REPAIR     • CARDIAC ABLATION     • CHOLECYSTECTOMY     • LASER ABLATION      VEIN RLE       Family History   Problem Relation Age of Onset   • Heart failure Mother         CHF   • Clotting disorder Father    • No Known Problems Sister    • No Known Problems Brother    • No Known Problems Brother        Social History     Socioeconomic History   • Marital status:      Spouse name: Not on file   • Number of children: Not on file   • Years of education: Not on file   • Highest education level: Not on file   Tobacco Use   • Smoking status: Never Smoker   • Smokeless tobacco: Never Used   Substance and Sexual Activity   • Alcohol use: Yes     Comment: \"3 SCOTCH AND 2 BEERS PER WEEK\"   • Drug use: No   • Sexual activity: Defer           Objective   Physical Exam   Constitutional: He is oriented to person, place, and time. He appears well-developed and well-nourished.  Non-toxic appearance. No distress.   HENT:   Head: Normocephalic and atraumatic.   Right Ear: External ear normal.   Left Ear: External ear normal.   Nose: Nose normal.   Eyes: Pupils are equal, round, and reactive to light. " EOM and lids are normal.   Neck: Normal range of motion. Neck supple. No tracheal deviation present.   Cardiovascular: Normal rate, regular rhythm and normal heart sounds. Exam reveals no gallop, no friction rub and no decreased pulses.   No murmur heard.  Pulmonary/Chest: Effort normal and breath sounds normal. No respiratory distress. He has no decreased breath sounds. He has no wheezes. He has no rhonchi. He has no rales.   Abdominal: Soft. Normal appearance and bowel sounds are normal. There is no tenderness. There is no rebound and no guarding.   Musculoskeletal: Normal range of motion. He exhibits no deformity.        Left lower leg: He exhibits swelling and edema. He exhibits no tenderness, no bony tenderness, no deformity and no laceration.        Legs:  Lymphadenopathy:     He has no cervical adenopathy.   Neurological: He is alert and oriented to person, place, and time. He has normal strength. No cranial nerve deficit or sensory deficit.   Skin: Skin is warm and dry. No rash noted. He is not diaphoretic.   Psychiatric: He has a normal mood and affect. His speech is normal and behavior is normal. Judgment and thought content normal. Cognition and memory are normal.   Nursing note and vitals reviewed.      Procedures           ED Course                                           MDM  Number of Diagnoses or Management Options  Dyspnea, unspecified type: new and requires workup  Peripheral edema: new and requires workup  Diagnosis management comments: Laboratory evaluation shows slightly increased creatinine and elevated BUN.  The patient appears somnolent here in the ER.  He is intermittently fall asleep but able to awake and communicate history very effectively.    He reports a history of narcolepsy and he feels this along with the current late hour is contributing to his sleepiness.    His oxygen saturations are 100% on room air while at rest.  Lungs are clear to auscultation diffusely and the BNP is within  normal limits.  At this given time I do not see reason for IV diuresis.  He does have lower extremity edema which I feel would improve with elevation above heart level.    I will perform a repeat EKG and troponin and ambulate the patient to see what his oxygen saturations do.    With ambulation the patient's oxygen saturations remained 97% or better on room air.  He actually states that he felt better walking today than he has recently.  Repeat cardiac enzymes and EKGs do not show any ischemic changes.    I discussed options including admission versus discharge.  The patient agrees with me at this given time that it would be preferred to go home, and that there is greater risk from coming in the hospital at this given time.    The patient will be discharged with the advised to keep the legs elevated above heart level as much as possible and wear compression dressings to the lower extremities to help decrease lower extremity edema.  He is advised to continue to take diuretics as prescribed.    Follow-up with primary care physician for recheck within the next 2 to 3 days return to the ER with any further concern.       Amount and/or Complexity of Data Reviewed  Clinical lab tests: ordered and reviewed  Tests in the radiology section of CPT®: ordered and reviewed  Decide to obtain previous medical records or to obtain history from someone other than the patient: yes  Review and summarize past medical records: yes  Independent visualization of images, tracings, or specimens: yes        Final diagnoses:   Peripheral edema   Dyspnea, unspecified type            Ruth Ann Felder MD  05/13/20 0246

## 2020-05-13 NOTE — DISCHARGE INSTRUCTIONS
I have advised the patient to have the legs elevated above heart level anytime that he is not standing or walking around.    He is advised to apply compression dressings of some type to his legs to help decrease lower extremity edema.    Continue to take current diuretics as prescribed.    Follow-up with primary care physician for repeat evaluation within the next 2 to 3 days.    Return to the ER with any further concern.

## 2020-05-15 ENCOUNTER — OFFICE VISIT (OUTPATIENT)
Dept: INTERNAL MEDICINE | Facility: CLINIC | Age: 65
End: 2020-05-15

## 2020-05-15 VITALS
HEIGHT: 76 IN | HEART RATE: 68 BPM | SYSTOLIC BLOOD PRESSURE: 126 MMHG | DIASTOLIC BLOOD PRESSURE: 70 MMHG | BODY MASS INDEX: 38.36 KG/M2 | TEMPERATURE: 97.8 F | WEIGHT: 315 LBS

## 2020-05-15 DIAGNOSIS — I42.5 RESTRICTIVE CARDIOMYOPATHY (HCC): Primary | ICD-10-CM

## 2020-05-15 DIAGNOSIS — E78.2 MIXED HYPERLIPIDEMIA: ICD-10-CM

## 2020-05-15 DIAGNOSIS — I89.0 LYMPHEDEMA: ICD-10-CM

## 2020-05-15 DIAGNOSIS — Z99.89 OSA ON CPAP: ICD-10-CM

## 2020-05-15 DIAGNOSIS — I50.32 CHRONIC DIASTOLIC CONGESTIVE HEART FAILURE (HCC): ICD-10-CM

## 2020-05-15 DIAGNOSIS — K75.81 NASH (NONALCOHOLIC STEATOHEPATITIS): ICD-10-CM

## 2020-05-15 DIAGNOSIS — R60.0 LOCALIZED EDEMA: ICD-10-CM

## 2020-05-15 DIAGNOSIS — I10 ESSENTIAL HYPERTENSION: ICD-10-CM

## 2020-05-15 DIAGNOSIS — G47.33 OSA ON CPAP: ICD-10-CM

## 2020-05-15 DIAGNOSIS — E11.65 UNCONTROLLED TYPE 2 DIABETES MELLITUS WITH HYPERGLYCEMIA (HCC): ICD-10-CM

## 2020-05-15 DIAGNOSIS — E78.5 DYSLIPIDEMIA: ICD-10-CM

## 2020-05-15 DIAGNOSIS — N17.9 AKI (ACUTE KIDNEY INJURY) (HCC): ICD-10-CM

## 2020-05-15 DIAGNOSIS — D69.6 THROMBOCYTOPENIA (HCC): ICD-10-CM

## 2020-05-15 DIAGNOSIS — E66.01 MORBIDLY OBESE (HCC): ICD-10-CM

## 2020-05-15 DIAGNOSIS — F32.89 OTHER DEPRESSION: ICD-10-CM

## 2020-05-15 DIAGNOSIS — E66.01 CLASS 3 SEVERE OBESITY DUE TO EXCESS CALORIES WITH SERIOUS COMORBIDITY AND BODY MASS INDEX (BMI) OF 40.0 TO 44.9 IN ADULT (HCC): ICD-10-CM

## 2020-05-15 LAB
ANION GAP SERPL CALCULATED.3IONS-SCNC: 16.5 MMOL/L (ref 5–15)
BUN BLD-MCNC: 48 MG/DL (ref 8–23)
BUN/CREAT SERPL: 37.5 (ref 7–25)
CALCIUM SPEC-SCNC: 9.9 MG/DL (ref 8.6–10.5)
CHLORIDE SERPL-SCNC: 86 MMOL/L (ref 98–107)
CO2 SERPL-SCNC: 25.5 MMOL/L (ref 22–29)
CREAT BLD-MCNC: 1.28 MG/DL (ref 0.76–1.27)
GFR SERPL CREATININE-BSD FRML MDRD: 56 ML/MIN/1.73
GLUCOSE BLD-MCNC: 157 MG/DL (ref 65–99)
POTASSIUM BLD-SCNC: 3.8 MMOL/L (ref 3.5–5.2)
SODIUM BLD-SCNC: 128 MMOL/L (ref 136–145)
TSH SERPL DL<=0.05 MIU/L-ACNC: 2.71 UIU/ML (ref 0.27–4.2)

## 2020-05-15 PROCEDURE — 84443 ASSAY THYROID STIM HORMONE: CPT | Performed by: INTERNAL MEDICINE

## 2020-05-15 PROCEDURE — 36415 COLL VENOUS BLD VENIPUNCTURE: CPT | Performed by: INTERNAL MEDICINE

## 2020-05-15 PROCEDURE — 80048 BASIC METABOLIC PNL TOTAL CA: CPT | Performed by: INTERNAL MEDICINE

## 2020-05-15 PROCEDURE — 99214 OFFICE O/P EST MOD 30 MIN: CPT | Performed by: INTERNAL MEDICINE

## 2020-05-15 NOTE — PROGRESS NOTES
"Patient is a 64 y.o. male who is here for a follow up of recent ER visit.  Chief Complaint   Patient presents with   • Follow-up     ER followup         HPI:    Here for ER f/u.  Had progressive edema by \"off loading\" and continuing on same regimen of diuretics.  Breathing is much better.  Weight is down almost 15 pounds.   Still on same dose of gabapentin.  Energy level is not the best.  No CP or palpitation.      History:     Patient Active Problem List   Diagnosis   • Type 2 diabetes mellitus with hyperglycemia (CMS/HCC)   • Elevated LFTs   • Thrombocytopenia (CMS/HCC)   • PVD (peripheral vascular disease) (CMS/HCC)   • A-fib (CMS/HCC)   • Cirrhosis of liver (CMS/HCC)   • Chronic congestive heart failure (CMS/HCC)   • Diabetic ulcer of right heel (CMS/HCC)   • Primary narcolepsy without cataplexy   • Essential hypertension   • Morbidly obese (CMS/HCC)   • Restless legs syndrome (RLS)   • ADD (attention deficit disorder)   • Depression   • Diabetes mellitus (CMS/HCC)   • Edema   • MCCAULEY (nonalcoholic steatohepatitis)   • Hepatitis B   • Insomnia   • Lymphedema   • Obesity   • Severe JAIME on CPAP   • RLS (restless legs syndrome)   • Tremor of both hands   • Restrictive cardiomyopathy (CMS/HCC)   • Mixed hyperlipidemia   • Uncontrolled type 2 diabetes mellitus with hyperglycemia (CMS/HCC)   • OMAR (acute kidney injury) (CMS/HCC)   • Dyslipidemia   • Hyperglycemia   • Diabetic ulcer of right heel associated with type 2 diabetes mellitus (CMS/HCC)       Past Medical History:   Diagnosis Date   • A-fib (CMS/HCC)    • ADD (attention deficit disorder)    • Atrial flutter (CMS/HCC)    • Cellulitis    • CHF (congestive heart failure) (CMS/HCC)    • Cirrhosis of liver (CMS/HCC)    • Constipation    • Depression    • Diabetes mellitus (CMS/HCC)    • Diabetic ulcer of right foot (CMS/HCC)    • Disease of thyroid gland    • Edema    • Fatty liver    • Hepatitis B    • Hyperlipidemia    • Hypokalemia    • Insomnia    • Lymphedema  "   • Narcolepsy    • Neuropathy    • Obesity    • JAIME on CPAP    • PVD (peripheral vascular disease) (CMS/HCC)    • RLS (restless legs syndrome)    • Skin cancer    • Tardive dyskinesia    • Tremor of both hands    • Yeast infection        Past Surgical History:   Procedure Laterality Date   • ACHILLES TENDON REPAIR     • CARDIAC ABLATION     • CHOLECYSTECTOMY     • LASER ABLATION      VEIN RLE       Current Outpatient Medications on File Prior to Visit   Medication Sig   • amphetamine-dextroamphetamine (ADDERALL) 30 MG tablet Take 2 tablets by mouth Daily.   • aspirin 81 MG EC tablet TAKE 1 TABLET BY MOUTH EVERY DAY   • bumetanide (BUMEX) 2 MG tablet TAKE 2 TABLETS BY MOUTH TWICE A DAY   • clotrimazole (ANTI-FUNGAL) 1 % cream Apply 1 application topically to the appropriate area as directed 4 (Four) Times a Day As Needed.   • Docusate Calcium (STOOL SOFTENER PO) Take 1 tablet by mouth As Needed.   • gabapentin (NEURONTIN) 300 MG capsule Take 3 capsules by mouth 2 (Two) Times a Day.   • ketoconazole (NIZORAL) 2 % cream Apply  topically to the appropriate area as directed 2 (Two) Times a Day As Needed for Itching.   • levocetirizine (XYZAL) 5 MG tablet Take 1 tablet by mouth Every Evening.   • levothyroxine (SYNTHROID, LEVOTHROID) 75 MCG tablet TAKE 1 TABLET BY MOUTH EVERY DAY   • metOLazone (ZAROXOLYN) 5 MG tablet Take 1 tablet by mouth Daily As Needed (edema/fluid overload).   • ondansetron (ZOFRAN) 8 MG tablet Take 1 tablet by mouth Every 8 (Eight) Hours As Needed for Nausea or Vomiting.   • polyethylene glycol (MIRALAX) powder Take 17 g by mouth As Needed.   • potassium chloride (K-DUR) 10 MEQ CR tablet Take 3 tablets by mouth 2 (Two) Times a Day.   • pramipexole (MIRAPEX) 1 MG tablet Take 1 tablet by mouth 2 (Two) Times a Day.   • pravastatin (PRAVACHOL) 40 MG tablet Take 1 tablet by mouth Every Night.   • spironolactone (ALDACTONE) 100 MG tablet TAKE 1 TABLET BY MOUTH EVERY DAY   • traZODone (DESYREL) 50 MG  "tablet Take 1 tablet by mouth At Night As Needed for Sleep. for sleep   • venlafaxine XR (EFFEXOR-XR) 150 MG 24 hr capsule Take 1 capsule by mouth Every Night.   • ACCU-CHEK FASTCLIX LANCETS misc 1 each by Other route 4 (Four) Times a Day After Meals & at Bedtime.   • ACCU-CHEK FASTCLIX LANCETS misc 1 each by Other route 4 (Four) Times a Day After Meals & at Bedtime. Dx E11.9   • ACCU-CHEK FASTCLIX LANCETS misc 1 each by Other route 4 (Four) Times a Day After Meals & at Bedtime. Dx E11.9   • Alcohol Swabs (ALCOHOL PADS) 70 % pads 1 swab 3 (Three) Times a Day.   • Alcohol Swabs (ALCOHOL PADS) 70 % pads 1 swab 4 (Four) Times a Day After Meals & at Bedtime. Dx E11.9   • Blood Glucose Monitoring Suppl (ACCU-CHEK GUIDE) w/Device kit 1 each 4 (Four) Times a Day After Meals & at Bedtime.   • Blood Glucose Monitoring Suppl (ACCU-CHEK GUIDE) w/Device kit 1 each 4 (Four) Times a Day After Meals & at Bedtime. Dx E11.9   • glucose blood (ACCU-CHEK GUIDE) test strip 1 each by Other route 4 (Four) Times a Day After Meals & at Bedtime. Use as instructed   • glucose blood (ACCU-CHEK GUIDE) test strip 1 each by Other route 4 (Four) Times a Day After Meals & at Bedtime. Use as instructed Dx E11.9   • glucose blood (ACCU-CHEK GUIDE) test strip 1 each by Other route 4 (Four) Times a Day After Meals & at Bedtime. Use as instructed Dx E11.9   • Insulin Syringe 29G X 1/2\" 1 ML misc Use one each to inject insulin four times daily Ell.9     No current facility-administered medications on file prior to visit.        Family History   Problem Relation Age of Onset   • Heart failure Mother         CHF   • Clotting disorder Father    • No Known Problems Sister    • No Known Problems Brother    • No Known Problems Brother        Social History     Socioeconomic History   • Marital status:      Spouse name: Not on file   • Number of children: Not on file   • Years of education: Not on file   • Highest education level: Not on file   Tobacco " "Use   • Smoking status: Never Smoker   • Smokeless tobacco: Never Used   Substance and Sexual Activity   • Alcohol use: Yes     Comment: \"3 SCOTCH AND 2 BEERS PER WEEK\"   • Drug use: No   • Sexual activity: Defer         Review of Systems   Constitutional: Positive for fatigue. Negative for chills and fever.   HENT: Negative for congestion, ear pain, hearing loss, rhinorrhea, sinus pressure, sore throat and trouble swallowing.    Eyes: Negative for discharge and itching.   Respiratory: Positive for shortness of breath (better). Negative for cough and chest tightness.    Cardiovascular: Positive for leg swelling (better). Negative for chest pain and palpitations.   Gastrointestinal: Negative for abdominal pain, blood in stool, constipation, diarrhea and vomiting.        Colonoscopy 3/16   Endocrine: Positive for cold intolerance. Negative for polydipsia and polyuria.   Genitourinary: Negative for difficulty urinating, dysuria, enuresis, frequency, hematuria and urgency.   Musculoskeletal: Positive for arthralgias, back pain and gait problem. Negative for joint swelling.   Skin: Positive for wound (right foot). Negative for rash.   Allergic/Immunologic: Negative for immunocompromised state.   Neurological: Positive for weakness and light-headedness. Negative for dizziness, syncope, numbness and headaches.   Hematological: Does not bruise/bleed easily.   Psychiatric/Behavioral: Negative for behavioral problems, dysphoric mood and sleep disturbance. The patient is not nervous/anxious.        /70   Pulse 68   Temp 97.8 °F (36.6 °C) (Temporal)   Ht 193 cm (75.98\")   Wt (!) 144 kg (317 lb)   BMI 38.60 kg/m²       Physical Exam   Constitutional: He is oriented to person, place, and time. He appears well-developed and well-nourished.   HENT:   Head: Normocephalic and atraumatic.   Right Ear: External ear normal.   Left Ear: External ear normal.   Mouth/Throat: Oropharynx is clear and moist.   Eyes: Conjunctivae " "and EOM are normal.   Neck: Normal range of motion. Neck supple.   Cardiovascular: Normal rate and regular rhythm.   Slight diminished heart sounds   Pulmonary/Chest: Effort normal and breath sounds normal.   Abdominal: Soft. Bowel sounds are normal.   Musculoskeletal: He exhibits edema (improved).   Lymphadenopathy:     He has no cervical adenopathy.   Neurological: He is alert and oriented to person, place, and time.   Skin: Skin is warm and dry.   Followed by wound care   Psychiatric: He has a normal mood and affect. His behavior is normal. Thought content normal.       Procedure:      Discussion/Summary:    DM-f/u ENDO, counseled on low carb and goals of FSBS  Chronic LE edema-advised to weigh daily and if needed add metolazone, improved  CHF-\" \"  Hep B hx/FLD-f/u Dr Murcia  Neuropathy-on Gabapentin, attempt to decrease so will assist with the edema  Narcolepsy-on Adderall, still an issue  JAIME-cont CPAP  Hx of Afib-no recurrence s/p Ablation  Hypothyroid-TSH 12/27 at goal, recheck   Depression-controlled on Effexor  RLS-controlled on Mirapex  Elevated Cr-stable advised to ensure adequate hydration, recheck mproved  Hyperlipidemia-cont statin, labs 2/13 dw patient, recheck on rtc     Hospital Labs noted and dw patient    Current Outpatient Medications:   •  amphetamine-dextroamphetamine (ADDERALL) 30 MG tablet, Take 2 tablets by mouth Daily., Disp: 60 tablet, Rfl: 0  •  aspirin 81 MG EC tablet, TAKE 1 TABLET BY MOUTH EVERY DAY, Disp: 90 tablet, Rfl: 1  •  bumetanide (BUMEX) 2 MG tablet, TAKE 2 TABLETS BY MOUTH TWICE A DAY, Disp: 348 tablet, Rfl: 1  •  clotrimazole (ANTI-FUNGAL) 1 % cream, Apply 1 application topically to the appropriate area as directed 4 (Four) Times a Day As Needed., Disp: , Rfl:   •  Docusate Calcium (STOOL SOFTENER PO), Take 1 tablet by mouth As Needed., Disp: , Rfl:   •  gabapentin (NEURONTIN) 300 MG capsule, Take 3 capsules by mouth 2 (Two) Times a Day., Disp: 540 capsule, Rfl: 0  •  " ketoconazole (NIZORAL) 2 % cream, Apply  topically to the appropriate area as directed 2 (Two) Times a Day As Needed for Itching., Disp: 60 g, Rfl: 2  •  levocetirizine (XYZAL) 5 MG tablet, Take 1 tablet by mouth Every Evening., Disp: 90 tablet, Rfl: 1  •  levothyroxine (SYNTHROID, LEVOTHROID) 75 MCG tablet, TAKE 1 TABLET BY MOUTH EVERY DAY, Disp: 90 tablet, Rfl: 1  •  metOLazone (ZAROXOLYN) 5 MG tablet, Take 1 tablet by mouth Daily As Needed (edema/fluid overload)., Disp: 90 tablet, Rfl: 1  •  ondansetron (ZOFRAN) 8 MG tablet, Take 1 tablet by mouth Every 8 (Eight) Hours As Needed for Nausea or Vomiting., Disp: 20 tablet, Rfl: 2  •  polyethylene glycol (MIRALAX) powder, Take 17 g by mouth As Needed., Disp: , Rfl:   •  potassium chloride (K-DUR) 10 MEQ CR tablet, Take 3 tablets by mouth 2 (Two) Times a Day., Disp: 540 tablet, Rfl: 3  •  pramipexole (MIRAPEX) 1 MG tablet, Take 1 tablet by mouth 2 (Two) Times a Day., Disp: 180 tablet, Rfl: 1  •  pravastatin (PRAVACHOL) 40 MG tablet, Take 1 tablet by mouth Every Night., Disp: 90 tablet, Rfl: 3  •  spironolactone (ALDACTONE) 100 MG tablet, TAKE 1 TABLET BY MOUTH EVERY DAY, Disp: 90 tablet, Rfl: 1  •  traZODone (DESYREL) 50 MG tablet, Take 1 tablet by mouth At Night As Needed for Sleep. for sleep, Disp: 30 tablet, Rfl: 5  •  venlafaxine XR (EFFEXOR-XR) 150 MG 24 hr capsule, Take 1 capsule by mouth Every Night., Disp: 90 capsule, Rfl: 1  •  ACCU-CHEK FASTCLIX LANCETS misc, 1 each by Other route 4 (Four) Times a Day After Meals & at Bedtime., Disp: 100 each, Rfl: 0  •  ACCU-CHEK FASTCLIX LANCETS misc, 1 each by Other route 4 (Four) Times a Day After Meals & at Bedtime. Dx E11.9, Disp: 100 each, Rfl: 0  •  ACCU-CHEK FASTCLIX LANCETS misc, 1 each by Other route 4 (Four) Times a Day After Meals & at Bedtime. Dx E11.9, Disp: 102 each, Rfl: 0  •  Alcohol Swabs (ALCOHOL PADS) 70 % pads, 1 swab 3 (Three) Times a Day., Disp: 100 each, Rfl: 0  •  Alcohol Swabs (ALCOHOL PADS) 70 %  "pads, 1 swab 4 (Four) Times a Day After Meals & at Bedtime. Dx E11.9, Disp: 90 each, Rfl: 0  •  Blood Glucose Monitoring Suppl (ACCU-CHEK GUIDE) w/Device kit, 1 each 4 (Four) Times a Day After Meals & at Bedtime., Disp: 1 kit, Rfl: 0  •  Blood Glucose Monitoring Suppl (ACCU-CHEK GUIDE) w/Device kit, 1 each 4 (Four) Times a Day After Meals & at Bedtime. Dx E11.9, Disp: 1 kit, Rfl: 0  •  glucose blood (ACCU-CHEK GUIDE) test strip, 1 each by Other route 4 (Four) Times a Day After Meals & at Bedtime. Use as instructed, Disp: 1 each, Rfl: 0  •  glucose blood (ACCU-CHEK GUIDE) test strip, 1 each by Other route 4 (Four) Times a Day After Meals & at Bedtime. Use as instructed Dx E11.9, Disp: 1 each, Rfl: 0  •  glucose blood (ACCU-CHEK GUIDE) test strip, 1 each by Other route 4 (Four) Times a Day After Meals & at Bedtime. Use as instructed Dx E11.9, Disp: 100 each, Rfl: 0  •  Insulin Syringe 29G X 1/2\" 1 ML misc, Use one each to inject insulin four times daily Ell.9, Disp: 200 each, Rfl: 1        Abe was seen today for follow-up.    Diagnoses and all orders for this visit:    Restrictive cardiomyopathy (CMS/HCC)    Mixed hyperlipidemia  -     TSH    Essential hypertension  -     Basic Metabolic Panel    Chronic diastolic congestive heart failure (CMS/HCC)    Severe JAIME on CPAP    Class 3 severe obesity due to excess calories with serious comorbidity and body mass index (BMI) of 40.0 to 44.9 in adult (CMS/HCC)    MCCAULEY (nonalcoholic steatohepatitis)    Morbidly obese (CMS/HCC)    Uncontrolled type 2 diabetes mellitus with hyperglycemia (CMS/HCC)    OMAR (acute kidney injury) (CMS/HCC)    Thrombocytopenia (CMS/HCC)    Lymphedema    Localized edema    Dyslipidemia    Other depression        "

## 2020-05-19 RX ORDER — LEVOTHYROXINE SODIUM 0.07 MG/1
TABLET ORAL
Qty: 90 TABLET | Refills: 0 | Status: SHIPPED | OUTPATIENT
Start: 2020-05-19 | End: 2020-06-03

## 2020-05-27 DIAGNOSIS — G47.33 OSA ON CPAP: ICD-10-CM

## 2020-05-27 DIAGNOSIS — G47.419 PRIMARY NARCOLEPSY WITHOUT CATAPLEXY: ICD-10-CM

## 2020-05-27 DIAGNOSIS — Z99.89 OSA ON CPAP: ICD-10-CM

## 2020-05-27 RX ORDER — DEXTROAMPHETAMINE SACCHARATE, AMPHETAMINE ASPARTATE, DEXTROAMPHETAMINE SULFATE AND AMPHETAMINE SULFATE 7.5; 7.5; 7.5; 7.5 MG/1; MG/1; MG/1; MG/1
60 TABLET ORAL DAILY
Qty: 60 TABLET | Refills: 0 | Status: SHIPPED | OUTPATIENT
Start: 2020-05-27 | End: 2020-07-10 | Stop reason: SDUPTHER

## 2020-06-02 RX ORDER — IBUPROFEN 200 MG
TABLET ORAL
Qty: 500 EACH | Refills: 1 | Status: SHIPPED | OUTPATIENT
Start: 2020-06-02 | End: 2020-01-01 | Stop reason: SDUPTHER

## 2020-06-02 NOTE — TELEPHONE ENCOUNTER
PATIENT CALLED  REQUESTING RX FOR  SYRINGES     [facility] [address]    [phone]    PATIENT PHONE NUMBER  [phone]

## 2020-06-03 RX ORDER — BUMETANIDE 2 MG/1
TABLET ORAL
Qty: 348 TABLET | Refills: 0 | Status: SHIPPED | OUTPATIENT
Start: 2020-06-03 | End: 2020-07-31 | Stop reason: ALTCHOICE

## 2020-06-03 RX ORDER — LEVOTHYROXINE SODIUM 0.07 MG/1
TABLET ORAL
Qty: 82 TABLET | Refills: 0 | Status: SHIPPED | OUTPATIENT
Start: 2020-06-03 | End: 2020-01-01

## 2020-06-08 RX ORDER — SPIRONOLACTONE 100 MG/1
TABLET, FILM COATED ORAL
Qty: 90 TABLET | Refills: 2 | Status: SHIPPED | OUTPATIENT
Start: 2020-06-08 | End: 2020-06-08 | Stop reason: SDUPTHER

## 2020-06-08 RX ORDER — VENLAFAXINE HYDROCHLORIDE 150 MG/1
CAPSULE, EXTENDED RELEASE ORAL
Qty: 90 CAPSULE | Refills: 2 | Status: SHIPPED | OUTPATIENT
Start: 2020-06-08 | End: 2020-06-08 | Stop reason: SDUPTHER

## 2020-06-08 RX ORDER — VENLAFAXINE HYDROCHLORIDE 150 MG/1
150 CAPSULE, EXTENDED RELEASE ORAL NIGHTLY
Qty: 90 CAPSULE | Refills: 2 | Status: SHIPPED | OUTPATIENT
Start: 2020-06-08

## 2020-06-08 RX ORDER — SPIRONOLACTONE 100 MG/1
100 TABLET, FILM COATED ORAL DAILY
Qty: 90 TABLET | Refills: 2 | Status: SHIPPED | OUTPATIENT
Start: 2020-06-08 | End: 2020-07-24 | Stop reason: SDUPTHER

## 2020-06-08 RX ORDER — TRAZODONE HYDROCHLORIDE 50 MG/1
TABLET ORAL
Qty: 90 TABLET | Refills: 2 | Status: SHIPPED | OUTPATIENT
Start: 2020-06-08 | End: 2020-06-08 | Stop reason: SDUPTHER

## 2020-06-08 RX ORDER — TRAZODONE HYDROCHLORIDE 50 MG/1
50 TABLET ORAL NIGHTLY PRN
Qty: 90 TABLET | Refills: 2 | Status: SHIPPED | OUTPATIENT
Start: 2020-06-08 | End: 2021-01-01 | Stop reason: HOSPADM

## 2020-06-10 RX ORDER — METOLAZONE 5 MG/1
5 TABLET ORAL DAILY PRN
Qty: 90 TABLET | Refills: 1 | Status: SHIPPED | OUTPATIENT
Start: 2020-06-10 | End: 2021-01-01

## 2020-06-10 NOTE — TELEPHONE ENCOUNTER
PATIENT NEEDS A REFILL OF     metOLazone (ZAROXOLYN) 5 MG tablet 90 DAY SUPPLY IF POSSIBLE     SENT TO [facility] PH: [phone]

## 2020-07-10 DIAGNOSIS — G47.419 PRIMARY NARCOLEPSY WITHOUT CATAPLEXY: ICD-10-CM

## 2020-07-10 DIAGNOSIS — G47.33 OSA ON CPAP: ICD-10-CM

## 2020-07-10 DIAGNOSIS — Z99.89 OSA ON CPAP: ICD-10-CM

## 2020-07-10 RX ORDER — DEXTROAMPHETAMINE SACCHARATE, AMPHETAMINE ASPARTATE, DEXTROAMPHETAMINE SULFATE AND AMPHETAMINE SULFATE 7.5; 7.5; 7.5; 7.5 MG/1; MG/1; MG/1; MG/1
30 TABLET ORAL 2 TIMES DAILY
Qty: 120 TABLET | Refills: 0 | Status: SHIPPED | OUTPATIENT
Start: 2020-07-10 | End: 2020-08-18 | Stop reason: SDUPTHER

## 2020-07-22 NOTE — PROGRESS NOTES
Subjective:     Encounter Date:07/24/2020    Patient ID: Abe Phillips Jr. is a 64 y.o.  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  He recently moved here from [place], [place].     SELF REFERRED  INTERNIST: Anibal Maria MD  PULMONOLOGIST: Fernando Womack MD, [facility]  NEUROLOGIST: ELMA Jenkins  ELECTROPHYSIOLOGIST: Jalen Scott MD ([place])  REMOTE CARDIOLOGIST: Mathew Cox MD ([place])  GASTROENTEROLOGIST: Jalen Rashid MD ([place])  WOUND CARE: [facility], Winifred Pedersen MD  ENDOCRINOLOGIST: Dustin Hendricks MD  NEPHROLOGIST: [facility]    Chief Complaint:   Chief Complaint   Patient presents with   • Chronic diastolic congestive heart failure     F/U       Problem List:  1. Paroxysmal atrial fibrillation:  a. Diagnosed 2016, CHADsVasc 4, anticoagulated with Eliquis  b. ECV, 2016  c. Atrial fibrillation and flutter ablation, 8/30/2016 ([place]  d. CHADsVasc 4, anticoagulated with ASA  e. NSR on ECG, February 2019  2. Chronic congestive heart failure (HFpEF):  a. Echocardiogram, July 2016, LVEF 0.58 ([place]  b. Echocardiogram, January 2017, LVEF 0.50 ([place]  c. Echocardiogram, 4/12/2018: Mild concentric LVH, EF 55-60 percent, trivial TR  d. Echocardiogram, 1/5/2019: EF greater than 0.70, LV wall thickness is consistent with posterior asymmetric hypertrophy, trace MR, physiologic TR, no pericardial effusion  e. Residual CCS 0 angina pectoris/NYHA class II CHF  3. Hypertensive cardiovascular disease:  a. Remote stress test and negative LHC, approximately 2016, in [place]-data deficit  b. Residual class I symptoms  4. Hypertension  5. Hyperlipidemia; not on statin therapy, patient wishes to not take statins  6. Type 2 diabetes mellitus; hemoglobin A1c 7.9% July 2016; 8%, autumn 2018; 11.7%, January 2019 with RLE foot ulcer since May 2018 (seeing wound care), 7.9% March 2019, 13.6%, December  2019  7. Hypothyroidism on replacement therapy  8. Peripheral vascular disease  9. Obstructive sleep apnea with CPAP with sleep study 2/4/2019: Negative for significant JAIME, but had very little REM sleep  10. Restless leg syndrome  11. Narcolepsy  12. Anxiety and depression  13. Chronic bilateral lower extremity venous insufficiency/edema  14. Moderate obesity: BMI 38.7  15. Hepatic cirrhosis and portal hypertension with splenomegaly  16. History of hepatitis B antibody 30 years ago  17. History of duodenal ulcer  18. Encephalopathy/sepsis/dehydration with 2-day [facility] admission, January 2019, with fever of unknown origin, echocardiogram negative for endocarditis  19. 60-pound intentional weight loss over past 2 years, February 2019  20. Surgical history:  a. Cholecystectomy  b. LHC  c. Atrial fibrillation/flutter ablation  d. Hemorrhoid surgery  e. Liver biopsy    No Known Allergies    Current Outpatient Medications:   •  ACCU-CHEK FASTCLIX LANCETS misc, 1 each by Other route 4 (Four) Times a Day After Meals & at Bedtime., Disp: 100 each, Rfl: 0  •  ACCU-CHEK FASTCLIX LANCETS misc, 1 each by Other route 4 (Four) Times a Day After Meals & at Bedtime. Dx E11.9, Disp: 100 each, Rfl: 0  •  ACCU-CHEK FASTCLIX LANCETS misc, 1 each by Other route 4 (Four) Times a Day After Meals & at Bedtime. Dx E11.9, Disp: 102 each, Rfl: 0  •  Alcohol Swabs (ALCOHOL PADS) 70 % pads, 1 swab 3 (Three) Times a Day., Disp: 100 each, Rfl: 0  •  amphetamine-dextroamphetamine (ADDERALL) 30 MG tablet, Take 1 tablet by mouth 2 (Two) Times a Day. (Patient taking differently: Take 60 mg by mouth 2 (Two) Times a Day.), Disp: 120 tablet, Rfl: 0  •  aspirin 81 MG EC tablet, TAKE 1 TABLET BY MOUTH EVERY DAY, Disp: 90 tablet, Rfl: 1  •  Blood Glucose Monitoring Suppl (ACCU-CHEK GUIDE) w/Device kit, 1 each 4 (Four) Times a Day After Meals & at Bedtime., Disp: 1 kit, Rfl: 0  •  bumetanide (BUMEX) 2 MG tablet, TAKE TWO TABLETS BY MOUTH TWICE A DAY, Disp: 348  "tablet, Rfl: 0  •  diclofenac (VOLTAREN) 1 % gel gel, Apply 4 g topically to the appropriate area as directed 2 (Two) Times a Day As Needed (joint pain)., Disp: 100 g, Rfl: 1  •  Docusate Calcium (STOOL SOFTENER PO), Take 1 tablet by mouth As Needed., Disp: , Rfl:   •  gabapentin (NEURONTIN) 300 MG capsule, Take 3 capsules by mouth 2 (Two) Times a Day., Disp: 540 capsule, Rfl: 0  •  glucose blood (ACCU-CHEK GUIDE) test strip, 1 each by Other route 4 (Four) Times a Day After Meals & at Bedtime. Use as instructed, Disp: 1 each, Rfl: 0  •  Insulin Syringe 29G X 1/2\" 1 ML misc, Use one each to inject insulin four times daily Ell.9, Disp: 500 each, Rfl: 1  •  levocetirizine (XYZAL) 5 MG tablet, Take 1 tablet by mouth Every Evening., Disp: 90 tablet, Rfl: 1  •  levothyroxine (SYNTHROID, LEVOTHROID) 75 MCG tablet, TAKE ONE TABLET BY MOUTH DAILY, Disp: 82 tablet, Rfl: 0  •  metOLazone (ZAROXOLYN) 5 MG tablet, Take 1 tablet by mouth Daily As Needed (edema/fluid overload)., Disp: 90 tablet, Rfl: 1  •  polyethylene glycol (MIRALAX) powder, Take 17 g by mouth As Needed., Disp: , Rfl:   •  potassium chloride (K-DUR) 10 MEQ CR tablet, Take 3 tablets by mouth 2 (Two) Times a Day., Disp: 540 tablet, Rfl: 3  •  pramipexole (MIRAPEX) 1 MG tablet, Take 1 tablet by mouth 2 (Two) Times a Day., Disp: 180 tablet, Rfl: 1  •  pravastatin (PRAVACHOL) 40 MG tablet, Take 1 tablet by mouth Every Night., Disp: 90 tablet, Rfl: 3  •  spironolactone (ALDACTONE) 100 MG tablet, Take 1 tablet by mouth Daily. (Patient taking differently: Take 50 mg by mouth Daily.), Disp: 90 tablet, Rfl: 2  •  traZODone (DESYREL) 50 MG tablet, Take 1 tablet by mouth At Night As Needed for Sleep., Disp: 90 tablet, Rfl: 2  •  venlafaxine XR (EFFEXOR-XR) 150 MG 24 hr capsule, Take 1 capsule by mouth Every Night., Disp: 90 capsule, Rfl: 2  •  Blood Glucose Monitoring Suppl (ACCU-CHEK GUIDE) w/Device kit, 1 each 4 (Four) Times a Day After Meals & at Bedtime. Dx E11.9, Disp: " "1 kit, Rfl: 0  •  glucose blood (ACCU-CHEK GUIDE) test strip, 1 each by Other route 4 (Four) Times a Day After Meals & at Bedtime. Use as instructed Dx E11.9, Disp: 1 each, Rfl: 0  •  glucose blood (ACCU-CHEK GUIDE) test strip, 1 each by Other route 4 (Four) Times a Day After Meals & at Bedtime. Use as instructed Dx E11.9, Disp: 100 each, Rfl: 0    History of Present Illness: Patient returns for a 6-month follow up. Patient was admitted to the hospital on 02/12/2020 for 4 days due to hyperglycemia. He was also seen in [facility] ED on 05/13/2020 for peripheral edema and was discharged home after a unremarkable ED work-up. He states that he has not been working. He reportedly started collecting unemployment, which he has been experiencing trouble with. He has been experiencing edema in his legs and shortness of breath, but he was advised that he is not experiencing CHF exacerbation during his ED visit. He is advised we will refer him to have a fitting for support stockings to help with edema. He notes that he has been staying active by walking and standing in the kitchen often by cooking. His wife is staying in [place] at the moment due to the passing of her mother in January 2020. Patient otherwise denies chest pain, persistent shortness of breath, PND, palpitations, syncope, or presyncope at this time. His spironolactone was decreased to 50 mg recently by his nephrologist. He is encouraged to receive his influenza vaccine in Fall 2020.       ROS   Obtained and negative except as outlined in problem list and HPI.    Procedures       Objective:       Vitals:    07/24/20 1156 07/24/20 1203   BP: 141/83 135/77   BP Location: Right arm Right arm   Patient Position: Sitting Standing   Pulse: 83 85   Temp: 97.7 °F (36.5 °C)    Weight: (!) 144 kg (318 lb)    Height: 193 cm (75.98\")      Body mass index is 38.72 kg/m².  Last weight: 308 lbs    Physical Exam   Constitutional: He is oriented to person, place, and time. He appears " well-developed and well-nourished.   Neck: No JVD present. Carotid bruit is not present. No thyromegaly present.   Cardiovascular: Regular rhythm, S1 normal and S2 normal. Exam reveals no gallop, no S3 and no friction rub.   Murmur heard.   Medium-pitched early systolic murmur is present with a grade of 2/6 at the lower left sternal border.  Pulses:       Carotid pulses are 1+ on the right side, and 1+ on the left side.       Radial pulses are 1+ on the right side, and 1+ on the left side.        Femoral pulses are 1+ on the right side, and 1+ on the left side.       Popliteal pulses are 1+ on the right side, and 1+ on the left side.        Dorsalis pedis pulses are 1+ on the right side, and 1+ on the left side.        Posterior tibial pulses are 1+ on the right side, and 1+ on the left side.   Pulmonary/Chest: Effort normal. He has decreased breath sounds. He has no wheezes. He has no rhonchi. He has no rales.   Abdominal: Soft. He exhibits no mass. There is no hepatosplenomegaly. There is no tenderness. There is no guarding.   Musculoskeletal: Normal range of motion. He exhibits no edema (1+ bipedal ).   Lymphadenopathy:     He has no cervical adenopathy.   Neurological: He is alert and oriented to person, place, and time.   Skin: Skin is warm, dry and intact. No rash noted.   Vitals reviewed.        Lab Review:   Lab Results   Component Value Date    GLUCOSE 157 (H) 05/15/2020    BUN 48 (H) 05/15/2020    CREATININE 1.28 (H) 05/15/2020    EGFRIFNONA 56 (L) 05/15/2020    BCR 37.5 (H) 05/15/2020    CO2 25.5 05/15/2020    CALCIUM 9.9 05/15/2020    PROTENTOTREF 7.0 07/26/2019    ALBUMIN 3.70 05/12/2020    LABIL2 0.9 07/26/2019    AST 31 05/12/2020    ALT 23 05/12/2020       Lab Results   Component Value Date    WBC 6.55 05/12/2020    HGB 11.4 (L) 05/12/2020    HCT 36.8 (L) 05/12/2020    MCV 91.1 05/12/2020     05/12/2020       Lab Results   Component Value Date    HGBA1C 14.80 (H) 02/13/2020       Lab Results    Component Value Date    TSH 2.710 05/15/2020       Lab Results   Component Value Date    CHOL 220 (H) 02/13/2020    CHOL 160 12/27/2019    CHOL 167 09/20/2019     Lab Results   Component Value Date    TRIG 285 (H) 02/13/2020    TRIG 109 12/27/2019    TRIG 112 09/20/2019     Lab Results   Component Value Date    HDL 46 02/13/2020    HDL 60 12/27/2019    HDL 52 09/20/2019     Lab Results   Component Value Date     (H) 02/13/2020    LDL 78 12/27/2019    LDL 93 09/20/2019     XR Chest 1 view, 05/12/2020:   IMPRESSION:   No acute cardiopulmonary findings.        Assessment:       Overall continued acceptable course with no new interim cardiopulmonary complaints with limited functional status. We will defer additional diagnostic or therapeutic intervention from a cardiac perspective at this time.      Diagnosis Plan   1. PVD (peripheral vascular disease) (CMS/HCC)  No current claudication or TIA symptoms; Continue current treatment.   2. Chronic diastolic congestive heart failure (CMS/[facility])  Continue current treatment.   3. Essential hypertension  Overall acceptable control; Continue current treatment.   4. Severe JAIME on CPAP  Compliance stressed.   5. Type 2 diabetes mellitus with hyperglycemia, with long-term current use of insulin (CMS/[facility])  Continue current treatment and follow up with Dr. Maria.     6. Chronic kidney disease, stage III (moderate) (CMS/[facility])  Continue follow up with nephrology.           Plan:         1. Patient to continue current medications and close follow up with the above providers.  2. Tentative cardiology follow up in January 2021 or patient may return sooner PRN.    Scribed for Ld Orozco MD by Fernanda Gutierrez. 7/24/2020  13:00      ILd MD, [facility], personally performed the services described in this documentation as scribed by the above named individual in my presence, and it is both accurate and complete. At 12:49 PM on 07/24/2020

## 2020-07-24 ENCOUNTER — OFFICE VISIT (OUTPATIENT)
Dept: CARDIOLOGY | Facility: CLINIC | Age: 65
End: 2020-07-24

## 2020-07-24 VITALS
HEIGHT: 76 IN | WEIGHT: 315 LBS | SYSTOLIC BLOOD PRESSURE: 135 MMHG | BODY MASS INDEX: 38.36 KG/M2 | DIASTOLIC BLOOD PRESSURE: 77 MMHG | TEMPERATURE: 97.7 F | HEART RATE: 85 BPM

## 2020-07-24 DIAGNOSIS — I10 ESSENTIAL HYPERTENSION: ICD-10-CM

## 2020-07-24 DIAGNOSIS — N18.30 CHRONIC KIDNEY DISEASE, STAGE III (MODERATE) (HCC): ICD-10-CM

## 2020-07-24 DIAGNOSIS — Z79.4 TYPE 2 DIABETES MELLITUS WITH HYPERGLYCEMIA, WITH LONG-TERM CURRENT USE OF INSULIN (HCC): ICD-10-CM

## 2020-07-24 DIAGNOSIS — I73.9 PVD (PERIPHERAL VASCULAR DISEASE) (HCC): Primary | ICD-10-CM

## 2020-07-24 DIAGNOSIS — Z99.89 OSA ON CPAP: ICD-10-CM

## 2020-07-24 DIAGNOSIS — G47.33 OSA ON CPAP: ICD-10-CM

## 2020-07-24 DIAGNOSIS — I50.32 CHRONIC DIASTOLIC CONGESTIVE HEART FAILURE (HCC): ICD-10-CM

## 2020-07-24 DIAGNOSIS — E11.65 TYPE 2 DIABETES MELLITUS WITH HYPERGLYCEMIA, WITH LONG-TERM CURRENT USE OF INSULIN (HCC): ICD-10-CM

## 2020-07-24 PROCEDURE — 99214 OFFICE O/P EST MOD 30 MIN: CPT | Performed by: INTERNAL MEDICINE

## 2020-07-24 RX ORDER — SPIRONOLACTONE 100 MG/1
50 TABLET, FILM COATED ORAL DAILY
Qty: 30 TABLET | Refills: 6 | Status: ON HOLD | OUTPATIENT
Start: 2020-07-24 | End: 2021-01-01 | Stop reason: SDUPTHER

## 2020-07-25 ENCOUNTER — TELEPHONE ENCOUNTER (OUTPATIENT)
Dept: URBAN - METROPOLITAN AREA CLINIC 13 | Facility: CLINIC | Age: 65
End: 2020-07-25

## 2020-07-25 RX ORDER — POLYETHYLENE GLYCOL 3350, SODIUM SULFATE, SODIUM CHLORIDE, POTASSIUM CHLORIDE, ASCORBIC ACID, SODIUM ASCORBATE 7.5-2.691G
TAKE 32 OZ AS DIRECTED 5:00PM THE EVENING BEFORE AND 6HR PRIOR TO PROCEDURE KIT ORAL
Qty: 1 | Refills: 0 | OUTPATIENT
Start: 2016-01-14 | End: 2016-02-18

## 2020-07-25 RX ORDER — OXYCODONE 30 MG/1
TAKE 1 TABLET EVERY 4 HOURS AS NEEDED FOR PAIN TABLET ORAL
Refills: 0 | OUTPATIENT
Start: 2011-07-26 | End: 2011-07-26

## 2020-07-25 RX ORDER — LORATADINE 10 MG
USE AS DIRECTED TABLET,DISINTEGRATING ORAL
Refills: 0 | OUTPATIENT
Start: 2011-07-26 | End: 2015-12-11

## 2020-07-25 RX ORDER — HYDROMORPHONE HYDROCHLORIDE 8 MG/1
TAKE 1 TABLET EVERY 4 HOURS AS NEEDED TABLET ORAL
Refills: 0 | OUTPATIENT
Start: 2011-07-26 | End: 2011-08-25

## 2020-07-25 RX ORDER — ARMODAFINIL 250 MG/1
TAKE 1 TABLET DAILY TABLET ORAL
Refills: 0 | OUTPATIENT
End: 2016-01-14

## 2020-07-25 RX ORDER — METOPROLOL SUCCINATE 25 MG/1
TAKE 1 TABLET TWICE DAILY TABLET, EXTENDED RELEASE ORAL
Refills: 0 | OUTPATIENT
End: 2016-02-18

## 2020-07-25 RX ORDER — BUMETANIDE 2 MG/1
TAKE 1 TABLET 3 TIMES DAILY TABLET ORAL
Refills: 0 | OUTPATIENT
Start: 2011-07-26 | End: 2011-07-26

## 2020-07-26 ENCOUNTER — TELEPHONE ENCOUNTER (OUTPATIENT)
Dept: URBAN - METROPOLITAN AREA CLINIC 13 | Facility: CLINIC | Age: 65
End: 2020-07-26

## 2020-07-26 RX ORDER — FLUOROURACIL 50 MG/G
CREAM TOPICAL
Qty: 40 | Refills: 0 | Status: ACTIVE | COMMUNITY
Start: 2012-02-13

## 2020-07-26 RX ORDER — DEXTROAMPHETAMINE SACCHARATE, AMPHETAMINE ASPARTATE, DEXTROAMPHETAMINE SULFATE, AND AMPHETAMINE SULFATE 7.5; 7.5; 7.5; 7.5 MG/1; MG/1; MG/1; MG/1
TAKE 1 TABLET TWICE DAILY TABLET ORAL
Refills: 0 | Status: ACTIVE | COMMUNITY

## 2020-07-26 RX ORDER — FLUCONAZOLE 150 MG/1
TABLET ORAL
Qty: 7 | Refills: 0 | Status: ACTIVE | COMMUNITY
Start: 2011-08-19

## 2020-07-26 RX ORDER — LEVOTHYROXINE SODIUM 75 UG/1
TAKE 1 TABLET DAILY TABLET ORAL
Refills: 0 | Status: ACTIVE | COMMUNITY

## 2020-07-26 RX ORDER — DESONIDE 0.5 MG/G
CREAM TOPICAL
Qty: 30 | Refills: 0 | Status: ACTIVE | COMMUNITY
Start: 2011-06-04

## 2020-07-26 RX ORDER — LACTULOSE 10 G/15ML
SOLUTION ORAL; RECTAL
Qty: 255 | Refills: 0 | Status: ACTIVE | COMMUNITY
Start: 2011-07-19

## 2020-07-26 RX ORDER — METOLAZONE 5 MG/1
TABLET ORAL
Qty: 30 | Refills: 0 | Status: ACTIVE | COMMUNITY
Start: 2011-06-14

## 2020-07-26 RX ORDER — GABAPENTIN 300 MG/1
CAPSULE ORAL
Qty: 30 | Refills: 0 | Status: ACTIVE | COMMUNITY
Start: 2012-02-04

## 2020-07-26 RX ORDER — FUROSEMIDE 80 MG/1
TABLET ORAL
Qty: 90 | Refills: 0 | Status: ACTIVE | COMMUNITY
Start: 2011-07-13

## 2020-07-26 RX ORDER — KETOCONAZOLE 20 MG/G
CREAM TOPICAL
Qty: 30 | Refills: 0 | Status: ACTIVE | COMMUNITY
Start: 2011-06-04

## 2020-07-26 RX ORDER — POLYETHYLENE GLYCOL-3350 AND ELECTROLYTES 236; 6.74; 5.86; 2.97; 22.74 G/274.31G; G/274.31G; G/274.31G; G/274.31G; G/274.31G
POWDER, FOR SOLUTION ORAL
Qty: 255 | Refills: 0 | Status: ACTIVE | COMMUNITY
Start: 2011-07-19

## 2020-07-26 RX ORDER — MELOXICAM 15 MG/1
TABLET ORAL
Qty: 30 | Refills: 0 | Status: ACTIVE | COMMUNITY
Start: 2011-06-08

## 2020-07-26 RX ORDER — LEVOTHYROXINE SODIUM 0.07 MG/1
TABLET ORAL
Qty: 30 | Refills: 0 | Status: ACTIVE | COMMUNITY
Start: 2012-04-03

## 2020-07-26 RX ORDER — PRAMIPEXOLE DIHYDROCHLORIDE 0.5 MG/1
TABLET ORAL
Qty: 30 | Refills: 0 | Status: ACTIVE | COMMUNITY
Start: 2011-10-28

## 2020-07-26 RX ORDER — TRAZODONE HYDROCHLORIDE 50 MG/1
TAKE 1 TABLET DAILY TABLET ORAL
Refills: 0 | Status: ACTIVE | COMMUNITY

## 2020-07-26 RX ORDER — KETOCONAZOLE 200 MG/1
TABLET ORAL
Qty: 14 | Refills: 0 | Status: ACTIVE | COMMUNITY
Start: 2011-06-04

## 2020-07-26 RX ORDER — GABAPENTIN 600 MG/1
TABLET, FILM COATED ORAL
Qty: 30 | Refills: 0 | Status: ACTIVE | COMMUNITY
Start: 2012-03-29

## 2020-07-26 RX ORDER — CEPHALEXIN 500 MG/1
CAPSULE ORAL
Qty: 21 | Refills: 0 | Status: ACTIVE | COMMUNITY
Start: 2011-06-21

## 2020-07-26 RX ORDER — SILVER SULFADIAZINE 10 MG/G
CREAM TOPICAL
Qty: 50 | Refills: 0 | Status: ACTIVE | COMMUNITY
Start: 2011-06-08

## 2020-07-26 RX ORDER — GABAPENTIN 100 MG/1
CAPSULE ORAL
Qty: 90 | Refills: 0 | Status: ACTIVE | COMMUNITY
Start: 2011-05-20

## 2020-07-26 RX ORDER — INSULIN ASPART 100 [IU]/ML
INJECT 35 UNIT 3 TIMES DAILY INJECTION, SOLUTION INTRAVENOUS; SUBCUTANEOUS
Refills: 0 | Status: ACTIVE | COMMUNITY

## 2020-07-26 RX ORDER — LISDEXAMFETAMINE DIMESYLATE 30 MG/1
CAPSULE ORAL
Qty: 30 | Refills: 0 | Status: ACTIVE | COMMUNITY
Start: 2011-10-17

## 2020-07-26 RX ORDER — INSULIN DETEMIR 100 [IU]/ML
INJECT 40 UNIT DAILY INJECTION, SOLUTION SUBCUTANEOUS
Refills: 0 | Status: ACTIVE | COMMUNITY

## 2020-07-29 RX ORDER — GABAPENTIN 300 MG/1
CAPSULE ORAL
Qty: 540 CAPSULE | Refills: 0 | Status: SHIPPED | OUTPATIENT
Start: 2020-07-29 | End: 2020-08-31

## 2020-07-30 ENCOUNTER — TELEPHONE (OUTPATIENT)
Dept: CARDIOLOGY | Facility: CLINIC | Age: 65
End: 2020-07-30

## 2020-07-30 DIAGNOSIS — I50.32 CHRONIC DIASTOLIC CONGESTIVE HEART FAILURE (HCC): ICD-10-CM

## 2020-07-30 DIAGNOSIS — I10 ESSENTIAL HYPERTENSION: Primary | ICD-10-CM

## 2020-07-30 NOTE — TELEPHONE ENCOUNTER
Noted; would discontinue Bumex and initiate torsemide 20 mg daily and update us on symptoms, weight, and peripheral edema in 1-2 weeks.    Thanks!

## 2020-07-30 NOTE — TELEPHONE ENCOUNTER
Patient called and stated that he can no longer afford Bumex 2 mg BID.     Pt states that currently he has some swelling in ankles, greater in right side, and swelling in legs. Patient states that it is time for him to take metolazone 5 mg which he anticipates will decrease this swelling. BP 120s/70s usually. Current /81  78    Pt denies SOB, chest pain, cough, dizziness.     Pt currently takes:  Spironolactone 50 mg   Metolazone 5 mg daily as needed (uses 2-3 times a week)  Potassium 30 mEq BID  Bumex 2 mg BID    Is there an alternative for Bumex 2 mg BID?    Please advise.

## 2020-07-31 RX ORDER — TORSEMIDE 20 MG/1
20 TABLET ORAL DAILY
Qty: 90 TABLET | Refills: 1 | Status: SHIPPED | OUTPATIENT
Start: 2020-07-31 | End: 2020-08-12

## 2020-08-10 RX ORDER — BUMETANIDE 2 MG/1
TABLET ORAL
Qty: 56 TABLET | Refills: 0 | Status: SHIPPED | OUTPATIENT
Start: 2020-08-10 | End: 2020-08-12 | Stop reason: SDUPTHER

## 2020-08-12 ENCOUNTER — TELEPHONE (OUTPATIENT)
Dept: INTERNAL MEDICINE | Facility: CLINIC | Age: 65
End: 2020-08-12

## 2020-08-12 ENCOUNTER — OFFICE VISIT (OUTPATIENT)
Dept: NEUROLOGY | Facility: CLINIC | Age: 65
End: 2020-08-12

## 2020-08-12 VITALS
OXYGEN SATURATION: 98 % | HEART RATE: 89 BPM | TEMPERATURE: 98 F | HEIGHT: 76 IN | SYSTOLIC BLOOD PRESSURE: 128 MMHG | DIASTOLIC BLOOD PRESSURE: 68 MMHG | WEIGHT: 302 LBS | BODY MASS INDEX: 36.77 KG/M2

## 2020-08-12 DIAGNOSIS — G51.0 FACIAL PARALYSIS/BELLS PALSY: Primary | ICD-10-CM

## 2020-08-12 PROCEDURE — 99204 OFFICE O/P NEW MOD 45 MIN: CPT | Performed by: PSYCHIATRY & NEUROLOGY

## 2020-08-12 RX ORDER — BUMETANIDE 2 MG/1
4 TABLET ORAL 2 TIMES DAILY
Qty: 120 TABLET | Refills: 5 | Status: SHIPPED | OUTPATIENT
Start: 2020-08-12 | End: 2020-10-02 | Stop reason: SDUPTHER

## 2020-08-12 NOTE — TELEPHONE ENCOUNTER
PT CALLED STATING HE HAD REQUESTING A 90 DAY SUPPLY OF:  bumetanide (BUMEX) 2 MG tablet    PT STATED HE ONLY RECEIVED 58 TABLETS (2 WEEKS SUPPLY)    PLEASE ADVISE PT AT: [phone]     [address] [phone] [facility] [phone] [facility]

## 2020-08-12 NOTE — PROGRESS NOTES
Subjective   Patient ID: Abe Phillips Jr. is a 65 y.o. male     Chief Complaint   Patient presents with   • Eyelid Weakness        History of Present Illness    65 y.o. male self referred for eyelid weakness.   Two months ago developed tingling left cheek.  Then sharp shooting pains in face worse in the evenings.  Left side of face weakness three weeks ago.  OS vision is clear.  No change in taste or hearing.  Mild numbness in L V2.      Pain is daily in afternoons.  Sharp electrical jolts.      Taking  mg BID for DMPN.      Reviewed medical records:     Presents with pain in OS.  Started one month previous.  Sudden onset with visual disturbance.  PMH of Bell's Palsy on left.      A1C 14.8 2/13/2020  Cr 1.28  Na 128      Past Medical History:   Diagnosis Date   • A-fib (CMS/HCC)    • ADD (attention deficit disorder)    • Atrial flutter (CMS/HCC)    • Cellulitis    • CHF (congestive heart failure) (CMS/HCC)    • Cirrhosis of liver (CMS/HCC)    • Constipation    • Depression    • Diabetes mellitus (CMS/HCC)    • Diabetic ulcer of right foot (CMS/HCC)    • Disease of thyroid gland    • Edema    • Fatty liver    • Hepatitis B    • Hyperlipidemia    • Hypokalemia    • Insomnia    • Lymphedema    • Narcolepsy    • Neuropathy    • Obesity    • JAIME on CPAP    • PVD (peripheral vascular disease) (CMS/HCC)    • RLS (restless legs syndrome)    • Skin cancer    • Tardive dyskinesia    • Tremor of both hands    • Yeast infection      Family History   Problem Relation Age of Onset   • Heart failure Mother         CHF   • Clotting disorder Father    • No Known Problems Sister    • No Known Problems Brother    • No Known Problems Brother      Social History     Socioeconomic History   • Marital status:      Spouse name: Not on file   • Number of children: Not on file   • Years of education: Not on file   • Highest education level: Not on file   Tobacco Use   • Smoking status: Never Smoker   • Smokeless tobacco: Never  "Used   Substance and Sexual Activity   • Alcohol use: Yes     Comment: \"3 SCOTCH AND 2 BEERS PER WEEK\"   • Drug use: No   • Sexual activity: Defer       Review of Systems   Constitutional: Negative for activity change, fatigue and unexpected weight change.   HENT: Negative for facial swelling, hearing loss, tinnitus, trouble swallowing and voice change.    Eyes: Positive for visual disturbance. Negative for photophobia and pain.   Respiratory: Negative for apnea, cough and choking.    Cardiovascular: Negative for chest pain.   Gastrointestinal: Negative for constipation, nausea and vomiting.   Endocrine: Negative for cold intolerance.   Genitourinary: Negative for difficulty urinating, frequency and urgency.   Musculoskeletal: Negative for arthralgias, back pain, gait problem, myalgias, neck pain and neck stiffness.   Skin: Negative for rash.   Allergic/Immunologic: Negative for immunocompromised state.   Neurological: Positive for numbness and headaches. Negative for dizziness, tremors, seizures, syncope, facial asymmetry, speech difficulty, weakness and light-headedness.   Hematological: Negative for adenopathy.   Psychiatric/Behavioral: Positive for decreased concentration and sleep disturbance. Negative for confusion and hallucinations. The patient is not nervous/anxious.        Objective     Vitals:    08/12/20 1515   BP: 128/68   Pulse: 89   Temp: 98 °F (36.7 °C)   SpO2: 98%   Weight: (!) 137 kg (302 lb)   Height: 193 cm (75.98\")       Neurologic Exam     Mental Status   Oriented to person, place, and time.   Registration: recalls 3 of 3 objects. Recall at 5 minutes: recalls 3 of 3 objects. Follows 3 step commands.   Attention: normal. Concentration: normal.   Speech: speech is normal   Level of consciousness: alert  Knowledge: good and consistent with education.   Able to name object. Able to read. Able to repeat. Able to write. Normal comprehension.     Cranial Nerves     CN II   Visual fields full to " confrontation.   Visual acuity: normal  Right visual field deficit: none  Left visual field deficit: none     CN III, IV, VI   Pupils are equal, round, and reactive to light.  Extraocular motions are normal.   Right pupil: Shape: regular. Reactivity: brisk. Consensual response: intact.   Left pupil: Shape: regular. Reactivity: brisk. Consensual response: intact.   Nystagmus: none   Diplopia: none  Ophthalmoparesis: none  Upgaze: normal  Downgaze: normal  Conjugate gaze: present  Vestibulo-ocular reflex: present    CN V   Right facial sensation deficit: none  Left facial sensation deficit: cheeks  Right corneal reflex: normal  Left corneal reflex: normal    CN VII   Right facial weakness: none  Left facial weakness: peripheral    CN VIII   Hearing: intact    CN IX, X   Palate: symmetric  Right gag reflex: normal  Left gag reflex: normal    CN XI   Right sternocleidomastoid strength: normal  Left sternocleidomastoid strength: normal    CN XII   Tongue: not atrophic  Fasciculations: absent  Tongue deviation: none    Motor Exam   Muscle bulk: normal  Overall muscle tone: normal  Right arm tone: normal  Left arm tone: normal  Right leg tone: normal  Left leg tone: normal    Strength   Strength 5/5 throughout.     Sensory Exam   Right arm light touch: normal  Left arm light touch: normal  Right leg light touch: decreased from knee  Left leg light touch: decreased from knee  Right leg vibration: decreased from knee  Proprioception normal.   Right arm pinprick: normal  Left arm pinprick: normal  Right leg pinprick: decreased from knee  Left leg pinprick: decreased from knee    Gait, Coordination, and Reflexes     Gait  Gait: normal    Coordination   Romberg: negative  Finger to nose coordination: normal  Heel to shin coordination: normal  Tandem walking coordination: normal    Tremor   Resting tremor: absent  Intention tremor: absent  Action tremor: absent    Reflexes   Reflexes 2+ except as noted.       Physical Exam    Constitutional: He is oriented to person, place, and time. Vital signs are normal. He appears well-developed and well-nourished.   HENT:   Head: Normocephalic and atraumatic.   Eyes: Pupils are equal, round, and reactive to light. EOM and lids are normal.   Fundoscopic exam:       The right eye shows no exudate, no hemorrhage and no papilledema. The right eye shows venous pulsations.        The left eye shows no exudate, no hemorrhage and no papilledema. The left eye shows venous pulsations.   Neck: Normal range of motion and phonation normal. Normal carotid pulses present. Carotid bruit is not present. No thyroid mass and no thyromegaly present.   Cardiovascular: Normal rate, regular rhythm and normal heart sounds.   Pulmonary/Chest: Effort normal.   Neurological: He is oriented to person, place, and time. He has normal strength. He has a normal Finger-Nose-Finger Test, a normal Heel to Shin Test, a normal Romberg Test and a normal Tandem Gait Test. Gait normal.   Skin: Skin is warm and dry.   Psychiatric: He has a normal mood and affect. His speech is normal.   Nursing note and vitals reviewed.      Office Visit on 05/15/2020   Component Date Value Ref Range Status   • Glucose 05/15/2020 157* 65 - 99 mg/dL Final   • BUN 05/15/2020 48* 8 - 23 mg/dL Final   • Creatinine 05/15/2020 1.28* 0.76 - 1.27 mg/dL Final   • Sodium 05/15/2020 128* 136 - 145 mmol/L Final   • Potassium 05/15/2020 3.8  3.5 - 5.2 mmol/L Final   • Chloride 05/15/2020 86* 98 - 107 mmol/L Final   • CO2 05/15/2020 25.5  22.0 - 29.0 mmol/L Final   • Calcium 05/15/2020 9.9  8.6 - 10.5 mg/dL Final   • eGFR Non African Amer 05/15/2020 56* >60 mL/min/1.73 Final   • BUN/Creatinine Ratio 05/15/2020 37.5* 7.0 - 25.0 Final   • Anion Gap 05/15/2020 16.5* 5.0 - 15.0 mmol/L Final   • TSH 05/15/2020 2.710  0.270 - 4.200 uIU/mL Final    TSH results may be falsely decreased if patient taking Biotin.         Assessment/Plan     Problem List Items Addressed This  Visit        Nervous and Auditory    Facial paralysis/Reyno palsy - Primary    Current Assessment & Plan     Most likely due to poorly controlled on DM    MRI Brain               Relevant Orders    MRI Brain Without Contrast             No follow-ups on file.

## 2020-08-14 ENCOUNTER — LAB (OUTPATIENT)
Dept: LAB | Facility: HOSPITAL | Age: 65
End: 2020-08-14

## 2020-08-14 ENCOUNTER — OFFICE VISIT (OUTPATIENT)
Dept: INTERNAL MEDICINE | Facility: CLINIC | Age: 65
End: 2020-08-14

## 2020-08-14 VITALS
HEART RATE: 68 BPM | DIASTOLIC BLOOD PRESSURE: 74 MMHG | WEIGHT: 307 LBS | BODY MASS INDEX: 37.38 KG/M2 | TEMPERATURE: 97.1 F | SYSTOLIC BLOOD PRESSURE: 120 MMHG | HEIGHT: 76 IN

## 2020-08-14 DIAGNOSIS — K75.81 NASH (NONALCOHOLIC STEATOHEPATITIS): ICD-10-CM

## 2020-08-14 DIAGNOSIS — E11.65 UNCONTROLLED TYPE 2 DIABETES MELLITUS WITH HYPERGLYCEMIA (HCC): ICD-10-CM

## 2020-08-14 DIAGNOSIS — I10 ESSENTIAL HYPERTENSION: ICD-10-CM

## 2020-08-14 DIAGNOSIS — I50.32 CHRONIC DIASTOLIC CONGESTIVE HEART FAILURE (HCC): ICD-10-CM

## 2020-08-14 DIAGNOSIS — G47.33 OSA ON CPAP: ICD-10-CM

## 2020-08-14 DIAGNOSIS — F32.89 OTHER DEPRESSION: ICD-10-CM

## 2020-08-14 DIAGNOSIS — Z99.89 OSA ON CPAP: ICD-10-CM

## 2020-08-14 DIAGNOSIS — G51.0 FACIAL PARALYSIS/BELLS PALSY: ICD-10-CM

## 2020-08-14 DIAGNOSIS — F98.8 ATTENTION DEFICIT DISORDER (ADD) WITHOUT HYPERACTIVITY: ICD-10-CM

## 2020-08-14 DIAGNOSIS — E78.5 DYSLIPIDEMIA: ICD-10-CM

## 2020-08-14 DIAGNOSIS — R60.0 LOCALIZED EDEMA: ICD-10-CM

## 2020-08-14 DIAGNOSIS — E78.2 MIXED HYPERLIPIDEMIA: Primary | ICD-10-CM

## 2020-08-14 LAB
ANION GAP SERPL CALCULATED.3IONS-SCNC: 11.1 MMOL/L (ref 5–15)
BUN SERPL-MCNC: 30 MG/DL (ref 8–23)
BUN/CREAT SERPL: 26.1 (ref 7–25)
CALCIUM SPEC-SCNC: 9.7 MG/DL (ref 8.6–10.5)
CHLORIDE SERPL-SCNC: 89 MMOL/L (ref 98–107)
CO2 SERPL-SCNC: 31.9 MMOL/L (ref 22–29)
CREAT SERPL-MCNC: 1.15 MG/DL (ref 0.76–1.27)
GFR SERPL CREATININE-BSD FRML MDRD: 64 ML/MIN/1.73
GLUCOSE SERPL-MCNC: 330 MG/DL (ref 65–99)
MAGNESIUM SERPL-MCNC: 2.1 MG/DL (ref 1.6–2.4)
NT-PROBNP SERPL-MCNC: 233.4 PG/ML (ref 0–900)
POTASSIUM SERPL-SCNC: 3.5 MMOL/L (ref 3.5–5.2)
SODIUM SERPL-SCNC: 132 MMOL/L (ref 136–145)

## 2020-08-14 PROCEDURE — 83880 ASSAY OF NATRIURETIC PEPTIDE: CPT | Performed by: INTERNAL MEDICINE

## 2020-08-14 PROCEDURE — 83735 ASSAY OF MAGNESIUM: CPT | Performed by: INTERNAL MEDICINE

## 2020-08-14 PROCEDURE — 80048 BASIC METABOLIC PNL TOTAL CA: CPT | Performed by: INTERNAL MEDICINE

## 2020-08-14 PROCEDURE — 36415 COLL VENOUS BLD VENIPUNCTURE: CPT

## 2020-08-14 PROCEDURE — 99214 OFFICE O/P EST MOD 30 MIN: CPT | Performed by: INTERNAL MEDICINE

## 2020-08-14 NOTE — PROGRESS NOTES
"Patient is a 65 y.o. male who is here for a follow up of hypertension and diabetes.  Chief Complaint   Patient presents with   • Hypertension   • Diabetes         HPI:    Here for mgmt of DM and CHF and afib.  Onset years.  Has been taking Bumex with improvement in edema and breathing.  FSBS are \"ok\".  For the past 6 weeks dealing with L [place] palsy.  Seeing neurologist.  Will be getting insulin pump.  Energy level is improved.  BP has been good.  No dizziness or lightheadedness.      History:     Patient Active Problem List   Diagnosis   • Type 2 diabetes mellitus with hyperglycemia (CMS/HCC)   • Elevated LFTs   • Thrombocytopenia (CMS/HCC)   • PVD (peripheral vascular disease) (CMS/HCC)   • A-fib (CMS/HCC)   • Cirrhosis of liver (CMS/HCC)   • Chronic congestive heart failure (CMS/HCC)   • Diabetic ulcer of right heel (CMS/HCC)   • Primary narcolepsy without cataplexy   • Essential hypertension   • Morbidly obese (CMS/HCC)   • Restless legs syndrome (RLS)   • ADD (attention deficit disorder)   • Depression   • Diabetes mellitus (CMS/HCC)   • Edema   • MCCAULEY (nonalcoholic steatohepatitis)   • Hepatitis B   • Insomnia   • Lymphedema   • Obesity   • Severe JAIME on CPAP   • RLS (restless legs syndrome)   • Tremor of both hands   • Restrictive cardiomyopathy (CMS/HCC)   • Mixed hyperlipidemia   • Uncontrolled type 2 diabetes mellitus with hyperglycemia (CMS/HCC)   • OMAR (acute kidney injury) (CMS/HCC)   • Dyslipidemia   • Hyperglycemia   • Diabetic ulcer of right heel associated with type 2 diabetes mellitus (CMS/HCC)   • Facial paralysis/[place] palsy       Past Medical History:   Diagnosis Date   • A-fib (CMS/HCC)    • ADD (attention deficit disorder)    • Atrial flutter (CMS/HCC)    • Cellulitis    • CHF (congestive heart failure) (CMS/HCC)    • Cirrhosis of liver (CMS/HCC)    • Constipation    • Depression    • Diabetes mellitus (CMS/HCC)    • Diabetic ulcer of right foot (CMS/HCC)    • Disease of thyroid gland    • " Edema    • Fatty liver    • Hepatitis B    • Hyperlipidemia    • Hypokalemia    • Insomnia    • Lymphedema    • Narcolepsy    • Neuropathy    • Obesity    • JAIME on CPAP    • PVD (peripheral vascular disease) (CMS/HCC)    • RLS (restless legs syndrome)    • Skin cancer    • Tardive dyskinesia    • Tremor of both hands    • Yeast infection        Past Surgical History:   Procedure Laterality Date   • ACHILLES TENDON REPAIR     • CARDIAC ABLATION     • CHOLECYSTECTOMY     • LASER ABLATION      VEIN RLE       Current Outpatient Medications on File Prior to Visit   Medication Sig   • amphetamine-dextroamphetamine (ADDERALL) 30 MG tablet Take 1 tablet by mouth 2 (Two) Times a Day. (Patient taking differently: Take 60 mg by mouth 2 (Two) Times a Day.)   • aspirin 81 MG EC tablet TAKE 1 TABLET BY MOUTH EVERY DAY   • bumetanide (BUMEX) 2 MG tablet Take 2 tablets by mouth 2 (Two) Times a Day.   • diclofenac (VOLTAREN) 1 % gel gel Apply 4 g topically to the appropriate area as directed 2 (Two) Times a Day As Needed (joint pain).   • Docusate Calcium (STOOL SOFTENER PO) Take 1 tablet by mouth As Needed.   • gabapentin (NEURONTIN) 300 MG capsule TAKE THREE CAPSULES BY MOUTH TWICE A DAY   • levocetirizine (XYZAL) 5 MG tablet Take 1 tablet by mouth Every Evening.   • levothyroxine (SYNTHROID, LEVOTHROID) 75 MCG tablet TAKE ONE TABLET BY MOUTH DAILY   • metOLazone (ZAROXOLYN) 5 MG tablet Take 1 tablet by mouth Daily As Needed (edema/fluid overload).   • polyethylene glycol (MIRALAX) powder Take 17 g by mouth As Needed.   • potassium chloride (K-DUR) 10 MEQ CR tablet Take 3 tablets by mouth 2 (Two) Times a Day.   • pramipexole (MIRAPEX) 1 MG tablet Take 1 tablet by mouth 2 (Two) Times a Day.   • pravastatin (PRAVACHOL) 40 MG tablet Take 1 tablet by mouth Every Night.   • spironolactone (ALDACTONE) 100 MG tablet Take 0.5 tablets by mouth Daily.   • traZODone (DESYREL) 50 MG tablet Take 1 tablet by mouth At Night As Needed for Sleep.  "  • venlafaxine XR (EFFEXOR-XR) 150 MG 24 hr capsule Take 1 capsule by mouth Every Night.   • ACCU-CHEK FASTCLIX LANCETS misc 1 each by Other route 4 (Four) Times a Day After Meals & at Bedtime.   • Alcohol Swabs (ALCOHOL PADS) 70 % pads 1 swab 3 (Three) Times a Day.   • Blood Glucose Monitoring Suppl (ACCU-CHEK GUIDE) w/Device kit 1 each 4 (Four) Times a Day After Meals & at Bedtime.   • glucose blood (ACCU-CHEK GUIDE) test strip 1 each by Other route 4 (Four) Times a Day After Meals & at Bedtime. Use as instructed   • Insulin Syringe 29G X 1/2\" 1 ML misc Use one each to inject insulin four times daily Ell.9     No current facility-administered medications on file prior to visit.        Family History   Problem Relation Age of Onset   • Heart failure Mother         CHF   • Clotting disorder Father    • No Known Problems Sister    • No Known Problems Brother    • No Known Problems Brother        Social History     Socioeconomic History   • Marital status:      Spouse name: Not on file   • Number of children: Not on file   • Years of education: Not on file   • Highest education level: Not on file   Tobacco Use   • Smoking status: Never Smoker   • Smokeless tobacco: Never Used   Substance and Sexual Activity   • Alcohol use: Yes     Comment: \"3 SCOTCH AND 2 BEERS PER WEEK\"   • Drug use: No   • Sexual activity: Defer         Review of Systems   Constitutional: Negative for chills and fever.   HENT: Negative for congestion, ear pain, hearing loss, rhinorrhea, sinus pressure, sore throat and trouble swallowing.    Eyes: Negative for discharge and itching.   Respiratory: Positive for shortness of breath (better). Negative for cough and chest tightness.    Cardiovascular: Positive for leg swelling (better). Negative for chest pain and palpitations.   Gastrointestinal: Negative for abdominal pain, blood in stool, constipation, diarrhea and vomiting.        Colonoscopy 3/16   Endocrine: Positive for cold intolerance. " "Negative for polydipsia and polyuria.   Genitourinary: Negative for difficulty urinating, dysuria, enuresis, frequency, hematuria and urgency.   Musculoskeletal: Positive for arthralgias, back pain and gait problem. Negative for joint swelling.   Skin: Negative for rash.   Allergic/Immunologic: Negative for immunocompromised state.   Neurological: Positive for weakness and light-headedness. Negative for dizziness, syncope, numbness and headaches.   Hematological: Does not bruise/bleed easily.   Psychiatric/Behavioral: Negative for behavioral problems, dysphoric mood and sleep disturbance. The patient is not nervous/anxious.        /74   Pulse 68   Temp 97.1 °F (36.2 °C) (Infrared)   Ht 193 cm (75.98\")   Wt (!) 139 kg (307 lb)   BMI 37.38 kg/m²       Physical Exam   Constitutional: He is oriented to person, place, and time. He appears well-developed and well-nourished.   HENT:   Head: Normocephalic and atraumatic.   Right Ear: External ear normal.   Left Ear: External ear normal.   Mouth/Throat: Oropharynx is clear and moist.   Eyes: Conjunctivae and EOM are normal.   Neck: Normal range of motion. Neck supple.   Cardiovascular: Normal rate and regular rhythm.   Slight diminished heart sounds   Pulmonary/Chest: Effort normal and breath sounds normal.   Abdominal: Soft. Bowel sounds are normal.   Musculoskeletal: He exhibits edema (improved).   Lymphadenopathy:     He has no cervical adenopathy.   Neurological: He is alert and oriented to person, place, and time. A cranial nerve deficit (left [place] palsy) is present.   Skin: Skin is warm and dry.   Followed by wound care   Psychiatric: He has a normal mood and affect. His behavior is normal. Thought content normal.       Procedure:      Discussion/Summary:    DM-f/u ENDO, counseled on low carb and goals of FSBS, hopefull will get pump soon  Chronic LE edema-advised to weigh daily, controlled on tripe diuretic  CHF-\" \"  Hep B hx/FLD-f/u Dr Murcia, advised wt " loss  Neuropathy-on Gabapentin, attempt to decrease so will assist with the edema  Narcolepsy-on Adderall, stable  JAIME-cont CPAP, stable  Hx of Afib-no recurrence s/p Ablation  Hypothyroid-TSH at goal  Depression-stable on Effexor  RLS-controlled on Mirapex  Elevated Cr-stable advised to ensure adequate hydration, recheck today  Hyperlipidemia-cont statin, labs 2/13 dw patient, recheck on rtc     8/14 Labs noted and dw patient    Current Outpatient Medications:   •  amphetamine-dextroamphetamine (ADDERALL) 30 MG tablet, Take 1 tablet by mouth 2 (Two) Times a Day. (Patient taking differently: Take 60 mg by mouth 2 (Two) Times a Day.), Disp: 120 tablet, Rfl: 0  •  aspirin 81 MG EC tablet, TAKE 1 TABLET BY MOUTH EVERY DAY, Disp: 90 tablet, Rfl: 1  •  bumetanide (BUMEX) 2 MG tablet, Take 2 tablets by mouth 2 (Two) Times a Day., Disp: 120 tablet, Rfl: 5  •  diclofenac (VOLTAREN) 1 % gel gel, Apply 4 g topically to the appropriate area as directed 2 (Two) Times a Day As Needed (joint pain)., Disp: 100 g, Rfl: 1  •  Docusate Calcium (STOOL SOFTENER PO), Take 1 tablet by mouth As Needed., Disp: , Rfl:   •  gabapentin (NEURONTIN) 300 MG capsule, TAKE THREE CAPSULES BY MOUTH TWICE A DAY, Disp: 540 capsule, Rfl: 0  •  levocetirizine (XYZAL) 5 MG tablet, Take 1 tablet by mouth Every Evening., Disp: 90 tablet, Rfl: 1  •  levothyroxine (SYNTHROID, LEVOTHROID) 75 MCG tablet, TAKE ONE TABLET BY MOUTH DAILY, Disp: 82 tablet, Rfl: 0  •  metOLazone (ZAROXOLYN) 5 MG tablet, Take 1 tablet by mouth Daily As Needed (edema/fluid overload)., Disp: 90 tablet, Rfl: 1  •  polyethylene glycol (MIRALAX) powder, Take 17 g by mouth As Needed., Disp: , Rfl:   •  potassium chloride (K-DUR) 10 MEQ CR tablet, Take 3 tablets by mouth 2 (Two) Times a Day., Disp: 540 tablet, Rfl: 3  •  pramipexole (MIRAPEX) 1 MG tablet, Take 1 tablet by mouth 2 (Two) Times a Day., Disp: 180 tablet, Rfl: 1  •  pravastatin (PRAVACHOL) 40 MG tablet, Take 1 tablet by mouth  "Every Night., Disp: 90 tablet, Rfl: 3  •  spironolactone (ALDACTONE) 100 MG tablet, Take 0.5 tablets by mouth Daily., Disp: 30 tablet, Rfl: 6  •  traZODone (DESYREL) 50 MG tablet, Take 1 tablet by mouth At Night As Needed for Sleep., Disp: 90 tablet, Rfl: 2  •  venlafaxine XR (EFFEXOR-XR) 150 MG 24 hr capsule, Take 1 capsule by mouth Every Night., Disp: 90 capsule, Rfl: 2  •  ACCU-CHEK FASTCLIX LANCETS misc, 1 each by Other route 4 (Four) Times a Day After Meals & at Bedtime., Disp: 100 each, Rfl: 0  •  Alcohol Swabs (ALCOHOL PADS) 70 % pads, 1 swab 3 (Three) Times a Day., Disp: 100 each, Rfl: 0  •  Blood Glucose Monitoring Suppl (ACCU-CHEK GUIDE) w/Device kit, 1 each 4 (Four) Times a Day After Meals & at Bedtime., Disp: 1 kit, Rfl: 0  •  glucose blood (ACCU-CHEK GUIDE) test strip, 1 each by Other route 4 (Four) Times a Day After Meals & at Bedtime. Use as instructed, Disp: 1 each, Rfl: 0  •  Insulin Syringe 29G X 1/2\" 1 ML misc, Use one each to inject insulin four times daily Ell.9, Disp: 500 each, Rfl: 1        Abe was seen today for hypertension and diabetes.    Diagnoses and all orders for this visit:    Mixed hyperlipidemia    Essential hypertension    Chronic diastolic congestive heart failure (CMS/HCC)    Severe JAIME on CPAP    MCCAULEY (nonalcoholic steatohepatitis)    Uncontrolled type 2 diabetes mellitus with hyperglycemia (CMS/HCC)    Facial paralysis/[place] palsy    Localized edema    Dyslipidemia    Other depression    Attention deficit disorder (ADD) without hyperactivity        Answers for HPI/ROS submitted by the patient on 8/12/2020   What is the primary reason for your visit?: Physical    "

## 2020-08-17 RX ORDER — POTASSIUM CHLORIDE 750 MG/1
30 TABLET, FILM COATED, EXTENDED RELEASE ORAL 2 TIMES DAILY
Qty: 540 TABLET | Refills: 3 | Status: SHIPPED | OUTPATIENT
Start: 2020-08-17 | End: 2021-01-01 | Stop reason: SDUPTHER

## 2020-08-17 RX ORDER — NYSTATIN 100000 U/G
OINTMENT TOPICAL
Refills: 2 | OUTPATIENT
Start: 2020-08-17

## 2020-08-18 DIAGNOSIS — G47.419 PRIMARY NARCOLEPSY WITHOUT CATAPLEXY: ICD-10-CM

## 2020-08-18 DIAGNOSIS — G47.33 OSA ON CPAP: ICD-10-CM

## 2020-08-18 DIAGNOSIS — Z99.89 OSA ON CPAP: ICD-10-CM

## 2020-08-20 ENCOUNTER — TELEPHONE (OUTPATIENT)
Dept: NEUROLOGY | Facility: CLINIC | Age: 65
End: 2020-08-20

## 2020-08-20 DIAGNOSIS — G51.0 FACIAL PARALYSIS/BELLS PALSY: Primary | ICD-10-CM

## 2020-08-20 RX ORDER — ALPRAZOLAM 1 MG/1
TABLET ORAL
Qty: 2 TABLET | Refills: 0 | Status: SHIPPED | OUTPATIENT
Start: 2020-08-20 | End: 2020-09-01

## 2020-08-20 NOTE — TELEPHONE ENCOUNTER
PATIENT CALLED TO REQUEST A RX TO HELP CALM HIM FOR HIS MRI ON Sunday     PATIENT STILL USES [address]

## 2020-08-21 RX ORDER — DEXTROAMPHETAMINE SACCHARATE, AMPHETAMINE ASPARTATE, DEXTROAMPHETAMINE SULFATE AND AMPHETAMINE SULFATE 7.5; 7.5; 7.5; 7.5 MG/1; MG/1; MG/1; MG/1
30 TABLET ORAL 2 TIMES DAILY
Qty: 60 TABLET | Refills: 0 | Status: SHIPPED | OUTPATIENT
Start: 2020-08-21 | End: 2020-09-22 | Stop reason: SDUPTHER

## 2020-08-22 ENCOUNTER — APPOINTMENT (OUTPATIENT)
Dept: MRI IMAGING | Facility: HOSPITAL | Age: 65
End: 2020-08-22

## 2020-08-23 ENCOUNTER — HOSPITAL ENCOUNTER (OUTPATIENT)
Dept: MRI IMAGING | Facility: HOSPITAL | Age: 65
Discharge: HOME | End: 2020-08-23
Admitting: PSYCHIATRY & NEUROLOGY

## 2020-08-23 DIAGNOSIS — G51.0 FACIAL PARALYSIS/BELLS PALSY: ICD-10-CM

## 2020-08-23 PROCEDURE — 70551 MRI BRAIN STEM W/O DYE: CPT

## 2020-08-31 RX ORDER — GABAPENTIN 300 MG/1
CAPSULE ORAL
Qty: 180 CAPSULE | Refills: 0 | Status: SHIPPED | OUTPATIENT
Start: 2020-08-31 | End: 2020-10-02 | Stop reason: SDUPTHER

## 2020-09-01 ENCOUNTER — OFFICE VISIT (OUTPATIENT)
Dept: NEUROLOGY | Facility: CLINIC | Age: 65
End: 2020-09-01

## 2020-09-01 VITALS
HEART RATE: 85 BPM | DIASTOLIC BLOOD PRESSURE: 68 MMHG | HEIGHT: 76 IN | WEIGHT: 306.44 LBS | SYSTOLIC BLOOD PRESSURE: 116 MMHG | TEMPERATURE: 97.2 F | OXYGEN SATURATION: 98 % | BODY MASS INDEX: 37.32 KG/M2

## 2020-09-01 DIAGNOSIS — G51.0 FACIAL PARALYSIS/BELLS PALSY: Primary | ICD-10-CM

## 2020-09-01 PROCEDURE — 99213 OFFICE O/P EST LOW 20 MIN: CPT | Performed by: PSYCHIATRY & NEUROLOGY

## 2020-09-01 RX ORDER — OXCARBAZEPINE 300 MG/1
TABLET, FILM COATED ORAL
Qty: 60 TABLET | Refills: 6 | Status: SHIPPED | OUTPATIENT
Start: 2020-09-01 | End: 2020-01-01

## 2020-09-01 RX ORDER — NYSTATIN 100000 U/G
1 OINTMENT TOPICAL AS NEEDED
COMMUNITY
Start: 2020-08-17

## 2020-09-01 NOTE — ASSESSMENT & PLAN NOTE
Slight improvement in left sided facial weakness        Add  mg BID for one week, then increase 300 mg BID for sharp shooting pains.

## 2020-09-01 NOTE — PROGRESS NOTES
Subjective   Patient ID: Abe Phillips Jr. is a 65 y.o. male     Chief Complaint   Patient presents with   • Facial Paralysis/[place] Palsy     Post MRI         History of Present Illness    65 y.o. male returns in follow up for [place] Palsy.   Last visit on 8/12/20 ordered MRI Brain.      MRI Brain, my review of films, normal brain, vessel loop close to L [place].       Two months ago developed tingling left cheek.  Then sharp shooting pains in face worse in the evenings.  Left side of face weakness three weeks ago.  OS vision is clear.  No change in taste or hearing.  Mild numbness in L V2.      Pain is daily in afternoons.  Sharp electrical jolts.  Multiple times a day.  Facial movement with slight improvement.      Taking  mg BID for DMPN.      Reviewed medical records:     Presents with pain in OS.  Started one month previous.  Sudden onset with visual disturbance.  PMH of [place]'s Palsy on left.      A1C 14.8 2/13/2020  Cr 1.28  Na 128      Past Medical History:   Diagnosis Date   • A-fib (CMS/HCC)    • ADD (attention deficit disorder)    • Atrial flutter (CMS/HCC)    • Cellulitis    • CHF (congestive heart failure) (CMS/HCC)    • Cirrhosis of liver (CMS/HCC)    • Constipation    • Depression    • Diabetes mellitus (CMS/HCC)    • Diabetic ulcer of right foot (CMS/HCC)    • Disease of thyroid gland    • Edema    • Fatty liver    • Hepatitis B    • Hyperlipidemia    • Hypokalemia    • Insomnia    • Lymphedema    • Narcolepsy    • Neuropathy    • Obesity    • JAIME on CPAP    • PVD (peripheral vascular disease) (CMS/HCC)    • RLS (restless legs syndrome)    • Skin cancer    • Tardive dyskinesia    • Tremor of both hands    • Yeast infection      Family History   Problem Relation Age of Onset   • Heart failure Mother         CHF   • Clotting disorder Father    • No Known Problems Sister    • No Known Problems Brother    • No Known Problems Brother      Social History     Socioeconomic History   • Marital  "status:      Spouse name: Not on file   • Number of children: Not on file   • Years of education: Not on file   • Highest education level: Not on file   Tobacco Use   • Smoking status: Never Smoker   • Smokeless tobacco: Never Used   Substance and Sexual Activity   • Alcohol use: Yes     Comment: \"3 SCOTCH AND 2 BEERS PER WEEK\"   • Drug use: No   • Sexual activity: Defer       Review of Systems   Constitutional: Negative for activity change, fatigue and unexpected weight change.   HENT: Negative for facial swelling, hearing loss, tinnitus, trouble swallowing and voice change.    Eyes: Positive for visual disturbance. Negative for photophobia and pain.   Respiratory: Negative for apnea, cough and choking.    Cardiovascular: Negative for chest pain.   Gastrointestinal: Negative for constipation, nausea and vomiting.   Endocrine: Negative for cold intolerance.   Genitourinary: Negative for difficulty urinating, frequency and urgency.   Musculoskeletal: Negative for arthralgias, back pain, gait problem, myalgias, neck pain and neck stiffness.   Skin: Negative for rash.   Allergic/Immunologic: Negative for immunocompromised state.   Neurological: Positive for numbness and headaches. Negative for dizziness, tremors, seizures, syncope, facial asymmetry, speech difficulty, weakness and light-headedness.   Hematological: Negative for adenopathy.   Psychiatric/Behavioral: Positive for decreased concentration and sleep disturbance. Negative for confusion and hallucinations. The patient is not nervous/anxious.        Objective     Vitals:    09/01/20 1506   BP: 116/68   Pulse: 85   Temp: 97.2 °F (36.2 °C)   SpO2: 98%   Weight: (!) 139 kg (306 lb 7 oz)   Height: 193 cm (75.98\")       Neurologic Exam     Mental Status   Registration: recalls 3 of 3 objects. Recall at 5 minutes: recalls 3 of 3 objects. Follows 3 step commands.   Attention: normal. Concentration: normal.   Level of consciousness: alert  Knowledge: good and " consistent with education.   Able to name object. Able to read. Able to repeat. Able to write. Normal comprehension.     Cranial Nerves     CN II   Visual fields full to confrontation.   Visual acuity: normal  Right visual field deficit: none  Left visual field deficit: none     CN III, IV, VI   Right pupil: Shape: regular. Reactivity: brisk. Consensual response: intact.   Left pupil: Shape: regular. Reactivity: brisk. Consensual response: intact.   Nystagmus: none   Diplopia: none  Ophthalmoparesis: none  Upgaze: normal  Downgaze: normal  Conjugate gaze: present  Vestibulo-ocular reflex: present    CN V   Right facial sensation deficit: none  Left facial sensation deficit: cheeks  Right corneal reflex: normal  Left corneal reflex: normal    CN VII   Right facial weakness: none  Left facial weakness: peripheral    CN VIII   Hearing: intact    CN IX, X   Palate: symmetric  Right gag reflex: normal  Left gag reflex: normal    CN XI   Right sternocleidomastoid strength: normal  Left sternocleidomastoid strength: normal    CN XII   Tongue: not atrophic  Fasciculations: absent  Tongue deviation: none    Motor Exam   Muscle bulk: normal  Overall muscle tone: normal  Right arm tone: normal  Left arm tone: normal  Right leg tone: normal  Left leg tone: normal    Sensory Exam   Right arm light touch: normal  Left arm light touch: normal  Right leg light touch: decreased from knee  Left leg light touch: decreased from knee  Right leg vibration: decreased from knee  Proprioception normal.   Right arm pinprick: normal  Left arm pinprick: normal  Right leg pinprick: decreased from knee  Left leg pinprick: decreased from knee    Gait, Coordination, and Reflexes     Tremor   Resting tremor: absent  Intention tremor: absent  Action tremor: absent    Reflexes   Reflexes 2+ except as noted.       Physical Exam    Lab on 08/14/2020   Component Date Value Ref Range Status   • Glucose 08/14/2020 330* 65 - 99 mg/dL Final   • BUN  08/14/2020 30* 8 - 23 mg/dL Final   • Creatinine 08/14/2020 1.15  0.76 - 1.27 mg/dL Final   • Sodium 08/14/2020 132* 136 - 145 mmol/L Final   • Potassium 08/14/2020 3.5  3.5 - 5.2 mmol/L Final   • Chloride 08/14/2020 89* 98 - 107 mmol/L Final   • CO2 08/14/2020 31.9* 22.0 - 29.0 mmol/L Final   • Calcium 08/14/2020 9.7  8.6 - 10.5 mg/dL Final   • eGFR Non African Amer 08/14/2020 64  >60 mL/min/1.73 Final   • BUN/Creatinine Ratio 08/14/2020 26.1* 7.0 - 25.0 Final   • Anion Gap 08/14/2020 11.1  5.0 - 15.0 mmol/L Final   • proBNP 08/14/2020 233.4  0.0 - 900.0 pg/mL Final   • Magnesium 08/14/2020 2.1  1.6 - 2.4 mg/dL Final         Assessment/Plan     Problem List Items Addressed This Visit        Nervous and Auditory    Facial paralysis/[place] palsy - Primary    Current Assessment & Plan     Slight improvement in left sided facial weakness        Add  mg BID for one week, then increase 300 mg BID for sharp shooting pains.                   No follow-ups on file.

## 2020-09-16 ENCOUNTER — TELEPHONE (OUTPATIENT)
Dept: CARDIOLOGY | Facility: CLINIC | Age: 65
End: 2020-09-16

## 2020-09-16 DIAGNOSIS — R60.0 LOCALIZED EDEMA: Primary | ICD-10-CM

## 2020-09-16 NOTE — TELEPHONE ENCOUNTER
Pt called stating he is still in need of a fitting for compression stockings. Unfortunately, [facility] no longer provides this service. Would you like me to place a referral to  PT lymphedema clinic for pt to be fitted?

## 2020-09-22 ENCOUNTER — HOSPITAL ENCOUNTER (OUTPATIENT)
Dept: PHYSICAL THERAPY | Facility: HOSPITAL | Age: 65
Setting detail: THERAPIES SERIES
Discharge: HOME | End: 2020-09-22

## 2020-09-22 DIAGNOSIS — Z99.89 OSA ON CPAP: ICD-10-CM

## 2020-09-22 DIAGNOSIS — G47.419 PRIMARY NARCOLEPSY WITHOUT CATAPLEXY: ICD-10-CM

## 2020-09-22 DIAGNOSIS — I89.0 LYMPHEDEMA: Primary | ICD-10-CM

## 2020-09-22 DIAGNOSIS — G47.33 OSA ON CPAP: ICD-10-CM

## 2020-09-22 PROCEDURE — 97161 PT EVAL LOW COMPLEX 20 MIN: CPT

## 2020-09-22 RX ORDER — DEXTROAMPHETAMINE SACCHARATE, AMPHETAMINE ASPARTATE, DEXTROAMPHETAMINE SULFATE AND AMPHETAMINE SULFATE 7.5; 7.5; 7.5; 7.5 MG/1; MG/1; MG/1; MG/1
30 TABLET ORAL 2 TIMES DAILY
Qty: 60 TABLET | Refills: 0 | Status: SHIPPED | OUTPATIENT
Start: 2020-09-22 | End: 2020-10-08

## 2020-09-22 NOTE — THERAPY EVALUATION
Physical Therapy Lymphedema Initial Evaluation  [facility]     Patient Name: Abe Phillips Jr.  : 1955  MRN: [id number]  Today's Date: 2020      Visit Date: 2020    Visit Dx:    ICD-10-CM ICD-9-CM   1. Lymphedema  I89.0 457.1       Patient Active Problem List   Diagnosis   • Type 2 diabetes mellitus with hyperglycemia (CMS/HCC)   • Elevated LFTs   • Thrombocytopenia (CMS/HCC)   • PVD (peripheral vascular disease) (CMS/HCC)   • A-fib (CMS/HCC)   • Cirrhosis of liver (CMS/HCC)   • Chronic congestive heart failure (CMS/HCC)   • Diabetic ulcer of right heel (CMS/HCC)   • Primary narcolepsy without cataplexy   • Essential hypertension   • Morbidly obese (CMS/HCC)   • Restless legs syndrome (RLS)   • ADD (attention deficit disorder)   • Depression   • Diabetes mellitus (CMS/HCC)   • Edema   • MCCAULEY (nonalcoholic steatohepatitis)   • Hepatitis B   • Insomnia   • Lymphedema   • Obesity   • Severe JAIME on CPAP   • RLS (restless legs syndrome)   • Tremor of both hands   • Restrictive cardiomyopathy (CMS/HCC)   • Mixed hyperlipidemia   • Uncontrolled type 2 diabetes mellitus with hyperglycemia (CMS/HCC)   • OMAR (acute kidney injury) (CMS/HCC)   • Dyslipidemia   • Hyperglycemia   • Diabetic ulcer of right heel associated with type 2 diabetes mellitus (CMS/HCC)   • Facial paralysis/[place] palsy        Past Medical History:   Diagnosis Date   • A-fib (CMS/HCC)    • ADD (attention deficit disorder)    • Atrial flutter (CMS/HCC)    • Cellulitis    • CHF (congestive heart failure) (CMS/HCC)    • Cirrhosis of liver (CMS/HCC)    • Constipation    • Depression    • Diabetes mellitus (CMS/HCC)    • Diabetic ulcer of right foot (CMS/HCC)    • Disease of thyroid gland    • Edema    • Fatty liver    • Hepatitis B    • Hyperlipidemia    • Hypokalemia    • Insomnia    • Lymphedema    • Narcolepsy    • Neuropathy    • Obesity    • JAIME on CPAP    • PVD (peripheral vascular disease) (CMS/HCC)    • RLS (restless legs  syndrome)    • Skin cancer    • Tardive dyskinesia    • Tremor of both hands    • Yeast infection         Past Surgical History:   Procedure Laterality Date   • ACHILLES TENDON REPAIR     • CARDIAC ABLATION     • CHOLECYSTECTOMY     • LASER ABLATION      VEIN RLE       Visit Dx:    ICD-10-CM ICD-9-CM   1. Lymphedema  I89.0 457.1       Patient History     Row Name 09/22/20 1400             History    Chief Complaint  Swelling  -LF      Date Current Problem(s) Began  -- 10 yrs.  -LF      Brief Description of Current Complaint  Patient presents with bilateral LE lymphedema.  He is needing new compression stockings.  He was just discharged from [place]. wound care for treatment to diabetic ulcer on his right foot.  Reports onset of leg swelling about 10 years ago.  Reports increased swelling R>L recently.  Has used tubigrip, as well as knee high compression stockings before.  Had CDT treatment when he still lived in [place].  Had gotten a compression pump, but no longer has it and didn't use much when he did have it.   Takes diuretics which help some.  He has had vein ablation R leg.  H/o CHF, PVD, diabetes, neuropathy.  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  Listening;Reading  -LF      Teaching needs identified  Management of Condition  -LF      Barriers to learning  None  -LF      Pt Participated in POC and Goals  Yes  -LF        User Key  (r) = Recorded By, (t) = Taken By, (c) = Cosigned By     Initials Name Provider Type    LF ValerioHaileeAlessandra, PT Physical Therapist          Lymphedema     Row Name 09/22/20 1400             Lymphedema Assessment    Lymphedema Classification  RLE:;LLE:;secondary;stage 2 (Spontaneously Irreversible)  -LF         General ROM    GENERAL ROM COMMENTS  WFL's  -LF         MMT (Manual Muscle Testing)    General MMT Comments  WFL's  -LF         Lymphedema Edema Assessment    Edema Assessment Comment  Pitting edema at feet R>L, more firm lower legs  -LF         Skin Changes/Observations    Location/Assessment  Lower Extremity  -LF      Lower Extremity Conditions  bilateral:;intact;clean  -LF      Lower Extremity Color/Pigment  bilateral:;hyperpigmented  -LF         Lymphedema Sensation    Lymphedema Sensation Reports  RLE:;LLE:;numbness related to neuropathy  -LF         Lymphedema Pulses/Capillary Refill    Lymphedema Pulses/Capillary Refill  lower extremity pulses;capillary refill  -LF      Dorsalis Pedis Pulse  left:;+2 normal;right:;+1 diminished R - difficult to palpate with tissue swelling  -LF      Capillary Refill  lower extremity capillary refill  -LF      Lower Extremity Capillary Refill  right:;left:;less than 3 seconds  -LF         Lymphedema Measurements    Measurement Type(s)  Quick Girth  -LF      Quick Girth Areas  Lower extremities  -LF         LLE Quick Girth (cm)    Met-heads  23.4 cm  -LF      Mid foot  23.8 cm  -LF      Smallest ankle  29.8 cm  -LF      Largest calf  46.7 cm  -LF      Tib tuberosity  44.1 cm  -LF      Mid patella  47.8 cm  -LF      Distal thigh  52.4 cm  -LF      Proximal thigh  58.5 cm  -LF      Other 1  66.5 cm  -LF      Other 2  35.7 cm 10cm proximal to ankle  -LF         RLE Quick Girth (cm)    Met-heads  23.9 cm  -LF      Mid foot  24.8 cm  -LF      Smallest ankle  31.9 cm  -LF      Largest calf  47.5 cm  -LF      Tib tuberosity  43.8 cm  -LF      Mid patella  49.5 cm  -LF      Distal thigh  54 cm  -LF      Proximal thigh  59.3 cm  -LF       Other 1  66 cm  -LF      Other 2  39.5 cm 10cm prox. to ankle  -LF      RLE Quick Girth Total  440.2  -LF         Compression/Skin Care    Compression/Skin Care  bandaging;compression garment  -LF      Bandage Layers  cotton elastic stocking- single layer (comment size);cotton elastic stocking- double layer (comment size)  -LF      Bandaging Comments  RLE: compressogrip size 4 foot to knee and doubled to proximal ankle  -LF      Compression Garment Comments  LLE:  fit with Juzo 20-30mmHg OT knee high.  Will also be appropriate size for RLE, but recommended he use compressogrip briefly to help reduce further  -LF        User Key  (r) = Recorded By, (t) = Taken By, (c) = Cosigned By    Initials Name Provider Type    Hailee Marshall, PT Physical Therapist            Therapy Education  Education Details: Educated on compression garment options knee high ready-made vs. velcro system.  currently prefers stocking.  Educated on benefits of compression pump, but not currently interested in pursuing at this time.  Informed patient that Medicare does not cover compression garments.   Given: Edema management  Program: New, Reinforced(knowledgable from previous treatment)  How Provided: Verbal  Provided to: Patient  Level of Understanding: Verbalized          PT OP Goals     Row Name 09/22/20 1400          PT Short Term Goals    STG Date to Achieve  10/13/20  -LF     STG 1  Patient to be independent with simple compression wrapping to promote edema reduction.  -LF     STG 1 Progress  New  -LF        Long Term Goals    LTG 1  Pt to be measured and fit with compression garment/system for long term self management of lymphedema  -LF     LTG 1 Progress  New;Partially Met  -LF     LTG 2  Patient independent with self-management of BLE lymphedema to include skin care, self-MLD, compression, and exercise  -LF     LTG 2 Progress  New  -LF     LTG 3  Patient demonstrate decreased net edema of right lower extremity > 5 cm for  decrease in edema, symptoms, decreased risk of infection and improved skin care/transition to self-care of condition.  -LF     LTG 3 Progress  New  -LF        Time Calculation    PT Goal Re-Cert Due Date  12/21/20  -LF       User Key  (r) = Recorded By, (t) = Taken By, (c) = Cosigned By    Initials Name Provider Type    LF Hailee Luo PT Physical Therapist          PT Assessment/Plan     Row Name 09/22/20 1400          PT Assessment    Functional Limitations  Other (comment) management of lymphedema  -LF     Impairments  Edema;Impaired lymphatic circulation  -LF     Assessment Comments  Patient presents with bilateral LE edema/lymphedema/phlebolymphedema.  Currently increased edema R>L.  Able to issue compression stocking for LLE, but recommended using compressogrip to help reduce RLE further before getting into stocking.  Would benefit from additional visits (2-3 only) to help further minimize edema and assist in long-term management including assist in getting additional compression garments.    -LF     Please refer to paper survey for additional self-reported information  Yes  -LF     Rehab Potential  Good  -LF     Patient/caregiver participated in establishment of treatment plan and goals  Yes  -LF     Patient would benefit from skilled therapy intervention  Yes  -LF        PT Plan    PT Frequency  1x/week;2x/week  -LF     Planned CPT's?  PT EVAL LOW COMPLEXITY: 91372;PT THER PROC EA 15 MIN: 07200;PT THER ACT EA 15 MIN: 79782;PT MANUAL THERAPY EA 15 MIN: 06072;PT SELF CARE/HOME MGMT/TRAIN EA 15: 26820  -LF     PT Plan Comments  will follow-up 1-2 weeks to see how he does with compression.  Include MLD  -LF       User Key  (r) = Recorded By, (t) = Taken By, (c) = Cosigned By    Initials Name Provider Type    Hailee Marshall PT Physical Therapist             Outcome Measure Options: Lower Extremity Functional Scale (LEFS)  Lower Extremity Functional Index  Any of your usual work, housework or school  activities: A little bit of difficulty  Your usual hobbies, recreational or sporting activities: No difficulty  Getting into or out of the bath: No difficulty  Walking between rooms: No difficulty  Putting on your shoes or socks: A little bit of difficulty  Squatting: Quite a bit of difficulty  Lifting an object, like a bag of groceries from the floor: Quite a bit of difficulty  Performing light activities around your home: No difficulty  Performing heavy activities around your home: Moderate difficulty  Getting into or out of a car: No difficulty  Walking 2 blocks: A little bit of difficulty  Walking a mile: Extreme difficulty or unable to perform activity  Going up or down 10 stairs (about 1 flight of stairs): Moderate difficulty  Standing for 1 hour: A little bit of difficulty  Sitting for 1 hour: No difficulty  Running on even ground: Quite a bit of difficulty  Running on uneven ground: Extreme difficulty or unable to perform activity  Making sharp turns while running fast: Extreme difficulty or unable to perform activity  Hopping: Moderate difficulty  Rolling over in bed: A little bit of difficulty  Total: 48      Time Calculation:   Start Time: 1400   Therapy Charges for Today     Code Description Service Date Service Provider Modifiers Qty    32940421944 HC PT EVAL LOW COMPLEXITY 4 9/22/2020 Hailee Luo, PT GP 1          PT G-Codes  Outcome Measure Options: Lower Extremity Functional Scale (LEFS)  Total: 48         Hailee Luo PT  9/22/2020

## 2020-09-29 ENCOUNTER — HOSPITAL ENCOUNTER (OUTPATIENT)
Dept: PHYSICAL THERAPY | Facility: HOSPITAL | Age: 65
Setting detail: THERAPIES SERIES
Discharge: HOME | End: 2020-09-29

## 2020-09-29 DIAGNOSIS — I89.0 LYMPHEDEMA: Primary | ICD-10-CM

## 2020-09-29 PROCEDURE — 97140 MANUAL THERAPY 1/> REGIONS: CPT

## 2020-09-29 NOTE — THERAPY TREATMENT NOTE
Outpatient Physical Therapy Lymphedema Treatment Note  [facility]     Patient Name: Abe Phillips Jr.  : 1955  MRN: [id number]  Today's Date: 2020        Visit Date: 2020    Visit Dx:    ICD-10-CM ICD-9-CM   1. Lymphedema  I89.0 457.1       Patient Active Problem List   Diagnosis   • Type 2 diabetes mellitus with hyperglycemia (CMS/HCC)   • Elevated LFTs   • Thrombocytopenia (CMS/HCC)   • PVD (peripheral vascular disease) (CMS/HCC)   • A-fib (CMS/HCC)   • Cirrhosis of liver (CMS/HCC)   • Chronic congestive heart failure (CMS/HCC)   • Diabetic ulcer of right heel (CMS/HCC)   • Primary narcolepsy without cataplexy   • Essential hypertension   • Morbidly obese (CMS/HCC)   • Restless legs syndrome (RLS)   • ADD (attention deficit disorder)   • Depression   • Diabetes mellitus (CMS/HCC)   • Edema   • MCCAULEY (nonalcoholic steatohepatitis)   • Hepatitis B   • Insomnia   • Lymphedema   • Obesity   • Severe JAIME on CPAP   • RLS (restless legs syndrome)   • Tremor of both hands   • Restrictive cardiomyopathy (CMS/HCC)   • Mixed hyperlipidemia   • Uncontrolled type 2 diabetes mellitus with hyperglycemia (CMS/HCC)   • OMAR (acute kidney injury) (CMS/HCC)   • Dyslipidemia   • Hyperglycemia   • Diabetic ulcer of right heel associated with type 2 diabetes mellitus (CMS/HCC)   • Facial paralysis/[place] palsy        Lymphedema     Row Name 20 1500             Subjective Pain    Able to rate subjective pain?  yes  -LF      Pre-Treatment Pain Level  0  -LF      Post-Treatment Pain Level  0  -LF         Subjective Comments    Subjective Comments  has been able to wear compression socks both legs  -LF         Lymphedema Edema Assessment    Edema Assessment Comment  no pitting edema  -LF         Skin Changes/Observations    Location/Assessment  Lower Extremity  -LF      Lower Extremity Conditions  bilateral:;intact;clean;dry  -LF      Lower Extremity Color/Pigment  bilateral:;hyperpigmented  -LF         Lymphedema  Pulses/Capillary Refill    Capillary Refill  lower extremity capillary refill  -LF      Lower Extremity Capillary Refill  right:;left:;less than 3 seconds  -LF         Lymphedema Measurements    Measurement Type(s)  Quick Girth  -LF      Quick Girth Areas  Lower extremities  -LF         LLE Quick Girth (cm)    Met-heads  23 cm  -LF      Mid foot  23.3 cm  -LF      Smallest ankle  26.9 cm  -LF      Largest calf  45 cm  -LF      Tib tuberosity  42.6 cm  -LF      Mid patella  47.4 cm  -LF      Distal thigh  51.4 cm  -LF      Proximal thigh  57.8 cm *mid-thigh  -LF      Other 1  64 cm *prox. thigh  -LF      Other 2  34 cm 10cm prox. to ankle  -LF         RLE Quick Girth (cm)    Met-heads  23.5 cm  -LF      Mid foot  23.4 cm  -LF      Smallest ankle  28.5 cm  -LF      Largest calf  45.8 cm  -LF      Tib tuberosity  43 cm  -LF      Mid patella  47 cm  -LF      Distal thigh  51.3 cm  -LF      Proximal thigh  57.3 cm *mid-thigh  -LF      Other 1  61 cm *prox. thigh  -LF      Other 2  36.4 cm 10cm prox. to ankle  -LF      RLE Quick Girth Total  417.2  -LF         Manual Lymphatic Drainage    Manual Lymphatic Drainage  initial sequence;opened regional lymph nodes;extremity treatment  -LF      Initial Sequence  supraclavicular;shoulder collectors;abdomen;diaphragmatic breathing  -LF      Supraclavicular  right;left  -LF      Shoulder Collectors  right;left  -LF      Abdomen  superficial  -LF      Diaphragmatic Breathing  completed x8  -LF      Opened Regional Lymph Nodes  inguinal  -LF      Inguinal  right;left  -LF      Extremity Treatment  MLD to full limb  -LF      MLD to Full Limb  BLE  -LF         Compression/Skin Care    Compression/Skin Care  skin care;compression garment  -LF      Skin Care  moisturizing lotion applied  -LF      Compression Garment Comments  Doffed-donned BK compression socks pre-post treatment  -LF        User Key  (r) = Recorded By, (t) = Taken By, (c) = Cosigned By    Initials Name Provider Type      Hailee Luo, PT Physical Therapist            PT Assessment/Plan     Row Name 09/29/20 1345          PT Assessment    Assessment Comments  Good improvement in LE edema with conistent wear of compression socks.  Patient to call if symptoms increase and unable to manage, otherwise follow-up treatment not indicated at this time.  encouraged to call with any questions/concerns in ordering additional pair of compression socks.  -LF        PT Plan    PT Plan Comments  pt to call for follow-up prn  -LF       User Key  (r) = Recorded By, (t) = Taken By, (c) = Cosigned By    Initials Name Provider Type     Hailee Luo, PT Physical Therapist             OP Exercises     Row Name 09/29/20 1500 09/29/20 1345          Subjective Comments    Subjective Comments  has been able to wear compression socks both legs  -LF  --        Subjective Pain    Able to rate subjective pain?  yes  -LF  --     Pre-Treatment Pain Level  0  -LF  --     Post-Treatment Pain Level  0  -LF  --        Total Minutes    38140 - PT Manual Therapy Minutes  --  55  -LF       User Key  (r) = Recorded By, (t) = Taken By, (c) = Cosigned By    Initials Name Provider Type     Hailee Luo, PT Physical Therapist             Manual Rx (last 36 hours)      Manual Treatments     Row Name 09/29/20 1345             Total Minutes    06449 - PT Manual Therapy Minutes  55  -LF        User Key  (r) = Recorded By, (t) = Taken By, (c) = Cosigned By    Initials Name Provider Type    Hailee Marshall, PT Physical Therapist          PT OP Goals     Row Name 09/29/20 1345          PT Short Term Goals    STG Date to Achieve  10/13/20  -LF     STG 1  Patient to be independent with simple compression wrapping to promote edema reduction.  -LF     STG 1 Progress  Met  -LF        Long Term Goals    LTG 1  Pt to be measured and fit with compression garment/system for long term self management of lymphedema  -LF     LTG 1 Progress  Met  -LF     LTG 2  Patient  independent with self-management of BLE lymphedema to include skin care, self-MLD, compression, and exercise  -LF     LTG 2 Progress  Partially Met  -LF     LTG 3  Patient demonstrate decreased net edema of right lower extremity > 5 cm for decrease in edema, symptoms, decreased risk of infection and improved skin care/transition to self-care of condition.  -LF     LTG 3 Progress  Met  -LF       User Key  (r) = Recorded By, (t) = Taken By, (c) = Cosigned By    Initials Name Provider Type     Hailee Luo PT Physical Therapist          Therapy Education  Education Details: good fit of compression socks provided last visit.  Advised patient to order 2nd pair (gave options of other brands etc., informed him that insurance does not pay for them)  Given: Edema management  Program: Reinforced  How Provided: Verbal  Provided to: Patient  Level of Understanding: Verbalized       Time Calculation:   Start Time: 1345   Therapy Charges for Today     Code Description Service Date Service Provider Modifiers Qty    35951262897  PT MANUAL THERAPY EA 15 MIN 9/29/2020 Hailee Luo, PT GP 4    [id number]  PT MANUAL THERAPY EA 15 MIN 9/29/2020 Hailee Luo PT GP 4                    Hailee Luo PT  9/29/2020

## 2020-09-30 ENCOUNTER — TRANSCRIBE ORDERS (OUTPATIENT)
Dept: ADMINISTRATIVE | Facility: HOSPITAL | Age: 65
End: 2020-09-30

## 2020-09-30 DIAGNOSIS — G51.0 BELL'S PALSY: Primary | ICD-10-CM

## 2020-10-02 RX ORDER — GABAPENTIN 300 MG/1
900 CAPSULE ORAL 2 TIMES DAILY
Qty: 180 CAPSULE | Refills: 2 | Status: SHIPPED | OUTPATIENT
Start: 2020-10-02 | End: 2020-01-01 | Stop reason: SDUPTHER

## 2020-10-02 RX ORDER — BUMETANIDE 2 MG/1
4 TABLET ORAL 2 TIMES DAILY
Qty: 120 TABLET | Refills: 5 | Status: ON HOLD | OUTPATIENT
Start: 2020-10-02 | End: 2021-01-01 | Stop reason: SDUPTHER

## 2020-10-02 NOTE — TELEPHONE ENCOUNTER
Caller: Abe Phillips Jr.    Relationship: Self    Best call back number: [phone]    Medication needed:   Requested Prescriptions     Pending Prescriptions Disp Refills   • bumetanide (BUMEX) 2 MG tablet 120 tablet 5     Sig: Take 2 tablets by mouth 2 (Two) Times a Day.   • gabapentin (NEURONTIN) 300 MG capsule 180 capsule 0     Sig: Take 3 capsules by mouth 2 (Two) Times a Day.       When do you need the refill by: 10/2/20    What details did the patient provide when requesting the medication: Patient is out of medication. Patient would like 90 day supplies of both prescriptions    Does the patient have less than a 3 day supply:  [x] Yes  [] No    What is the patient's preferred pharmacy: [address] [phone] [place] [phone]

## 2020-10-05 ENCOUNTER — APPOINTMENT (OUTPATIENT)
Dept: PHYSICAL THERAPY | Facility: HOSPITAL | Age: 65
End: 2020-10-05

## 2020-10-05 NOTE — TELEPHONE ENCOUNTER
Patient is needing a refill on contour next test strips. Please send to [facility] in [facility].

## 2020-10-06 ENCOUNTER — TRANSCRIBE ORDERS (OUTPATIENT)
Dept: ADMINISTRATIVE | Facility: HOSPITAL | Age: 65
End: 2020-10-06

## 2020-10-06 DIAGNOSIS — K75.81 LIVER CIRRHOSIS SECONDARY TO NASH (HCC): Primary | ICD-10-CM

## 2020-10-06 DIAGNOSIS — K74.60 LIVER CIRRHOSIS SECONDARY TO NASH (HCC): Primary | ICD-10-CM

## 2020-10-07 ENCOUNTER — TELEPHONE (OUTPATIENT)
Dept: ENDOCRINOLOGY | Facility: CLINIC | Age: 65
End: 2020-10-07

## 2020-10-07 DIAGNOSIS — E11.65 TYPE 2 DIABETES MELLITUS WITH HYPERGLYCEMIA, WITH LONG-TERM CURRENT USE OF INSULIN (HCC): Primary | ICD-10-CM

## 2020-10-07 DIAGNOSIS — Z79.4 TYPE 2 DIABETES MELLITUS WITH HYPERGLYCEMIA, WITH LONG-TERM CURRENT USE OF INSULIN (HCC): Primary | ICD-10-CM

## 2020-10-07 NOTE — TELEPHONE ENCOUNTER
PHARMACY IS NEEDING US TO RESEND PRESCRIPTION WITH CORRECT DIRECTIONS AND TYPE OF TEST STRIPS AND DIAGNOSIS/ICD CODES IN ORDER FOR MEDICARE TO COVER THE TEST STRIPS. PHARMACY NUMBER [phone]

## 2020-10-08 ENCOUNTER — OFFICE VISIT (OUTPATIENT)
Dept: SLEEP MEDICINE | Facility: HOSPITAL | Age: 65
End: 2020-10-08

## 2020-10-08 VITALS
SYSTOLIC BLOOD PRESSURE: 136 MMHG | BODY MASS INDEX: 38.11 KG/M2 | OXYGEN SATURATION: 94 % | RESPIRATION RATE: 16 BRPM | HEIGHT: 76 IN | HEART RATE: 78 BPM | TEMPERATURE: 98 F | DIASTOLIC BLOOD PRESSURE: 67 MMHG | WEIGHT: 313 LBS

## 2020-10-08 DIAGNOSIS — Z99.89 OSA ON CPAP: ICD-10-CM

## 2020-10-08 DIAGNOSIS — E66.01 MORBIDLY OBESE (HCC): Primary | ICD-10-CM

## 2020-10-08 DIAGNOSIS — G25.81 RESTLESS LEGS SYNDROME (RLS): ICD-10-CM

## 2020-10-08 DIAGNOSIS — G47.419 PRIMARY NARCOLEPSY WITHOUT CATAPLEXY: ICD-10-CM

## 2020-10-08 DIAGNOSIS — G47.33 OSA ON CPAP: ICD-10-CM

## 2020-10-08 PROCEDURE — 99214 OFFICE O/P EST MOD 30 MIN: CPT | Performed by: INTERNAL MEDICINE

## 2020-10-08 RX ORDER — DEXTROAMPHETAMINE SACCHARATE, AMPHETAMINE ASPARTATE MONOHYDRATE, DEXTROAMPHETAMINE SULFATE AND AMPHETAMINE SULFATE 7.5; 7.5; 7.5; 7.5 MG/1; MG/1; MG/1; MG/1
60 CAPSULE, EXTENDED RELEASE ORAL EVERY MORNING
Qty: 60 CAPSULE | Refills: 0 | Status: SHIPPED | OUTPATIENT
Start: 2020-10-08 | End: 2020-01-01 | Stop reason: SDUPTHER

## 2020-10-08 NOTE — PROGRESS NOTES
Subjective:     Chief Complaint:   Chief Complaint   Patient presents with   • Sleep Apnea     6 month med follow up        HPI:    Abe Phillips Jr. is a 65 y.o. male here for follow-up of severe obstructive sleep apnea and daytime somnolence.    He has a complicated sleep history.  He has had problems for over 20 years in regards to poor sleep quality and daytime somnolence.  He saw a neurologist in [facility] named Torey Marcus in approximately 2010 and underwent a sleep study and a nap study.  He was apparently diagnosed with severe obstructive sleep apnea as well as narcolepsy.  He has been treated with CPAP since that time.  He was set at 7 cm H2O at the time of his move to [place] several years ago.  He was also been treated with high dose of Adderall at 60 mg in the morning and 30 mg in the afternoon.  He has been treated with Mirapex for RLS.    Since his arrival here he underwent a sleep study which revealed the presence of severe obstructive sleep apnea.  Furthermore was felt that his optimal pressure was CPAP of 13.  He was placed on CPAP therapy at 13-20 cm H2O.  He has had difficulty with CPAP compliance for various reasons.  He tolerates the pressure poorly.  His pressures were increased to a range of 9-20 cm H2O and his download last year revealed a normal AHI.  He has always had low compliance.  He did not bring his card with him today.    I decreased his Adderall dosage to 30 mg twice daily as he was on a very high dose previously.  He does not think this is adequate for his daytime somnolence and does feel sleepy in the afternoon.  He has begun taking the 60 mg all in the morning.    Current medications are:   Current Outpatient Medications:   •  ACCU-CHEK FASTCLIX LANCETS misc, 1 each by Other route 4 (Four) Times a Day After Meals & at Bedtime., Disp: 100 each, Rfl: 0  •  Alcohol Swabs (ALCOHOL PADS) 70 % pads, 1 swab 3 (Three) Times a Day., Disp: 100 each, Rfl: 0  •   "amphetamine-dextroamphetamine (ADDERALL) 30 MG tablet, Take 1 tablet by mouth 2 (Two) Times a Day., Disp: 60 tablet, Rfl: 0  •  aspirin 81 MG EC tablet, TAKE 1 TABLET BY MOUTH EVERY DAY, Disp: 90 tablet, Rfl: 1  •  Blood Glucose Monitoring Suppl (ACCU-CHEK GUIDE) w/Device kit, 1 each 4 (Four) Times a Day After Meals & at Bedtime., Disp: 1 kit, Rfl: 0  •  bumetanide (BUMEX) 2 MG tablet, Take 2 tablets by mouth 2 (Two) Times a Day., Disp: 120 tablet, Rfl: 5  •  diclofenac (VOLTAREN) 1 % gel gel, Apply 4 g topically to the appropriate area as directed 2 (Two) Times a Day As Needed (joint pain)., Disp: 100 g, Rfl: 1  •  Docusate Calcium (STOOL SOFTENER PO), Take 1 tablet by mouth As Needed., Disp: , Rfl:   •  gabapentin (NEURONTIN) 300 MG capsule, Take 3 capsules by mouth 2 (Two) Times a Day., Disp: 180 capsule, Rfl: 2  •  glucose blood (ACCU-CHEK GUIDE) test strip, 1 each by Other route 4 (Four) Times a Day After Meals & at Bedtime. Use as instructed, Disp: 1 each, Rfl: 0  •  glucose blood test strip, PRN, Disp: 60 each, Rfl: 5  •  Insulin Syringe 29G X 1/2\" 1 ML misc, Use one each to inject insulin four times daily Ell.9, Disp: 500 each, Rfl: 1  •  levocetirizine (XYZAL) 5 MG tablet, Take 1 tablet by mouth Every Evening., Disp: 90 tablet, Rfl: 1  •  levothyroxine (SYNTHROID, LEVOTHROID) 75 MCG tablet, TAKE ONE TABLET BY MOUTH DAILY, Disp: 82 tablet, Rfl: 0  •  metOLazone (ZAROXOLYN) 5 MG tablet, Take 1 tablet by mouth Daily As Needed (edema/fluid overload)., Disp: 90 tablet, Rfl: 1  •  nystatin (MYCOSTATIN) 579149 UNIT/GM ointment, , Disp: , Rfl:   •  OXcarbazepine (TRILEPTAL) 300 MG tablet, Take one half tablet twice a day for one week, then increase one tablet twice a day, Disp: 60 tablet, Rfl: 6  •  polyethylene glycol (MIRALAX) powder, Take 17 g by mouth As Needed., Disp: , Rfl:   •  potassium chloride (K-DUR) 10 MEQ CR tablet, Take 3 tablets by mouth 2 (Two) Times a Day., Disp: 540 tablet, Rfl: 3  •  pramipexole " (MIRAPEX) 1 MG tablet, Take 1 tablet by mouth 2 (Two) Times a Day., Disp: 180 tablet, Rfl: 1  •  pravastatin (PRAVACHOL) 40 MG tablet, Take 1 tablet by mouth Every Night., Disp: 90 tablet, Rfl: 3  •  spironolactone (ALDACTONE) 100 MG tablet, Take 0.5 tablets by mouth Daily., Disp: 30 tablet, Rfl: 6  •  traZODone (DESYREL) 50 MG tablet, Take 1 tablet by mouth At Night As Needed for Sleep., Disp: 90 tablet, Rfl: 2  •  venlafaxine XR (EFFEXOR-XR) 150 MG 24 hr capsule, Take 1 capsule by mouth Every Night., Disp: 90 capsule, Rfl: 2.      The patient's relevant past medical, surgical, family and social history were reviewed and updated in Epic as appropriate.     ROS:    Review of Systems   Constitutional: Positive for fatigue.   Respiratory: Positive for apnea.    Psychiatric/Behavioral: Positive for sleep disturbance.         Objective:    Physical Exam   Constitutional: He is oriented to person, place, and time. He appears well-developed.   HENT:   Head: Normocephalic and atraumatic.   Neck: Neck supple. No thyromegaly present.   Cardiovascular: Normal rate and regular rhythm. Exam reveals no gallop and no friction rub.   No murmur heard.  Pulmonary/Chest: Effort normal. No respiratory distress. He has no wheezes. He has no rales.   Neurological: He is alert and oriented to person, place, and time.   Skin: Skin is warm and dry.   Psychiatric: His behavior is normal.   Vitals reviewed.        Assessment:    Problem List Items Addressed This Visit        Pulmonary Problems    Severe JAIME on CPAP       Other    Primary narcolepsy without cataplexy    Morbidly obese (CMS/HCC) - Primary    Restless legs syndrome (RLS)          64-year-old male with severe obstructive sleep apnea and historically low CPAP compliance.  He received a diagnosis of narcolepsy in [place] and is on high doses of Adderall.  He is also on Mirapex for RLS.  His excessive sleepiness may be related to his obstructive sleep apnea.  I cannot independently  confirm the diagnosis of narcolepsy as I do not have the records from [place].    1. Severe obstructive sleep apnea: Currently on auto CPAP at 9-20 cm H2O.  I have urged him to be more compliant and I am trying to help with any barriers he may have to use of CPAP more consistently.  2. Excessive daytime somnolence: I think I will try him on a long-acting Adderall at the same dosage.  This would be 60 mg in the morning.  3. Morbid obesity: He tells me he is embarking on a weight loss regimen and I encouraged this  4. RLS: Continue Mirapex    Plan:     1. I refilled his Adderall.  I changed him to Adderall XR 60 mg every morning  2. I encouraged him to use his CPAP and I will review his download on his follow-up visit.  His CPAP prescription is current.  3. Continued sleep center follow-up    Discussed in detail with the patient.  He will call prior to his follow up visit for any new problems.    Level of Risk Moderate due to: two stable chronic illnesses and prescription drug management    Signed by  Fernando Womack MD

## 2020-10-09 ENCOUNTER — TELEPHONE (OUTPATIENT)
Dept: ENDOCRINOLOGY | Facility: CLINIC | Age: 65
End: 2020-10-09

## 2020-10-09 DIAGNOSIS — E11.65 TYPE 2 DIABETES MELLITUS WITH HYPERGLYCEMIA, WITH LONG-TERM CURRENT USE OF INSULIN (HCC): Primary | ICD-10-CM

## 2020-10-09 DIAGNOSIS — Z79.4 TYPE 2 DIABETES MELLITUS WITH HYPERGLYCEMIA, WITH LONG-TERM CURRENT USE OF INSULIN (HCC): Primary | ICD-10-CM

## 2020-10-09 RX ORDER — LANCETS
EACH MISCELLANEOUS
Qty: 100 EACH | Refills: 3 | Status: SHIPPED | OUTPATIENT
Start: 2020-10-09 | End: 2021-01-01

## 2020-10-09 RX ORDER — BLOOD-GLUCOSE METER
1 EACH MISCELLANEOUS
Qty: 1 KIT | Refills: 0 | Status: ON HOLD | OUTPATIENT
Start: 2020-10-09 | End: 2021-01-01

## 2020-10-09 NOTE — TELEPHONE ENCOUNTER
You probably already  Know this, Medicare requires X for for all meters, test strip and lancets sent to pharmacy.  Please add DX code E11.65 in pharmacy note section of the RX  And resend it, you will be good!

## 2020-10-09 NOTE — TELEPHONE ENCOUNTER
[facility] is requesting a prescription for all 3 Dexcom parts stating it looks like Medicare may approve.

## 2020-10-13 NOTE — TELEPHONE ENCOUNTER
PLEASE SEND RX FOR DEXCOM TO [facility]. THIS WAS SENT TO WRONG PHARMACY AND WILL NOT BE COVERED AT [facility].

## 2020-10-14 NOTE — PROGRESS NOTES
[facility] Note    Subjective     Chief Complaint   Patient presents with   • Right Knee - Pain        HPI    Abe Phillips Jr. is a 65 y.o. male who presents with right knee pain.  Onset: atraumatic and gradual in nature. The issue has been ongoing for 3 month(s). Pain is a 8/10 on the pain scale. Pain is described as aching and stabbing. Associated symptoms include pain and swelling. The pain is worse with walking, standing, sitting and climbing stairs; resting and pain medication and/or NSAID improve the pain. Previous treatments have included: NSAIDS.  No history of trauma.  No previous injections.    I have reviewed the following portions of the patient's history and agree with: History of Present Illness and Review of Systems    Patient Active Problem List   Diagnosis   • Type 2 diabetes mellitus with hyperglycemia (CMS/HCC)   • Elevated LFTs   • Thrombocytopenia (CMS/HCC)   • PVD (peripheral vascular disease) (CMS/HCC)   • A-fib (CMS/HCC)   • Cirrhosis of liver (CMS/HCC)   • Chronic congestive heart failure (CMS/HCC)   • Diabetic ulcer of right heel (CMS/HCC)   • Primary narcolepsy without cataplexy   • Essential hypertension   • Morbidly obese (CMS/HCC)   • Restless legs syndrome (RLS)   • ADD (attention deficit disorder)   • Depression   • Diabetes mellitus (CMS/HCC)   • Edema   • MCCAULEY (nonalcoholic steatohepatitis)   • Hepatitis B   • Insomnia   • Lymphedema   • Obesity   • Severe JAIME on CPAP   • RLS (restless legs syndrome)   • Tremor of both hands   • Restrictive cardiomyopathy (CMS/HCC)   • Mixed hyperlipidemia   • Uncontrolled type 2 diabetes mellitus with hyperglycemia (CMS/HCC)   • OMAR (acute kidney injury) (CMS/HCC)   • Dyslipidemia   • Hyperglycemia   • Diabetic ulcer of right heel associated with type 2 diabetes mellitus (CMS/HCC)   • Facial paralysis/[place] palsy     Past Medical History:   Diagnosis Date   • A-fib (CMS/HCC)    • ADD (attention deficit disorder)    • Atrial  "flutter (CMS/HCC)    • Cellulitis    • CHF (congestive heart failure) (CMS/HCC)    • Cirrhosis of liver (CMS/HCC)    • Constipation    • Depression    • Diabetes mellitus (CMS/HCC)    • Diabetic ulcer of right foot (CMS/HCC)    • Disease of thyroid gland    • Edema    • Fatty liver    • Hepatitis B    • Hyperlipidemia    • Hypokalemia    • Insomnia    • Lymphedema    • Narcolepsy    • Neuropathy    • Obesity    • JAIME on CPAP    • PVD (peripheral vascular disease) (CMS/HCC)    • RLS (restless legs syndrome)    • Skin cancer    • Tardive dyskinesia    • Tremor of both hands    • Yeast infection       Past Surgical History:   Procedure Laterality Date   • ACHILLES TENDON REPAIR     • CARDIAC ABLATION     • CHOLECYSTECTOMY     • LASER ABLATION      VEIN RLE      Family History   Problem Relation Age of Onset   • Heart failure Mother         CHF   • Osteoarthritis Mother    • Clotting disorder Father    • Diabetes Father    • Hypertension Father    • No Known Problems Sister    • No Known Problems Brother    • No Known Problems Brother      Social History     Socioeconomic History   • Marital status:      Spouse name: Not on file   • Number of children: Not on file   • Years of education: Not on file   • Highest education level: Not on file   Tobacco Use   • Smoking status: Never Smoker   • Smokeless tobacco: Never Used   Substance and Sexual Activity   • Alcohol use: Yes     Comment: \"3 SCOTCH AND 2 BEERS PER WEEK\"   • Drug use: No   • Sexual activity: Defer      Current Outpatient Medications on File Prior to Visit   Medication Sig Dispense Refill   • Alcohol Swabs (ALCOHOL PADS) 70 % pads 1 swab 3 (Three) Times a Day. 100 each 0   • amphetamine-dextroamphetamine XR (ADDERALL XR) 30 MG 24 hr capsule Take 2 capsules by mouth Every Morning 60 capsule 0   • aspirin 81 MG EC tablet TAKE 1 TABLET BY MOUTH EVERY DAY 90 tablet 1   • Blood Glucose Monitoring Suppl (Contour Next Monitor) w/Device kit 1 kit Every 3 " "(Three) Months. Use to check bg 1 kit 0   • bumetanide (BUMEX) 2 MG tablet Take 2 tablets by mouth 2 (Two) Times a Day. 120 tablet 5   • Continuous Blood Gluc  (Dexcom G6 ) device 1 kit Every 3 (Three) Months. For checking bg gets one per year 1 Device 0   • Continuous Blood Gluc  (Dexcom G6 ) device 1 kit Every 3 (Three) Months. Use to check blood sugar 1 Device 0   • Continuous Blood Gluc Sensor (Dexcom G6 Sensor) Every 10 (Ten) Days. 3 each 11   • Continuous Blood Gluc Sensor (Dexcom G6 Sensor) Every 10 (Ten) Days. 3 each 11   • Continuous Blood Gluc Transmit (Dexcom G6 Transmitter) misc 1 kit Every 3 (Three) Months. Use to check blood sugar 1 each 3   • diclofenac (VOLTAREN) 1 % gel gel Apply 4 g topically to the appropriate area as directed 2 (Two) Times a Day As Needed (joint pain). 100 g 1   • Docusate Calcium (STOOL SOFTENER PO) Take 1 tablet by mouth As Needed.     • gabapentin (NEURONTIN) 300 MG capsule Take 3 capsules by mouth 2 (Two) Times a Day. 180 capsule 2   • glucose blood test strip Contour next test strips  For checking bg tid 100 each 5   • Insulin Syringe 29G X 1/2\" 1 ML misc Use one each to inject insulin four times daily Ell.9 500 each 1   • levocetirizine (XYZAL) 5 MG tablet Take 1 tablet by mouth Every Evening. 90 tablet 1   • levothyroxine (SYNTHROID, LEVOTHROID) 75 MCG tablet TAKE ONE TABLET BY MOUTH DAILY 82 tablet 0   • metOLazone (ZAROXOLYN) 5 MG tablet Take 1 tablet by mouth Daily As Needed (edema/fluid overload). 90 tablet 1   • Microlet Lancets misc For checking bg tid 100 each 3   • nystatin (MYCOSTATIN) 793200 UNIT/GM ointment      • OXcarbazepine (TRILEPTAL) 300 MG tablet Take one half tablet twice a day for one week, then increase one tablet twice a day 60 tablet 6   • polyethylene glycol (MIRALAX) powder Take 17 g by mouth As Needed.     • potassium chloride (K-DUR) 10 MEQ CR tablet Take 3 tablets by mouth 2 (Two) Times a Day. 540 tablet 3   • " pramipexole (MIRAPEX) 1 MG tablet Take 1 tablet by mouth 2 (Two) Times a Day. 180 tablet 1   • pravastatin (PRAVACHOL) 40 MG tablet Take 1 tablet by mouth Every Night. 90 tablet 3   • spironolactone (ALDACTONE) 100 MG tablet Take 0.5 tablets by mouth Daily. 30 tablet 6   • traZODone (DESYREL) 50 MG tablet Take 1 tablet by mouth At Night As Needed for Sleep. 90 tablet 2   • venlafaxine XR (EFFEXOR-XR) 150 MG 24 hr capsule Take 1 capsule by mouth Every Night. 90 capsule 2     Current Facility-Administered Medications on File Prior to Visit   Medication Dose Route Frequency Provider Last Rate Last Dose   • [COMPLETED] gadobenate dimeglumine (MULTIHANCE) injection 20 mL  20 mL Intravenous Once in imaging Maria Isabel Maldonado MD   20 mL at 10/14/20 1532      No Known Allergies     Review of Systems   Constitutional: Negative for activity change, appetite change, chills, diaphoresis, fatigue, fever and unexpected weight change.   HENT: Negative for congestion, dental problem, drooling, ear discharge, ear pain, facial swelling, hearing loss, mouth sores, nosebleeds, postnasal drip, rhinorrhea, sinus pressure, sneezing, sore throat, tinnitus, trouble swallowing and voice change.    Eyes: Negative for photophobia, pain, discharge, redness, itching and visual disturbance.   Respiratory: Negative for apnea, cough, choking, chest tightness, shortness of breath, wheezing and stridor.    Cardiovascular: Negative for chest pain, palpitations and leg swelling.   Gastrointestinal: Negative for abdominal distention, abdominal pain, anal bleeding, blood in stool, constipation, diarrhea, nausea, rectal pain and vomiting.   Endocrine: Negative for cold intolerance, heat intolerance, polydipsia, polyphagia and polyuria.   Genitourinary: Negative for decreased urine volume, difficulty urinating, dysuria, enuresis, flank pain, frequency, genital sores, hematuria and urgency.   Musculoskeletal: Positive for arthralgias and joint  "swelling. Negative for back pain, gait problem, myalgias, neck pain and neck stiffness.   Skin: Negative for color change, pallor, rash and wound.   Allergic/Immunologic: Negative for environmental allergies, food allergies and immunocompromised state.   Neurological: Negative for dizziness, tremors, seizures, syncope, facial asymmetry, speech difficulty, weakness, light-headedness, numbness and headaches.   Hematological: Negative for adenopathy. Does not bruise/bleed easily.   Psychiatric/Behavioral: Negative for agitation, behavioral problems, confusion, decreased concentration, dysphoric mood, hallucinations, self-injury, sleep disturbance and suicidal ideas. The patient is not nervous/anxious and is not hyperactive.         Objective      Physical Exam  Pulse 88   Ht 193 cm (75.98\")   Wt (!) 142 kg (313 lb 0.9 oz)   SpO2 99%   BMI 38.12 kg/m²     Body mass index is 38.12 kg/m².    General:   Mental Status:  Alert   Appearance: Cooperative, in no acute distress   Build and Nutrition: Obese male   Orientation: Alert and oriented to person, place and time   Posture: Normal   Gait: Slight limp on the right    Integument:   Right knee: No skin lesions, no rash, no ecchymosis    Neurologic:   Sensation:    Right foot: Intact to light touch on the dorsal and plantar aspect   Motor:  Right lower extremity: 5/5 quadriceps, hamstrings, ankle dorsiflexors, and ankle plantar flexors    Vascular:   Right lower extremity: 2+ dorsalis pedis pulse, prompt capillary refill    Lower Extremities:   Right Knee:    Tenderness:  Medial joint line tenderness    Effusion:  None    Swelling:  None    Crepitus:  Positive    Atrophy:  None    Range of motion:  Extension: 0°       Flexion: 120°  Instability:  No varus laxity, no valgus laxity, negative anterior drawer  Deformities:  None      Imaging/Studies      Imaging Results (Last 24 Hours)     Procedure Component Value Units Date/Time    XR Knee 4+ View Right [[id number]] " Resulted: 10/14/20 1045     Updated: 10/14/20 1046    Narrative:      Right Knee Radiographs  Indication: right knee pain  Views: Standing AP's and skiers of both knees, with lateral and sunrise   views of the right knee    Comparison: no prior studies available    Findings:   Medial joint space narrowing, tell femoral degeneration, no acute bony   abnormalities.  No unusual bony features.          Assessment and Plan     Diagnoses and all orders for this visit:    1. Primary osteoarthritis of right knee (Primary)  -     XR Knee 4+ View Right        1. Primary osteoarthritis of right knee        I reviewed my findings with the patient today.  He does have right knee arthritis, and I offered him an injection today, and he wished to proceed.  He may be a candidate for Visco supplementation injections in the future.  We may also consider an MRI if he has any worsening or problems.  Please see my procedure note for details.  I will see him back in 3 months, but sooner for any problems.    Procedure Note:  The potential benefits of performing a therapeutic right knee joint injection, as well as potential risks (including, but not limited to infection, swelling, pain, bleeding, bruising, nerve/blood vessel damage, skin color changes, transient elevation in blood glucose levels, and fat atrophy) were discussed with the patient.  After informed consent, timeout procedure was performed, and the skin on the right knee was prepped with chlorhexidine soap and alcohol, after which ethyl chloride was applied to the skin at the injection site. Via the anterolateral approach, 1ml of Kenalog 40mg/ml mixed with 4ml 0.5% ropivacaine plain was injected into the knee joint.  The patient tolerated the procedure well, experiencing 75% improvement a few minutes following the injection. There were no complications.  Band-Aid was applied to the injection site. Post-procedural instructions were given to the patient and/or their  caregiver.      Return in about 3 months (around 1/14/2021).    Medical Decision Making  Management Options : prescription/IM medicine  Data/Risk: radiology tests and independent visualization of imaging, lab tests, or EMG/NCV      Delta Miller MD  10/15/20  06:38 EDT    Dragon disclaimer:  Much of this encounter note is an electronic transcription/translation of spoken language to printed text. The electronic translation of spoken language may permit erroneous, or at times, nonsensical words or phrases to be inadvertently transcribed; Although I have reviewed the note for such errors, some may still exist.

## 2020-10-15 NOTE — PROGRESS NOTES
Procedure   Large Joint Arthrocentesis: R knee  Date/Time: 10/15/2020 7:17 AM  Consent given by: patient  Site marked: site marked  Timeout: Immediately prior to procedure a time out was called to verify the correct patient, procedure, equipment, support staff and site/side marked as required   Supporting Documentation  Indications: pain   Procedure Details  Location: knee - R knee  Preparation: Patient was prepped and draped in the usual sterile fashion  Needle size: 22 G  Approach: anterolateral  Medications administered: 4 mL ropivacaine 0.5 %; 40 mg triamcinolone acetonide 40 MG/ML  Patient tolerance: patient tolerated the procedure well with no immediate complications

## 2020-10-20 NOTE — ED PROVIDER NOTES
Subjective   This is a pleasant 65-year-old male who has multiple medical issues including longstanding diabetes.  Dr. Shirley is primary.    At his baseline he is ambulatory without a cane or a walker.  He has sleep apnea but does not wear a machine.    He comes the emergency room today with fairly abrupt onset of pain in his right calf anterior and lateral area that woke him up from a deep sleep early this morning.    His daughter who is apparently a nurse practitioner looked at his leg and thought he might have a blood clot versus cellulitis and sent him to the ER for further evaluation.    He is never had issues with his leg like this in the past.  Though he does have an ulcer on his right heel and is followed by wound care at [facility].  He had some healing of this ulcer but apparently has gotten worse recently that really does not cause him any pain.  It sounds like he has a peripheral diabetic neuropathy.  He has no history of blood clots that he knows of but does have a history of A. fib in the past and was ablated and has had no recurrence that he knows of and takes a baby aspirin.    He does have chronic venous stasis dermatitis.  He has had ultrasound of his leg in the past.    He sees Dr. Miller for orthopedics and had arthritis in his right knee and had an injection on the 14th of this month in his right knee and has had good pain relief from that.    Currently denies fevers or chills.  He has no chest pain or shortness of breath.  Bowel movements and urine have been normal and is not passed blood per any orifice.  He has had no known Covid exposure.        All other systems are reviewed and are negative except as noted above.          Review of Systems   All other systems reviewed and are negative.      Past Medical History:   Diagnosis Date   • A-fib (CMS/HCC)    • ADD (attention deficit disorder)    • Atrial flutter (CMS/HCC)    • Cellulitis    • CHF (congestive heart failure) (CMS/HCC)    •  pt. refused heparin sq "Cirrhosis of liver (CMS/HCC)    • Constipation    • Depression    • Diabetes mellitus (CMS/HCC)    • Diabetic ulcer of right foot (CMS/HCC)    • Disease of thyroid gland    • Edema    • Fatty liver    • Hepatitis B    • Hyperlipidemia    • Hypokalemia    • Insomnia    • Lymphedema    • Narcolepsy    • Neuropathy    • Obesity    • JAIME on CPAP    • PVD (peripheral vascular disease) (CMS/HCC)    • RLS (restless legs syndrome)    • Skin cancer    • Tardive dyskinesia    • Tremor of both hands    • Yeast infection        No Known Allergies    Past Surgical History:   Procedure Laterality Date   • ACHILLES TENDON REPAIR     • CARDIAC ABLATION     • CHOLECYSTECTOMY     • LASER ABLATION      VEIN RLE       Family History   Problem Relation Age of Onset   • Heart failure Mother         CHF   • Osteoarthritis Mother    • Clotting disorder Father    • Diabetes Father    • Hypertension Father    • No Known Problems Sister    • No Known Problems Brother    • No Known Problems Brother        Social History     Socioeconomic History   • Marital status:      Spouse name: Not on file   • Number of children: Not on file   • Years of education: Not on file   • Highest education level: Not on file   Tobacco Use   • Smoking status: Never Smoker   • Smokeless tobacco: Never Used   Substance and Sexual Activity   • Alcohol use: Yes     Comment: \"3 SCOTCH AND 2 BEERS PER WEEK\"   • Drug use: No   • Sexual activity: Defer           Objective   Physical Exam  Vitals signs and nursing note reviewed.   Constitutional:       Appearance: Normal appearance. He is obese.   HENT:      Head: Normocephalic and atraumatic.      Right Ear: External ear normal.      Left Ear: External ear normal.      Nose: Nose normal.      Mouth/Throat:      Mouth: Mucous membranes are moist.      Pharynx: Oropharynx is clear.   Eyes:      Extraocular Movements: Extraocular movements intact.      Conjunctiva/sclera: Conjunctivae normal.      Pupils: Pupils are " equal, round, and reactive to light.   Neck:      Musculoskeletal: Normal range of motion and neck supple.   Cardiovascular:      Rate and Rhythm: Normal rate and regular rhythm.      Heart sounds: Normal heart sounds.   Pulmonary:      Effort: Pulmonary effort is normal.      Breath sounds: Normal breath sounds.   Abdominal:      General: Bowel sounds are normal.      Palpations: Abdomen is soft.      Tenderness: There is no abdominal tenderness.   Musculoskeletal:      Comments: Upper extremities have good pulses and there is no synovitis there a few bruises noted.    His left lower extremity shows a left knee and upper leg are unremarkable.  His left lower leg has fairly severe venous stasis dermatitis with pigmentation to the ankle.  He has 2/4 pulses in his left foot.  He has no tissue loss.  Is a stocking-like diabetic neuropathy.    On the right side his right thigh is unremarkable his right knee has arthritic changes but is not red or swollen.  He has fairly extensive venous stasis changes in his right leg as well but he has moderate swelling in the area of the venous stasis changes it is very warm to touch and very tender.  I do not see or feel an abscess on his leg but his calf is tender to palpation.  He has 2/4 pulses right foot which seems well perfused and is warm.  He does have a dime size ulcer on the heel of his right foot.  There is no surrounding cellulitis on the side I see no foul smell from it has a clean base.  Is a stocking-like diabetic neuropathy in his right foot as well.   Lymphadenopathy:      Cervical: No cervical adenopathy.   Skin:     General: Skin is warm and dry.      Capillary Refill: Capillary refill takes less than 2 seconds.   Neurological:      Mental Status: He is alert and oriented to person, place, and time. Mental status is at baseline.      Sensory: Sensory deficit present.   Psychiatric:         Mood and Affect: Mood normal.         Behavior: Behavior normal.          Thought Content: Thought content normal.         Procedures           ED Course      Recent Results (from the past 24 hour(s))   Comprehensive Metabolic Panel    Collection Time: 10/20/20  7:38 AM    Specimen: Blood   Result Value Ref Range    Glucose 133 (H) 65 - 99 mg/dL    BUN 50 (H) 8 - 23 mg/dL    Creatinine 1.25 0.76 - 1.27 mg/dL    Sodium 132 (L) 136 - 145 mmol/L    Potassium 2.6 (C) 3.5 - 5.2 mmol/L    Chloride 86 (L) 98 - 107 mmol/L    CO2 34.0 (H) 22.0 - 29.0 mmol/L    Calcium 10.4 8.6 - 10.5 mg/dL    Total Protein 7.9 6.0 - 8.5 g/dL    Albumin 4.00 3.50 - 5.20 g/dL    ALT (SGPT) 49 (H) 1 - 41 U/L    AST (SGOT) 56 (H) 1 - 40 U/L    Alkaline Phosphatase 177 (H) 39 - 117 U/L    Total Bilirubin 1.3 (H) 0.0 - 1.2 mg/dL    eGFR Non African Amer 58 (L) >60 mL/min/1.73    Globulin 3.9 gm/dL    A/G Ratio 1.0 g/dL    BUN/Creatinine Ratio 40.0 (H) 7.0 - 25.0    Anion Gap 12.0 5.0 - 15.0 mmol/L   BNP    Collection Time: 10/20/20  7:38 AM    Specimen: Blood   Result Value Ref Range    proBNP 153.0 0.0 - 900.0 pg/mL   D-dimer, Quantitative    Collection Time: 10/20/20  7:38 AM    Specimen: Blood   Result Value Ref Range    D-Dimer, Quantitative <0.27 0.00 - 0.56 MCGFEU/mL   Sedimentation Rate    Collection Time: 10/20/20  7:38 AM    Specimen: Blood   Result Value Ref Range    Sed Rate 89 (H) 0 - 20 mm/hr   CBC Auto Differential    Collection Time: 10/20/20  7:38 AM    Specimen: Blood   Result Value Ref Range    WBC 10.18 3.40 - 10.80 10*3/mm3    RBC 5.73 4.14 - 5.80 10*6/mm3    Hemoglobin 16.8 13.0 - 17.7 g/dL    Hematocrit 50.5 37.5 - 51.0 %    MCV 88.1 79.0 - 97.0 fL    MCH 29.3 26.6 - 33.0 pg    MCHC 33.3 31.5 - 35.7 g/dL    RDW 13.3 12.3 - 15.4 %    RDW-SD 43.1 37.0 - 54.0 fl    MPV 11.6 6.0 - 12.0 fL    Platelets 143 140 - 450 10*3/mm3    Neutrophil % 82.7 (H) 42.7 - 76.0 %    Lymphocyte % 8.6 (L) 19.6 - 45.3 %    Monocyte % 5.6 5.0 - 12.0 %    Eosinophil % 1.2 0.3 - 6.2 %    Basophil % 0.5 0.0 - 1.5 %    Immature  Grans % 1.4 (H) 0.0 - 0.5 %    Neutrophils, Absolute 8.42 (H) 1.70 - 7.00 10*3/mm3    Lymphocytes, Absolute 0.88 0.70 - 3.10 10*3/mm3    Monocytes, Absolute 0.57 0.10 - 0.90 10*3/mm3    Eosinophils, Absolute 0.12 0.00 - 0.40 10*3/mm3    Basophils, Absolute 0.05 0.00 - 0.20 10*3/mm3    Immature Grans, Absolute 0.14 (H) 0.00 - 0.05 10*3/mm3    nRBC 0.0 0.0 - 0.2 /100 WBC   Magnesium    Collection Time: 10/20/20  7:38 AM    Specimen: Blood   Result Value Ref Range    Magnesium 2.2 1.6 - 2.4 mg/dL   Duplex Lower Extremity Art / Grafts - Right CAR    Collection Time: 10/20/20 10:50 AM   Result Value Ref Range    RIGHT TAVO RATIO 1.00     LEFT TAVO RATIO 1.00     Right Brachial Pressure 140 mmHg    Left Brachial Pressure 140 mmHg    CFA Prox PSV-Right 153.00 cm/s    CFA Prox EDV-Right 24.00 cm/s    CFA Distal PSV-Right 148.00 cm/s    CFA Distal EDV-Right 22.00 cm/s    DFA Prox PSV-Right 62.00 cm/s    SFA Prox PSV-Right 126.00 cm/s    SFA Prox EDV-Right 25.00 cm/s    SFA Mid PSV-Right 116.00 cm/s    SFA Mid EDV-Right 27.00 cm/s    SFA Distal PSV-Right 104.00 cm/s    SFA Distal EDV-Right 22.00 cm/s    Popiteal A Prox PSV-Right 103.00 cm/s    Popiteal A Prox EDV-Right 26.00 cm/s    Popiteal A Distal PSV-Right 96.00 cm/s    Popiteal A Distal EDV-Right 21.00 cm/s    PTA Mid PSV-Right 60.00 cm/s    PTA Mid EDV-Right 17.00 cm/s    PTA Distal PSV-Right 76.00 cm/s    PTA Distal EDV-Right 15.00 cm/s    Ant Tibial A Prox PSV-Right 69.00 cm/s    Ant Tibial A Prox EDV-Right 14.00 cm/s    Ant Tibial A Mid PSV-Right 74.00 cm/s    Ant Tibial A Mid EDV-Right 17.00 cm/s    Ant Tibial A Distal PSV-Right 80.00 cm/s    Ant Tibial A Distal EDV-Right 14.00 cm/s    LEFT PTA PRESSURE 150 mmHg    RIGHT DPA PRESSURE 130 mmHg    RIGHT PTA PRESSURE 150 mmHg   Duplex Venous Lower Extremity - Right CAR    Collection Time: 10/20/20 11:03 AM   Result Value Ref Range    Right Common Femoral Spont Y     Right Common Femoral Phasic Y     Right Common Femoral  "Augment Y     Right Common Femoral Competent Y     Right Common Femoral Compress C     Right Saphenofemoral Junction Compress C     Right Proximal Femoral Compress C     Right Mid Femoral Spont Y     Right Mid Femoral Phasic Y     Right Mid Femoral Augment Y     Right Mid Femoral Competent Y     Right Mid Femoral Compress C     Right Distal Femoral Compress C     Right Popliteal Spont Y     Right Popliteal Phasic Y     Right Popliteal Augment Y     Right Popliteal Competent Y     Right Popliteal Compress C     Right Posterior Tibial Compress C     Right Peroneal Compress C     Right GastronemiusSoleal Compress C     Right Greater Saph AK Compress C     Right Greater Saph BK Compress C     Left Common Femoral Spont Y     Left Common Femoral Phasic Y     Left Common Femoral Augment Y     Left Common Femoral Competent Y     Left Common Femoral Compress C     Left Saphenofemoral Junction Compress C      Note: In addition to lab results from this visit, the labs listed above may include labs taken at another facility or during a different encounter within the last 24 hours. Please correlate lab times with ED admission and discharge times for further clarification of the services performed during this visit.    XR Foot 3+ View Right   Preliminary Result   1. Diffuse soft tissue swelling, and evidence of heel ulcer is noted. No   evidence of calcaneal osteomyelitis.   2. Smooth periosteal reaction of the second, third and fourth   metatarsals favored to be chronic. Please correlate with any symptoms in   this region however.        D:  10/20/2020   E:  10/20/2020            Vitals:    10/20/20 0700 10/20/20 0730 10/20/20 0800 10/20/20 1050   BP: 128/79 152/87     BP Location:       Patient Position:       Pulse: 87  87    Resp:       Temp:       TempSrc:       SpO2: 94% 94% 96%    Weight:    (!) 141 kg (310 lb)   Height:    193 cm (75.98\")     Medications   sodium chloride 0.9 % flush 10 mL (has no administration in time " range)   sodium chloride 0.9 % infusion (125 mL/hr Intravenous New Bag 10/20/20 0806)   HYDROmorphone (DILAUDID) injection 0.5 mg (0.5 mg Intravenous Given 10/20/20 0806)   cefTRIAXone (ROCEPHIN) 1 g/100 mL 0.9% NS (MBP) (0 g Intravenous Stopped 10/20/20 0843)   vancomycin 2750 mg/500 mL 0.9% NS IVPB ([facility]) (2,750 mg Intravenous New Bag 10/20/20 0957)   ondansetron (ZOFRAN) injection 4 mg (4 mg Intravenous Given 10/20/20 0806)   potassium chloride (KLOR-CON) packet 40 mEq (40 mEq Oral Given 10/20/20 1003)     ECG/EMG Results (last 24 hours)     ** No results found for the last 24 hours. **        No orders to display                                     MDM  Number of Diagnoses or Management Options  Cellulitis of right lower extremity:   Elevated sed rate:   Heel ulcer due to secondary DM (CMS/HCC):   Hypokalemia:   Hyponatremia:   LFTs abnormal:   Pain and swelling of lower leg, right:   Diagnosis management comments:       I reviewed all available studies at bedside with the patient.  This x-ray of his foot shows no osteomyelitis of the heel.  Is images of his toe are abnormal but I suspect these are chronically that way and he really has no pain to suggest injury or osteo of his toes.    His venous duplex was normal without clot.    His arterial duplex showed he had normal ABIs but he did have monophasic flow in his popliteal trunk but it turned triphasic flow back at his ankle.    His potassium is low and he is on a big dose of diuretics, to increase his potassium and I gave him some here as well.  He does have chronic hyponatremia and chronic LFT abnormalities as well.    I think his current issues are due to cellulitis of his right lower extremity I started him on Rocephin and vancomycin here and put him on amoxicillin and doxycycline at home for this.  Advise follow-up with his wound care doctor and I will refer him to infectious disease and back to his primary care as well to keep track of his infection and  his potassium level.  He will return to the ER if worse in any way.    All are agreeable to plan.       Amount and/or Complexity of Data Reviewed  Clinical lab tests: reviewed  Tests in the radiology section of CPT®: reviewed  Decide to obtain previous medical records or to obtain history from someone other than the patient: yes        Final diagnoses:   Pain and swelling of lower leg, right   Cellulitis of right lower extremity   Heel ulcer due to secondary DM (CMS/HCC)   Hypokalemia   Hyponatremia   LFTs abnormal   Elevated sed rate            Cesar Contreras MD  10/20/20 1209

## 2020-10-21 NOTE — TELEPHONE ENCOUNTER
Called pt and he said his daughter told him his pot was low it could kill him. The ER dr had prescribed additional potassium to take to help raise it. He states he is taking 30mg bid

## 2020-10-21 NOTE — TELEPHONE ENCOUNTER
PHARMACY CALLED AND WAS ASKING ABOUT PATIENTS  POTASSIUM; HE SAID THAT ER PHYSICIAN PUT PATIENT ON THIS MEDICATION AND PHARMACY WASN'T SURE IF DR KEE WANTED THE PATIENT TO TAKE THIS ON A PERMANENT BASIS; PHARMACY SAID YOU MAY WANT TO CALL PATIENT TO CLARIFY    FARTUN EUGENE: [phone]    PATIENT WAS SEEN IN [facility]

## 2020-10-23 NOTE — OUTREACH NOTE
Care Coordination Note    Contacted Dr. Pavan COLON's office regarding f/u ED appt.  Was instructed that pt is to see podiatrist first for evaluation and it he thought needed ID, would get appt set up with referral from podiatrist.   Pt notified of this and states he will call his podiatrist, Dr. Rosado as soon as he is off our call.     Katharine Cha RN  Ambulatory     10/23/2020, 10:43 EDT

## 2020-10-23 NOTE — OUTREACH NOTE
Care Coordination Assessment    Documented/Reviewed By: Katharine Cha RN Date/time: 10/23/2020 10:14 AM   Assessment completed with: patient  Living arrangement: family members, spouse  Support system: family  Type of residence: private residence  Equipment used at home:  (Comment: Dexcom CGM)  Communication device: No  Bed or wheelchair confined: No  Inadequate nutrition: No  Medication adherence problem: No  Experiencing side effects from current medications: No  History of fall(s) in last 6 months: No  Difficulty keeping appointments: No  Family aware of the patient's advance care planning wishes: Yes (Comment: States family aware of wishes, however does not have Living Will at present)

## 2020-10-23 NOTE — OUTREACH NOTE
"Care Plan Note      Responses   Annual Wellness Visit:   Patient Will Schedule   Care Gaps Addressed  Colon Cancer Screening, Diabetic A1C, Diabetic Eye Exam, Flu Shot, Pneumonia Vaccine   Colon Cancer Screening Type  Colonoscopy   Colonoscopy Status  Up to Date (< 10 yrs)   Colon Cancer Screening Completion at [facility] or Other  Other   Other Location  Had in [facility] approx 3 years ago.     HbA1c Status  -- [States had at Dr. Hendricks office before they join [facility] and was coming down from 14.8 to 9]   Diabetic Eye Exam Status  Up to Date [facility]Oct 2020   [facility]   Flu Shot Status  Patient will Complete   Flu Shot Comments  States has appt today at [facility] to have   Care Gap Comments  States has appt today at [facility] to have   Specific Disease Process Teaching  Diabetes, Heart Disease   Other Patient Education/Resources   24/7 [facility] Nurse Call Line   24/7 Nurse Call Line Education Method  Verbal   Does patient have depression diagnosis?  Yes   Advanced Directives:  -- [Will discuss with Dr. Maria ]   Ed Visits past 12 months:  2 or 3   Hospitalizations past 12 months  2 or 3        The main concerns and/or symptoms the patient would like to address are: Pt contacted regarding ED visit 10/20/2020 with chief c/o leg pain.  States \"I'm doing fine.  I goet the medicine I needed and that is helping a lot.\"  Taking his antibiotic as directed without issue.  Reviewed the AVS with pt. Aware of increase in oral potassium.   He thought he was supposed to hear from ID about appt.  Asked RN-ACM to check on this.  Usually sees Dr. Pavan Levy at [facility].  Contacted their office. He states pt had been contacted, however he was supposed to see his podiatrist first for eval and then if needed podiatrist to refer back to ID.  Pt states he has standing appt with podiatrist and next appt is 10/27, however he will call his office and let them know of ED visit as soon as off our call.  He states he is compliant " with medicines and medical appts.  Pt states he moved here from [place] [facility] couple years ago and is retired , so he does cooking at home.  He lives with spouse, oldest daughter who is a [employer] APRN, her  and grandchildren.  He ambulates without assistive device, denies any recent falls and states he did about 3 hours of garden work yest. He does drive himself and has no transportation issues and denies any food insecurities.   He has dexcom CGM for his diabetes and states although his A1C in Feb was 14.8, he did have more recent A1C at Dr. Hendricks office that showed it coming down and was now 9.  He contributes improvement to the CGM that he has had for approx 6 weeks. He does watch his weight and states he can also tell by looking at ankles and will adjust diuretic as instructed.  He has appt with [facility] today to update vaccines.  He had colonoscopy in [place] approx 3 years ago and was to have f/u colonoscopy in 5 years.  He has established care with Dr. Murcia. Pt very knowledgeable of his medical history. He is active on MyChart.  He does not have Living Will, but he and his wife have been talking about it.    He denies any needs at present and did voice his appreciation for the call.      Education/instruction provided by Care Coordinator:  Role of  explained and contact number given.  [facility] 24/7 Nurse line explained and contact number provided.  DM and Heart Failure discussed.     Follow Up Outreach Due:   As needed     Katharine Cha RN  Ambulatory     10/23/2020, 10:21 EDT

## 2020-10-26 NOTE — TELEPHONE ENCOUNTER
[facility]     States he has recently been on 2 other antibiotics and they have caused him to have thrush.

## 2020-10-26 NOTE — TELEPHONE ENCOUNTER
PATIENT HAS CALLED TO REQUEST A MEDICATION TO BE CALLED INTO HIS PHARMACY. HE WAS DISCHARGED FROM THE HOSPITAL ON 10/20/2020 FOR CELLULITIS AND WAS GIVEN TWO ANTIBIOTICS UPON BEING RELEASED. THE PATIENT HAS EXPRESSED THAT HE GETS THRUSH WHEN HE TAKES ANTIBIOTICS AND WOULD LIKE [facility] CALLED IN TO HELP HIM WITH HIS THRUSH. HE WAS HOPING THAT THIS COULD BE DONE TODAY. UPON INFORMING THE PATIENT THAT HIS REQUEST COULD TAKE 24-48 HOURS  HE REQUESTED TO SPEAK WITH SOMEONE IN THE OFFICE DUE TO HIS CONCERN THAT THE THRUSH COULD BE REALLY BAD TOMORROW.   WARM TRANSFERRED PATIENT INTO THE OFFICE         FARTUN [address] [phone]  - [phone] FX

## 2020-11-02 NOTE — THERAPY DISCHARGE NOTE
Outpatient Physical Therapy Discharge Summary         Patient Name: Abe Phillips Jr.  : 1955  MRN: [id number]    Today's Date: 2020    Visit Dx:    ICD-10-CM ICD-9-CM   1. Lymphedema  I89.0 457.1           OP PT Discharge Summary  Date of Discharge: 20  Reason for Discharge: All goals achieved  Outcomes Achieved: Able to achieve all goals within established timeline  Discharge Instructions/Additional Comments: Patient was seen for initial eval and 1 follow-up visit. Symptoms improved with consistent wear of compression socks.  Patient was to call if any change. Discharged due to no further follow-up needed        Hailee Luo, PT  2020

## 2020-11-19 PROBLEM — E11.649 DIABETIC HYPOGLYCEMIA (HCC): Status: ACTIVE | Noted: 2020-01-01

## 2020-11-19 PROBLEM — E29.1 HYPOGONADISM IN MALE: Status: ACTIVE | Noted: 2020-01-01

## 2020-11-19 NOTE — PROGRESS NOTES
"     Office Note      Date: 2020  Patient Name: Abe Phillips Jr.  MRN: [id number]  : 1955    Chief Complaint   Patient presents with   • Diabetes       History of Present Illness:   Abe Phillips Jr. is a 65 y.o. male who presents for Diabetes type 2- duration 20 years   He is using  u500 in an omnipod pump supplemented with a dexcom   The pump is set to deliver 0.8 units per hour which actually gives him 4 units of insulin per hour.  He has to be careful with boluses late at night or he goes low at night.   He has had blood sugars as low as  40..    Changes since last time- he  Had congestive heart failure.  He has had  Bell palsy. He had mri of  Brain and face to assess the structures and nerves of the  Face.    He states that in the past he had low testosterone and used testosterone replacement. He found the injections  Helped better than the shots.  He has not had cad.      Hemoglobin A1C   Date Value Ref Range Status   2020 8.3 % Final   2020 14.80 (H) 4.80 - 5.60 % Final       Changes in health since last visit: see above . Last eye exam up to date .    Subjective            Review of Systems:   Review of Systems   Constitutional: Positive for unexpected weight change.   Eyes: Positive for pain.   Cardiovascular: Positive for leg swelling.   Gastrointestinal: Positive for constipation.   Musculoskeletal: Positive for back pain.   Hematological: Bruises/bleeds easily.       The following portions of the patient's history were reviewed and updated as appropriate: allergies, current medications, past family history, past medical history, past social history, past surgical history and problem list.    Objective     Visit Vitals  /78 (BP Location: Left arm, Patient Position: Sitting, Cuff Size: Adult)   Pulse 92   Ht 193 cm (76\")   Wt (!) 148 kg (327 lb)   BMI 39.80 kg/m²       Labs:    CMP  Lab Results   Component Value Date    GLUCOSE 372 (H) 10/23/2020    BUN 53 (H) 10/23/2020    " CREATININE 1.26 10/23/2020    EGFRIFNONA 57 (L) 10/23/2020    BCR 42.1 (H) 10/23/2020    K 3.2 (L) 10/23/2020    CO2 32.1 (H) 10/23/2020    CALCIUM 9.8 10/23/2020    PROTENTOTREF 7.0 07/26/2019    LABIL2 0.9 07/26/2019    AST 56 (H) 10/20/2020    ALT 49 (H) 10/20/2020        CBC w/DIFF  Lab Results   Component Value Date    WBC 10.18 10/20/2020    RBC 5.73 10/20/2020    HGB 16.8 10/20/2020    HCT 50.5 10/20/2020    MCV 88.1 10/20/2020    MCH 29.3 10/20/2020    MCHC 33.3 10/20/2020    RDW 13.3 10/20/2020    RDWSD 43.1 10/20/2020    MPV 11.6 10/20/2020     10/20/2020    NEUTRORELPCT 82.7 (H) 10/20/2020    LYMPHORELPCT 8.6 (L) 10/20/2020    MONORELPCT 5.6 10/20/2020    EOSRELPCT 1.2 10/20/2020    BASORELPCT 0.5 10/20/2020    AUTOIGPER 1.4 (H) 10/20/2020    NEUTROABS 8.42 (H) 10/20/2020    LYMPHSABS 0.88 10/20/2020    MONOSABS 0.57 10/20/2020    EOSABS 0.12 10/20/2020    BASOSABS 0.05 10/20/2020    AUTOIGNUM 0.14 (H) 10/20/2020    NRBC 0.0 10/20/2020       Physical Exam:  Physical Exam     Assessment / Plan      Assessment & Plan:  Problem List Items Addressed This Visit        Endocrine    Diabetes mellitus type 2, uncontrolled, with complications (CMS/[facility]) - Primary    Current Assessment & Plan     diabetres is improved  Continue current treatment          Relevant Medications    glucose blood test strip    Blood Glucose Monitoring Suppl (Contour Next Monitor) w/Device kit    Microlet Lancets misc    Continuous Blood Gluc Sensor (Dexcom G6 Sensor)    Continuous Blood Gluc  (Dexcom G6 ) device    Continuous Blood Gluc Transmit (Dexcom G6 Transmitter) misc    insulin regular (HUMULIN R) 500 UNIT/ML CONCENTRATED injection    Other Relevant Orders    POC Glycosylated Hemoglobin (Hb A1C) (Completed)    Diabetic hypoglycemia (CMS/[facility])    Current Assessment & Plan     Has some nocturnal hypos. Can reduce night time basal rate    dexcom data downloaded and reviewed with patient. Estimated a1c - 7  2 %  hypoglycemia.. some  Nocturnal.  27 % hyperglycemic          Relevant Medications    insulin regular (HUMULIN R) 500 UNIT/ML CONCENTRATED injection    Hypogonadism in male    Current Assessment & Plan     A  New problem requiring further work up. Will check level s                Dustin Hendricks MD   11/19/2020

## 2020-11-19 NOTE — ASSESSMENT & PLAN NOTE
Has some nocturnal hypos. Can reduce night time basal rate    dexcom data downloaded and reviewed with patient. Estimated a1c - 7  2 % hypoglycemia.. some  Nocturnal.  27 % hyperglycemic

## 2020-11-30 NOTE — TELEPHONE ENCOUNTER
Caller: Abe Phillips Jr.    Relationship: Self    Best call back number: [phone]     Medication needed:   Requested Prescriptions     Pending Prescriptions Disp Refills   • gabapentin (NEURONTIN) 300 MG capsule 180 capsule 2     Sig: Take 3 capsules by mouth 2 (Two) Times a Day.     REQUESTING A 90 DAY SUPPLY WITH REFILLS    When do you need the refill by: ASAP    What details did the patient provide when requesting the medication: HAS 3 DAY     Does the patient have less than a 3 day supply:  [] Yes  [x] No    What is the patient's preferred pharmacy: [address] [phone] [place] [phone]

## 2020-12-14 PROBLEM — E78.49 OTHER HYPERLIPIDEMIA: Status: ACTIVE | Noted: 2019-02-08

## 2020-12-14 NOTE — PROGRESS NOTES
[facility] Note    Subjective     Chief Complaint   Patient presents with   • Right Knee - Pain     Primary osteoarthritis of right knee-last cortisone injection given 10/14/20            HPI    It has been 2  month(s) since Mr. Phillips's last visit. He returns to clinic today for follow-up of right knee arthritis. He rates his pain a 7/10 on the pain scale. Previous/current treatments: steroid injection (last injection 10/14/20). Current symptoms: pain, swelling and giving way/buckling. The pain is worse with walking, standing, sitting and climbing stairs; resting and heat improve the pain. Overall, he is doing the same.  He had initial good pain relief with the injection, but then visited his daughter in [place], and stressed his knee at that time.    I have reviewed the following portions of the patient's history and agree with: History of Present Illness and Review of Systems    Patient Active Problem List   Diagnosis   • Type 2 diabetes mellitus with hyperglycemia (CMS/HCC)   • Elevated LFTs   • Thrombocytopenia (CMS/HCC)   • PVD (peripheral vascular disease) (CMS/HCC)   • A-fib (CMS/HCC)   • Cirrhosis of liver (CMS/HCC)   • Chronic congestive heart failure (CMS/HCC)   • Diabetic ulcer of right heel (CMS/HCC)   • Primary narcolepsy without cataplexy   • Essential hypertension   • Morbidly obese (CMS/HCC)   • Restless legs syndrome (RLS)   • ADD (attention deficit disorder)   • Depression   • Diabetes mellitus type 2, uncontrolled, with complications (CMS/HCC)   • Edema   • MCCAULEY (nonalcoholic steatohepatitis)   • Hepatitis B   • Insomnia   • Lymphedema   • Obesity   • Severe JAIME on CPAP   • RLS (restless legs syndrome)   • Tremor of both hands   • Restrictive cardiomyopathy (CMS/HCC)   • Other hyperlipidemia   • Uncontrolled type 2 diabetes mellitus with hyperglycemia (CMS/HCC)   • OMAR (acute kidney injury) (CMS/HCC)   • Dyslipidemia   • Hyperglycemia   • Diabetic ulcer of right heel  "associated with type 2 diabetes mellitus (CMS/HCC)   • Facial paralysis/[place] palsy   • Diabetic hypoglycemia (CMS/HCC)   • Hypogonadism in male     Past Medical History:   Diagnosis Date   • A-fib (CMS/HCC)    • ADD (attention deficit disorder)    • Atrial flutter (CMS/HCC)    • Cellulitis    • CHF (congestive heart failure) (CMS/HCC)    • Cirrhosis of liver (CMS/HCC)    • Constipation    • Depression    • Diabetes mellitus (CMS/HCC)    • Diabetic ulcer of right foot (CMS/HCC)    • Disease of thyroid gland    • Edema    • Fatty liver    • Hepatitis B    • Hyperlipidemia    • Hypokalemia    • Insomnia    • Lymphedema    • Narcolepsy    • Neuropathy    • Obesity    • JAIEM on CPAP    • PVD (peripheral vascular disease) (CMS/HCC)    • RLS (restless legs syndrome)    • Skin cancer    • Tardive dyskinesia    • Tremor of both hands    • Type 2 diabetes mellitus (CMS/HCC)    • Yeast infection       Past Surgical History:   Procedure Laterality Date   • ACHILLES TENDON REPAIR     • CARDIAC ABLATION     • CHOLECYSTECTOMY     • LASER ABLATION      VEIN RLE      Family History   Problem Relation Age of Onset   • Heart failure Mother         CHF   • Osteoarthritis Mother    • Clotting disorder Father    • Diabetes Father    • Hypertension Father    • No Known Problems Sister    • No Known Problems Brother    • No Known Problems Brother      Social History     Socioeconomic History   • Marital status:      Spouse name: Not on file   • Number of children: Not on file   • Years of education: Not on file   • Highest education level: Not on file   Social Needs   • Financial resource strain: Not on file   • Food insecurity     Worry: Never true     Inability: Never true   • Transportation needs     Medical: No     Non-medical: No   Tobacco Use   • Smoking status: Never Smoker   • Smokeless tobacco: Never Used   Substance and Sexual Activity   • Alcohol use: Yes     Comment: \"3 SCOTCH AND 2 BEERS PER WEEK\"   • Drug use: No   • " "Sexual activity: Defer      Current Outpatient Medications on File Prior to Visit   Medication Sig Dispense Refill   • Alcohol Swabs (ALCOHOL PADS) 70 % pads 1 swab 3 (Three) Times a Day. 100 each 0   • amphetamine-dextroamphetamine XR (ADDERALL XR) 30 MG 24 hr capsule Take 2 capsules by mouth Every Morning 60 capsule 0   • aspirin 81 MG EC tablet TAKE 1 TABLET BY MOUTH EVERY DAY 90 tablet 1   • Blood Glucose Monitoring Suppl (Contour Next Monitor) w/Device kit 1 kit Every 3 (Three) Months. Use to check bg 1 kit 0   • bumetanide (BUMEX) 2 MG tablet Take 2 tablets by mouth 2 (Two) Times a Day. 120 tablet 5   • Continuous Blood Gluc  (Dexcom G6 ) device 1 kit Every 3 (Three) Months. For checking bg gets one per year 1 Device 0   • Continuous Blood Gluc Sensor (Dexcom G6 Sensor) Every 10 (Ten) Days. 3 each 11   • Continuous Blood Gluc Transmit (Dexcom G6 Transmitter) misc 1 kit Every 3 (Three) Months. Use to check blood sugar 1 each 3   • diclofenac (VOLTAREN) 1 % gel gel Apply 4 g topically to the appropriate area as directed 2 (Two) Times a Day As Needed (joint pain). 100 g 1   • Docusate Calcium (STOOL SOFTENER PO) Take 1 tablet by mouth As Needed.     • gabapentin (NEURONTIN) 300 MG capsule Take 3 capsules by mouth 2 (Two) Times a Day. 180 capsule 2   • glucose blood test strip Contour next test strips  For checking bg tid 100 each 5   • Insulin Disposable Pump (OmniPod Dash 5 Pack Pods) misc CHANGE POD EVERY 3 DAYS     • insulin regular (HUMULIN R) 500 UNIT/ML CONCENTRATED injection As directed in insulin pump mas daily dose 40 unit     • Insulin Syringe 29G X 1/2\" 1 ML misc Use one each to inject insulin four times daily Ell.9 500 each 1   • levocetirizine (XYZAL) 5 MG tablet Take 1 tablet by mouth Every Evening. 90 tablet 1   • levothyroxine (SYNTHROID, LEVOTHROID) 75 MCG tablet TAKE ONE TABLET BY MOUTH DAILY 90 tablet 3   • metOLazone (ZAROXOLYN) 5 MG tablet Take 1 tablet by mouth Daily As " "Needed (edema/fluid overload). 90 tablet 1   • Microlet Lancets misc For checking bg tid 100 each 3   • mupirocin (BACTROBAN) 2 % ointment Apply  topically to the appropriate area as directed 3 (Three) Times a Day. 30 g 0   • Needle, Disp, (BD Disp Needle) 23G X 1\" misc Use to inject testosterone every 2 weeks 6 each 1   • nystatin (MYCOSTATIN) 150410 UNIT/GM ointment      • potassium chloride (K-DUR) 10 MEQ CR tablet Take 3 tablets by mouth 2 (Two) Times a Day. 540 tablet 3   • pramipexole (MIRAPEX) 1 MG tablet TAKE ONE TABLET BY MOUTH TWICE A  tablet 1   • pravastatin (PRAVACHOL) 40 MG tablet Take 1 tablet by mouth Every Night. 90 tablet 3   • psyllium (METAMUCIL) 58.6 % packet Take 1 packet by mouth Daily As Needed.     • spironolactone (ALDACTONE) 100 MG tablet Take 0.5 tablets by mouth Daily. 30 tablet 6   • Syringe/Needle, Disp, (B-D 3CC LUER-AGNES SYR 53QA7-3/2) 18G X 1-1/2\" 3 ML misc Use to draw up testosterone every 2 weeks 6 each 1   • Testosterone Cypionate (DEPOTESTOTERONE CYPIONATE) 200 MG/ML injection Inject 1 mL into the appropriate muscle as directed by prescriber Every 14 (Fourteen) Days. 10 mL 1   • traZODone (DESYREL) 50 MG tablet Take 1 tablet by mouth At Night As Needed for Sleep. 90 tablet 2   • venlafaxine XR (EFFEXOR-XR) 150 MG 24 hr capsule Take 1 capsule by mouth Every Night. 90 capsule 2   • [DISCONTINUED] fluconazole (Diflucan) 100 MG tablet Take 1 tablet by mouth Daily. 7 tablet 0     No current facility-administered medications on file prior to visit.       No Known Allergies     Review of Systems   Constitutional: Negative.    HENT: Negative.    Eyes: Negative.    Respiratory: Negative.    Cardiovascular: Negative.    Gastrointestinal: Negative.    Endocrine: Negative.    Genitourinary: Negative.    Musculoskeletal: Positive for arthralgias.   Skin: Negative.    Allergic/Immunologic: Negative.    Neurological: Negative.    Hematological: Negative.    Psychiatric/Behavioral: " "Negative.         Objective      Physical Exam  Pulse 79   Ht 193 cm (75.98\")   Wt (!) 144 kg (317 lb 7.4 oz)   SpO2 94%   BMI 38.66 kg/m²     Body mass index is 38.66 kg/m².    General:   Mental Status:  Alert   Appearance: Cooperative, in no acute distress   Build and Nutrition: Obese male   Orientation: Alert and oriented to person, place and time   Posture: Normal   Gait: Slow    Integument:              Right knee: No skin lesions, no rash, no ecchymosis     Lower Extremities:              Right Knee:                          Tenderness:    Medial and lateral joint line tenderness                          Effusion:          None                          Swelling:          None                          Crepitus:          Positive                          Atrophy:           None                          Range of motion:        Extension:       0°                                                              Flexion:           120°  Instability:        No varus laxity, no valgus laxity, negative anterior drawer  Deformities:     None      Assessment and Plan     Diagnoses and all orders for this visit:    1. Primary osteoarthritis of right knee (Primary)  -     Large Joint Arthrocentesis: R knee        1. Primary osteoarthritis of right knee        I reviewed my findings with the patient today.  His knee was doing well after the injection, but then he visited his daughter in [place], who had low furniture and toilets, which stressed his knee.  He has a trip to [place] tomorrow, to visit a friend in hospice care.  He would like to have an injection before traveling, and this was provided.  Last injection was 2 months ago.  He is a candidate for viscosupplementation injections, and we will see him back in 4 weeks to consider an injection series.    Procedure Note:  The potential benefits of performing a therapeutic right knee joint injection, as well as potential risks (including, but not limited to infection, " swelling, pain, bleeding, bruising, nerve/blood vessel damage, skin color changes, transient elevation in blood glucose levels, and fat atrophy) were discussed with the patient.  After informed consent, timeout procedure was performed, and the skin on the right knee was prepped with chlorhexidine soap and alcohol, after which ethyl chloride was applied to the skin at the injection site. Via the anterolateral approach, 1ml of Kenalog 40mg/ml mixed with 4ml 0.5% ropivacaine plain was injected into the knee joint.  The patient tolerated the procedure well, experiencing 75% improvement a few minutes following the injection. There were no complications.  Band-Aid was applied to the injection site. Post-procedural instructions were given to the patient and/or their caregiver.      Return in about 4 weeks (around 1/11/2021).    Medical Decision Making  Management Options : prescription/IM medicine    Delta Miller MD  12/14/20  15:45 SARAH Owens disclaimer:  Much of this encounter note is an electronic transcription/translation of spoken language to printed text. The electronic translation of spoken language may permit erroneous, or at times, nonsensical words or phrases to be inadvertently transcribed; Although I have reviewed the note for such errors, some may still exist.

## 2020-12-14 NOTE — PROGRESS NOTES
Procedure   Large Joint Arthrocentesis: R knee  Date/Time: 12/14/2020 3:32 PM  Consent given by: patient  Site marked: site marked  Timeout: Immediately prior to procedure a time out was called to verify the correct patient, procedure, equipment, support staff and site/side marked as required   Supporting Documentation  Indications: pain   Procedure Details  Location: knee - R knee  Preparation: Patient was prepped and draped in the usual sterile fashion  Needle size: 22 G  Approach: anterolateral  Medications administered: 40 mg triamcinolone acetonide 40 MG/ML; 4 mL ropivacaine 0.5 %  Patient tolerance: patient tolerated the procedure well with no immediate complications

## 2020-12-24 NOTE — TELEPHONE ENCOUNTER
Patients foot is swollen possible infection and wanted to know if he can get some antibiotics sent into [facility].

## 2020-12-29 NOTE — TELEPHONE ENCOUNTER
PLEASE CALL IN TODAY   RX FOR U 500 SYRINGES PT USES [facility]    PTS NUMBER IF YOU HAVE QUESTIONS [phone]

## 2021-01-01 ENCOUNTER — OFFICE VISIT (OUTPATIENT)
Dept: NEUROLOGY | Facility: CLINIC | Age: 66
End: 2021-01-01

## 2021-01-01 ENCOUNTER — OFFICE VISIT (OUTPATIENT)
Dept: PSYCHIATRY | Facility: CLINIC | Age: 66
End: 2021-01-01

## 2021-01-01 ENCOUNTER — OFFICE VISIT (OUTPATIENT)
Dept: INTERNAL MEDICINE | Facility: CLINIC | Age: 66
End: 2021-01-01

## 2021-01-01 ENCOUNTER — OFFICE VISIT (OUTPATIENT)
Dept: ENDOCRINOLOGY | Facility: CLINIC | Age: 66
End: 2021-01-01

## 2021-01-01 ENCOUNTER — LAB (OUTPATIENT)
Dept: LAB | Facility: HOSPITAL | Age: 66
End: 2021-01-01

## 2021-01-01 ENCOUNTER — APPOINTMENT (OUTPATIENT)
Dept: CARDIOLOGY | Facility: HOSPITAL | Age: 66
End: 2021-01-01

## 2021-01-01 ENCOUNTER — TELEPHONE (OUTPATIENT)
Dept: INTERNAL MEDICINE | Facility: CLINIC | Age: 66
End: 2021-01-01

## 2021-01-01 ENCOUNTER — APPOINTMENT (OUTPATIENT)
Dept: CT IMAGING | Facility: HOSPITAL | Age: 66
End: 2021-01-01

## 2021-01-01 ENCOUNTER — APPOINTMENT (OUTPATIENT)
Dept: GENERAL RADIOLOGY | Facility: HOSPITAL | Age: 66
End: 2021-01-01

## 2021-01-01 ENCOUNTER — READMISSION MANAGEMENT (OUTPATIENT)
Dept: CALL CENTER | Facility: HOSPITAL | Age: 66
End: 2021-01-01

## 2021-01-01 ENCOUNTER — HOSPITAL ENCOUNTER (INPATIENT)
Facility: HOSPITAL | Age: 66
LOS: 8 days | Discharge: NURSING, REHAB OR LONG-TERM CARE | End: 2021-09-13
Attending: EMERGENCY MEDICINE | Admitting: INTERNAL MEDICINE

## 2021-01-01 ENCOUNTER — TELEMEDICINE (OUTPATIENT)
Dept: CARDIOLOGY | Facility: HOSPITAL | Age: 66
End: 2021-01-01

## 2021-01-01 ENCOUNTER — HOSPITAL ENCOUNTER (INPATIENT)
Facility: HOSPITAL | Age: 66
LOS: 3 days | Discharge: HOME | End: 2021-08-01
Attending: EMERGENCY MEDICINE | Admitting: HOSPITALIST

## 2021-01-01 ENCOUNTER — TELEPHONE (OUTPATIENT)
Dept: CARDIOLOGY | Facility: HOSPITAL | Age: 66
End: 2021-01-01

## 2021-01-01 ENCOUNTER — DOCUMENTATION (OUTPATIENT)
Dept: CARDIAC REHAB | Facility: HOSPITAL | Age: 66
End: 2021-01-01

## 2021-01-01 ENCOUNTER — DOCUMENTATION (OUTPATIENT)
Dept: INTERNAL MEDICINE | Facility: HOSPITAL | Age: 66
End: 2021-01-01

## 2021-01-01 ENCOUNTER — OFFICE VISIT (OUTPATIENT)
Dept: ORTHOPEDIC SURGERY | Facility: CLINIC | Age: 66
End: 2021-01-01

## 2021-01-01 ENCOUNTER — ANESTHESIA EVENT (OUTPATIENT)
Dept: GASTROENTEROLOGY | Facility: HOSPITAL | Age: 66
End: 2021-01-01

## 2021-01-01 ENCOUNTER — ANESTHESIA (OUTPATIENT)
Dept: GASTROENTEROLOGY | Facility: HOSPITAL | Age: 66
End: 2021-01-01

## 2021-01-01 ENCOUNTER — TRANSITIONAL CARE MANAGEMENT TELEPHONE ENCOUNTER (OUTPATIENT)
Dept: CALL CENTER | Facility: HOSPITAL | Age: 66
End: 2021-01-01

## 2021-01-01 ENCOUNTER — TELEPHONE (OUTPATIENT)
Dept: SLEEP MEDICINE | Facility: HOSPITAL | Age: 66
End: 2021-01-01

## 2021-01-01 ENCOUNTER — CLINICAL SUPPORT (OUTPATIENT)
Dept: ORTHOPEDIC SURGERY | Facility: CLINIC | Age: 66
End: 2021-01-01

## 2021-01-01 ENCOUNTER — APPOINTMENT (OUTPATIENT)
Dept: NEUROLOGY | Facility: HOSPITAL | Age: 66
End: 2021-01-01

## 2021-01-01 ENCOUNTER — HOSPITAL ENCOUNTER (OUTPATIENT)
Facility: HOSPITAL | Age: 66
Discharge: HOME | End: 2021-08-18
Attending: EMERGENCY MEDICINE | Admitting: INTERNAL MEDICINE

## 2021-01-01 ENCOUNTER — OFFICE VISIT (OUTPATIENT)
Dept: SLEEP MEDICINE | Facility: HOSPITAL | Age: 66
End: 2021-01-01

## 2021-01-01 ENCOUNTER — HOSPITAL ENCOUNTER (INPATIENT)
Facility: HOSPITAL | Age: 66
LOS: 5 days | Discharge: NURSING, REHAB OR LONG-TERM CARE | End: 2021-09-27
Attending: STUDENT IN AN ORGANIZED HEALTH CARE EDUCATION/TRAINING PROGRAM | Admitting: INTERNAL MEDICINE

## 2021-01-01 ENCOUNTER — TELEPHONE (OUTPATIENT)
Dept: ENDOCRINOLOGY | Facility: CLINIC | Age: 66
End: 2021-01-01

## 2021-01-01 ENCOUNTER — OFFICE VISIT (OUTPATIENT)
Dept: CARDIOLOGY | Facility: CLINIC | Age: 66
End: 2021-01-01

## 2021-01-01 ENCOUNTER — APPOINTMENT (OUTPATIENT)
Dept: ULTRASOUND IMAGING | Facility: HOSPITAL | Age: 66
End: 2021-01-01

## 2021-01-01 ENCOUNTER — TREATMENT (OUTPATIENT)
Dept: PHYSICAL THERAPY | Facility: CLINIC | Age: 66
End: 2021-01-01

## 2021-01-01 ENCOUNTER — TELEPHONE (OUTPATIENT)
Dept: NEUROLOGY | Facility: CLINIC | Age: 66
End: 2021-01-01

## 2021-01-01 VITALS
HEIGHT: 76 IN | TEMPERATURE: 96.8 F | SYSTOLIC BLOOD PRESSURE: 126 MMHG | HEART RATE: 83 BPM | BODY MASS INDEX: 38.11 KG/M2 | WEIGHT: 313 LBS | OXYGEN SATURATION: 98 % | DIASTOLIC BLOOD PRESSURE: 78 MMHG

## 2021-01-01 VITALS
OXYGEN SATURATION: 96 % | HEIGHT: 75 IN | HEART RATE: 68 BPM | WEIGHT: 315 LBS | BODY MASS INDEX: 39.17 KG/M2 | TEMPERATURE: 97.6 F | DIASTOLIC BLOOD PRESSURE: 58 MMHG | SYSTOLIC BLOOD PRESSURE: 103 MMHG | RESPIRATION RATE: 18 BRPM

## 2021-01-01 VITALS
SYSTOLIC BLOOD PRESSURE: 128 MMHG | DIASTOLIC BLOOD PRESSURE: 66 MMHG | HEART RATE: 77 BPM | TEMPERATURE: 97.3 F | WEIGHT: 315 LBS | HEIGHT: 76 IN | OXYGEN SATURATION: 97 % | BODY MASS INDEX: 38.36 KG/M2

## 2021-01-01 VITALS
HEIGHT: 75 IN | WEIGHT: 315 LBS | DIASTOLIC BLOOD PRESSURE: 62 MMHG | SYSTOLIC BLOOD PRESSURE: 110 MMHG | HEART RATE: 86 BPM | OXYGEN SATURATION: 98 % | BODY MASS INDEX: 39.17 KG/M2

## 2021-01-01 VITALS
DIASTOLIC BLOOD PRESSURE: 82 MMHG | WEIGHT: 315 LBS | HEART RATE: 82 BPM | HEIGHT: 75 IN | RESPIRATION RATE: 20 BRPM | SYSTOLIC BLOOD PRESSURE: 128 MMHG | TEMPERATURE: 98.2 F | BODY MASS INDEX: 39.17 KG/M2 | OXYGEN SATURATION: 98 %

## 2021-01-01 VITALS
WEIGHT: 315 LBS | BODY MASS INDEX: 39.17 KG/M2 | HEART RATE: 74 BPM | DIASTOLIC BLOOD PRESSURE: 70 MMHG | HEIGHT: 75 IN | SYSTOLIC BLOOD PRESSURE: 124 MMHG

## 2021-01-01 VITALS
RESPIRATION RATE: 18 BRPM | SYSTOLIC BLOOD PRESSURE: 133 MMHG | DIASTOLIC BLOOD PRESSURE: 90 MMHG | WEIGHT: 315 LBS | HEART RATE: 79 BPM | BODY MASS INDEX: 39.17 KG/M2 | HEIGHT: 75 IN | OXYGEN SATURATION: 93 % | TEMPERATURE: 97.8 F

## 2021-01-01 VITALS
DIASTOLIC BLOOD PRESSURE: 70 MMHG | OXYGEN SATURATION: 98 % | SYSTOLIC BLOOD PRESSURE: 120 MMHG | TEMPERATURE: 96.6 F | WEIGHT: 313 LBS | HEART RATE: 78 BPM | HEIGHT: 76 IN | BODY MASS INDEX: 38.11 KG/M2

## 2021-01-01 VITALS — HEIGHT: 76 IN | WEIGHT: 315 LBS | HEART RATE: 93 BPM | OXYGEN SATURATION: 98 % | BODY MASS INDEX: 38.36 KG/M2

## 2021-01-01 VITALS
BODY MASS INDEX: 39.17 KG/M2 | TEMPERATURE: 98 F | OXYGEN SATURATION: 95 % | RESPIRATION RATE: 18 BRPM | WEIGHT: 315 LBS | SYSTOLIC BLOOD PRESSURE: 115 MMHG | DIASTOLIC BLOOD PRESSURE: 66 MMHG | HEART RATE: 81 BPM | HEIGHT: 75 IN

## 2021-01-01 VITALS
HEIGHT: 76 IN | OXYGEN SATURATION: 94 % | SYSTOLIC BLOOD PRESSURE: 143 MMHG | WEIGHT: 315 LBS | RESPIRATION RATE: 18 BRPM | BODY MASS INDEX: 38.36 KG/M2 | HEART RATE: 86 BPM | DIASTOLIC BLOOD PRESSURE: 80 MMHG | TEMPERATURE: 96.6 F

## 2021-01-01 VITALS
BODY MASS INDEX: 38.36 KG/M2 | HEIGHT: 76 IN | OXYGEN SATURATION: 98 % | WEIGHT: 315 LBS | SYSTOLIC BLOOD PRESSURE: 130 MMHG | DIASTOLIC BLOOD PRESSURE: 76 MMHG | HEART RATE: 81 BPM

## 2021-01-01 VITALS
HEART RATE: 82 BPM | OXYGEN SATURATION: 97 % | WEIGHT: 315 LBS | DIASTOLIC BLOOD PRESSURE: 80 MMHG | SYSTOLIC BLOOD PRESSURE: 132 MMHG | BODY MASS INDEX: 38.36 KG/M2 | HEIGHT: 76 IN

## 2021-01-01 VITALS
HEART RATE: 88 BPM | DIASTOLIC BLOOD PRESSURE: 70 MMHG | BODY MASS INDEX: 38.36 KG/M2 | SYSTOLIC BLOOD PRESSURE: 128 MMHG | OXYGEN SATURATION: 99 % | WEIGHT: 315 LBS | HEIGHT: 76 IN

## 2021-01-01 VITALS
TEMPERATURE: 97.1 F | SYSTOLIC BLOOD PRESSURE: 120 MMHG | WEIGHT: 315 LBS | OXYGEN SATURATION: 95 % | DIASTOLIC BLOOD PRESSURE: 60 MMHG | HEART RATE: 90 BPM | BODY MASS INDEX: 39.17 KG/M2 | HEIGHT: 75 IN

## 2021-01-01 DIAGNOSIS — N18.31 STAGE 3A CHRONIC KIDNEY DISEASE (HCC): ICD-10-CM

## 2021-01-01 DIAGNOSIS — E03.9 HYPOTHYROIDISM, UNSPECIFIED TYPE: ICD-10-CM

## 2021-01-01 DIAGNOSIS — E11.65 TYPE 2 DIABETES MELLITUS WITH HYPERGLYCEMIA, WITH LONG-TERM CURRENT USE OF INSULIN (HCC): ICD-10-CM

## 2021-01-01 DIAGNOSIS — K76.82 HEPATIC ENCEPHALOPATHY (HCC): ICD-10-CM

## 2021-01-01 DIAGNOSIS — E29.1 HYPOGONADISM IN MALE: ICD-10-CM

## 2021-01-01 DIAGNOSIS — E78.5 DYSLIPIDEMIA: ICD-10-CM

## 2021-01-01 DIAGNOSIS — Z79.4 TYPE 2 DIABETES MELLITUS WITH HYPERGLYCEMIA, WITH LONG-TERM CURRENT USE OF INSULIN (HCC): ICD-10-CM

## 2021-01-01 DIAGNOSIS — E78.49 OTHER HYPERLIPIDEMIA: Primary | ICD-10-CM

## 2021-01-01 DIAGNOSIS — Z99.89 OSA ON CPAP: ICD-10-CM

## 2021-01-01 DIAGNOSIS — G51.0 FACIAL PARALYSIS/BELLS PALSY: Chronic | ICD-10-CM

## 2021-01-01 DIAGNOSIS — I89.0 LYMPHEDEMA: ICD-10-CM

## 2021-01-01 DIAGNOSIS — I50.32 CHRONIC DIASTOLIC CONGESTIVE HEART FAILURE (HCC): ICD-10-CM

## 2021-01-01 DIAGNOSIS — E66.01 MORBIDLY OBESE (HCC): Primary | ICD-10-CM

## 2021-01-01 DIAGNOSIS — F41.1 GENERALIZED ANXIETY DISORDER: Primary | ICD-10-CM

## 2021-01-01 DIAGNOSIS — I50.32 CHRONIC HEART FAILURE WITH PRESERVED EJECTION FRACTION (HCC): Primary | ICD-10-CM

## 2021-01-01 DIAGNOSIS — G47.33 OSA ON CPAP: ICD-10-CM

## 2021-01-01 DIAGNOSIS — J81.0 ACUTE PULMONARY EDEMA (HCC): ICD-10-CM

## 2021-01-01 DIAGNOSIS — G50.1 ATYPICAL FACIAL PAIN: Primary | Chronic | ICD-10-CM

## 2021-01-01 DIAGNOSIS — E11.65 TYPE 2 DIABETES MELLITUS WITH HYPERGLYCEMIA, WITH LONG-TERM CURRENT USE OF INSULIN (HCC): Primary | ICD-10-CM

## 2021-01-01 DIAGNOSIS — G25.81 RLS (RESTLESS LEGS SYNDROME): ICD-10-CM

## 2021-01-01 DIAGNOSIS — G25.81 RESTLESS LEGS SYNDROME (RLS): ICD-10-CM

## 2021-01-01 DIAGNOSIS — I10 ESSENTIAL HYPERTENSION: ICD-10-CM

## 2021-01-01 DIAGNOSIS — Z79.4 TYPE 2 DIABETES MELLITUS WITH HYPERGLYCEMIA, WITH LONG-TERM CURRENT USE OF INSULIN (HCC): Primary | ICD-10-CM

## 2021-01-01 DIAGNOSIS — K76.82 HEPATIC ENCEPHALOPATHY (HCC): Primary | ICD-10-CM

## 2021-01-01 DIAGNOSIS — R53.1 GENERALIZED WEAKNESS: Primary | ICD-10-CM

## 2021-01-01 DIAGNOSIS — G47.419 PRIMARY NARCOLEPSY WITHOUT CATAPLEXY: ICD-10-CM

## 2021-01-01 DIAGNOSIS — M17.11 PRIMARY OSTEOARTHRITIS OF RIGHT KNEE: Primary | ICD-10-CM

## 2021-01-01 DIAGNOSIS — G50.1 ATYPICAL FACIAL PAIN: ICD-10-CM

## 2021-01-01 DIAGNOSIS — K75.81 NASH (NONALCOHOLIC STEATOHEPATITIS): ICD-10-CM

## 2021-01-01 DIAGNOSIS — L03.116 CELLULITIS OF BOTH LOWER EXTREMITIES: Primary | ICD-10-CM

## 2021-01-01 DIAGNOSIS — N17.9 ACUTE KIDNEY INJURY (HCC): ICD-10-CM

## 2021-01-01 DIAGNOSIS — D64.9 ANEMIA, UNSPECIFIED TYPE: ICD-10-CM

## 2021-01-01 DIAGNOSIS — I48.0 PAROXYSMAL ATRIAL FIBRILLATION (HCC): ICD-10-CM

## 2021-01-01 DIAGNOSIS — L03.115 CELLULITIS OF BOTH LOWER EXTREMITIES: Primary | ICD-10-CM

## 2021-01-01 DIAGNOSIS — E66.01 MORBIDLY OBESE (HCC): ICD-10-CM

## 2021-01-01 DIAGNOSIS — Z86.39 HISTORY OF DIABETES MELLITUS: ICD-10-CM

## 2021-01-01 DIAGNOSIS — N17.9 AKI (ACUTE KIDNEY INJURY) (HCC): ICD-10-CM

## 2021-01-01 DIAGNOSIS — F32.89 OTHER DEPRESSION: ICD-10-CM

## 2021-01-01 DIAGNOSIS — I50.32 CHRONIC DIASTOLIC CONGESTIVE HEART FAILURE (HCC): Primary | ICD-10-CM

## 2021-01-01 DIAGNOSIS — R41.82 ALTERED MENTAL STATUS, UNSPECIFIED ALTERED MENTAL STATUS TYPE: Primary | ICD-10-CM

## 2021-01-01 DIAGNOSIS — G47.419 PRIMARY NARCOLEPSY WITHOUT CATAPLEXY: Primary | ICD-10-CM

## 2021-01-01 DIAGNOSIS — E87.6 HYPOKALEMIA: ICD-10-CM

## 2021-01-01 DIAGNOSIS — E11.65 UNCONTROLLED TYPE 2 DIABETES MELLITUS WITH HYPERGLYCEMIA (HCC): ICD-10-CM

## 2021-01-01 DIAGNOSIS — J90 PLEURAL EFFUSION: ICD-10-CM

## 2021-01-01 DIAGNOSIS — G25.81 RESTLESS LEGS SYNDROME (RLS): Chronic | ICD-10-CM

## 2021-01-01 DIAGNOSIS — L03.115 CELLULITIS OF RIGHT LOWER EXTREMITY: ICD-10-CM

## 2021-01-01 DIAGNOSIS — Z86.79 HISTORY OF CHF (CONGESTIVE HEART FAILURE): ICD-10-CM

## 2021-01-01 DIAGNOSIS — R60.0 LOCALIZED EDEMA: ICD-10-CM

## 2021-01-01 DIAGNOSIS — G51.0 FACIAL PARALYSIS/BELLS PALSY: Primary | ICD-10-CM

## 2021-01-01 DIAGNOSIS — E87.1 HYPONATREMIA: ICD-10-CM

## 2021-01-01 DIAGNOSIS — G51.0 FACIAL PARALYSIS/BELLS PALSY: ICD-10-CM

## 2021-01-01 DIAGNOSIS — E78.49 OTHER HYPERLIPIDEMIA: ICD-10-CM

## 2021-01-01 DIAGNOSIS — E16.2 HYPOGLYCEMIA: ICD-10-CM

## 2021-01-01 DIAGNOSIS — E78.2 MIXED HYPERLIPIDEMIA: Primary | ICD-10-CM

## 2021-01-01 DIAGNOSIS — R41.0 ACUTE CONFUSION: Primary | ICD-10-CM

## 2021-01-01 DIAGNOSIS — R77.8 ELEVATED TROPONIN: ICD-10-CM

## 2021-01-01 DIAGNOSIS — I73.9 PVD (PERIPHERAL VASCULAR DISEASE) (HCC): Primary | ICD-10-CM

## 2021-01-01 DIAGNOSIS — I10 ESSENTIAL HYPERTENSION: Primary | ICD-10-CM

## 2021-01-01 DIAGNOSIS — R60.0 BILATERAL LOWER EXTREMITY EDEMA: ICD-10-CM

## 2021-01-01 LAB
25(OH)D3 SERPL-MCNC: 18.7 NG/ML (ref 30–100)
ALBUMIN SERPL-MCNC: 3.5 G/DL (ref 3.5–5.2)
ALBUMIN SERPL-MCNC: 3.6 G/DL (ref 3.5–5.2)
ALBUMIN SERPL-MCNC: 3.8 G/DL (ref 3.5–5.2)
ALBUMIN SERPL-MCNC: 3.9 G/DL (ref 3.5–5.2)
ALBUMIN SERPL-MCNC: 4.1 G/DL (ref 3.5–5.2)
ALBUMIN/GLOB SERPL: 1 G/DL
ALBUMIN/GLOB SERPL: 1.1 G/DL
ALP SERPL-CCNC: 122 U/L (ref 39–117)
ALP SERPL-CCNC: 122 U/L (ref 39–117)
ALP SERPL-CCNC: 123 U/L (ref 39–117)
ALP SERPL-CCNC: 129 U/L (ref 39–117)
ALP SERPL-CCNC: 136 U/L (ref 39–117)
ALP SERPL-CCNC: 136 U/L (ref 39–117)
ALP SERPL-CCNC: 145 U/L (ref 39–117)
ALP SERPL-CCNC: 150 U/L (ref 39–117)
ALPHA-FETOPROTEIN: 6.48 NG/ML (ref 0–8.3)
ALT SERPL W P-5'-P-CCNC: 13 U/L (ref 1–41)
ALT SERPL W P-5'-P-CCNC: 14 U/L (ref 1–41)
ALT SERPL W P-5'-P-CCNC: 15 U/L (ref 1–41)
ALT SERPL W P-5'-P-CCNC: 15 U/L (ref 1–41)
ALT SERPL W P-5'-P-CCNC: 20 U/L (ref 1–41)
ALT SERPL W P-5'-P-CCNC: 23 U/L (ref 1–41)
ALT SERPL W P-5'-P-CCNC: 26 U/L (ref 1–41)
ALT SERPL W P-5'-P-CCNC: 31 U/L (ref 1–41)
AMMONIA BLD-SCNC: 28 UMOL/L (ref 16–60)
AMMONIA BLD-SCNC: 29 UMOL/L (ref 16–60)
AMMONIA BLD-SCNC: 40 UMOL/L (ref 16–60)
AMMONIA BLD-SCNC: 51 UMOL/L (ref 16–60)
AMMONIA BLD-SCNC: 52 UMOL/L (ref 16–60)
AMPHET+METHAMPHET UR QL: POSITIVE
AMPHETAMINES UR QL: NEGATIVE
ANION GAP SERPL CALCULATED.3IONS-SCNC: 10 MMOL/L (ref 5–15)
ANION GAP SERPL CALCULATED.3IONS-SCNC: 11 MMOL/L (ref 5–15)
ANION GAP SERPL CALCULATED.3IONS-SCNC: 11 MMOL/L (ref 5–15)
ANION GAP SERPL CALCULATED.3IONS-SCNC: 12 MMOL/L (ref 5–15)
ANION GAP SERPL CALCULATED.3IONS-SCNC: 12 MMOL/L (ref 5–15)
ANION GAP SERPL CALCULATED.3IONS-SCNC: 12.7 MMOL/L (ref 5–15)
ANION GAP SERPL CALCULATED.3IONS-SCNC: 13 MMOL/L (ref 5–15)
ANION GAP SERPL CALCULATED.3IONS-SCNC: 13.3 MMOL/L (ref 5–15)
ANION GAP SERPL CALCULATED.3IONS-SCNC: 14 MMOL/L (ref 5–15)
ANION GAP SERPL CALCULATED.3IONS-SCNC: 15 MMOL/L (ref 5–15)
ANION GAP SERPL CALCULATED.3IONS-SCNC: 15.4 MMOL/L (ref 5–15)
ANION GAP SERPL CALCULATED.3IONS-SCNC: 17 MMOL/L (ref 5–15)
APAP SERPL-MCNC: <5 MCG/ML (ref 0–30)
ARTERIAL PATENCY WRIST A: ABNORMAL
ASCENDING AORTA: 3 CM
AST SERPL-CCNC: 25 U/L (ref 1–40)
AST SERPL-CCNC: 25 U/L (ref 1–40)
AST SERPL-CCNC: 26 U/L (ref 1–40)
AST SERPL-CCNC: 27 U/L (ref 1–40)
AST SERPL-CCNC: 29 U/L (ref 1–40)
AST SERPL-CCNC: 38 U/L (ref 1–40)
AST SERPL-CCNC: 39 U/L (ref 1–40)
AST SERPL-CCNC: 40 U/L (ref 1–40)
ATMOSPHERIC PRESS: ABNORMAL MM[HG]
BACTERIA SPEC AEROBE CULT: NORMAL
BARBITURATES UR QL SCN: NEGATIVE
BASE EXCESS BLDA CALC-SCNC: 0.5 MMOL/L (ref 0–2)
BASOPHILS # BLD AUTO: 0.02 10*3/MM3 (ref 0–0.2)
BASOPHILS # BLD AUTO: 0.03 10*3/MM3 (ref 0–0.2)
BASOPHILS # BLD AUTO: 0.05 10*3/MM3 (ref 0–0.2)
BASOPHILS # BLD AUTO: 0.06 10*3/MM3 (ref 0–0.2)
BASOPHILS # BLD AUTO: 0.07 10*3/MM3 (ref 0–0.2)
BASOPHILS NFR BLD AUTO: 0.3 % (ref 0–1.5)
BASOPHILS NFR BLD AUTO: 0.4 % (ref 0–1.5)
BASOPHILS NFR BLD AUTO: 0.5 % (ref 0–1.5)
BASOPHILS NFR BLD AUTO: 0.5 % (ref 0–1.5)
BASOPHILS NFR BLD AUTO: 0.8 % (ref 0–1.5)
BASOPHILS NFR BLD AUTO: 1 % (ref 0–1.5)
BASOPHILS NFR BLD AUTO: 1.1 % (ref 0–1.5)
BASOPHILS NFR BLD AUTO: 1.1 % (ref 0–1.5)
BASOPHILS NFR BLD AUTO: 1.5 % (ref 0–1.5)
BDY SITE: ABNORMAL
BENZODIAZ UR QL SCN: NEGATIVE
BH CV ECHO MEAS - AO MAX PG (FULL): 5.1 MMHG
BH CV ECHO MEAS - AO MAX PG: 7 MMHG
BH CV ECHO MEAS - AO MEAN PG (FULL): 2 MMHG
BH CV ECHO MEAS - AO MEAN PG: 3 MMHG
BH CV ECHO MEAS - AO ROOT AREA (BSA CORRECTED): 1.1
BH CV ECHO MEAS - AO ROOT AREA: 7.5 CM^2
BH CV ECHO MEAS - AO ROOT DIAM: 3.1 CM
BH CV ECHO MEAS - AO V2 MAX: 132 CM/SEC
BH CV ECHO MEAS - AO V2 MEAN: 76.4 CM/SEC
BH CV ECHO MEAS - AO V2 VTI: 25.4 CM
BH CV ECHO MEAS - ASC AORTA: 3 CM
BH CV ECHO MEAS - AVA(I,A): 2 CM^2
BH CV ECHO MEAS - AVA(I,D): 2 CM^2
BH CV ECHO MEAS - AVA(V,A): 1.7 CM^2
BH CV ECHO MEAS - AVA(V,D): 1.7 CM^2
BH CV ECHO MEAS - BSA(HAYCOCK): 3 M^2
BH CV ECHO MEAS - BSA: 2.8 M^2
BH CV ECHO MEAS - BZI_BMI: 44.9 KILOGRAMS/M^2
BH CV ECHO MEAS - BZI_METRIC_HEIGHT: 190.5 CM
BH CV ECHO MEAS - BZI_METRIC_WEIGHT: 162.8 KG
BH CV ECHO MEAS - EDV(CUBED): 29.8 ML
BH CV ECHO MEAS - EDV(MOD-SP2): 46.2 ML
BH CV ECHO MEAS - EDV(MOD-SP4): 119 ML
BH CV ECHO MEAS - EDV(TEICH): 37.9 ML
BH CV ECHO MEAS - EF(CUBED): 47.6 %
BH CV ECHO MEAS - EF(MOD-BP): 62.7 %
BH CV ECHO MEAS - EF(MOD-SP2): 56.9 %
BH CV ECHO MEAS - EF(MOD-SP4): 68.8 %
BH CV ECHO MEAS - EF(TEICH): 41.1 %
BH CV ECHO MEAS - ESV(CUBED): 15.6 ML
BH CV ECHO MEAS - ESV(MOD-SP2): 19.9 ML
BH CV ECHO MEAS - ESV(MOD-SP4): 37.1 ML
BH CV ECHO MEAS - ESV(TEICH): 22.3 ML
BH CV ECHO MEAS - FS: 19.4 %
BH CV ECHO MEAS - IVS/LVPW: 1
BH CV ECHO MEAS - IVSD: 1.1 CM
BH CV ECHO MEAS - LA DIMENSION: 3.5 CM
BH CV ECHO MEAS - LA/AO: 1.1
BH CV ECHO MEAS - LAT PEAK E' VEL: 15.7 CM/SEC
BH CV ECHO MEAS - LATERAL E/E' RATIO: 8.8
BH CV ECHO MEAS - LV DIASTOLIC VOL/BSA (35-75): 42.3 ML/M^2
BH CV ECHO MEAS - LV IVRT: 0.13 SEC
BH CV ECHO MEAS - LV MASS(C)D: 99.7 GRAMS
BH CV ECHO MEAS - LV MASS(C)DI: 35.4 GRAMS/M^2
BH CV ECHO MEAS - LV MAX PG: 1.9 MMHG
BH CV ECHO MEAS - LV MEAN PG: 1 MMHG
BH CV ECHO MEAS - LV SYSTOLIC VOL/BSA (12-30): 13.2 ML/M^2
BH CV ECHO MEAS - LV V1 MAX: 69.5 CM/SEC
BH CV ECHO MEAS - LV V1 MEAN: 47.9 CM/SEC
BH CV ECHO MEAS - LV V1 VTI: 16.1 CM
BH CV ECHO MEAS - LVIDD: 3.1 CM
BH CV ECHO MEAS - LVIDS: 2.5 CM
BH CV ECHO MEAS - LVLD AP2: 6.8 CM
BH CV ECHO MEAS - LVLD AP4: 7.6 CM
BH CV ECHO MEAS - LVLS AP2: 5.8 CM
BH CV ECHO MEAS - LVLS AP4: 6.7 CM
BH CV ECHO MEAS - LVOT AREA (M): 3.1 CM^2
BH CV ECHO MEAS - LVOT AREA: 3.1 CM^2
BH CV ECHO MEAS - LVOT DIAM: 2 CM
BH CV ECHO MEAS - LVPWD: 1.1 CM
BH CV ECHO MEAS - MED PEAK E' VEL: 13.5 CM/SEC
BH CV ECHO MEAS - MEDIAL E/E' RATIO: 10.2
BH CV ECHO MEAS - MV A MAX VEL: 82 CM/SEC
BH CV ECHO MEAS - MV DEC SLOPE: 766.5 CM/SEC^2
BH CV ECHO MEAS - MV DEC TIME: 0.23 SEC
BH CV ECHO MEAS - MV E MAX VEL: 138 CM/SEC
BH CV ECHO MEAS - MV E/A: 1.7
BH CV ECHO MEAS - MV MAX PG: 12.7 MMHG
BH CV ECHO MEAS - MV MEAN PG: 4 MMHG
BH CV ECHO MEAS - MV P1/2T MAX VEL: 185 CM/SEC
BH CV ECHO MEAS - MV P1/2T: 70.7 MSEC
BH CV ECHO MEAS - MV V2 MAX: 178 CM/SEC
BH CV ECHO MEAS - MV V2 MEAN: 82.3 CM/SEC
BH CV ECHO MEAS - MV V2 VTI: 33.8 CM
BH CV ECHO MEAS - MVA P1/2T LCG: 1.2 CM^2
BH CV ECHO MEAS - MVA(P1/2T): 3.1 CM^2
BH CV ECHO MEAS - MVA(VTI): 1.5 CM^2
BH CV ECHO MEAS - PA ACC TIME: 0.18 SEC
BH CV ECHO MEAS - PA MAX PG: 2.4 MMHG
BH CV ECHO MEAS - PA PR(ACCEL): -3.4 MMHG
BH CV ECHO MEAS - PA V2 MAX: 76.7 CM/SEC
BH CV ECHO MEAS - RAP SYSTOLE: 15 MMHG
BH CV ECHO MEAS - SI(AO): 68.1 ML/M^2
BH CV ECHO MEAS - SI(CUBED): 5 ML/M^2
BH CV ECHO MEAS - SI(LVOT): 18 ML/M^2
BH CV ECHO MEAS - SI(MOD-SP2): 9.3 ML/M^2
BH CV ECHO MEAS - SI(MOD-SP4): 29.1 ML/M^2
BH CV ECHO MEAS - SI(TEICH): 5.5 ML/M^2
BH CV ECHO MEAS - SV(AO): 191.7 ML
BH CV ECHO MEAS - SV(CUBED): 14.2 ML
BH CV ECHO MEAS - SV(LVOT): 50.6 ML
BH CV ECHO MEAS - SV(MOD-SP2): 26.3 ML
BH CV ECHO MEAS - SV(MOD-SP4): 81.9 ML
BH CV ECHO MEAS - SV(TEICH): 15.6 ML
BH CV ECHO MEAS - TAPSE (>1.6): 0.97 CM
BH CV ECHO MEASUREMENTS AVERAGE E/E' RATIO: 9.45
BH CV LOWER VASCULAR LEFT COMMON FEMORAL AUGMENT: NORMAL
BH CV LOWER VASCULAR LEFT COMMON FEMORAL COMPRESS: NORMAL
BH CV LOWER VASCULAR LEFT COMMON FEMORAL PHASIC: NORMAL
BH CV LOWER VASCULAR LEFT COMMON FEMORAL SPONT: NORMAL
BH CV LOWER VASCULAR LEFT DISTAL FEMORAL COMPRESS: NORMAL
BH CV LOWER VASCULAR LEFT GASTRONEMIUS COMPRESS: NORMAL
BH CV LOWER VASCULAR LEFT GREATER SAPH AK COMPRESS: NORMAL
BH CV LOWER VASCULAR LEFT GREATER SAPH BK COMPRESS: NORMAL
BH CV LOWER VASCULAR LEFT LESSER SAPH COMPRESS: NORMAL
BH CV LOWER VASCULAR LEFT MID FEMORAL AUGMENT: NORMAL
BH CV LOWER VASCULAR LEFT MID FEMORAL COMPRESS: NORMAL
BH CV LOWER VASCULAR LEFT MID FEMORAL PHASIC: NORMAL
BH CV LOWER VASCULAR LEFT MID FEMORAL SPONT: NORMAL
BH CV LOWER VASCULAR LEFT PERONEAL COMPRESS: NORMAL
BH CV LOWER VASCULAR LEFT POPLITEAL AUGMENT: NORMAL
BH CV LOWER VASCULAR LEFT POPLITEAL COMPRESS: NORMAL
BH CV LOWER VASCULAR LEFT POPLITEAL PHASIC: NORMAL
BH CV LOWER VASCULAR LEFT POPLITEAL SPONT: NORMAL
BH CV LOWER VASCULAR LEFT POSTERIOR TIBIAL COMPRESS: NORMAL
BH CV LOWER VASCULAR LEFT PROFUNDA FEMORAL COMPRESS: NORMAL
BH CV LOWER VASCULAR LEFT PROXIMAL FEMORAL COMPRESS: NORMAL
BH CV LOWER VASCULAR LEFT SAPHENOFEMORAL JUNCTION COMPRESS: NORMAL
BH CV LOWER VASCULAR RIGHT COMMON FEMORAL AUGMENT: NORMAL
BH CV LOWER VASCULAR RIGHT COMMON FEMORAL COMPRESS: NORMAL
BH CV LOWER VASCULAR RIGHT COMMON FEMORAL PHASIC: NORMAL
BH CV LOWER VASCULAR RIGHT COMMON FEMORAL SPONT: NORMAL
BH CV LOWER VASCULAR RIGHT DISTAL FEMORAL COMPRESS: NORMAL
BH CV LOWER VASCULAR RIGHT GASTRONEMIUS COMPRESS: NORMAL
BH CV LOWER VASCULAR RIGHT GREATER SAPH AK COMPRESS: NORMAL
BH CV LOWER VASCULAR RIGHT GREATER SAPH BK COMPRESS: NORMAL
BH CV LOWER VASCULAR RIGHT LESSER SAPH COMPRESS: NORMAL
BH CV LOWER VASCULAR RIGHT MID FEMORAL AUGMENT: NORMAL
BH CV LOWER VASCULAR RIGHT MID FEMORAL COMPRESS: NORMAL
BH CV LOWER VASCULAR RIGHT MID FEMORAL PHASIC: NORMAL
BH CV LOWER VASCULAR RIGHT MID FEMORAL SPONT: NORMAL
BH CV LOWER VASCULAR RIGHT PERONEAL COMPRESS: NORMAL
BH CV LOWER VASCULAR RIGHT POPLITEAL AUGMENT: NORMAL
BH CV LOWER VASCULAR RIGHT POPLITEAL COMPRESS: NORMAL
BH CV LOWER VASCULAR RIGHT POPLITEAL PHASIC: NORMAL
BH CV LOWER VASCULAR RIGHT POPLITEAL SPONT: NORMAL
BH CV LOWER VASCULAR RIGHT POSTERIOR TIBIAL COMPRESS: NORMAL
BH CV LOWER VASCULAR RIGHT PROFUNDA FEMORAL COMPRESS: NORMAL
BH CV LOWER VASCULAR RIGHT PROXIMAL FEMORAL COMPRESS: NORMAL
BH CV LOWER VASCULAR RIGHT SAPHENOFEMORAL JUNCTION COMPRESS: NORMAL
BH CV VAS BP RIGHT ARM: NORMAL MMHG
BH CV XLRA - TDI S': 7.2 CM/SEC
BILIRUB SERPL-MCNC: 0.6 MG/DL (ref 0–1.2)
BILIRUB SERPL-MCNC: 0.6 MG/DL (ref 0–1.2)
BILIRUB SERPL-MCNC: 0.7 MG/DL (ref 0–1.2)
BILIRUB SERPL-MCNC: 0.8 MG/DL (ref 0–1.2)
BILIRUB SERPL-MCNC: 1 MG/DL (ref 0–1.2)
BILIRUB UR QL STRIP: NEGATIVE
BODY TEMPERATURE: 37 C
BUN SERPL-MCNC: 39 MG/DL (ref 8–23)
BUN SERPL-MCNC: 44 MG/DL (ref 8–23)
BUN SERPL-MCNC: 45 MG/DL (ref 8–23)
BUN SERPL-MCNC: 47 MG/DL (ref 8–23)
BUN SERPL-MCNC: 48 MG/DL (ref 8–23)
BUN SERPL-MCNC: 49 MG/DL (ref 8–23)
BUN SERPL-MCNC: 50 MG/DL (ref 8–23)
BUN SERPL-MCNC: 50 MG/DL (ref 8–23)
BUN SERPL-MCNC: 51 MG/DL (ref 8–23)
BUN SERPL-MCNC: 51 MG/DL (ref 8–23)
BUN SERPL-MCNC: 52 MG/DL (ref 8–23)
BUN SERPL-MCNC: 52 MG/DL (ref 8–23)
BUN SERPL-MCNC: 53 MG/DL (ref 8–23)
BUN SERPL-MCNC: 54 MG/DL (ref 8–23)
BUN SERPL-MCNC: 54 MG/DL (ref 8–23)
BUN SERPL-MCNC: 55 MG/DL (ref 8–23)
BUN SERPL-MCNC: 56 MG/DL (ref 8–23)
BUN SERPL-MCNC: 57 MG/DL (ref 8–23)
BUN SERPL-MCNC: 59 MG/DL (ref 8–23)
BUN SERPL-MCNC: 60 MG/DL (ref 8–23)
BUN SERPL-MCNC: 61 MG/DL (ref 8–23)
BUN/CREAT SERPL: 28.5 (ref 7–25)
BUN/CREAT SERPL: 29.1 (ref 7–25)
BUN/CREAT SERPL: 30.1 (ref 7–25)
BUN/CREAT SERPL: 30.1 (ref 7–25)
BUN/CREAT SERPL: 30.4 (ref 7–25)
BUN/CREAT SERPL: 30.8 (ref 7–25)
BUN/CREAT SERPL: 31.5 (ref 7–25)
BUN/CREAT SERPL: 31.9 (ref 7–25)
BUN/CREAT SERPL: 31.9 (ref 7–25)
BUN/CREAT SERPL: 32.4 (ref 7–25)
BUN/CREAT SERPL: 34 (ref 7–25)
BUN/CREAT SERPL: 34.1 (ref 7–25)
BUN/CREAT SERPL: 35.3 (ref 7–25)
BUN/CREAT SERPL: 35.8 (ref 7–25)
BUN/CREAT SERPL: 37.1 (ref 7–25)
BUN/CREAT SERPL: 37.2 (ref 7–25)
BUN/CREAT SERPL: 37.4 (ref 7–25)
BUN/CREAT SERPL: 38.9 (ref 7–25)
BUN/CREAT SERPL: 39.5 (ref 7–25)
BUN/CREAT SERPL: 40.2 (ref 7–25)
BUN/CREAT SERPL: 41.9 (ref 7–25)
BUN/CREAT SERPL: 43.4 (ref 7–25)
BUN/CREAT SERPL: 43.7 (ref 7–25)
BUN/CREAT SERPL: 43.8 (ref 7–25)
BUN/CREAT SERPL: 45.2 (ref 7–25)
BUN/CREAT SERPL: 46.3 (ref 7–25)
BUN/CREAT SERPL: 50.4 (ref 7–25)
BUPRENORPHINE SERPL-MCNC: NEGATIVE NG/ML
CALCIUM SPEC-SCNC: 9 MG/DL (ref 8.6–10.5)
CALCIUM SPEC-SCNC: 9.1 MG/DL (ref 8.6–10.5)
CALCIUM SPEC-SCNC: 9.1 MG/DL (ref 8.6–10.5)
CALCIUM SPEC-SCNC: 9.2 MG/DL (ref 8.6–10.5)
CALCIUM SPEC-SCNC: 9.3 MG/DL (ref 8.6–10.5)
CALCIUM SPEC-SCNC: 9.3 MG/DL (ref 8.6–10.5)
CALCIUM SPEC-SCNC: 9.4 MG/DL (ref 8.6–10.5)
CALCIUM SPEC-SCNC: 9.5 MG/DL (ref 8.6–10.5)
CALCIUM SPEC-SCNC: 9.6 MG/DL (ref 8.6–10.5)
CALCIUM SPEC-SCNC: 9.7 MG/DL (ref 8.6–10.5)
CALCIUM SPEC-SCNC: 9.7 MG/DL (ref 8.6–10.5)
CALCIUM SPEC-SCNC: 9.8 MG/DL (ref 8.6–10.5)
CALCIUM SPEC-SCNC: 9.8 MG/DL (ref 8.6–10.5)
CALCIUM SPEC-SCNC: 9.9 MG/DL (ref 8.6–10.5)
CANNABINOIDS SERPL QL: NEGATIVE
CHLORIDE SERPL-SCNC: 88 MMOL/L (ref 98–107)
CHLORIDE SERPL-SCNC: 91 MMOL/L (ref 98–107)
CHLORIDE SERPL-SCNC: 92 MMOL/L (ref 98–107)
CHLORIDE SERPL-SCNC: 92 MMOL/L (ref 98–107)
CHLORIDE SERPL-SCNC: 93 MMOL/L (ref 98–107)
CHLORIDE SERPL-SCNC: 94 MMOL/L (ref 98–107)
CHLORIDE SERPL-SCNC: 95 MMOL/L (ref 98–107)
CHLORIDE SERPL-SCNC: 96 MMOL/L (ref 98–107)
CHLORIDE SERPL-SCNC: 97 MMOL/L (ref 98–107)
CHLORIDE SERPL-SCNC: 97 MMOL/L (ref 98–107)
CHOLEST SERPL-MCNC: 134 MG/DL (ref 0–200)
CK SERPL-CCNC: 130 U/L (ref 20–200)
CK SERPL-CCNC: 132 U/L (ref 20–200)
CLARITY UR: CLEAR
CO2 BLDA-SCNC: 26.1 MMOL/L (ref 22–33)
CO2 SERPL-SCNC: 22 MMOL/L (ref 22–29)
CO2 SERPL-SCNC: 23 MMOL/L (ref 22–29)
CO2 SERPL-SCNC: 24 MMOL/L (ref 22–29)
CO2 SERPL-SCNC: 24 MMOL/L (ref 22–29)
CO2 SERPL-SCNC: 25 MMOL/L (ref 22–29)
CO2 SERPL-SCNC: 26 MMOL/L (ref 22–29)
CO2 SERPL-SCNC: 26 MMOL/L (ref 22–29)
CO2 SERPL-SCNC: 26.6 MMOL/L (ref 22–29)
CO2 SERPL-SCNC: 27 MMOL/L (ref 22–29)
CO2 SERPL-SCNC: 28 MMOL/L (ref 22–29)
CO2 SERPL-SCNC: 29.3 MMOL/L (ref 22–29)
CO2 SERPL-SCNC: 30 MMOL/L (ref 22–29)
CO2 SERPL-SCNC: 31.7 MMOL/L (ref 22–29)
COCAINE UR QL: NEGATIVE
COHGB MFR BLD: 1.7 % (ref 0–2)
COLOR UR: YELLOW
CREAT SERPL-MCNC: 1.03 MG/DL (ref 0.76–1.27)
CREAT SERPL-MCNC: 1.08 MG/DL (ref 0.76–1.27)
CREAT SERPL-MCNC: 1.12 MG/DL (ref 0.76–1.27)
CREAT SERPL-MCNC: 1.13 MG/DL (ref 0.76–1.27)
CREAT SERPL-MCNC: 1.13 MG/DL (ref 0.76–1.27)
CREAT SERPL-MCNC: 1.19 MG/DL (ref 0.76–1.27)
CREAT SERPL-MCNC: 1.29 MG/DL (ref 0.76–1.27)
CREAT SERPL-MCNC: 1.31 MG/DL (ref 0.76–1.27)
CREAT SERPL-MCNC: 1.32 MG/DL (ref 0.76–1.27)
CREAT SERPL-MCNC: 1.34 MG/DL (ref 0.76–1.27)
CREAT SERPL-MCNC: 1.35 MG/DL (ref 0.76–1.27)
CREAT SERPL-MCNC: 1.36 MG/DL (ref 0.76–1.27)
CREAT SERPL-MCNC: 1.37 MG/DL (ref 0.76–1.27)
CREAT SERPL-MCNC: 1.38 MG/DL (ref 0.76–1.27)
CREAT SERPL-MCNC: 1.39 MG/DL (ref 0.76–1.27)
CREAT SERPL-MCNC: 1.49 MG/DL (ref 0.76–1.27)
CREAT SERPL-MCNC: 1.53 MG/DL (ref 0.76–1.27)
CREAT SERPL-MCNC: 1.53 MG/DL (ref 0.76–1.27)
CREAT SERPL-MCNC: 1.6 MG/DL (ref 0.76–1.27)
CREAT SERPL-MCNC: 1.63 MG/DL (ref 0.76–1.27)
CREAT SERPL-MCNC: 1.66 MG/DL (ref 0.76–1.27)
CREAT SERPL-MCNC: 1.66 MG/DL (ref 0.76–1.27)
CREAT SERPL-MCNC: 1.71 MG/DL (ref 0.76–1.27)
CREAT SERPL-MCNC: 1.82 MG/DL (ref 0.76–1.27)
CREAT SERPL-MCNC: 1.89 MG/DL (ref 0.76–1.27)
CREAT UR-MCNC: 117.4 MG/DL
D DIMER PPP FEU-MCNC: 0.42 MCGFEU/ML (ref 0–0.56)
D DIMER PPP FEU-MCNC: 1.99 MCGFEU/ML (ref 0–0.56)
D-LACTATE SERPL-SCNC: 0.9 MMOL/L (ref 0.5–2)
D-LACTATE SERPL-SCNC: 1.6 MMOL/L (ref 0.5–2)
DEPRECATED RDW RBC AUTO: 47.3 FL (ref 37–54)
DEPRECATED RDW RBC AUTO: 48.5 FL (ref 37–54)
DEPRECATED RDW RBC AUTO: 49.6 FL (ref 37–54)
DEPRECATED RDW RBC AUTO: 50.8 FL (ref 37–54)
DEPRECATED RDW RBC AUTO: 51.2 FL (ref 37–54)
DEPRECATED RDW RBC AUTO: 51.4 FL (ref 37–54)
DEPRECATED RDW RBC AUTO: 52.5 FL (ref 37–54)
DEPRECATED RDW RBC AUTO: 55 FL (ref 37–54)
DEPRECATED RDW RBC AUTO: 56 FL (ref 37–54)
DEPRECATED RDW RBC AUTO: 56.8 FL (ref 37–54)
DEPRECATED RDW RBC AUTO: 56.9 FL (ref 37–54)
DEPRECATED RDW RBC AUTO: 57.2 FL (ref 37–54)
DEPRECATED RDW RBC AUTO: 58.3 FL (ref 37–54)
DEPRECATED RDW RBC AUTO: 59.7 FL (ref 37–54)
DEPRECATED RDW RBC AUTO: 59.8 FL (ref 37–54)
DEPRECATED RDW RBC AUTO: 59.8 FL (ref 37–54)
EOSINOPHIL # BLD AUTO: 0.03 10*3/MM3 (ref 0–0.4)
EOSINOPHIL # BLD AUTO: 0.09 10*3/MM3 (ref 0–0.4)
EOSINOPHIL # BLD AUTO: 0.13 10*3/MM3 (ref 0–0.4)
EOSINOPHIL # BLD AUTO: 0.13 10*3/MM3 (ref 0–0.4)
EOSINOPHIL # BLD AUTO: 0.14 10*3/MM3 (ref 0–0.4)
EOSINOPHIL # BLD AUTO: 0.17 10*3/MM3 (ref 0–0.4)
EOSINOPHIL # BLD AUTO: 0.18 10*3/MM3 (ref 0–0.4)
EOSINOPHIL # BLD AUTO: 0.19 10*3/MM3 (ref 0–0.4)
EOSINOPHIL # BLD AUTO: 0.22 10*3/MM3 (ref 0–0.4)
EOSINOPHIL NFR BLD AUTO: 0.4 % (ref 0.3–6.2)
EOSINOPHIL NFR BLD AUTO: 1.3 % (ref 0.3–6.2)
EOSINOPHIL NFR BLD AUTO: 2.2 % (ref 0.3–6.2)
EOSINOPHIL NFR BLD AUTO: 2.2 % (ref 0.3–6.2)
EOSINOPHIL NFR BLD AUTO: 2.8 % (ref 0.3–6.2)
EOSINOPHIL NFR BLD AUTO: 3.2 % (ref 0.3–6.2)
EOSINOPHIL NFR BLD AUTO: 3.8 % (ref 0.3–6.2)
EOSINOPHIL NFR BLD AUTO: 3.9 % (ref 0.3–6.2)
EOSINOPHIL NFR BLD AUTO: 4.1 % (ref 0.3–6.2)
EPAP: 0
ERYTHROCYTE [DISTWIDTH] IN BLOOD BY AUTOMATED COUNT: 14.9 % (ref 12.3–15.4)
ERYTHROCYTE [DISTWIDTH] IN BLOOD BY AUTOMATED COUNT: 15 % (ref 12.3–15.4)
ERYTHROCYTE [DISTWIDTH] IN BLOOD BY AUTOMATED COUNT: 15.1 % (ref 12.3–15.4)
ERYTHROCYTE [DISTWIDTH] IN BLOOD BY AUTOMATED COUNT: 15.1 % (ref 12.3–15.4)
ERYTHROCYTE [DISTWIDTH] IN BLOOD BY AUTOMATED COUNT: 15.6 % (ref 12.3–15.4)
ERYTHROCYTE [DISTWIDTH] IN BLOOD BY AUTOMATED COUNT: 15.7 % (ref 12.3–15.4)
ERYTHROCYTE [DISTWIDTH] IN BLOOD BY AUTOMATED COUNT: 15.9 % (ref 12.3–15.4)
ERYTHROCYTE [DISTWIDTH] IN BLOOD BY AUTOMATED COUNT: 16.2 % (ref 12.3–15.4)
ERYTHROCYTE [DISTWIDTH] IN BLOOD BY AUTOMATED COUNT: 16.3 % (ref 12.3–15.4)
ERYTHROCYTE [DISTWIDTH] IN BLOOD BY AUTOMATED COUNT: 16.3 % (ref 12.3–15.4)
ERYTHROCYTE [DISTWIDTH] IN BLOOD BY AUTOMATED COUNT: 16.6 % (ref 12.3–15.4)
ERYTHROCYTE [DISTWIDTH] IN BLOOD BY AUTOMATED COUNT: 16.7 % (ref 12.3–15.4)
ERYTHROCYTE [DISTWIDTH] IN BLOOD BY AUTOMATED COUNT: 16.8 % (ref 12.3–15.4)
ERYTHROCYTE [DISTWIDTH] IN BLOOD BY AUTOMATED COUNT: 17.1 % (ref 12.3–15.4)
ERYTHROCYTE [DISTWIDTH] IN BLOOD BY AUTOMATED COUNT: 17.2 % (ref 12.3–15.4)
ERYTHROCYTE [DISTWIDTH] IN BLOOD BY AUTOMATED COUNT: 17.5 % (ref 12.3–15.4)
ERYTHROCYTE [SEDIMENTATION RATE] IN BLOOD: 65 MM/HR (ref 0–20)
ETHANOL BLD-MCNC: <10 MG/DL (ref 0–10)
EXPIRATION DATE: NORMAL
FLUAV RNA RESP QL NAA+PROBE: NOT DETECTED
FLUAV RNA RESP QL NAA+PROBE: NOT DETECTED
FLUBV RNA RESP QL NAA+PROBE: NOT DETECTED
FLUBV RNA RESP QL NAA+PROBE: NOT DETECTED
GFR SERPL CREATININE-BSD FRML MDRD: 36 ML/MIN/1.73
GFR SERPL CREATININE-BSD FRML MDRD: 37 ML/MIN/1.73
GFR SERPL CREATININE-BSD FRML MDRD: 40 ML/MIN/1.73
GFR SERPL CREATININE-BSD FRML MDRD: 42 ML/MIN/1.73
GFR SERPL CREATININE-BSD FRML MDRD: 43 ML/MIN/1.73
GFR SERPL CREATININE-BSD FRML MDRD: 46 ML/MIN/1.73
GFR SERPL CREATININE-BSD FRML MDRD: 46 ML/MIN/1.73
GFR SERPL CREATININE-BSD FRML MDRD: 47 ML/MIN/1.73
GFR SERPL CREATININE-BSD FRML MDRD: 51 ML/MIN/1.73
GFR SERPL CREATININE-BSD FRML MDRD: 51 ML/MIN/1.73
GFR SERPL CREATININE-BSD FRML MDRD: 52 ML/MIN/1.73
GFR SERPL CREATININE-BSD FRML MDRD: 53 ML/MIN/1.73
GFR SERPL CREATININE-BSD FRML MDRD: 53 ML/MIN/1.73
GFR SERPL CREATININE-BSD FRML MDRD: 54 ML/MIN/1.73
GFR SERPL CREATININE-BSD FRML MDRD: 55 ML/MIN/1.73
GFR SERPL CREATININE-BSD FRML MDRD: 56 ML/MIN/1.73
GFR SERPL CREATININE-BSD FRML MDRD: 61 ML/MIN/1.73
GFR SERPL CREATININE-BSD FRML MDRD: 65 ML/MIN/1.73
GFR SERPL CREATININE-BSD FRML MDRD: 68 ML/MIN/1.73
GFR SERPL CREATININE-BSD FRML MDRD: 72 ML/MIN/1.73
GLOBULIN UR ELPH-MCNC: 3.3 GM/DL
GLOBULIN UR ELPH-MCNC: 3.3 GM/DL
GLOBULIN UR ELPH-MCNC: 3.4 GM/DL
GLOBULIN UR ELPH-MCNC: 3.4 GM/DL
GLOBULIN UR ELPH-MCNC: 3.5 GM/DL
GLOBULIN UR ELPH-MCNC: 3.6 GM/DL
GLOBULIN UR ELPH-MCNC: 3.7 GM/DL
GLOBULIN UR ELPH-MCNC: 3.9 GM/DL
GLUCOSE BLDC GLUCOMTR-MCNC: 101 MG/DL (ref 70–130)
GLUCOSE BLDC GLUCOMTR-MCNC: 102 MG/DL (ref 70–130)
GLUCOSE BLDC GLUCOMTR-MCNC: 104 MG/DL (ref 70–130)
GLUCOSE BLDC GLUCOMTR-MCNC: 105 MG/DL (ref 70–130)
GLUCOSE BLDC GLUCOMTR-MCNC: 106 MG/DL (ref 70–130)
GLUCOSE BLDC GLUCOMTR-MCNC: 106 MG/DL (ref 70–130)
GLUCOSE BLDC GLUCOMTR-MCNC: 110 MG/DL (ref 70–130)
GLUCOSE BLDC GLUCOMTR-MCNC: 110 MG/DL (ref 70–130)
GLUCOSE BLDC GLUCOMTR-MCNC: 113 MG/DL (ref 70–130)
GLUCOSE BLDC GLUCOMTR-MCNC: 114 MG/DL (ref 70–130)
GLUCOSE BLDC GLUCOMTR-MCNC: 121 MG/DL (ref 70–130)
GLUCOSE BLDC GLUCOMTR-MCNC: 122 MG/DL (ref 70–130)
GLUCOSE BLDC GLUCOMTR-MCNC: 123 MG/DL (ref 70–130)
GLUCOSE BLDC GLUCOMTR-MCNC: 126 MG/DL (ref 70–130)
GLUCOSE BLDC GLUCOMTR-MCNC: 127 MG/DL (ref 70–130)
GLUCOSE BLDC GLUCOMTR-MCNC: 127 MG/DL (ref 70–130)
GLUCOSE BLDC GLUCOMTR-MCNC: 130 MG/DL (ref 70–130)
GLUCOSE BLDC GLUCOMTR-MCNC: 131 MG/DL (ref 70–130)
GLUCOSE BLDC GLUCOMTR-MCNC: 132 MG/DL (ref 70–130)
GLUCOSE BLDC GLUCOMTR-MCNC: 133 MG/DL (ref 70–130)
GLUCOSE BLDC GLUCOMTR-MCNC: 135 MG/DL (ref 70–130)
GLUCOSE BLDC GLUCOMTR-MCNC: 136 MG/DL (ref 70–130)
GLUCOSE BLDC GLUCOMTR-MCNC: 136 MG/DL (ref 70–130)
GLUCOSE BLDC GLUCOMTR-MCNC: 137 MG/DL (ref 70–130)
GLUCOSE BLDC GLUCOMTR-MCNC: 138 MG/DL (ref 70–130)
GLUCOSE BLDC GLUCOMTR-MCNC: 138 MG/DL (ref 70–130)
GLUCOSE BLDC GLUCOMTR-MCNC: 139 MG/DL (ref 70–130)
GLUCOSE BLDC GLUCOMTR-MCNC: 142 MG/DL (ref 70–130)
GLUCOSE BLDC GLUCOMTR-MCNC: 144 MG/DL (ref 70–130)
GLUCOSE BLDC GLUCOMTR-MCNC: 146 MG/DL (ref 70–130)
GLUCOSE BLDC GLUCOMTR-MCNC: 147 MG/DL (ref 70–130)
GLUCOSE BLDC GLUCOMTR-MCNC: 151 MG/DL (ref 70–130)
GLUCOSE BLDC GLUCOMTR-MCNC: 154 MG/DL (ref 70–130)
GLUCOSE BLDC GLUCOMTR-MCNC: 154 MG/DL (ref 70–130)
GLUCOSE BLDC GLUCOMTR-MCNC: 155 MG/DL (ref 70–130)
GLUCOSE BLDC GLUCOMTR-MCNC: 156 MG/DL (ref 70–130)
GLUCOSE BLDC GLUCOMTR-MCNC: 156 MG/DL (ref 70–130)
GLUCOSE BLDC GLUCOMTR-MCNC: 159 MG/DL (ref 70–130)
GLUCOSE BLDC GLUCOMTR-MCNC: 159 MG/DL (ref 70–130)
GLUCOSE BLDC GLUCOMTR-MCNC: 160 MG/DL (ref 70–130)
GLUCOSE BLDC GLUCOMTR-MCNC: 162 MG/DL (ref 70–130)
GLUCOSE BLDC GLUCOMTR-MCNC: 162 MG/DL (ref 70–130)
GLUCOSE BLDC GLUCOMTR-MCNC: 163 MG/DL (ref 70–130)
GLUCOSE BLDC GLUCOMTR-MCNC: 165 MG/DL (ref 70–130)
GLUCOSE BLDC GLUCOMTR-MCNC: 165 MG/DL (ref 70–130)
GLUCOSE BLDC GLUCOMTR-MCNC: 166 MG/DL (ref 70–130)
GLUCOSE BLDC GLUCOMTR-MCNC: 168 MG/DL (ref 70–130)
GLUCOSE BLDC GLUCOMTR-MCNC: 170 MG/DL (ref 70–130)
GLUCOSE BLDC GLUCOMTR-MCNC: 171 MG/DL (ref 70–130)
GLUCOSE BLDC GLUCOMTR-MCNC: 174 MG/DL (ref 70–130)
GLUCOSE BLDC GLUCOMTR-MCNC: 175 MG/DL (ref 70–130)
GLUCOSE BLDC GLUCOMTR-MCNC: 175 MG/DL (ref 70–130)
GLUCOSE BLDC GLUCOMTR-MCNC: 178 MG/DL (ref 70–130)
GLUCOSE BLDC GLUCOMTR-MCNC: 181 MG/DL (ref 70–130)
GLUCOSE BLDC GLUCOMTR-MCNC: 181 MG/DL (ref 70–130)
GLUCOSE BLDC GLUCOMTR-MCNC: 183 MG/DL (ref 70–130)
GLUCOSE BLDC GLUCOMTR-MCNC: 188 MG/DL (ref 70–130)
GLUCOSE BLDC GLUCOMTR-MCNC: 190 MG/DL (ref 70–130)
GLUCOSE BLDC GLUCOMTR-MCNC: 191 MG/DL (ref 70–130)
GLUCOSE BLDC GLUCOMTR-MCNC: 192 MG/DL (ref 70–130)
GLUCOSE BLDC GLUCOMTR-MCNC: 192 MG/DL (ref 70–130)
GLUCOSE BLDC GLUCOMTR-MCNC: 193 MG/DL (ref 70–130)
GLUCOSE BLDC GLUCOMTR-MCNC: 196 MG/DL (ref 70–130)
GLUCOSE BLDC GLUCOMTR-MCNC: 199 MG/DL (ref 70–130)
GLUCOSE BLDC GLUCOMTR-MCNC: 200 MG/DL (ref 70–130)
GLUCOSE BLDC GLUCOMTR-MCNC: 203 MG/DL (ref 70–130)
GLUCOSE BLDC GLUCOMTR-MCNC: 208 MG/DL (ref 70–130)
GLUCOSE BLDC GLUCOMTR-MCNC: 217 MG/DL (ref 70–130)
GLUCOSE BLDC GLUCOMTR-MCNC: 222 MG/DL (ref 70–130)
GLUCOSE BLDC GLUCOMTR-MCNC: 232 MG/DL (ref 70–130)
GLUCOSE BLDC GLUCOMTR-MCNC: 242 MG/DL (ref 70–130)
GLUCOSE BLDC GLUCOMTR-MCNC: 246 MG/DL (ref 70–130)
GLUCOSE BLDC GLUCOMTR-MCNC: 251 MG/DL (ref 70–130)
GLUCOSE BLDC GLUCOMTR-MCNC: 254 MG/DL (ref 70–130)
GLUCOSE BLDC GLUCOMTR-MCNC: 256 MG/DL (ref 70–130)
GLUCOSE BLDC GLUCOMTR-MCNC: 271 MG/DL (ref 70–130)
GLUCOSE BLDC GLUCOMTR-MCNC: 274 MG/DL (ref 70–130)
GLUCOSE BLDC GLUCOMTR-MCNC: 306 MG/DL (ref 70–130)
GLUCOSE BLDC GLUCOMTR-MCNC: 319 MG/DL (ref 70–130)
GLUCOSE BLDC GLUCOMTR-MCNC: 43 MG/DL (ref 70–130)
GLUCOSE BLDC GLUCOMTR-MCNC: 449 MG/DL (ref 70–130)
GLUCOSE BLDC GLUCOMTR-MCNC: 51 MG/DL (ref 70–130)
GLUCOSE BLDC GLUCOMTR-MCNC: 61 MG/DL (ref 70–130)
GLUCOSE BLDC GLUCOMTR-MCNC: 61 MG/DL (ref 70–130)
GLUCOSE BLDC GLUCOMTR-MCNC: 63 MG/DL (ref 70–130)
GLUCOSE BLDC GLUCOMTR-MCNC: 64 MG/DL (ref 70–130)
GLUCOSE BLDC GLUCOMTR-MCNC: 66 MG/DL (ref 70–130)
GLUCOSE BLDC GLUCOMTR-MCNC: 68 MG/DL (ref 70–130)
GLUCOSE BLDC GLUCOMTR-MCNC: 70 MG/DL (ref 70–130)
GLUCOSE BLDC GLUCOMTR-MCNC: 73 MG/DL (ref 70–130)
GLUCOSE BLDC GLUCOMTR-MCNC: 73 MG/DL (ref 70–130)
GLUCOSE BLDC GLUCOMTR-MCNC: 75 MG/DL (ref 70–130)
GLUCOSE BLDC GLUCOMTR-MCNC: 80 MG/DL (ref 70–130)
GLUCOSE BLDC GLUCOMTR-MCNC: 88 MG/DL (ref 70–130)
GLUCOSE BLDC GLUCOMTR-MCNC: 89 MG/DL (ref 70–130)
GLUCOSE BLDC GLUCOMTR-MCNC: 90 MG/DL (ref 70–130)
GLUCOSE BLDC GLUCOMTR-MCNC: 91 MG/DL (ref 70–130)
GLUCOSE BLDC GLUCOMTR-MCNC: 91 MG/DL (ref 70–130)
GLUCOSE BLDC GLUCOMTR-MCNC: 96 MG/DL (ref 70–130)
GLUCOSE BLDC GLUCOMTR-MCNC: 98 MG/DL (ref 70–130)
GLUCOSE BLDC GLUCOMTR-MCNC: 99 MG/DL (ref 70–130)
GLUCOSE SERPL-MCNC: 104 MG/DL (ref 65–99)
GLUCOSE SERPL-MCNC: 106 MG/DL (ref 65–99)
GLUCOSE SERPL-MCNC: 113 MG/DL (ref 65–99)
GLUCOSE SERPL-MCNC: 114 MG/DL (ref 65–99)
GLUCOSE SERPL-MCNC: 124 MG/DL (ref 65–99)
GLUCOSE SERPL-MCNC: 125 MG/DL (ref 65–99)
GLUCOSE SERPL-MCNC: 133 MG/DL (ref 65–99)
GLUCOSE SERPL-MCNC: 133 MG/DL (ref 65–99)
GLUCOSE SERPL-MCNC: 136 MG/DL (ref 65–99)
GLUCOSE SERPL-MCNC: 144 MG/DL (ref 65–99)
GLUCOSE SERPL-MCNC: 145 MG/DL (ref 65–99)
GLUCOSE SERPL-MCNC: 145 MG/DL (ref 65–99)
GLUCOSE SERPL-MCNC: 147 MG/DL (ref 65–99)
GLUCOSE SERPL-MCNC: 150 MG/DL (ref 65–99)
GLUCOSE SERPL-MCNC: 150 MG/DL (ref 65–99)
GLUCOSE SERPL-MCNC: 153 MG/DL (ref 65–99)
GLUCOSE SERPL-MCNC: 155 MG/DL (ref 65–99)
GLUCOSE SERPL-MCNC: 157 MG/DL (ref 65–99)
GLUCOSE SERPL-MCNC: 162 MG/DL (ref 65–99)
GLUCOSE SERPL-MCNC: 67 MG/DL (ref 65–99)
GLUCOSE SERPL-MCNC: 68 MG/DL (ref 65–99)
GLUCOSE SERPL-MCNC: 69 MG/DL (ref 65–99)
GLUCOSE SERPL-MCNC: 79 MG/DL (ref 65–99)
GLUCOSE SERPL-MCNC: 80 MG/DL (ref 65–99)
GLUCOSE SERPL-MCNC: 92 MG/DL (ref 65–99)
GLUCOSE SERPL-MCNC: 96 MG/DL (ref 65–99)
GLUCOSE SERPL-MCNC: 97 MG/DL (ref 65–99)
GLUCOSE UR STRIP-MCNC: NEGATIVE MG/DL
HAV AB SER QL IA: NEGATIVE
HAV IGM SERPL QL IA: NORMAL
HBA1C MFR BLD: 6.4 %
HBA1C MFR BLD: 6.6 % (ref 4.8–5.6)
HBA1C MFR BLD: 7.2 % (ref 4.8–5.6)
HBV CORE IGM SERPL QL IA: NORMAL
HBV E AB SERPL QL IA: NEGATIVE
HBV SURFACE AB SER-ACNC: <3.1 MIU/ML
HBV SURFACE AG SERPL QL IA: NORMAL
HCO3 BLDA-SCNC: 25 MMOL/L (ref 20–26)
HCT VFR BLD AUTO: 30.6 % (ref 37.5–51)
HCT VFR BLD AUTO: 31.2 % (ref 37.5–51)
HCT VFR BLD AUTO: 31.3 % (ref 37.5–51)
HCT VFR BLD AUTO: 31.4 % (ref 37.5–51)
HCT VFR BLD AUTO: 31.7 % (ref 37.5–51)
HCT VFR BLD AUTO: 32.3 % (ref 37.5–51)
HCT VFR BLD AUTO: 32.6 % (ref 37.5–51)
HCT VFR BLD AUTO: 32.6 % (ref 37.5–51)
HCT VFR BLD AUTO: 33.5 % (ref 37.5–51)
HCT VFR BLD AUTO: 34.1 % (ref 37.5–51)
HCT VFR BLD AUTO: 34.6 % (ref 37.5–51)
HCT VFR BLD AUTO: 34.8 % (ref 37.5–51)
HCT VFR BLD AUTO: 36.1 % (ref 37.5–51)
HCT VFR BLD AUTO: 36.4 % (ref 37.5–51)
HCT VFR BLD AUTO: 36.6 % (ref 37.5–51)
HCT VFR BLD AUTO: 40.5 % (ref 37.5–51)
HCT VFR BLD CALC: 32.1 %
HCV AB SER DONR QL: NORMAL
HDLC SERPL-MCNC: 60 MG/DL (ref 40–60)
HGB BLD-MCNC: 10 G/DL (ref 13–17.7)
HGB BLD-MCNC: 10 G/DL (ref 13–17.7)
HGB BLD-MCNC: 10.1 G/DL (ref 13–17.7)
HGB BLD-MCNC: 10.2 G/DL (ref 13–17.7)
HGB BLD-MCNC: 10.4 G/DL (ref 13–17.7)
HGB BLD-MCNC: 10.5 G/DL (ref 13–17.7)
HGB BLD-MCNC: 10.9 G/DL (ref 13–17.7)
HGB BLD-MCNC: 10.9 G/DL (ref 13–17.7)
HGB BLD-MCNC: 11 G/DL (ref 13–17.7)
HGB BLD-MCNC: 11.6 G/DL (ref 13–17.7)
HGB BLD-MCNC: 11.7 G/DL (ref 13–17.7)
HGB BLD-MCNC: 11.7 G/DL (ref 13–17.7)
HGB BLD-MCNC: 13.1 G/DL (ref 13–17.7)
HGB BLD-MCNC: 9.5 G/DL (ref 13–17.7)
HGB BLD-MCNC: 9.8 G/DL (ref 13–17.7)
HGB BLD-MCNC: 9.9 G/DL (ref 13–17.7)
HGB BLDA-MCNC: 10.5 G/DL (ref 13.5–17.5)
HGB UR QL STRIP.AUTO: NEGATIVE
HOLD SPECIMEN: NORMAL
IMM GRANULOCYTES # BLD AUTO: 0.02 10*3/MM3 (ref 0–0.05)
IMM GRANULOCYTES # BLD AUTO: 0.03 10*3/MM3 (ref 0–0.05)
IMM GRANULOCYTES # BLD AUTO: 0.04 10*3/MM3 (ref 0–0.05)
IMM GRANULOCYTES # BLD AUTO: 0.09 10*3/MM3 (ref 0–0.05)
IMM GRANULOCYTES # BLD AUTO: 0.1 10*3/MM3 (ref 0–0.05)
IMM GRANULOCYTES # BLD AUTO: 0.12 10*3/MM3 (ref 0–0.05)
IMM GRANULOCYTES # BLD AUTO: 0.14 10*3/MM3 (ref 0–0.05)
IMM GRANULOCYTES # BLD AUTO: 0.17 10*3/MM3 (ref 0–0.05)
IMM GRANULOCYTES # BLD AUTO: 0.17 10*3/MM3 (ref 0–0.05)
IMM GRANULOCYTES NFR BLD AUTO: 0.4 % (ref 0–0.5)
IMM GRANULOCYTES NFR BLD AUTO: 0.7 % (ref 0–0.5)
IMM GRANULOCYTES NFR BLD AUTO: 0.9 % (ref 0–0.5)
IMM GRANULOCYTES NFR BLD AUTO: 1.3 % (ref 0–0.5)
IMM GRANULOCYTES NFR BLD AUTO: 1.7 % (ref 0–0.5)
IMM GRANULOCYTES NFR BLD AUTO: 2.1 % (ref 0–0.5)
IMM GRANULOCYTES NFR BLD AUTO: 2.1 % (ref 0–0.5)
IMM GRANULOCYTES NFR BLD AUTO: 2.3 % (ref 0–0.5)
IMM GRANULOCYTES NFR BLD AUTO: 2.9 % (ref 0–0.5)
INHALED O2 CONCENTRATION: 21 %
INR PPP: 1.14 (ref 0.85–1.16)
INR PPP: 1.21 (ref 0.85–1.16)
INR PPP: 1.31 (ref 0.85–1.16)
INR PPP: 1.39 (ref 0.85–1.16)
INR PPP: 1.4 (ref 0.85–1.16)
IPAP: 0
IVRT: 132 MSEC
KETONES UR QL STRIP: NEGATIVE
L PNEUMO1 AG UR QL IA: NEGATIVE
LDLC SERPL CALC-MCNC: 57 MG/DL (ref 0–100)
LDLC/HDLC SERPL: 0.94 {RATIO}
LEUKOCYTE ESTERASE UR QL STRIP.AUTO: NEGATIVE
LV EF 2D ECHO EST: 65 %
LYMPHOCYTES # BLD AUTO: 0.37 10*3/MM3 (ref 0.7–3.1)
LYMPHOCYTES # BLD AUTO: 0.6 10*3/MM3 (ref 0.7–3.1)
LYMPHOCYTES # BLD AUTO: 0.7 10*3/MM3 (ref 0.7–3.1)
LYMPHOCYTES # BLD AUTO: 0.7 10*3/MM3 (ref 0.7–3.1)
LYMPHOCYTES # BLD AUTO: 0.79 10*3/MM3 (ref 0.7–3.1)
LYMPHOCYTES # BLD AUTO: 0.81 10*3/MM3 (ref 0.7–3.1)
LYMPHOCYTES # BLD AUTO: 0.9 10*3/MM3 (ref 0.7–3.1)
LYMPHOCYTES # BLD AUTO: 0.98 10*3/MM3 (ref 0.7–3.1)
LYMPHOCYTES # BLD AUTO: 1.14 10*3/MM3 (ref 0.7–3.1)
LYMPHOCYTES NFR BLD AUTO: 12.9 % (ref 19.6–45.3)
LYMPHOCYTES NFR BLD AUTO: 13.7 % (ref 19.6–45.3)
LYMPHOCYTES NFR BLD AUTO: 14.9 % (ref 19.6–45.3)
LYMPHOCYTES NFR BLD AUTO: 15.4 % (ref 19.6–45.3)
LYMPHOCYTES NFR BLD AUTO: 16.8 % (ref 19.6–45.3)
LYMPHOCYTES NFR BLD AUTO: 17 % (ref 19.6–45.3)
LYMPHOCYTES NFR BLD AUTO: 17.4 % (ref 19.6–45.3)
LYMPHOCYTES NFR BLD AUTO: 6.5 % (ref 19.6–45.3)
LYMPHOCYTES NFR BLD AUTO: 8.6 % (ref 19.6–45.3)
Lab: NORMAL
MAGNESIUM SERPL-MCNC: 1.7 MG/DL (ref 1.6–2.4)
MAGNESIUM SERPL-MCNC: 1.8 MG/DL (ref 1.6–2.4)
MAGNESIUM SERPL-MCNC: 1.9 MG/DL (ref 1.6–2.4)
MAGNESIUM SERPL-MCNC: 2.2 MG/DL (ref 1.6–2.4)
MAGNESIUM SERPL-MCNC: 2.4 MG/DL (ref 1.6–2.4)
MAGNESIUM SERPL-MCNC: 2.4 MG/DL (ref 1.6–2.4)
MAGNESIUM SERPL-MCNC: 2.5 MG/DL (ref 1.6–2.4)
MAGNESIUM SERPL-MCNC: 2.6 MG/DL (ref 1.6–2.4)
MAGNESIUM SERPL-MCNC: 2.7 MG/DL (ref 1.6–2.4)
MAXIMAL PREDICTED HEART RATE: 153 BPM
MCH RBC QN AUTO: 28.4 PG (ref 26.6–33)
MCH RBC QN AUTO: 28.8 PG (ref 26.6–33)
MCH RBC QN AUTO: 29.1 PG (ref 26.6–33)
MCH RBC QN AUTO: 29.3 PG (ref 26.6–33)
MCH RBC QN AUTO: 29.5 PG (ref 26.6–33)
MCH RBC QN AUTO: 29.6 PG (ref 26.6–33)
MCH RBC QN AUTO: 29.8 PG (ref 26.6–33)
MCH RBC QN AUTO: 29.8 PG (ref 26.6–33)
MCH RBC QN AUTO: 30 PG (ref 26.6–33)
MCH RBC QN AUTO: 30.1 PG (ref 26.6–33)
MCH RBC QN AUTO: 30.2 PG (ref 26.6–33)
MCHC RBC AUTO-ENTMCNC: 31 G/DL (ref 31.5–35.7)
MCHC RBC AUTO-ENTMCNC: 31.3 G/DL (ref 31.5–35.7)
MCHC RBC AUTO-ENTMCNC: 31.4 G/DL (ref 31.5–35.7)
MCHC RBC AUTO-ENTMCNC: 31.5 G/DL (ref 31.5–35.7)
MCHC RBC AUTO-ENTMCNC: 31.6 G/DL (ref 31.5–35.7)
MCHC RBC AUTO-ENTMCNC: 31.9 G/DL (ref 31.5–35.7)
MCHC RBC AUTO-ENTMCNC: 31.9 G/DL (ref 31.5–35.7)
MCHC RBC AUTO-ENTMCNC: 32 G/DL (ref 31.5–35.7)
MCHC RBC AUTO-ENTMCNC: 32.2 G/DL (ref 31.5–35.7)
MCHC RBC AUTO-ENTMCNC: 32.3 G/DL (ref 31.5–35.7)
MCHC RBC AUTO-ENTMCNC: 32.3 G/DL (ref 31.5–35.7)
MCHC RBC AUTO-ENTMCNC: 32.4 G/DL (ref 31.5–35.7)
MCV RBC AUTO: 87.9 FL (ref 79–97)
MCV RBC AUTO: 89.8 FL (ref 79–97)
MCV RBC AUTO: 90.1 FL (ref 79–97)
MCV RBC AUTO: 91.4 FL (ref 79–97)
MCV RBC AUTO: 91.5 FL (ref 79–97)
MCV RBC AUTO: 91.6 FL (ref 79–97)
MCV RBC AUTO: 92.8 FL (ref 79–97)
MCV RBC AUTO: 93 FL (ref 79–97)
MCV RBC AUTO: 93.2 FL (ref 79–97)
MCV RBC AUTO: 94 FL (ref 79–97)
MCV RBC AUTO: 94.6 FL (ref 79–97)
MCV RBC AUTO: 94.9 FL (ref 79–97)
MCV RBC AUTO: 95.3 FL (ref 79–97)
MCV RBC AUTO: 95.3 FL (ref 79–97)
MCV RBC AUTO: 95.8 FL (ref 79–97)
MCV RBC AUTO: 96.7 FL (ref 79–97)
METHADONE UR QL SCN: NEGATIVE
METHGB BLD QL: 0.3 % (ref 0–1.5)
MODALITY: ABNORMAL
MONOCYTES # BLD AUTO: 0.4 10*3/MM3 (ref 0.1–0.9)
MONOCYTES # BLD AUTO: 0.43 10*3/MM3 (ref 0.1–0.9)
MONOCYTES # BLD AUTO: 0.5 10*3/MM3 (ref 0.1–0.9)
MONOCYTES # BLD AUTO: 0.51 10*3/MM3 (ref 0.1–0.9)
MONOCYTES # BLD AUTO: 0.51 10*3/MM3 (ref 0.1–0.9)
MONOCYTES # BLD AUTO: 0.54 10*3/MM3 (ref 0.1–0.9)
MONOCYTES # BLD AUTO: 0.55 10*3/MM3 (ref 0.1–0.9)
MONOCYTES # BLD AUTO: 0.56 10*3/MM3 (ref 0.1–0.9)
MONOCYTES # BLD AUTO: 0.58 10*3/MM3 (ref 0.1–0.9)
MONOCYTES NFR BLD AUTO: 11.8 % (ref 5–12)
MONOCYTES NFR BLD AUTO: 12.4 % (ref 5–12)
MONOCYTES NFR BLD AUTO: 5.3 % (ref 5–12)
MONOCYTES NFR BLD AUTO: 7.5 % (ref 5–12)
MONOCYTES NFR BLD AUTO: 8.3 % (ref 5–12)
MONOCYTES NFR BLD AUTO: 8.7 % (ref 5–12)
MONOCYTES NFR BLD AUTO: 9.2 % (ref 5–12)
MONOCYTES NFR BLD AUTO: 9.5 % (ref 5–12)
MONOCYTES NFR BLD AUTO: 9.6 % (ref 5–12)
MRSA DNA SPEC QL NAA+PROBE: NEGATIVE
NEUTROPHILS NFR BLD AUTO: 3.04 10*3/MM3 (ref 1.7–7)
NEUTROPHILS NFR BLD AUTO: 3.12 10*3/MM3 (ref 1.7–7)
NEUTROPHILS NFR BLD AUTO: 3.15 10*3/MM3 (ref 1.7–7)
NEUTROPHILS NFR BLD AUTO: 4 10*3/MM3 (ref 1.7–7)
NEUTROPHILS NFR BLD AUTO: 4.12 10*3/MM3 (ref 1.7–7)
NEUTROPHILS NFR BLD AUTO: 4.4 10*3/MM3 (ref 1.7–7)
NEUTROPHILS NFR BLD AUTO: 4.45 10*3/MM3 (ref 1.7–7)
NEUTROPHILS NFR BLD AUTO: 4.87 10*3/MM3 (ref 1.7–7)
NEUTROPHILS NFR BLD AUTO: 6.79 10*3/MM3 (ref 1.7–7)
NEUTROPHILS NFR BLD AUTO: 65.2 % (ref 42.7–76)
NEUTROPHILS NFR BLD AUTO: 66.5 % (ref 42.7–76)
NEUTROPHILS NFR BLD AUTO: 68.4 % (ref 42.7–76)
NEUTROPHILS NFR BLD AUTO: 70.6 % (ref 42.7–76)
NEUTROPHILS NFR BLD AUTO: 72.1 % (ref 42.7–76)
NEUTROPHILS NFR BLD AUTO: 72.6 % (ref 42.7–76)
NEUTROPHILS NFR BLD AUTO: 72.9 % (ref 42.7–76)
NEUTROPHILS NFR BLD AUTO: 77.5 % (ref 42.7–76)
NEUTROPHILS NFR BLD AUTO: 83.2 % (ref 42.7–76)
NITRITE UR QL STRIP: NEGATIVE
NOTE: ABNORMAL
NRBC BLD AUTO-RTO: 0 /100 WBC (ref 0–0.2)
NT-PROBNP SERPL-MCNC: 432.9 PG/ML (ref 0–900)
NT-PROBNP SERPL-MCNC: 610.1 PG/ML (ref 0–900)
NT-PROBNP SERPL-MCNC: 626 PG/ML (ref 0–900)
NT-PROBNP SERPL-MCNC: 656.8 PG/ML (ref 0–900)
NT-PROBNP SERPL-MCNC: 729.9 PG/ML (ref 0–900)
OPIATES UR QL: NEGATIVE
OXCARBAZEPINE SERPL-MCNC: 22 UG/ML (ref 10–35)
OXYCODONE UR QL SCN: NEGATIVE
OXYHGB MFR BLDV: 94.3 % (ref 94–99)
PAW @ PEAK INSP FLOW SETTING VENT: 0 CMH2O
PCO2 BLDA: 38.8 MM HG (ref 35–45)
PCO2 TEMP ADJ BLD: 38.8 MM HG (ref 35–48)
PCP UR QL SCN: NEGATIVE
PH BLDA: 7.42 PH UNITS (ref 7.35–7.45)
PH UR STRIP.AUTO: 6.5 [PH] (ref 5–8)
PH UR STRIP.AUTO: 7.5 [PH] (ref 5–8)
PH UR STRIP.AUTO: <=5 [PH] (ref 5–8)
PH, TEMP CORRECTED: 7.42 PH UNITS
PHOSPHATE SERPL-MCNC: 3.2 MG/DL (ref 2.5–4.5)
PHOSPHATE SERPL-MCNC: 3.3 MG/DL (ref 2.5–4.5)
PLATELET # BLD AUTO: 117 10*3/MM3 (ref 140–450)
PLATELET # BLD AUTO: 117 10*3/MM3 (ref 140–450)
PLATELET # BLD AUTO: 118 10*3/MM3 (ref 140–450)
PLATELET # BLD AUTO: 121 10*3/MM3 (ref 140–450)
PLATELET # BLD AUTO: 122 10*3/MM3 (ref 140–450)
PLATELET # BLD AUTO: 124 10*3/MM3 (ref 140–450)
PLATELET # BLD AUTO: 125 10*3/MM3 (ref 140–450)
PLATELET # BLD AUTO: 128 10*3/MM3 (ref 140–450)
PLATELET # BLD AUTO: 130 10*3/MM3 (ref 140–450)
PLATELET # BLD AUTO: 132 10*3/MM3 (ref 140–450)
PLATELET # BLD AUTO: 133 10*3/MM3 (ref 140–450)
PLATELET # BLD AUTO: 133 10*3/MM3 (ref 140–450)
PLATELET # BLD AUTO: 134 10*3/MM3 (ref 140–450)
PLATELET # BLD AUTO: 138 10*3/MM3 (ref 140–450)
PLATELET # BLD AUTO: 145 10*3/MM3 (ref 140–450)
PLATELET # BLD AUTO: 147 10*3/MM3 (ref 140–450)
PMV BLD AUTO: 10.1 FL (ref 6–12)
PMV BLD AUTO: 10.3 FL (ref 6–12)
PMV BLD AUTO: 10.3 FL (ref 6–12)
PMV BLD AUTO: 10.4 FL (ref 6–12)
PMV BLD AUTO: 10.4 FL (ref 6–12)
PMV BLD AUTO: 10.5 FL (ref 6–12)
PMV BLD AUTO: 10.5 FL (ref 6–12)
PMV BLD AUTO: 10.6 FL (ref 6–12)
PMV BLD AUTO: 10.7 FL (ref 6–12)
PMV BLD AUTO: 10.8 FL (ref 6–12)
PMV BLD AUTO: 10.8 FL (ref 6–12)
PMV BLD AUTO: 10.9 FL (ref 6–12)
PMV BLD AUTO: 10.9 FL (ref 6–12)
PO2 BLDA: 77.2 MM HG (ref 83–108)
PO2 TEMP ADJ BLD: 77.2 MM HG (ref 83–108)
POTASSIUM SERPL-SCNC: 2.4 MMOL/L (ref 3.5–5.2)
POTASSIUM SERPL-SCNC: 2.8 MMOL/L (ref 3.5–5.2)
POTASSIUM SERPL-SCNC: 2.9 MMOL/L (ref 3.5–5.2)
POTASSIUM SERPL-SCNC: 3.1 MMOL/L (ref 3.5–5.2)
POTASSIUM SERPL-SCNC: 3.1 MMOL/L (ref 3.5–5.2)
POTASSIUM SERPL-SCNC: 3.2 MMOL/L (ref 3.5–5.2)
POTASSIUM SERPL-SCNC: 3.3 MMOL/L (ref 3.5–5.2)
POTASSIUM SERPL-SCNC: 3.3 MMOL/L (ref 3.5–5.2)
POTASSIUM SERPL-SCNC: 3.4 MMOL/L (ref 3.5–5.2)
POTASSIUM SERPL-SCNC: 3.5 MMOL/L (ref 3.5–5.2)
POTASSIUM SERPL-SCNC: 3.6 MMOL/L (ref 3.5–5.2)
POTASSIUM SERPL-SCNC: 3.7 MMOL/L (ref 3.5–5.2)
POTASSIUM SERPL-SCNC: 3.9 MMOL/L (ref 3.5–5.2)
POTASSIUM SERPL-SCNC: 3.9 MMOL/L (ref 3.5–5.2)
POTASSIUM SERPL-SCNC: 4 MMOL/L (ref 3.5–5.2)
POTASSIUM SERPL-SCNC: 4.5 MMOL/L (ref 3.5–5.2)
POTASSIUM SERPL-SCNC: 4.9 MMOL/L (ref 3.5–5.2)
POTASSIUM SERPL-SCNC: 5 MMOL/L (ref 3.5–5.2)
PROCALCITONIN SERPL-MCNC: 0.08 NG/ML (ref 0–0.25)
PROPOXYPH UR QL: NEGATIVE
PROT SERPL-MCNC: 6.8 G/DL (ref 6–8.5)
PROT SERPL-MCNC: 6.9 G/DL (ref 6–8.5)
PROT SERPL-MCNC: 7 G/DL (ref 6–8.5)
PROT SERPL-MCNC: 7.2 G/DL (ref 6–8.5)
PROT SERPL-MCNC: 7.3 G/DL (ref 6–8.5)
PROT SERPL-MCNC: 7.6 G/DL (ref 6–8.5)
PROT SERPL-MCNC: 7.7 G/DL (ref 6–8.5)
PROT SERPL-MCNC: 7.7 G/DL (ref 6–8.5)
PROT UR QL STRIP: NEGATIVE
PROTHROMBIN TIME: 14.2 SECONDS (ref 11.4–14.4)
PROTHROMBIN TIME: 14.9 SECONDS (ref 11.4–14.4)
PROTHROMBIN TIME: 15.9 SECONDS (ref 11.4–14.4)
PROTHROMBIN TIME: 16.6 SECONDS (ref 11.4–14.4)
PROTHROMBIN TIME: 16.7 SECONDS (ref 11.4–14.4)
QT INTERVAL: 374 MS
QT INTERVAL: 400 MS
QT INTERVAL: 406 MS
QT INTERVAL: 416 MS
QT INTERVAL: 420 MS
QT INTERVAL: 420 MS
QT INTERVAL: 422 MS
QT INTERVAL: 434 MS
QT INTERVAL: 434 MS
QTC INTERVAL: 415 MS
QTC INTERVAL: 434 MS
QTC INTERVAL: 442 MS
QTC INTERVAL: 458 MS
QTC INTERVAL: 466 MS
QTC INTERVAL: 468 MS
QTC INTERVAL: 470 MS
QTC INTERVAL: 475 MS
QTC INTERVAL: 491 MS
RBC # BLD AUTO: 3.21 10*6/MM3 (ref 4.14–5.8)
RBC # BLD AUTO: 3.31 10*6/MM3 (ref 4.14–5.8)
RBC # BLD AUTO: 3.32 10*6/MM3 (ref 4.14–5.8)
RBC # BLD AUTO: 3.32 10*6/MM3 (ref 4.14–5.8)
RBC # BLD AUTO: 3.36 10*6/MM3 (ref 4.14–5.8)
RBC # BLD AUTO: 3.37 10*6/MM3 (ref 4.14–5.8)
RBC # BLD AUTO: 3.39 10*6/MM3 (ref 4.14–5.8)
RBC # BLD AUTO: 3.53 10*6/MM3 (ref 4.14–5.8)
RBC # BLD AUTO: 3.56 10*6/MM3 (ref 4.14–5.8)
RBC # BLD AUTO: 3.72 10*6/MM3 (ref 4.14–5.8)
RBC # BLD AUTO: 3.73 10*6/MM3 (ref 4.14–5.8)
RBC # BLD AUTO: 3.75 10*6/MM3 (ref 4.14–5.8)
RBC # BLD AUTO: 3.98 10*6/MM3 (ref 4.14–5.8)
RBC # BLD AUTO: 4.02 10*6/MM3 (ref 4.14–5.8)
RBC # BLD AUTO: 4.06 10*6/MM3 (ref 4.14–5.8)
RBC # BLD AUTO: 4.61 10*6/MM3 (ref 4.14–5.8)
SALICYLATES SERPL-MCNC: <0.3 MG/DL
SARS-COV-2 RDRP RESP QL NAA+PROBE: NORMAL
SARS-COV-2 RDRP RESP QL NAA+PROBE: NORMAL
SARS-COV-2 RNA NOSE QL NAA+PROBE: NOT DETECTED
SARS-COV-2 RNA RESP QL NAA+PROBE: NOT DETECTED
SODIUM SERPL-SCNC: 131 MMOL/L (ref 136–145)
SODIUM SERPL-SCNC: 132 MMOL/L (ref 136–145)
SODIUM SERPL-SCNC: 133 MMOL/L (ref 136–145)
SODIUM SERPL-SCNC: 134 MMOL/L (ref 136–145)
SODIUM SERPL-SCNC: 134 MMOL/L (ref 136–145)
SODIUM SERPL-SCNC: 135 MMOL/L (ref 136–145)
SODIUM SERPL-SCNC: 136 MMOL/L (ref 136–145)
SODIUM UR-SCNC: <20 MMOL/L
SP GR UR STRIP: 1.01 (ref 1–1.03)
STRESS TARGET HR: 130 BPM
T4 FREE SERPL-MCNC: 0.71 NG/DL (ref 0.93–1.7)
T4 FREE SERPL-MCNC: 0.75 NG/DL (ref 0.93–1.7)
T4 FREE SERPL-MCNC: 0.84 NG/DL (ref 0.93–1.7)
TESTOST FREE SERPL-MCNC: 9.7 PG/ML (ref 6.6–18.1)
TESTOST SERPL-MCNC: 1435 NG/DL (ref 264–916)
TOTAL RATE: 0 BREATHS/MINUTE
TRICYCLICS UR QL SCN: NEGATIVE
TRIGL SERPL-MCNC: 87 MG/DL (ref 0–150)
TROPONIN T SERPL-MCNC: 0.03 NG/ML (ref 0–0.03)
TROPONIN T SERPL-MCNC: 0.03 NG/ML (ref 0–0.03)
TROPONIN T SERPL-MCNC: 0.04 NG/ML (ref 0–0.03)
TROPONIN T SERPL-MCNC: 0.05 NG/ML (ref 0–0.03)
TROPONIN T SERPL-MCNC: 0.05 NG/ML (ref 0–0.03)
TROPONIN T SERPL-MCNC: 0.06 NG/ML (ref 0–0.03)
TROPONIN T SERPL-MCNC: 0.08 NG/ML (ref 0–0.03)
TSH SERPL DL<=0.05 MIU/L-ACNC: 1.64 UIU/ML (ref 0.27–4.2)
TSH SERPL DL<=0.05 MIU/L-ACNC: 14.6 UIU/ML (ref 0.27–4.2)
TSH SERPL DL<=0.05 MIU/L-ACNC: 15.74 UIU/ML (ref 0.27–4.2)
TSH SERPL DL<=0.05 MIU/L-ACNC: 2.2 UIU/ML (ref 0.27–4.2)
TSH SERPL DL<=0.05 MIU/L-ACNC: 3.94 UIU/ML (ref 0.27–4.2)
UROBILINOGEN UR QL STRIP: NORMAL
UUN 24H UR-MCNC: 491 MG/DL
VIT B12 BLD-MCNC: 1330 PG/ML (ref 211–946)
VLDLC SERPL-MCNC: 17 MG/DL (ref 5–40)
WBC # BLD AUTO: 4.37 10*3/MM3 (ref 3.4–10.8)
WBC # BLD AUTO: 4.4 10*3/MM3 (ref 3.4–10.8)
WBC # BLD AUTO: 4.47 10*3/MM3 (ref 3.4–10.8)
WBC # BLD AUTO: 4.66 10*3/MM3 (ref 3.4–10.8)
WBC # BLD AUTO: 4.69 10*3/MM3 (ref 3.4–10.8)
WBC # BLD AUTO: 5.06 10*3/MM3 (ref 3.4–10.8)
WBC # BLD AUTO: 5.37 10*3/MM3 (ref 3.4–10.8)
WBC # BLD AUTO: 5.68 10*3/MM3 (ref 3.4–10.8)
WBC # BLD AUTO: 5.74 10*3/MM3 (ref 3.4–10.8)
WBC # BLD AUTO: 5.84 10*3/MM3 (ref 3.4–10.8)
WBC # BLD AUTO: 5.85 10*3/MM3 (ref 3.4–10.8)
WBC # BLD AUTO: 6.04 10*3/MM3 (ref 3.4–10.8)
WBC # BLD AUTO: 6.11 10*3/MM3 (ref 3.4–10.8)
WBC # BLD AUTO: 6.41 10*3/MM3 (ref 3.4–10.8)
WBC # BLD AUTO: 6.71 10*3/MM3 (ref 3.4–10.8)
WBC # BLD AUTO: 8.15 10*3/MM3 (ref 3.4–10.8)
WHOLE BLOOD HOLD SPECIMEN: NORMAL

## 2021-01-01 PROCEDURE — 83735 ASSAY OF MAGNESIUM: CPT | Performed by: INTERNAL MEDICINE

## 2021-01-01 PROCEDURE — 85610 PROTHROMBIN TIME: CPT | Performed by: NURSE PRACTITIONER

## 2021-01-01 PROCEDURE — 97116 GAIT TRAINING THERAPY: CPT

## 2021-01-01 PROCEDURE — A9270 NON-COVERED ITEM OR SERVICE: HCPCS | Performed by: PHYSICIAN ASSISTANT

## 2021-01-01 PROCEDURE — 83605 ASSAY OF LACTIC ACID: CPT | Performed by: STUDENT IN AN ORGANIZED HEALTH CARE EDUCATION/TRAINING PROGRAM

## 2021-01-01 PROCEDURE — 84439 ASSAY OF FREE THYROXINE: CPT | Performed by: EMERGENCY MEDICINE

## 2021-01-01 PROCEDURE — 25010000003 CEFTRIAXONE PER 250 MG: Performed by: EMERGENCY MEDICINE

## 2021-01-01 PROCEDURE — 80053 COMPREHEN METABOLIC PANEL: CPT | Performed by: INTERNAL MEDICINE

## 2021-01-01 PROCEDURE — 80069 RENAL FUNCTION PANEL: CPT | Performed by: INTERNAL MEDICINE

## 2021-01-01 PROCEDURE — 82962 GLUCOSE BLOOD TEST: CPT

## 2021-01-01 PROCEDURE — 80143 DRUG ASSAY ACETAMINOPHEN: CPT | Performed by: STUDENT IN AN ORGANIZED HEALTH CARE EDUCATION/TRAINING PROGRAM

## 2021-01-01 PROCEDURE — 99232 SBSQ HOSP IP/OBS MODERATE 35: CPT | Performed by: INTERNAL MEDICINE

## 2021-01-01 PROCEDURE — 99214 OFFICE O/P EST MOD 30 MIN: CPT | Performed by: INTERNAL MEDICINE

## 2021-01-01 PROCEDURE — 87040 BLOOD CULTURE FOR BACTERIA: CPT | Performed by: EMERGENCY MEDICINE

## 2021-01-01 PROCEDURE — 63710000001 PRAVASTATIN 40 MG TABLET: Performed by: INTERNAL MEDICINE

## 2021-01-01 PROCEDURE — 95910 NRV CNDJ TEST 7-8 STUDIES: CPT

## 2021-01-01 PROCEDURE — 84443 ASSAY THYROID STIM HORMONE: CPT | Performed by: INTERNAL MEDICINE

## 2021-01-01 PROCEDURE — 83735 ASSAY OF MAGNESIUM: CPT | Performed by: NURSE PRACTITIONER

## 2021-01-01 PROCEDURE — A9270 NON-COVERED ITEM OR SERVICE: HCPCS | Performed by: INTERNAL MEDICINE

## 2021-01-01 PROCEDURE — 99212 OFFICE O/P EST SF 10 MIN: CPT | Performed by: PSYCHIATRY & NEUROLOGY

## 2021-01-01 PROCEDURE — 84443 ASSAY THYROID STIM HORMONE: CPT | Performed by: EMERGENCY MEDICINE

## 2021-01-01 PROCEDURE — 99233 SBSQ HOSP IP/OBS HIGH 50: CPT | Performed by: INTERNAL MEDICINE

## 2021-01-01 PROCEDURE — 76705 ECHO EXAM OF ABDOMEN: CPT

## 2021-01-01 PROCEDURE — 85379 FIBRIN DEGRADATION QUANT: CPT | Performed by: EMERGENCY MEDICINE

## 2021-01-01 PROCEDURE — 84132 ASSAY OF SERUM POTASSIUM: CPT | Performed by: HOSPITALIST

## 2021-01-01 PROCEDURE — 25010000002 HEPARIN (PORCINE) PER 1000 UNITS: Performed by: PHYSICIAN ASSISTANT

## 2021-01-01 PROCEDURE — 63710000001 INSULIN LISPRO (HUMAN) PER 5 UNITS: Performed by: PHYSICIAN ASSISTANT

## 2021-01-01 PROCEDURE — 80053 COMPREHEN METABOLIC PANEL: CPT | Performed by: EMERGENCY MEDICINE

## 2021-01-01 PROCEDURE — 85027 COMPLETE CBC AUTOMATED: CPT | Performed by: HOSPITALIST

## 2021-01-01 PROCEDURE — 63710000001 LEVOTHYROXINE 75 MCG TABLET: Performed by: INTERNAL MEDICINE

## 2021-01-01 PROCEDURE — 80048 BASIC METABOLIC PNL TOTAL CA: CPT | Performed by: PHYSICIAN ASSISTANT

## 2021-01-01 PROCEDURE — U0003 INFECTIOUS AGENT DETECTION BY NUCLEIC ACID (DNA OR RNA); SEVERE ACUTE RESPIRATORY SYNDROME CORONAVIRUS 2 (SARS-COV-2) (CORONAVIRUS DISEASE [COVID-19]), AMPLIFIED PROBE TECHNIQUE, MAKING USE OF HIGH THROUGHPUT TECHNOLOGIES AS DESCRIBED BY CMS-2020-01-R: HCPCS | Performed by: INTERNAL MEDICINE

## 2021-01-01 PROCEDURE — 63710000001 VENLAFAXINE XR 75 MG CAPSULE SUSTAINED-RELEASE 24 HR: Performed by: INTERNAL MEDICINE

## 2021-01-01 PROCEDURE — 87635 SARS-COV-2 COVID-19 AMP PRB: CPT | Performed by: INTERNAL MEDICINE

## 2021-01-01 PROCEDURE — 93005 ELECTROCARDIOGRAM TRACING: CPT | Performed by: EMERGENCY MEDICINE

## 2021-01-01 PROCEDURE — 86708 HEPATITIS A ANTIBODY: CPT | Performed by: NURSE PRACTITIONER

## 2021-01-01 PROCEDURE — 63710000001 OXCARBAZEPINE 150 MG TABLET: Performed by: INTERNAL MEDICINE

## 2021-01-01 PROCEDURE — 96372 THER/PROPH/DIAG INJ SC/IM: CPT

## 2021-01-01 PROCEDURE — 43235 EGD DIAGNOSTIC BRUSH WASH: CPT | Performed by: INTERNAL MEDICINE

## 2021-01-01 PROCEDURE — 25010000002 CEFTRIAXONE PER 250 MG: Performed by: NURSE PRACTITIONER

## 2021-01-01 PROCEDURE — 80183 DRUG SCRN QUANT OXCARBAZEPIN: CPT | Performed by: INTERNAL MEDICINE

## 2021-01-01 PROCEDURE — 63710000001 ASPIRIN 81 MG TABLET DELAYED-RELEASE: Performed by: INTERNAL MEDICINE

## 2021-01-01 PROCEDURE — 85025 COMPLETE CBC W/AUTO DIFF WBC: CPT | Performed by: STUDENT IN AN ORGANIZED HEALTH CARE EDUCATION/TRAINING PROGRAM

## 2021-01-01 PROCEDURE — 82077 ASSAY SPEC XCP UR&BREATH IA: CPT | Performed by: STUDENT IN AN ORGANIZED HEALTH CARE EDUCATION/TRAINING PROGRAM

## 2021-01-01 PROCEDURE — 85025 COMPLETE CBC W/AUTO DIFF WBC: CPT | Performed by: FAMILY MEDICINE

## 2021-01-01 PROCEDURE — 97597 DBRDMT OPN WND 1ST 20 CM/<: CPT

## 2021-01-01 PROCEDURE — 63710000001 TRAZODONE 50 MG TABLET: Performed by: INTERNAL MEDICINE

## 2021-01-01 PROCEDURE — 25010000002 CHLOROTHIAZIDE PER 500 MG: Performed by: INTERNAL MEDICINE

## 2021-01-01 PROCEDURE — G0378 HOSPITAL OBSERVATION PER HR: HCPCS

## 2021-01-01 PROCEDURE — 80179 DRUG ASSAY SALICYLATE: CPT | Performed by: STUDENT IN AN ORGANIZED HEALTH CARE EDUCATION/TRAINING PROGRAM

## 2021-01-01 PROCEDURE — 99285 EMERGENCY DEPT VISIT HI MDM: CPT

## 2021-01-01 PROCEDURE — 97110 THERAPEUTIC EXERCISES: CPT

## 2021-01-01 PROCEDURE — 84484 ASSAY OF TROPONIN QUANT: CPT | Performed by: EMERGENCY MEDICINE

## 2021-01-01 PROCEDURE — 97165 OT EVAL LOW COMPLEX 30 MIN: CPT

## 2021-01-01 PROCEDURE — 97161 PT EVAL LOW COMPLEX 20 MIN: CPT

## 2021-01-01 PROCEDURE — 97530 THERAPEUTIC ACTIVITIES: CPT

## 2021-01-01 PROCEDURE — 82140 ASSAY OF AMMONIA: CPT | Performed by: INTERNAL MEDICINE

## 2021-01-01 PROCEDURE — G0108 DIAB MANAGE TRN  PER INDIV: HCPCS

## 2021-01-01 PROCEDURE — 84132 ASSAY OF SERUM POTASSIUM: CPT | Performed by: INTERNAL MEDICINE

## 2021-01-01 PROCEDURE — 99214 OFFICE O/P EST MOD 30 MIN: CPT | Performed by: PSYCHIATRY & NEUROLOGY

## 2021-01-01 PROCEDURE — 99217 PR OBSERVATION CARE DISCHARGE MANAGEMENT: CPT | Performed by: INTERNAL MEDICINE

## 2021-01-01 PROCEDURE — 97116 GAIT TRAINING THERAPY: CPT | Performed by: PHYSICAL THERAPIST

## 2021-01-01 PROCEDURE — 80306 DRUG TEST PRSMV INSTRMNT: CPT | Performed by: STUDENT IN AN ORGANIZED HEALTH CARE EDUCATION/TRAINING PROGRAM

## 2021-01-01 PROCEDURE — 99225 PR SBSQ OBSERVATION CARE/DAY 25 MINUTES: CPT | Performed by: INTERNAL MEDICINE

## 2021-01-01 PROCEDURE — 83735 ASSAY OF MAGNESIUM: CPT

## 2021-01-01 PROCEDURE — 90471 IMMUNIZATION ADMIN: CPT | Performed by: PHYSICIAN ASSISTANT

## 2021-01-01 PROCEDURE — 85027 COMPLETE CBC AUTOMATED: CPT | Performed by: PHYSICIAN ASSISTANT

## 2021-01-01 PROCEDURE — 83880 ASSAY OF NATRIURETIC PEPTIDE: CPT | Performed by: EMERGENCY MEDICINE

## 2021-01-01 PROCEDURE — 97164 PT RE-EVAL EST PLAN CARE: CPT

## 2021-01-01 PROCEDURE — 84402 ASSAY OF FREE TESTOSTERONE: CPT

## 2021-01-01 PROCEDURE — 25010000002 ENOXAPARIN PER 10 MG

## 2021-01-01 PROCEDURE — 80053 COMPREHEN METABOLIC PANEL: CPT | Performed by: HOSPITALIST

## 2021-01-01 PROCEDURE — 87899 AGENT NOS ASSAY W/OPTIC: CPT | Performed by: PHYSICIAN ASSISTANT

## 2021-01-01 PROCEDURE — 93005 ELECTROCARDIOGRAM TRACING: CPT | Performed by: NURSE PRACTITIONER

## 2021-01-01 PROCEDURE — 85025 COMPLETE CBC W/AUTO DIFF WBC: CPT | Performed by: INTERNAL MEDICINE

## 2021-01-01 PROCEDURE — 82550 ASSAY OF CK (CPK): CPT | Performed by: INTERNAL MEDICINE

## 2021-01-01 PROCEDURE — U0005 INFEC AGEN DETEC AMPLI PROBE: HCPCS | Performed by: NURSE PRACTITIONER

## 2021-01-01 PROCEDURE — 63710000001 PRAMIPEXOLE 0.25 MG TABLET: Performed by: INTERNAL MEDICINE

## 2021-01-01 PROCEDURE — 87636 SARSCOV2 & INF A&B AMP PRB: CPT | Performed by: EMERGENCY MEDICINE

## 2021-01-01 PROCEDURE — 97535 SELF CARE MNGMENT TRAINING: CPT

## 2021-01-01 PROCEDURE — 99214 OFFICE O/P EST MOD 30 MIN: CPT | Performed by: NURSE PRACTITIONER

## 2021-01-01 PROCEDURE — 83036 HEMOGLOBIN GLYCOSYLATED A1C: CPT | Performed by: INTERNAL MEDICINE

## 2021-01-01 PROCEDURE — U0003 INFECTIOUS AGENT DETECTION BY NUCLEIC ACID (DNA OR RNA); SEVERE ACUTE RESPIRATORY SYNDROME CORONAVIRUS 2 (SARS-COV-2) (CORONAVIRUS DISEASE [COVID-19]), AMPLIFIED PROBE TECHNIQUE, MAKING USE OF HIGH THROUGHPUT TECHNOLOGIES AS DESCRIBED BY CMS-2020-01-R: HCPCS | Performed by: FAMILY MEDICINE

## 2021-01-01 PROCEDURE — 82607 VITAMIN B-12: CPT | Performed by: INTERNAL MEDICINE

## 2021-01-01 PROCEDURE — 99233 SBSQ HOSP IP/OBS HIGH 50: CPT | Performed by: HOSPITALIST

## 2021-01-01 PROCEDURE — 99223 1ST HOSP IP/OBS HIGH 75: CPT | Performed by: INTERNAL MEDICINE

## 2021-01-01 PROCEDURE — 96365 THER/PROPH/DIAG IV INF INIT: CPT

## 2021-01-01 PROCEDURE — 93005 ELECTROCARDIOGRAM TRACING: CPT | Performed by: INTERNAL MEDICINE

## 2021-01-01 PROCEDURE — 99443 PR PHYS/QHP TELEPHONE EVALUATION 21-30 MIN: CPT | Performed by: NURSE PRACTITIONER

## 2021-01-01 PROCEDURE — 96376 TX/PRO/DX INJ SAME DRUG ADON: CPT

## 2021-01-01 PROCEDURE — 25010000003 MAGNESIUM SULFATE 4 GM/100ML SOLUTION: Performed by: INTERNAL MEDICINE

## 2021-01-01 PROCEDURE — 83050 HGB METHEMOGLOBIN QUAN: CPT

## 2021-01-01 PROCEDURE — 63710000001 CHOLECALCIFEROL 25 MCG (1000 UT) TABLET: Performed by: PHYSICIAN ASSISTANT

## 2021-01-01 PROCEDURE — 93306 TTE W/DOPPLER COMPLETE: CPT | Performed by: INTERNAL MEDICINE

## 2021-01-01 PROCEDURE — 80048 BASIC METABOLIC PNL TOTAL CA: CPT | Performed by: INTERNAL MEDICINE

## 2021-01-01 PROCEDURE — 63710000001: Performed by: INTERNAL MEDICINE

## 2021-01-01 PROCEDURE — 84484 ASSAY OF TROPONIN QUANT: CPT | Performed by: PHYSICIAN ASSISTANT

## 2021-01-01 PROCEDURE — 63710000001 GABAPENTIN 300 MG CAPSULE: Performed by: INTERNAL MEDICINE

## 2021-01-01 PROCEDURE — 29581 APPL MULTLAYER CMPRN SYS LEG: CPT

## 2021-01-01 PROCEDURE — 84439 ASSAY OF FREE THYROXINE: CPT | Performed by: INTERNAL MEDICINE

## 2021-01-01 PROCEDURE — 97161 PT EVAL LOW COMPLEX 20 MIN: CPT | Performed by: PHYSICAL THERAPIST

## 2021-01-01 PROCEDURE — 97140 MANUAL THERAPY 1/> REGIONS: CPT | Performed by: PHYSICAL THERAPIST

## 2021-01-01 PROCEDURE — 99239 HOSP IP/OBS DSCHRG MGMT >30: CPT | Performed by: INTERNAL MEDICINE

## 2021-01-01 PROCEDURE — U0005 INFEC AGEN DETEC AMPLI PROBE: HCPCS | Performed by: INTERNAL MEDICINE

## 2021-01-01 PROCEDURE — 20610 DRAIN/INJ JOINT/BURSA W/O US: CPT | Performed by: ORTHOPAEDIC SURGERY

## 2021-01-01 PROCEDURE — 99232 SBSQ HOSP IP/OBS MODERATE 35: CPT | Performed by: NURSE PRACTITIONER

## 2021-01-01 PROCEDURE — 84540 ASSAY OF URINE/UREA-N: CPT | Performed by: INTERNAL MEDICINE

## 2021-01-01 PROCEDURE — 99212 OFFICE O/P EST SF 10 MIN: CPT | Performed by: ORTHOPAEDIC SURGERY

## 2021-01-01 PROCEDURE — 93005 ELECTROCARDIOGRAM TRACING: CPT | Performed by: STUDENT IN AN ORGANIZED HEALTH CARE EDUCATION/TRAINING PROGRAM

## 2021-01-01 PROCEDURE — 83880 ASSAY OF NATRIURETIC PEPTIDE: CPT | Performed by: INTERNAL MEDICINE

## 2021-01-01 PROCEDURE — 84484 ASSAY OF TROPONIN QUANT: CPT | Performed by: NURSE PRACTITIONER

## 2021-01-01 PROCEDURE — 93306 TTE W/DOPPLER COMPLETE: CPT

## 2021-01-01 PROCEDURE — 83605 ASSAY OF LACTIC ACID: CPT | Performed by: EMERGENCY MEDICINE

## 2021-01-01 PROCEDURE — 97110 THERAPEUTIC EXERCISES: CPT | Performed by: PHYSICAL THERAPIST

## 2021-01-01 PROCEDURE — 63710000001 INSULIN LISPRO (HUMAN) PER 5 UNITS: Performed by: INTERNAL MEDICINE

## 2021-01-01 PROCEDURE — 82805 BLOOD GASES W/O2 SATURATION: CPT

## 2021-01-01 PROCEDURE — 81003 URINALYSIS AUTO W/O SCOPE: CPT | Performed by: EMERGENCY MEDICINE

## 2021-01-01 PROCEDURE — 99239 HOSP IP/OBS DSCHRG MGMT >30: CPT | Performed by: HOSPITALIST

## 2021-01-01 PROCEDURE — 85027 COMPLETE CBC AUTOMATED: CPT | Performed by: INTERNAL MEDICINE

## 2021-01-01 PROCEDURE — 84439 ASSAY OF FREE THYROXINE: CPT | Performed by: STUDENT IN AN ORGANIZED HEALTH CARE EDUCATION/TRAINING PROGRAM

## 2021-01-01 PROCEDURE — 80053 COMPREHEN METABOLIC PANEL: CPT | Performed by: PHYSICIAN ASSISTANT

## 2021-01-01 PROCEDURE — 63710000001 POTASSIUM CHLORIDE 10 MEQ CAPSULE CONTROLLED-RELEASE: Performed by: INTERNAL MEDICINE

## 2021-01-01 PROCEDURE — 82140 ASSAY OF AMMONIA: CPT | Performed by: FAMILY MEDICINE

## 2021-01-01 PROCEDURE — 83036 HEMOGLOBIN GLYCOSYLATED A1C: CPT | Performed by: NURSE PRACTITIONER

## 2021-01-01 PROCEDURE — 25010000002 HEPARIN (PORCINE) PER 1000 UNITS: Performed by: INTERNAL MEDICINE

## 2021-01-01 PROCEDURE — 25010000003 POTASSIUM CHLORIDE 10 MEQ/100ML SOLUTION: Performed by: EMERGENCY MEDICINE

## 2021-01-01 PROCEDURE — 82306 VITAMIN D 25 HYDROXY: CPT | Performed by: NURSE PRACTITIONER

## 2021-01-01 PROCEDURE — U0004 COV-19 TEST NON-CDC HGH THRU: HCPCS | Performed by: NURSE PRACTITIONER

## 2021-01-01 PROCEDURE — 86317 IMMUNOASSAY INFECTIOUS AGENT: CPT | Performed by: INTERNAL MEDICINE

## 2021-01-01 PROCEDURE — 90632 HEPA VACCINE ADULT IM: CPT | Performed by: PHYSICIAN ASSISTANT

## 2021-01-01 PROCEDURE — 87641 MR-STAPH DNA AMP PROBE: CPT | Performed by: PHYSICIAN ASSISTANT

## 2021-01-01 PROCEDURE — 82105 ALPHA-FETOPROTEIN SERUM: CPT | Performed by: NURSE PRACTITIONER

## 2021-01-01 PROCEDURE — 85025 COMPLETE CBC W/AUTO DIFF WBC: CPT | Performed by: EMERGENCY MEDICINE

## 2021-01-01 PROCEDURE — 99284 EMERGENCY DEPT VISIT MOD MDM: CPT

## 2021-01-01 PROCEDURE — 87636 SARSCOV2 & INF A&B AMP PRB: CPT | Performed by: INTERNAL MEDICINE

## 2021-01-01 PROCEDURE — 0 IOPAMIDOL PER 1 ML: Performed by: EMERGENCY MEDICINE

## 2021-01-01 PROCEDURE — 83735 ASSAY OF MAGNESIUM: CPT | Performed by: STUDENT IN AN ORGANIZED HEALTH CARE EDUCATION/TRAINING PROGRAM

## 2021-01-01 PROCEDURE — 63710000001 BUMETANIDE 1 MG TABLET: Performed by: INTERNAL MEDICINE

## 2021-01-01 PROCEDURE — 95886 MUSC TEST DONE W/N TEST COMP: CPT

## 2021-01-01 PROCEDURE — 85379 FIBRIN DEGRADATION QUANT: CPT | Performed by: INTERNAL MEDICINE

## 2021-01-01 PROCEDURE — 25010000002 CEFTRIAXONE PER 250 MG: Performed by: INTERNAL MEDICINE

## 2021-01-01 PROCEDURE — 81003 URINALYSIS AUTO W/O SCOPE: CPT | Performed by: STUDENT IN AN ORGANIZED HEALTH CARE EDUCATION/TRAINING PROGRAM

## 2021-01-01 PROCEDURE — 93000 ELECTROCARDIOGRAM COMPLETE: CPT | Performed by: NURSE PRACTITIONER

## 2021-01-01 PROCEDURE — 71250 CT THORAX DX C-: CPT

## 2021-01-01 PROCEDURE — 99220 PR INITIAL OBSERVATION CARE/DAY 70 MINUTES: CPT | Performed by: INTERNAL MEDICINE

## 2021-01-01 PROCEDURE — 97166 OT EVAL MOD COMPLEX 45 MIN: CPT

## 2021-01-01 PROCEDURE — 25010000002 PROPOFOL 10 MG/ML EMULSION: Performed by: NURSE ANESTHETIST, CERTIFIED REGISTERED

## 2021-01-01 PROCEDURE — 80048 BASIC METABOLIC PNL TOTAL CA: CPT

## 2021-01-01 PROCEDURE — 99239 HOSP IP/OBS DSCHRG MGMT >30: CPT | Performed by: NURSE PRACTITIONER

## 2021-01-01 PROCEDURE — 71275 CT ANGIOGRAPHY CHEST: CPT

## 2021-01-01 PROCEDURE — 99223 1ST HOSP IP/OBS HIGH 75: CPT | Performed by: NURSE PRACTITIONER

## 2021-01-01 PROCEDURE — 25010000002 CEFTRIAXONE PER 250 MG: Performed by: FAMILY MEDICINE

## 2021-01-01 PROCEDURE — 84300 ASSAY OF URINE SODIUM: CPT | Performed by: INTERNAL MEDICINE

## 2021-01-01 PROCEDURE — 99231 SBSQ HOSP IP/OBS SF/LOW 25: CPT | Performed by: INTERNAL MEDICINE

## 2021-01-01 PROCEDURE — 36415 COLL VENOUS BLD VENIPUNCTURE: CPT

## 2021-01-01 PROCEDURE — 63710000001 MONTELUKAST 10 MG TABLET: Performed by: INTERNAL MEDICINE

## 2021-01-01 PROCEDURE — 80061 LIPID PANEL: CPT | Performed by: INTERNAL MEDICINE

## 2021-01-01 PROCEDURE — 36600 WITHDRAWAL OF ARTERIAL BLOOD: CPT

## 2021-01-01 PROCEDURE — 25010000002 VANCOMYCIN 10 G RECONSTITUTED SOLUTION: Performed by: EMERGENCY MEDICINE

## 2021-01-01 PROCEDURE — 63710000001 CETIRIZINE 10 MG TABLET: Performed by: INTERNAL MEDICINE

## 2021-01-01 PROCEDURE — 85610 PROTHROMBIN TIME: CPT | Performed by: INTERNAL MEDICINE

## 2021-01-01 PROCEDURE — 36415 COLL VENOUS BLD VENIPUNCTURE: CPT | Performed by: INTERNAL MEDICINE

## 2021-01-01 PROCEDURE — 71045 X-RAY EXAM CHEST 1 VIEW: CPT

## 2021-01-01 PROCEDURE — 74178 CT ABD&PLV WO CNTR FLWD CNTR: CPT

## 2021-01-01 PROCEDURE — 99222 1ST HOSP IP/OBS MODERATE 55: CPT | Performed by: INTERNAL MEDICINE

## 2021-01-01 PROCEDURE — U0005 INFEC AGEN DETEC AMPLI PROBE: HCPCS | Performed by: FAMILY MEDICINE

## 2021-01-01 PROCEDURE — 70450 CT HEAD/BRAIN W/O DYE: CPT

## 2021-01-01 PROCEDURE — 63710000001 DEXTROSE 40 % GEL 37.5 G TUBE: Performed by: INTERNAL MEDICINE

## 2021-01-01 PROCEDURE — 82570 ASSAY OF URINE CREATININE: CPT | Performed by: INTERNAL MEDICINE

## 2021-01-01 PROCEDURE — 92610 EVALUATE SWALLOWING FUNCTION: CPT

## 2021-01-01 PROCEDURE — 87040 BLOOD CULTURE FOR BACTERIA: CPT | Performed by: STUDENT IN AN ORGANIZED HEALTH CARE EDUCATION/TRAINING PROGRAM

## 2021-01-01 PROCEDURE — 82140 ASSAY OF AMMONIA: CPT | Performed by: EMERGENCY MEDICINE

## 2021-01-01 PROCEDURE — 90837 PSYTX W PT 60 MINUTES: CPT | Performed by: SOCIAL WORKER

## 2021-01-01 PROCEDURE — 1111F DSCHRG MED/CURRENT MED MERGE: CPT | Performed by: INTERNAL MEDICINE

## 2021-01-01 PROCEDURE — 80048 BASIC METABOLIC PNL TOTAL CA: CPT | Performed by: HOSPITALIST

## 2021-01-01 PROCEDURE — 84484 ASSAY OF TROPONIN QUANT: CPT | Performed by: STUDENT IN AN ORGANIZED HEALTH CARE EDUCATION/TRAINING PROGRAM

## 2021-01-01 PROCEDURE — 25010000002 SULFUR HEXAFLUORIDE MICROSPH 60.7-25 MG RECONSTITUTED SUSPENSION: Performed by: PHYSICIAN ASSISTANT

## 2021-01-01 PROCEDURE — 95251 CONT GLUC MNTR ANALYSIS I&R: CPT | Performed by: INTERNAL MEDICINE

## 2021-01-01 PROCEDURE — 85610 PROTHROMBIN TIME: CPT | Performed by: EMERGENCY MEDICINE

## 2021-01-01 PROCEDURE — 97164 PT RE-EVAL EST PLAN CARE: CPT | Performed by: PHYSICAL THERAPIST

## 2021-01-01 PROCEDURE — 97162 PT EVAL MOD COMPLEX 30 MIN: CPT | Performed by: PHYSICAL THERAPIST

## 2021-01-01 PROCEDURE — G0463 HOSPITAL OUTPT CLINIC VISIT: HCPCS | Performed by: INTERNAL MEDICINE

## 2021-01-01 PROCEDURE — 93010 ELECTROCARDIOGRAM REPORT: CPT | Performed by: INTERNAL MEDICINE

## 2021-01-01 PROCEDURE — 83036 HEMOGLOBIN GLYCOSYLATED A1C: CPT | Performed by: PHYSICIAN ASSISTANT

## 2021-01-01 PROCEDURE — 25010000002 IOPAMIDOL 61 % SOLUTION: Performed by: INTERNAL MEDICINE

## 2021-01-01 PROCEDURE — 85025 COMPLETE CBC W/AUTO DIFF WBC: CPT | Performed by: NURSE PRACTITIONER

## 2021-01-01 PROCEDURE — 86707 HEPATITIS BE ANTIBODY: CPT | Performed by: INTERNAL MEDICINE

## 2021-01-01 PROCEDURE — 80074 ACUTE HEPATITIS PANEL: CPT | Performed by: NURSE PRACTITIONER

## 2021-01-01 PROCEDURE — 80048 BASIC METABOLIC PNL TOTAL CA: CPT | Performed by: NURSE PRACTITIONER

## 2021-01-01 PROCEDURE — 71046 X-RAY EXAM CHEST 2 VIEWS: CPT

## 2021-01-01 PROCEDURE — 93970 EXTREMITY STUDY: CPT | Performed by: INTERNAL MEDICINE

## 2021-01-01 PROCEDURE — 84443 ASSAY THYROID STIM HORMONE: CPT | Performed by: STUDENT IN AN ORGANIZED HEALTH CARE EDUCATION/TRAINING PROGRAM

## 2021-01-01 PROCEDURE — 99214 OFFICE O/P EST MOD 30 MIN: CPT | Performed by: PHYSICIAN ASSISTANT

## 2021-01-01 PROCEDURE — 84145 PROCALCITONIN (PCT): CPT | Performed by: EMERGENCY MEDICINE

## 2021-01-01 PROCEDURE — 93970 EXTREMITY STUDY: CPT

## 2021-01-01 PROCEDURE — 97530 THERAPEUTIC ACTIVITIES: CPT | Performed by: PHYSICAL THERAPIST

## 2021-01-01 PROCEDURE — 83880 ASSAY OF NATRIURETIC PEPTIDE: CPT | Performed by: NURSE PRACTITIONER

## 2021-01-01 PROCEDURE — 83735 ASSAY OF MAGNESIUM: CPT | Performed by: EMERGENCY MEDICINE

## 2021-01-01 PROCEDURE — 83880 ASSAY OF NATRIURETIC PEPTIDE: CPT | Performed by: STUDENT IN AN ORGANIZED HEALTH CARE EDUCATION/TRAINING PROGRAM

## 2021-01-01 PROCEDURE — 82140 ASSAY OF AMMONIA: CPT | Performed by: STUDENT IN AN ORGANIZED HEALTH CARE EDUCATION/TRAINING PROGRAM

## 2021-01-01 PROCEDURE — 97033 APP MDLTY 1+IONTPHRSIS EA 15: CPT | Performed by: PHYSICAL THERAPIST

## 2021-01-01 PROCEDURE — 99231 SBSQ HOSP IP/OBS SF/LOW 25: CPT | Performed by: NURSE PRACTITIONER

## 2021-01-01 PROCEDURE — 85652 RBC SED RATE AUTOMATED: CPT | Performed by: INTERNAL MEDICINE

## 2021-01-01 PROCEDURE — 96375 TX/PRO/DX INJ NEW DRUG ADDON: CPT

## 2021-01-01 PROCEDURE — 82375 ASSAY CARBOXYHB QUANT: CPT

## 2021-01-01 PROCEDURE — 83735 ASSAY OF MAGNESIUM: CPT | Performed by: HOSPITALIST

## 2021-01-01 PROCEDURE — 84484 ASSAY OF TROPONIN QUANT: CPT | Performed by: INTERNAL MEDICINE

## 2021-01-01 PROCEDURE — 84403 ASSAY OF TOTAL TESTOSTERONE: CPT

## 2021-01-01 PROCEDURE — 80053 COMPREHEN METABOLIC PANEL: CPT | Performed by: STUDENT IN AN ORGANIZED HEALTH CARE EDUCATION/TRAINING PROGRAM

## 2021-01-01 PROCEDURE — 85610 PROTHROMBIN TIME: CPT | Performed by: PHYSICIAN ASSISTANT

## 2021-01-01 PROCEDURE — 25010000002 HEPATITIS A 1440 EL U/ML SUSPENSION: Performed by: PHYSICIAN ASSISTANT

## 2021-01-01 RX ORDER — DEXTROAMPHETAMINE SACCHARATE, AMPHETAMINE ASPARTATE, DEXTROAMPHETAMINE SULFATE AND AMPHETAMINE SULFATE 7.5; 7.5; 7.5; 7.5 MG/1; MG/1; MG/1; MG/1
60 TABLET ORAL DAILY
Status: DISCONTINUED | OUTPATIENT
Start: 2021-01-01 | End: 2021-01-01 | Stop reason: HOSPADM

## 2021-01-01 RX ORDER — SODIUM CHLORIDE 0.9 % (FLUSH) 0.9 %
10 SYRINGE (ML) INJECTION EVERY 12 HOURS SCHEDULED
Status: CANCELLED | OUTPATIENT
Start: 2021-01-01

## 2021-01-01 RX ORDER — DEXTROAMPHETAMINE SACCHARATE, AMPHETAMINE ASPARTATE MONOHYDRATE, DEXTROAMPHETAMINE SULFATE AND AMPHETAMINE SULFATE 7.5; 7.5; 7.5; 7.5 MG/1; MG/1; MG/1; MG/1
60 CAPSULE, EXTENDED RELEASE ORAL EVERY MORNING
COMMUNITY
End: 2021-01-01

## 2021-01-01 RX ORDER — PROCHLORPERAZINE 25 MG/1
SUPPOSITORY RECTAL
Qty: 1 EACH | Refills: 0 | Status: SHIPPED | OUTPATIENT
Start: 2021-01-01 | End: 2021-01-01 | Stop reason: SDUPTHER

## 2021-01-01 RX ORDER — DEXTROAMPHETAMINE SACCHARATE, AMPHETAMINE ASPARTATE, DEXTROAMPHETAMINE SULFATE AND AMPHETAMINE SULFATE 7.5; 7.5; 7.5; 7.5 MG/1; MG/1; MG/1; MG/1
30 TABLET ORAL ONCE
Status: COMPLETED | OUTPATIENT
Start: 2021-01-01 | End: 2021-01-01

## 2021-01-01 RX ORDER — METOLAZONE 5 MG/1
10 TABLET ORAL DAILY PRN
Qty: 180 TABLET | Refills: 3 | Status: ON HOLD | OUTPATIENT
Start: 2021-01-01 | End: 2021-01-01 | Stop reason: SDUPTHER

## 2021-01-01 RX ORDER — MONTELUKAST SODIUM 10 MG/1
10 TABLET ORAL NIGHTLY
Status: DISCONTINUED | OUTPATIENT
Start: 2021-01-01 | End: 2021-01-01 | Stop reason: HOSPADM

## 2021-01-01 RX ORDER — LIDOCAINE HYDROCHLORIDE 10 MG/ML
0.5 INJECTION, SOLUTION EPIDURAL; INFILTRATION; INTRACAUDAL; PERINEURAL ONCE AS NEEDED
Status: DISCONTINUED | OUTPATIENT
Start: 2021-01-01 | End: 2021-01-01 | Stop reason: HOSPADM

## 2021-01-01 RX ORDER — METOLAZONE 5 MG/1
TABLET ORAL
Qty: 90 TABLET | Refills: 3 | Status: SHIPPED | OUTPATIENT
Start: 2021-01-01 | End: 2021-01-01 | Stop reason: SDUPTHER

## 2021-01-01 RX ORDER — LEVOTHYROXINE SODIUM 0.07 MG/1
75 TABLET ORAL DAILY
Status: DISCONTINUED | OUTPATIENT
Start: 2021-01-01 | End: 2021-01-01 | Stop reason: HOSPADM

## 2021-01-01 RX ORDER — SODIUM CHLORIDE 0.9 % (FLUSH) 0.9 %
10 SYRINGE (ML) INJECTION AS NEEDED
Status: DISCONTINUED | OUTPATIENT
Start: 2021-01-01 | End: 2021-01-01 | Stop reason: HOSPADM

## 2021-01-01 RX ORDER — SODIUM CHLORIDE 0.9 % (FLUSH) 0.9 %
10 SYRINGE (ML) INJECTION EVERY 12 HOURS SCHEDULED
Status: DISCONTINUED | OUTPATIENT
Start: 2021-01-01 | End: 2021-01-01 | Stop reason: HOSPADM

## 2021-01-01 RX ORDER — BUMETANIDE 0.25 MG/ML
0.5 INJECTION INTRAMUSCULAR; INTRAVENOUS DAILY
Status: DISCONTINUED | OUTPATIENT
Start: 2021-01-01 | End: 2021-01-01

## 2021-01-01 RX ORDER — MAGNESIUM SULFATE HEPTAHYDRATE 40 MG/ML
4 INJECTION, SOLUTION INTRAVENOUS AS NEEDED
Status: DISCONTINUED | OUTPATIENT
Start: 2021-01-01 | End: 2021-01-01 | Stop reason: HOSPADM

## 2021-01-01 RX ORDER — HEPARIN SODIUM 5000 [USP'U]/ML
5000 INJECTION, SOLUTION INTRAVENOUS; SUBCUTANEOUS EVERY 12 HOURS SCHEDULED
Status: DISCONTINUED | OUTPATIENT
Start: 2021-01-01 | End: 2021-01-01 | Stop reason: HOSPADM

## 2021-01-01 RX ORDER — TRIAMCINOLONE ACETONIDE 1 MG/G
CREAM TOPICAL 2 TIMES DAILY PRN
Qty: 80 G | Refills: 2 | Status: SHIPPED | OUTPATIENT
Start: 2021-01-01 | End: 2021-01-01 | Stop reason: HOSPADM

## 2021-01-01 RX ORDER — PRAMIPEXOLE DIHYDROCHLORIDE 0.25 MG/1
0.75 TABLET ORAL DAILY
Status: DISCONTINUED | OUTPATIENT
Start: 2021-01-01 | End: 2021-01-01 | Stop reason: HOSPADM

## 2021-01-01 RX ORDER — NYSTATIN 100000 U/G
OINTMENT TOPICAL EVERY 12 HOURS SCHEDULED
Status: DISCONTINUED | OUTPATIENT
Start: 2021-01-01 | End: 2021-01-01 | Stop reason: HOSPADM

## 2021-01-01 RX ORDER — DEXTROAMPHETAMINE SACCHARATE, AMPHETAMINE ASPARTATE, DEXTROAMPHETAMINE SULFATE AND AMPHETAMINE SULFATE 3.75; 3.75; 3.75; 3.75 MG/1; MG/1; MG/1; MG/1
30 TABLET ORAL DAILY
Status: DISCONTINUED | OUTPATIENT
Start: 2021-01-01 | End: 2021-01-01

## 2021-01-01 RX ORDER — DEXTROSE MONOHYDRATE 25 G/50ML
25 INJECTION, SOLUTION INTRAVENOUS ONCE
Status: COMPLETED | OUTPATIENT
Start: 2021-01-01 | End: 2021-01-01

## 2021-01-01 RX ORDER — NICOTINE POLACRILEX 4 MG
15 LOZENGE BUCCAL
Status: DISCONTINUED | OUTPATIENT
Start: 2021-01-01 | End: 2021-01-01 | Stop reason: HOSPADM

## 2021-01-01 RX ORDER — PRAMIPEXOLE DIHYDROCHLORIDE 0.75 MG/1
0.75 TABLET ORAL 2 TIMES DAILY
Start: 2021-01-01 | End: 2021-01-01 | Stop reason: HOSPADM

## 2021-01-01 RX ORDER — PRAMIPEXOLE DIHYDROCHLORIDE 0.75 MG/1
0.75 TABLET ORAL DAILY
Qty: 30 TABLET | Refills: 0 | Status: SHIPPED | OUTPATIENT
Start: 2021-01-01

## 2021-01-01 RX ORDER — DEXTROAMPHETAMINE SACCHARATE, AMPHETAMINE ASPARTATE, DEXTROAMPHETAMINE SULFATE AND AMPHETAMINE SULFATE 7.5; 7.5; 7.5; 7.5 MG/1; MG/1; MG/1; MG/1
30 TABLET ORAL 2 TIMES DAILY
Qty: 60 TABLET | Refills: 0 | Status: SHIPPED | OUTPATIENT
Start: 2021-01-01 | End: 2021-01-01 | Stop reason: SDUPTHER

## 2021-01-01 RX ORDER — VENLAFAXINE HYDROCHLORIDE 75 MG/1
150 CAPSULE, EXTENDED RELEASE ORAL NIGHTLY
Status: DISCONTINUED | OUTPATIENT
Start: 2021-01-01 | End: 2021-01-01 | Stop reason: HOSPADM

## 2021-01-01 RX ORDER — TRAZODONE HYDROCHLORIDE 50 MG/1
50 TABLET ORAL NIGHTLY PRN
Status: DISCONTINUED | OUTPATIENT
Start: 2021-01-01 | End: 2021-01-01 | Stop reason: HOSPADM

## 2021-01-01 RX ORDER — SPIRONOLACTONE 50 MG/1
50 TABLET, FILM COATED ORAL DAILY
Status: DISCONTINUED | OUTPATIENT
Start: 2021-01-01 | End: 2021-01-01 | Stop reason: HOSPADM

## 2021-01-01 RX ORDER — CEFTRIAXONE SODIUM 1 G/50ML
1 INJECTION, SOLUTION INTRAVENOUS EVERY 24 HOURS
Status: DISCONTINUED | OUTPATIENT
Start: 2021-01-01 | End: 2021-01-01 | Stop reason: HOSPADM

## 2021-01-01 RX ORDER — OXCARBAZEPINE 300 MG/1
300 TABLET, FILM COATED ORAL 2 TIMES DAILY
Qty: 60 TABLET | Refills: 6 | Status: SHIPPED | OUTPATIENT
Start: 2021-01-01 | End: 2021-01-01 | Stop reason: SDUPTHER

## 2021-01-01 RX ORDER — CEPHALEXIN 500 MG/1
500 CAPSULE ORAL 2 TIMES DAILY
Qty: 8 CAPSULE | Refills: 0 | Status: SHIPPED | OUTPATIENT
Start: 2021-01-01 | End: 2021-01-01

## 2021-01-01 RX ORDER — MONTELUKAST SODIUM 10 MG/1
10 TABLET ORAL NIGHTLY
Qty: 30 TABLET | Refills: 5 | Status: SHIPPED | OUTPATIENT
Start: 2021-01-01

## 2021-01-01 RX ORDER — BUMETANIDE 1 MG/1
4 TABLET ORAL
Status: DISCONTINUED | OUTPATIENT
Start: 2021-01-01 | End: 2021-01-01

## 2021-01-01 RX ORDER — PROPOFOL 10 MG/ML
VIAL (ML) INTRAVENOUS AS NEEDED
Status: DISCONTINUED | OUTPATIENT
Start: 2021-01-01 | End: 2021-01-01 | Stop reason: SURG

## 2021-01-01 RX ORDER — POTASSIUM CHLORIDE 750 MG/1
20 CAPSULE, EXTENDED RELEASE ORAL ONCE
Status: COMPLETED | OUTPATIENT
Start: 2021-01-01 | End: 2021-01-01

## 2021-01-01 RX ORDER — OXCARBAZEPINE 300 MG/1
600 TABLET, FILM COATED ORAL 2 TIMES DAILY
Qty: 120 TABLET | Refills: 6 | Status: SHIPPED | OUTPATIENT
Start: 2021-01-01 | End: 2021-01-01

## 2021-01-01 RX ORDER — POTASSIUM CHLORIDE 1.5 G/1.77G
40 POWDER, FOR SOLUTION ORAL AS NEEDED
Status: DISCONTINUED | OUTPATIENT
Start: 2021-01-01 | End: 2021-01-01 | Stop reason: HOSPADM

## 2021-01-01 RX ORDER — AMOXICILLIN 250 MG
2 CAPSULE ORAL 2 TIMES DAILY
Status: DISCONTINUED | OUTPATIENT
Start: 2021-01-01 | End: 2021-01-01 | Stop reason: HOSPADM

## 2021-01-01 RX ORDER — GABAPENTIN 300 MG/1
600 CAPSULE ORAL 2 TIMES DAILY
Status: DISCONTINUED | OUTPATIENT
Start: 2021-01-01 | End: 2021-01-01

## 2021-01-01 RX ORDER — DEXTROAMPHETAMINE SACCHARATE, AMPHETAMINE ASPARTATE, DEXTROAMPHETAMINE SULFATE AND AMPHETAMINE SULFATE 3.75; 3.75; 3.75; 3.75 MG/1; MG/1; MG/1; MG/1
30 TABLET ORAL DAILY
Qty: 120 TABLET | Refills: 0 | Status: SHIPPED | OUTPATIENT
Start: 2021-01-01 | End: 2021-01-01 | Stop reason: SDUPTHER

## 2021-01-01 RX ORDER — DEXTROSE MONOHYDRATE 25 G/50ML
25 INJECTION, SOLUTION INTRAVENOUS
Status: DISCONTINUED | OUTPATIENT
Start: 2021-01-01 | End: 2021-01-01 | Stop reason: HOSPADM

## 2021-01-01 RX ORDER — MAGNESIUM SULFATE HEPTAHYDRATE 40 MG/ML
2 INJECTION, SOLUTION INTRAVENOUS AS NEEDED
Status: DISCONTINUED | OUTPATIENT
Start: 2021-01-01 | End: 2021-01-01 | Stop reason: HOSPADM

## 2021-01-01 RX ORDER — MIDAZOLAM HYDROCHLORIDE 1 MG/ML
0.5 INJECTION INTRAMUSCULAR; INTRAVENOUS
Status: DISCONTINUED | OUTPATIENT
Start: 2021-01-01 | End: 2021-01-01 | Stop reason: HOSPADM

## 2021-01-01 RX ORDER — BISACODYL 5 MG/1
5 TABLET, DELAYED RELEASE ORAL DAILY PRN
Status: DISCONTINUED | OUTPATIENT
Start: 2021-01-01 | End: 2021-01-01 | Stop reason: HOSPADM

## 2021-01-01 RX ORDER — MELATONIN
1000 DAILY
Status: DISCONTINUED | OUTPATIENT
Start: 2021-01-01 | End: 2021-01-01 | Stop reason: HOSPADM

## 2021-01-01 RX ORDER — GABAPENTIN 300 MG/1
600 CAPSULE ORAL 2 TIMES DAILY
Status: DISCONTINUED | OUTPATIENT
Start: 2021-01-01 | End: 2021-01-01 | Stop reason: HOSPADM

## 2021-01-01 RX ORDER — DOXYCYCLINE 100 MG/1
100 CAPSULE ORAL EVERY 12 HOURS SCHEDULED
Status: DISCONTINUED | OUTPATIENT
Start: 2021-01-01 | End: 2021-01-01 | Stop reason: HOSPADM

## 2021-01-01 RX ORDER — LACTULOSE 10 G/15ML
20 SOLUTION ORAL ONCE
Status: COMPLETED | OUTPATIENT
Start: 2021-01-01 | End: 2021-01-01

## 2021-01-01 RX ORDER — ERYTHROMYCIN 5 MG/G
OINTMENT OPHTHALMIC
COMMUNITY
Start: 2021-01-01 | End: 2021-01-01 | Stop reason: HOSPADM

## 2021-01-01 RX ORDER — SODIUM CHLORIDE AND POTASSIUM CHLORIDE 150; 900 MG/100ML; MG/100ML
100 INJECTION, SOLUTION INTRAVENOUS CONTINUOUS
Status: CANCELLED | OUTPATIENT
Start: 2021-01-01

## 2021-01-01 RX ORDER — BUMETANIDE 0.25 MG/ML
2 INJECTION INTRAMUSCULAR; INTRAVENOUS
Status: DISCONTINUED | OUTPATIENT
Start: 2021-01-01 | End: 2021-01-01

## 2021-01-01 RX ORDER — NICOTINE POLACRILEX 4 MG
15 LOZENGE BUCCAL
Status: DISCONTINUED | OUTPATIENT
Start: 2021-01-01 | End: 2021-01-01

## 2021-01-01 RX ORDER — DOXYCYCLINE 100 MG/1
100 CAPSULE ORAL EVERY 12 HOURS SCHEDULED
Qty: 8 CAPSULE | Refills: 0 | Status: SHIPPED | OUTPATIENT
Start: 2021-01-01 | End: 2021-01-01

## 2021-01-01 RX ORDER — OXCARBAZEPINE 150 MG/1
300 TABLET, FILM COATED ORAL 2 TIMES DAILY
Status: DISCONTINUED | OUTPATIENT
Start: 2021-01-01 | End: 2021-01-01 | Stop reason: HOSPADM

## 2021-01-01 RX ORDER — BUMETANIDE 0.25 MG/ML
1 INJECTION INTRAMUSCULAR; INTRAVENOUS EVERY 12 HOURS SCHEDULED
Status: DISCONTINUED | OUTPATIENT
Start: 2021-01-01 | End: 2021-01-01

## 2021-01-01 RX ORDER — CHOLECALCIFEROL (VITAMIN D3) 125 MCG
5 CAPSULE ORAL NIGHTLY PRN
Status: DISCONTINUED | OUTPATIENT
Start: 2021-01-01 | End: 2021-01-01 | Stop reason: HOSPADM

## 2021-01-01 RX ORDER — FAMOTIDINE 20 MG/1
20 TABLET, FILM COATED ORAL ONCE
Status: CANCELLED | OUTPATIENT
Start: 2021-01-01 | End: 2021-01-01

## 2021-01-01 RX ORDER — SODIUM CHLORIDE, SODIUM LACTATE, POTASSIUM CHLORIDE, CALCIUM CHLORIDE 600; 310; 30; 20 MG/100ML; MG/100ML; MG/100ML; MG/100ML
30 INJECTION, SOLUTION INTRAVENOUS CONTINUOUS PRN
Status: DISCONTINUED | OUTPATIENT
Start: 2021-01-01 | End: 2021-01-01 | Stop reason: HOSPADM

## 2021-01-01 RX ORDER — DEXTROAMPHETAMINE SACCHARATE, AMPHETAMINE ASPARTATE, DEXTROAMPHETAMINE SULFATE AND AMPHETAMINE SULFATE 7.5; 7.5; 7.5; 7.5 MG/1; MG/1; MG/1; MG/1
30 TABLET ORAL 2 TIMES DAILY
Qty: 60 TABLET | Refills: 0 | Status: SHIPPED | OUTPATIENT
Start: 2021-01-01 | End: 2021-01-01

## 2021-01-01 RX ORDER — LACTULOSE 10 G/15ML
45 SOLUTION ORAL 3 TIMES DAILY
Status: DISCONTINUED | OUTPATIENT
Start: 2021-01-01 | End: 2021-01-01 | Stop reason: HOSPADM

## 2021-01-01 RX ORDER — SPIRONOLACTONE 100 MG/1
TABLET, FILM COATED ORAL
Qty: 45 TABLET | OUTPATIENT
Start: 2021-01-01

## 2021-01-01 RX ORDER — GABAPENTIN 300 MG/1
900 CAPSULE ORAL 2 TIMES DAILY
Status: DISCONTINUED | OUTPATIENT
Start: 2021-01-01 | End: 2021-01-01

## 2021-01-01 RX ORDER — BUMETANIDE 1 MG/1
2 TABLET ORAL 2 TIMES DAILY
Status: DISCONTINUED | OUTPATIENT
Start: 2021-01-01 | End: 2021-01-01 | Stop reason: HOSPADM

## 2021-01-01 RX ORDER — POTASSIUM CHLORIDE 750 MG/1
40 CAPSULE, EXTENDED RELEASE ORAL AS NEEDED
Status: DISCONTINUED | OUTPATIENT
Start: 2021-01-01 | End: 2021-01-01 | Stop reason: HOSPADM

## 2021-01-01 RX ORDER — ACETAMINOPHEN 325 MG/1
650 TABLET ORAL EVERY 4 HOURS PRN
Status: DISCONTINUED | OUTPATIENT
Start: 2021-01-01 | End: 2021-01-01 | Stop reason: HOSPADM

## 2021-01-01 RX ORDER — POTASSIUM CHLORIDE 7.45 MG/ML
10 INJECTION INTRAVENOUS ONCE
Status: COMPLETED | OUTPATIENT
Start: 2021-01-01 | End: 2021-01-01

## 2021-01-01 RX ORDER — POTASSIUM CHLORIDE 750 MG/1
40 TABLET, FILM COATED, EXTENDED RELEASE ORAL 2 TIMES DAILY
Qty: 720 TABLET | Refills: 3
Start: 2021-01-01 | End: 2021-01-01 | Stop reason: SDUPTHER

## 2021-01-01 RX ORDER — ASPIRIN 81 MG/1
81 TABLET ORAL DAILY
Status: DISCONTINUED | OUTPATIENT
Start: 2021-01-01 | End: 2021-01-01 | Stop reason: HOSPADM

## 2021-01-01 RX ORDER — DEXTROAMPHETAMINE SACCHARATE, AMPHETAMINE ASPARTATE, DEXTROAMPHETAMINE SULFATE AND AMPHETAMINE SULFATE 7.5; 7.5; 7.5; 7.5 MG/1; MG/1; MG/1; MG/1
30 TABLET ORAL EVERY 12 HOURS
Status: DISCONTINUED | OUTPATIENT
Start: 2021-01-01 | End: 2021-01-01

## 2021-01-01 RX ORDER — CETIRIZINE HYDROCHLORIDE 10 MG/1
10 TABLET ORAL DAILY
Status: DISCONTINUED | OUTPATIENT
Start: 2021-01-01 | End: 2021-01-01 | Stop reason: HOSPADM

## 2021-01-01 RX ORDER — METOLAZONE 5 MG/1
2.5 TABLET ORAL DAILY PRN
Start: 2021-01-01 | End: 2021-01-01 | Stop reason: HOSPADM

## 2021-01-01 RX ORDER — OXCARBAZEPINE 300 MG/1
600 TABLET, FILM COATED ORAL 2 TIMES DAILY
Status: DISCONTINUED | OUTPATIENT
Start: 2021-01-01 | End: 2021-01-01 | Stop reason: HOSPADM

## 2021-01-01 RX ORDER — LIDOCAINE HYDROCHLORIDE 10 MG/ML
INJECTION, SOLUTION EPIDURAL; INFILTRATION; INTRACAUDAL; PERINEURAL AS NEEDED
Status: DISCONTINUED | OUTPATIENT
Start: 2021-01-01 | End: 2021-01-01 | Stop reason: SURG

## 2021-01-01 RX ORDER — DEXTROSE MONOHYDRATE 25 G/50ML
25 INJECTION, SOLUTION INTRAVENOUS
Status: DISCONTINUED | OUTPATIENT
Start: 2021-01-01 | End: 2021-01-01

## 2021-01-01 RX ORDER — GABAPENTIN 400 MG/1
400 CAPSULE ORAL EVERY 12 HOURS SCHEDULED
Status: DISCONTINUED | OUTPATIENT
Start: 2021-01-01 | End: 2021-01-01 | Stop reason: HOSPADM

## 2021-01-01 RX ORDER — BUMETANIDE 1 MG/1
4 TABLET ORAL DAILY
Status: DISCONTINUED | OUTPATIENT
Start: 2021-01-01 | End: 2021-01-01

## 2021-01-01 RX ORDER — GABAPENTIN 300 MG/1
900 CAPSULE ORAL 2 TIMES DAILY
Qty: 180 CAPSULE | Refills: 2 | Status: SHIPPED | OUTPATIENT
Start: 2021-01-01 | End: 2021-01-01

## 2021-01-01 RX ORDER — PRAMIPEXOLE DIHYDROCHLORIDE 0.25 MG/1
0.75 TABLET ORAL 2 TIMES DAILY
Status: DISCONTINUED | OUTPATIENT
Start: 2021-01-01 | End: 2021-01-01

## 2021-01-01 RX ORDER — GABAPENTIN 300 MG/1
900 CAPSULE ORAL 2 TIMES DAILY
Status: DISCONTINUED | OUTPATIENT
Start: 2021-01-01 | End: 2021-01-01 | Stop reason: HOSPADM

## 2021-01-01 RX ORDER — HEPARIN SODIUM 5000 [USP'U]/ML
INJECTION, SOLUTION INTRAVENOUS; SUBCUTANEOUS
COMMUNITY
Start: 2021-01-01

## 2021-01-01 RX ORDER — GABAPENTIN 400 MG/1
400 CAPSULE ORAL 2 TIMES DAILY
Qty: 6 CAPSULE | Refills: 0 | Status: SHIPPED | OUTPATIENT
Start: 2021-01-01

## 2021-01-01 RX ORDER — BUMETANIDE 0.25 MG/ML
2.5 INJECTION INTRAMUSCULAR; INTRAVENOUS DAILY
Status: DISCONTINUED | OUTPATIENT
Start: 2021-01-01 | End: 2021-01-01

## 2021-01-01 RX ORDER — BUMETANIDE 1 MG/1
2 TABLET ORAL DAILY
Status: DISCONTINUED | OUTPATIENT
Start: 2021-01-01 | End: 2021-01-01 | Stop reason: HOSPADM

## 2021-01-01 RX ORDER — TRAZODONE HYDROCHLORIDE 50 MG/1
50 TABLET ORAL NIGHTLY
Status: DISCONTINUED | OUTPATIENT
Start: 2021-01-01 | End: 2021-01-01 | Stop reason: HOSPADM

## 2021-01-01 RX ORDER — POLYETHYLENE GLYCOL 3350 17 G/17G
17 POWDER, FOR SOLUTION ORAL DAILY PRN
Status: DISCONTINUED | OUTPATIENT
Start: 2021-01-01 | End: 2021-01-01 | Stop reason: HOSPADM

## 2021-01-01 RX ORDER — ONDANSETRON 2 MG/ML
4 INJECTION INTRAMUSCULAR; INTRAVENOUS ONCE AS NEEDED
Status: DISCONTINUED | OUTPATIENT
Start: 2021-01-01 | End: 2021-01-01 | Stop reason: HOSPADM

## 2021-01-01 RX ORDER — BUMETANIDE 2 MG/1
4 TABLET ORAL DAILY
Qty: 120 TABLET | Refills: 5 | Status: SHIPPED | OUTPATIENT
Start: 2021-01-01 | End: 2021-01-01 | Stop reason: HOSPADM

## 2021-01-01 RX ORDER — DEXTROAMPHETAMINE SACCHARATE, AMPHETAMINE ASPARTATE, DEXTROAMPHETAMINE SULFATE AND AMPHETAMINE SULFATE 7.5; 7.5; 7.5; 7.5 MG/1; MG/1; MG/1; MG/1
30 TABLET ORAL 2 TIMES DAILY
Status: DISCONTINUED | OUTPATIENT
Start: 2021-01-01 | End: 2021-01-01

## 2021-01-01 RX ORDER — BUMETANIDE 2 MG/1
4 TABLET ORAL DAILY
Status: DISCONTINUED | OUTPATIENT
Start: 2021-01-01 | End: 2021-01-01

## 2021-01-01 RX ORDER — PROCHLORPERAZINE 25 MG/1
1 SUPPOSITORY RECTAL
Qty: 1 EACH | Refills: 1 | Status: SHIPPED | OUTPATIENT
Start: 2021-01-01

## 2021-01-01 RX ORDER — PROCHLORPERAZINE 25 MG/1
SUPPOSITORY RECTAL
Qty: 4 EACH | Refills: 1 | Status: SHIPPED | OUTPATIENT
Start: 2021-01-01

## 2021-01-01 RX ORDER — PRAVASTATIN SODIUM 40 MG
40 TABLET ORAL NIGHTLY
Status: DISCONTINUED | OUTPATIENT
Start: 2021-01-01 | End: 2021-01-01 | Stop reason: HOSPADM

## 2021-01-01 RX ORDER — PRAMIPEXOLE DIHYDROCHLORIDE 1 MG/1
TABLET ORAL
Qty: 180 TABLET | Refills: 3 | Status: SHIPPED | OUTPATIENT
Start: 2021-01-01 | End: 2021-01-01

## 2021-01-01 RX ORDER — SODIUM CHLORIDE, SODIUM LACTATE, POTASSIUM CHLORIDE, CALCIUM CHLORIDE 600; 310; 30; 20 MG/100ML; MG/100ML; MG/100ML; MG/100ML
9 INJECTION, SOLUTION INTRAVENOUS CONTINUOUS
Status: CANCELLED | OUTPATIENT
Start: 2021-01-01

## 2021-01-01 RX ORDER — PRAMIPEXOLE DIHYDROCHLORIDE 0.25 MG/1
0.75 TABLET ORAL 2 TIMES DAILY
Status: DISCONTINUED | OUTPATIENT
Start: 2021-01-01 | End: 2021-01-01 | Stop reason: HOSPADM

## 2021-01-01 RX ORDER — POTASSIUM CHLORIDE 7.45 MG/ML
10 INJECTION INTRAVENOUS
Status: DISCONTINUED | OUTPATIENT
Start: 2021-01-01 | End: 2021-01-01 | Stop reason: HOSPADM

## 2021-01-01 RX ORDER — CEFTRIAXONE SODIUM 2 G/50ML
2 INJECTION, SOLUTION INTRAVENOUS ONCE
Status: COMPLETED | OUTPATIENT
Start: 2021-01-01 | End: 2021-01-01

## 2021-01-01 RX ORDER — OXCARBAZEPINE 300 MG/1
300 TABLET, FILM COATED ORAL 2 TIMES DAILY
Qty: 180 TABLET | Refills: 3 | Status: SHIPPED | OUTPATIENT
Start: 2021-01-01

## 2021-01-01 RX ORDER — BUMETANIDE 0.25 MG/ML
1 INJECTION INTRAMUSCULAR; INTRAVENOUS ONCE
Status: COMPLETED | OUTPATIENT
Start: 2021-01-01 | End: 2021-01-01

## 2021-01-01 RX ORDER — LACTULOSE 10 G/15ML
45 SOLUTION ORAL 3 TIMES DAILY
COMMUNITY

## 2021-01-01 RX ORDER — TOBRAMYCIN AND DEXAMETHASONE 3; 1 MG/ML; MG/ML
SUSPENSION/ DROPS OPHTHALMIC
COMMUNITY
Start: 2021-01-01 | End: 2021-01-01 | Stop reason: HOSPADM

## 2021-01-01 RX ORDER — BUMETANIDE 2 MG/1
2 TABLET ORAL 2 TIMES DAILY
Qty: 60 TABLET | Refills: 0 | Status: SHIPPED | OUTPATIENT
Start: 2021-01-01

## 2021-01-01 RX ORDER — DEXTROSE MONOHYDRATE 25 G/50ML
25 INJECTION, SOLUTION INTRAVENOUS
Status: DISCONTINUED | OUTPATIENT
Start: 2021-01-01 | End: 2021-01-01 | Stop reason: SDUPTHER

## 2021-01-01 RX ORDER — SPIRONOLACTONE 50 MG/1
100 TABLET, FILM COATED ORAL DAILY
Status: DISCONTINUED | OUTPATIENT
Start: 2021-01-01 | End: 2021-01-01 | Stop reason: HOSPADM

## 2021-01-01 RX ORDER — GABAPENTIN 300 MG/1
CAPSULE ORAL
Qty: 180 CAPSULE | Refills: 2 | Status: SHIPPED | OUTPATIENT
Start: 2021-01-01 | End: 2021-01-01 | Stop reason: HOSPADM

## 2021-01-01 RX ORDER — ONDANSETRON 2 MG/ML
4 INJECTION INTRAMUSCULAR; INTRAVENOUS EVERY 6 HOURS PRN
Status: DISCONTINUED | OUTPATIENT
Start: 2021-01-01 | End: 2021-01-01 | Stop reason: HOSPADM

## 2021-01-01 RX ORDER — NICOTINE POLACRILEX 4 MG
15 LOZENGE BUCCAL
Status: DISCONTINUED | OUTPATIENT
Start: 2021-01-01 | End: 2021-01-01 | Stop reason: SDUPTHER

## 2021-01-01 RX ORDER — POTASSIUM CHLORIDE 750 MG/1
40 TABLET, FILM COATED, EXTENDED RELEASE ORAL 2 TIMES DAILY
Qty: 720 TABLET | Refills: 3 | Status: SHIPPED | OUTPATIENT
Start: 2021-01-01 | End: 2021-01-01 | Stop reason: SDUPTHER

## 2021-01-01 RX ORDER — PRAMIPEXOLE DIHYDROCHLORIDE 1 MG/1
1 TABLET ORAL 2 TIMES DAILY
COMMUNITY
End: 2021-01-01 | Stop reason: HOSPADM

## 2021-01-01 RX ORDER — DEXTROAMPHETAMINE SACCHARATE, AMPHETAMINE ASPARTATE, DEXTROAMPHETAMINE SULFATE AND AMPHETAMINE SULFATE 7.5; 7.5; 7.5; 7.5 MG/1; MG/1; MG/1; MG/1
60 TABLET ORAL DAILY
Qty: 120 TABLET | Refills: 0 | Status: SHIPPED | OUTPATIENT
Start: 2021-01-01 | End: 2021-01-01

## 2021-01-01 RX ORDER — SODIUM CHLORIDE 0.9 % (FLUSH) 0.9 %
10 SYRINGE (ML) INJECTION AS NEEDED
Status: DISCONTINUED | OUTPATIENT
Start: 2021-01-01 | End: 2021-01-01

## 2021-01-01 RX ORDER — SPIRONOLACTONE 100 MG/1
100 TABLET, FILM COATED ORAL DAILY
Qty: 30 TABLET | Refills: 1 | Status: SHIPPED | OUTPATIENT
Start: 2021-01-01

## 2021-01-01 RX ORDER — POTASSIUM CHLORIDE 750 MG/1
20 CAPSULE, EXTENDED RELEASE ORAL 2 TIMES DAILY WITH MEALS
Status: CANCELLED | OUTPATIENT
Start: 2021-01-01

## 2021-01-01 RX ORDER — METOLAZONE 5 MG/1
5 TABLET ORAL DAILY
Status: DISCONTINUED | OUTPATIENT
Start: 2021-01-01 | End: 2021-01-01 | Stop reason: HOSPADM

## 2021-01-01 RX ORDER — ACETAMINOPHEN 160 MG/5ML
650 SOLUTION ORAL EVERY 4 HOURS PRN
Status: DISCONTINUED | OUTPATIENT
Start: 2021-01-01 | End: 2021-01-01 | Stop reason: HOSPADM

## 2021-01-01 RX ORDER — PROCHLORPERAZINE 25 MG/1
1 SUPPOSITORY RECTAL ONCE
Qty: 1 EACH | Refills: 0 | Status: SHIPPED | OUTPATIENT
Start: 2021-01-01 | End: 2021-01-01

## 2021-01-01 RX ORDER — POTASSIUM CHLORIDE 750 MG/1
50 TABLET, FILM COATED, EXTENDED RELEASE ORAL 2 TIMES DAILY
Qty: 900 TABLET | Refills: 3 | Status: SHIPPED | OUTPATIENT
Start: 2021-01-01 | End: 2021-01-01 | Stop reason: HOSPADM

## 2021-01-01 RX ORDER — POTASSIUM CHLORIDE 750 MG/1
10 TABLET, FILM COATED, EXTENDED RELEASE ORAL 2 TIMES DAILY
COMMUNITY

## 2021-01-01 RX ORDER — FAMOTIDINE 10 MG/ML
20 INJECTION, SOLUTION INTRAVENOUS ONCE
Status: COMPLETED | OUTPATIENT
Start: 2021-01-01 | End: 2021-01-01

## 2021-01-01 RX ORDER — OLOPATADINE HYDROCHLORIDE 1 MG/ML
SOLUTION/ DROPS OPHTHALMIC
COMMUNITY
Start: 2021-01-01 | End: 2021-01-01 | Stop reason: HOSPADM

## 2021-01-01 RX ORDER — BISACODYL 10 MG
10 SUPPOSITORY, RECTAL RECTAL DAILY PRN
Status: DISCONTINUED | OUTPATIENT
Start: 2021-01-01 | End: 2021-01-01 | Stop reason: HOSPADM

## 2021-01-01 RX ORDER — HYDROMORPHONE HYDROCHLORIDE 1 MG/ML
0.5 INJECTION, SOLUTION INTRAMUSCULAR; INTRAVENOUS; SUBCUTANEOUS
Status: DISCONTINUED | OUTPATIENT
Start: 2021-01-01 | End: 2021-01-01 | Stop reason: HOSPADM

## 2021-01-01 RX ORDER — SODIUM CHLORIDE 9 MG/ML
9 INJECTION, SOLUTION INTRAVENOUS CONTINUOUS
Status: DISCONTINUED | OUTPATIENT
Start: 2021-01-01 | End: 2021-01-01

## 2021-01-01 RX ORDER — ACETAMINOPHEN 650 MG/1
650 SUPPOSITORY RECTAL EVERY 4 HOURS PRN
Status: DISCONTINUED | OUTPATIENT
Start: 2021-01-01 | End: 2021-01-01 | Stop reason: HOSPADM

## 2021-01-01 RX ORDER — BUMETANIDE 0.25 MG/ML
2 INJECTION INTRAMUSCULAR; INTRAVENOUS 3 TIMES DAILY
Status: DISCONTINUED | OUTPATIENT
Start: 2021-01-01 | End: 2021-01-01

## 2021-01-01 RX ORDER — CHOLECALCIFEROL (VITAMIN D3) 125 MCG
5 CAPSULE ORAL NIGHTLY PRN
Start: 2021-01-01

## 2021-01-01 RX ORDER — ACETAMINOPHEN 325 MG/1
650 TABLET ORAL EVERY 6 HOURS PRN
Status: DISCONTINUED | OUTPATIENT
Start: 2021-01-01 | End: 2021-01-01 | Stop reason: HOSPADM

## 2021-01-01 RX ORDER — SPIRONOLACTONE 25 MG/1
100 TABLET ORAL DAILY
Status: DISCONTINUED | OUTPATIENT
Start: 2021-01-01 | End: 2021-01-01 | Stop reason: HOSPADM

## 2021-01-01 RX ORDER — DEXTROAMPHETAMINE SACCHARATE, AMPHETAMINE ASPARTATE, DEXTROAMPHETAMINE SULFATE AND AMPHETAMINE SULFATE 7.5; 7.5; 7.5; 7.5 MG/1; MG/1; MG/1; MG/1
60 TABLET ORAL EVERY MORNING
Qty: 120 TABLET | Refills: 0 | Status: SHIPPED | OUTPATIENT
Start: 2021-01-01

## 2021-01-01 RX ORDER — PRAVASTATIN SODIUM 40 MG
40 TABLET ORAL DAILY
Status: DISCONTINUED | OUTPATIENT
Start: 2021-01-01 | End: 2021-01-01 | Stop reason: HOSPADM

## 2021-01-01 RX ORDER — AZITHROMYCIN 250 MG/1
TABLET, FILM COATED ORAL
Qty: 6 TABLET | Refills: 0 | Status: SHIPPED | OUTPATIENT
Start: 2021-01-01 | End: 2021-01-01

## 2021-01-01 RX ORDER — DOXYCYCLINE HYCLATE 100 MG/1
100 CAPSULE ORAL 2 TIMES DAILY
Qty: 20 CAPSULE | Refills: 0 | Status: ON HOLD | OUTPATIENT
Start: 2021-01-01 | End: 2021-01-01

## 2021-01-01 RX ORDER — METOLAZONE 2.5 MG/1
2.5 TABLET ORAL 2 TIMES WEEKLY
Qty: 14 TABLET | Refills: 0 | Status: SHIPPED | OUTPATIENT
Start: 2021-01-01

## 2021-01-01 RX ORDER — GABAPENTIN 400 MG/1
400 CAPSULE ORAL EVERY 12 HOURS SCHEDULED
Qty: 6 CAPSULE | Refills: 0 | Status: SHIPPED | OUTPATIENT
Start: 2021-01-01 | End: 2021-01-01

## 2021-01-01 RX ORDER — NYSTATIN 100000 [USP'U]/G
POWDER TOPICAL EVERY 12 HOURS SCHEDULED
Status: DISCONTINUED | OUTPATIENT
Start: 2021-01-01 | End: 2021-01-01 | Stop reason: HOSPADM

## 2021-01-01 RX ORDER — BUMETANIDE 0.25 MG/ML
2 INJECTION INTRAMUSCULAR; INTRAVENOUS EVERY 12 HOURS SCHEDULED
Status: DISCONTINUED | OUTPATIENT
Start: 2021-01-01 | End: 2021-01-01 | Stop reason: HOSPADM

## 2021-01-01 RX ORDER — BUMETANIDE 0.25 MG/ML
3 INJECTION INTRAMUSCULAR; INTRAVENOUS ONCE
Status: COMPLETED | OUTPATIENT
Start: 2021-01-01 | End: 2021-01-01

## 2021-01-01 RX ORDER — DEXTROAMPHETAMINE SACCHARATE, AMPHETAMINE ASPARTATE, DEXTROAMPHETAMINE SULFATE AND AMPHETAMINE SULFATE 3.75; 3.75; 3.75; 3.75 MG/1; MG/1; MG/1; MG/1
30 TABLET ORAL DAILY
Qty: 120 TABLET | Refills: 0 | Status: SHIPPED | OUTPATIENT
Start: 2021-01-01

## 2021-01-01 RX ORDER — BUMETANIDE 0.25 MG/ML
0.5 INJECTION INTRAMUSCULAR; INTRAVENOUS ONCE
Status: COMPLETED | OUTPATIENT
Start: 2021-01-01 | End: 2021-01-01

## 2021-01-01 RX ORDER — OFLOXACIN 3 MG/ML
SOLUTION/ DROPS OPHTHALMIC
COMMUNITY
Start: 2021-01-01 | End: 2021-01-01 | Stop reason: HOSPADM

## 2021-01-01 RX ORDER — DEXTROAMPHETAMINE SACCHARATE, AMPHETAMINE ASPARTATE, DEXTROAMPHETAMINE SULFATE AND AMPHETAMINE SULFATE 7.5; 7.5; 7.5; 7.5 MG/1; MG/1; MG/1; MG/1
30 TABLET ORAL DAILY
Status: DISCONTINUED | OUTPATIENT
Start: 2021-01-01 | End: 2021-01-01 | Stop reason: HOSPADM

## 2021-01-01 RX ORDER — GABAPENTIN 400 MG/1
400 CAPSULE ORAL 2 TIMES DAILY
Status: DISCONTINUED | OUTPATIENT
Start: 2021-01-01 | End: 2021-01-01

## 2021-01-01 RX ORDER — FENTANYL CITRATE 50 UG/ML
50 INJECTION, SOLUTION INTRAMUSCULAR; INTRAVENOUS
Status: DISCONTINUED | OUTPATIENT
Start: 2021-01-01 | End: 2021-01-01 | Stop reason: HOSPADM

## 2021-01-01 RX ORDER — PRAMIPEXOLE DIHYDROCHLORIDE 1 MG/1
1 TABLET ORAL EVERY 12 HOURS SCHEDULED
Status: DISCONTINUED | OUTPATIENT
Start: 2021-01-01 | End: 2021-01-01 | Stop reason: HOSPADM

## 2021-01-01 RX ORDER — DEXTROAMPHETAMINE SACCHARATE, AMPHETAMINE ASPARTATE, DEXTROAMPHETAMINE SULFATE AND AMPHETAMINE SULFATE 3.75; 3.75; 3.75; 3.75 MG/1; MG/1; MG/1; MG/1
30 TABLET ORAL DAILY
Status: DISCONTINUED | OUTPATIENT
Start: 2021-01-01 | End: 2021-01-01 | Stop reason: HOSPADM

## 2021-01-01 RX ORDER — ACETAMINOPHEN 325 MG/1
650 TABLET ORAL EVERY 4 HOURS PRN
Start: 2021-01-01

## 2021-01-01 RX ORDER — METOLAZONE 2.5 MG/1
10 TABLET ORAL DAILY PRN
Status: DISCONTINUED | OUTPATIENT
Start: 2021-01-01 | End: 2021-01-01

## 2021-01-01 RX ORDER — GABAPENTIN 400 MG/1
400 CAPSULE ORAL 2 TIMES DAILY
Status: DISCONTINUED | OUTPATIENT
Start: 2021-01-01 | End: 2021-01-01 | Stop reason: HOSPADM

## 2021-01-01 RX ADMIN — SODIUM CHLORIDE 1 G: 900 INJECTION INTRAVENOUS at 02:50

## 2021-01-01 RX ADMIN — HEPARIN SODIUM 5000 UNITS: 5000 INJECTION, SOLUTION INTRAVENOUS; SUBCUTANEOUS at 10:37

## 2021-01-01 RX ADMIN — GABAPENTIN 400 MG: 400 CAPSULE ORAL at 22:20

## 2021-01-01 RX ADMIN — HEPARIN SODIUM 5000 UNITS: 5000 INJECTION, SOLUTION INTRAVENOUS; SUBCUTANEOUS at 09:36

## 2021-01-01 RX ADMIN — HEPARIN SODIUM 5000 UNITS: 5000 INJECTION, SOLUTION INTRAVENOUS; SUBCUTANEOUS at 11:36

## 2021-01-01 RX ADMIN — PRAVASTATIN SODIUM 40 MG: 40 TABLET ORAL at 21:16

## 2021-01-01 RX ADMIN — LACTULOSE 45 ML: 10 SOLUTION ORAL at 08:32

## 2021-01-01 RX ADMIN — DEXTROAMPHETAMINE, AMPHETAMINE, DEXTROAMPHETAMINE, AMPHETAMINE 30 MG: 3.75; 3.75; 3.75; 3.75 TABLET ORAL at 15:03

## 2021-01-01 RX ADMIN — PSYLLIUM HUSK 1 PACKET: 3.4 POWDER ORAL at 08:25

## 2021-01-01 RX ADMIN — MONTELUKAST SODIUM 10 MG: 10 TABLET, COATED ORAL at 22:21

## 2021-01-01 RX ADMIN — PRAMIPEXOLE DIHYDROCHLORIDE 0.75 MG: 0.25 TABLET ORAL at 08:26

## 2021-01-01 RX ADMIN — POTASSIUM CHLORIDE 40 MEQ: 1.5 POWDER, FOR SOLUTION ORAL at 14:32

## 2021-01-01 RX ADMIN — LACTULOSE 45 ML: 10 SOLUTION ORAL at 09:37

## 2021-01-01 RX ADMIN — HEPARIN SODIUM 5000 UNITS: 5000 INJECTION, SOLUTION INTRAVENOUS; SUBCUTANEOUS at 08:26

## 2021-01-01 RX ADMIN — LEVOTHYROXINE SODIUM 75 MCG: 0.07 TABLET ORAL at 06:16

## 2021-01-01 RX ADMIN — Medication 1000 UNITS: at 12:31

## 2021-01-01 RX ADMIN — TRAZODONE HYDROCHLORIDE 50 MG: 50 TABLET ORAL at 20:06

## 2021-01-01 RX ADMIN — PRAVASTATIN SODIUM 40 MG: 40 TABLET ORAL at 20:51

## 2021-01-01 RX ADMIN — PRAMIPEXOLE DIHYDROCHLORIDE 0.75 MG: 0.25 TABLET ORAL at 08:32

## 2021-01-01 RX ADMIN — OXCARBAZEPINE 300 MG: 300 TABLET, FILM COATED ORAL at 08:23

## 2021-01-01 RX ADMIN — POTASSIUM CHLORIDE 40 MEQ: 750 CAPSULE, EXTENDED RELEASE ORAL at 00:06

## 2021-01-01 RX ADMIN — METOLAZONE 5 MG: 5 TABLET ORAL at 11:43

## 2021-01-01 RX ADMIN — SODIUM CHLORIDE, PRESERVATIVE FREE 10 ML: 5 INJECTION INTRAVENOUS at 20:13

## 2021-01-01 RX ADMIN — ENOXAPARIN SODIUM 30 MG: 30 INJECTION SUBCUTANEOUS at 19:56

## 2021-01-01 RX ADMIN — PRAMIPEXOLE DIHYDROCHLORIDE 0.75 MG: 0.25 TABLET ORAL at 20:59

## 2021-01-01 RX ADMIN — OXCARBAZEPINE 300 MG: 300 TABLET, FILM COATED ORAL at 20:27

## 2021-01-01 RX ADMIN — LEVOTHYROXINE SODIUM 75 MCG: 0.07 TABLET ORAL at 05:52

## 2021-01-01 RX ADMIN — HEPARIN SODIUM 5000 UNITS: 5000 INJECTION, SOLUTION INTRAVENOUS; SUBCUTANEOUS at 08:53

## 2021-01-01 RX ADMIN — Medication 5 MG: at 22:22

## 2021-01-01 RX ADMIN — BUMETANIDE 1 MG: 0.25 INJECTION, SOLUTION INTRAMUSCULAR; INTRAVENOUS at 15:32

## 2021-01-01 RX ADMIN — OXCARBAZEPINE 300 MG: 150 TABLET, FILM COATED ORAL at 21:11

## 2021-01-01 RX ADMIN — SPIRONOLACTONE 100 MG: 50 TABLET ORAL at 09:46

## 2021-01-01 RX ADMIN — LEVOTHYROXINE SODIUM 75 MCG: 0.07 TABLET ORAL at 08:17

## 2021-01-01 RX ADMIN — PRAMIPEXOLE DIHYDROCHLORIDE 0.75 MG: 0.25 TABLET ORAL at 08:23

## 2021-01-01 RX ADMIN — PRAVASTATIN SODIUM 40 MG: 40 TABLET ORAL at 08:34

## 2021-01-01 RX ADMIN — HEPARIN SODIUM 5000 UNITS: 5000 INJECTION, SOLUTION INTRAVENOUS; SUBCUTANEOUS at 11:52

## 2021-01-01 RX ADMIN — CETIRIZINE HYDROCHLORIDE 10 MG: 10 TABLET, FILM COATED ORAL at 08:56

## 2021-01-01 RX ADMIN — NYSTATIN: 100000 POWDER TOPICAL at 08:56

## 2021-01-01 RX ADMIN — DOCUSATE SODIUM 50MG AND SENNOSIDES 8.6MG 2 TABLET: 8.6; 5 TABLET, FILM COATED ORAL at 19:57

## 2021-01-01 RX ADMIN — GABAPENTIN 600 MG: 300 CAPSULE ORAL at 08:40

## 2021-01-01 RX ADMIN — OXCARBAZEPINE 300 MG: 150 TABLET, FILM COATED ORAL at 08:48

## 2021-01-01 RX ADMIN — BUMETANIDE 2 MG: 0.25 INJECTION, SOLUTION INTRAMUSCULAR; INTRAVENOUS at 16:32

## 2021-01-01 RX ADMIN — LEVOTHYROXINE SODIUM 75 MCG: 0.07 TABLET ORAL at 08:56

## 2021-01-01 RX ADMIN — BUMETANIDE 2.5 MG: 2 TABLET ORAL at 09:41

## 2021-01-01 RX ADMIN — GABAPENTIN 400 MG: 400 CAPSULE ORAL at 22:23

## 2021-01-01 RX ADMIN — OXCARBAZEPINE 300 MG: 150 TABLET, FILM COATED ORAL at 08:44

## 2021-01-01 RX ADMIN — MAGNESIUM SULFATE HEPTAHYDRATE 4 G: 40 INJECTION, SOLUTION INTRAVENOUS at 04:02

## 2021-01-01 RX ADMIN — POTASSIUM CHLORIDE 40 MEQ: 10 CAPSULE, COATED, EXTENDED RELEASE ORAL at 05:56

## 2021-01-01 RX ADMIN — PRAMIPEXOLE DIHYDROCHLORIDE 1 MG: 1 TABLET ORAL at 08:39

## 2021-01-01 RX ADMIN — SPIRONOLACTONE 50 MG: 50 TABLET ORAL at 10:11

## 2021-01-01 RX ADMIN — DOCUSATE SODIUM 50MG AND SENNOSIDES 8.6MG 2 TABLET: 8.6; 5 TABLET, FILM COATED ORAL at 10:11

## 2021-01-01 RX ADMIN — PRAVASTATIN SODIUM 40 MG: 40 TABLET ORAL at 09:36

## 2021-01-01 RX ADMIN — LACTULOSE 45 ML: 20 SOLUTION ORAL at 09:47

## 2021-01-01 RX ADMIN — ASPIRIN 81 MG: 81 TABLET, COATED ORAL at 08:40

## 2021-01-01 RX ADMIN — GABAPENTIN 900 MG: 300 CAPSULE ORAL at 22:31

## 2021-01-01 RX ADMIN — NYSTATIN: 100000 POWDER TOPICAL at 22:12

## 2021-01-01 RX ADMIN — SODIUM CHLORIDE, PRESERVATIVE FREE 10 ML: 5 INJECTION INTRAVENOUS at 22:24

## 2021-01-01 RX ADMIN — LEVOTHYROXINE SODIUM 75 MCG: 0.07 TABLET ORAL at 09:40

## 2021-01-01 RX ADMIN — ASPIRIN 81 MG: 81 TABLET, COATED ORAL at 08:26

## 2021-01-01 RX ADMIN — LEVOTHYROXINE SODIUM 75 MCG: 0.07 TABLET ORAL at 08:28

## 2021-01-01 RX ADMIN — OXCARBAZEPINE 300 MG: 150 TABLET, FILM COATED ORAL at 20:59

## 2021-01-01 RX ADMIN — CETIRIZINE HYDROCHLORIDE 10 MG: 10 TABLET, FILM COATED ORAL at 10:11

## 2021-01-01 RX ADMIN — PRAVASTATIN SODIUM 40 MG: 40 TABLET ORAL at 20:27

## 2021-01-01 RX ADMIN — SODIUM CHLORIDE, PRESERVATIVE FREE 10 ML: 5 INJECTION INTRAVENOUS at 20:28

## 2021-01-01 RX ADMIN — LACTULOSE 45 ML: 20 SOLUTION ORAL at 08:55

## 2021-01-01 RX ADMIN — OXCARBAZEPINE 300 MG: 300 TABLET, FILM COATED ORAL at 21:15

## 2021-01-01 RX ADMIN — TRAZODONE HYDROCHLORIDE 50 MG: 50 TABLET ORAL at 20:41

## 2021-01-01 RX ADMIN — LEVOTHYROXINE SODIUM 75 MCG: 0.07 TABLET ORAL at 08:22

## 2021-01-01 RX ADMIN — LACTULOSE 45 ML: 10 SOLUTION ORAL at 22:33

## 2021-01-01 RX ADMIN — HEPARIN SODIUM 5000 UNITS: 5000 INJECTION, SOLUTION INTRAVENOUS; SUBCUTANEOUS at 21:04

## 2021-01-01 RX ADMIN — SODIUM CHLORIDE 2 G: 900 INJECTION INTRAVENOUS at 10:12

## 2021-01-01 RX ADMIN — PRAVASTATIN SODIUM 40 MG: 40 TABLET ORAL at 08:26

## 2021-01-01 RX ADMIN — BUMETANIDE 2 MG: 0.25 INJECTION, SOLUTION INTRAMUSCULAR; INTRAVENOUS at 17:24

## 2021-01-01 RX ADMIN — PRAMIPEXOLE DIHYDROCHLORIDE 1 MG: 1 TABLET ORAL at 19:57

## 2021-01-01 RX ADMIN — NYSTATIN 1 APPLICATION: 100000 POWDER TOPICAL at 10:00

## 2021-01-01 RX ADMIN — SODIUM CHLORIDE, PRESERVATIVE FREE 10 ML: 5 INJECTION INTRAVENOUS at 08:24

## 2021-01-01 RX ADMIN — SODIUM CHLORIDE 2 G: 900 INJECTION INTRAVENOUS at 10:02

## 2021-01-01 RX ADMIN — SODIUM CHLORIDE, PRESERVATIVE FREE 10 ML: 5 INJECTION INTRAVENOUS at 08:21

## 2021-01-01 RX ADMIN — CETIRIZINE HYDROCHLORIDE 10 MG: 10 TABLET, FILM COATED ORAL at 08:39

## 2021-01-01 RX ADMIN — LACTULOSE 45 ML: 20 SOLUTION ORAL at 17:29

## 2021-01-01 RX ADMIN — LEVOTHYROXINE SODIUM 75 MCG: 0.07 TABLET ORAL at 08:33

## 2021-01-01 RX ADMIN — SPIRONOLACTONE 100 MG: 50 TABLET ORAL at 08:22

## 2021-01-01 RX ADMIN — VENLAFAXINE HYDROCHLORIDE 150 MG: 75 CAPSULE, EXTENDED RELEASE ORAL at 22:19

## 2021-01-01 RX ADMIN — BUMETANIDE 2 MG: 1 TABLET ORAL at 08:44

## 2021-01-01 RX ADMIN — CEFTRIAXONE SODIUM 1 G: 1 INJECTION, SOLUTION INTRAVENOUS at 14:39

## 2021-01-01 RX ADMIN — SODIUM CHLORIDE, PRESERVATIVE FREE 10 ML: 5 INJECTION INTRAVENOUS at 08:20

## 2021-01-01 RX ADMIN — GABAPENTIN 600 MG: 300 CAPSULE ORAL at 20:18

## 2021-01-01 RX ADMIN — POTASSIUM CHLORIDE 10 MEQ: 7.46 INJECTION, SOLUTION INTRAVENOUS at 01:12

## 2021-01-01 RX ADMIN — SPIRONOLACTONE 100 MG: 25 TABLET ORAL at 08:17

## 2021-01-01 RX ADMIN — HEPARIN SODIUM 5000 UNITS: 5000 INJECTION, SOLUTION INTRAVENOUS; SUBCUTANEOUS at 21:11

## 2021-01-01 RX ADMIN — PRAVASTATIN SODIUM 40 MG: 40 TABLET ORAL at 22:19

## 2021-01-01 RX ADMIN — PRAMIPEXOLE DIHYDROCHLORIDE 0.75 MG: 0.25 TABLET ORAL at 21:00

## 2021-01-01 RX ADMIN — ENOXAPARIN SODIUM 30 MG: 30 INJECTION SUBCUTANEOUS at 22:18

## 2021-01-01 RX ADMIN — BUMETANIDE 2 MG: 0.25 INJECTION, SOLUTION INTRAMUSCULAR; INTRAVENOUS at 17:29

## 2021-01-01 RX ADMIN — GABAPENTIN 600 MG: 300 CAPSULE ORAL at 20:27

## 2021-01-01 RX ADMIN — PRAMIPEXOLE DIHYDROCHLORIDE 0.75 MG: 0.25 TABLET ORAL at 09:42

## 2021-01-01 RX ADMIN — BUMETANIDE 4 MG: 1 TABLET ORAL at 08:26

## 2021-01-01 RX ADMIN — HEPARIN SODIUM 5000 UNITS: 5000 INJECTION, SOLUTION INTRAVENOUS; SUBCUTANEOUS at 20:59

## 2021-01-01 RX ADMIN — MONTELUKAST SODIUM 10 MG: 10 TABLET, COATED ORAL at 21:01

## 2021-01-01 RX ADMIN — RIFAXIMIN 550 MG: 550 TABLET ORAL at 09:36

## 2021-01-01 RX ADMIN — RIFAXIMIN 550 MG: 550 TABLET ORAL at 08:28

## 2021-01-01 RX ADMIN — HEPARIN SODIUM 5000 UNITS: 5000 INJECTION, SOLUTION INTRAVENOUS; SUBCUTANEOUS at 22:32

## 2021-01-01 RX ADMIN — LEVOTHYROXINE SODIUM 75 MCG: 0.07 TABLET ORAL at 05:47

## 2021-01-01 RX ADMIN — PRAMIPEXOLE DIHYDROCHLORIDE 0.75 MG: 0.25 TABLET ORAL at 21:09

## 2021-01-01 RX ADMIN — GABAPENTIN 600 MG: 300 CAPSULE ORAL at 10:37

## 2021-01-01 RX ADMIN — DOXYCYCLINE 100 MG: 100 CAPSULE ORAL at 11:46

## 2021-01-01 RX ADMIN — VENLAFAXINE HYDROCHLORIDE 150 MG: 75 CAPSULE, EXTENDED RELEASE ORAL at 21:00

## 2021-01-01 RX ADMIN — HEPARIN SODIUM 5000 UNITS: 5000 INJECTION, SOLUTION INTRAVENOUS; SUBCUTANEOUS at 08:45

## 2021-01-01 RX ADMIN — RIFAXIMIN 550 MG: 550 TABLET ORAL at 22:12

## 2021-01-01 RX ADMIN — DEXTROAMPHETAMINE SACCHARATE, AMPHETAMINE ASPARTATE MONOHYDRATE, DEXTROAMPHETAMINE SULFATE AND AMPHETAMINE SULFATE 30 MG: 7.5; 7.5; 7.5; 7.5 TABLET ORAL at 14:51

## 2021-01-01 RX ADMIN — SODIUM CHLORIDE 1 G: 900 INJECTION INTRAVENOUS at 04:53

## 2021-01-01 RX ADMIN — Medication 5 MG: at 22:33

## 2021-01-01 RX ADMIN — PRAVASTATIN SODIUM 40 MG: 40 TABLET ORAL at 08:28

## 2021-01-01 RX ADMIN — CETIRIZINE HYDROCHLORIDE 10 MG: 10 TABLET, FILM COATED ORAL at 08:22

## 2021-01-01 RX ADMIN — DEXTROAMPHETAMINE SACCHARATE, AMPHETAMINE ASPARTATE MONOHYDRATE, DEXTROAMPHETAMINE SULFATE AND AMPHETAMINE SULFATE 60 MG: 7.5; 7.5; 7.5; 7.5 TABLET ORAL at 09:02

## 2021-01-01 RX ADMIN — WATER 500 MG: 1 INJECTION INTRAMUSCULAR; INTRAVENOUS; SUBCUTANEOUS at 08:57

## 2021-01-01 RX ADMIN — HEPARIN SODIUM 5000 UNITS: 5000 INJECTION, SOLUTION INTRAVENOUS; SUBCUTANEOUS at 09:42

## 2021-01-01 RX ADMIN — ASPIRIN 81 MG: 81 TABLET, COATED ORAL at 08:28

## 2021-01-01 RX ADMIN — OXCARBAZEPINE 300 MG: 150 TABLET, FILM COATED ORAL at 08:56

## 2021-01-01 RX ADMIN — GABAPENTIN 400 MG: 400 CAPSULE ORAL at 08:19

## 2021-01-01 RX ADMIN — SODIUM CHLORIDE, PRESERVATIVE FREE 10 ML: 5 INJECTION INTRAVENOUS at 21:16

## 2021-01-01 RX ADMIN — GABAPENTIN 400 MG: 400 CAPSULE ORAL at 08:20

## 2021-01-01 RX ADMIN — GABAPENTIN 400 MG: 400 CAPSULE ORAL at 08:55

## 2021-01-01 RX ADMIN — SODIUM CHLORIDE, PRESERVATIVE FREE 10 ML: 5 INJECTION INTRAVENOUS at 23:04

## 2021-01-01 RX ADMIN — SODIUM CHLORIDE, PRESERVATIVE FREE 10 ML: 5 INJECTION INTRAVENOUS at 21:07

## 2021-01-01 RX ADMIN — SODIUM CHLORIDE 2 G: 900 INJECTION INTRAVENOUS at 09:42

## 2021-01-01 RX ADMIN — MONTELUKAST SODIUM 10 MG: 10 TABLET, COATED ORAL at 20:12

## 2021-01-01 RX ADMIN — PRAVASTATIN SODIUM 40 MG: 40 TABLET ORAL at 20:07

## 2021-01-01 RX ADMIN — GABAPENTIN 900 MG: 300 CAPSULE ORAL at 08:36

## 2021-01-01 RX ADMIN — BUMETANIDE 2 MG: 0.25 INJECTION, SOLUTION INTRAMUSCULAR; INTRAVENOUS at 09:37

## 2021-01-01 RX ADMIN — LACTULOSE 45 ML: 20 SOLUTION ORAL at 20:27

## 2021-01-01 RX ADMIN — NYSTATIN: 100000 POWDER TOPICAL at 08:26

## 2021-01-01 RX ADMIN — BUMETANIDE 0.5 MG: 0.25 INJECTION, SOLUTION INTRAMUSCULAR; INTRAVENOUS at 08:22

## 2021-01-01 RX ADMIN — PRAMIPEXOLE DIHYDROCHLORIDE 0.75 MG: 0.25 TABLET ORAL at 22:33

## 2021-01-01 RX ADMIN — PRAMIPEXOLE DIHYDROCHLORIDE 0.75 MG: 0.25 TABLET ORAL at 08:53

## 2021-01-01 RX ADMIN — VENLAFAXINE HYDROCHLORIDE 150 MG: 75 CAPSULE, EXTENDED RELEASE ORAL at 20:59

## 2021-01-01 RX ADMIN — PRAVASTATIN SODIUM 40 MG: 40 TABLET ORAL at 08:21

## 2021-01-01 RX ADMIN — WATER 500 MG: 1 INJECTION INTRAMUSCULAR; INTRAVENOUS; SUBCUTANEOUS at 22:24

## 2021-01-01 RX ADMIN — OXCARBAZEPINE 300 MG: 150 TABLET, FILM COATED ORAL at 21:09

## 2021-01-01 RX ADMIN — GABAPENTIN 900 MG: 300 CAPSULE ORAL at 08:33

## 2021-01-01 RX ADMIN — DEXTROAMPHETAMINE SACCHARATE, AMPHETAMINE ASPARTATE, DEXTROAMPHETAMINE SULFATE AND AMPHETAMINE SULFATE 30 MG: 7.5; 7.5; 7.5; 7.5 TABLET ORAL at 11:34

## 2021-01-01 RX ADMIN — LACTULOSE 20 G: 20 SOLUTION ORAL at 03:56

## 2021-01-01 RX ADMIN — LACTULOSE 45 ML: 10 SOLUTION ORAL at 16:00

## 2021-01-01 RX ADMIN — POTASSIUM CHLORIDE 40 MEQ: 750 CAPSULE, EXTENDED RELEASE ORAL at 14:39

## 2021-01-01 RX ADMIN — INSULIN LISPRO 3 UNITS: 100 INJECTION, SOLUTION INTRAVENOUS; SUBCUTANEOUS at 17:14

## 2021-01-01 RX ADMIN — SPIRONOLACTONE 100 MG: 25 TABLET ORAL at 09:35

## 2021-01-01 RX ADMIN — HEPARIN SODIUM 5000 UNITS: 5000 INJECTION, SOLUTION INTRAVENOUS; SUBCUTANEOUS at 08:41

## 2021-01-01 RX ADMIN — ENOXAPARIN SODIUM 30 MG: 30 INJECTION SUBCUTANEOUS at 08:39

## 2021-01-01 RX ADMIN — PRAMIPEXOLE DIHYDROCHLORIDE 0.75 MG: 0.25 TABLET ORAL at 09:46

## 2021-01-01 RX ADMIN — GABAPENTIN 900 MG: 300 CAPSULE ORAL at 08:38

## 2021-01-01 RX ADMIN — CETIRIZINE HYDROCHLORIDE 10 MG: 10 TABLET, FILM COATED ORAL at 08:53

## 2021-01-01 RX ADMIN — SODIUM CHLORIDE, PRESERVATIVE FREE 10 ML: 5 INJECTION INTRAVENOUS at 08:48

## 2021-01-01 RX ADMIN — HEPARIN SODIUM 5000 UNITS: 5000 INJECTION, SOLUTION INTRAVENOUS; SUBCUTANEOUS at 08:32

## 2021-01-01 RX ADMIN — GABAPENTIN 900 MG: 300 CAPSULE ORAL at 22:19

## 2021-01-01 RX ADMIN — GABAPENTIN 600 MG: 300 CAPSULE ORAL at 21:00

## 2021-01-01 RX ADMIN — OXCARBAZEPINE 600 MG: 300 TABLET, FILM COATED ORAL at 22:32

## 2021-01-01 RX ADMIN — CETIRIZINE HYDROCHLORIDE 10 MG: 10 TABLET, FILM COATED ORAL at 09:42

## 2021-01-01 RX ADMIN — RIFAXIMIN 550 MG: 550 TABLET ORAL at 08:33

## 2021-01-01 RX ADMIN — BUMETANIDE 2.5 MG: 2 TABLET ORAL at 08:56

## 2021-01-01 RX ADMIN — SODIUM CHLORIDE, PRESERVATIVE FREE 10 ML: 5 INJECTION INTRAVENOUS at 08:34

## 2021-01-01 RX ADMIN — SODIUM CHLORIDE, PRESERVATIVE FREE 10 ML: 5 INJECTION INTRAVENOUS at 22:32

## 2021-01-01 RX ADMIN — HEPARIN SODIUM 5000 UNITS: 5000 INJECTION, SOLUTION INTRAVENOUS; SUBCUTANEOUS at 08:42

## 2021-01-01 RX ADMIN — POTASSIUM CHLORIDE 40 MEQ: 10 CAPSULE, COATED, EXTENDED RELEASE ORAL at 08:46

## 2021-01-01 RX ADMIN — GABAPENTIN 900 MG: 300 CAPSULE ORAL at 20:59

## 2021-01-01 RX ADMIN — BUMETANIDE 2 MG: 0.25 INJECTION, SOLUTION INTRAMUSCULAR; INTRAVENOUS at 08:25

## 2021-01-01 RX ADMIN — PRAMIPEXOLE DIHYDROCHLORIDE 0.75 MG: 0.25 TABLET ORAL at 11:53

## 2021-01-01 RX ADMIN — PRAMIPEXOLE DIHYDROCHLORIDE 0.75 MG: 0.25 TABLET ORAL at 21:11

## 2021-01-01 RX ADMIN — NYSTATIN: 100000 OINTMENT TOPICAL at 16:30

## 2021-01-01 RX ADMIN — INSULIN LISPRO 2 UNITS: 100 INJECTION, SOLUTION INTRAVENOUS; SUBCUTANEOUS at 08:45

## 2021-01-01 RX ADMIN — CETIRIZINE HYDROCHLORIDE 10 MG: 10 TABLET, FILM COATED ORAL at 08:20

## 2021-01-01 RX ADMIN — RIFAXIMIN 550 MG: 550 TABLET ORAL at 21:09

## 2021-01-01 RX ADMIN — ASPIRIN 81 MG: 81 TABLET, COATED ORAL at 09:42

## 2021-01-01 RX ADMIN — CETIRIZINE HYDROCHLORIDE 10 MG: 10 TABLET, FILM COATED ORAL at 08:19

## 2021-01-01 RX ADMIN — BENZOCAINE AND MENTHOL 1 LOZENGE: 15; 3.6 LOZENGE ORAL at 20:30

## 2021-01-01 RX ADMIN — PRAMIPEXOLE DIHYDROCHLORIDE 0.75 MG: 0.25 TABLET ORAL at 22:57

## 2021-01-01 RX ADMIN — RIFAXIMIN 550 MG: 550 TABLET ORAL at 21:11

## 2021-01-01 RX ADMIN — SODIUM CHLORIDE, PRESERVATIVE FREE 10 ML: 5 INJECTION INTRAVENOUS at 21:00

## 2021-01-01 RX ADMIN — HEPARIN SODIUM 5000 UNITS: 5000 INJECTION, SOLUTION INTRAVENOUS; SUBCUTANEOUS at 20:51

## 2021-01-01 RX ADMIN — SODIUM CHLORIDE, PRESERVATIVE FREE 10 ML: 5 INJECTION INTRAVENOUS at 22:23

## 2021-01-01 RX ADMIN — SULFUR HEXAFLUORIDE 3 ML: KIT at 15:55

## 2021-01-01 RX ADMIN — LACTULOSE 45 ML: 20 SOLUTION ORAL at 17:03

## 2021-01-01 RX ADMIN — GABAPENTIN 900 MG: 300 CAPSULE ORAL at 08:26

## 2021-01-01 RX ADMIN — RIFAXIMIN 550 MG: 550 TABLET ORAL at 22:58

## 2021-01-01 RX ADMIN — DEXTROAMPHETAMINE SACCHARATE, AMPHETAMINE ASPARTATE MONOHYDRATE, DEXTROAMPHETAMINE SULFATE AND AMPHETAMINE SULFATE 60 MG: 7.5; 7.5; 7.5; 7.5 TABLET ORAL at 08:19

## 2021-01-01 RX ADMIN — OXCARBAZEPINE 300 MG: 150 TABLET, FILM COATED ORAL at 08:41

## 2021-01-01 RX ADMIN — BUMETANIDE 2 MG: 1 TABLET ORAL at 21:09

## 2021-01-01 RX ADMIN — MONTELUKAST SODIUM 10 MG: 10 TABLET, COATED ORAL at 22:12

## 2021-01-01 RX ADMIN — SODIUM CHLORIDE, PRESERVATIVE FREE 10 ML: 5 INJECTION INTRAVENOUS at 08:28

## 2021-01-01 RX ADMIN — VENLAFAXINE HYDROCHLORIDE 150 MG: 75 CAPSULE, EXTENDED RELEASE ORAL at 22:13

## 2021-01-01 RX ADMIN — DOXYCYCLINE 100 MG: 100 CAPSULE ORAL at 22:19

## 2021-01-01 RX ADMIN — VANCOMYCIN HYDROCHLORIDE 2750 MG: 100 INJECTION, POWDER, LYOPHILIZED, FOR SOLUTION INTRAVENOUS at 16:00

## 2021-01-01 RX ADMIN — DEXTROAMPHETAMINE SACCHARATE, AMPHETAMINE ASPARTATE MONOHYDRATE, DEXTROAMPHETAMINE SULFATE AND AMPHETAMINE SULFATE 60 MG: 7.5; 7.5; 7.5; 7.5 TABLET ORAL at 08:42

## 2021-01-01 RX ADMIN — LEVOTHYROXINE SODIUM 75 MCG: 0.07 TABLET ORAL at 08:21

## 2021-01-01 RX ADMIN — RIFAXIMIN 550 MG: 550 TABLET ORAL at 08:20

## 2021-01-01 RX ADMIN — BUMETANIDE 4 MG: 1 TABLET ORAL at 08:28

## 2021-01-01 RX ADMIN — OXCARBAZEPINE 600 MG: 300 TABLET, FILM COATED ORAL at 10:11

## 2021-01-01 RX ADMIN — DEXTROSE MONOHYDRATE 25 G: 25 INJECTION, SOLUTION INTRAVENOUS at 02:26

## 2021-01-01 RX ADMIN — LACTULOSE 45 ML: 10 SOLUTION ORAL at 08:25

## 2021-01-01 RX ADMIN — OXCARBAZEPINE 300 MG: 150 TABLET, FILM COATED ORAL at 09:42

## 2021-01-01 RX ADMIN — OXCARBAZEPINE 300 MG: 150 TABLET, FILM COATED ORAL at 08:52

## 2021-01-01 RX ADMIN — INSULIN LISPRO 4 UNITS: 100 INJECTION, SOLUTION INTRAVENOUS; SUBCUTANEOUS at 08:40

## 2021-01-01 RX ADMIN — LEVOTHYROXINE SODIUM 75 MCG: 0.07 TABLET ORAL at 05:08

## 2021-01-01 RX ADMIN — HEPARIN SODIUM 5000 UNITS: 5000 INJECTION, SOLUTION INTRAVENOUS; SUBCUTANEOUS at 21:08

## 2021-01-01 RX ADMIN — ASPIRIN 81 MG: 81 TABLET, COATED ORAL at 08:39

## 2021-01-01 RX ADMIN — GABAPENTIN 600 MG: 300 CAPSULE ORAL at 09:42

## 2021-01-01 RX ADMIN — LEVOTHYROXINE SODIUM 75 MCG: 0.07 TABLET ORAL at 08:45

## 2021-01-01 RX ADMIN — ASPIRIN 81 MG: 81 TABLET, COATED ORAL at 10:37

## 2021-01-01 RX ADMIN — SODIUM CHLORIDE, PRESERVATIVE FREE 10 ML: 5 INJECTION INTRAVENOUS at 08:23

## 2021-01-01 RX ADMIN — SODIUM CHLORIDE, PRESERVATIVE FREE 10 ML: 5 INJECTION INTRAVENOUS at 09:47

## 2021-01-01 RX ADMIN — CETIRIZINE HYDROCHLORIDE 10 MG: 10 TABLET, FILM COATED ORAL at 08:24

## 2021-01-01 RX ADMIN — VENLAFAXINE HYDROCHLORIDE 150 MG: 75 CAPSULE, EXTENDED RELEASE ORAL at 20:12

## 2021-01-01 RX ADMIN — VENLAFAXINE HYDROCHLORIDE 150 MG: 75 CAPSULE, EXTENDED RELEASE ORAL at 22:56

## 2021-01-01 RX ADMIN — NYSTATIN: 100000 POWDER TOPICAL at 21:04

## 2021-01-01 RX ADMIN — ASPIRIN 81 MG: 81 TABLET, COATED ORAL at 08:53

## 2021-01-01 RX ADMIN — SPIRONOLACTONE 100 MG: 25 TABLET ORAL at 08:28

## 2021-01-01 RX ADMIN — ASPIRIN 81 MG: 81 TABLET, COATED ORAL at 08:17

## 2021-01-01 RX ADMIN — GABAPENTIN 600 MG: 300 CAPSULE ORAL at 20:12

## 2021-01-01 RX ADMIN — NYSTATIN: 100000 POWDER TOPICAL at 22:21

## 2021-01-01 RX ADMIN — MONTELUKAST SODIUM 10 MG: 10 TABLET, COATED ORAL at 22:19

## 2021-01-01 RX ADMIN — PRAMIPEXOLE DIHYDROCHLORIDE 0.75 MG: 0.25 TABLET ORAL at 08:55

## 2021-01-01 RX ADMIN — INSULIN LISPRO 2 UNITS: 100 INJECTION, SOLUTION INTRAVENOUS; SUBCUTANEOUS at 16:47

## 2021-01-01 RX ADMIN — LACTULOSE 45 ML: 20 SOLUTION ORAL at 08:22

## 2021-01-01 RX ADMIN — LACTULOSE 45 ML: 10 SOLUTION ORAL at 16:32

## 2021-01-01 RX ADMIN — GABAPENTIN 600 MG: 300 CAPSULE ORAL at 08:17

## 2021-01-01 RX ADMIN — RIFAXIMIN 550 MG: 550 TABLET ORAL at 20:27

## 2021-01-01 RX ADMIN — SODIUM CHLORIDE, PRESERVATIVE FREE 10 ML: 5 INJECTION INTRAVENOUS at 10:01

## 2021-01-01 RX ADMIN — NYSTATIN: 100000 POWDER TOPICAL at 08:46

## 2021-01-01 RX ADMIN — SODIUM CHLORIDE 9 ML/HR: 9 INJECTION, SOLUTION INTRAVENOUS at 10:02

## 2021-01-01 RX ADMIN — HEPARIN SODIUM 5000 UNITS: 5000 INJECTION, SOLUTION INTRAVENOUS; SUBCUTANEOUS at 21:15

## 2021-01-01 RX ADMIN — INSULIN LISPRO 2 UNITS: 100 INJECTION, SOLUTION INTRAVENOUS; SUBCUTANEOUS at 09:45

## 2021-01-01 RX ADMIN — GABAPENTIN 900 MG: 300 CAPSULE ORAL at 20:41

## 2021-01-01 RX ADMIN — LACTULOSE 20 G: 20 SOLUTION ORAL at 05:32

## 2021-01-01 RX ADMIN — DOCUSATE SODIUM 50MG AND SENNOSIDES 8.6MG 2 TABLET: 8.6; 5 TABLET, FILM COATED ORAL at 08:39

## 2021-01-01 RX ADMIN — DEXTROSE MONOHYDRATE 25 G: 25 INJECTION, SOLUTION INTRAVENOUS at 04:56

## 2021-01-01 RX ADMIN — ASPIRIN 81 MG: 81 TABLET, COATED ORAL at 08:55

## 2021-01-01 RX ADMIN — GABAPENTIN 400 MG: 400 CAPSULE ORAL at 08:44

## 2021-01-01 RX ADMIN — PRAVASTATIN SODIUM 40 MG: 40 TABLET ORAL at 19:57

## 2021-01-01 RX ADMIN — HEPARIN SODIUM 5000 UNITS: 5000 INJECTION, SOLUTION INTRAVENOUS; SUBCUTANEOUS at 22:12

## 2021-01-01 RX ADMIN — ASPIRIN 81 MG: 81 TABLET, COATED ORAL at 08:24

## 2021-01-01 RX ADMIN — SODIUM CHLORIDE, PRESERVATIVE FREE 10 ML: 5 INJECTION INTRAVENOUS at 19:58

## 2021-01-01 RX ADMIN — DOXYCYCLINE 100 MG: 100 INJECTION, POWDER, LYOPHILIZED, FOR SOLUTION INTRAVENOUS at 17:04

## 2021-01-01 RX ADMIN — DEXTROAMPHETAMINE SACCHARATE, AMPHETAMINE ASPARTATE MONOHYDRATE, DEXTROAMPHETAMINE SULFATE AND AMPHETAMINE SULFATE 30 MG: 7.5; 7.5; 7.5; 7.5 TABLET ORAL at 18:20

## 2021-01-01 RX ADMIN — Medication 5 MG: at 22:57

## 2021-01-01 RX ADMIN — TRAZODONE HYDROCHLORIDE 50 MG: 50 TABLET ORAL at 20:12

## 2021-01-01 RX ADMIN — PRAMIPEXOLE DIHYDROCHLORIDE 1 MG: 1 TABLET ORAL at 10:10

## 2021-01-01 RX ADMIN — HEPARIN SODIUM 5000 UNITS: 5000 INJECTION, SOLUTION INTRAVENOUS; SUBCUTANEOUS at 20:27

## 2021-01-01 RX ADMIN — PRAVASTATIN SODIUM 40 MG: 40 TABLET ORAL at 22:58

## 2021-01-01 RX ADMIN — ASPIRIN 81 MG: 81 TABLET, COATED ORAL at 08:56

## 2021-01-01 RX ADMIN — RIFAXIMIN 550 MG: 550 TABLET ORAL at 21:16

## 2021-01-01 RX ADMIN — HEPARIN SODIUM 5000 UNITS: 5000 INJECTION, SOLUTION INTRAVENOUS; SUBCUTANEOUS at 22:23

## 2021-01-01 RX ADMIN — IOPAMIDOL 100 ML: 755 INJECTION, SOLUTION INTRAVENOUS at 00:53

## 2021-01-01 RX ADMIN — NYSTATIN: 100000 POWDER TOPICAL at 21:01

## 2021-01-01 RX ADMIN — BUMETANIDE 1 MG: 0.25 INJECTION, SOLUTION INTRAMUSCULAR; INTRAVENOUS at 23:34

## 2021-01-01 RX ADMIN — POTASSIUM CHLORIDE 40 MEQ: 750 CAPSULE, EXTENDED RELEASE ORAL at 19:57

## 2021-01-01 RX ADMIN — NYSTATIN: 100000 POWDER TOPICAL at 21:08

## 2021-01-01 RX ADMIN — CETIRIZINE HYDROCHLORIDE 10 MG: 10 TABLET, FILM COATED ORAL at 08:44

## 2021-01-01 RX ADMIN — SODIUM CHLORIDE, PRESERVATIVE FREE 10 ML: 5 INJECTION INTRAVENOUS at 22:33

## 2021-01-01 RX ADMIN — RIFAXIMIN 550 MG: 550 TABLET ORAL at 09:46

## 2021-01-01 RX ADMIN — GABAPENTIN 600 MG: 300 CAPSULE ORAL at 08:53

## 2021-01-01 RX ADMIN — RIFAXIMIN 550 MG: 550 TABLET ORAL at 20:59

## 2021-01-01 RX ADMIN — OXCARBAZEPINE 300 MG: 150 TABLET, FILM COATED ORAL at 21:00

## 2021-01-01 RX ADMIN — RIFAXIMIN 550 MG: 550 TABLET ORAL at 09:40

## 2021-01-01 RX ADMIN — LACTULOSE 45 ML: 10 SOLUTION ORAL at 21:02

## 2021-01-01 RX ADMIN — LACTULOSE 45 ML: 20 SOLUTION ORAL at 22:58

## 2021-01-01 RX ADMIN — PRAMIPEXOLE DIHYDROCHLORIDE 0.75 MG: 0.25 TABLET ORAL at 09:41

## 2021-01-01 RX ADMIN — INSULIN LISPRO 2 UNITS: 100 INJECTION, SOLUTION INTRAVENOUS; SUBCUTANEOUS at 17:41

## 2021-01-01 RX ADMIN — SODIUM CHLORIDE, PRESERVATIVE FREE 10 ML: 5 INJECTION INTRAVENOUS at 09:42

## 2021-01-01 RX ADMIN — DEXTROAMPHETAMINE SACCHARATE, AMPHETAMINE ASPARTATE MONOHYDRATE, DEXTROAMPHETAMINE SULFATE AND AMPHETAMINE SULFATE 60 MG: 7.5; 7.5; 7.5; 7.5 TABLET ORAL at 10:54

## 2021-01-01 RX ADMIN — OXCARBAZEPINE 300 MG: 150 TABLET, FILM COATED ORAL at 10:36

## 2021-01-01 RX ADMIN — ENOXAPARIN SODIUM 30 MG: 30 INJECTION SUBCUTANEOUS at 20:41

## 2021-01-01 RX ADMIN — RIFAXIMIN 550 MG: 550 TABLET ORAL at 08:56

## 2021-01-01 RX ADMIN — SODIUM CHLORIDE, PRESERVATIVE FREE 10 ML: 5 INJECTION INTRAVENOUS at 10:11

## 2021-01-01 RX ADMIN — PRAMIPEXOLE DIHYDROCHLORIDE 0.75 MG: 0.25 TABLET ORAL at 20:12

## 2021-01-01 RX ADMIN — CETIRIZINE HYDROCHLORIDE 10 MG: 10 TABLET, FILM COATED ORAL at 08:21

## 2021-01-01 RX ADMIN — PRAMIPEXOLE DIHYDROCHLORIDE 0.75 MG: 0.25 TABLET ORAL at 22:21

## 2021-01-01 RX ADMIN — MONTELUKAST SODIUM 10 MG: 10 TABLET, COATED ORAL at 21:00

## 2021-01-01 RX ADMIN — LIDOCAINE HYDROCHLORIDE 100 MG: 10 INJECTION, SOLUTION EPIDURAL; INFILTRATION; INTRACAUDAL; PERINEURAL at 10:15

## 2021-01-01 RX ADMIN — PRAMIPEXOLE DIHYDROCHLORIDE 0.75 MG: 0.25 TABLET ORAL at 08:44

## 2021-01-01 RX ADMIN — OXCARBAZEPINE 300 MG: 150 TABLET, FILM COATED ORAL at 08:34

## 2021-01-01 RX ADMIN — HEPARIN SODIUM 5000 UNITS: 5000 INJECTION, SOLUTION INTRAVENOUS; SUBCUTANEOUS at 08:19

## 2021-01-01 RX ADMIN — ASPIRIN 81 MG: 81 TABLET, COATED ORAL at 09:45

## 2021-01-01 RX ADMIN — CETIRIZINE HYDROCHLORIDE 10 MG: 10 TABLET, FILM COATED ORAL at 08:40

## 2021-01-01 RX ADMIN — INSULIN LISPRO 2 UNITS: 100 INJECTION, SOLUTION INTRAVENOUS; SUBCUTANEOUS at 12:18

## 2021-01-01 RX ADMIN — GABAPENTIN 400 MG: 400 CAPSULE ORAL at 16:45

## 2021-01-01 RX ADMIN — POTASSIUM CHLORIDE 20 MEQ: 750 CAPSULE, EXTENDED RELEASE ORAL at 01:11

## 2021-01-01 RX ADMIN — ACETAMINOPHEN 650 MG: 325 TABLET ORAL at 11:48

## 2021-01-01 RX ADMIN — PRAMIPEXOLE DIHYDROCHLORIDE 0.75 MG: 0.25 TABLET ORAL at 08:47

## 2021-01-01 RX ADMIN — BUMETANIDE 1 MG: 0.25 INJECTION, SOLUTION INTRAMUSCULAR; INTRAVENOUS at 04:13

## 2021-01-01 RX ADMIN — SODIUM CHLORIDE, PRESERVATIVE FREE 10 ML: 5 INJECTION INTRAVENOUS at 20:52

## 2021-01-01 RX ADMIN — SODIUM CHLORIDE, PRESERVATIVE FREE 10 ML: 5 INJECTION INTRAVENOUS at 08:57

## 2021-01-01 RX ADMIN — VENLAFAXINE HYDROCHLORIDE 150 MG: 75 CAPSULE, EXTENDED RELEASE ORAL at 20:07

## 2021-01-01 RX ADMIN — SPIRONOLACTONE 50 MG: 50 TABLET ORAL at 08:24

## 2021-01-01 RX ADMIN — DOXYCYCLINE 100 MG: 100 INJECTION, POWDER, LYOPHILIZED, FOR SOLUTION INTRAVENOUS at 06:10

## 2021-01-01 RX ADMIN — PRAVASTATIN SODIUM 40 MG: 40 TABLET ORAL at 21:00

## 2021-01-01 RX ADMIN — GABAPENTIN 900 MG: 300 CAPSULE ORAL at 22:12

## 2021-01-01 RX ADMIN — DOCUSATE SODIUM 50MG AND SENNOSIDES 8.6MG 2 TABLET: 8.6; 5 TABLET, FILM COATED ORAL at 22:19

## 2021-01-01 RX ADMIN — MONTELUKAST SODIUM 10 MG: 10 TABLET, COATED ORAL at 22:31

## 2021-01-01 RX ADMIN — INSULIN LISPRO 2 UNITS: 100 INJECTION, SOLUTION INTRAVENOUS; SUBCUTANEOUS at 17:44

## 2021-01-01 RX ADMIN — DEXTROAMPHETAMINE, AMPHETAMINE, DEXTROAMPHETAMINE, AMPHETAMINE 30 MG: 3.75; 3.75; 3.75; 3.75 TABLET ORAL at 14:43

## 2021-01-01 RX ADMIN — GABAPENTIN 400 MG: 400 CAPSULE ORAL at 08:56

## 2021-01-01 RX ADMIN — CETIRIZINE HYDROCHLORIDE 10 MG: 10 TABLET, FILM COATED ORAL at 08:55

## 2021-01-01 RX ADMIN — PRAMIPEXOLE DIHYDROCHLORIDE 1 MG: 1 TABLET ORAL at 23:34

## 2021-01-01 RX ADMIN — DOXYCYCLINE 100 MG: 100 CAPSULE ORAL at 19:56

## 2021-01-01 RX ADMIN — OXCARBAZEPINE 300 MG: 150 TABLET, FILM COATED ORAL at 08:26

## 2021-01-01 RX ADMIN — BUMETANIDE 2 MG: 0.25 INJECTION, SOLUTION INTRAMUSCULAR; INTRAVENOUS at 08:57

## 2021-01-01 RX ADMIN — RIFAXIMIN 550 MG: 550 TABLET ORAL at 08:45

## 2021-01-01 RX ADMIN — LEVOTHYROXINE SODIUM 75 MCG: 0.07 TABLET ORAL at 09:46

## 2021-01-01 RX ADMIN — SPIRONOLACTONE 100 MG: 25 TABLET ORAL at 08:25

## 2021-01-01 RX ADMIN — RIFAXIMIN 550 MG: 550 TABLET ORAL at 08:17

## 2021-01-01 RX ADMIN — RIFAXIMIN 550 MG: 550 TABLET ORAL at 22:22

## 2021-01-01 RX ADMIN — DEXTROAMPHETAMINE SACCHARATE, AMPHETAMINE ASPARTATE MONOHYDRATE, DEXTROAMPHETAMINE SULFATE AND AMPHETAMINE SULFATE 60 MG: 7.5; 7.5; 7.5; 7.5 TABLET ORAL at 08:47

## 2021-01-01 RX ADMIN — SODIUM CHLORIDE, PRESERVATIVE FREE 10 ML: 5 INJECTION INTRAVENOUS at 21:01

## 2021-01-01 RX ADMIN — FAMOTIDINE 20 MG: 10 INJECTION INTRAVENOUS at 10:00

## 2021-01-01 RX ADMIN — BUMETANIDE 4 MG: 1 TABLET ORAL at 20:41

## 2021-01-01 RX ADMIN — OXCARBAZEPINE 300 MG: 300 TABLET, FILM COATED ORAL at 22:57

## 2021-01-01 RX ADMIN — INSULIN LISPRO 2 UNITS: 100 INJECTION, SOLUTION INTRAVENOUS; SUBCUTANEOUS at 08:21

## 2021-01-01 RX ADMIN — GABAPENTIN 400 MG: 400 CAPSULE ORAL at 22:58

## 2021-01-01 RX ADMIN — NYSTATIN: 100000 OINTMENT TOPICAL at 22:20

## 2021-01-01 RX ADMIN — LACTULOSE 45 ML: 10 SOLUTION ORAL at 15:03

## 2021-01-01 RX ADMIN — VENLAFAXINE HYDROCHLORIDE 150 MG: 75 CAPSULE, EXTENDED RELEASE ORAL at 21:15

## 2021-01-01 RX ADMIN — LEVOTHYROXINE SODIUM 75 MCG: 0.07 TABLET ORAL at 05:41

## 2021-01-01 RX ADMIN — Medication 1000 UNITS: at 08:42

## 2021-01-01 RX ADMIN — LACTULOSE 45 ML: 20 SOLUTION ORAL at 08:20

## 2021-01-01 RX ADMIN — POTASSIUM CHLORIDE 20 MEQ: 10 CAPSULE, COATED, EXTENDED RELEASE ORAL at 16:12

## 2021-01-01 RX ADMIN — LACTULOSE 45 ML: 20 SOLUTION ORAL at 09:41

## 2021-01-01 RX ADMIN — LACTULOSE 45 ML: 10 SOLUTION ORAL at 15:20

## 2021-01-01 RX ADMIN — GABAPENTIN 600 MG: 300 CAPSULE ORAL at 20:55

## 2021-01-01 RX ADMIN — ASPIRIN 81 MG: 81 TABLET, COATED ORAL at 08:20

## 2021-01-01 RX ADMIN — GABAPENTIN 600 MG: 300 CAPSULE ORAL at 08:42

## 2021-01-01 RX ADMIN — SPIRONOLACTONE 100 MG: 25 TABLET ORAL at 08:55

## 2021-01-01 RX ADMIN — CEFTRIAXONE SODIUM 1 G: 1 INJECTION, SOLUTION INTRAVENOUS at 14:36

## 2021-01-01 RX ADMIN — ENOXAPARIN SODIUM 30 MG: 30 INJECTION SUBCUTANEOUS at 10:13

## 2021-01-01 RX ADMIN — LACTULOSE 45 ML: 10 SOLUTION ORAL at 22:21

## 2021-01-01 RX ADMIN — PRAMIPEXOLE DIHYDROCHLORIDE 0.75 MG: 0.25 TABLET ORAL at 08:41

## 2021-01-01 RX ADMIN — INSULIN LISPRO 3 UNITS: 100 INJECTION, SOLUTION INTRAVENOUS; SUBCUTANEOUS at 12:50

## 2021-01-01 RX ADMIN — PRAMIPEXOLE DIHYDROCHLORIDE 0.75 MG: 0.25 TABLET ORAL at 08:20

## 2021-01-01 RX ADMIN — HEPARIN SODIUM 5000 UNITS: 5000 INJECTION, SOLUTION INTRAVENOUS; SUBCUTANEOUS at 20:12

## 2021-01-01 RX ADMIN — PRAMIPEXOLE DIHYDROCHLORIDE 1 MG: 1 TABLET ORAL at 08:24

## 2021-01-01 RX ADMIN — GABAPENTIN 900 MG: 300 CAPSULE ORAL at 19:56

## 2021-01-01 RX ADMIN — PRAMIPEXOLE DIHYDROCHLORIDE 0.75 MG: 0.25 TABLET ORAL at 08:21

## 2021-01-01 RX ADMIN — OXCARBAZEPINE 300 MG: 150 TABLET, FILM COATED ORAL at 08:17

## 2021-01-01 RX ADMIN — PRAMIPEXOLE DIHYDROCHLORIDE 0.75 MG: 0.25 TABLET ORAL at 21:02

## 2021-01-01 RX ADMIN — CETIRIZINE HYDROCHLORIDE 10 MG: 10 TABLET, FILM COATED ORAL at 09:41

## 2021-01-01 RX ADMIN — NYSTATIN 1 APPLICATION: 100000 POWDER TOPICAL at 08:29

## 2021-01-01 RX ADMIN — PROPOFOL 50 MG: 10 INJECTION, EMULSION INTRAVENOUS at 10:18

## 2021-01-01 RX ADMIN — POTASSIUM CHLORIDE 40 MEQ: 750 CAPSULE, EXTENDED RELEASE ORAL at 17:28

## 2021-01-01 RX ADMIN — DEXTROAMPHETAMINE SACCHARATE, AMPHETAMINE ASPARTATE MONOHYDRATE, DEXTROAMPHETAMINE SULFATE AND AMPHETAMINE SULFATE 30 MG: 7.5; 7.5; 7.5; 7.5 TABLET ORAL at 15:17

## 2021-01-01 RX ADMIN — BUMETANIDE 0.5 MG: 0.25 INJECTION, SOLUTION INTRAMUSCULAR; INTRAVENOUS at 00:47

## 2021-01-01 RX ADMIN — LACTULOSE 45 ML: 10 SOLUTION ORAL at 08:20

## 2021-01-01 RX ADMIN — INSULIN LISPRO 2 UNITS: 100 INJECTION, SOLUTION INTRAVENOUS; SUBCUTANEOUS at 11:57

## 2021-01-01 RX ADMIN — BUMETANIDE 2 MG: 0.25 INJECTION, SOLUTION INTRAMUSCULAR; INTRAVENOUS at 15:02

## 2021-01-01 RX ADMIN — GABAPENTIN 900 MG: 300 CAPSULE ORAL at 11:45

## 2021-01-01 RX ADMIN — BISACODYL 5 MG: 5 TABLET, COATED ORAL at 08:24

## 2021-01-01 RX ADMIN — MONTELUKAST SODIUM 10 MG: 10 TABLET, COATED ORAL at 21:16

## 2021-01-01 RX ADMIN — SODIUM CHLORIDE, PRESERVATIVE FREE 10 ML: 5 INJECTION INTRAVENOUS at 08:56

## 2021-01-01 RX ADMIN — METOLAZONE 5 MG: 5 TABLET ORAL at 08:24

## 2021-01-01 RX ADMIN — VENLAFAXINE HYDROCHLORIDE 150 MG: 75 CAPSULE, EXTENDED RELEASE ORAL at 20:51

## 2021-01-01 RX ADMIN — GABAPENTIN 400 MG: 400 CAPSULE ORAL at 09:45

## 2021-01-01 RX ADMIN — PRAMIPEXOLE DIHYDROCHLORIDE 0.75 MG: 0.25 TABLET ORAL at 08:27

## 2021-01-01 RX ADMIN — OXCARBAZEPINE 300 MG: 300 TABLET, FILM COATED ORAL at 21:00

## 2021-01-01 RX ADMIN — VENLAFAXINE HYDROCHLORIDE 150 MG: 75 CAPSULE, EXTENDED RELEASE ORAL at 20:26

## 2021-01-01 RX ADMIN — HEPARIN SODIUM 5000 UNITS: 5000 INJECTION, SOLUTION INTRAVENOUS; SUBCUTANEOUS at 08:28

## 2021-01-01 RX ADMIN — BUMETANIDE 2 MG: 0.25 INJECTION, SOLUTION INTRAMUSCULAR; INTRAVENOUS at 11:52

## 2021-01-01 RX ADMIN — CETIRIZINE HYDROCHLORIDE 10 MG: 10 TABLET, FILM COATED ORAL at 09:36

## 2021-01-01 RX ADMIN — RIFAXIMIN 550 MG: 550 TABLET ORAL at 08:26

## 2021-01-01 RX ADMIN — NYSTATIN: 100000 POWDER TOPICAL at 09:38

## 2021-01-01 RX ADMIN — BUMETANIDE 1 MG: 0.25 INJECTION, SOLUTION INTRAMUSCULAR; INTRAVENOUS at 19:57

## 2021-01-01 RX ADMIN — OXCARBAZEPINE 300 MG: 150 TABLET, FILM COATED ORAL at 09:36

## 2021-01-01 RX ADMIN — PRAMIPEXOLE DIHYDROCHLORIDE 0.75 MG: 0.25 TABLET ORAL at 20:06

## 2021-01-01 RX ADMIN — SODIUM CHLORIDE, PRESERVATIVE FREE 10 ML: 5 INJECTION INTRAVENOUS at 21:12

## 2021-01-01 RX ADMIN — INSULIN LISPRO 2 UNITS: 100 INJECTION, SOLUTION INTRAVENOUS; SUBCUTANEOUS at 08:56

## 2021-01-01 RX ADMIN — ASPIRIN 81 MG: 81 TABLET, COATED ORAL at 09:36

## 2021-01-01 RX ADMIN — BUMETANIDE 2 MG: 0.25 INJECTION, SOLUTION INTRAMUSCULAR; INTRAVENOUS at 22:15

## 2021-01-01 RX ADMIN — OXCARBAZEPINE 300 MG: 150 TABLET, FILM COATED ORAL at 08:19

## 2021-01-01 RX ADMIN — LACTULOSE 45 ML: 10 SOLUTION ORAL at 08:28

## 2021-01-01 RX ADMIN — HEPARIN SODIUM 5000 UNITS: 5000 INJECTION, SOLUTION INTRAVENOUS; SUBCUTANEOUS at 20:07

## 2021-01-01 RX ADMIN — ACETAMINOPHEN 650 MG: 325 TABLET, FILM COATED ORAL at 08:43

## 2021-01-01 RX ADMIN — CETIRIZINE HYDROCHLORIDE 10 MG: 10 TABLET, FILM COATED ORAL at 08:41

## 2021-01-01 RX ADMIN — OXCARBAZEPINE 600 MG: 300 TABLET, FILM COATED ORAL at 20:27

## 2021-01-01 RX ADMIN — BUMETANIDE 2 MG: 0.25 INJECTION, SOLUTION INTRAMUSCULAR; INTRAVENOUS at 08:24

## 2021-01-01 RX ADMIN — DEXTROAMPHETAMINE SACCHARATE, AMPHETAMINE ASPARTATE MONOHYDRATE, DEXTROAMPHETAMINE SULFATE AND AMPHETAMINE SULFATE 30 MG: 7.5; 7.5; 7.5; 7.5 TABLET ORAL at 15:54

## 2021-01-01 RX ADMIN — ASPIRIN 81 MG: 81 TABLET, COATED ORAL at 08:22

## 2021-01-01 RX ADMIN — BUMETANIDE 2 MG: 0.25 INJECTION, SOLUTION INTRAMUSCULAR; INTRAVENOUS at 08:20

## 2021-01-01 RX ADMIN — SPIRONOLACTONE 100 MG: 50 TABLET ORAL at 09:41

## 2021-01-01 RX ADMIN — INSULIN LISPRO 2 UNITS: 100 INJECTION, SOLUTION INTRAVENOUS; SUBCUTANEOUS at 11:53

## 2021-01-01 RX ADMIN — PRAMIPEXOLE DIHYDROCHLORIDE 0.75 MG: 0.25 TABLET ORAL at 10:36

## 2021-01-01 RX ADMIN — OXCARBAZEPINE 300 MG: 300 TABLET, FILM COATED ORAL at 20:42

## 2021-01-01 RX ADMIN — RIFAXIMIN 550 MG: 550 TABLET ORAL at 22:34

## 2021-01-01 RX ADMIN — CEFTRIAXONE SODIUM 1 G: 1 INJECTION, SOLUTION INTRAVENOUS at 14:54

## 2021-01-01 RX ADMIN — HEPARIN SODIUM 5000 UNITS: 5000 INJECTION, SOLUTION INTRAVENOUS; SUBCUTANEOUS at 22:57

## 2021-01-01 RX ADMIN — GABAPENTIN 900 MG: 300 CAPSULE ORAL at 08:21

## 2021-01-01 RX ADMIN — PRAMIPEXOLE DIHYDROCHLORIDE 1 MG: 1 TABLET ORAL at 22:19

## 2021-01-01 RX ADMIN — Medication 5 MG: at 21:10

## 2021-01-01 RX ADMIN — HEPARIN SODIUM 5000 UNITS: 5000 INJECTION, SOLUTION INTRAVENOUS; SUBCUTANEOUS at 08:21

## 2021-01-01 RX ADMIN — ASPIRIN 81 MG: 81 TABLET, COATED ORAL at 11:35

## 2021-01-01 RX ADMIN — ASPIRIN 81 MG: 81 TABLET, COATED ORAL at 10:11

## 2021-01-01 RX ADMIN — HEPATITIS A VACCINE 1440 UNITS: 1440 INJECTION, SUSPENSION INTRAMUSCULAR at 12:31

## 2021-01-01 RX ADMIN — HEPARIN SODIUM 5000 UNITS: 5000 INJECTION, SOLUTION INTRAVENOUS; SUBCUTANEOUS at 09:45

## 2021-01-01 RX ADMIN — MONTELUKAST SODIUM 10 MG: 10 TABLET, COATED ORAL at 19:57

## 2021-01-01 RX ADMIN — IOPAMIDOL 100 ML: 612 INJECTION, SOLUTION INTRAVENOUS at 17:43

## 2021-01-01 RX ADMIN — CETIRIZINE HYDROCHLORIDE 10 MG: 10 TABLET, FILM COATED ORAL at 11:35

## 2021-01-01 RX ADMIN — LACTULOSE 45 ML: 10 SOLUTION ORAL at 21:05

## 2021-01-01 RX ADMIN — PRAVASTATIN SODIUM 40 MG: 40 TABLET ORAL at 20:12

## 2021-01-01 RX ADMIN — ACETAMINOPHEN 650 MG: 325 TABLET, FILM COATED ORAL at 16:31

## 2021-01-01 RX ADMIN — MONTELUKAST SODIUM 10 MG: 10 TABLET, COATED ORAL at 20:41

## 2021-01-01 RX ADMIN — TRAZODONE HYDROCHLORIDE 50 MG: 50 TABLET ORAL at 20:51

## 2021-01-01 RX ADMIN — POTASSIUM CHLORIDE 40 MEQ: 750 CAPSULE, EXTENDED RELEASE ORAL at 08:19

## 2021-01-01 RX ADMIN — HEPARIN SODIUM 5000 UNITS: 5000 INJECTION, SOLUTION INTRAVENOUS; SUBCUTANEOUS at 21:00

## 2021-01-01 RX ADMIN — MONTELUKAST SODIUM 10 MG: 10 TABLET, COATED ORAL at 22:57

## 2021-01-01 RX ADMIN — WATER 500 MG: 1 INJECTION INTRAMUSCULAR; INTRAVENOUS; SUBCUTANEOUS at 14:44

## 2021-01-01 RX ADMIN — LACTULOSE 45 ML: 20 SOLUTION ORAL at 16:47

## 2021-01-01 RX ADMIN — POTASSIUM CHLORIDE 40 MEQ: 750 CAPSULE, EXTENDED RELEASE ORAL at 06:35

## 2021-01-01 RX ADMIN — PSYLLIUM HUSK 1 PACKET: 3.4 POWDER ORAL at 08:47

## 2021-01-01 RX ADMIN — NYSTATIN: 100000 POWDER TOPICAL at 22:34

## 2021-01-01 RX ADMIN — ASPIRIN 81 MG: 81 TABLET, COATED ORAL at 08:19

## 2021-01-01 RX ADMIN — RIFAXIMIN 550 MG: 550 TABLET ORAL at 21:00

## 2021-01-01 RX ADMIN — LACTULOSE 45 ML: 20 SOLUTION ORAL at 16:30

## 2021-01-01 RX ADMIN — DEXTROSE MONOHYDRATE 25 G: 500 INJECTION PARENTERAL at 00:49

## 2021-01-01 RX ADMIN — CETIRIZINE HYDROCHLORIDE 10 MG: 10 TABLET, FILM COATED ORAL at 10:36

## 2021-01-01 RX ADMIN — OXCARBAZEPINE 300 MG: 300 TABLET, FILM COATED ORAL at 09:41

## 2021-01-01 RX ADMIN — DEXTROAMPHETAMINE SACCHARATE, AMPHETAMINE ASPARTATE, DEXTROAMPHETAMINE SULFATE AND AMPHETAMINE SULFATE 30 MG: 7.5; 7.5; 7.5; 7.5 TABLET ORAL at 16:30

## 2021-01-01 RX ADMIN — PRAMIPEXOLE DIHYDROCHLORIDE 0.75 MG: 0.25 TABLET ORAL at 22:13

## 2021-01-01 RX ADMIN — LACTULOSE 45 ML: 10 SOLUTION ORAL at 16:05

## 2021-01-01 RX ADMIN — OXCARBAZEPINE 300 MG: 150 TABLET, FILM COATED ORAL at 20:12

## 2021-01-01 RX ADMIN — NYSTATIN: 100000 OINTMENT TOPICAL at 08:24

## 2021-01-01 RX ADMIN — DEXTROAMPHETAMINE SACCHARATE, AMPHETAMINE ASPARTATE MONOHYDRATE, DEXTROAMPHETAMINE SULFATE AND AMPHETAMINE SULFATE 30 MG: 7.5; 7.5; 7.5; 7.5 TABLET ORAL at 17:25

## 2021-01-01 RX ADMIN — SPIRONOLACTONE 100 MG: 50 TABLET ORAL at 08:56

## 2021-01-01 RX ADMIN — VENLAFAXINE HYDROCHLORIDE 150 MG: 75 CAPSULE, EXTENDED RELEASE ORAL at 20:27

## 2021-01-01 RX ADMIN — OXCARBAZEPINE 300 MG: 150 TABLET, FILM COATED ORAL at 22:22

## 2021-01-01 RX ADMIN — INSULIN LISPRO 2 UNITS: 100 INJECTION, SOLUTION INTRAVENOUS; SUBCUTANEOUS at 17:30

## 2021-01-01 RX ADMIN — RIFAXIMIN 550 MG: 550 TABLET ORAL at 22:20

## 2021-01-01 RX ADMIN — PRAVASTATIN SODIUM 40 MG: 40 TABLET ORAL at 08:45

## 2021-01-01 RX ADMIN — LACTULOSE 45 ML: 10 SOLUTION ORAL at 11:53

## 2021-01-01 RX ADMIN — LACTULOSE 45 ML: 10 SOLUTION ORAL at 22:13

## 2021-01-01 RX ADMIN — SPIRONOLACTONE 100 MG: 25 TABLET ORAL at 08:18

## 2021-01-01 RX ADMIN — SPIRONOLACTONE 50 MG: 50 TABLET ORAL at 08:39

## 2021-01-01 RX ADMIN — PROPOFOL 120 MG: 10 INJECTION, EMULSION INTRAVENOUS at 10:15

## 2021-01-01 RX ADMIN — BUMETANIDE 2 MG: 1 TABLET ORAL at 10:10

## 2021-01-01 RX ADMIN — INSULIN LISPRO 4 UNITS: 100 INJECTION, SOLUTION INTRAVENOUS; SUBCUTANEOUS at 13:51

## 2021-01-01 RX ADMIN — RIFAXIMIN 550 MG: 550 TABLET ORAL at 08:21

## 2021-01-01 RX ADMIN — DOXYCYCLINE 100 MG: 100 CAPSULE ORAL at 08:24

## 2021-01-01 RX ADMIN — ASPIRIN 81 MG: 81 TABLET, COATED ORAL at 08:44

## 2021-01-01 RX ADMIN — MONTELUKAST SODIUM 10 MG: 10 TABLET, COATED ORAL at 20:59

## 2021-01-01 RX ADMIN — ASPIRIN 81 MG: 81 TABLET, COATED ORAL at 08:42

## 2021-01-01 RX ADMIN — DOCUSATE SODIUM 50MG AND SENNOSIDES 8.6MG 2 TABLET: 8.6; 5 TABLET, FILM COATED ORAL at 20:41

## 2021-01-01 RX ADMIN — OXCARBAZEPINE 300 MG: 300 TABLET, FILM COATED ORAL at 08:56

## 2021-01-01 RX ADMIN — RIFAXIMIN 550 MG: 550 TABLET ORAL at 08:22

## 2021-01-01 RX ADMIN — LACTULOSE 45 ML: 20 SOLUTION ORAL at 21:05

## 2021-01-01 RX ADMIN — DEXTROAMPHETAMINE SACCHARATE, AMPHETAMINE ASPARTATE MONOHYDRATE, DEXTROAMPHETAMINE SULFATE AND AMPHETAMINE SULFATE 60 MG: 7.5; 7.5; 7.5; 7.5 TABLET ORAL at 08:41

## 2021-01-01 RX ADMIN — WATER 500 MG: 1 INJECTION INTRAMUSCULAR; INTRAVENOUS; SUBCUTANEOUS at 08:21

## 2021-01-01 RX ADMIN — LEVOTHYROXINE SODIUM 75 MCG: 0.07 TABLET ORAL at 08:26

## 2021-01-01 RX ADMIN — SPIRONOLACTONE 100 MG: 25 TABLET ORAL at 08:21

## 2021-01-01 RX ADMIN — OXCARBAZEPINE 300 MG: 150 TABLET, FILM COATED ORAL at 08:21

## 2021-01-01 RX ADMIN — VENLAFAXINE HYDROCHLORIDE 150 MG: 75 CAPSULE, EXTENDED RELEASE ORAL at 20:42

## 2021-01-01 RX ADMIN — SODIUM CHLORIDE, PRESERVATIVE FREE 10 ML: 5 INJECTION INTRAVENOUS at 08:19

## 2021-01-01 RX ADMIN — LEVOTHYROXINE SODIUM 75 MCG: 0.07 TABLET ORAL at 06:05

## 2021-01-01 RX ADMIN — RIFAXIMIN 550 MG: 550 TABLET ORAL at 08:19

## 2021-01-01 RX ADMIN — CETIRIZINE HYDROCHLORIDE 10 MG: 10 TABLET, FILM COATED ORAL at 08:26

## 2021-01-01 RX ADMIN — PRAMIPEXOLE DIHYDROCHLORIDE 0.75 MG: 0.25 TABLET ORAL at 20:51

## 2021-01-01 RX ADMIN — GABAPENTIN 600 MG: 300 CAPSULE ORAL at 20:51

## 2021-01-01 RX ADMIN — ENOXAPARIN SODIUM 30 MG: 30 INJECTION SUBCUTANEOUS at 08:25

## 2021-01-01 RX ADMIN — SODIUM CHLORIDE, PRESERVATIVE FREE 10 ML: 5 INJECTION INTRAVENOUS at 08:25

## 2021-01-01 RX ADMIN — CETIRIZINE HYDROCHLORIDE 10 MG: 10 TABLET, FILM COATED ORAL at 08:28

## 2021-01-01 RX ADMIN — BUMETANIDE 2 MG: 0.25 INJECTION, SOLUTION INTRAMUSCULAR; INTRAVENOUS at 17:15

## 2021-01-01 RX ADMIN — DEXTROAMPHETAMINE SACCHARATE, AMPHETAMINE ASPARTATE MONOHYDRATE, DEXTROAMPHETAMINE SULFATE AND AMPHETAMINE SULFATE 60 MG: 7.5; 7.5; 7.5; 7.5 TABLET ORAL at 09:42

## 2021-01-01 RX ADMIN — OXCARBAZEPINE 300 MG: 150 TABLET, FILM COATED ORAL at 22:20

## 2021-01-01 RX ADMIN — SODIUM CHLORIDE, PRESERVATIVE FREE 10 ML: 5 INJECTION INTRAVENOUS at 22:14

## 2021-01-01 RX ADMIN — DEXTROSE 15 G: 15 GEL ORAL at 12:59

## 2021-01-01 RX ADMIN — OXCARBAZEPINE 300 MG: 150 TABLET, FILM COATED ORAL at 20:51

## 2021-01-01 RX ADMIN — SODIUM CHLORIDE, PRESERVATIVE FREE 10 ML: 5 INJECTION INTRAVENOUS at 20:08

## 2021-01-01 RX ADMIN — HEPARIN SODIUM 5000 UNITS: 5000 INJECTION, SOLUTION INTRAVENOUS; SUBCUTANEOUS at 22:20

## 2021-01-01 RX ADMIN — HEPARIN SODIUM 5000 UNITS: 5000 INJECTION, SOLUTION INTRAVENOUS; SUBCUTANEOUS at 08:56

## 2021-01-01 RX ADMIN — DOCUSATE SODIUM 50MG AND SENNOSIDES 8.6MG 2 TABLET: 8.6; 5 TABLET, FILM COATED ORAL at 08:24

## 2021-01-01 RX ADMIN — WATER 500 MG: 1 INJECTION INTRAMUSCULAR; INTRAVENOUS; SUBCUTANEOUS at 21:11

## 2021-01-01 RX ADMIN — CETIRIZINE HYDROCHLORIDE 10 MG: 10 TABLET, FILM COATED ORAL at 09:45

## 2021-01-01 RX ADMIN — LACTULOSE 45 ML: 10 SOLUTION ORAL at 20:53

## 2021-01-01 RX ADMIN — MONTELUKAST SODIUM 10 MG: 10 TABLET, COATED ORAL at 20:27

## 2021-01-01 RX ADMIN — BUMETANIDE 4 MG: 2 TABLET ORAL at 08:20

## 2021-01-01 RX ADMIN — POTASSIUM CHLORIDE 40 MEQ: 750 CAPSULE, EXTENDED RELEASE ORAL at 11:47

## 2021-01-01 RX ADMIN — PRAVASTATIN SODIUM 40 MG: 40 TABLET ORAL at 08:55

## 2021-01-01 RX ADMIN — MONTELUKAST SODIUM 10 MG: 10 TABLET, COATED ORAL at 20:51

## 2021-01-01 RX ADMIN — BUMETANIDE 2 MG: 0.25 INJECTION, SOLUTION INTRAMUSCULAR; INTRAVENOUS at 09:59

## 2021-01-01 RX ADMIN — INSULIN LISPRO 2 UNITS: 100 INJECTION, SOLUTION INTRAVENOUS; SUBCUTANEOUS at 12:33

## 2021-01-01 RX ADMIN — INSULIN LISPRO 4 UNITS: 100 INJECTION, SOLUTION INTRAVENOUS; SUBCUTANEOUS at 17:13

## 2021-01-01 RX ADMIN — DEXTROAMPHETAMINE SACCHARATE, AMPHETAMINE ASPARTATE, DEXTROAMPHETAMINE SULFATE AND AMPHETAMINE SULFATE 30 MG: 7.5; 7.5; 7.5; 7.5 TABLET ORAL at 08:34

## 2021-01-01 RX ADMIN — INSULIN LISPRO 2 UNITS: 100 INJECTION, SOLUTION INTRAVENOUS; SUBCUTANEOUS at 17:29

## 2021-01-01 RX ADMIN — POTASSIUM CHLORIDE 40 MEQ: 750 CAPSULE, EXTENDED RELEASE ORAL at 05:53

## 2021-01-01 RX ADMIN — BUMETANIDE 1 MG: 0.25 INJECTION, SOLUTION INTRAMUSCULAR; INTRAVENOUS at 08:39

## 2021-01-01 RX ADMIN — CETIRIZINE HYDROCHLORIDE 10 MG: 10 TABLET, FILM COATED ORAL at 08:33

## 2021-01-01 RX ADMIN — INSULIN LISPRO 2 UNITS: 100 INJECTION, SOLUTION INTRAVENOUS; SUBCUTANEOUS at 18:06

## 2021-01-01 RX ADMIN — SPIRONOLACTONE 100 MG: 50 TABLET ORAL at 08:20

## 2021-01-01 RX ADMIN — GABAPENTIN 600 MG: 300 CAPSULE ORAL at 11:35

## 2021-01-01 RX ADMIN — DEXTROSE MONOHYDRATE 25 G: 25 INJECTION, SOLUTION INTRAVENOUS at 07:26

## 2021-01-01 RX ADMIN — OXCARBAZEPINE 300 MG: 150 TABLET, FILM COATED ORAL at 22:12

## 2021-01-01 RX ADMIN — MONTELUKAST SODIUM 10 MG: 10 TABLET, COATED ORAL at 21:11

## 2021-01-01 RX ADMIN — POTASSIUM CHLORIDE 10 MEQ: 7.46 INJECTION, SOLUTION INTRAVENOUS at 16:13

## 2021-01-01 RX ADMIN — OXCARBAZEPINE 300 MG: 150 TABLET, FILM COATED ORAL at 20:27

## 2021-01-01 RX ADMIN — OXCARBAZEPINE 300 MG: 300 TABLET, FILM COATED ORAL at 09:46

## 2021-01-01 RX ADMIN — OXCARBAZEPINE 300 MG: 150 TABLET, FILM COATED ORAL at 08:28

## 2021-01-01 RX ADMIN — LACTULOSE 45 ML: 10 SOLUTION ORAL at 17:23

## 2021-01-01 RX ADMIN — MONTELUKAST SODIUM 10 MG: 10 TABLET, COATED ORAL at 20:06

## 2021-01-01 RX ADMIN — PRAMIPEXOLE DIHYDROCHLORIDE 0.75 MG: 0.25 TABLET ORAL at 20:27

## 2021-01-01 RX ADMIN — CETIRIZINE HYDROCHLORIDE 10 MG: 10 TABLET, FILM COATED ORAL at 08:17

## 2021-01-01 RX ADMIN — BUMETANIDE 2 MG: 0.25 INJECTION, SOLUTION INTRAMUSCULAR; INTRAVENOUS at 22:21

## 2021-01-01 RX ADMIN — VENLAFAXINE HYDROCHLORIDE 150 MG: 75 CAPSULE, EXTENDED RELEASE ORAL at 22:22

## 2021-01-01 RX ADMIN — BUMETANIDE 3 MG: 0.25 INJECTION, SOLUTION INTRAMUSCULAR; INTRAVENOUS at 12:18

## 2021-01-01 RX ADMIN — POTASSIUM CHLORIDE 40 MEQ: 750 CAPSULE, EXTENDED RELEASE ORAL at 13:25

## 2021-01-01 RX ADMIN — TRAZODONE HYDROCHLORIDE 50 MG: 50 TABLET ORAL at 22:03

## 2021-01-01 RX ADMIN — DEXTROAMPHETAMINE, AMPHETAMINE, DEXTROAMPHETAMINE, AMPHETAMINE 30 MG: 3.75; 3.75; 3.75; 3.75 TABLET ORAL at 16:34

## 2021-01-01 RX ADMIN — NYSTATIN: 100000 POWDER TOPICAL at 22:23

## 2021-01-01 RX ADMIN — HEPARIN SODIUM 5000 UNITS: 5000 INJECTION, SOLUTION INTRAVENOUS; SUBCUTANEOUS at 08:22

## 2021-01-01 RX ADMIN — LEVOTHYROXINE SODIUM 75 MCG: 0.07 TABLET ORAL at 08:55

## 2021-01-01 RX ADMIN — OXCARBAZEPINE 600 MG: 300 TABLET, FILM COATED ORAL at 09:50

## 2021-01-01 RX ADMIN — OXCARBAZEPINE 300 MG: 150 TABLET, FILM COATED ORAL at 22:32

## 2021-01-01 RX ADMIN — LACTULOSE 45 ML: 10 SOLUTION ORAL at 08:21

## 2021-01-01 RX ADMIN — SPIRONOLACTONE 100 MG: 25 TABLET ORAL at 08:33

## 2021-01-01 RX ADMIN — HEPARIN SODIUM 5000 UNITS: 5000 INJECTION, SOLUTION INTRAVENOUS; SUBCUTANEOUS at 08:55

## 2021-01-01 RX ADMIN — OXCARBAZEPINE 300 MG: 150 TABLET, FILM COATED ORAL at 20:07

## 2021-01-01 RX ADMIN — DEXTROAMPHETAMINE, AMPHETAMINE, DEXTROAMPHETAMINE, AMPHETAMINE 30 MG: 3.75; 3.75; 3.75; 3.75 TABLET ORAL at 16:05

## 2021-01-01 RX ADMIN — RIFAXIMIN 550 MG: 550 TABLET ORAL at 20:57

## 2021-01-01 RX ADMIN — NYSTATIN: 100000 POWDER TOPICAL at 12:23

## 2021-01-01 RX ADMIN — CEFTRIAXONE SODIUM 2 G: 2 INJECTION, SOLUTION INTRAVENOUS at 15:58

## 2021-01-01 RX ADMIN — WATER 500 MG: 1 INJECTION INTRAMUSCULAR; INTRAVENOUS; SUBCUTANEOUS at 22:20

## 2021-01-01 RX ADMIN — HEPARIN SODIUM 5000 UNITS: 5000 INJECTION, SOLUTION INTRAVENOUS; SUBCUTANEOUS at 08:20

## 2021-01-01 RX ADMIN — NYSTATIN: 100000 POWDER TOPICAL at 21:06

## 2021-01-01 RX ADMIN — PRAMIPEXOLE DIHYDROCHLORIDE 0.75 MG: 0.25 TABLET ORAL at 09:36

## 2021-01-01 RX ADMIN — VENLAFAXINE HYDROCHLORIDE 150 MG: 75 CAPSULE, EXTENDED RELEASE ORAL at 19:56

## 2021-01-01 RX ADMIN — DOXYCYCLINE 100 MG: 100 CAPSULE ORAL at 08:39

## 2021-01-01 RX ADMIN — SODIUM CHLORIDE, PRESERVATIVE FREE 10 ML: 5 INJECTION INTRAVENOUS at 21:10

## 2021-01-01 RX ADMIN — SPIRONOLACTONE 100 MG: 25 TABLET ORAL at 08:43

## 2021-01-01 RX ADMIN — ASPIRIN 81 MG: 81 TABLET, COATED ORAL at 09:40

## 2021-01-01 RX ADMIN — DEXTROAMPHETAMINE SACCHARATE, AMPHETAMINE ASPARTATE MONOHYDRATE, DEXTROAMPHETAMINE SULFATE AND AMPHETAMINE SULFATE 60 MG: 7.5; 7.5; 7.5; 7.5 TABLET ORAL at 08:56

## 2021-01-01 RX ADMIN — Medication 5 MG: at 22:48

## 2021-01-01 RX ADMIN — LEVOTHYROXINE SODIUM 75 MCG: 0.07 TABLET ORAL at 05:53

## 2021-01-01 RX ADMIN — PRAVASTATIN SODIUM 40 MG: 40 TABLET ORAL at 20:41

## 2021-01-01 RX ADMIN — MONTELUKAST SODIUM 10 MG: 10 TABLET, COATED ORAL at 22:23

## 2021-01-01 RX ADMIN — POTASSIUM CHLORIDE 40 MEQ: 10 CAPSULE, COATED, EXTENDED RELEASE ORAL at 09:42

## 2021-01-01 RX ADMIN — DOXYCYCLINE 100 MG: 100 INJECTION, POWDER, LYOPHILIZED, FOR SOLUTION INTRAVENOUS at 05:40

## 2021-01-01 RX ADMIN — Medication 5 MG: at 20:58

## 2021-01-01 RX ADMIN — PRAMIPEXOLE DIHYDROCHLORIDE 0.75 MG: 0.25 TABLET ORAL at 08:40

## 2021-01-01 RX ADMIN — TRAZODONE HYDROCHLORIDE 50 MG: 50 TABLET ORAL at 20:27

## 2021-01-01 RX ADMIN — NYSTATIN: 100000 POWDER TOPICAL at 08:23

## 2021-01-01 RX ADMIN — POTASSIUM CHLORIDE 40 MEQ: 750 CAPSULE, EXTENDED RELEASE ORAL at 15:02

## 2021-01-01 RX ADMIN — GABAPENTIN 400 MG: 400 CAPSULE ORAL at 21:11

## 2021-01-01 RX ADMIN — GABAPENTIN 400 MG: 400 CAPSULE ORAL at 09:36

## 2021-01-01 RX ADMIN — BUMETANIDE 1 MG: 0.25 INJECTION, SOLUTION INTRAMUSCULAR; INTRAVENOUS at 10:11

## 2021-01-01 RX ADMIN — PRAVASTATIN SODIUM 40 MG: 40 TABLET ORAL at 08:19

## 2021-01-01 RX ADMIN — VENLAFAXINE HYDROCHLORIDE 150 MG: 75 CAPSULE, EXTENDED RELEASE ORAL at 22:32

## 2021-01-01 RX ADMIN — WATER 500 MG: 1 INJECTION INTRAMUSCULAR; INTRAVENOUS; SUBCUTANEOUS at 09:37

## 2021-01-01 RX ADMIN — BUMETANIDE 4 MG: 2 TABLET ORAL at 09:45

## 2021-01-01 RX ADMIN — LEVOTHYROXINE SODIUM 75 MCG: 0.07 TABLET ORAL at 08:20

## 2021-01-01 RX ADMIN — INSULIN LISPRO 2 UNITS: 100 INJECTION, SOLUTION INTRAVENOUS; SUBCUTANEOUS at 08:20

## 2021-01-01 RX ADMIN — INSULIN LISPRO 2 UNITS: 100 INJECTION, SOLUTION INTRAVENOUS; SUBCUTANEOUS at 12:09

## 2021-01-01 RX ADMIN — VENLAFAXINE HYDROCHLORIDE 150 MG: 75 CAPSULE, EXTENDED RELEASE ORAL at 21:09

## 2021-01-01 RX ADMIN — INSULIN LISPRO 2 UNITS: 100 INJECTION, SOLUTION INTRAVENOUS; SUBCUTANEOUS at 17:03

## 2021-01-01 RX ADMIN — LEVOTHYROXINE SODIUM 75 MCG: 0.07 TABLET ORAL at 08:19

## 2021-01-01 RX ADMIN — GABAPENTIN 400 MG: 400 CAPSULE ORAL at 21:10

## 2021-01-01 RX ADMIN — MONTELUKAST SODIUM 10 MG: 10 TABLET, COATED ORAL at 21:09

## 2021-01-01 RX ADMIN — LEVOTHYROXINE SODIUM 75 MCG: 0.07 TABLET ORAL at 09:36

## 2021-01-01 RX ADMIN — INSULIN LISPRO 2 UNITS: 100 INJECTION, SOLUTION INTRAVENOUS; SUBCUTANEOUS at 11:48

## 2021-01-01 RX ADMIN — LACTULOSE 45 ML: 10 SOLUTION ORAL at 22:22

## 2021-01-01 RX ADMIN — PRAVASTATIN SODIUM 40 MG: 40 TABLET ORAL at 08:17

## 2021-01-01 RX ADMIN — POTASSIUM CHLORIDE 40 MEQ: 750 CAPSULE, EXTENDED RELEASE ORAL at 14:53

## 2021-01-01 RX ADMIN — VENLAFAXINE HYDROCHLORIDE 150 MG: 75 CAPSULE, EXTENDED RELEASE ORAL at 21:10

## 2021-01-01 RX ADMIN — DEXTROAMPHETAMINE, AMPHETAMINE, DEXTROAMPHETAMINE, AMPHETAMINE 30 MG: 3.75; 3.75; 3.75; 3.75 TABLET ORAL at 14:37

## 2021-01-01 RX ADMIN — OXCARBAZEPINE 600 MG: 300 TABLET, FILM COATED ORAL at 08:25

## 2021-01-01 RX ADMIN — OXCARBAZEPINE 300 MG: 300 TABLET, FILM COATED ORAL at 08:20

## 2021-01-01 RX ADMIN — NYSTATIN: 100000 POWDER TOPICAL at 08:19

## 2021-01-01 RX ADMIN — ASPIRIN 81 MG: 81 TABLET, COATED ORAL at 08:33

## 2021-01-04 NOTE — TELEPHONE ENCOUNTER
----- Message from Abe Phillips Jr. sent at 1/4/2021 12:32 PM EST -----  Regarding: Prescription Question  Contact: [phone]  I need my  Aderall prescription called in to [facility] pharmacy [facility] as soon as possible please. The extended release are not working well. I would like to change back to regular 2 x 30 mg. Please.

## 2021-01-05 NOTE — TELEPHONE ENCOUNTER
----- Message from Abe Phillips Jr. sent at 1/4/2021 12:32 PM EST -----  Regarding: Prescription Question  Contact: [phone]  I need my  Aderall prescription called in to [facility] pharmacy [facility] as soon as possible please. The extended release are not working well. I would like to change back to regular 2 x 30 mg. Please.

## 2021-01-05 NOTE — TELEPHONE ENCOUNTER
CALLED PATIENT TO ADVISE SCRIPT SENT TO PHARMACY. ADDERALL 30 2X DAILY. PATIENT VERBALIZED UNDERSTANDING 01/05/21 TRC

## 2021-01-13 NOTE — PROGRESS NOTES
Procedure   Large Joint Arthrocentesis: R knee  Date/Time: 1/13/2021 3:43 PM  Consent given by: patient  Site marked: site marked  Timeout: Immediately prior to procedure a time out was called to verify the correct patient, procedure, equipment, support staff and site/side marked as required   Supporting Documentation  Indications: pain   Procedure Details  Location: knee - R knee  Preparation: Patient was prepped and draped in the usual sterile fashion  Needle size: 22 G  Approach: anterolateral  Medications administered: 30 mg Hyaluronan 30 MG/2ML  Patient tolerance: patient tolerated the procedure well with no immediate complications

## 2021-01-13 NOTE — PROGRESS NOTES
[facility]opa[facility] Clinic Note    Subjective     Chief Complaint   Patient presents with   • Follow-up     4 week follow up - Primary osteoarthritis of right knee        HPI    It has been 4  week(s) since Mr. Phillips's last visit. He returns to clinic today for follow-up of right knee arthritis. He rates his pain a 4/10 on the pain scale. Previous/current treatments: NSAIDS. Current symptoms: same as prior visit. The pain is worse with walking, standing, climbing stairs and working; resting improve the pain. Overall, he is doing the same.  He would like to try viscosupplementation injections at this time.  He is not interested in surgical invention currently.    I have reviewed the following portions of the patient's history and agree with: History of Present Illness and Review of Systems    Patient Active Problem List   Diagnosis   • Type 2 diabetes mellitus with hyperglycemia (CMS/HCC)   • Elevated LFTs   • Thrombocytopenia (CMS/HCC)   • PVD (peripheral vascular disease) (CMS/HCC)   • A-fib (CMS/HCC)   • Cirrhosis of liver (CMS/HCC)   • Chronic congestive heart failure (CMS/HCC)   • Diabetic ulcer of right heel (CMS/HCC)   • Primary narcolepsy without cataplexy   • Essential hypertension   • Morbidly obese (CMS/HCC)   • Restless legs syndrome (RLS)   • ADD (attention deficit disorder)   • Depression   • Diabetes mellitus type 2, uncontrolled, with complications (CMS/HCC)   • Edema   • MCCAULEY (nonalcoholic steatohepatitis)   • Hepatitis B   • Insomnia   • Lymphedema   • Obesity   • Severe JAIME on CPAP   • RLS (restless legs syndrome)   • Tremor of both hands   • Restrictive cardiomyopathy (CMS/HCC)   • Other hyperlipidemia   • Uncontrolled type 2 diabetes mellitus with hyperglycemia (CMS/HCC)   • OMAR (acute kidney injury) (CMS/HCC)   • Dyslipidemia   • Hyperglycemia   • Diabetic ulcer of right heel associated with type 2 diabetes mellitus (CMS/HCC)   • Facial paralysis/[place] palsy   • Diabetic hypoglycemia  "(CMS/HCC)   • Hypogonadism in male     Past Medical History:   Diagnosis Date   • A-fib (CMS/HCC)    • ADD (attention deficit disorder)    • Atrial flutter (CMS/HCC)    • Cellulitis    • CHF (congestive heart failure) (CMS/HCC)    • Cirrhosis of liver (CMS/HCC)    • Constipation    • Depression    • Diabetes mellitus (CMS/HCC)    • Diabetic ulcer of right foot (CMS/HCC)    • Disease of thyroid gland    • Edema    • Fatty liver    • Hepatitis B    • Hyperlipidemia    • Hypokalemia    • Insomnia    • Lymphedema    • Narcolepsy    • Neuropathy    • Obesity    • JAIME on CPAP    • PVD (peripheral vascular disease) (CMS/HCC)    • RLS (restless legs syndrome)    • Skin cancer    • Tardive dyskinesia    • Tremor of both hands    • Type 2 diabetes mellitus (CMS/HCC)    • Yeast infection       Past Surgical History:   Procedure Laterality Date   • ACHILLES TENDON REPAIR     • CARDIAC ABLATION     • CHOLECYSTECTOMY     • LASER ABLATION      VEIN RLE      Family History   Problem Relation Age of Onset   • Heart failure Mother         CHF   • Osteoarthritis Mother    • Clotting disorder Father    • Diabetes Father    • Hypertension Father    • No Known Problems Sister    • No Known Problems Brother    • No Known Problems Brother      Social History     Socioeconomic History   • Marital status:      Spouse name: Not on file   • Number of children: Not on file   • Years of education: Not on file   • Highest education level: Not on file   Social Needs   • Financial resource strain: Not on file   • Food insecurity     Worry: Never true     Inability: Never true   • Transportation needs     Medical: No     Non-medical: No   Tobacco Use   • Smoking status: Never Smoker   • Smokeless tobacco: Never Used   Substance and Sexual Activity   • Alcohol use: Yes     Comment: \"3 SCOTCH AND 2 BEERS PER WEEK\"   • Drug use: No   • Sexual activity: Defer      Current Outpatient Medications on File Prior to Visit   Medication Sig Dispense " "Refill   • Alcohol Swabs (ALCOHOL PADS) 70 % pads 1 swab 3 (Three) Times a Day. 100 each 0   • amphetamine-dextroamphetamine (Adderall) 30 MG tablet Take 1 tablet by mouth 2 (Two) Times a Day. 60 tablet 0   • aspirin 81 MG EC tablet TAKE 1 TABLET BY MOUTH EVERY DAY 90 tablet 1   • Blood Glucose Monitoring Suppl (Contour Next Monitor) w/Device kit 1 kit Every 3 (Three) Months. Use to check bg 1 kit 0   • bumetanide (BUMEX) 2 MG tablet Take 2 tablets by mouth 2 (Two) Times a Day. 120 tablet 5   • Continuous Blood Gluc  (Dexcom G6 ) device 1 kit Every 3 (Three) Months. For checking bg gets one per year 1 Device 0   • Continuous Blood Gluc Sensor (Dexcom G6 Sensor) Every 10 (Ten) Days. 3 each 11   • Continuous Blood Gluc Transmit (Dexcom G6 Transmitter) misc 1 kit Every 3 (Three) Months. Use to check blood sugar 1 each 3   • diclofenac (VOLTAREN) 1 % gel gel Apply 4 g topically to the appropriate area as directed 2 (Two) Times a Day As Needed (joint pain). 100 g 1   • Docusate Calcium (STOOL SOFTENER PO) Take 1 tablet by mouth As Needed.     • doxycycline (VIBRAMYCIN) 100 MG capsule Take 1 capsule by mouth 2 (Two) Times a Day. 20 capsule 0   • gabapentin (NEURONTIN) 300 MG capsule Take 3 capsules by mouth 2 (Two) Times a Day. 180 capsule 2   • glucose blood test strip Contour next test strips  For checking bg tid 100 each 5   • Insulin Disposable Pump (OmniPod Dash 5 Pack Pods) misc CHANGE POD EVERY 3 DAYS     • insulin regular (HUMULIN R) 500 UNIT/ML CONCENTRATED injection 120 Units BID; Dx code E11.9 30 mL 2   • Insulin Syringe 29G X 1/2\" 1 ML misc Use one each to inject insulin four times daily Ell.9 500 each 1   • levocetirizine (XYZAL) 5 MG tablet Take 1 tablet by mouth Every Evening. 90 tablet 1   • levothyroxine (SYNTHROID, LEVOTHROID) 75 MCG tablet TAKE ONE TABLET BY MOUTH DAILY 90 tablet 3   • metOLazone (ZAROXOLYN) 5 MG tablet Take 1 tablet by mouth Daily As Needed (edema/fluid overload). 90 " "tablet 1   • Microlet Lancets misc For checking bg tid 100 each 3   • mupirocin (BACTROBAN) 2 % ointment Apply  topically to the appropriate area as directed 3 (Three) Times a Day. 30 g 0   • Needle, Disp, (BD Disp Needle) 23G X 1\" misc Use to inject testosterone every 2 weeks 6 each 1   • nystatin (MYCOSTATIN) 220315 UNIT/GM ointment      • potassium chloride (K-DUR) 10 MEQ CR tablet Take 3 tablets by mouth 2 (Two) Times a Day. 540 tablet 3   • pramipexole (MIRAPEX) 1 MG tablet TAKE ONE TABLET BY MOUTH TWICE A  tablet 1   • pravastatin (PRAVACHOL) 40 MG tablet Take 1 tablet by mouth every night at bedtime. 90 tablet 3   • psyllium (METAMUCIL) 58.6 % packet Take 1 packet by mouth Daily As Needed.     • spironolactone (ALDACTONE) 100 MG tablet Take 0.5 tablets by mouth Daily. 30 tablet 6   • Syringe/Needle, Disp, (B-D 3CC LUER-AGNES SYR 32AI7-0/2) 18G X 1-1/2\" 3 ML misc Use to draw up testosterone every 2 weeks 6 each 1   • Testosterone Cypionate (DEPOTESTOTERONE CYPIONATE) 200 MG/ML injection Inject 1 mL into the appropriate muscle as directed by prescriber Every 14 (Fourteen) Days. 10 mL 1   • traZODone (DESYREL) 50 MG tablet Take 1 tablet by mouth At Night As Needed for Sleep. 90 tablet 2   • venlafaxine XR (EFFEXOR-XR) 150 MG 24 hr capsule Take 1 capsule by mouth Every Night. 90 capsule 2     No current facility-administered medications on file prior to visit.       No Known Allergies     Review of Systems   Constitutional: Negative for activity change, appetite change, chills, diaphoresis, fatigue, fever and unexpected weight change.   HENT: Negative for congestion, dental problem, drooling, ear discharge, ear pain, facial swelling, hearing loss, mouth sores, nosebleeds, postnasal drip, rhinorrhea, sinus pressure, sneezing, sore throat, tinnitus, trouble swallowing and voice change.    Eyes: Negative for photophobia, pain, discharge, redness, itching and visual disturbance.   Respiratory: Negative for " "apnea, cough, choking, chest tightness, shortness of breath, wheezing and stridor.    Cardiovascular: Negative for chest pain, palpitations and leg swelling.   Gastrointestinal: Negative for abdominal distention, abdominal pain, anal bleeding, blood in stool, constipation, diarrhea, nausea, rectal pain and vomiting.   Endocrine: Negative for cold intolerance, heat intolerance, polydipsia, polyphagia and polyuria.   Genitourinary: Negative for decreased urine volume, difficulty urinating, dysuria, enuresis, flank pain, frequency, genital sores, hematuria and urgency.   Musculoskeletal: Positive for arthralgias. Negative for back pain, gait problem, joint swelling, myalgias, neck pain and neck stiffness.   Skin: Negative for color change, pallor, rash and wound.   Allergic/Immunologic: Negative for environmental allergies, food allergies and immunocompromised state.   Neurological: Negative for dizziness, tremors, seizures, syncope, facial asymmetry, speech difficulty, weakness, light-headedness, numbness and headaches.   Hematological: Negative for adenopathy. Does not bruise/bleed easily.   Psychiatric/Behavioral: Negative for agitation, behavioral problems, confusion, decreased concentration, dysphoric mood, hallucinations, self-injury, sleep disturbance and suicidal ideas. The patient is not nervous/anxious and is not hyperactive.         Objective      Physical Exam  Pulse 93   Ht 193 cm (75.98\")   Wt (!) 146 kg (321 lb 3.2 oz)   SpO2 98%   BMI 39.11 kg/m²     Body mass index is 39.11 kg/m².    General:   Mental Status:  Alert   Appearance: Cooperative, in no acute distress   Build and Nutrition: Obese male   Orientation: Alert and oriented to person, place and time   Posture: Normal   Gait: Slow    Integument:              Right knee: No skin lesions, no rash, no ecchymosis     Lower Extremities:              Right Knee:                          Tenderness:    Medial and lateral joint line " tenderness                          Effusion:          None                          Swelling:          None                          Crepitus:          Positive                          Atrophy:           None                          Range of motion:        Extension:       0°                                                              Flexion:           120°  Instability:        No varus laxity, no valgus laxity, negative anterior drawer  Deformities:     None    Imaging/Studies  Imaging Results (Last 24 Hours)     ** No results found for the last 24 hours. **            Assessment and Plan     Diagnoses and all orders for this visit:    1. Primary osteoarthritis of right knee (Primary)  -     Large Joint Arthrocentesis: R knee        1. Primary osteoarthritis of right knee        I reviewed my findings with the patient today.  His right knee continues to bother him, and he would like to try viscosupplementation injection series.  That has been approved, we will start today.  I will see him back in a week for the second injection.    Procedure Note:  The potential benefits of performing a therapeutic knee joint visco supplementation injection, as well as potential risks (including, but not limited to infection, swelling, pain, bleeding, bruising, nerve/blood vessel damage, and pseudoseptic reaction) have been discussed with the patient.  After informed consent, timeout procedure was performed, and the skin on the right knee was prepped with chlorhexidine soap and alcohol, after which ethyl chloride was applied to the skin at the injection site. Via the anterolateral approach, Orthovisc was injected into the knee joint.  The patient tolerated the procedure well. There were no complications.  Band-Aid was applied to the injection site. Post-procedural instructions discussed with the patient and/or their caregiver.      Return in about 1 week (around 1/20/2021) for Injection.    Medical Decision Making  Management  Options : prescription/IM medicine    Delta Miller MD  01/13/21  15:59 SARAH Owens disclaimer:  Much of this encounter note is an electronic transcription/translation of spoken language to printed text. The electronic translation of spoken language may permit erroneous, or at times, nonsensical words or phrases to be inadvertently transcribed; Although I have reviewed the note for such errors, some may still exist.   yes

## 2021-01-20 NOTE — PROGRESS NOTES
Procedure   Large Joint Arthrocentesis: R knee  Date/Time: 1/20/2021 2:34 PM  Consent given by: patient  Site marked: site marked  Timeout: Immediately prior to procedure a time out was called to verify the correct patient, procedure, equipment, support staff and site/side marked as required   Supporting Documentation  Indications: pain   Procedure Details  Location: knee - R knee  Preparation: Patient was prepped and draped in the usual sterile fashion  Needle size: 22 G  Approach: anterolateral  Medications administered: 30 mg Hyaluronan 30 MG/2ML  Patient tolerance: patient tolerated the procedure well with no immediate complications

## 2021-01-20 NOTE — PROGRESS NOTES
[facility] [place] [facility] Note    Subjective     Chief Complaint   Patient presents with   • Follow-up     right knee orthovisc #2 injcetion        HPI    Abe Phillips Jr. is a 65 y.o. male who presents for the second Orthovisc injection into the right knee.  Minor improvement so far.    Patient Active Problem List   Diagnosis   • Type 2 diabetes mellitus with hyperglycemia (CMS/HCC)   • Elevated LFTs   • Thrombocytopenia (CMS/HCC)   • PVD (peripheral vascular disease) (CMS/HCC)   • A-fib (CMS/HCC)   • Cirrhosis of liver (CMS/HCC)   • Chronic congestive heart failure (CMS/HCC)   • Diabetic ulcer of right heel (CMS/HCC)   • Primary narcolepsy without cataplexy   • Essential hypertension   • Morbidly obese (CMS/HCC)   • Restless legs syndrome (RLS)   • ADD (attention deficit disorder)   • Depression   • Diabetes mellitus type 2, uncontrolled, with complications (CMS/HCC)   • Edema   • MCCAULEY (nonalcoholic steatohepatitis)   • Hepatitis B   • Insomnia   • Lymphedema   • Obesity   • Severe JAIME on CPAP   • RLS (restless legs syndrome)   • Tremor of both hands   • Restrictive cardiomyopathy (CMS/HCC)   • Other hyperlipidemia   • Uncontrolled type 2 diabetes mellitus with hyperglycemia (CMS/HCC)   • OMAR (acute kidney injury) (CMS/HCC)   • Dyslipidemia   • Hyperglycemia   • Diabetic ulcer of right heel associated with type 2 diabetes mellitus (CMS/HCC)   • Facial paralysis/[place] palsy   • Diabetic hypoglycemia (CMS/HCC)   • Hypogonadism in male     Past Medical History:   Diagnosis Date   • A-fib (CMS/HCC)    • ADD (attention deficit disorder)    • Atrial flutter (CMS/HCC)    • Cellulitis    • CHF (congestive heart failure) (CMS/HCC)    • Cirrhosis of liver (CMS/HCC)    • Constipation    • Depression    • Diabetes mellitus (CMS/HCC)    • Diabetic ulcer of right foot (CMS/HCC)    • Disease of thyroid gland    • Edema    • Fatty liver    • Hepatitis B    • Hyperlipidemia    • Hypokalemia    • Insomnia    • Lymphedema    "  • Narcolepsy    • Neuropathy    • Obesity    • JAIME on CPAP    • PVD (peripheral vascular disease) (CMS/HCC)    • RLS (restless legs syndrome)    • Skin cancer    • Tardive dyskinesia    • Tremor of both hands    • Type 2 diabetes mellitus (CMS/HCC)    • Yeast infection       Past Surgical History:   Procedure Laterality Date   • ACHILLES TENDON REPAIR     • CARDIAC ABLATION     • CHOLECYSTECTOMY     • LASER ABLATION      VEIN RLE      Family History   Problem Relation Age of Onset   • Heart failure Mother         CHF   • Osteoarthritis Mother    • Clotting disorder Father    • Diabetes Father    • Hypertension Father    • No Known Problems Sister    • No Known Problems Brother    • No Known Problems Brother      Social History     Socioeconomic History   • Marital status:      Spouse name: Not on file   • Number of children: Not on file   • Years of education: Not on file   • Highest education level: Not on file   Social Needs   • Financial resource strain: Not on file   • Food insecurity     Worry: Never true     Inability: Never true   • Transportation needs     Medical: No     Non-medical: No   Tobacco Use   • Smoking status: Never Smoker   • Smokeless tobacco: Never Used   Substance and Sexual Activity   • Alcohol use: Yes     Comment: \"3 SCOTCH AND 2 BEERS PER WEEK\"   • Drug use: No   • Sexual activity: Defer      Current Outpatient Medications on File Prior to Visit   Medication Sig Dispense Refill   • Alcohol Swabs (ALCOHOL PADS) 70 % pads 1 swab 3 (Three) Times a Day. 100 each 0   • amphetamine-dextroamphetamine (Adderall) 30 MG tablet Take 1 tablet by mouth 2 (Two) Times a Day. 60 tablet 0   • aspirin 81 MG EC tablet TAKE 1 TABLET BY MOUTH EVERY DAY 90 tablet 1   • Blood Glucose Monitoring Suppl (Contour Next Monitor) w/Device kit 1 kit Every 3 (Three) Months. Use to check bg 1 kit 0   • bumetanide (BUMEX) 2 MG tablet Take 2 tablets by mouth 2 (Two) Times a Day. 120 tablet 5   • Continuous Blood " "Gluc  (Dexcom G6 ) device 1 kit Every 3 (Three) Months. For checking bg gets one per year 1 Device 0   • Continuous Blood Gluc Sensor (Dexcom G6 Sensor) Every 10 (Ten) Days. 3 each 11   • Continuous Blood Gluc Transmit (Dexcom G6 Transmitter) misc 1 kit Every 3 (Three) Months. Use to check blood sugar 1 each 3   • diclofenac (VOLTAREN) 1 % gel gel Apply 4 g topically to the appropriate area as directed 2 (Two) Times a Day As Needed (joint pain). 100 g 1   • Docusate Calcium (STOOL SOFTENER PO) Take 1 tablet by mouth As Needed.     • doxycycline (VIBRAMYCIN) 100 MG capsule Take 1 capsule by mouth 2 (Two) Times a Day. 20 capsule 0   • gabapentin (NEURONTIN) 300 MG capsule Take 3 capsules by mouth 2 (Two) Times a Day. 180 capsule 2   • glucose blood test strip Contour next test strips  For checking bg tid 100 each 5   • Insulin Disposable Pump (OmniPod Dash 5 Pack Pods) misc CHANGE POD EVERY 3 DAYS     • insulin regular (HUMULIN R) 500 UNIT/ML CONCENTRATED injection 120 Units BID; Dx code E11.9 30 mL 2   • Insulin Syringe 29G X 1/2\" 1 ML misc Use one each to inject insulin four times daily Ell.9 500 each 1   • levocetirizine (XYZAL) 5 MG tablet Take 1 tablet by mouth Every Evening. 90 tablet 1   • levothyroxine (SYNTHROID, LEVOTHROID) 75 MCG tablet TAKE ONE TABLET BY MOUTH DAILY 90 tablet 3   • metOLazone (ZAROXOLYN) 5 MG tablet Take 1 tablet by mouth Daily As Needed (edema/fluid overload). 90 tablet 1   • Microlet Lancets misc For checking bg tid 100 each 3   • mupirocin (BACTROBAN) 2 % ointment Apply  topically to the appropriate area as directed 3 (Three) Times a Day. 30 g 0   • Needle, Disp, (BD Disp Needle) 23G X 1\" misc Use to inject testosterone every 2 weeks 6 each 1   • nystatin (MYCOSTATIN) 444864 UNIT/GM ointment      • potassium chloride (K-DUR) 10 MEQ CR tablet Take 3 tablets by mouth 2 (Two) Times a Day. 540 tablet 3   • pramipexole (MIRAPEX) 1 MG tablet TAKE ONE TABLET BY MOUTH TWICE A " " tablet 1   • pravastatin (PRAVACHOL) 40 MG tablet Take 1 tablet by mouth every night at bedtime. 90 tablet 3   • psyllium (METAMUCIL) 58.6 % packet Take 1 packet by mouth Daily As Needed.     • spironolactone (ALDACTONE) 100 MG tablet Take 0.5 tablets by mouth Daily. 30 tablet 6   • Syringe/Needle, Disp, (B-D 3CC LUER-AGNES SYR 05MR1-0/2) 18G X 1-1/2\" 3 ML misc Use to draw up testosterone every 2 weeks 6 each 1   • Testosterone Cypionate (DEPOTESTOTERONE CYPIONATE) 200 MG/ML injection Inject 1 mL into the appropriate muscle as directed by prescriber Every 14 (Fourteen) Days. 10 mL 1   • traZODone (DESYREL) 50 MG tablet Take 1 tablet by mouth At Night As Needed for Sleep. 90 tablet 2   • venlafaxine XR (EFFEXOR-XR) 150 MG 24 hr capsule Take 1 capsule by mouth Every Night. 90 capsule 2     No current facility-administered medications on file prior to visit.       No Known Allergies     Review of Systems   Constitutional: Negative for activity change, appetite change, chills, diaphoresis, fatigue, fever and unexpected weight change.   HENT: Negative for congestion, dental problem, drooling, ear discharge, ear pain, facial swelling, hearing loss, mouth sores, nosebleeds, postnasal drip, rhinorrhea, sinus pressure, sneezing, sore throat, tinnitus, trouble swallowing and voice change.    Eyes: Negative for photophobia, pain, discharge, redness, itching and visual disturbance.   Respiratory: Negative for apnea, cough, choking, chest tightness, shortness of breath, wheezing and stridor.    Cardiovascular: Negative for chest pain, palpitations and leg swelling.   Gastrointestinal: Negative for abdominal distention, abdominal pain, anal bleeding, blood in stool, constipation, diarrhea, nausea, rectal pain and vomiting.   Endocrine: Negative for cold intolerance, heat intolerance, polydipsia, polyphagia and polyuria.   Genitourinary: Negative for decreased urine volume, difficulty urinating, dysuria, enuresis, flank " pain, frequency, genital sores, hematuria and urgency.   Musculoskeletal: Positive for arthralgias. Negative for back pain, gait problem, joint swelling, myalgias, neck pain and neck stiffness.   Skin: Negative for color change, pallor, rash and wound.   Allergic/Immunologic: Negative for environmental allergies, food allergies and immunocompromised state.   Neurological: Negative for dizziness, tremors, seizures, syncope, facial asymmetry, speech difficulty, weakness, light-headedness, numbness and headaches.   Hematological: Negative for adenopathy. Does not bruise/bleed easily.   Psychiatric/Behavioral: Negative for agitation, behavioral problems, confusion, decreased concentration, dysphoric mood, hallucinations, self-injury, sleep disturbance and suicidal ideas. The patient is not nervous/anxious and is not hyperactive.         Objective      Physical Exam  There were no vitals taken for this visit.    There is no height or weight on file to calculate BMI.    General:   Mental Status:  Alert   Appearance: Cooperative, in no acute distress   Posture: Normal    Assessment and Plan     Diagnoses and all orders for this visit:    1. Primary osteoarthritis of right knee (Primary)  -     Large Joint Arthrocentesis: R knee        1. Primary osteoarthritis of right knee        Procedure Note:  The potential benefits of performing a therapeutic knee joint visco supplementation injection, as well as potential risks (including, but not limited to infection, swelling, pain, bleeding, bruising, nerve/blood vessel damage, and pseudoseptic reaction) have been discussed with the patient.  After informed consent, timeout procedure was performed, and the skin on the right knee was prepped with chlorhexidine soap and alcohol, after which ethyl chloride was applied to the skin at the injection site. Via the anterolateral approach, Orthovisc was injected into the knee joint.  The patient tolerated the procedure well. There were no  complications.  Band-Aid was applied to the injection site. Post-procedural instructions discussed with the patient and/or their caregiver.    Return in about 1 week (around 1/27/2021) for Injection.    Delta Miller MD  01/20/21  15:06 SARAH Owens disclaimer:  Much of this encounter note is an electronic transcription/translation of spoken language to printed text. The electronic translation of spoken language may permit erroneous, or at times, nonsensical words or phrases to be inadvertently transcribed; Although I have reviewed the note for such errors, some may still exist.

## 2021-01-26 NOTE — TELEPHONE ENCOUNTER
PT CALLED STATED THAT HE IS HAVING ISSUES WITH BEING CONGESTED AND COUGHING UP GREEN MUCUS AT NIGHT AND WOULD LIKE TO KNOW WHAT DR SUGGEST.    PLEASE ADVISE.  CALL BACK:[phone]      [facility] [place] [facility] [address]

## 2021-02-01 NOTE — PROGRESS NOTES
Procedure   Large Joint Arthrocentesis: R knee  Date/Time: 2/1/2021 2:29 PM  Consent given by: patient  Site marked: site marked  Timeout: Immediately prior to procedure a time out was called to verify the correct patient, procedure, equipment, support staff and site/side marked as required   Supporting Documentation  Indications: pain   Procedure Details  Location: knee - R knee  Preparation: Patient was prepped and draped in the usual sterile fashion  Needle size: 22 G  Approach: anterolateral  Medications administered: 30 mg Hyaluronan 30 MG/2ML  Patient tolerance: patient tolerated the procedure well with no immediate complications

## 2021-02-02 PROBLEM — G51.0 FACIAL PARALYSIS/BELLS PALSY: Chronic | Status: ACTIVE | Noted: 2020-08-12

## 2021-02-02 PROBLEM — G50.1 ATYPICAL FACIAL PAIN: Chronic | Status: ACTIVE | Noted: 2021-01-01

## 2021-02-02 PROBLEM — G50.1 ATYPICAL FACIAL PAIN: Status: ACTIVE | Noted: 2021-01-01

## 2021-02-02 NOTE — PROGRESS NOTES
"Chief Complaint  bells palsy    Subjective          Abe Phillips Jr. presents to [facility] NEUROLOG[facility] for facial pain.    History of Present Illness    65 y.o. male returns in follow up.  Last visit on 9/1/2020 rx OXC.    MRI Brain 8/23/20 normal brain, vessel loop close to L Meckel's cave.        MRI Face, 10/14/2020, no enhancing masses in L cerebellopontinue angle or CN.      Left sided facial movement slowly returning.  Continues to lubricate left eye.      Left V2 numbness.  Pain in orbit moved down into cheek.  Stopped OXC due to causing excessive sedation.      Sharp stabbing pains in left V2.  Improved by putting pressure on left face.      Problem history:    Two months ago developed tingling left cheek.  Then sharp shooting pains in face worse in the evenings.  Left side of face weakness three weeks ago.  OS vision is clear.  No change in taste or hearing.  Mild numbness in L V2.       Pain is daily in afternoons.  Sharp electrical jolts.  Multiple times a day.  Facial movement with slight improvement.       Taking  mg BID for DMPN.       Reviewed medical records:      Presents with pain in OS.  Started one month previous.  Sudden onset with visual disturbance.  PMH of Bell's Palsy on left.       A1C 14.8 2/13/2020  Cr 1.28  Na 128  Objective   Vital Signs:   /70   Pulse 88   Ht 193 cm (76\")   Wt (!) 143 kg (315 lb)   SpO2 99%   BMI 38.34 kg/m²     Physical Exam  Eyes:      Extraocular Movements: EOM normal.      Pupils: Pupils are equal, round, and reactive to light.   Neurological:      Mental Status: He is oriented to person, place, and time.      Gait: Gait is intact.      Deep Tendon Reflexes: Strength normal.   Psychiatric:         Speech: Speech normal.          Neurologic Exam     Mental Status   Oriented to person, place, and time.   Speech: speech is normal   Level of consciousness: alert  Knowledge: good and consistent with education.   Normal comprehension. "     Cranial Nerves   Cranial nerves II through XII intact.     CN II   Visual fields full to confrontation.   Visual acuity: normal  Right visual field deficit: none  Left visual field deficit: none     CN III, IV, VI   Pupils are equal, round, and reactive to light.  Extraocular motions are normal.   Nystagmus: none   Diplopia: none  Ophthalmoparesis: none  Upgaze: normal  Downgaze: normal  Conjugate gaze: present    CN V   Right facial sensation deficit: none  Left facial sensation deficit: cheeks  Right corneal reflex: normal  Left corneal reflex: normal    CN VII   Right facial weakness: none  Left facial weakness: peripheral    CN VIII   Hearing: intact    CN IX, X   Palate: symmetric  Right gag reflex: normal  Left gag reflex: normal    CN XI   Right sternocleidomastoid strength: normal  Left sternocleidomastoid strength: normal    CN XII   Tongue: not atrophic  Fasciculations: absent  Tongue deviation: none    Motor Exam   Muscle bulk: normal  Overall muscle tone: normal    Strength   Strength 5/5 throughout.     Sensory Exam   Light touch normal.     Gait, Coordination, and Reflexes     Gait  Gait: normal    Tremor   Resting tremor: absent  Intention tremor: absent  Action tremor: absent    Reflexes   Reflexes 2+ except as noted.      Result Review :   The following data was reviewed by: Sushil Ahumada MD on 02/02/2021:  Common labs    Common Labsle 10/23/20 11/19/20 12/14/20 12/14/20 12/14/20      1252 1252 1252   BUN 53 (A)    35 (A)   Creatinine 1.26    1.10   eGFR Non  Am 57 (A)    67   Sodium 129 (A)    135 (A)   Potassium 3.2 (A)    3.5   Chloride 86 (A)    91 (A)   Calcium 9.8    10.4   Albumin     3.90   Total Bilirubin     1.0   Alkaline Phosphatase     125 (A)   AST (SGOT)     33   ALT (SGPT)     26   WBC   8.02     Hemoglobin   13.7     Hematocrit   42.8     Platelets   158     Hemoglobin A1C  8.3      PSA    0.321    (A) Abnormal value                      Assessment and Plan    Problem  List Items Addressed This Visit        Other    Facial paralysis/[place] palsy - Primary (Chronic)    Current Assessment & Plan     Sx slowly improving .         Atypical facial pain (Chronic)    Current Assessment & Plan     Continue [facility]                Follow Up   No follow-ups on file.  Patient was given instructions and counseling regarding his condition or for health maintenance advice. Please see specific information pulled into the AVS if appropriate.

## 2021-02-03 NOTE — TELEPHONE ENCOUNTER
Caller: Abe Phillips Jr.    Relationship: Self    Best call back number: [phone]    Medication needed:   Requested Prescriptions     Pending Prescriptions Disp Refills   • gabapentin (NEURONTIN) 300 MG capsule 180 capsule 2     Sig: Take 3 capsules by mouth 2 (Two) Times a Day.       When do you need the refill by: ASAP    What details did the patient provide when requesting the medication: patient states he checked with the pharmacy and they only have a 30 day supply. Patient states he is requesting a 90 day supply that is 540 capsules. patient has 1 day left.    Does the patient have less than a 3 day supply:  [x] Yes  [] No    What is the patient's preferred pharmacy: [address] [phone] PH - [phone]

## 2021-02-19 NOTE — ASSESSMENT & PLAN NOTE
Diabetes is improving with treatment.   Continue current treatment regimen.  Diabetes will be reassessed in 6 months     Only having  Lows when he goes without eating... that is fixable. .

## 2021-02-19 NOTE — PROGRESS NOTES
"     Office Note      Date: 2021  Patient Name: Abe Phillips Jr.  MRN: [id number]  : 1955    Chief Complaint   Patient presents with   • Diabetes       History of Present Illness:   Abe Phillips Jr. is a 65 y.o. male who presents for Diabetes - type 2  Using u 500 in an omnipod  He uses a dexcom to check bg 288 times per day  He has lows when he misses meals.  He  Adjust his insulin based upon his readings    dexcom data shows <1 % of the time is he hypoglycemic with average of 150 and 76 % in range.      Last A1c:8  Hemoglobin A1C   Date Value Ref Range Status   2021 6.4 % Final   2020 14.80 (H) 4.80 - 5.60 % Final       Changes in health since last visit: started testosterone but has not had any improvement yet . Last eye exam up to date .    Subjective        Review of Systems:   Review of Systems   Constitutional: Negative.    HENT: Negative.    Eyes: Negative.    Respiratory: Negative.    All other systems reviewed and are negative.      The following portions of the patient's history were reviewed and updated as appropriate: allergies, current medications, past family history, past medical history, past social history, past surgical history and problem list.    Objective     Visit Vitals  /80 (BP Location: Left arm, Patient Position: Sitting, Cuff Size: Adult)   Pulse 82   Ht 193 cm (76\")   Wt (!) 153 kg (338 lb)   SpO2 97%   BMI 41.14 kg/m²       Labs:    CMP  Lab Results   Component Value Date    GLUCOSE 256 (H) 2020    BUN 35 (H) 2020    CREATININE 1.10 2020    EGFRIFNONA 67 2020    BCR 31.8 (H) 2020    K 3.5 2020    CO2 33.7 (H) 2020    CALCIUM 10.4 2020    PROTENTOTREF 7.0 2019    LABIL2 0.9 2019    AST 33 2020    ALT 26 2020        CBC w/DIFF  Lab Results   Component Value Date    WBC 8.02 2020    RBC 4.75 2020    HGB 13.7 2020    HCT 42.8 2020    MCV 90.1 2020    MCH 28.8 " 12/14/2020    MCHC 32.0 12/14/2020    RDW 13.3 12/14/2020    RDWSD 43.4 12/14/2020    MPV 11.6 12/14/2020     12/14/2020    NEUTRORELPCT 82.7 (H) 10/20/2020    LYMPHORELPCT 8.6 (L) 10/20/2020    MONORELPCT 5.6 10/20/2020    EOSRELPCT 1.2 10/20/2020    BASORELPCT 0.5 10/20/2020    AUTOIGPER 1.4 (H) 10/20/2020    NEUTROABS 8.42 (H) 10/20/2020    LYMPHSABS 0.88 10/20/2020    MONOSABS 0.57 10/20/2020    EOSABS 0.12 10/20/2020    BASOSABS 0.05 10/20/2020    AUTOIGNUM 0.14 (H) 10/20/2020    NRBC 0.0 10/20/2020       Physical Exam:  Physical Exam  Vitals signs reviewed.   Constitutional:       Appearance: Normal appearance. He is obese.   Neurological:      Mental Status: He is alert and oriented to person, place, and time.   Psychiatric:         Mood and Affect: Mood normal.         Thought Content: Thought content normal.         Judgment: Judgment normal.          Assessment / Plan      Assessment & Plan:  Problem List Items Addressed This Visit        Other    Type 2 diabetes mellitus with hyperglycemia (CMS/[facility]) - Primary    Current Assessment & Plan     Diabetes is improving with treatment.   Continue current treatment regimen.  Diabetes will be reassessed in 6 months     Only having  Lows when he goes without eating... that is fixable. .         Relevant Medications    glucose blood test strip    Blood Glucose Monitoring Suppl (Contour Next Monitor) w/Device kit    Microlet Lancets misc    Continuous Blood Gluc Sensor (Dexcom G6 Sensor)    Continuous Blood Gluc  (Dexcom G6 ) device    Continuous Blood Gluc Transmit (Dexcom G6 Transmitter) misc    insulin regular (HUMULIN R) 500 UNIT/ML CONCENTRATED injection    Other Relevant Orders    POC Glycosylated Hemoglobin (Hb A1C) (Completed)    Hypogonadism in male    Current Assessment & Plan     Started testosterone last time, due for recheck of levels today.. his last shot was 4 days ago          Relevant Medications    Testosterone Cypionate  (DEPOTESTOTERONE CYPIONATE) 200 MG/ML injection           Dustin Hendricks MD   02/19/2021

## 2021-03-04 NOTE — TELEPHONE ENCOUNTER
----- Message from Abe Phillips Jr. sent at 3/4/2021 11:19 AM EST -----  Regarding: Complaint  Contact: [phone]  Dr Ahumada, the nerve pain in l face has gotten much worse. Now I'm having attacks of severe pain both at night and in the daytime that can last from 30 to 45 minutes.  Not sure what to do about it.

## 2021-03-23 NOTE — PROGRESS NOTES
Subjective:     Encounter Date:03/25/2021      Patient ID: Abe Phillips Jr. is a 65 y.o.  white male from [place], [place], retired  and , Anisa Roth, ELMA's father, was recently driving for [employer].  He recently moved here from [place], [place].     SELF REFERRED  INTERNIST: Anibal Maria MD  PULMONOLOGIST: Fernando Womack MD, [facility]  NEUROLOGIST: ELMA Jenkins  ELECTROPHYSIOLOGIST: Jalen Scott MD ([place])  REMOTE CARDIOLOGIST: Mathew Cox MD ([place])  GASTROENTEROLOGIST: Jalen Rashid MD ([place])  WOUND CARE: [facility], Winifred Pedersen MD  ENDOCRINOLOGIST: Dustin Hendricks MD  NEPHROLOGIST: [facility].    Chief Complaint:   Chief Complaint   Patient presents with   • PVD (peripheral vascular disease) (CMS/[facility])     Problem List:  1. Paroxysmal atrial fibrillation:  a. Diagnosed 2016, CHADsVasc 4, anticoagulated with Eliquis  b. ECV, 2016  c. Atrial fibrillation and flutter ablation, 8/30/2016 ([place])  d. CHADsVasc 4, anticoagulated with ASA  e. NSR on ECG, February 2019, March 2021  2. Chronic congestive heart failure (HFpEF):  a. Echocardiogram, July 2016, LVEF 0.58 ([place])  b. Echocardiogram, January 2017, LVEF 0.50 ([place])  c. Echocardiogram, 4/12/2018: Mild concentric LVH, EF 55-60 percent, trivial TR  d. Echocardiogram, 1/5/2019: EF greater than 0.70, LV wall thickness is consistent with posterior asymmetric hypertrophy, trace MR, physiologic TR, no pericardial effusion  e. Residual CCS 0 angina pectoris/NYHA class II CHF  3. Hypertensive cardiovascular disease:  a. Remote stress test and negative LHC, approximately 2016, in [place]-data deficit  b. Residual class 0 angina/NYHA class II CHF symptoms  4. Hypertension  5. Hyperlipidemia; not on statin therapy, patient wishes to not take statins  6. Type 2 diabetes mellitus; hemoglobin A1c 7.9% July 2016; 8%, autumn 2018; 11.7%, January 2019 with RLE foot ulcer since May 2018 (seeing wound care), 7.9%  March 2019, 13.6%, December 2019, 6.4% February 2021  7. Hypothyroidism on replacement therapy  8. Peripheral vascular disease  9. Obstructive sleep apnea with CPAP with sleep study 2/4/2019: Negative for significant JAIME, but had very little REM sleep  10. Restless leg syndrome  11. Narcolepsy  12. Anxiety and depression  13. Chronic bilateral lower extremity venous insufficiency/edema  14. Moderate obesity: BMI 39.68  15. Hepatic cirrhosis and portal hypertension with splenomegaly  16. History of hepatitis B antibody 30 years ago  17. History of duodenal ulcer  18. Encephalopathy/sepsis/dehydration with 2-day [facility] admission, January 2019, with fever of unknown origin, echocardiogram negative for endocarditis  19. 60-pound intentional weight loss over past 2 years, February 2019  20. [facility] 4-day hospitalization February 2020 for hyperglycemia  21.  Bell's palsy spring 2021  22. Surgical history:  a. Cholecystectomy  b. LHC  c. Atrial fibrillation/flutter ablation  d. Hemorrhoid surgery  e. Liver biopsy    No Known Allergies      Current Outpatient Medications:   •  amphetamine-dextroamphetamine XR (ADDERALL XR) 30 MG 24 hr capsule, Take 60 mg by mouth Every Morning, Disp: , Rfl:   •  aspirin 81 MG EC tablet, TAKE 1 TABLET BY MOUTH EVERY DAY, Disp: 90 tablet, Rfl: 1  •  Blood Glucose Monitoring Suppl (Contour Next Monitor) w/Device kit, 1 kit Every 3 (Three) Months. Use to check bg, Disp: 1 kit, Rfl: 0  •  bumetanide (BUMEX) 2 MG tablet, Take 2 tablets by mouth 2 (Two) Times a Day., Disp: 120 tablet, Rfl: 5  •  Continuous Blood Gluc  (Dexcom G6 ) device, 1 kit Every 3 (Three) Months. For checking bg gets one per year, Disp: 1 Device, Rfl: 0  •  Continuous Blood Gluc Sensor (Dexcom G6 Sensor), Every 10 (Ten) Days., Disp: 3 each, Rfl: 11  •  Continuous Blood Gluc Transmit (Dexcom G6 Transmitter) misc, 1 kit Every 3 (Three) Months. Use to check blood sugar, Disp: 1 each, Rfl: 3  •  diclofenac (VOLTAREN) 1 %  gel gel, Apply 4 g topically to the appropriate area as directed 2 (Two) Times a Day As Needed (joint pain)., Disp: 100 g, Rfl: 1  •  gabapentin (NEURONTIN) 300 MG capsule, Take 3 capsules by mouth 2 (Two) Times a Day., Disp: 180 capsule, Rfl: 2  •  Insulin Disposable Pump (OmniPod Dash 5 Pack Pods) misc, CHANGE POD EVERY 3 DAYS, Disp: , Rfl:   •  levocetirizine (XYZAL) 5 MG tablet, Take 1 tablet by mouth Every Evening., Disp: 90 tablet, Rfl: 1  •  levothyroxine (SYNTHROID, LEVOTHROID) 75 MCG tablet, TAKE ONE TABLET BY MOUTH DAILY, Disp: 90 tablet, Rfl: 3  •  loratadine (CLARITIN) 10 MG tablet, Take 1 tablet by mouth Daily. Patient instructed to d/c Xyzal while taking, Disp: 30 tablet, Rfl: 0  •  metOLazone (ZAROXOLYN) 5 MG tablet, Take 1 tablet by mouth Daily As Needed (edema/fluid overload)., Disp: 90 tablet, Rfl: 1  •  mupirocin (BACTROBAN) 2 % ointment, Apply  topically to the appropriate area as directed 3 (Three) Times a Day. (Patient taking differently: Apply  topically to the appropriate area as directed 3 (Three) Times a Day As Needed.), Disp: 30 g, Rfl: 0  •  nystatin (MYCOSTATIN) 768495 UNIT/GM ointment, Apply 1 application topically to the appropriate area as directed As Needed., Disp: , Rfl:   •  OXcarbazepine (TRILEPTAL) 300 MG tablet, Take 2 tablets by mouth 2 (Two) Times a Day. (Patient taking differently: Take 300 mg by mouth 2 (Two) Times a Day.), Disp: 120 tablet, Rfl: 6  •  potassium chloride (K-DUR) 10 MEQ CR tablet, Take 3 tablets by mouth 2 (Two) Times a Day., Disp: 540 tablet, Rfl: 3  •  pramipexole (MIRAPEX) 1 MG tablet, TAKE ONE TABLET BY MOUTH TWICE A DAY, Disp: 180 tablet, Rfl: 1  •  pravastatin (PRAVACHOL) 40 MG tablet, Take 1 tablet by mouth every night at bedtime., Disp: 90 tablet, Rfl: 3  •  psyllium (METAMUCIL) 58.6 % packet, Take 1 packet by mouth Daily As Needed., Disp: , Rfl:   •  spironolactone (ALDACTONE) 100 MG tablet, Take 0.5 tablets by mouth Daily., Disp: 30 tablet, Rfl:  6  •  Testosterone Cypionate (DEPOTESTOTERONE CYPIONATE) 200 MG/ML injection, Inject 1 mL into the appropriate muscle as directed by prescriber Every 14 (Fourteen) Days., Disp: 10 mL, Rfl: 1  •  traZODone (DESYREL) 50 MG tablet, Take 1 tablet by mouth At Night As Needed for Sleep., Disp: 90 tablet, Rfl: 2  •  venlafaxine XR (EFFEXOR-XR) 150 MG 24 hr capsule, Take 1 capsule by mouth Every Night., Disp: 90 capsule, Rfl: 2    HISTORY OF PRESENT ILLNESS:  The patient is here after a 10-month hiatus.  The patient saw his sleep physician in October 2020.  He was seen in [facility] ED for pain and swelling in his right lower extremity in October 2020.  He was seen in urgent treatment center February 2021 for acute pansinusitis.  He contacted Dr. Ahumada March 2021 because he was having nerve pain in his face that had been worsening and recommendations were to increase oxcarbazepine to 600 mg twice daily.  He had a dermatology procedure for skin cancer performed and around that time he developed Bell's palsy on the left side of his face.  He is supposed to have a Mohs procedure soon for another skin cancer lesion.  The patient's wife had a brain shunt today.  The patient still has a right lower extremity heel ulcer but it is improving.  He is hoping since his hemoglobin A1c has gone down to 6.4%, that this will heal quicker.  He follows closely with Dustin Hendricks MD.  The patient goes to the lymphedema clinic for his lower extremity edema.  He is currently wearing compression stockings.  He has some shortness of breath on exertion that he attributes to weight gain from being more sedentary.  He denies any chest pressure, palpitations, presyncope, or syncope.  He would like referral to have cardiopulmonary rehab since his heel ulcer has improved.  He has had his first Covid vaccination.      Review of Systems   Cardiovascular: Positive for dyspnea on exertion and leg swelling.      All other systems reviewed and otherwise  "negative.      ECG 12 Lead    Date/Time: 3/25/2021 4:28 PM  Performed by: Elizabeth Gonzalez APRN  Authorized by: Elizabeth Gonzalez APRN   Rhythm comments: Sinus rhythm with first-degree AV block, right superior axis deviation, nonspecific intraventricular conduction delay, abnormal ECG, 79 bpm,  ms,  ms,  ms, no significant change from last EKG May 2020                 Objective:       Vitals:    03/25/21 1439   BP: 128/66   BP Location: Left arm   Patient Position: Sitting   Cuff Size: Large Adult   Pulse: 77   Temp: 97.3 °F (36.3 °C)   SpO2: 97%   Weight: (!) 148 kg (326 lb)   Height: 193 cm (76\")     Body mass index is 39.68 kg/m².  Last weight July 2020 was 318 pounds  Constitutional:       Appearance: Healthy appearance. Not in distress.   Neck:      Vascular: No JVR. JVD normal.   Pulmonary:      Effort: Pulmonary effort is normal.      Breath sounds: Decreased breath sounds present. No wheezing. No rhonchi. No rales.   Chest:      Chest wall: Not tender to palpatation.   Cardiovascular:      PMI at left midclavicular line. Normal rate. Regular rhythm. Normal S1. Normal S2.      Murmurs: There is a grade 2/6 mid frequency harsh early systolic murmur at the LLSB.      No gallop. No click. No rub.      Comments: Compression stockings in place  Pulses:     Dorsalis pedis: 1+ bilaterally.     Posterior tibial: 1+ bilaterally.  Edema:     Thigh: bilateral 1+ edema of the thigh.     Pretibial: bilateral 1+ edema of the pretibial area.     Ankle: bilateral 1+ edema of the ankle.     Feet: bilateral 1+ edema of the feet.  Abdominal:      General: Bowel sounds are normal.      Palpations: Abdomen is soft.      Tenderness: There is no abdominal tenderness.   Musculoskeletal: Normal range of motion.         General: No tenderness. Skin:     General: Skin is warm and dry.   Neurological:      General: No focal deficit present.      Mental Status: Alert and oriented to person, place and time. "           Lab Review:   Lab Results   Component Value Date    GLUCOSE 256 (H) 12/14/2020    BUN 35 (H) 12/14/2020    CREATININE 1.10 12/14/2020    EGFRIFNONA 67 12/14/2020    BCR 31.8 (H) 12/14/2020    CO2 33.7 (H) 12/14/2020    CALCIUM 10.4 12/14/2020    PROTENTOTREF 7.0 07/26/2019    ALBUMIN 3.90 12/14/2020    LABIL2 0.9 07/26/2019    AST 33 12/14/2020    ALT 26 12/14/2020       Lab Results   Component Value Date    WBC 8.02 12/14/2020    HGB 13.7 12/14/2020    HCT 42.8 12/14/2020    MCV 90.1 12/14/2020     12/14/2020       Lab Results   Component Value Date    HGBA1C 6.4 02/19/2021       Lab Results   Component Value Date    TSH 2.360 12/14/2020       Lab Results   Component Value Date    CHOL 220 (H) 02/13/2020    CHOL 160 12/27/2019     Lab Results   Component Value Date    TRIG 285 (H) 02/13/2020    TRIG 109 12/27/2019     Lab Results   Component Value Date    HDL 46 02/13/2020    HDL 60 12/27/2019     Lab Results   Component Value Date     (H) 02/13/2020    LDL 78 12/27/2019     Advance Care Planning   ACP discussion was held with the patient during this visit. Patient does not have an advance directive, declines further assistance.          Assessment:     Overall continued acceptable course with no new interim cardiopulmonary complaints with acceptable functional status on limited activity. We will defer additional diagnostic or therapeutic intervention from a cardiac perspective at this time.  I will make a referral for the patient to have cardiopulmonary rehab.  I also encouraged the patient to continue with the lymphedema clinic.  He is in sinus rhythm on ECG in office today.     Diagnosis Plan   1. Chronic diastolic congestive heart failure (CMS/HCC)  Ambulatory Referral to Cardiac Rehab.  No recurrent angina pectoris or CHF on current activity schedule; continue current treatment   2. Paroxysmal atrial fibrillation (CMS/HCC)  No recurrent atrial fibrillation, normal sinus rhythm on ECG in  office today   3. Essential hypertension   Controlled, continue current cardiac medications   4. Dyslipidemia  No new labs to review, continue pravastatin   5. Type 2 diabetes mellitus with hyperglycemia, with long-term current use of insulin (CMS/HCC)   Hemoglobin A1c 6.4% February 2021, continue heart healthy diet, follow-up with Dr. Hendricks   6. Morbidly obese (CMS/HCC)   Physical activity as tolerated, referral to cardiopulmonary rehab   7. Lymphedema   I encouraged the patient to continue with the lymphedema clinic.          Plan:         1. Patient to continue current medications and close follow up with the above providers.  2. Tentative cardiology follow up in September 2021 or patient may return sooner PRN.  3. Cardiopulmonary rehab referral      Electronically signed by ELMA Bradley, 03/25/21, 4:29 PM EDT.

## 2021-04-06 NOTE — PROGRESS NOTES
Subjective:     Chief Complaint:   Chief Complaint   Patient presents with   • Follow-up       HPI:    Abe Phillips Jr. is a 65 y.o. male here for follow-up of obstructive sleep apnea.    He has a complicated sleep history.  He has had problems for over 20 years in regards to poor sleep quality and daytime somnolence.  He saw a neurologist in [facility] named Torey Marcus in approximately 2010 and underwent a sleep study and a nap study.  He was apparently diagnosed with severe obstructive sleep apnea as well as narcolepsy.  He has been treated with CPAP since that time.  He was set at 7 cm H2O at the time of his move to [place] several years ago.  He was also been treated with high dose of Adderall at 60 mg in the morning and 30 mg in the afternoon.  He has been treated with Mirapex for RLS.     Since his arrival here he underwent a sleep study which revealed the presence of severe obstructive sleep apnea.  Furthermore was felt that his optimal pressure was CPAP of 13.  He was placed on CPAP therapy at 13-20 cm H2O.  He has had difficulty with CPAP compliance for various reasons.  He tolerates the pressure poorly.  His pressures were increased to a range of 9-20 cm H2O and his download last year revealed a normal AHI.  He has always had low compliance.       I decreased his Adderall dosage to 30 mg twice daily as he was on a very high dose previously.  He does not think this is adequate for his daytime somnolence and does feel sleepy in the afternoon.  He has begun taking the 60 mg all in the morning.    Since his last visit to see me he has developed what has been diagnosed as a Bell's palsy but also he has facial pain and cannot tolerate his CPAP mask so he has not been using it at all.    Furthermore, he continues to complain of daytime somnolence and the inadequacy of the current Adderall regimen.  He has talked to the head pharmacist at [facility] and he says that the pharmacy will fill a higher  dose of Adderall if I document that the higher dose as needed due to the patient's large size.    Further details are as follows:    Vernon Scale scored as 20/24.      Current medications are:   Current Outpatient Medications:   •  aspirin 81 MG EC tablet, TAKE 1 TABLET BY MOUTH EVERY DAY, Disp: 90 tablet, Rfl: 1  •  Blood Glucose Monitoring Suppl (Contour Next Monitor) w/Device kit, 1 kit Every 3 (Three) Months. Use to check bg, Disp: 1 kit, Rfl: 0  •  bumetanide (BUMEX) 2 MG tablet, Take 2 tablets by mouth 2 (Two) Times a Day., Disp: 120 tablet, Rfl: 5  •  Continuous Blood Gluc  (Dexcom G6 ) device, 1 kit Every 3 (Three) Months. For checking bg gets one per year, Disp: 1 Device, Rfl: 0  •  Continuous Blood Gluc Sensor (Dexcom G6 Sensor), Every 10 (Ten) Days., Disp: 3 each, Rfl: 11  •  Continuous Blood Gluc Transmit (Dexcom G6 Transmitter) misc, 1 kit Every 3 (Three) Months. Use to check blood sugar, Disp: 1 each, Rfl: 3  •  diclofenac (VOLTAREN) 1 % gel gel, Apply 4 g topically to the appropriate area as directed 2 (Two) Times a Day As Needed (joint pain)., Disp: 100 g, Rfl: 1  •  gabapentin (NEURONTIN) 300 MG capsule, Take 3 capsules by mouth 2 (Two) Times a Day., Disp: 180 capsule, Rfl: 2  •  Insulin Disposable Pump (OmniPod Dash 5 Pack Pods) misc, CHANGE POD EVERY 3 DAYS, Disp: , Rfl:   •  levocetirizine (XYZAL) 5 MG tablet, Take 1 tablet by mouth Every Evening., Disp: 90 tablet, Rfl: 1  •  levothyroxine (SYNTHROID, LEVOTHROID) 75 MCG tablet, TAKE ONE TABLET BY MOUTH DAILY, Disp: 90 tablet, Rfl: 3  •  loratadine (CLARITIN) 10 MG tablet, Take 1 tablet by mouth Daily. Patient instructed to d/c Xyzal while taking, Disp: 30 tablet, Rfl: 0  •  metOLazone (ZAROXOLYN) 5 MG tablet, Take 1 tablet by mouth Daily As Needed (edema/fluid overload)., Disp: 90 tablet, Rfl: 1  •  mupirocin (BACTROBAN) 2 % ointment, Apply  topically to the appropriate area as directed 3 (Three) Times a Day. (Patient taking  differently: Apply  topically to the appropriate area as directed 3 (Three) Times a Day As Needed.), Disp: 30 g, Rfl: 0  •  nystatin (MYCOSTATIN) 483906 UNIT/GM ointment, Apply 1 application topically to the appropriate area as directed As Needed., Disp: , Rfl:   •  OXcarbazepine (TRILEPTAL) 300 MG tablet, Take 2 tablets by mouth 2 (Two) Times a Day. (Patient taking differently: Take 300 mg by mouth 2 (Two) Times a Day.), Disp: 120 tablet, Rfl: 6  •  potassium chloride (K-DUR) 10 MEQ CR tablet, Take 3 tablets by mouth 2 (Two) Times a Day., Disp: 540 tablet, Rfl: 3  •  pramipexole (MIRAPEX) 1 MG tablet, TAKE ONE TABLET BY MOUTH TWICE A DAY, Disp: 180 tablet, Rfl: 1  •  pravastatin (PRAVACHOL) 40 MG tablet, Take 1 tablet by mouth every night at bedtime., Disp: 90 tablet, Rfl: 3  •  psyllium (METAMUCIL) 58.6 % packet, Take 1 packet by mouth Daily As Needed., Disp: , Rfl:   •  spironolactone (ALDACTONE) 100 MG tablet, Take 0.5 tablets by mouth Daily., Disp: 30 tablet, Rfl: 6  •  Testosterone Cypionate (DEPOTESTOTERONE CYPIONATE) 200 MG/ML injection, Inject 1 mL into the appropriate muscle as directed by prescriber Every 14 (Fourteen) Days., Disp: 10 mL, Rfl: 1  •  traZODone (DESYREL) 50 MG tablet, Take 1 tablet by mouth At Night As Needed for Sleep., Disp: 90 tablet, Rfl: 2  •  venlafaxine XR (EFFEXOR-XR) 150 MG 24 hr capsule, Take 1 capsule by mouth Every Night., Disp: 90 capsule, Rfl: 2  •  amphetamine-dextroamphetamine (ADDERALL) 15 MG tablet, Take 2 tablets by mouth Daily., Disp: 120 tablet, Rfl: 0  •  amphetamine-dextroamphetamine (ADDERALL) 30 MG tablet, Take 2 tablets by mouth Daily for 60 doses., Disp: 120 tablet, Rfl: 0.    The patient's relevant past medical, surgical, family and social history were reviewed and updated in Epic as appropriate.     ROS:    Review of Systems   Constitutional: Positive for fatigue.   Psychiatric/Behavioral: Positive for sleep disturbance.         Objective:    Physical  Exam  Vitals reviewed.   Constitutional:       Appearance: He is well-developed.   HENT:      Head: Normocephalic and atraumatic.      Mouth/Throat:      Mouth: Mucous membranes are moist.      Pharynx: Oropharynx is clear.   Neck:      Thyroid: No thyromegaly.   Cardiovascular:      Rate and Rhythm: Normal rate and regular rhythm.      Heart sounds: No murmur heard.   No friction rub. No gallop.    Pulmonary:      Effort: Pulmonary effort is normal. No respiratory distress.      Breath sounds: No wheezing or rales.   Musculoskeletal:      Cervical back: Neck supple.   Skin:     General: Skin is warm and dry.   Neurological:      Mental Status: He is alert and oriented to person, place, and time.   Psychiatric:         Behavior: Behavior normal.         Thought Content: Thought content normal.         Data:    Download not available as he is not currently using CPAP therapy    Assessment:    Problem List Items Addressed This Visit        Pulmonary Problems    Severe JAIME on CPAP    Relevant Orders    PAP Therapy       Other    Primary narcolepsy without cataplexy    Relevant Medications    amphetamine-dextroamphetamine (ADDERALL) 30 MG tablet    amphetamine-dextroamphetamine (ADDERALL) 15 MG tablet    Morbidly obese (CMS/HCC) - Primary    Restless legs syndrome (RLS)          1. Severe obstructive sleep apnea: Historically poor compliance with CPAP therapy.  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  I suggested we try a nasal pillow mask to see if this will be more comfortable for him and he is agreeable to trying this.  I stressed the importance of continued use of CPAP therapy  2. Narcolepsy without cataplexy: This is a historical diagnosis and whether his somnolence is due to narcolepsy or obstructive sleep apnea is unclear.  He does insist that he does better with the higher dosages of Adderall which are above recommended  limits.  His pharmacy in the past has refused to fill that but he has talked with them and they said that that could be filled as long as it is documented that he needs a higher dose because of his large body habitus.  He insists he does better with a higher dose so I will go ahead and fill it  3. Morbid obesity: Current BMI 39.2.  Not much change    Plan:     1. I renewed his prescriptions as above.  I did review his eKasper.  I gave him a 2-month supply of his Adderall at a dose of 2 x 30 mg in the morning and 2 x 15 mg in the afternoon  2. I rendered a new order for CPAP mask fitting with a nasal pillow mask  3. Continued close follow-up in the sleep center      Discussed in detail with the patient.  He will call prior to his follow up visit for any new problems.    Level of Risk Moderate due to: mild exacerbation of one chronic illness and prescription drug management    Signed by  Fernando Womack MD
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
## 2021-06-07 NOTE — TELEPHONE ENCOUNTER
PATIENT IS REQUESTING AN ORDER FOR A NEW DEXCOM G6  AS HE HAS LOST HIS. HE HAS SPOKEN WITH PHARMACY WHO ADVISED HIM TO CALL TO REQUEST A NEW RX. [facility]

## 2021-06-28 NOTE — TELEPHONE ENCOUNTER
MARKEL BARRIOS SAYS HIS LEGS ARE SWOLLEN WITH BLISTERS POPPING. HE WANTS YOU TO CALL HIM . [phone]. HE THINKS ITS CELLULITIS.  
ERROR  
r/o pseudotumor

## 2021-07-01 NOTE — PROGRESS NOTES
Patient is a 65 y.o. male who is here for a follow up of edema.  Chief Complaint   Patient presents with   • Edema         HPI:    Here for mgmt of LE edema/blisters.  Onset weeks.  Recently started on Doxy.  No fever or chills.  No GI issues with Doxy.  Some improvement in symptoms.  Thinks precipitated by running out of Metolazone.  Breathing is good.  FSBS are good with CGM.  No CP or palpitations.      History:     Patient Active Problem List   Diagnosis   • Type 2 diabetes mellitus with hyperglycemia (CMS/HCC)   • Elevated LFTs   • Thrombocytopenia (CMS/HCC)   • PVD (peripheral vascular disease) (CMS/HCC)   • A-fib (CMS/HCC)   • Cirrhosis of liver (CMS/HCC)   • Chronic congestive heart failure (CMS/HCC)   • Diabetic ulcer of right heel (CMS/HCC)   • Primary narcolepsy without cataplexy   • Essential hypertension   • Morbidly obese (CMS/HCC)   • Restless legs syndrome (RLS)   • ADD (attention deficit disorder)   • Depression   • Diabetes mellitus type 2, uncontrolled, with complications (CMS/HCC)   • Edema   • MCCAULEY (nonalcoholic steatohepatitis)   • Hepatitis B   • Insomnia   • Lymphedema   • Obesity   • Severe JAIME on CPAP   • RLS (restless legs syndrome)   • Tremor of both hands   • Restrictive cardiomyopathy (CMS/HCC)   • Other hyperlipidemia   • Uncontrolled type 2 diabetes mellitus with hyperglycemia (CMS/HCC)   • OMAR (acute kidney injury) (CMS/HCC)   • Dyslipidemia   • Hyperglycemia   • Diabetic ulcer of right heel associated with type 2 diabetes mellitus (CMS/HCC)   • Facial paralysis/[employer] palsy   • Diabetic hypoglycemia (CMS/HCC)   • Hypogonadism in male   • Atypical facial pain       Past Medical History:   Diagnosis Date   • A-fib (CMS/HCC)    • ADD (attention deficit disorder)    • Atrial flutter (CMS/HCC)    • Cellulitis    • CHF (congestive heart failure) (CMS/HCC)    • Cirrhosis of liver (CMS/HCC)    • Constipation    • Depression    • Diabetes mellitus (CMS/HCC)    • Diabetic ulcer of right foot  (CMS/HCC)    • Disease of thyroid gland    • Edema    • Fatty liver    • Hepatitis B    • Hyperlipidemia    • Hypokalemia    • Insomnia    • Lymphedema    • Narcolepsy    • Neuropathy    • Obesity    • JAIME on CPAP    • PVD (peripheral vascular disease) (CMS/HCC)    • RLS (restless legs syndrome)    • Skin cancer    • Tardive dyskinesia    • Tremor of both hands    • Type 2 diabetes mellitus (CMS/HCC)    • Yeast infection        Past Surgical History:   Procedure Laterality Date   • ACHILLES TENDON REPAIR     • CARDIAC ABLATION     • CHOLECYSTECTOMY     • LASER ABLATION      VEIN RLE       Current Outpatient Medications on File Prior to Visit   Medication Sig   • amphetamine-dextroamphetamine (ADDERALL) 15 MG tablet Take 2 tablets by mouth Daily.   • amphetamine-dextroamphetamine (ADDERALL) 30 MG tablet Take 2 tablets by mouth Every Morning.   • aspirin 81 MG EC tablet TAKE 1 TABLET BY MOUTH EVERY DAY   • bumetanide (BUMEX) 2 MG tablet Take 2 tablets by mouth 2 (Two) Times a Day.   • diclofenac (VOLTAREN) 1 % gel gel Apply 4 g topically to the appropriate area as directed 2 (Two) Times a Day As Needed (joint pain).   • doxycycline (VIBRAMYCIN) 100 MG capsule Take 1 capsule by mouth 2 (Two) Times a Day.   • gabapentin (NEURONTIN) 300 MG capsule Take 3 capsules by mouth 2 (Two) Times a Day.   • levocetirizine (XYZAL) 5 MG tablet Take 1 tablet by mouth Every Evening.   • levothyroxine (SYNTHROID, LEVOTHROID) 75 MCG tablet TAKE ONE TABLET BY MOUTH DAILY   • loratadine (CLARITIN) 10 MG tablet Take 1 tablet by mouth Daily. Patient instructed to d/c Xyzal while taking   • montelukast (Singulair) 10 MG tablet Take 1 tablet by mouth Every Night.   • mupirocin (BACTROBAN) 2 % ointment Apply  topically to the appropriate area as directed 3 (Three) Times a Day. (Patient taking differently: Apply  topically to the appropriate area as directed 3 (Three) Times a Day As Needed.)   • nystatin (MYCOSTATIN) 106659 UNIT/GM ointment  Apply 1 application topically to the appropriate area as directed As Needed.   • OXcarbazepine (TRILEPTAL) 300 MG tablet Take 2 tablets by mouth 2 (Two) Times a Day. (Patient taking differently: Take 300 mg by mouth 2 (Two) Times a Day.)   • potassium chloride 10 MEQ CR tablet Take 4 tablets by mouth 2 (Two) Times a Day.   • pravastatin (PRAVACHOL) 40 MG tablet Take 1 tablet by mouth every night at bedtime.   • psyllium (METAMUCIL) 58.6 % packet Take 1 packet by mouth Daily As Needed.   • spironolactone (ALDACTONE) 100 MG tablet Take 0.5 tablets by mouth Daily.   • traZODone (DESYREL) 50 MG tablet Take 1 tablet by mouth At Night As Needed for Sleep.   • venlafaxine XR (EFFEXOR-XR) 150 MG 24 hr capsule Take 1 capsule by mouth Every Night.   • [DISCONTINUED] metOLazone (ZAROXOLYN) 5 MG tablet TAKE ONE TABLET BY MOUTH DAILY AS NEEDED FOR EDEMA/FLUID OVERLOAD   • Blood Glucose Monitoring Suppl (Contour Next Monitor) w/Device kit 1 kit Every 3 (Three) Months. Use to check bg   • Continuous Blood Gluc Sensor (Dexcom G6 Sensor) Every 10 (Ten) Days.   • Continuous Blood Gluc Transmit (Dexcom G6 Transmitter) misc 1 kit Every 3 (Three) Months. Use to check blood sugar   • Insulin Disposable Pump (OmniPod Dash 5 Pack Pods) misc CHANGE POD EVERY 3 DAYS   • [DISCONTINUED] benzonatate (TESSALON) 200 MG capsule Take 1 capsule by mouth 3 (Three) Times a Day As Needed for Cough.   • [DISCONTINUED] pramipexole (MIRAPEX) 1 MG tablet TAKE ONE TABLET BY MOUTH TWICE A DAY   • [DISCONTINUED] Testosterone Cypionate (DEPOTESTOTERONE CYPIONATE) 200 MG/ML injection Inject 1 mL into the appropriate muscle as directed by prescriber Every 14 (Fourteen) Days.     No current facility-administered medications on file prior to visit.       Family History   Problem Relation Age of Onset   • Heart failure Mother         CHF   • Osteoarthritis Mother    • Clotting disorder Father    • Diabetes Father    • Hypertension Father    • No Known Problems  "Sister    • No Known Problems Brother    • No Known Problems Brother        Social History     Socioeconomic History   • Marital status:      Spouse name: Not on file   • Number of children: Not on file   • Years of education: Not on file   • Highest education level: Not on file   Tobacco Use   • Smoking status: Never Smoker   • Smokeless tobacco: Never Used   Vaping Use   • Vaping Use: Never used   Substance and Sexual Activity   • Alcohol use: Yes     Comment: \"3 SCOTCH AND 2 BEERS PER WEEK\"   • Drug use: No   • Sexual activity: Defer         Review of Systems   Constitutional: Positive for fatigue. Negative for chills and fever.   HENT: Negative for congestion, ear pain, hearing loss, rhinorrhea, sinus pressure, sore throat and trouble swallowing.    Eyes: Negative for discharge and itching.   Respiratory: Positive for shortness of breath (better). Negative for cough and chest tightness.    Cardiovascular: Positive for leg swelling (better). Negative for chest pain and palpitations.   Gastrointestinal: Negative for abdominal pain, blood in stool, constipation, diarrhea and vomiting.        Colonoscopy 3/16   Endocrine: Positive for cold intolerance. Negative for polydipsia and polyuria.   Genitourinary: Negative for difficulty urinating, dysuria, enuresis, frequency, hematuria and urgency.   Musculoskeletal: Positive for arthralgias, back pain and gait problem. Negative for joint swelling.   Skin: Negative for rash.        Right heel wound, followed by wound   Allergic/Immunologic: Negative for immunocompromised state.   Neurological: Negative for dizziness, syncope, numbness and headaches.        See HPI   Hematological: Does not bruise/bleed easily.   Psychiatric/Behavioral: Negative for behavioral problems, dysphoric mood and sleep disturbance. The patient is not nervous/anxious.        /70   Pulse 78   Temp 96.6 °F (35.9 °C) (Infrared)   Ht 193 cm (75.98\")   Wt (!) 142 kg (313 lb)   SpO2 98% " "  BMI 38.12 kg/m²       Physical Exam  Constitutional:       Appearance: Normal appearance. He is well-developed.   HENT:      Head: Normocephalic and atraumatic.      Right Ear: External ear normal.      Left Ear: External ear normal.      Nose: Nose normal.      Mouth/Throat:      Mouth: Mucous membranes are moist.      Pharynx: Oropharynx is clear.   Eyes:      Extraocular Movements: Extraocular movements intact.      Conjunctiva/sclera: Conjunctivae normal.      Pupils: Pupils are equal, round, and reactive to light.   Cardiovascular:      Rate and Rhythm: Normal rate and regular rhythm.      Heart sounds: Normal heart sounds.   Pulmonary:      Effort: Pulmonary effort is normal.      Breath sounds: Normal breath sounds.   Abdominal:      General: Bowel sounds are normal.      Palpations: Abdomen is soft.   Musculoskeletal:         General: Normal range of motion.      Cervical back: Normal range of motion and neck supple.      Right lower leg: Edema present.      Left lower leg: Edema present.   Lymphadenopathy:      Cervical: No cervical adenopathy.   Skin:     General: Skin is warm and dry.      Comments: Wound not examined on right heel  Bilateral Diabetic Dermopathy  Eczema in LE   Neurological:      General: No focal deficit present.      Mental Status: He is alert and oriented to person, place, and time.   Psychiatric:         Mood and Affect: Mood normal.         Behavior: Behavior normal.         Thought Content: Thought content normal.         Procedure:      Discussion/Summary:    Cellulitis/Dermopathy-cont Doxy  DM-f/u ENDO, counseled on low carb and goals of FSBS, at goal  Chronic LE edema-advised to weigh daily, controlled on triple diuretic and compression hose  CHF-\" \"  Hep B hx/FLD-f/u Dr Murcia, advised wt loss  Neuropathy-on Gabapentin, but gave weaning schedule given sedation  Narcolepsy-on Adderall, stable  JAIME-cont CPAP, stable  Hx of Afib-no recurrence s/p Ablation  Hypothyroid-TSH at " goal  Depression-stable on Effexor  RLS-controlled on Mirapex  Elevated Cr-stable advised to ensure adequate hydration, recheck improved  Hyperlipidemia-cont statin, labs 2/13 dw patient, recheck on rtc  thrombocytopenia-recheck normal  Right heel wound-f/u wound care  Eczema-tcn cream prn     Prior Labs noted and dw patient    Current Outpatient Medications:   •  amphetamine-dextroamphetamine (ADDERALL) 15 MG tablet, Take 2 tablets by mouth Daily., Disp: 120 tablet, Rfl: 0  •  amphetamine-dextroamphetamine (ADDERALL) 30 MG tablet, Take 2 tablets by mouth Every Morning., Disp: 120 tablet, Rfl: 0  •  aspirin 81 MG EC tablet, TAKE 1 TABLET BY MOUTH EVERY DAY, Disp: 90 tablet, Rfl: 1  •  bumetanide (BUMEX) 2 MG tablet, Take 2 tablets by mouth 2 (Two) Times a Day., Disp: 120 tablet, Rfl: 5  •  diclofenac (VOLTAREN) 1 % gel gel, Apply 4 g topically to the appropriate area as directed 2 (Two) Times a Day As Needed (joint pain)., Disp: 100 g, Rfl: 1  •  doxycycline (VIBRAMYCIN) 100 MG capsule, Take 1 capsule by mouth 2 (Two) Times a Day., Disp: 20 capsule, Rfl: 0  •  gabapentin (NEURONTIN) 300 MG capsule, Take 3 capsules by mouth 2 (Two) Times a Day., Disp: 180 capsule, Rfl: 2  •  levocetirizine (XYZAL) 5 MG tablet, Take 1 tablet by mouth Every Evening., Disp: 90 tablet, Rfl: 1  •  levothyroxine (SYNTHROID, LEVOTHROID) 75 MCG tablet, TAKE ONE TABLET BY MOUTH DAILY, Disp: 90 tablet, Rfl: 3  •  loratadine (CLARITIN) 10 MG tablet, Take 1 tablet by mouth Daily. Patient instructed to d/c Xyzal while taking, Disp: 30 tablet, Rfl: 0  •  metOLazone (ZAROXOLYN) 5 MG tablet, Take 2 tablets by mouth Daily As Needed (edema)., Disp: 180 tablet, Rfl: 3  •  montelukast (Singulair) 10 MG tablet, Take 1 tablet by mouth Every Night., Disp: 30 tablet, Rfl: 5  •  mupirocin (BACTROBAN) 2 % ointment, Apply  topically to the appropriate area as directed 3 (Three) Times a Day. (Patient taking differently: Apply  topically to the appropriate area  as directed 3 (Three) Times a Day As Needed.), Disp: 30 g, Rfl: 0  •  nystatin (MYCOSTATIN) 781405 UNIT/GM ointment, Apply 1 application topically to the appropriate area as directed As Needed., Disp: , Rfl:   •  OXcarbazepine (TRILEPTAL) 300 MG tablet, Take 2 tablets by mouth 2 (Two) Times a Day. (Patient taking differently: Take 300 mg by mouth 2 (Two) Times a Day.), Disp: 120 tablet, Rfl: 6  •  potassium chloride 10 MEQ CR tablet, Take 4 tablets by mouth 2 (Two) Times a Day., Disp: 720 tablet, Rfl: 3  •  pravastatin (PRAVACHOL) 40 MG tablet, Take 1 tablet by mouth every night at bedtime., Disp: 90 tablet, Rfl: 3  •  psyllium (METAMUCIL) 58.6 % packet, Take 1 packet by mouth Daily As Needed., Disp: , Rfl:   •  spironolactone (ALDACTONE) 100 MG tablet, Take 0.5 tablets by mouth Daily., Disp: 30 tablet, Rfl: 6  •  traZODone (DESYREL) 50 MG tablet, Take 1 tablet by mouth At Night As Needed for Sleep., Disp: 90 tablet, Rfl: 2  •  venlafaxine XR (EFFEXOR-XR) 150 MG 24 hr capsule, Take 1 capsule by mouth Every Night., Disp: 90 capsule, Rfl: 2  •  Blood Glucose Monitoring Suppl (Contour Next Monitor) w/Device kit, 1 kit Every 3 (Three) Months. Use to check bg, Disp: 1 kit, Rfl: 0  •  Continuous Blood Gluc Sensor (Dexcom G6 Sensor), Every 10 (Ten) Days., Disp: 3 each, Rfl: 11  •  Continuous Blood Gluc Transmit (Dexcom G6 Transmitter) misc, 1 kit Every 3 (Three) Months. Use to check blood sugar, Disp: 1 each, Rfl: 3  •  Insulin Disposable Pump (OmniPod Dash 5 Pack Pods) misc, CHANGE POD EVERY 3 DAYS, Disp: , Rfl:   •  triamcinolone (KENALOG) 0.1 % cream, Apply  topically to the appropriate area as directed 2 (Two) Times a Day As Needed for Rash., Disp: 80 g, Rfl: 2        Diagnoses and all orders for this visit:    1. Other hyperlipidemia (Primary)    2. Essential hypertension    3. Dyslipidemia    4. Chronic diastolic congestive heart failure (CMS/HCC)  -     metOLazone (ZAROXOLYN) 5 MG tablet; Take 2 tablets by mouth  Daily As Needed (edema).  Dispense: 180 tablet; Refill: 3    5. Type 2 diabetes mellitus with hyperglycemia, with long-term current use of insulin (CMS/[facility])    6. Cellulitis of right lower extremity    Other orders  -     triamcinolone (KENALOG) 0.1 % cream; Apply  topically to the appropriate area as directed 2 (Two) Times a Day As Needed for Rash.  Dispense: 80 g; Refill: 2

## 2021-07-15 NOTE — PROGRESS NOTES
Patient ID: Abe Phillips Jr. is a 65 y.o. male presenting to [facility] for assessment with Anneliese Nesbitt LCSW.     Time: 9:00 am   Time out: 10:00 am     Description of current emotional/behavioral concerns: Patient states that he has had anxiety since his wife had a major accident and has struggled to recover. Patient states that he has a past ADHD diagnosis, and is now struggling with Narcolepsy. Patient is receiving treatment from a sleep center. Patient fell asleep many times during session, but stated he would prefer to drive home and that he drove himself to the hospital today from [place], [place]. Patient states that he is currently prescribed 90 mg of adderall per day. Patient states that prior to [facility] he was a . He states that he is currently living with his adult daughter, who is an APRN for [employer]. He states that he and his wife have lived with his daughter for some time, and he occasionally will get into conflict with his son in law due to the amount they are paying towards the family's mortgage. He states that he also has an adult son who works in the [employer].     Patient adamantly and convincingly denies current suicidal or homicidal ideation or perceptual disturbance.    Significant Life Events  Has patient been through or witnessed a divorce? no      Has patient experienced a death / loss of relationship? no      Has patient experienced a major accident or tragic events? no      Has patient experienced any other significant life events or trauma (such as verbal, physical, sexual abuse)? no      Work History  Highest level of education obtained: vocational program    Ever been active duty in the ? no    Patient's Occupation: Retired    Legal History  The patient has no significant history of legal issues.    Interpersonal/Relational  Marital Status:   Patient's current living situation: Lives with wife, adult daughter, son  in law, grandchildren  Support system: significant other and extended family  Difficulty getting along with peers: no  Difficulty making new friendships: no  Difficulty maintaining friendships: no  Close with family members: yes    Mental/Behavioral Health History  History of prior treatment or hospitalization: None    Are there any significant health issues (current or past): Narcolepsy, sleep issues     History of seizures: no    Family History   Problem Relation Age of Onset   • Heart failure Mother         CHF   • Osteoarthritis Mother    • Clotting disorder Father    • Diabetes Father    • Hypertension Father    • No Known Problems Sister    • No Known Problems Brother    • No Known Problems Brother        Current Medications:   Current Outpatient Medications   Medication Sig Dispense Refill   • amphetamine-dextroamphetamine (ADDERALL) 15 MG tablet Take 2 tablets by mouth Daily. 120 tablet 0   • amphetamine-dextroamphetamine (ADDERALL) 30 MG tablet Take 2 tablets by mouth Every Morning. 120 tablet 0   • aspirin 81 MG EC tablet TAKE 1 TABLET BY MOUTH EVERY DAY 90 tablet 1   • Blood Glucose Monitoring Suppl (Contour Next Monitor) w/Device kit 1 kit Every 3 (Three) Months. Use to check bg 1 kit 0   • bumetanide (BUMEX) 2 MG tablet Take 2 tablets by mouth 2 (Two) Times a Day. 120 tablet 5   • Continuous Blood Gluc Sensor (Dexcom G6 Sensor) Every 10 (Ten) Days. 3 each 11   • Continuous Blood Gluc Transmit (Dexcom G6 Transmitter) misc 1 kit Every 3 (Three) Months. Use to check blood sugar 1 each 3   • diclofenac (VOLTAREN) 1 % gel gel Apply 4 g topically to the appropriate area as directed 2 (Two) Times a Day As Needed (joint pain). 100 g 1   • doxycycline (VIBRAMYCIN) 100 MG capsule Take 1 capsule by mouth 2 (Two) Times a Day. 20 capsule 0   • gabapentin (NEURONTIN) 300 MG capsule TAKE THREE CAPSULES BY MOUTH TWICE A  capsule 2   • Insulin Disposable Pump (OmniPod Dash 5 Pack Pods) misc CHANGE POD EVERY 3  DAYS     • levocetirizine (XYZAL) 5 MG tablet Take 1 tablet by mouth Every Evening. 90 tablet 1   • levothyroxine (SYNTHROID, LEVOTHROID) 75 MCG tablet TAKE ONE TABLET BY MOUTH DAILY 90 tablet 3   • loratadine (CLARITIN) 10 MG tablet Take 1 tablet by mouth Daily. Patient instructed to d/c Xyzal while taking 30 tablet 0   • metOLazone (ZAROXOLYN) 5 MG tablet Take 2 tablets by mouth Daily As Needed (edema). 180 tablet 3   • montelukast (Singulair) 10 MG tablet Take 1 tablet by mouth Every Night. 30 tablet 5   • mupirocin (BACTROBAN) 2 % ointment Apply  topically to the appropriate area as directed 3 (Three) Times a Day. (Patient taking differently: Apply  topically to the appropriate area as directed 3 (Three) Times a Day As Needed.) 30 g 0   • nystatin (MYCOSTATIN) 632672 UNIT/GM ointment Apply 1 application topically to the appropriate area as directed As Needed.     • OXcarbazepine (TRILEPTAL) 300 MG tablet Take 2 tablets by mouth 2 (Two) Times a Day. (Patient taking differently: Take 300 mg by mouth 2 (Two) Times a Day.) 120 tablet 6   • potassium chloride 10 MEQ CR tablet Take 4 tablets by mouth 2 (Two) Times a Day. 720 tablet 3   • pravastatin (PRAVACHOL) 40 MG tablet Take 1 tablet by mouth every night at bedtime. 90 tablet 3   • psyllium (METAMUCIL) 58.6 % packet Take 1 packet by mouth Daily As Needed.     • spironolactone (ALDACTONE) 100 MG tablet Take 0.5 tablets by mouth Daily. 30 tablet 6   • traZODone (DESYREL) 50 MG tablet Take 1 tablet by mouth At Night As Needed for Sleep. 90 tablet 2   • triamcinolone (KENALOG) 0.1 % cream Apply  topically to the appropriate area as directed 2 (Two) Times a Day As Needed for Rash. 80 g 2   • venlafaxine XR (EFFEXOR-XR) 150 MG 24 hr capsule Take 1 capsule by mouth Every Night. 90 capsule 2     No current facility-administered medications for this visit.       History of Substance Use:   Patient answered no  to experiencing two or more of the following problems related  to substance use: using more than intended or over longer period than intended; difficulty quitting or cutting back use; spending a great deal of time obtaining, using, or recovering from using; craving or strong desire or urge to use;  work and/or school problems; financial problems; family problems; using in dangerous situations; physical or mental health problems; relapse; feelings of guilt or remorse about use; times when used and/or drank alone; needing to use more in order to achieve the desired effect; illness or withdrawal when stopping or cutting back use; using to relieve or avoid getting ill or developing withdrawal symptoms; and black outs and/or memory issues when using.        Substance Age Frequency Amount Method Last use   Nicotine        Alcohol        Marijuana        Benzo        Pain Pills        Cocaine        Meth        Heroin        Suboxone        Synthetics/Other:                SUICIDE RISK ASSESSMENT/CSSRS  1. Does patient have thoughts of suicide? no  2. Does patient have intent for suicide? no  3. Does patient have a current plan for suicide? no  4. History of suicide attempts: no  5. Family history of suicide or attempts: no  6. History of violent behaviors towards others or property or thoughts of committing suicide: no  7. History of sexual aggression toward others: no  8. Access to firearms or weapons: no    Mental Status Exam:   Hygiene:   good  Cooperation:  Cooperative  Eye Contact:  Good  Psychomotor Behavior:  Appropriate  Affect:  Full range  Mood: normal  Hopelessness: Denies  Speech:  Normal  Thought Process:  Goal directed  Thought Content:  Normal  Suicidal:  None  Homicidal:  None  Hallucinations:  None  Delusion:  None  Memory:  Intact  Orientation:  Person, Place, Time and Situation  Reliability:  good  Insight:  Good  Judgement:  Good  Impulse Control:  Good    Impression/Formulation:    VISIT DIAGNOSIS:     ICD-10-CM ICD-9-CM   1. Generalized anxiety disorder  F41.1  300.02        Patient appeared alert and oriented.  Patient is voluntarily requesting to begin outpatient therapy at [facility].  Patient is receptive to assistance with maintaining a stable lifestyle.  Patient presents with history of anxiety.  Patient is agreeable to attend routine therapy sessions.  Patient expressed desire to maintain stability and participate in the therapeutic process.        Crisis Plan:  Symptoms and/or behaviors to indicate a crisis: Excessive worry or fear    What calming techniques or other strategies will patient use to de-esclate and stay safe: slow down, breathe, visualize calming self, think it though, listen to music, change focus, take a walk    Who is one person patient can contact to assist with de-escalation? His wife, daughter    If symptoms/behaviors persist, patient will present to the nearest hospital for an assessment. Advised patient of [facility] 24/7 assessment services.       Plan:   Obtain release of information for current treatment team for continuity of care  Patient will adhere to medication regimen as prescribed and report any side effects. Patient will contact this office, call 911 or present to the nearest emergency room should suicidal or homicidal ideations occur.  Begin psychotherapy         This document has been electronically signed by JENNY Nj, GAUDENCIO  July 15, 2021 15:28 EDT    Part of this note may be an electronic transcription/translation of spoken language to printed text using the Dragon Dictation System.

## 2021-07-29 PROBLEM — E87.6 HYPOKALEMIA: Status: ACTIVE | Noted: 2021-01-01

## 2021-07-29 PROBLEM — R77.8 ELEVATED TROPONIN: Status: ACTIVE | Noted: 2021-01-01

## 2021-07-29 PROBLEM — R79.89 ELEVATED TROPONIN: Status: ACTIVE | Noted: 2021-01-01

## 2021-07-29 PROBLEM — L03.119 LOWER EXTREMITY CELLULITIS: Status: ACTIVE | Noted: 2021-01-01

## 2021-08-01 PROBLEM — L03.119 LOWER EXTREMITY CELLULITIS: Status: RESOLVED | Noted: 2021-01-01 | Resolved: 2021-01-01

## 2021-08-01 PROBLEM — R79.89 ELEVATED TROPONIN: Status: RESOLVED | Noted: 2021-01-01 | Resolved: 2021-01-01

## 2021-08-01 PROBLEM — R79.89 ELEVATED LFTS: Status: RESOLVED | Noted: 2019-01-04 | Resolved: 2021-01-01

## 2021-08-01 PROBLEM — E87.6 HYPOKALEMIA: Status: RESOLVED | Noted: 2021-01-01 | Resolved: 2021-01-01

## 2021-08-01 PROBLEM — R77.8 ELEVATED TROPONIN: Status: RESOLVED | Noted: 2021-01-01 | Resolved: 2021-01-01

## 2021-08-01 NOTE — PLAN OF CARE
Goal Outcome Evaluation:  Plan of Care Reviewed With: patient        Progress: improving  Outcome Summary: VSS. Pt remains on RA with no signs of SOA. Pt remains in A. Fib on the monitor. Pt has not complained of any pain during shift. Patient's dorsalis pulses are palpatible tonight. Pt walked to the bathroom and around in the room a little bit at the begining of the shift. Pt refused his dressing change this morning. Pt stated wound care does it. I informed day shift about it.

## 2021-08-01 NOTE — DISCHARGE INSTR - APPOINTMENTS
Anibal Maria MD  PCP - General Internal Medicine [phone]  [address]  [address] [address]     Instructions: 1 week      ****call Monday to schedule appointment.        Wound Care - continue to follow with [facility]

## 2021-08-01 NOTE — DISCHARGE SUMMARY
[facility] Medicine Services  DISCHARGE SUMMARY    Patient Name: Abe Phillips Jr.  : 1955  MRN: [id number]    Date of Admission: 2021  2:27 PM  Date of Discharge:  21  Primary Care Physician: Anibal Maria MD    Consults     Date and Time Order Name Status Description    2021 10:49 PM Inpatient Cardiology Consult Completed           Hospital Course     Presenting Problem:   Elevated troponin [R77.8]    Active Hospital Problems    Diagnosis  POA   • Diabetic ulcer of right heel associated with type 2 diabetes mellitus (CMS/HCC) [E11.621, L97.419]  Yes   • Lymphedema [I89.0]  Yes   • Severe JAIME on CPAP [G47.33, Z99.89]  Not Applicable   • Hepatitis B [B19.10]  Yes   • MCCAULEY (nonalcoholic steatohepatitis) [K75.81]  Yes   • Essential hypertension [I10]  Yes   • Restless legs syndrome (RLS) [G25.81]  Yes   • Morbidly obese (CMS/HCC) [E66.01]  Yes   • Type 2 diabetes mellitus with hyperglycemia (CMS/HCC) [E11.65]  Yes   • PVD (peripheral vascular disease) (CMS/HCC) [I73.9]  Yes   • A-fib (CMS/HCC) [I48.91]  Yes   • Chronic congestive heart failure (CMS/HCC) [I50.9]  Yes   • Primary narcolepsy without cataplexy [G47.419]  Yes      Resolved Hospital Problems    Diagnosis Date Resolved POA   • **Lower extremity cellulitis [L03.119] 2021 Unknown   • Hypokalemia [E87.6] 2021 Unknown   • Elevated troponin [R77.8] 2021 Yes   • Elevated LFTs [R79.89] 2021 Yes          Hospital Course:  Abe Phillips Jr. is a 66 y.o. male with hx of Afib, CHF, HTN, HLD, DM2, MCCAULEY cirrhosis, PVD, JAIME (not using CPAP), narcolepsy, chronic lymphedema, and obesity who presents due to BLE redness, swelling, and warmth x 4 days.      BLE cellulitis  Chronic lymphedema  --Likely secondary to edema.  --He is afebrile, low procal, normal WBC.  --Received Rocephin/Doxycyline, will switch to PO ATBx today (Keflex and Doxycycline to complete 7 days total.  --PT wound care following.  They have wrapped his legs. He sees wound care as outpatient every Friday.   --Edema improved with increased dose of IV Bumex. Renal function stable. Resume home diuretics at discharge.      Elevated troponin  Hx of Afib  CHF, presumed diastolic  --Troponin 0.079-->0.048-->0.049.  --EKG no ischemic findings.   --Echo done and pending at time of discharge.  --Currently in NSR.  --Cardiology saw the patient. Requested records from last heart cath in 2016 in [place].   --He sees Dr. Orozco on 9/29/21. Can follow up on Echo at that time.     Hypokalemia  --Replace per protocol.  --He takes 40 mEq BID at home--was not restarted here.  --Worse after increased IV Bumex.   --Will ensure K is increasing prior to discharge home today.     DM2  --HbA1c 7.2%.  --Patient using his insulin pump.   --DM educator has seen.     MCCAULEY cirrhosis  --Compensated currently.      Narcolepsy   --Patient taking home supply of Adderall.      Morbid obesity  --Complicates all aspects of care.    Discharge Follow Up Recommendations for outpatient labs/diagnostics:  F/U with PCP in 1 week    Day of Discharge     HPI:   Patient seen this morning. Legs feel and look much better. PT wrapped them yesterday. He feels well and wants to go home.    Review of Systems  Gen-no fevers, no chills  CV-no chest pain, no palpitations  Resp-no cough, no dyspnea  GI-no N/V/D, no abd pain    All other systems reviewed and negative except any additional pertinent positives and negatives as discussed in HPI.      Vital Signs:   Temp:  [97.3 °F (36.3 °C)-97.8 °F (36.6 °C)] 97.3 °F (36.3 °C)  Heart Rate:  [65-95] 70  Resp:  [17-18] 18  BP: (106-135)/(64-95) 135/70     Physical Exam:  Gen-no acute distress  HENT-NCAT, mucous membranes moist  CV-RRR, S1 S2 normal, no m/r/g  Resp-CTAB, no wheezes or rales  Abd-soft, NT, ND, +BS  Ext-significantly improved BLE edema  Neuro-A&Ox3, no focal deficits  Skin-currently has legs wrapped  Psych-appropriate mood    Pertinent   and/or Most Recent Results     LAB RESULTS:      Lab 08/01/21  0719 07/31/21 0401 07/30/21  0504 07/29/21  2200 07/29/21  1557 07/29/21  1509   WBC 5.74 5.37 5.84  --   --  6.71   HEMOGLOBIN 11.6* 11.7* 10.9*  --   --  11.7*   HEMATOCRIT 36.4* 36.6* 34.8*  --   --  36.1*   PLATELETS 133* 128* 118*  --   --  132*   NEUTROS ABS  --   --  4.12  --   --  4.87   IMMATURE GRANS (ABS)  --   --  0.10*  --   --  0.09*   LYMPHS ABS  --   --  0.90  --   --  1.14   MONOS ABS  --   --  0.56  --   --  0.50   EOS ABS  --   --  0.13  --   --  0.09   MCV 91.5 90.1 92.8  --   --  89.8   PROCALCITONIN  --   --   --   --   --  0.08   LACTATE  --   --   --   --   --  0.9   PROTIME  --   --   --   --  14.2  --    D DIMER QUANT  --   --   --  0.42  --   --          Lab 08/01/21  0719 07/31/21 1957 07/31/21 0757 07/31/21  0401 07/30/21  0504 07/29/21  1509   SODIUM 131*  --   --  132* 135* 136   POTASSIUM 2.9* 3.5  --  3.3* 2.4* 2.8*   CHLORIDE 93*  --   --  93* 92* 93*   CO2 27.0  --   --  25.0 30.0* 28.0   ANION GAP 11.0  --   --  14.0 13.0 15.0   BUN 50*  --   --  47* 44* 45*   CREATININE 1.08  --   --  1.19 1.13 1.12   GLUCOSE 113*  --   --  124* 150* 92   CALCIUM 9.5  --   --  9.2 9.2 9.2   MAGNESIUM  --   --  2.5* 1.9 1.7 1.8   HEMOGLOBIN A1C  --   --   --   --  7.20*  --          Lab 07/29/21  1509   TOTAL PROTEIN 6.9   ALBUMIN 3.60   GLOBULIN 3.3   ALT (SGPT) 26   AST (SGOT) 38   BILIRUBIN 0.7   ALK PHOS 136*         Lab 07/31/21  0401 07/29/21  2200 07/29/21  1952 07/29/21  1557 07/29/21  1509   PROBNP 432.9  --  626.0  --  729.9   TROPONIN T  --  0.049* 0.048*  --  0.079*   PROTIME  --   --   --  14.2  --    INR  --   --   --  1.14  --                  Brief Urine Lab Results  (Last result in the past 365 days)      Color   Clarity   Blood   Leuk Est   Nitrite   Protein   CREAT   Urine HCG        07/29/21 1557 Yellow Clear Negative Negative Negative Negative             Microbiology Results (last 10 days)     Procedure  Component Value - Date/Time    Blood Culture - Blood, Hand, Left [[id number]] Collected: 07/29/21 1836    Lab Status: Preliminary result Specimen: Blood from Hand, Left Updated: 07/31/21 1846     Blood Culture No growth at 2 days    Blood Culture - Blood, Arm, Left [[id number]] Collected: 07/29/21 1830    Lab Status: Preliminary result Specimen: Blood from Arm, Left Updated: 07/31/21 1900     Blood Culture No growth at 2 days    COVID PRE-OP / PRE-PROCEDURE SCREENING ORDER (NO ISOLATION) - Swab, Nasopharynx [[id number]] Collected: 07/29/21 1745    Lab Status: Final result Specimen: Swab from Nasopharynx Updated: 07/30/21 1256    Narrative:      The following orders were created for panel order COVID PRE-OP / PRE-PROCEDURE SCREENING ORDER (NO ISOLATION) - Swab, Nasopharynx.  Procedure                               Abnormality         Status                     ---------                               -----------         ------                     COVID-19 PCR, [facility]...[[id number]]                      Final result                 Please view results for these tests on the individual orders.    COVID-19 PCR, [facility], NP SWAB IN [facility] VIRAL TRANSPORT MEDIA/ORAL SWISH 24-30 HR TAT - Swab, Nasopharynx [[id number]] Collected: 07/29/21 1745    Lab Status: Final result Specimen: Swab from Nasopharynx Updated: 07/30/21 1256     SARS-CoV-2 ANAND Not Detected          XR Chest 1 View    Result Date: 7/29/2021  EXAMINATION: XR CHEST 1 VW-07/29/2021:  INDICATION: LE edema.  COMPARISON: Chest x-ray 05/12/2020.  FINDINGS:  AP view of the chest reveals cardiac size within normal limits. Pulmonary vascularity within normal limits. No focal opacification or consolidation. No pneumothorax or effusion.      No overt edema or effusion.  D:  07/29/2021 E:  07/29/2021        Duplex Venous Lower Extremity - Bilateral    Result Date: 7/30/2021  · Normal bilateral lower extremity venous duplex scan.        Results for orders placed during  the hospital encounter of 07/29/21    Duplex Venous Lower Extremity - Bilateral    Interpretation Summary  · Normal bilateral lower extremity venous duplex scan.      Results for orders placed during the hospital encounter of 07/29/21    Duplex Venous Lower Extremity - Bilateral    Interpretation Summary  · Normal bilateral lower extremity venous duplex scan.      Results for orders placed during the hospital encounter of 01/04/19    Adult Transthoracic Echo Complete W/ Cont if Necessary Per Protocol    Interpretation Summary  · Left ventricular systolic function is hyperdynamic (EF > 70).        Pending Labs     Order Current Status    Blood Culture - Blood, Arm, Left Preliminary result    Blood Culture - Blood, Hand, Left Preliminary result        Discharge Details        Discharge Medications      New Medications      Instructions Start Date   cephalexin 500 MG capsule  Commonly known as: Keflex   500 mg, Oral, 2 Times Daily      doxycycline 100 MG capsule  Commonly known as: MONODOX   100 mg, Oral, Every 12 Hours Scheduled         Changes to Medications      Instructions Start Date   OXcarbazepine 300 MG tablet  Commonly known as: TRILEPTAL  What changed: how much to take   600 mg, Oral, 2 Times Daily         Continue These Medications      Instructions Start Date   amphetamine-dextroamphetamine 15 MG tablet  Commonly known as: ADDERALL   30 mg, Oral, Daily      amphetamine-dextroamphetamine 30 MG tablet  Commonly known as: ADDERALL   60 mg, Oral, Every Morning      aspirin 81 MG EC tablet   TAKE 1 TABLET BY MOUTH EVERY DAY      bumetanide 2 MG tablet  Commonly known as: BUMEX   4 mg, Oral, 2 Times Daily      Contour Next Monitor w/Device kit   1 kit, Does not apply, Every 3 Months, Use to check bg      Dexcom G6 Sensor   Does not apply, Every 10 Days      Dexcom G6 Transmitter misc   1 kit, Does not apply, Every 3 Months, Use to check blood sugar      gabapentin 300 MG capsule  Commonly known as: NEURONTIN    TAKE THREE CAPSULES BY MOUTH TWICE A DAY      levocetirizine 5 MG tablet  Commonly known as: XYZAL   5 mg, Oral, Every Evening      levothyroxine 75 MCG tablet  Commonly known as: SYNTHROID, LEVOTHROID   TAKE ONE TABLET BY MOUTH DAILY      loratadine 10 MG tablet  Commonly known as: CLARITIN   10 mg, Oral, Daily, Patient instructed to d/c Xyzal while taking      metOLazone 5 MG tablet  Commonly known as: ZAROXOLYN   10 mg, Oral, Daily PRN      montelukast 10 MG tablet  Commonly known as: Singulair   10 mg, Oral, Nightly      nystatin 851725 UNIT/GM ointment  Commonly known as: MYCOSTATIN   1 application, Topical, As Needed      OmniPod Dash 5 Pack Pods misc   CHANGE POD EVERY 3 DAYS      potassium chloride 10 MEQ CR tablet   40 mEq, Oral, 2 Times Daily      pramipexole 1 MG tablet  Commonly known as: MIRAPEX   1 mg, Oral, 2 Times Daily      pravastatin 40 MG tablet  Commonly known as: PRAVACHOL   40 mg, Oral, Every Night at Bedtime      spironolactone 100 MG tablet  Commonly known as: ALDACTONE   50 mg, Oral, Daily      traZODone 50 MG tablet  Commonly known as: DESYREL   50 mg, Oral, Nightly PRN      triamcinolone 0.1 % cream  Commonly known as: KENALOG   Topical, 2 Times Daily PRN      venlafaxine  MG 24 hr capsule  Commonly known as: EFFEXOR-XR   150 mg, Oral, Nightly         Stop These Medications    mupirocin 2 % ointment  Commonly known as: BACTROBAN            No Known Allergies      Discharge Disposition:  Home or Self Care    Diet:  Hospital:  Diet Order   Procedures   • Diet Regular; Thin; Cardiac, Consistent Carbohydrate       Activity:  Activity Instructions     Activity as Tolerated                   CODE STATUS:    Code Status and Medical Interventions:   Ordered at: 07/29/21 1441     Level Of Support Discussed With:    Patient     Code Status:    CPR     Medical Interventions (Level of Support Prior to Arrest):    Full       Future Appointments   Date Time Provider Department Center   8/2/2021   2:00 PM Delta Miller MD [facility]   8/3/2021  1:30 PM Sushil Ahumada MD [facility]   8/23/2021 10:00 AM Anneliese Nesbitt LCSW [facility]   8/24/2021  1:30 PM Anibal Maria MD [facility]   9/7/2021 10:00 AM nAneliese Nesbitt LCSW [facility]   9/29/2021  2:45 PM Ld Orozco MD [facility]       Additional Instructions for the Follow-ups that You Need to Schedule     Discharge Follow-up with PCP   As directed       Currently Documented PCP:    Anibal Maria MD    PCP Phone Number:    [phone]     Follow Up Details: 1 week                     Elizabeth Suggs MD  08/01/21      Time Spent on Discharge:  I spent  33 minutes on this discharge activity which included: face-to-face encounter with the patient, reviewing the data in the system, coordination of the care with the nursing staff as well as consultants, documentation, and entering orders.

## 2021-08-01 NOTE — CASE MANAGEMENT/SOCIAL WORK
Continued Stay Note  [facility]     Patient Name: Abe Phillips Jr.  MRN: [id number]  Today's Date: 8/1/2021    Admit Date: 7/29/2021    Discharge Plan     Row Name 08/01/21 1201       Plan    Plan  discharge plan    Plan Comments  Plan home with family.  No discharge needs identified.    Final Discharge Disposition Code  01 - home or self-care    Row Name 08/01/21 1200       Plan    Final Discharge Disposition Code  01 - home or self-care        Discharge Codes    No documentation.       Expected Discharge Date and Time     Expected Discharge Date Expected Discharge Time    Aug 1, 2021             Jana Francisco RN

## 2021-08-01 NOTE — OUTREACH NOTE
Prep Survey      Responses   [facility] patient discharged from?  [facility]   Is LACE score < 7 ?  No   Emergency Room discharge w/ pulse ox?  No   Eligibility  [facility]   Date of Admission  07/29/21   Date of Discharge  08/01/21   Discharge Disposition  Home or Self Care   Discharge diagnosis  Lower extremity cellulitis    Does the patient have one of the following disease processes/diagnoses(primary or secondary)?  Other   Does the patient have Home health ordered?  No   Is there a DME ordered?  No   Prep survey completed?  Yes          Brenda Coronado RN

## 2021-08-01 NOTE — PLAN OF CARE
Goal Outcome Evaluation:      VSS on room air, patient discharging home with discharge instructions and patient supplied medication.

## 2021-08-02 NOTE — PROGRESS NOTES
[facility] Note    Subjective     Chief Complaint   Patient presents with   • Follow-up     Primary osteoarthritis of right knee; Orthovisc series completed 02.01.2021        HPI    Abe Phillips Jr. is a 66 y.o. male who follows up for right knee arthritis. He finished a viscosupplementation injection series on 02/01/2021.    Today, the patient reports that the viscosupplementation injections were beneficial. He is not having any pain in his knees at this time. He reports that his only issue currently is the difficulty he experiences with rising from a seated position. He notes that his knee is better compared to before the injections.    I have reviewed the following portions of the patient's history and agree with: History of Present Illness and Review of Systems    Patient Active Problem List   Diagnosis   • Type 2 diabetes mellitus with hyperglycemia (CMS/HCC)   • Thrombocytopenia (CMS/HCC)   • PVD (peripheral vascular disease) (CMS/HCC)   • A-fib (CMS/HCC)   • Cirrhosis of liver (CMS/HCC)   • Chronic congestive heart failure (CMS/HCC)   • Diabetic ulcer of right heel (CMS/HCC)   • Primary narcolepsy without cataplexy   • Essential hypertension   • Morbidly obese (CMS/HCC)   • Restless legs syndrome (RLS)   • ADD (attention deficit disorder)   • Depression   • Diabetes mellitus type 2, uncontrolled, with complications (CMS/HCC)   • Edema   • MCCAULEY (nonalcoholic steatohepatitis)   • Hepatitis B   • Insomnia   • Lymphedema   • Obesity   • Severe JAIME on CPAP   • RLS (restless legs syndrome)   • Tremor of both hands   • Restrictive cardiomyopathy (CMS/HCC)   • Other hyperlipidemia   • Uncontrolled type 2 diabetes mellitus with hyperglycemia (CMS/HCC)   • OMAR (acute kidney injury) (CMS/HCC)   • Dyslipidemia   • Hyperglycemia   • Diabetic ulcer of right heel associated with type 2 diabetes mellitus (CMS/HCC)   • Facial paralysis/[place] palsy   • Diabetic hypoglycemia (CMS/HCC)   • Hypogonadism in  male   • Atypical facial pain     Past Medical History:   Diagnosis Date   • A-fib (CMS/HCC)    • ADD (attention deficit disorder)    • Atrial flutter (CMS/HCC)    • Cellulitis    • CHF (congestive heart failure) (CMS/HCC)    • Cirrhosis of liver (CMS/HCC)    • Constipation    • Depression    • Diabetes mellitus (CMS/HCC)    • Diabetic ulcer of right foot (CMS/HCC)    • Disease of thyroid gland    • Edema    • Fatty liver    • Hepatitis B    • Hyperlipidemia    • Hypokalemia    • Insomnia    • Lymphedema    • Narcolepsy    • Neuropathy    • Obesity    • JAIME on CPAP    • PVD (peripheral vascular disease) (CMS/HCC)    • RLS (restless legs syndrome)    • Skin cancer    • Tardive dyskinesia    • Tremor of both hands    • Type 2 diabetes mellitus (CMS/HCC)    • Yeast infection       Past Surgical History:   Procedure Laterality Date   • ACHILLES TENDON REPAIR     • CARDIAC ABLATION     • CHOLECYSTECTOMY     • LASER ABLATION      VEIN RLE      Family History   Problem Relation Age of Onset   • Heart failure Mother         CHF   • Osteoarthritis Mother    • Clotting disorder Father    • Diabetes Father    • Hypertension Father    • No Known Problems Sister    • No Known Problems Brother    • No Known Problems Brother    • No Known Problems Daughter      Social History     Socioeconomic History   • Marital status:      Spouse name: Not on file   • Number of children: Not on file   • Years of education: Not on file   • Highest education level: Not on file   Tobacco Use   • Smoking status: Never Smoker   • Smokeless tobacco: Never Used   Vaping Use   • Vaping Use: Never used   Substance and Sexual Activity   • Alcohol use: Yes     Comment: reports 1-2 drinks a week    • Drug use: No   • Sexual activity: Defer      Current Outpatient Medications on File Prior to Visit   Medication Sig Dispense Refill   • amphetamine-dextroamphetamine (ADDERALL) 15 MG tablet Take 2 tablets by mouth Daily. 120 tablet 0   •  amphetamine-dextroamphetamine (ADDERALL) 30 MG tablet Take 2 tablets by mouth Every Morning. 120 tablet 0   • aspirin 81 MG EC tablet TAKE 1 TABLET BY MOUTH EVERY DAY 90 tablet 1   • Blood Glucose Monitoring Suppl (Contour Next Monitor) w/Device kit 1 kit Every 3 (Three) Months. Use to check bg 1 kit 0   • bumetanide (BUMEX) 2 MG tablet Take 2 tablets by mouth 2 (Two) Times a Day. 120 tablet 5   • cephalexin (Keflex) 500 MG capsule Take 1 capsule by mouth 2 (Two) Times a Day for 4 days. 8 capsule 0   • Continuous Blood Gluc Sensor (Dexcom G6 Sensor) Every 10 (Ten) Days. 3 each 11   • Continuous Blood Gluc Transmit (Dexcom G6 Transmitter) misc 1 kit Every 3 (Three) Months. Use to check blood sugar 1 each 3   • doxycycline (MONODOX) 100 MG capsule Take 1 capsule by mouth Every 12 (Twelve) Hours for 4 days. Indications: Infection of the Skin and/or Soft Tissue 8 capsule 0   • erythromycin (ROMYCIN) 5 MG/GM ophthalmic ointment      • gabapentin (NEURONTIN) 300 MG capsule TAKE THREE CAPSULES BY MOUTH TWICE A  capsule 2   • Insulin Disposable Pump (OmniPod Dash 5 Pack Pods) misc CHANGE POD EVERY 3 DAYS     • levocetirizine (XYZAL) 5 MG tablet Take 1 tablet by mouth Every Evening. 90 tablet 1   • levothyroxine (SYNTHROID, LEVOTHROID) 75 MCG tablet TAKE ONE TABLET BY MOUTH DAILY 90 tablet 3   • loratadine (CLARITIN) 10 MG tablet Take 1 tablet by mouth Daily. Patient instructed to d/c Xyzal while taking 30 tablet 0   • metOLazone (ZAROXOLYN) 5 MG tablet Take 2 tablets by mouth Daily As Needed (edema). 180 tablet 3   • montelukast (Singulair) 10 MG tablet Take 1 tablet by mouth Every Night. 30 tablet 5   • nystatin (MYCOSTATIN) 983279 UNIT/GM ointment Apply 1 application topically to the appropriate area as directed As Needed.     • ofloxacin (OCUFLOX) 0.3 % ophthalmic solution      • olopatadine (PATANOL) 0.1 % ophthalmic solution      • OXcarbazepine (TRILEPTAL) 300 MG tablet Take 2 tablets by mouth 2 (Two) Times a  Day. (Patient taking differently: Take 300 mg by mouth 2 (Two) Times a Day.) 120 tablet 6   • potassium chloride 10 MEQ CR tablet Take 4 tablets by mouth 2 (Two) Times a Day. 720 tablet 3   • pramipexole (MIRAPEX) 1 MG tablet Take 1 mg by mouth 2 (Two) Times a Day.     • pravastatin (PRAVACHOL) 40 MG tablet Take 1 tablet by mouth every night at bedtime. 90 tablet 3   • spironolactone (ALDACTONE) 100 MG tablet Take 0.5 tablets by mouth Daily. 30 tablet 6   • tobramycin-dexamethasone (TOBRADEX) 0.3-0.1 % ophthalmic suspension      • traZODone (DESYREL) 50 MG tablet Take 1 tablet by mouth At Night As Needed for Sleep. 90 tablet 2   • triamcinolone (KENALOG) 0.1 % cream Apply  topically to the appropriate area as directed 2 (Two) Times a Day As Needed for Rash. 80 g 2   • venlafaxine XR (EFFEXOR-XR) 150 MG 24 hr capsule Take 1 capsule by mouth Every Night. 90 capsule 2     Current Facility-Administered Medications on File Prior to Visit   Medication Dose Route Frequency Provider Last Rate Last Admin   • [DISCONTINUED] acetaminophen (TYLENOL) 160 MG/5ML solution 650 mg  650 mg Oral Q4H PRN Sheila Marie APRN       • [DISCONTINUED] acetaminophen (TYLENOL) suppository 650 mg  650 mg Rectal Q4H PRN Sheila Marie APRN       • [DISCONTINUED] acetaminophen (TYLENOL) tablet 650 mg  650 mg Oral Q4H PRN Sheila Marie APRN   650 mg at 07/31/21 1148   • [DISCONTINUED] amphetamine-dextroamphetamine (ADDERALL) tablet 60 mg **PATIENT SUPPLIED**  60 mg Oral Daily Elizabeth Suggs MD       • [DISCONTINUED] aspirin EC tablet 81 mg  81 mg Oral Daily Sheila Marie APRN   81 mg at 08/01/21 0824   • [DISCONTINUED] bisacodyl (DULCOLAX) EC tablet 5 mg  5 mg Oral Daily PRN Sheila Marie APRN   5 mg at 08/01/21 0824   • [DISCONTINUED] bisacodyl (DULCOLAX) suppository 10 mg  10 mg Rectal Daily PRN Sheila Marie, APRN       • [DISCONTINUED] bumetanide (BUMEX) injection 2  mg  2 mg Intravenous Q12H Elizabeth Suggs MD   2 mg at 08/01/21 0825   • [DISCONTINUED] cefTRIAXone (ROCEPHIN) IVPB 1 g  1 g Intravenous Q24H Sheila Marie APRN 100 mL/hr at 08/01/21 1454 1 g at 08/01/21 1454   • [DISCONTINUED] cetirizine (zyrTEC) tablet 10 mg  10 mg Oral Daily Sheila Marie APRN   10 mg at 08/01/21 0824   • [DISCONTINUED] dextrose (D50W) 25 g/ 50mL Intravenous Solution 25 g  25 g Intravenous Q15 Min PRN Sheila Marie APRN       • [DISCONTINUED] dextrose (GLUTOSE) oral gel 15 g  15 g Oral Q15 Min PRN Sheila Marie APRN       • [DISCONTINUED] doxycycline (MONODOX) capsule 100 mg  100 mg Oral Q12H Cristofer Rivera MD   100 mg at 08/01/21 0824   • [DISCONTINUED] enoxaparin (LOVENOX) syringe 30 mg  30 mg Subcutaneous Q12H Cesar Hanks PharmD   30 mg at 08/01/21 0825   • [DISCONTINUED] gabapentin (NEURONTIN) capsule 900 mg  900 mg Oral BID Sheila Marie APRN   900 mg at 08/01/21 0836   • [DISCONTINUED] glucagon (human recombinant) (GLUCAGEN DIAGNOSTIC) injection 1 mg  1 mg Subcutaneous Q15 Min PRN Sheila Marie APRN       • [DISCONTINUED] levothyroxine (SYNTHROID, LEVOTHROID) tablet 75 mcg  75 mcg Oral Daily Sheila Marie APRN   75 mcg at 08/01/21 0552   • [DISCONTINUED] Magnesium Sulfate 2 gram / 50mL Infusion (GIVE X 3 BAGS TO EQUAL 6GM TOTAL DOSE) - Mg 1.1 - 1.5 mg/dl  2 g Intravenous PRN Cristofer Rivera MD       • [DISCONTINUED] Magnesium Sulfate 2 gram Bolus, followed by 8 gram infusion (total Mg dose 10 grams)- Mg less than or equal to 1mg/dL  2 g Intravenous PRN Cristofer Rivera MD       • [DISCONTINUED] Magnesium Sulfate 4 gram infusion- Mg 1.6-1.9 mg/dL  4 g Intravenous PRN Cristofer Rivera MD 25 mL/hr at 07/31/21 0402 4 g at 07/31/21 0402   • [DISCONTINUED] metOLazone (ZAROXOLYN) tablet 5 mg  5 mg Oral Daily Elizabeth Suggs MD   5 mg at 08/01/21 0824   • [DISCONTINUED] montelukast (SINGULAIR)  tablet 10 mg  10 mg Oral Nightly Sheila Marie APRN   10 mg at 07/31/21 2219   • [DISCONTINUED] nystatin (MYCOSTATIN) ointment   Topical Q12H Elizabeth Suggs MD   Given at 08/01/21 0824   • [DISCONTINUED] OXcarbazepine (TRILEPTAL) tablet 600 mg  600 mg Oral BID Sheila Marie, ELMA   600 mg at 08/01/21 0825   • [DISCONTINUED] polyethylene glycol (MIRALAX) packet 17 g  17 g Oral Daily PRN Sheila Marie, APRREGINO       • [DISCONTINUED] potassium chloride (KLOR-CON) packet 40 mEq  40 mEq Oral PRN Sheila Marie APRN       • [DISCONTINUED] potassium chloride (MICRO-K) CR capsule 40 mEq  40 mEq Oral PRN Sheila Marie APRN   40 mEq at 08/01/21 1453   • [DISCONTINUED] potassium chloride 10 mEq in 100 mL IVPB  10 mEq Intravenous Q1H PRN Sheila Marie, ELMA       • [DISCONTINUED] pramipexole (MIRAPEX) tablet 1 mg  1 mg Oral Q12H Luis Antonio Ríos MD   1 mg at 08/01/21 0824   • [DISCONTINUED] pravastatin (PRAVACHOL) tablet 40 mg  40 mg Oral Nightly Sheila Marie, ELMA   40 mg at 07/31/21 2219   • [DISCONTINUED] psyllium (METAMUCIL MULTIHEALTH FIBER) 58.12 % packet 1 packet  1 packet Oral Daily Sheila Marie APRN   1 packet at 08/01/21 0825   • [DISCONTINUED] sennosides-docusate (PERICOLACE) 8.6-50 MG per tablet 2 tablet  2 tablet Oral BID Sheila Marie APRN   2 tablet at 08/01/21 0824   • [DISCONTINUED] sodium chloride 0.9 % flush 10 mL  10 mL Intravenous PRN James Duncan MD       • [DISCONTINUED] sodium chloride 0.9 % flush 10 mL  10 mL Intravenous PRN James Duncan MD       • [DISCONTINUED] sodium chloride 0.9 % flush 10 mL  10 mL Intravenous Q12H Sheila Marie APRN   10 mL at 08/01/21 0825   • [DISCONTINUED] sodium chloride 0.9 % flush 10 mL  10 mL Intravenous PRN Sheila Marie, ELMA       • [DISCONTINUED] spironolactone (ALDACTONE) tablet 50 mg  50 mg Oral Daily Frank,  Sheila Meadows, APRN   50 mg at 08/01/21 0824   • [DISCONTINUED] traZODone (DESYREL) tablet 50 mg  50 mg Oral Nightly PRN Sheila Marie, APRN   50 mg at 07/29/21 2041   • [DISCONTINUED] venlafaxine XR (EFFEXOR-XR) 24 hr capsule 150 mg  150 mg Oral Nightly Sheila Marie, APRN   150 mg at 07/31/21 2219      No Known Allergies     Review of Systems   Constitutional: Negative for activity change, appetite change, chills, diaphoresis, fatigue, fever and unexpected weight change.   HENT: Negative for congestion, dental problem, drooling, ear discharge, ear pain, facial swelling, hearing loss, mouth sores, nosebleeds, postnasal drip, rhinorrhea, sinus pressure, sneezing, sore throat, tinnitus, trouble swallowing and voice change.    Eyes: Negative for photophobia, pain, discharge, redness, itching and visual disturbance.   Respiratory: Negative for apnea, cough, choking, chest tightness, shortness of breath, wheezing and stridor.    Cardiovascular: Negative for chest pain, palpitations and leg swelling.   Gastrointestinal: Negative for abdominal distention, abdominal pain, anal bleeding, blood in stool, constipation, diarrhea, nausea, rectal pain and vomiting.   Endocrine: Negative for cold intolerance, heat intolerance, polydipsia, polyphagia and polyuria.   Genitourinary: Negative for decreased urine volume, difficulty urinating, dysuria, enuresis, flank pain, frequency, genital sores, hematuria and urgency.   Musculoskeletal: Positive for arthralgias. Negative for back pain, gait problem, joint swelling, myalgias, neck pain and neck stiffness.   Skin: Negative for color change, pallor, rash and wound.   Allergic/Immunologic: Negative for environmental allergies, food allergies and immunocompromised state.   Neurological: Negative for dizziness, tremors, seizures, syncope, facial asymmetry, speech difficulty, weakness, light-headedness, numbness and headaches.   Hematological: Negative for  "adenopathy. Does not bruise/bleed easily.   Psychiatric/Behavioral: Negative for agitation, behavioral problems, confusion, decreased concentration, dysphoric mood, hallucinations, self-injury, sleep disturbance and suicidal ideas. The patient is not nervous/anxious and is not hyperactive.         Objective      Physical Exam  /70   Pulse 74   Ht 190.5 cm (75\")   Wt (!) 148 kg (326 lb 4.5 oz)   BMI 40.78 kg/m²     Body mass index is 40.78 kg/m².    General:   Mental Status:  Alert   Appearance: Cooperative, in no acute distress   Build and Nutrition: Obese by BMI male   Orientation: Alert and oriented to person, place and time   Posture: Normal   Gait: Slow, nonantalgic    Integument  • Right knee: No skin lesions, rash, or ecchymosis.    Lower Extremities  • Right Knee:  • Tenderness: No medial or lateral joint line tenderness.  • Swelling: None  • Effusion: 1+  • Crepitus: Positive  • Atrophy: None  • Range of motion:  • Extension: 0°  • Flexion: 120°  • Instability: No varus or valgus laxity. Negative anterior drawer.  • Deformities: None    Imaging/Studies  Imaging Results (Last 24 Hours)     ** No results found for the last 24 hours. **            Assessment and Plan     Diagnoses and all orders for this visit:    1. Primary osteoarthritis of right knee (Primary)        1. Primary osteoarthritis of right knee        The patient reports his right knee pain has resolved after completing viscosupplementation injection series for his right knee. As he is not experiencing any pain currently, no further treatment is recommended at this time.    Return in about 6 months (around 2/2/2022).      Scribed for Delta Miller MD by Jolene Robles.  08/02/21   15:57 EDT    I have personally performed the services described in this document as scribed by the above individual, and it is both accurate and complete.  Delta Miller MD  8/3/2021  10:57 EDT    "

## 2021-08-02 NOTE — OUTREACH NOTE
Call Center TCM Note      Responses   [facility] patient discharged from?  [facility]   Does the patient have one of the following disease processes/diagnoses(primary or secondary)?  Other   TCM attempt successful?  Yes   Call start time  1054   Call end time  1057   Discharge diagnosis  Lower extremity cellulitis    Person spoke with today (if not patient) and relationship  Patient   Meds reviewed with patient/caregiver?  Yes   Is the patient having any side effects they believe may be caused by any medication additions or changes?  No   Does the patient have all medications ordered at discharge?  No   What is keeping the patient from filling the prescriptions?  -- [Picking up today]   Nursing Interventions  Nurse provided patient education   Is the patient taking all medications as directed (includes completed medication regime)?  N/A   Does the patient have a primary care provider?   Yes   Does the patient have an appointment with their PCP within 7 days of discharge?  Yes   Comments regarding PCP  [facility] [place] [facility] app[facility] on 8/5/21 at 11:30 AM   Has the patient kept scheduled appointments due by today?  N/A   What is the Home health agency?   No HH   Psychosocial issues?  No   Did the patient receive a copy of their discharge instructions?  Yes   Nursing interventions  Reviewed instructions with patient   What is the patient's perception of their health status since discharge?  Improving   Is the patient/caregiver able to teach back signs and symptoms related to disease process for when to call PCP?  Yes   Is the patient/caregiver able to teach back signs and symptoms related to disease process for when to call 911?  Yes   Is the patient/caregiver able to teach back the hierarchy of who to call/visit for symptoms/problems? PCP, Specialist, Home health nurse, Urgent Care, ED, 911  Yes   If the patient is a current smoker, are they able to teach back resources for cessation?  Not a smoker   TCM call completed?   Yes   Wrap up additional comments  Pt states he is doing better. Pt verified PCP [facility] appt on 8/5/21. No questions/concerns.          Viridiana Paz RN    8/2/2021, 10:59 EDT

## 2021-08-03 PROBLEM — G25.81 RESTLESS LEGS SYNDROME (RLS): Chronic | Status: ACTIVE | Noted: 2019-01-07

## 2021-08-03 NOTE — PROGRESS NOTES
"Chief Complaint  [place] Palsy    Subjective          Abe Phillips Jr. presents to [facility] NEUROLOG[facility]     History of Present Illness    66 y.o. male returns in follow up.  Last visit on 2/2/21 continued OXC.      MRI Brain 8/23/20 normal brain, vessel loop close to L Meckel's cave.        MRI Face, 10/14/2020, no enhancing masses in L cerebellopontinue angle or CN.       Called 3/4/21 reporting increased facial pain.  Increased  mg BID.  Could not tolerate  mg BID    Sharp stabbing on left side of face controlled on  mg BID.     Left sided facial movement slowly returning.  Continues to lubricate left eye.       Left V2 numbness.  Pain in orbit moved down into cheek.  Stopped OXC due to causing excessive sedation.       Sharp stabbing pains in left V2.  Improved by putting pressure on left face.       Problem history:     Two months ago developed tingling left cheek.  Then sharp shooting pains in face worse in the evenings.  Left side of face weakness three weeks ago.  OS vision is clear.  No change in taste or hearing.  Mild numbness in L V2.       Pain is daily in afternoons.  Sharp electrical jolts.  Multiple times a day.  Facial movement with slight improvement.       Taking  mg BID for DMPN.       Reviewed medical records:      Presents with pain in OS.  Started one month previous.  Sudden onset with visual disturbance.  PMH of Bell's Palsy on left.       A1C 14.8 2/13/2020  Cr 1.28  Na 128    Objective   Vital Signs:   /62   Pulse 86   Ht 190.5 cm (75\")   Wt (!) 148 kg (326 lb)   SpO2 98%   BMI 40.75 kg/m²     Physical Exam  Eyes:      Extraocular Movements: EOM normal.      Pupils: Pupils are equal, round, and reactive to light.   Neurological:      Mental Status: He is oriented to person, place, and time.      Gait: Gait is intact.      Deep Tendon Reflexes: Strength normal.   Psychiatric:         Speech: Speech normal.          Neurologic Exam "     Mental Status   Oriented to person, place, and time.   Speech: speech is normal   Level of consciousness: alert  Knowledge: good and consistent with education.   Normal comprehension.     Cranial Nerves   Cranial nerves II through XII intact.     CN II   Visual fields full to confrontation.   Visual acuity: normal  Right visual field deficit: none  Left visual field deficit: none     CN III, IV, VI   Pupils are equal, round, and reactive to light.  Extraocular motions are normal.   Nystagmus: none   Diplopia: none  Ophthalmoparesis: none  Upgaze: normal  Downgaze: normal  Conjugate gaze: present    CN V   Facial sensation intact.   Left facial sensation deficit: forehead and cheeks  Right corneal reflex: normal  Left corneal reflex: normal    CN VII   Right facial weakness: none  Left facial weakness: central    CN VIII   Hearing: intact    CN IX, X   Palate: symmetric  Right gag reflex: normal  Left gag reflex: normal    CN XI   Right sternocleidomastoid strength: normal  Left sternocleidomastoid strength: normal    CN XII   Tongue: not atrophic  Fasciculations: absent  Tongue deviation: none    Motor Exam   Muscle bulk: normal  Overall muscle tone: normal    Strength   Strength 5/5 throughout.     Sensory Exam   Light touch normal.     Gait, Coordination, and Reflexes     Gait  Gait: normal    Tremor   Resting tremor: absent  Intention tremor: absent  Action tremor: absent    Reflexes   Reflexes 2+ except as noted.      Result Review :   The following data was reviewed by: Sushil Ahumada MD on 08/03/2021:  Common labs    Common Labsle 7/30/21 7/30/21 7/30/21 7/31/21 7/31/21 7/31/21 8/1/21 8/1/21 8/1/21    0504 0504 0504 0401 0401 1957 0719 0719 1427   Glucose  150 (A)   124 (A)   113 (A)    BUN  44 (A)   47 (A)   50 (A)    Creatinine  1.13   1.19   1.08    eGFR Non  Am  65   61   68    Sodium  135 (A)   132 (A)   131 (A)    Potassium  2.4 (A)   3.3 (A) 3.5  2.9 (A) 3.3 (A)   Chloride  92 (A)   93 (A)    93 (A)    Calcium  9.2   9.2   9.5    WBC 5.84   5.37   5.74     Hemoglobin 10.9 (A)   11.7 (A)   11.6 (A)     Hematocrit 34.8 (A)   36.6 (A)   36.4 (A)     Platelets 118 (A)   128 (A)   133 (A)     Hemoglobin A1C   7.20 (A)         (A) Abnormal value                      Assessment and Plan    Diagnoses and all orders for this visit:    1. Atypical facial pain (Primary)  Assessment & Plan:  Sx manageable on OXC      2. Facial paralysis/[place] palsy  -     Ambulatory Referral to Physical Therapy Evaluate and treat    3. Restless legs syndrome (RLS)  Assessment & Plan:  Sx stable on GBP, Mirapex       Other orders  -     OXcarbazepine (TRILEPTAL) 300 MG tablet; Take 1 tablet by mouth 2 (Two) Times a Day.  Dispense: 180 tablet; Refill: 3      Follow Up   No follow-ups on file.  Patient was given instructions and counseling regarding his condition or for health maintenance advice. Please see specific information pulled into the AVS if appropriate.

## 2021-08-03 NOTE — TELEPHONE ENCOUNTER
PT DOESN'T WANT TO SEE , STATES HE ONLY SEES . PT WAS INFORMED THAT  IS BOOKED FOR THE MONTH OF AUGUST, BUT PT COULDN'T UNDERSTAND WHY HE COULDN'T SEE . PT ASKED FOR CRYSTAL TO GIVE HIM A CALL,PLEASE ADVISE.

## 2021-08-05 NOTE — PROGRESS NOTES
Physical Therapy Initial Evaluation and Plan of Care      Patient: Abe Phillips Jr.   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  Pt reports that 3-4 weeks ago he started having extreme difficulty with his eye.  Pt thought it was allergist and nothing worked.  Pt went to see the allergist again and he was told to use a gel ointment in his eye twice a day.  Pt used a patch and didn't like it.  Pt was never given steroids.  Pt reports biting his left upper lip once a day.  Pt had facial surgery for precancerous cells and since that time has numbness on the left side of his face.  Pt recently discharged from hospital secondary to B LE cellulitis.       Patient Occupation: retired  and massage therapist   Precautions and Work Restrictions: L eye drynessQuality of life: good    Pain  Current pain ratin  At best pain ratin  At worst pain ratin  Location: L eye pain  Quality: sharp    Social Support  Lives with: spouse (dtr and son in law and three grandchildren)    Hand dominance: right    Treatments  Discharged from (in last 30 days): inpatient hospitalization  Patient Goals  Patient goal: face to work better           Objective    See facial movement assessment for details       Static Posture     Head  Forward and rotated right.    Shoulders  Rounded.    Tenderness     Additional Tenderness Details  L cervical and facial muscle tenderness with palpation    Neurological Testing     Sensation   Cervical/Thoracic   Left   Diminished: light touch    Comments   Left light touch: L side of face.         PT Neuro          Assessment & Plan     Assessment  Impairments: abnormal muscle firing, impaired physical strength, lacks appropriate home exercise program and pain with function  Prognosis: good    Goals  Plan Goals: STG: n/a secondary to short length of need    LT. Pt. to demonstrate full gentle eye closure to decrease dryness and irritation of the eye.  2. Pt. to report being able to whistle without difficulty for over 15 sec  For improved facial movement.  3.Pt. to report not biting his lip when eating.  4. Pt. to improve smile by 0.5 cm for improved facial symmetry.  5. Pt. to be I with facial HEP.  6. Pt. to improve Facial Disability score to 30 for both for improved physical and emotional state following Bell's Palsy.  7. Pt. to demonstrate decreased [place] Facial Grading score to no greater then 30 for improved facial symmetry.  8. Pt. to improve Frontalis movement by 0.2 cm for improved facial control and movement.      Plan  Therapy options: will be seen for skilled physical therapy services  Planned modality interventions: iontophoresis and thermotherapy (hydrocollator packs)  Planned therapy interventions: manual therapy, home exercise program, neuromuscular re-education, soft tissue mobilization and strengthening  Frequency: 3x week  Duration in visits: 7  Plan details: Pt to benefit from skilled PT services to improve facial movement and decrease discomfort upon palpation of facial and cervical muscles.          Timed:  Manual Therapy:    15     mins  99570;  Therapeutic Exercise:    15     mins  75801;     Neuromuscular Ravin:    0    mins  75819;    Therapeutic Activity:     0     mins  68736;     Gait Trainin     mins  12950;     Ultrasound:     0     mins  52849;    Electrical Stimulation:    0     mins  77620 ( );  IontoPhoresis        15 min  37277    Untimed:  Electrical Stimulation:    0     mins  71728 ( );  Mechanical Traction:    0     mins  91925;     Timed Treatment:   60    mins   Total Treatment:     60   mins    PT SIGNATURE: Summer Quinn, PT   DATE TREATMENT INITIATED: 8/5/2021    Initial Certification  Certification Period: 11/3/2021  I certify that the therapy services are furnished while this patient is under my care.  The services outlined above are required by this patient, and will be reviewed every 90 days.     PHYSICIAN: Sushil Ahumada MD      DATE:     Please sign and return via fax to [phone].. Thank you, [facility].

## 2021-08-05 NOTE — PROGRESS NOTES
Transitional Care Follow Up Visit  Subjective     Abe Phillips Jr. is a 66 y.o. male who presents for a transitional care management visit.    Within 48 business hours after discharge our office contacted him via telephone to coordinate his care and needs.      I reviewed and discussed the details of that call along with the discharge summary, hospital problems, inpatient lab results, inpatient diagnostic studies, and consultation reports with Abe.     Current outpatient and discharge medications have been reconciled for the patient.  Reviewed by: Anibal Maria MD      Date of TCM Phone Call 8/1/2021   [facility]   Date of Admission 7/29/2021   Date of Discharge 8/1/2021   Discharge Disposition Home or Self Care     Risk for Readmission (LACE) Score: 12 (8/1/2021  6:00 AM)      History of Present Illness   Course During Hospital Stay:  Here for mgmt of cellulitis.  Recently in hospital and rxd with Rocephin and doxy.  Was sent home on Doxy and Keflex.  His legs feel weak.  His low back is weak.  No cramping in his legs.  Legs feel fatigued.  Breathing is fine.  FSBS are good.  Appetite is good.  No GI issues.      The following portions of the patient's history were reviewed and updated as appropriate: allergies, current medications, past family history, past medical history, past social history, past surgical history and problem list.    Review of Systems   Constitutional: Positive for fatigue. Negative for chills and fever.   HENT: Negative for congestion, ear pain, hearing loss, rhinorrhea, sinus pressure, sore throat and trouble swallowing.    Eyes: Negative for discharge and itching.   Respiratory: Positive for shortness of breath (better). Negative for cough and chest tightness.    Cardiovascular: Positive for leg swelling (better). Negative for chest pain and palpitations.   Gastrointestinal: Negative for abdominal pain, blood in stool, constipation, diarrhea and vomiting.         Colonoscopy 3/16   Endocrine: Positive for cold intolerance. Negative for polydipsia and polyuria.   Genitourinary: Negative for difficulty urinating, dysuria, enuresis, frequency, hematuria and urgency.   Musculoskeletal: Positive for arthralgias, back pain and gait problem. Negative for joint swelling.   Skin: Negative for rash.        Right heel wound, followed by wound   Allergic/Immunologic: Negative for immunocompromised state.   Neurological: Positive for weakness. Negative for dizziness, syncope, numbness and headaches.        See HPI   Hematological: Does not bruise/bleed easily.   Psychiatric/Behavioral: Negative for behavioral problems, dysphoric mood and sleep disturbance. The patient is not nervous/anxious.        Objective   Physical Exam  Constitutional:       Appearance: Normal appearance. He is well-developed. He is obese.   HENT:      Head: Normocephalic and atraumatic.      Right Ear: External ear normal.      Left Ear: External ear normal.      Nose: Nose normal.      Mouth/Throat:      Mouth: Mucous membranes are moist.      Pharynx: Oropharynx is clear.   Eyes:      Extraocular Movements: Extraocular movements intact.      Conjunctiva/sclera: Conjunctivae normal.      Pupils: Pupils are equal, round, and reactive to light.   Cardiovascular:      Rate and Rhythm: Normal rate and regular rhythm.      Heart sounds: Normal heart sounds.   Pulmonary:      Effort: Pulmonary effort is normal.      Breath sounds: Normal breath sounds.   Abdominal:      General: Bowel sounds are normal.      Palpations: Abdomen is soft.   Musculoskeletal:         General: Normal range of motion.      Cervical back: Normal range of motion and neck supple.      Right lower leg: Edema present.      Left lower leg: Edema present.   Lymphadenopathy:      Cervical: No cervical adenopathy.   Skin:     General: Skin is warm and dry.      Comments: Wound not examined on right heel  Bilateral Diabetic Dermopathy  Eczema in LE  "  Neurological:      General: No focal deficit present.      Mental Status: He is alert and oriented to person, place, and time.   Psychiatric:         Mood and Affect: Mood normal.         Behavior: Behavior normal.         Thought Content: Thought content normal.         Assessment/Plan   Diagnoses and all orders for this visit:    1. PVD (peripheral vascular disease) (CMS/HCC) (Primary)    2. Other hyperlipidemia    3. Essential hypertension  -     Basic Metabolic Panel  -     Magnesium  -     TSH    4. Dyslipidemia    5. Uncontrolled type 2 diabetes mellitus with hyperglycemia (CMS/[facility])    6. Other depression    7. Lymphedema  -     Basic Metabolic Panel  -     Magnesium  -     TSH    Other orders  -     potassium chloride 10 MEQ CR tablet; Take 5 tablets by mouth 2 (Two) Times a Day.  Dispense: 900 tablet; Refill: 3             Cellulitis/Dermopathy-cont Doxy/keflex  DM-f/u ENDO, counseled on low carb and goals of FSBS, at goal  Chronic LE edema-advised to weigh daily, controlled on triple diuretic and compression hose  CHF-\" \"  Hep B hx/FLD-f/u Dr Murcia, advised wt loss  Neuropathy-on Gabapentin, but gave weaning schedule given sedation and wt gain  Narcolepsy-on Adderall, stable  JAIME-cont CPAP, stable  Hx of Afib-no recurrence s/p Ablation  Hypothyroid-TSH in am  Depression-stable on Effexor  RLS-controlled on Mirapex  Elevated Cr-stable advised to ensure adequate hydration, recheck improved  Hyperlipidemia-cont statin, labs 2/13 dw patient, recheck on rtc  thrombocytopenia-recheck normal  Right heel wound-f/u wound care     Hospital Labs noted and dw patient  8/11 labs noted, will increase Kcl to 50 meq bid    "

## 2021-08-11 NOTE — OUTREACH NOTE
Medical Week 2 Survey      Responses   [facility] patient discharged from?  [place]   Does the patient have one of the following disease processes/diagnoses(primary or secondary)?  Other   Week 2 attempt successful?  No   Unsuccessful attempts  Attempt 1          Teresa Wood LPN

## 2021-08-12 PROBLEM — R29.898 BILATERAL LEG WEAKNESS: Status: ACTIVE | Noted: 2021-01-01

## 2021-08-13 PROBLEM — D64.9 ANEMIA: Status: ACTIVE | Noted: 2021-01-01

## 2021-08-13 PROBLEM — R79.89 ELEVATED TROPONIN: Status: ACTIVE | Noted: 2021-01-01

## 2021-08-13 PROBLEM — E16.2 HYPOGLYCEMIA: Status: ACTIVE | Noted: 2021-01-01

## 2021-08-13 PROBLEM — R77.8 ELEVATED TROPONIN: Status: ACTIVE | Noted: 2021-01-01

## 2021-08-13 PROBLEM — E87.6 HYPOKALEMIA: Status: ACTIVE | Noted: 2021-01-01

## 2021-08-13 NOTE — ED PROVIDER NOTES
"Subjective   66-year-old male with extensive past medical history presents for evaluation of generalized weakness and refractory hypokalemia.  Of note, the patient was admitted to our facility from July 29 through August 1.  He states that since going home he has continued to experience generalized weakness to the point that he is unable to walk.  He has been taking his oral potassium supplements as prescribed by his physician.  He saw his physician yesterday where labs were drawn and he was contacted today to come to the emergency department for persistent hypokalemia given his ongoing symptoms.  He denies any pain.  No vomiting or diarrhea.  He states that he simply \"has no energy.\"          Review of Systems   Constitutional: Negative for fatigue.   Neurological: Negative for weakness.   All other systems reviewed and are negative.      Past Medical History:   Diagnosis Date   • A-fib (CMS/HCC)    • ADD (attention deficit disorder)    • Atrial flutter (CMS/HCC)    • Cellulitis    • CHF (congestive heart failure) (CMS/HCC)    • Cirrhosis of liver (CMS/HCC)    • Constipation    • Depression    • Diabetes mellitus (CMS/HCC)    • Diabetic ulcer of right foot (CMS/HCC)    • Disease of thyroid gland    • Edema    • Fatty liver    • Hepatitis B    • Hyperlipidemia    • Hypokalemia    • Insomnia    • Lymphedema    • Narcolepsy    • Neuropathy    • Obesity    • JAIME on CPAP    • PVD (peripheral vascular disease) (CMS/HCC)    • RLS (restless legs syndrome)    • Skin cancer    • Tardive dyskinesia    • Tremor of both hands    • Type 2 diabetes mellitus (CMS/HCC)    • Yeast infection        No Known Allergies    Past Surgical History:   Procedure Laterality Date   • ACHILLES TENDON REPAIR     • CARDIAC ABLATION     • CHOLECYSTECTOMY     • LASER ABLATION      VEIN RLE       Family History   Problem Relation Age of Onset   • Heart failure Mother         CHF   • Osteoarthritis Mother    • Clotting disorder Father    • Diabetes " Father    • Hypertension Father    • No Known Problems Sister    • No Known Problems Brother    • No Known Problems Brother    • No Known Problems Daughter        Social History     Socioeconomic History   • Marital status:      Spouse name: Not on file   • Number of children: Not on file   • Years of education: Not on file   • Highest education level: Not on file   Tobacco Use   • Smoking status: Never Smoker   • Smokeless tobacco: Never Used   Vaping Use   • Vaping Use: Never used   Substance and Sexual Activity   • Alcohol use: Yes     Comment: reports 1-2 drinks a week    • Drug use: No   • Sexual activity: Defer           Objective   Physical Exam  Vitals and nursing note reviewed.   Constitutional:       General: He is not in acute distress.     Appearance: He is well-developed. He is obese. He is not diaphoretic.      Comments: Chronically ill-appearing male   HENT:      Head: Normocephalic and atraumatic.   Neck:      Vascular: No JVD.   Cardiovascular:      Rate and Rhythm: Normal rate and regular rhythm.      Heart sounds: Normal heart sounds. No murmur heard.   No friction rub. No gallop.    Pulmonary:      Effort: Pulmonary effort is normal. No respiratory distress.      Breath sounds: Normal breath sounds. No wheezing or rales.   Abdominal:      General: Bowel sounds are normal. There is no distension.      Palpations: Abdomen is soft. There is no mass.      Tenderness: There is no abdominal tenderness. There is no guarding.   Musculoskeletal:         General: Normal range of motion.      Cervical back: Normal range of motion.   Skin:     General: Skin is warm and dry.      Coloration: Skin is not pale.      Findings: No erythema or rash.   Neurological:      General: No focal deficit present.      Mental Status: He is alert and oriented to person, place, and time.      Comments: Awake, alert, and oriented x3, clear and fluent speech, no ataxia or dysmetria, neurovascularly intact distally in all  fours with bounding distal pulses and normal sensation, appears globally weak but has 5 out of 5 strength in all fours, no cranial nerve deficits noted with cranial nerves II through XII grossly intact, unable to assess gait as the patient states that he is unable to walk secondary to his generalized weakness   Psychiatric:         Mood and Affect: Mood normal.         Thought Content: Thought content normal.         Judgment: Judgment normal.         Procedures           ED Course  ED Course as of Aug 13 0018   Fri Aug 13, 2021   0016 66-year-old male presents for evaluation of generalized weakness.  Of note, the patient was admitted to our facility for similar symptoms from July 29 through August 1.  He was treated for hypokalemia and states that he has been taking his prescribed potassium supplementation as directed.  However, he continues to feel quite weak and yesterday saw his primary care physician regarding his symptoms.  He was contacted today and advised to return to the emergency department for refractory hypokalemia.  On arrival, the patient is nontoxic-appearing.  He appears chronically ill and generally weak but has no focal neurological deficits noted on exam.  Initial glucose was 64.  He was given a box lunch and orange juice.  Additionally, labs remarkable for potassium of 3.2.  Potassium replaced both intravenously and orally.  Given the patient's overall clinical presentation and refractory hypokalemia, I feel that he warrants admission to the hospital for observation at this point.  He is in agreement.  I discussed the patient's case with Dr. Duncan, and the patient will be admitted under her care for further evaluation and treatment.  He is aware/agreeable with the plan at this time.    [DD]   0018 Troponin is mildly elevated.  I trended back his troponins and his current troponin is not any different than his baseline troponin.  EKG is unrevealing as well.    [DD]      ED Course User Index  [DD]  James Duncan MD                                   Recent Results (from the past 24 hour(s))   Comprehensive Metabolic Panel    Collection Time: 08/12/21 10:25 PM    Specimen: Blood   Result Value Ref Range    Glucose 68 65 - 99 mg/dL    BUN 59 (H) 8 - 23 mg/dL    Creatinine 1.36 (H) 0.76 - 1.27 mg/dL    Sodium 134 (L) 136 - 145 mmol/L    Potassium 3.2 (L) 3.5 - 5.2 mmol/L    Chloride 93 (L) 98 - 107 mmol/L    CO2 28.0 22.0 - 29.0 mmol/L    Calcium 9.1 8.6 - 10.5 mg/dL    Total Protein 6.8 6.0 - 8.5 g/dL    Albumin 3.50 3.50 - 5.20 g/dL    ALT (SGPT) 23 1 - 41 U/L    AST (SGOT) 39 1 - 40 U/L    Alkaline Phosphatase 123 (H) 39 - 117 U/L    Total Bilirubin 0.6 0.0 - 1.2 mg/dL    eGFR Non African Amer 52 (L) >60 mL/min/1.73    Globulin 3.3 gm/dL    A/G Ratio 1.1 g/dL    BUN/Creatinine Ratio 43.4 (H) 7.0 - 25.0    Anion Gap 13.0 5.0 - 15.0 mmol/L   Troponin    Collection Time: 08/12/21 10:25 PM    Specimen: Blood   Result Value Ref Range    Troponin T 0.064 (C) 0.000 - 0.030 ng/mL   Magnesium    Collection Time: 08/12/21 10:25 PM    Specimen: Blood   Result Value Ref Range    Magnesium 2.4 1.6 - 2.4 mg/dL   Green Top (Gel)    Collection Time: 08/12/21 10:25 PM   Result Value Ref Range    Extra Tube Hold for add-ons.    Lavender Top    Collection Time: 08/12/21 10:25 PM   Result Value Ref Range    Extra Tube hold for add-on    Gold Top - SST    Collection Time: 08/12/21 10:25 PM   Result Value Ref Range    Extra Tube Hold for add-ons.    CBC Auto Differential    Collection Time: 08/12/21 10:25 PM    Specimen: Blood   Result Value Ref Range    WBC 8.15 3.40 - 10.80 10*3/mm3    RBC 3.73 (L) 4.14 - 5.80 10*6/mm3    Hemoglobin 11.0 (L) 13.0 - 17.7 g/dL    Hematocrit 34.1 (L) 37.5 - 51.0 %    MCV 91.4 79.0 - 97.0 fL    MCH 29.5 26.6 - 33.0 pg    MCHC 32.3 31.5 - 35.7 g/dL    RDW 15.7 (H) 12.3 - 15.4 %    RDW-SD 51.2 37.0 - 54.0 fl    MPV 10.7 6.0 - 12.0 fL    Platelets 147 140 - 450 10*3/mm3    Neutrophil % 83.2 (H)  "42.7 - 76.0 %    Lymphocyte % 8.6 (L) 19.6 - 45.3 %    Monocyte % 5.3 5.0 - 12.0 %    Eosinophil % 0.4 0.3 - 6.2 %    Basophil % 0.4 0.0 - 1.5 %    Immature Grans % 2.1 (H) 0.0 - 0.5 %    Neutrophils, Absolute 6.79 1.70 - 7.00 10*3/mm3    Lymphocytes, Absolute 0.70 0.70 - 3.10 10*3/mm3    Monocytes, Absolute 0.43 0.10 - 0.90 10*3/mm3    Eosinophils, Absolute 0.03 0.00 - 0.40 10*3/mm3    Basophils, Absolute 0.03 0.00 - 0.20 10*3/mm3    Immature Grans, Absolute 0.17 (H) 0.00 - 0.05 10*3/mm3    nRBC 0.0 0.0 - 0.2 /100 WBC   TSH    Collection Time: 08/12/21 10:25 PM    Specimen: Blood   Result Value Ref Range    TSH 1.640 0.270 - 4.200 uIU/mL   T4, Free    Collection Time: 08/12/21 10:25 PM    Specimen: Blood   Result Value Ref Range    Free T4 0.71 (L) 0.93 - 1.70 ng/dL   Sedimentation Rate    Collection Time: 08/12/21 10:25 PM    Specimen: Blood   Result Value Ref Range    Sed Rate 65 (H) 0 - 20 mm/hr   POC Glucose Once    Collection Time: 08/12/21 10:47 PM    Specimen: Blood   Result Value Ref Range    Glucose 64 (L) 70 - 130 mg/dL     Note: In addition to lab results from this visit, the labs listed above may include labs taken at another facility or during a different encounter within the last 24 hours. Please correlate lab times with ED admission and discharge times for further clarification of the services performed during this visit.    XR Chest 1 View   Final Result   No acute cardiopulmonary findings.      Signer Name: Mathew Mcdaniel MD    Signed: 8/12/2021 11:07 PM    Workstation Name: [email or web address]     [facility]        Vitals:    08/12/21 [id number]   BP: 140/95   BP Location: Right arm   Patient Position: Sitting   Pulse: 74   Resp: 20   Temp: 96.9 °F (36.1 °C)   TempSrc: Infrared   SpO2: 98%   Weight: (!) 147 kg (325 lb)   Height: 190.5 cm (75\")     Medications   sodium chloride 0.9 % flush 10 mL (has no administration in time range)   potassium chloride (MICRO-K) CR capsule 20 mEq (has " no administration in time range)   potassium chloride 10 mEq in 100 mL IVPB (has no administration in time range)     ECG/EMG Results (last 24 hours)     Procedure Component Value Units Date/Time    ECG 12 Lead [[id number]] Collected: 08/12/21 2230     Updated: 08/12/21 2229    ECG 12 Lead [[id number]] Collected: 08/12/21 2308     Updated: 08/12/21 2309        ECG 12 Lead         ECG 12 Lead             Recent Results (from the past 24 hour(s))   Comprehensive Metabolic Panel    Collection Time: 08/12/21 10:25 PM    Specimen: Blood   Result Value Ref Range    Glucose 68 65 - 99 mg/dL    BUN 59 (H) 8 - 23 mg/dL    Creatinine 1.36 (H) 0.76 - 1.27 mg/dL    Sodium 134 (L) 136 - 145 mmol/L    Potassium 3.2 (L) 3.5 - 5.2 mmol/L    Chloride 93 (L) 98 - 107 mmol/L    CO2 28.0 22.0 - 29.0 mmol/L    Calcium 9.1 8.6 - 10.5 mg/dL    Total Protein 6.8 6.0 - 8.5 g/dL    Albumin 3.50 3.50 - 5.20 g/dL    ALT (SGPT) 23 1 - 41 U/L    AST (SGOT) 39 1 - 40 U/L    Alkaline Phosphatase 123 (H) 39 - 117 U/L    Total Bilirubin 0.6 0.0 - 1.2 mg/dL    eGFR Non African Amer 52 (L) >60 mL/min/1.73    Globulin 3.3 gm/dL    A/G Ratio 1.1 g/dL    BUN/Creatinine Ratio 43.4 (H) 7.0 - 25.0    Anion Gap 13.0 5.0 - 15.0 mmol/L   Troponin    Collection Time: 08/12/21 10:25 PM    Specimen: Blood   Result Value Ref Range    Troponin T 0.064 (C) 0.000 - 0.030 ng/mL   Magnesium    Collection Time: 08/12/21 10:25 PM    Specimen: Blood   Result Value Ref Range    Magnesium 2.4 1.6 - 2.4 mg/dL   Green Top (Gel)    Collection Time: 08/12/21 10:25 PM   Result Value Ref Range    Extra Tube Hold for add-ons.    Lavender Top    Collection Time: 08/12/21 10:25 PM   Result Value Ref Range    Extra Tube hold for add-on    Gold Top - SST    Collection Time: 08/12/21 10:25 PM   Result Value Ref Range    Extra Tube Hold for add-ons.    CBC Auto Differential    Collection Time: 08/12/21 10:25 PM    Specimen: Blood   Result Value Ref Range    WBC 8.15 3.40 - 10.80 10*3/mm3     RBC 3.73 (L) 4.14 - 5.80 10*6/mm3    Hemoglobin 11.0 (L) 13.0 - 17.7 g/dL    Hematocrit 34.1 (L) 37.5 - 51.0 %    MCV 91.4 79.0 - 97.0 fL    MCH 29.5 26.6 - 33.0 pg    MCHC 32.3 31.5 - 35.7 g/dL    RDW 15.7 (H) 12.3 - 15.4 %    RDW-SD 51.2 37.0 - 54.0 fl    MPV 10.7 6.0 - 12.0 fL    Platelets 147 140 - 450 10*3/mm3    Neutrophil % 83.2 (H) 42.7 - 76.0 %    Lymphocyte % 8.6 (L) 19.6 - 45.3 %    Monocyte % 5.3 5.0 - 12.0 %    Eosinophil % 0.4 0.3 - 6.2 %    Basophil % 0.4 0.0 - 1.5 %    Immature Grans % 2.1 (H) 0.0 - 0.5 %    Neutrophils, Absolute 6.79 1.70 - 7.00 10*3/mm3    Lymphocytes, Absolute 0.70 0.70 - 3.10 10*3/mm3    Monocytes, Absolute 0.43 0.10 - 0.90 10*3/mm3    Eosinophils, Absolute 0.03 0.00 - 0.40 10*3/mm3    Basophils, Absolute 0.03 0.00 - 0.20 10*3/mm3    Immature Grans, Absolute 0.17 (H) 0.00 - 0.05 10*3/mm3    nRBC 0.0 0.0 - 0.2 /100 WBC   TSH    Collection Time: 08/12/21 10:25 PM    Specimen: Blood   Result Value Ref Range    TSH 1.640 0.270 - 4.200 uIU/mL   T4, Free    Collection Time: 08/12/21 10:25 PM    Specimen: Blood   Result Value Ref Range    Free T4 0.71 (L) 0.93 - 1.70 ng/dL   Sedimentation Rate    Collection Time: 08/12/21 10:25 PM    Specimen: Blood   Result Value Ref Range    Sed Rate 65 (H) 0 - 20 mm/hr   POC Glucose Once    Collection Time: 08/12/21 10:47 PM    Specimen: Blood   Result Value Ref Range    Glucose 64 (L) 70 - 130 mg/dL     Note: In addition to lab results from this visit, the labs listed above may include labs taken at another facility or during a different encounter within the last 24 hours. Please correlate lab times with ED admission and discharge times for further clarification of the services performed during this visit.    XR Chest 1 View   Final Result   No acute cardiopulmonary findings.      Signer Name: Mathew Mcdaniel MD    Signed: 8/12/2021 11:07 PM    Workstation Name: [email or web address]     [facility]        Vitals:    08/12/21 9026  "  BP: 140/95   BP Location: Right arm   Patient Position: Sitting   Pulse: 74   Resp: 20   Temp: 96.9 °F (36.1 °C)   TempSrc: Infrared   SpO2: 98%   Weight: (!) 147 kg (325 lb)   Height: 190.5 cm (75\")     Medications   sodium chloride 0.9 % flush 10 mL (has no administration in time range)   potassium chloride (MICRO-K) CR capsule 20 mEq (has no administration in time range)   potassium chloride 10 mEq in 100 mL IVPB (has no administration in time range)     ECG/EMG Results (last 24 hours)     Procedure Component Value Units Date/Time    ECG 12 Lead [[id number]] Collected: 08/12/21 2230     Updated: 08/12/21 2229    ECG 12 Lead [[id number]] Collected: 08/12/21 2308     Updated: 08/12/21 2309        ECG 12 Lead         ECG 12 Lead                     MDM    Final diagnoses:   Generalized weakness   Hypokalemia   Hypoglycemia   Anemia, unspecified type       ED Disposition  ED Disposition     ED Disposition Condition Comment    Decision to Admit            No follow-up provider specified.       Medication List      No changes were made to your prescriptions during this visit.          James Duncan MD  08/13/21 0018       James Duncan MD  08/13/21 0018    "

## 2021-08-13 NOTE — PROGRESS NOTES
[facility] Medicine Services  ADMISSION FOLLOW-UP NOTE          Patient admitted after midnight, H&P by my partner performed earlier on today's date reviewed.  Interim findings, labs, and charting also reviewed.        The [facility] Hospital Problem List has been managed and updated to include any new diagnoses:  Active Hospital Problems    Diagnosis  POA   • **Bilateral leg weakness [R29.898]  Yes   • Hypoglycemia [E16.2]  Yes   • Elevated troponin [R77.8]  Yes   • Hypokalemia [E87.6]  Yes   • Anemia [D64.9]  Yes   • Uncontrolled type 2 diabetes mellitus with hyperglycemia (CMS/[facility]) [E11.65]  Yes   • OMAR (acute kidney injury) (CMS/[facility]) [N17.9]  Yes   • Other hyperlipidemia [E78.49]  Yes   • Severe JAIME on CPAP [G47.33, Z99.89]  Not Applicable   • ADD (attention deficit disorder) [F98.8]  Yes   • Obesity [E66.9]  Yes   • Essential hypertension [I10]  Yes   • Restless legs syndrome (RLS) [G25.81]  Yes   • PVD (peripheral vascular disease) (CMS/HCC) [I73.9]  Yes   • Cirrhosis of liver (CMS/HCC) [K74.60]  Yes   • Chronic congestive heart failure (CMS/HCC) [I50.9]  Yes   • A-fib (CMS/HCC) [I48.91]  Yes      Resolved Hospital Problems   No resolved problems to display.         ADDITIONAL PLAN:  - detailed assessment and plan from admission reviewed  Summary: This is a 66-year-old male with obesity, Melo cirrhosis, narcolepsy, diastolic CHF, other comorbidities who presents to the hospital with 1 week of generalized weakness, worse in the legs preventing effective mobility    Assessment/plan    Acute generalized weakness  Pronounced bilateral leg weakness  -Continue to replace electrolytes per protocol  -EMG with generalized sensorimotor neuropathy  -Consult placed for neurology, discussed via telephone, check orthostatic blood pressures (possible overdiuresis, symptoms worse with postural changes), replacing electrolytes, PT/OT have not yet assessed him    Diabetes mellitus, A1c 6.6%, with  long-term use of insulin (insulin pump), with hyperglycemia  -Blood glucose improved, will ask nursing to help him restart his insulin pump; he is agreeable to allow us to obtain fingersticks for additional monitoring    Hx MCCAULEY cirrhosis  -Prior imaging suggesting cirrhotic appearance, however labs not completely consistent with the same; discussed with neurology, planning GI consult for reevaluation as a bariatric surgery would potentially be life-changing for him    CKD stage 3  -Baseline creatinine 1.1-1.3; EGFR 40s-50s  -Slightly worse than baseline    Chronic diastolic CHF, compensated  Chronic lymphedema  -Wound care for pressure wrapping  -Diuretics on hold    Narcolepsy  -Resume Adderall if patient able to supply his own (nonformulary)    JAIME  -CPAP    Obesity  -BMI 40.62 kg/M2    Nirav Fregoso,   08/13/21

## 2021-08-13 NOTE — THERAPY EVALUATION
Patient Name: Abe Phillips Jr.  : 1955    MRN: [id number]                              Today's Date: 2021       Admit Date: 2021    Visit Dx:     ICD-10-CM ICD-9-CM   1. Generalized weakness  R53.1 780.79   2. Hypokalemia  E87.6 276.8   3. Hypoglycemia  E16.2 251.2   4. Anemia, unspecified type  D64.9 285.9     Patient Active Problem List   Diagnosis   • Type 2 diabetes mellitus with hyperglycemia (CMS/HCC)   • Thrombocytopenia (CMS/HCC)   • PVD (peripheral vascular disease) (CMS/HCC)   • A-fib (CMS/HCC)   • Cirrhosis of liver (CMS/HCC)   • Chronic congestive heart failure (CMS/HCC)   • Diabetic ulcer of right heel (CMS/HCC)   • Primary narcolepsy without cataplexy   • Essential hypertension   • Morbidly obese (CMS/HCC)   • Restless legs syndrome (RLS)   • ADD (attention deficit disorder)   • Depression   • Diabetes mellitus type 2, uncontrolled, with complications (CMS/HCC)   • Edema   • MCCAULEY (nonalcoholic steatohepatitis)   • Hepatitis B   • Insomnia   • Lymphedema   • Obesity   • Severe JAIME on CPAP   • RLS (restless legs syndrome)   • Tremor of both hands   • Restrictive cardiomyopathy (CMS/HCC)   • Other hyperlipidemia   • Uncontrolled type 2 diabetes mellitus with hyperglycemia (CMS/HCC)   • OMAR (acute kidney injury) (CMS/HCC)   • Dyslipidemia   • Hyperglycemia   • Diabetic ulcer of right heel associated with type 2 diabetes mellitus (CMS/HCC)   • Facial paralysis/Barnard palsy   • Diabetic hypoglycemia (CMS/HCC)   • Hypogonadism in male   • Atypical facial pain   • Bilateral leg weakness   • Hypoglycemia   • Elevated troponin   • Hypokalemia   • Anemia     Past Medical History:   Diagnosis Date   • A-fib (CMS/HCC)    • ADD (attention deficit disorder)    • Atrial flutter (CMS/HCC)    • Cellulitis    • CHF (congestive heart failure) (CMS/HCC)    • Cirrhosis of liver (CMS/HCC)    • Constipation    • Depression    • Diabetes mellitus (CMS/HCC)    • Diabetic ulcer of right foot (CMS/HCC)    •  Disease of thyroid gland    • Edema    • Fatty liver    • Hepatitis B    • Hyperlipidemia    • Hypokalemia    • Insomnia    • Lymphedema    • Narcolepsy    • Neuropathy    • Obesity    • JAIME on CPAP    • PVD (peripheral vascular disease) (CMS/HCC)    • RLS (restless legs syndrome)    • Skin cancer    • Tardive dyskinesia    • Tremor of both hands    • Type 2 diabetes mellitus (CMS/HCC)    • Yeast infection      Past Surgical History:   Procedure Laterality Date   • ACHILLES TENDON REPAIR     • CARDIAC ABLATION     • CHOLECYSTECTOMY     • LASER ABLATION      VEIN RLE     General Information     Row Name 08/13/21 1422          OT Time and Intention    Document Type  evaluation  -     Mode of Treatment  occupational therapy  -     Row Name 08/13/21 1422          General Information    Patient Profile Reviewed  yes  -     Prior Level of Function  independent:;all household mobility;community mobility;driving;cooking;mod assist:;dressing  -     Existing Precautions/Restrictions  fall  -     Row Name 08/13/21 1422          Occupational Profile    Environmental Supports and Barriers (Occupational Profile)  Pt and spouse live with their dtr and her family.  Pt has a bsc, grab bar in the shower, and has assistance with lbd from spouse d/t lymphedema.  -     Row Name 08/13/21 1422          Living Environment    Lives With  spouse;child(joni), adult  -     Row Name 08/13/21 1422          Home Main Entrance    Number of Stairs, Main Entrance  two  -     Row Name 08/13/21 1422          Cognition    Orientation Status (Cognition)  oriented x 4  -     Row Name 08/13/21 1422          Safety Issues, Functional Mobility    Impairments Affecting Function (Mobility)  balance;endurance/activity tolerance;strength  -       User Key  (r) = Recorded By, (t) = Taken By, (c) = Cosigned By    Initials Name Provider Type    SW Mari Mehta OT Occupational Therapist          Mobility/ADL's     Row Name 08/13/21 1422           Bed Mobility    Bed Mobility  bed mobility (all) activities  -     All Activities, [place] (Bed Mobility)  modified independence  -     Assistive Device (Bed Mobility)  bed rails  -[facility] Name 08/13/21 1422          Transfers    Transfers  sit-stand transfer  -     Sit-Stand [place] (Transfers)  supervision  -[facility] Name 08/13/21 1422          Sit-Stand Transfer    Assistive Device (Sit-Stand Transfers)  walker, front-wheeled  -[facility] Name 08/13/21 1422          Functional Mobility    Functional Mobility- Ind. Level  contact guard assist  -     Functional Mobility- Device  rolling walker  -     Functional Mobility-Distance (Feet)  5  -[facility] Name 08/13/21 1422          Activities of Daily Living    BADL Assessment/Intervention  lower body dressing;grooming;feeding  -SW     Row Name 08/13/21 1422          Lower Body Dressing Assessment/Training    [place] Level (Lower Body Dressing)  lower body dressing skills;socks;dependent (less than 25% patient effort)  -     Position (Lower Body Dressing)  supine  -[facility] Name 08/13/21 1422          Grooming Assessment/Training    [place] Level (Grooming)  grooming skills;set up  -     Position (Grooming)  edge of bed sitting  -SW     Row Name 08/13/21 1422          Self-Feeding Assessment/Training    [place] Level (Feeding)  feeding skills;set up  -     Position (Self-Feeding)  sitting up in bed  -       User Key  (r) = Recorded By, (t) = Taken By, (c) = Cosigned By    Initials Name Provider Type    Mari Camara OT Occupational Therapist        Obj/Interventions     Row Name 08/13/21 1422          Sensory Assessment (Somatosensory)    Sensory Assessment (Somatosensory)  UE sensation intact  -SW     Row Name 08/13/21 1422          Vision Assessment/Intervention    Visual Impairment/Limitations  WFL;corrective lenses full-time  -[facility] Name 08/13/21 1422          Range of Motion Comprehensive    General Range  of Motion  bilateral upper extremity ROM WNL  -     Row Name 08/13/21 1422          Strength Comprehensive (MMT)    General Manual Muscle Testing (MMT) Assessment  no strength deficits identified  -     Comment, General Manual Muscle Testing (MMT) Assessment  BUEs 5/5  -     Row Name 08/13/21 1422          Balance    Balance Assessment  sitting static balance;sitting dynamic balance;sit to stand dynamic balance;standing static balance;standing dynamic balance  -     Static Sitting Balance  WNL;unsupported;sitting, edge of bed  -SW     Dynamic Sitting Balance  WNL;unsupported;sitting, edge of bed  -SW     Sit to Stand Dynamic Balance  WFL  -SW     Static Standing Balance  WFL;supported;standing  -SW     Dynamic Standing Balance  mild impairment;unsupported;standing;supported  -SW     Balance Interventions  manual resistance applied during activity;sitting;standing;sit to stand;supported;static;dynamic;minimal challenge;occupation based/functional task  -       User Key  (r) = Recorded By, (t) = Taken By, (c) = Cosigned By    Initials Name Provider Type    Mari Camara OT Occupational Therapist        Goals/Plan     [facility] Name 08/13/21 1422          Transfer Goal 1 (OT)    Activity/Assistive Device (Transfer Goal 1, OT)  transfers, all  -SW     [place] Level/Cues Needed (Transfer Goal 1, OT)  independent  -SW     Time Frame (Transfer Goal 1, OT)  long term goal (LTG);by discharge  -     Progress/Outcome (Transfer Goal 1, OT)  goal ongoing  -[facility] Name 08/13/21 1422          Dressing Goal 1 (OT)    Activity/Device (Dressing Goal 1, OT)  lower body dressing  -     [place]/Cues Needed (Dressing Goal 1, OT)  moderate assist (50-74% patient effort)  -     Time Frame (Dressing Goal 1, OT)  long term goal (LTG);by discharge  -     Progress/Outcome (Dressing Goal 1, OT)  goal ongoing  -[facility] Name 08/13/21 1422          Toileting Goal 1 (OT)    Activity/Device (Toileting Goal 1, OT)   toileting skills, all  -     [place] Level/Cues Needed (Toileting Goal 1, OT)  supervision required  -     Time Frame (Toileting Goal 1, OT)  long term goal (LTG);by discharge  -     Progress/Outcome (Toileting Goal 1, OT)  goal ongoing  -SW     Row Name 08/13/21 1422          Therapy Assessment/Plan (OT)    Planned Therapy Interventions (OT)  activity tolerance training;adaptive equipment training;BADL retraining;functional balance retraining;transfer/mobility retraining;strengthening exercise;patient/caregiver education/training  -       User Key  (r) = Recorded By, (t) = Taken By, (c) = Cosigned By    Initials Name Provider Type     Mari Mehta OT Occupational Therapist        Clinical Impression     Row Name 08/13/21 1422          Pain Assessment    Additional Documentation  Pain Scale: Numbers Pre/Post-Treatment (Group)  -SW     Row Name 08/13/21 1422          Pain Scale: Numbers Pre/Post-Treatment    Pretreatment Pain Rating  0/10 - no pain  -     Posttreatment Pain Rating  0/10 - no pain  -SW     Row Name 08/13/21 1422          Plan of Care Review    Plan of Care Reviewed With  patient  -     Outcome Summary  OT evaluation complete.  Pt Ox4 and has no pain.  He c/o tremors to BLEs and hands when activity increased.  Pt mod indep with bed mobility and dependent for donning socks.  Pt setup for grooming and self feeding.  Recommend IPOT and IPR at d/c to promote indep in adls and decrease fall risk.  -[facility] Name 08/13/21 1422          Therapy Assessment/Plan (OT)    Rehab Potential (OT)  good, to achieve stated therapy goals  -     Criteria for Skilled Therapeutic Interventions Met (OT)  yes;meets criteria;skilled treatment is necessary  -     Therapy Frequency (OT)  daily  -[facility] Name 08/13/21 1422          Therapy Plan Review/Discharge Plan (OT)    Equipment Needs Upon Discharge (OT)  walker, rolling  -     Anticipated Discharge Disposition (OT)  inpatient rehabilitation  facility  -SW     Row Name 08/13/21 1422          Vital Signs    Pre Systolic BP Rehab  108  -SW     Pre Treatment Diastolic BP  92  -SW     Pretreatment Heart Rate (beats/min)  66  -SW     Pre SpO2 (%)  90  -SW     O2 Delivery Pre Treatment  room air  -SW     O2 Delivery Intra Treatment  room air  -SW     O2 Delivery Post Treatment  room air  -SW     Pre Patient Position  Supine  -SW     Intra Patient Position  Standing  -SW     Post Patient Position  Sitting  -SW     Row Name 08/13/21 1422          Positioning and Restraints    Pre-Treatment Position  in bed  -SW     Post Treatment Position  bed  -SW     In Bed  notified nsg;fowlers;sitting;call light within reach;encouraged to call for assist;exit alarm on;side rails up x2  -SW       User Key  (r) = Recorded By, (t) = Taken By, (c) = Cosigned By    Initials Name Provider Type    Mari Camara OT Occupational Therapist        Outcome Measures     Row Name 08/13/21 1517          How much help from another is currently needed...    Putting on and taking off regular lower body clothing?  1  -SW     Bathing (including washing, rinsing, and drying)  3  -SW     Toileting (which includes using toilet bed pan or urinal)  3  -SW     Putting on and taking off regular upper body clothing  4  -SW     Taking care of personal grooming (such as brushing teeth)  4  -SW     Eating meals  4  -SW     AM-PAC 6 Clicks Score (OT)  19  -SW     Row Name 08/13/21 1517          Functional Assessment    Outcome Measure Options  AM-PAC 6 Clicks Daily Activity (OT)  -SW       User Key  (r) = Recorded By, (t) = Taken By, (c) = Cosigned By    Initials Name Provider Type    Mari Camara OT Occupational Therapist          Occupational Therapy Education                 Title: PT OT SLP Therapies (In Progress)     Topic: Occupational Therapy (In Progress)     Point: ADL training (Done)     Description:   Instruct learner(s) on proper safety adaptation and remediation techniques during self  care or transfers.   Instruct in proper use of assistive devices.              Learning Progress Summary           Patient Acceptance, E, VU by  at 8/13/2021 1518                   Point: Home exercise program (Not Started)     Description:   Instruct learner(s) on appropriate technique for monitoring, assisting and/or progressing therapeutic exercises/activities.              Learner Progress:  Not documented in this visit.          Point: Precautions (Done)     Description:   Instruct learner(s) on prescribed precautions during self-care and functional transfers.              Learning Progress Summary           Patient Acceptance, E, VU by  at 8/13/2021 1518                   Point: Body mechanics (Not Started)     Description:   Instruct learner(s) on proper positioning and spine alignment during self-care, functional mobility activities and/or exercises.              Learner Progress:  Not documented in this visit.                      User Key     Initials Effective Dates Name Provider Type Discipline     06/16/21 -  Mari Mehta OT Occupational Therapist OT              OT Recommendation and Plan  Planned Therapy Interventions (OT): activity tolerance training, adaptive equipment training, BADL retraining, functional balance retraining, transfer/mobility retraining, strengthening exercise, patient/caregiver education/training  Therapy Frequency (OT): daily  Plan of Care Review  Plan of Care Reviewed With: patient  Outcome Summary: OT evaluation complete.  Pt Ox4 and has no pain.  He c/o tremors to BLEs and hands when activity increased.  Pt mod indep with bed mobility and dependent for donning socks.  Pt setup for grooming and self feeding.  Recommend IPOT and IPR at d/c to promote indep in adls and decrease fall risk.     Time Calculation:   Time Calculation- OT     Row Name 08/13/21 1422             Time Calculation- OT    OT Start Time  1422  -      OT Received On  08/13/21  -      OT Goal Re-Cert  Due Date  08/23/21  -SW         Timed Charges    47650 - OT Self Care/Mgmt Minutes  15  -SW         Untimed Charges    OT Eval/Re-eval Minutes  40  -SW         Total Minutes    Timed Charges Total Minutes  15  -SW      Untimed Charges Total Minutes  40  -SW       Total Minutes  55  -SW        User Key  (r) = Recorded By, (t) = Taken By, (c) = Cosigned By    Initials Name Provider Type     Mari Mehta OT Occupational Therapist        Therapy Charges for Today     Code Description Service Date Service Provider Modifiers Qty    [id number] HC OT SELF CARE/MGMT/TRAIN EA 15 MIN 8/13/2021 Mari Mehta OT GO 1    [id number]  OT EVAL LOW COMPLEXITY 3 8/13/2021 Mari Mehta OT GO 1               Mari Mehta OT  8/13/2021

## 2021-08-13 NOTE — CONSULTS
Neurology    Referring provider:   No referring provider defined for this encounter.    Reason for Consultation: Leg weakness    Chief complaint: My legs are giving out    History of present illness: 66-year-old man seen for Dr. Fregoso for evaluation of leg weakness.    He had a recent hospitalization for lymphedema and cellulitis.    Over the last week or so he has reached a point where he cannot stand without his legs buckling.    He denies lightheadedness.    Is 20-year history of chronic lymphedema and is morbidly obese.  He weighs 325 pounds.    He is diabetic and is on insulin pump with a reasonably good control.    He has a complex medical history.  In the process of being evaluated for gastric sleeve in [place] 5 years ago he was told that he had esophageal varices and was not a surgical candidate.    Has never had bleeding and is unclear as to how the diagnosis was made.    He is desirous of pursuing this option if at all possible.    He has complex sleep disorder as well.  He has narcolepsy for which he is taking Adderall 60 mg in the morning and 30 mg in the afternoon.    He also has obstructive sleep apnea and has had some difficulty with the CPAP.    History of atrial fibrillation with an ablation.  Has had no issues without recently.    He has a diabetic peripheral neuropathy for which he is taking gabapentin 1800 mg twice a day.    He is taking Bumex 4 mg twice daily, Zaroxolyn 10 mg daily as needed for edema.Trazodone 50 mg at night for sleep.    Also on spironolactone 50 mg daily.    He has restless leg syndrome and is on Mirapex milligram by mouth twice daily.        Review of Systems: The patient gets little if any exercise.    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    Although there is note that he has had congestive heart failure, he seen Dr. Tito Meeks here in July and there is no indication that he had the problem.    All other systems reviewed and are  negative.      Home meds:   Medications Prior to Admission   Medication Sig Dispense Refill Last Dose   • amphetamine-dextroamphetamine (ADDERALL) 15 MG tablet Take 2 tablets by mouth Daily. 120 tablet 0    • amphetamine-dextroamphetamine (ADDERALL) 30 MG tablet Take 2 tablets by mouth Every Morning. 120 tablet 0    • aspirin 81 MG EC tablet TAKE 1 TABLET BY MOUTH EVERY DAY 90 tablet 1    • Blood Glucose Monitoring Suppl (Contour Next Monitor) w/Device kit 1 kit Every 3 (Three) Months. Use to check bg 1 kit 0    • bumetanide (BUMEX) 2 MG tablet Take 2 tablets by mouth 2 (Two) Times a Day. 120 tablet 5    • Continuous Blood Gluc Sensor (Dexcom G6 Sensor) Every 10 (Ten) Days. 3 each 11    • Continuous Blood Gluc Transmit (Dexcom G6 Transmitter) misc 1 kit Every 3 (Three) Months. Use to check blood sugar 1 each 3    • erythromycin (ROMYCIN) 5 MG/GM ophthalmic ointment       • gabapentin (NEURONTIN) 300 MG capsule TAKE THREE CAPSULES BY MOUTH TWICE A  capsule 2    • Insulin Disposable Pump (OmniPod Dash 5 Pack Pods) misc CHANGE POD EVERY 3 DAYS      • levocetirizine (XYZAL) 5 MG tablet Take 1 tablet by mouth Every Evening. 90 tablet 1    • levothyroxine (SYNTHROID, LEVOTHROID) 75 MCG tablet TAKE ONE TABLET BY MOUTH DAILY 90 tablet 3    • loratadine (CLARITIN) 10 MG tablet Take 1 tablet by mouth Daily. Patient instructed to d/c Xyzal while taking 30 tablet 0    • metOLazone (ZAROXOLYN) 5 MG tablet Take 2 tablets by mouth Daily As Needed (edema). (Patient taking differently: Take 5 mg by mouth 2 (Two) Times a Day.) 180 tablet 3    • montelukast (Singulair) 10 MG tablet Take 1 tablet by mouth Every Night. 30 tablet 5    • nystatin (MYCOSTATIN) 627141 UNIT/GM ointment Apply 1 application topically to the appropriate area as directed As Needed.      • ofloxacin (OCUFLOX) 0.3 % ophthalmic solution       • olopatadine (PATANOL) 0.1 % ophthalmic solution       • OXcarbazepine (TRILEPTAL) 300 MG tablet Take 1 tablet by  mouth 2 (Two) Times a Day. 180 tablet 3    • potassium chloride 10 MEQ CR tablet Take 5 tablets by mouth 2 (Two) Times a Day. 900 tablet 3    • pramipexole (MIRAPEX) 1 MG tablet Take 1 mg by mouth 2 (Two) Times a Day.      • pravastatin (PRAVACHOL) 40 MG tablet Take 1 tablet by mouth every night at bedtime. 90 tablet 3    • spironolactone (ALDACTONE) 100 MG tablet Take 0.5 tablets by mouth Daily. (Patient taking differently: Take 100 mg by mouth Daily.) 30 tablet 6    • tobramycin-dexamethasone (TOBRADEX) 0.3-0.1 % ophthalmic suspension       • traZODone (DESYREL) 50 MG tablet Take 1 tablet by mouth At Night As Needed for Sleep. 90 tablet 2    • triamcinolone (KENALOG) 0.1 % cream Apply  topically to the appropriate area as directed 2 (Two) Times a Day As Needed for Rash. 80 g 2    • venlafaxine XR (EFFEXOR-XR) 150 MG 24 hr capsule Take 1 capsule by mouth Every Night. 90 capsule 2        History  Past Medical History:   Diagnosis Date   • A-fib (CMS/HCC)    • ADD (attention deficit disorder)    • Atrial flutter (CMS/HCC)    • Cellulitis    • CHF (congestive heart failure) (CMS/HCC)    • Cirrhosis of liver (CMS/HCC)    • Constipation    • Depression    • Diabetes mellitus (CMS/HCC)    • Diabetic ulcer of right foot (CMS/HCC)    • Disease of thyroid gland    • Edema    • Fatty liver    • Hepatitis B    • Hyperlipidemia    • Hypokalemia    • Insomnia    • Lymphedema    • Narcolepsy    • Neuropathy    • Obesity    • JAIME on CPAP    • PVD (peripheral vascular disease) (CMS/HCC)    • RLS (restless legs syndrome)    • Skin cancer    • Tardive dyskinesia    • Tremor of both hands    • Type 2 diabetes mellitus (CMS/HCC)    • Yeast infection    ,   Past Surgical History:   Procedure Laterality Date   • ACHILLES TENDON REPAIR     • CARDIAC ABLATION     • CHOLECYSTECTOMY     • LASER ABLATION      VEIN RLE   ,   Family History   Problem Relation Age of Onset   • Heart failure Mother         CHF   • Osteoarthritis Mother    •  "Clotting disorder Father    • Diabetes Father    • Hypertension Father    • No Known Problems Sister    • No Known Problems Brother    • No Known Problems Brother    • No Known Problems Daughter    ,   Social History     Tobacco Use   • Smoking status: Never Smoker   • Smokeless tobacco: Never Used   Vaping Use   • Vaping Use: Never used   Substance Use Topics   • Alcohol use: Yes     Comment: reports 1-2 drinks a week    • Drug use: No    and Allergies:  Patient has no known allergies.,    Vital Signs   Blood pressure 123/73, pulse 63, temperature 96.9 °F (36.1 °C), temperature source Infrared, resp. rate 18, height 190.5 cm (75\"), weight (!) 147 kg (325 lb), SpO2 90 %.  Body mass index is 40.62 kg/m².    Physical Exam:   General: Morbidly obese white male              Head: No trauma              Neck: No bruits              Resp: Normal breath sounds              Cor: Regular rhythm              Extremities: 4+ brawny edema with trophic changes              Skin: Warm and dry              Neuro: Patient is awake alert and oriented.  He fell asleep but spontaneously twice during our interview.    Oriented to person place and time with normal memory attention and concentration.    Speech is articulate with no word finding problems.    Coordination is normal finger-to-nose testing bilaterally.    Cranial nerves show benign fundi.  Eye movements are full.  Pupils are equal.    He has no ptosis.    He has a weakness of eye closure and does not move the left side of his mouth as well as the right.    He has decreased sensation in the left side of his face.    Palate elevates normally tongue protrudes normally.    Reflexes are 1+ and equal in the upper extremities.    1+ and equal in the knees bilaterally.    Motor testing shows normal power and tone proximally and distally in both upper and lower extremities.    Sensory testing shows mild decrease in sensation distally in his feet.        Results Review: Serum " potassium from May 5 to August 13 of this year has none only once been in the normal range with most numbers varying between 2.4 and 3.2.    Magnesium by contrast has been within the normal ranges within the past year.    Her ultrasound last year showed diffusely echogenic liver parenchyma consistent with parenchymal disease such as hepatic steatosis.    MRI of the brain done in August of last year by Dr. Ahumada shows a vascular loop contiguous with the left 5th cranial nerve.    Recent venous duplex of his lower extremities showed no thrombophlebitis.    Labs: EMG nerve conduction study today shows a generalized sensorimotor neuropathy likely mixed axonal-2 mild left, moderate  Lab Results (last 72 hours)     Procedure Component Value Units Date/Time    POC Glucose Once [[id number]]  (Abnormal) Collected: 08/13/21 1133    Specimen: Blood Updated: 08/13/21 1137     Glucose 190 mg/dL      Comment: Meter: [id number] : [id number] Roxanne Martinez       POC Glucose Once [[id number]]  (Normal) Collected: 08/13/21 1022    Specimen: Blood Updated: 08/13/21 1024     Glucose 127 mg/dL      Comment: Meter: [id number] : [id number] Roxanne Martinez       POC Glucose Once [[id number]]  (Normal) Collected: 08/13/21 0853    Specimen: Blood Updated: 08/13/21 0855     Glucose 98 mg/dL      Comment: Meter: [id number] : [id number] Jcarlos Newman       Urinalysis With Microscopic If Indicated (No Culture) - Urine, Clean Catch [[id number]]  (Normal) Collected: 08/13/21 0718    Specimen: Urine, Clean Catch Updated: 08/13/21 0728     Color, UA Yellow     Appearance, UA Clear     pH, UA 6.5     Specific Gravity, UA 1.010     Glucose, UA Negative     Ketones, UA Negative     Bilirubin, UA Negative     Blood, UA Negative     Protein, UA Negative     Leuk Esterase, UA Negative     Nitrite, UA Negative     Urobilinogen, UA 1.0 E.U./dL    Narrative:      Urine microscopic not indicated.    Basic Metabolic Panel [[id number]]  (Abnormal) Collected: 08/13/21  0615    Specimen: Blood Updated: 08/13/21 0710     Glucose 106 mg/dL      BUN 61 mg/dL      Creatinine 1.35 mg/dL      Sodium 136 mmol/L      Potassium 3.2 mmol/L      Chloride 96 mmol/L      CO2 28.0 mmol/L      Calcium 9.0 mg/dL      eGFR Non African Amer 53 mL/min/1.73      BUN/Creatinine Ratio 45.2     Anion Gap 12.0 mmol/L     Narrative:      GFR Normal >60  Chronic Kidney Disease <60  Kidney Failure <15      Troponin [[id number]]  (Abnormal) Collected: 08/13/21 0615    Specimen: Blood Updated: 08/13/21 0704     Troponin T 0.057 ng/mL     Narrative:      Troponin T Reference Range:  <= 0.03 ng/mL-   Negative for AMI  >0.03 ng/mL-     Abnormal for myocardial necrosis.  Clinicians would have to utilize clinical acumen, EKG, Troponin and serial changes to determine if it is an Acute Myocardial Infarction or myocardial injury due to an underlying chronic condition.       Results may be falsely decreased if patient taking Biotin.      Hemoglobin A1c [[id number]]  (Abnormal) Collected: 08/13/21 0615    Specimen: Blood Updated: 08/13/21 0701     Hemoglobin A1C 6.60 %     Narrative:      Hemoglobin A1C Ranges:    Increased Risk for Diabetes  5.7% to 6.4%  Diabetes                     >= 6.5%  Diabetic Goal                < 7.0%    CBC (No Diff) [[id number]]  (Abnormal) Collected: 08/13/21 0615    Specimen: Blood Updated: 08/13/21 0632     WBC 6.41 10*3/mm3      RBC 3.56 10*6/mm3      Hemoglobin 10.5 g/dL      Hematocrit 32.6 %      MCV 91.6 fL      MCH 29.5 pg      MCHC 32.2 g/dL      RDW 15.9 %      RDW-SD 52.5 fl      MPV 10.7 fL      Platelets 134 10*3/mm3     POC Glucose Once [[id number]]  (Normal) Collected: 08/13/21 0616    Specimen: Blood Updated: 08/13/21 0617     Glucose 110 mg/dL      Comment: Meter: [id number] : [id number] Ty Osorio       POC Glucose Once [[id number]]  (Normal) Collected: 08/13/21 0436    Specimen: Blood Updated: 08/13/21 0437     Glucose 114 mg/dL      Comment: Meter: [id number] :  [id number] Ty Osorio       POC Glucose Once [[id number]]  (Normal) Collected: 08/13/21 0430    Specimen: Blood Updated: 08/13/21 0433     Glucose 130 mg/dL      Comment: Meter: [id number] : [id number] Ty Osorio       POC Glucose Once [[id number]]  (Abnormal) Collected: 08/13/21 0429    Specimen: Blood Updated: 08/13/21 0433     Glucose 242 mg/dL      Comment: Meter: [id number] : [id number] Ty Osorio       POC Glucose Once [[id number]]  (Abnormal) Collected: 08/13/21 0428    Specimen: Blood Updated: 08/13/21 0432     Glucose 449 mg/dL      Comment: Physician Notified Meter: [id number] : [id number] Ty Osorio       POC Glucose Once [[id number]]  (Normal) Collected: 08/13/21 0316    Specimen: Blood Updated: 08/13/21 0317     Glucose 91 mg/dL      Comment: Meter: [id number] : [id number] Ty Osorio       [place] Draw [[id number]] Collected: 08/12/21 2225    Specimen: Blood Updated: 08/13/21 0230    Narrative:      The following orders were created for panel order [place] Draw.  Procedure                               Abnormality         Status                     ---------                               -----------         ------                     Green Top (Gel)[[id number]]                                  Final result               Lavender Top[[id number]]                                     Final result               Gold Top - SST[[id number]]                                   Final result               Mckeon Top[[id number]]                                         Final result                 Please view results for these tests on the individual orders.    Gray Top [[id number]] Collected: 08/12/21 2225    Specimen: Blood Updated: 08/13/21 0230     Extra Tube Hold for add-ons.     Comment: Auto resulted.       POC Glucose Once [[id number]]  (Normal) Collected: 08/13/21 0156    Specimen: Blood Updated: 08/13/21 0158     Glucose 123 mg/dL      Comment: Meter: [id number] : [id number] Osorio        CK [[id number]]  (Normal) Collected: 08/13/21 0050    Specimen: Blood Updated: 08/13/21 0126     Creatine Kinase 132 U/L     Troponin [[id number]]  (Abnormal) Collected: 08/13/21 0050    Specimen: Blood Updated: 08/13/21 0122     Troponin T 0.063 ng/mL     Narrative:      Troponin T Reference Range:  <= 0.03 ng/mL-   Negative for AMI  >0.03 ng/mL-     Abnormal for myocardial necrosis.  Clinicians would have to utilize clinical acumen, EKG, Troponin and serial changes to determine if it is an Acute Myocardial Infarction or myocardial injury due to an underlying chronic condition.       Results may be falsely decreased if patient taking Biotin.      POC Glucose Once [[id number]]  (Abnormal) Collected: 08/13/21 0019    Specimen: Blood Updated: 08/13/21 0020     Glucose 51 mg/dL      Comment: Meter: [id number] : [id number] [facility]       Sedimentation Rate [[id number]]  (Abnormal) Collected: 08/12/21 2225    Specimen: Blood Updated: 08/13/21 0000     Sed Rate 65 mm/hr     Lavender Top [[id number]] Collected: 08/12/21 2225    Specimen: Blood Updated: 08/12/21 2330     Extra Tube hold for add-on     Comment: Auto resulted       Green Top (Gel) [[id number]] Collected: 08/12/21 2225    Specimen: Blood Updated: 08/12/21 2330     Extra Tube Hold for add-ons.     Comment: Auto resulted.       Gold Top - SST [[id number]] Collected: 08/12/21 2225    Specimen: Blood Updated: 08/12/21 2330     Extra Tube Hold for add-ons.     Comment: Auto resulted.       T4, Free [[id number]]  (Abnormal) Collected: 08/12/21 2225    Specimen: Blood Updated: 08/12/21 2329     Free T4 0.71 ng/dL     Narrative:      Results may be falsely increased if patient taking Biotin.      TSH [[id number]]  (Normal) Collected: 08/12/21 2225    Specimen: Blood Updated: 08/12/21 2328     TSH 1.640 uIU/mL     Comprehensive Metabolic Panel [[id number]]  (Abnormal) Collected: 08/12/21 2225    Specimen: Blood Updated: 08/12/21 2311     Glucose 68 mg/dL      BUN 59  mg/dL      Creatinine 1.36 mg/dL      Sodium 134 mmol/L      Potassium 3.2 mmol/L      Comment: Slight hemolysis detected by analyzer. Results may be affected.        Chloride 93 mmol/L      CO2 28.0 mmol/L      Calcium 9.1 mg/dL      Total Protein 6.8 g/dL      Albumin 3.50 g/dL      ALT (SGPT) 23 U/L      AST (SGOT) 39 U/L      Alkaline Phosphatase 123 U/L      Total Bilirubin 0.6 mg/dL      eGFR Non African Amer 52 mL/min/1.73      Globulin 3.3 gm/dL      A/G Ratio 1.1 g/dL      BUN/Creatinine Ratio 43.4     Anion Gap 13.0 mmol/L     Narrative:      GFR Normal >60  Chronic Kidney Disease <60  Kidney Failure <15      Magnesium [[id number]]  (Normal) Collected: 08/12/21 2225    Specimen: Blood Updated: 08/12/21 2311     Magnesium 2.4 mg/dL     Troponin [[id number]]  (Abnormal) Collected: 08/12/21 2225    Specimen: Blood Updated: 08/12/21 2310     Troponin T 0.064 ng/mL     Narrative:      Troponin T Reference Range:  <= 0.03 ng/mL-   Negative for AMI  >0.03 ng/mL-     Abnormal for myocardial necrosis.  Clinicians would have to utilize clinical acumen, EKG, Troponin and serial changes to determine if it is an Acute Myocardial Infarction or myocardial injury due to an underlying chronic condition.       Results may be falsely decreased if patient taking Biotin.      CBC & Differential [[id number]]  (Abnormal) Collected: 08/12/21 2225    Specimen: Blood Updated: 08/12/21 2252    Narrative:      The following orders were created for panel order CBC & Differential.  Procedure                               Abnormality         Status                     ---------                               -----------         ------                     CBC Auto Differential[[id number]]        Abnormal            Final result               Scan Slide[[id number]]                                                                    Please view results for these tests on the individual orders.    CBC Auto Differential [[id number]]  (Abnormal)  Collected: 08/12/21 2225    Specimen: Blood Updated: 08/12/21 2252     WBC 8.15 10*3/mm3      RBC 3.73 10*6/mm3      Hemoglobin 11.0 g/dL      Hematocrit 34.1 %      MCV 91.4 fL      MCH 29.5 pg      MCHC 32.3 g/dL      RDW 15.7 %      RDW-SD 51.2 fl      MPV 10.7 fL      Platelets 147 10*3/mm3      Neutrophil % 83.2 %      Lymphocyte % 8.6 %      Monocyte % 5.3 %      Eosinophil % 0.4 %      Basophil % 0.4 %      Immature Grans % 2.1 %      Neutrophils, Absolute 6.79 10*3/mm3      Lymphocytes, Absolute 0.70 10*3/mm3      Monocytes, Absolute 0.43 10*3/mm3      Eosinophils, Absolute 0.03 10*3/mm3      Basophils, Absolute 0.03 10*3/mm3      Immature Grans, Absolute 0.17 10*3/mm3      nRBC 0.0 /100 WBC     POC Glucose Once [[id number]]  (Abnormal) Collected: 08/12/21 2247    Specimen: Blood Updated: 08/12/21 2248     Glucose 64 mg/dL      Comment: Meter: [id number] : [id number] Oscar Padilla             Rads:  Imaging Results (Last 72 Hours)     Procedure Component Value Units Date/Time    XR Chest 1 View [[id number]] Collected: 08/12/21 2307     Updated: 08/12/21 2309    Narrative:      CR Chest 1 Vw    INDICATION:   Weakness. Dizziness.     COMPARISON:    7/29/2021    FINDINGS:  2 portable AP view(s) of the chest.  The heart and mediastinal contours are normal. The lungs are clear. No pneumothorax or pleural effusion.      Impression:      No acute cardiopulmonary findings.    Signer Name: Mathew Mcdaniel MD   Signed: 8/12/2021 11:07 PM   Workstation Name: [facility]    [facility]            Assessment: Leg weakness on standing,?  Hypokalemia,?  Orthostatic hypotension secondary to multiple diuretics.    Diabetic neuropathy.    Obstructive sleep apnea.    Narcolepsy.    Trigeminal neuralgia left.    Bell's palsy left.    Nonalcoholic steatosis,?  Cirrhosis    Morbid obesity       Plan:    Potassium replacement.    Orthostatic blood pressure checks.    Physical therapy for gradual weighted  exercise program.    GI consult to evaluate for possible esophageal varices and his suitability for gastric bypass surgery.    Continue Adderall 60 mg morning and 30 mg early afternoon as prescribed by Dr. Womack for narcolepsy.    Comment:   Complex patient with multiple neurologic abnormalities.    He certainly does not show any evidence to support a diagnosis of Guillian Barré syndrome.    I discussed the patients findings and my recommendations with patient, family and primary care team      Jesu Zavala MD  08/13/21  14:32 EDT

## 2021-08-13 NOTE — CASE MANAGEMENT/SOCIAL WORK
Discharge Planning Assessment  [facility]     Patient Name: Abe Phillips Jr.  MRN: [id number]  Today's Date: 8/13/2021    Admit Date: 8/12/2021    Discharge Needs Assessment     Row Name 08/13/21 1130       Living Environment    Lives With  spouse    Name(s) of Who Lives With Patient  Caryn Phillips, spouse,[phone]    Current Living Arrangements  home/apartment/condo    Potentially Unsafe Housing Conditions  unable to assess    Primary Care Provided by  self    Provides Primary Care For  no one    Family Caregiver if Needed  child(joni), adult;spouse    Family Caregiver Names  Anisa Roth (Daughter) [phone] (Home Phone)    Quality of Family Relationships  unable to assess    Able to Return to Prior Arrangements  yes       Resource/Environmental Concerns    Resource/Environmental Concerns  none    Transportation Concerns  car, none       Transition Planning    Patient/Family Anticipates Transition to  home    Patient/Family Anticipated Services at Transition  none    Transportation Anticipated  family or friend will provide       Discharge Needs Assessment    Readmission Within the Last 30 Days  no previous admission in last 30 days    Equipment Currently Used at Home  walker, standard    Concerns to be Addressed  denies needs/concerns at this time    Anticipated Changes Related to Illness  none    Equipment Needed After Discharge  none    Provided Post Acute Provider List?  N/A    N/A Provider List Comment  denies need for outpt services        Discharge Plan     Row Name 08/13/21 3920       Plan    Plan  initial    Plan Comments  Pt lives with his wife and adult daughter in [facility]. He requires no assistance with ADL's. He is AxO today; hx includes Afib, Cirrhosis, HF, and ADD. Plan is home. PCP is Anibal Maria    Final Discharge Disposition Code  30 - still a patient        Continued Care and Services - Admitted Since 8/12/2021    Coordination has not been started for this encounter.          Demographic Summary    No documentation.       Functional Status     Row Name 08/13/21 1130       Functional Status    Usual Activity Tolerance  good       Functional Status, IADL    Medications  independent    Meal Preparation  independent    Housekeeping  independent    Laundry  independent    Shopping  independent       Mental Status    General Appearance WDL  WDL       Mental Status Summary    Recent Changes in Mental Status/Cognitive Functioning  no changes       Employment/    Employment Status  retired        Psychosocial    No documentation.       Abuse/Neglect    No documentation.       Legal    No documentation.       Substance Abuse    No documentation.       Patient Forms    No documentation.           Mya Fung RN

## 2021-08-13 NOTE — PLAN OF CARE
Goal Outcome Evaluation:  Plan of Care Reviewed With: patient           Outcome Summary: OT evaluation complete.  Pt Ox4 and has no pain.  He c/o tremors to BLEs and hands when activity increased.  Pt mod indep with bed mobility and dependent for donning socks.  Pt setup for grooming and self feeding.  Recommend IPOT and IPR at d/c to promote indep in adls and decrease fall risk.

## 2021-08-13 NOTE — PLAN OF CARE
Goal Outcome Evaluation:         Pt will remain free from falls during hospital stay. Will utilize safety precautions.

## 2021-08-13 NOTE — H&P
[facility] Medicine Services  HISTORY AND PHYSICAL    Patient Name: Abe Phillips Jr.  : 1955  MRN: [id number]  Primary Care Physician: Anibal Maria MD  Date of admission: 2021    Subjective   Subjective     Chief Complaint:  Generalized weakness    HPI:  Abe Phillips Jr. is a 66 y.o. male with a past medical history significant for atrial fibrillation, hypertension, HLD, CHF, DM2, MCCAULEY cirrhosis, PVD, JAIME, narcolepsy, chronic lymphedema, and obesity. He presents today with complaints of progressive generalized weakness going on for the past week. Patient volunteers that he has difficulty ambulating independently and is concerned about loss of mobility and functional decline. Also endorses tremors in hands bilaterally with worsened fined motor skills. States he is unable to  small things without dropping them in both hands. Patient was concerned and called his PCP a couple days ago wherein he it was suspected he was low on potassium again. States it was increased for two days and repeat labs today still demonstrated marked hypokalemia. States he was subsequently sent to ED for further evaluation and treatment.  Currently there are no complaints of fever, cough, congestion, SOB, or chest pain. No syncope, abdominal pain, or N/v/D. He reports adequate PO intake.  Patient was recently admitted to this service 21 to 21 with hypokalemia, hypomagnesemia and chronic lower extremity edema. Improved with electrolyte protocol. Patient is fully vaccinated for COVID-19. Will admit to inpatient.      COVID Details:    Symptoms:    [x] NONE [] Fever []  Cough [] Shortness of breath [] Change in taste/smell      The patient has a COVID HM Topic on their chart, and they are fully vaccinated.      Review of Systems   Constitutional: Negative for chills and fever.   HENT: Negative for congestion and trouble swallowing.    Eyes: Negative for photophobia and visual disturbance.    Respiratory: Negative for cough and shortness of breath.    Cardiovascular: Positive for leg swelling. Negative for chest pain.   Gastrointestinal: Negative for abdominal pain, diarrhea and nausea.   Endocrine: Negative for cold intolerance and heat intolerance.   Genitourinary: Negative for dysuria and flank pain.   Musculoskeletal: Positive for gait problem. Negative for back pain.   Skin: Negative for pallor and rash.   Allergic/Immunologic: Positive for immunocompromised state.   Neurological: Positive for weakness. Negative for dizziness, numbness and headaches.   Hematological: Negative for adenopathy.   Psychiatric/Behavioral: Negative for agitation and confusion.        All other systems reviewed and are negative.     Personal History     Past Medical History:   Diagnosis Date   • A-fib (CMS/HCC)    • ADD (attention deficit disorder)    • Atrial flutter (CMS/HCC)    • Cellulitis    • CHF (congestive heart failure) (CMS/HCC)    • Cirrhosis of liver (CMS/HCC)    • Constipation    • Depression    • Diabetes mellitus (CMS/HCC)    • Diabetic ulcer of right foot (CMS/HCC)    • Disease of thyroid gland    • Edema    • Fatty liver    • Hepatitis B    • Hyperlipidemia    • Hypokalemia    • Insomnia    • Lymphedema    • Narcolepsy    • Neuropathy    • Obesity    • JAIME on CPAP    • PVD (peripheral vascular disease) (CMS/HCC)    • RLS (restless legs syndrome)    • Skin cancer    • Tardive dyskinesia    • Tremor of both hands    • Type 2 diabetes mellitus (CMS/HCC)    • Yeast infection        Past Surgical History:   Procedure Laterality Date   • ACHILLES TENDON REPAIR     • CARDIAC ABLATION     • CHOLECYSTECTOMY     • LASER ABLATION      VEIN RLE       Family History: family history includes Clotting disorder in his father; Diabetes in his father; Heart failure in his mother; Hypertension in his father; No Known Problems in his brother, brother, daughter, and sister; Osteoarthritis in his mother. Otherwise pertinent  FHx was reviewed and unremarkable.     Social History:  reports that he has never smoked. He has never used smokeless tobacco. He reports current alcohol use. He reports that he does not use drugs.  Social History     Social History Narrative    .  Lives with wife        Medications:  Contour Next Monitor, Dexcom G6 Sensor, Dexcom G6 Transmitter, OXcarbazepine, OmniPod Dash 5 Pack Pods, amphetamine-dextroamphetamine, aspirin, bumetanide, erythromycin, gabapentin, levocetirizine, levothyroxine, loratadine, metOLazone, montelukast, nystatin, ofloxacin, olopatadine, potassium chloride, pramipexole, pravastatin, spironolactone, tobramycin-dexamethasone, traZODone, triamcinolone, and venlafaxine XR    No Known Allergies    Objective   Objective     Vital Signs:   Temp:  [96.9 °F (36.1 °C)] 96.9 °F (36.1 °C)  Heart Rate:  [63-74] 63  Resp:  [18-20] 18  BP: (123-140)/(73-95) 123/73    Physical Exam   Constitutional: Awake, alert  Eyes: PERRLA, sclerae anicteric, no conjunctival injection  HENT: NCAT, mucous membranes moist  Neck: Supple, no thyromegaly, no lymphadenopathy, trachea midline  Respiratory: Clear to auscultation bilaterally, nonlabored respirations   Cardiovascular: RRR, no murmurs, rubs, or gallops, palpable pedal pulses bilaterally  Gastrointestinal: Positive bowel sounds, soft, nontender, nondistended  Musculoskeletal: BLE edema, pitting with venous stasis changes, no clubbing or cyanosis to extremities  Psychiatric: Appropriate affect, cooperative  Neurologic: Oriented x 3, strength symmetric in all extremities, Cranial Nerves grossly intact to confrontation, speech clear  Skin: No rashes      Results Reviewed:  I have personally reviewed most recent indicated data and agree with findings including:  [x]  Laboratory  [x]  Radiology  []  EKG/Telemetry  []  Pathology  [x]  Cardiac/Vascular Studies  [x]  Old records  []  Other:  Most pertinent findings include: vitals stable. Troponin 0.064. potassium  3.2. creatinine 1.36. BUN 59. Alk phos 123. H/H 11 and 34.1. CXR clear.       LAB RESULTS:      Lab 08/12/21 2225   WBC 8.15   HEMOGLOBIN 11.0*   HEMATOCRIT 34.1*   PLATELETS 147   NEUTROS ABS 6.79   IMMATURE GRANS (ABS) 0.17*   LYMPHS ABS 0.70   MONOS ABS 0.43   EOS ABS 0.03   MCV 91.4   SED RATE 65*         Lab 08/12/21 2225 08/11/21  1306   SODIUM 134* 135*   POTASSIUM 3.2* 3.2*   CHLORIDE 93* 93*   CO2 28.0 26.6   ANION GAP 13.0 15.4*   BUN 59* 54*   CREATININE 1.36* 1.29*   GLUCOSE 68 97   CALCIUM 9.1 9.8   MAGNESIUM 2.4 2.2   TSH 1.640 2.200         Lab 08/12/21 2225   TOTAL PROTEIN 6.8   ALBUMIN 3.50   GLOBULIN 3.3   ALT (SGPT) 23   AST (SGOT) 39   BILIRUBIN 0.6   ALK PHOS 123*         Lab 08/12/21 2225   TROPONIN T 0.064*                 Brief Urine Lab Results  (Last result in the past 365 days)      Color   Clarity   Blood   Leuk Est   Nitrite   Protein   CREAT   Urine HCG        07/29/21 1557 Yellow Clear Negative Negative Negative Negative             Microbiology Results (last 10 days)     ** No results found for the last 240 hours. **          XR Chest 1 View    Result Date: 8/12/2021  CR Chest 1 Vw INDICATION: Weakness. Dizziness. COMPARISON:  7/29/2021 FINDINGS: 2 portable AP view(s) of the chest.  The heart and mediastinal contours are normal. The lungs are clear. No pneumothorax or pleural effusion.     Impression: No acute cardiopulmonary findings. Signer Name: Mathew Mcdaniel MD  Signed: 8/12/2021 11:07 PM  Workstation Name: [email or web address]  [facility]      Results for orders placed during the hospital encounter of 01/04/19    Adult Transthoracic Echo Complete W/ Cont if Necessary Per Protocol    Interpretation Summary  · Left ventricular systolic function is hyperdynamic (EF > 70).      Assessment/Plan   Assessment & Plan       Bilateral leg weakness    OMAR (acute kidney injury) (CMS/HCC)    Hypoglycemia    Elevated troponin    Hypokalemia    A-fib (CMS/HCC)    Cirrhosis  of liver (CMS/HCC)    Chronic congestive heart failure (CMS/HCC)    Essential hypertension    Uncontrolled type 2 diabetes mellitus with hyperglycemia (CMS/HCC)    Anemia    PVD (peripheral vascular disease) (CMS/HCC)    Restless legs syndrome (RLS)    ADD (attention deficit disorder)    Obesity    Severe JAIME on CPAP    Other hyperlipidemia      Abe Phillips Jr. is a 66 y.o. male with a past medical history significant for atrial fibrillation, hypertension, HLD, CHF, DM2, MCCAULEY cirrhosis, PVD, JAIME, narcolepsy, chronic lymphedema, and obesity. He presents today with complaints of progressive generalized weakness going on for the past week.    1. Bilateral Leg Weakness/ Generalized Weakness:  - with hypokalemia. On Bumex and metolazone. Takes 40 Meq daily  - check magnesium  - EKG without acute changes  - replace as needed per protocol  - PT/OT  - am labs  -check EMG, neuro to see    2. OMAR:  - baseline creatinine ~ 1.0  - hold Bumex and other nephrotoxins  - gentle hydration with saline 75 cc/hr x 8 hours  - monitor with history of lymphedema    3. Chronic Lymphedema:  - wound care  - OT for wrapping    4. Hypoglycemia:  - History of DM2, HbA1c 7.2%  - uses insulin pump. Will turn off for now and initiate hypoglycemic protocol  - accu checks q1hour    5. Elevated Troponin:  - appears chronically elevated. Continue to trend for now  - EKG without acute changes  - last heart cath 2016 in [place]  - followed by Pam. To see 9/29/21    6. CHF:  - presumed diastolic. Echo 2019 with EF > 70%  - resume diuresis as tolerated    7. MCCAULEY Cirrhosis:  - liver enzymes favorable  - currently compensated     8. Narcolepsy:  - on Adderall  - liver enzymes favorable    9. JAIME:  - continue /NEEDS CPAP inpatient    DVT prophylaxis:  MARIANO    CODE STATUS:  Full code  Code Status and Medical Interventions:   Ordered at: 08/13/21 0114     Level Of Support Discussed With:    Patient     Code Status:    CPR     Medical Interventions (Level of  Support Prior to Arrest):    Full       This note has been completed as part of a split-shared workflow.     Electronically signed by Bk Bhatt PA-C, 08/13/21, 2:08 AM EDT.  Attending   Admission Attestation       I have seen and examined the patient, performing an independent face-to-face diagnostic evaluation with plan of care reviewed and developed with the advanced practice clinician (APC).      Brief Summary Statement:     Abe Phillips Jr. is a 66 y.o. male with a past medical history significant for atrial fibrillation, hypertension, HLD, CHF, DM2, MCCAULEY cirrhosis, PVD, JAIME, narcolepsy, chronic lymphedema, and obesity. He presents today with complaints of progressive generalized weakness going on for the past week.  Patient says he has chronic back pain, worsening lower leg strength. Ongoing edema-     Remainder of detailed HPI is as noted by APC and has been reviewed and/or edited by me for completeness.    Attending Physical Exam:  Temp:  [96.9 °F (36.1 °C)] 96.9 °F (36.1 °C)  Heart Rate:  [63-74] 63  Resp:  [18-20] 18  BP: (123-140)/(73-95) 123/73  87% on 2 LPM while sleeping  Patient is sleeping, woke easliy and coherent  Thick Neck is without mass or JVD  Heart is Reg wo murmur  Lungs are decreased  Abdomen is soft without HSM or mass, not tender or distended  MAEW, warm lower legs, chronic edema,   Skin is without rash  Neurologic exam is nonfocal   Mood is appropriate    Brief Assessment/Plan :  See detailed assessment and plan developed with APC which I have reviewed and/or edited for completeness.    I believe this patient meets Obs status for now    Electronically signed by Diamond Duncan MD, 08/13/21, 3:36 AM EDT.

## 2021-08-13 NOTE — CONSULTS
Met with Mr. Phillips for diabetes education.  We reviewed his A1c of 6.6 and he was congratulated on managing blood sugars.  Mr. Davis has an Omnipod pump he replaced yesterday 08/12.  He has U500 insulin.  Mr. Phillips is well known to our service and very knowledgeable in regards to diabetes and his pump.  He doesn't have a carb ratio or correction dose.  He adjusts basal continuously depending on blood sugars.  This is not ideal, but works for him.  He signed insulin pump contract and it was placed in chart.  We reviewed hypoglycemia s/s and when to call nurse.  He verbalized understanding.  He is aware if blood sugars are uncontrolled pump will need to be removed and insulin will need to be given per hospital regimen. Thank you.

## 2021-08-14 NOTE — THERAPY WOUND CARE TREATMENT
Acute Care - Wound/Debridement Initial Evaluation  [facility]     Patient Name: Abe Phillips Jr.  : 1955  MRN: [id number]  Today's Date: 2021            R heel      Admit Date: 2021    Visit Dx:    ICD-10-CM ICD-9-CM   1. Generalized weakness  R53.1 780.79   2. Hypokalemia  E87.6 276.8   3. Hypoglycemia  E16.2 251.2   4. Anemia, unspecified type  D64.9 285.9       Patient Active Problem List   Diagnosis   • Type 2 diabetes mellitus with hyperglycemia (CMS/HCC)   • Thrombocytopenia (CMS/HCC)   • PVD (peripheral vascular disease) (CMS/HCC)   • A-fib (CMS/HCC)   • Cirrhosis of liver (CMS/HCC)   • Chronic congestive heart failure (CMS/HCC)   • Diabetic ulcer of right heel (CMS/HCC)   • Primary narcolepsy without cataplexy   • Essential hypertension   • Morbidly obese (CMS/HCC)   • Restless legs syndrome (RLS)   • ADD (attention deficit disorder)   • Depression   • Diabetes mellitus type 2, uncontrolled, with complications (CMS/HCC)   • Edema   • MCCAULEY (nonalcoholic steatohepatitis)   • Hepatitis B   • Insomnia   • Lymphedema   • Obesity   • Severe JAIME on CPAP   • RLS (restless legs syndrome)   • Tremor of both hands   • Restrictive cardiomyopathy (CMS/HCC)   • Other hyperlipidemia   • Uncontrolled type 2 diabetes mellitus with hyperglycemia (CMS/HCC)   • OAMR (acute kidney injury) (CMS/HCC)   • Dyslipidemia   • Hyperglycemia   • Diabetic ulcer of right heel associated with type 2 diabetes mellitus (CMS/HCC)   • Facial paralysis/Glendo palsy   • Diabetic hypoglycemia (CMS/HCC)   • Hypogonadism in male   • Atypical facial pain   • Bilateral leg weakness   • Hypoglycemia   • Elevated troponin   • Hypokalemia   • Anemia        Past Medical History:   Diagnosis Date   • A-fib (CMS/HCC)    • ADD (attention deficit disorder)    • Atrial flutter (CMS/HCC)    • Cellulitis    • CHF (congestive heart failure) (CMS/HCC)    • Cirrhosis of liver (CMS/HCC)    • Constipation    • Depression    • Diabetes mellitus  (CMS/HCC)    • Diabetic ulcer of right foot (CMS/HCC)    • Disease of thyroid gland    • Edema    • Fatty liver    • Hepatitis B    • Hyperlipidemia    • Hypokalemia    • Insomnia    • Lymphedema    • Narcolepsy    • Neuropathy    • Obesity    • JAIME on CPAP    • PVD (peripheral vascular disease) (CMS/HCC)    • RLS (restless legs syndrome)    • Skin cancer    • Tardive dyskinesia    • Tremor of both hands    • Type 2 diabetes mellitus (CMS/HCC)    • Yeast infection         Past Surgical History:   Procedure Laterality Date   • ACHILLES TENDON REPAIR     • CARDIAC ABLATION     • CHOLECYSTECTOMY     • LASER ABLATION      VEIN RLE           Wound 12/27/19 2334 Right heel Diabetic Ulcer (Active)   Wound Image   08/14/21 0920   Dressing Appearance intact;moist drainage 08/14/21 0920   Base moist;pink;yellow;white 08/14/21 0920   Periwound intact;dry 08/14/21 0920   Periwound Temperature cool 08/14/21 0920   Periwound Skin Turgor firm 08/14/21 0920   Edges callused;open 08/14/21 0920   Wound Length (cm) 0.5 cm 08/14/21 0920   Wound Width (cm) 0.4 cm 08/14/21 0920   Wound Depth (cm) 0.2 cm 08/14/21 0920   Drainage Characteristics/Odor serous 08/14/21 0920   Drainage Amount small 08/14/21 0920   Care, Wound cleansed with;wound cleanser;debrided 08/14/21 0920   Dressing Care dressing applied;antimicrobial agent applied;foam;low-adherent;border dressing 08/14/21 0920   Periwound Care cleansed with pH balanced cleanser;dry periwound area maintained 08/14/21 0920     Lymphedema     Row Name 08/14/21 0920             Lymphedema Edema Assessment    Ptting Edema Category  By severity  -MC      Pitting Edema  Moderate  -MC         Skin Changes/Observations    Location/Assessment  Lower Extremity  -MC      Lower Extremity Conditions  left:;intact;bilateral:;clean;dry;shiny;hairless  -MC      Lower Extremity Color/Pigment  bilateral:;brawny  -MC         Lymphedema Pulses/Capillary Refill    Lymphedema Pulses/Capillary Refill  lower  extremity pulses;capillary refill  -      Dorsalis Pedis Pulse  right:;left:;+2 normal  -      Posterior Tibialis Pulse  right:;left:;+2 normal  -      Capillary Refill  lower extremity capillary refill  -      Lower Extremity Capillary Refill  right:;left:;less than 3 seconds  -         Compression/Skin Care    Compression/Skin Care  skin care;wrapping location;bandaging  -      Skin Care  washed/dried;lotion applied  -      Wrapping Location  lower extremity  -      Wrapping Location LE  bilateral:;foot to knee  -      Wrapping Comments  size 4/6 compressogrip doubled and overlapping to create gradient compression  -      Bandage Layers  cotton elastic stocking- double layer (comment size)  -        User Key  (r) = Recorded By, (t) = Taken By, (c) = Cosigned By    Initials Name Provider Type    Nickie Monreal, PT Physical Therapist          WOUND DEBRIDEMENT  Total area of Debridement: 2 cm2  Debridement Site 1  Location- Site 1: R heel  Selective Debridement- Site 1: Wound Surface <20cmsq  Instruments- Site 1: #15, scapel, tweezers  Excised Tissue Description- Site 1: scant, slough, moderate, other (comment) (callus)  Bleeding- Site 1: scant, held pressure, 1 minute                  PT Assessment (last 12 hours)      PT Evaluation and Treatment     Row Name 08/14/21 8212          Physical Therapy Time and Intention    Subjective Information  complains of;swelling  -     Document Type  wound care;evaluation  -     Mode of Treatment  physical therapy;individual therapy  -     Row Name 08/14/21 2326          General Information    Patient Profile Reviewed  yes  -     Patient Observations  alert;cooperative;agree to therapy  -     Risks Reviewed  patient:;increased discomfort  -     Benefits Reviewed  patient:;improve skin integrity  -     Barriers to Rehab  medically complex;previous functional deficit  -     Row Name 08/14/21 0948          Cognition    Orientation Status  "(Cognition)  oriented x 4  -     Row Name 08/14/21 0920          Pain Scale: Numbers Pre/Post-Treatment    Pretreatment Pain Rating  0/10 - no pain  -     Posttreatment Pain Rating  0/10 - no pain  -     Row Name 08/14/21 0920          Wound 12/27/19 2334 Right heel Diabetic Ulcer    Wound - Properties Group Placement Date: 12/27/19  - Placement Time: 2334  -AYLIN Present on Hospital Admission: Y  -AYLIN Side: Right  -AYLIN Location: heel  -AYLIN Primary Wound Type: Diabetic ulc  -AYLIN    Wound Image  Images linked: 1  -MC     Dressing Appearance  intact;moist drainage  -     Base  moist;pink;yellow;white  -     Periwound  intact;dry  -     Periwound Temperature  cool  -     Periwound Skin Turgor  firm  -     Edges  callused;open  -     Wound Length (cm)  0.5 cm  -     Wound Width (cm)  0.4 cm  -     Wound Depth (cm)  0.2 cm  -     Drainage Characteristics/Odor  serous  -     Drainage Amount  small  -     Care, Wound  cleansed with;wound cleanser;debrided  -     Dressing Care  dressing applied;antimicrobial agent applied;foam;low-adherent;border dressing HFBt, 4\" optifoam  -     Periwound Care  cleansed with pH balanced cleanser;dry periwound area maintained  -     Retired Wound - Properties Group Date first assessed: 12/27/19 -AYLIN Time first assessed: 2334 -AYLIN Present on Hospital Admission: Y  -AYLIN Side: Right  -AYLIN Location: heel  -AYLIN Primary Wound Type: Diabetic ulc  -AYLIN    Row Name 08/14/21 0920          Coping    Observed Emotional State  calm;cooperative;pleasant  -     Verbalized Emotional State  acceptance  -     Row Name 08/14/21 0920          Plan of Care Review    Plan of Care Reviewed With  patient  -MC     Outcome Summary  PT Wound care eval complete. Pt with small, shallow, diabetic ulcer to the R heel as well as moderate BLE swelling. Pt is a good candidate for edema management with multilayer wrapping to increase venous return and maintain limb girth reduction. Anticipate " change in 2-3 days.  -     Row Name 08/14/21 0920          Physical Therapy Goals    Wound Care Goal Selection (PT)  wound care, PT goal 1;wound care, PT goal 2  -     Row Name 08/14/21 0920          Wound Care Goal 1 (PT)    Wound Care Goal 1 (PT)  Pt will demonstrate only mild BLE edema to indicate appropriate edema management  -     Time Frame (Wound Care Goal 1, PT)  10 days  -     Row Name 08/14/21 0920          Wound Care Goal 2 (PT)    Wound Care Goal 2 (PT)  Pt will demonstrate 25% reduction in wound area to indicate healing progress.  -     Time Frame (Wound Care Goal 2, PT)  10 days  -     Row Name 08/14/21 0920          Positioning and Restraints    Pre-Treatment Position  in bed  -     Post Treatment Position  bed  -     In Bed  supine;call light within reach;encouraged to call for assist  -     Row Name 08/14/21 0920          Therapy Assessment/Plan (PT)    Patient/Family Therapy Goals Statement (PT)  reduce swelling, be able to get up  -     Rehab Potential (PT)  good, to achieve stated therapy goals  -     Criteria for Skilled Interventions Met (PT)  yes;meets criteria;skilled treatment is necessary  -     Row Name 08/14/21 0920          PT Evaluation Complexity    History, PT Evaluation Complexity  1-2 personal factors and/or comorbidities  -     Examination of Body Systems (PT Eval Complexity)  1-2 elements  -     Clinical Presentation (PT Evaluation Complexity)  stable  -     Clinical Decision Making (PT Evaluation Complexity)  low complexity  -     Overall Complexity (PT Evaluation Complexity)  low complexity  -       User Key  (r) = Recorded By, (t) = Taken By, (c) = Cosigned By    Initials Name Provider Type    Nickie Monreal, PT Physical Therapist    Lewis Cadena RN Registered Nurse        Physical Therapy Education                 Title: PT OT SLP Therapies (In Progress)     Topic: Physical Therapy (Not Started)     Point: Mobility training  (Not Started)     Learner Progress:  Not documented in this visit.          Point: Home exercise program (Not Started)     Learner Progress:  Not documented in this visit.          Point: Body mechanics (Not Started)     Learner Progress:  Not documented in this visit.          Point: Precautions (Not Started)     Learner Progress:  Not documented in this visit.                            Recommendation and Plan  Anticipated Discharge Disposition (PT): home with home health, home with outpatient therapy services  Planned Therapy Interventions (PT): wound care  Plan of Care Reviewed With: patient           Outcome Summary: PT Wound care eval complete. Pt with small, shallow, diabetic ulcer to the R heel as well as moderate BLE swelling. Pt is a good candidate for edema management with multilayer wrapping to increase venous return and maintain limb girth reduction. Anticipate change in 2-3 days.  Plan of Care Reviewed With: patient            Time Calculation  PT Charges     Row Name 08/14/21 0920             Time Calculation    Start Time  0920  -MC      PT Goal Re-Cert Due Date  08/24/21  -MC         Untimed Charges    PT Eval/Re-eval Minutes  45  -MC      Wound Care  53086 Multilayer comp below knee;29335 Selective debridement  -MC      15650-Anedslzknv comp below knee  15  -MC      19126-Ivcqwjduf debridement  10  -MC         Total Minutes    Untimed Charges Total Minutes  70  -MC       Total Minutes  70  -MC        User Key  (r) = Recorded By, (t) = Taken By, (c) = Cosigned By    Initials Name Provider Type    Nickie Monreal, PT Physical Therapist            Therapy Charges for Today     Code Description Service Date Service Provider Modifiers Qty    30284347924 HC PT EVAL LOW COMPLEXITY 3 8/14/2021 Nickie Beverly, PT GP 1    71491982543 HC AL DEBRIDE OPEN WOUND UP TO 20CM 8/14/2021 Nickie Beverly, PT GP 1    47943814680 HC PT MULTI LAYER COMP SYS BELOW KNEE 8/14/2021 Nickie Beverly, PT GP 1             PT G-Codes  Outcome Measure Options: AM-PAC 6 Clicks Daily Activity (OT)  AM-PAC 6 Clicks Score (PT): 19  AM-PAC 6 Clicks Score (OT): 19       Nickie Beverly, PT  8/14/2021

## 2021-08-14 NOTE — PROGRESS NOTES
Daily Progress Note  Neurology     LOS: 0 days     Subjective     Chief Complaint: Bilateral leg weakness.    Interval History: No acute issues overnight.  Has not had physical therapy today.  Has completed EMG.    ROS: Negative for fever, chest pain or shortness of breath.    Objective     Vital signs in last 24 hours:  Temp:  [97.7 °F (36.5 °C)-98 °F (36.7 °C)] 97.7 °F (36.5 °C)  Heart Rate:  [67-84] 84  Resp:  [16-18] 16  BP: (111-147)/(72-88) 113/88      Physical Exam:   General: Sitting in chair with eyes open.. In NAD.     Respiratory: Respirations unlabored   CV: RRR       Neurologic Exam:   Mental status: Awake, alert, Follows commands. Speech fluent   CN: II-XII intact to detailed exam except for left facial weakness-history of Bell's palsy on the left.   Motor: Strength full (5-/5) throughout in BUE and BLE    Reflexes: 1+ in both upper extremity, 1+ bilateral knee and absent bilateral ankles.          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    Assessment/Plan      1.  Bilateral lower extremity weakness  2.  Moderate mixed motor and sensory, diabetic polyneuropathy  3.  CKD stage III  4.  JAIME  5.  Bell's palsy, left  6.  Morbid obesity  -Possibly caused by overdiuresis versus orthostatic hypotension as well as moderate diabetic polyneuropathy.  -Electrolytes are being replaced.  -Will Need aggressive OT PT as an outpatient  -GI is consulted for gastric bypass surgery -if cleared, it will help him  tremendously with gait, balance, better control of diabetes, kidney function.  -Neurology will continue to follow.    Sony Harry MD  08/14/21  15:16 EDT

## 2021-08-14 NOTE — NURSING NOTE
WOC consult:     BLE and right posterior heel ulceration     PT wound care had been consulted as well  Compression wraps are in place and ulceration is being treated with Hydrofera blue.     Will defer care to PT wound care.     Will order a bariatric bed.     Nothing further needed from WOC nurse.   Will sign off.     Thanks

## 2021-08-14 NOTE — PLAN OF CARE
Problem: Fall Injury Risk  Goal: Absence of Fall and Fall-Related Injury  Outcome: Ongoing, Progressing  Intervention: Identify and Manage Contributors to Fall Injury Risk  Recent Flowsheet Documentation  Taken 8/14/2021 0600 by Doreen Escobedo RN  Medication Review/Management: medications reviewed  Taken 8/14/2021 0400 by Doreen Escobedo, RN  Medication Review/Management: medications reviewed  Taken 8/14/2021 0200 by Doreen Escobedo, RN  Medication Review/Management: medications reviewed  Taken 8/14/2021 0000 by Doreen Escobedo, RN  Medication Review/Management: medications reviewed  Taken 8/13/2021 2210 by Doreen Escobedo, RN  Medication Review/Management: medications reviewed  Taken 8/13/2021 2100 by Doreen Escobedo RN  Medication Review/Management: medications reviewed  Self-Care Promotion: independence encouraged  Intervention: Promote Injury-Free Environment  Recent Flowsheet Documentation  Taken 8/14/2021 0600 by Doreen Escobedo, RN  Safety Promotion/Fall Prevention:   activity supervised   assistive device/personal items within reach   clutter free environment maintained   fall prevention program maintained   nonskid shoes/slippers when out of bed   room organization consistent   safety round/check completed   toileting scheduled  Taken 8/14/2021 0400 by Doreen Escobedo RN  Safety Promotion/Fall Prevention:   activity supervised   assistive device/personal items within reach   clutter free environment maintained   fall prevention program maintained   nonskid shoes/slippers when out of bed   room organization consistent   safety round/check completed   toileting scheduled  Taken 8/14/2021 0200 by Doreen Escobedo RN  Safety Promotion/Fall Prevention:   activity supervised   assistive device/personal items within reach   clutter free environment maintained   fall prevention program maintained   nonskid shoes/slippers when out of bed   room organization consistent   toileting scheduled    safety round/check completed  Taken 8/14/2021 0000 by Doreen Escobedo, RN  Safety Promotion/Fall Prevention:   activity supervised   assistive device/personal items within reach   clutter free environment maintained   fall prevention program maintained   nonskid shoes/slippers when out of bed   room organization consistent   safety round/check completed   toileting scheduled  Taken 8/13/2021 2210 by Doreen Escobedo, RN  Safety Promotion/Fall Prevention:   activity supervised   assistive device/personal items within reach   clutter free environment maintained   fall prevention program maintained   nonskid shoes/slippers when out of bed   room organization consistent   safety round/check completed   toileting scheduled  Taken 8/13/2021 2100 by Doreen Escobedo, RN  Safety Promotion/Fall Prevention:   activity supervised   assistive device/personal items within reach   clutter free environment maintained   fall prevention program maintained   nonskid shoes/slippers when out of bed   room organization consistent   safety round/check completed   toileting scheduled     Problem: Skin Injury Risk Increased  Goal: Skin Health and Integrity  Outcome: Ongoing, Progressing  Intervention: Optimize Skin Protection  Recent Flowsheet Documentation  Taken 8/14/2021 0600 by Doreen Escobedo, RN  Pressure Reduction Techniques:   frequent weight shift encouraged   weight shift assistance provided  Pressure Reduction Devices: pressure-redistributing mattress utilized  Skin Protection:   adhesive use limited   tubing/devices free from skin contact  Taken 8/14/2021 0400 by Doreen Escobedo, RN  Pressure Reduction Techniques:   frequent weight shift encouraged   weight shift assistance provided  Pressure Reduction Devices: pressure-redistributing mattress utilized  Skin Protection:   adhesive use limited   tubing/devices free from skin contact  Taken 8/14/2021 0200 by Doreen Escobedo, RN  Pressure Reduction Techniques:   frequent  weight shift encouraged   weight shift assistance provided  Pressure Reduction Devices: pressure-redistributing mattress utilized  Skin Protection:   adhesive use limited   tubing/devices free from skin contact  Taken 8/14/2021 0000 by Doreen Escobedo RN  Pressure Reduction Techniques:   frequent weight shift encouraged   weight shift assistance provided  Pressure Reduction Devices: pressure-redistributing mattress utilized  Skin Protection:   adhesive use limited   tubing/devices free from skin contact  Taken 8/13/2021 2210 by Doreen Escobedo, RN  Pressure Reduction Techniques:   frequent weight shift encouraged   weight shift assistance provided  Pressure Reduction Devices: pressure-redistributing mattress utilized  Skin Protection:   adhesive use limited   tubing/devices free from skin contact  Taken 8/13/2021 2100 by Doreen Escobedo RN  Pressure Reduction Techniques:   frequent weight shift encouraged   weight shift assistance provided  Head of Bed (HOB): HOB at 20-30 degrees  Pressure Reduction Devices: pressure-redistributing mattress utilized  Skin Protection:   adhesive use limited   incontinence pads utilized   tubing/devices free from skin contact     Problem: Adult Inpatient Plan of Care  Goal: Plan of Care Review  Outcome: Ongoing, Progressing  Flowsheets (Taken 8/14/2021 0633)  Progress: no change  Plan of Care Reviewed With: patient  Goal: Patient-Specific Goal (Individualized)  Outcome: Ongoing, Progressing  Goal: Absence of Hospital-Acquired Illness or Injury  Outcome: Ongoing, Progressing  Intervention: Identify and Manage Fall Risk  Recent Flowsheet Documentation  Taken 8/14/2021 0600 by Doreen Escobedo, RN  Safety Promotion/Fall Prevention:   activity supervised   assistive device/personal items within reach   clutter free environment maintained   fall prevention program maintained   nonskid shoes/slippers when out of bed   room organization consistent   safety round/check completed    toileting scheduled  Taken 8/14/2021 0400 by Doreen Escobedo, RN  Safety Promotion/Fall Prevention:   activity supervised   assistive device/personal items within reach   clutter free environment maintained   fall prevention program maintained   nonskid shoes/slippers when out of bed   room organization consistent   safety round/check completed   toileting scheduled  Taken 8/14/2021 0200 by Doreen Escobedo, RN  Safety Promotion/Fall Prevention:   activity supervised   assistive device/personal items within reach   clutter free environment maintained   fall prevention program maintained   nonskid shoes/slippers when out of bed   room organization consistent   toileting scheduled   safety round/check completed  Taken 8/14/2021 0000 by Doreen Escobedo, RN  Safety Promotion/Fall Prevention:   activity supervised   assistive device/personal items within reach   clutter free environment maintained   fall prevention program maintained   nonskid shoes/slippers when out of bed   room organization consistent   safety round/check completed   toileting scheduled  Taken 8/13/2021 2210 by Doreen Escobedo, RN  Safety Promotion/Fall Prevention:   activity supervised   assistive device/personal items within reach   clutter free environment maintained   fall prevention program maintained   nonskid shoes/slippers when out of bed   room organization consistent   safety round/check completed   toileting scheduled  Taken 8/13/2021 2100 by Doreen Escobedo, RN  Safety Promotion/Fall Prevention:   activity supervised   assistive device/personal items within reach   clutter free environment maintained   fall prevention program maintained   nonskid shoes/slippers when out of bed   room organization consistent   safety round/check completed   toileting scheduled  Intervention: Prevent Skin Injury  Recent Flowsheet Documentation  Taken 8/14/2021 0600 by Doreen Escobedo, RN  Body Position: weight shift assistance provided  Skin  Protection:   adhesive use limited   tubing/devices free from skin contact  Taken 8/14/2021 0400 by Doreen Escobedo, RN  Body Position: weight shift assistance provided  Skin Protection:   adhesive use limited   tubing/devices free from skin contact  Taken 8/14/2021 0200 by Doreen Escobedo, RN  Body Position: weight shift assistance provided  Skin Protection:   adhesive use limited   tubing/devices free from skin contact  Taken 8/14/2021 0000 by Doreen Escobedo, RN  Body Position: weight shift assistance provided  Skin Protection:   adhesive use limited   tubing/devices free from skin contact  Taken 8/13/2021 2210 by Doreen Escobedo, RN  Body Position: weight shift assistance provided  Skin Protection:   adhesive use limited   tubing/devices free from skin contact  Taken 8/13/2021 2100 by Doreen Escobedo, RN  Body Position: sitting up in bed  Skin Protection:   adhesive use limited   incontinence pads utilized   tubing/devices free from skin contact  Intervention: Prevent and Manage VTE (venous thromboembolism) Risk  Recent Flowsheet Documentation  Taken 8/13/2021 2100 by Doreen Escobedo RN  VTE Prevention/Management: (sq heparin ) bleeding risk factor(s) identified  Intervention: Prevent Infection  Recent Flowsheet Documentation  Taken 8/14/2021 0600 by Doreen Escobedo, RN  Infection Prevention:   environmental surveillance performed   hand hygiene promoted   rest/sleep promoted   single patient room provided   visitors restricted/screened  Taken 8/14/2021 0400 by Doreen Escobedo, RN  Infection Prevention:   environmental surveillance performed   hand hygiene promoted   rest/sleep promoted   single patient room provided   visitors restricted/screened  Taken 8/14/2021 0200 by Doreen Escobedo RN  Infection Prevention:   environmental surveillance performed   hand hygiene promoted   rest/sleep promoted   single patient room provided   visitors restricted/screened  Taken 8/14/2021 0000 by Gregg  Doreen WADDELL, RN  Infection Prevention:   environmental surveillance performed   hand hygiene promoted   rest/sleep promoted   single patient room provided   visitors restricted/screened  Taken 8/13/2021 2210 by Doreen Escobedo, RN  Infection Prevention:   environmental surveillance performed   hand hygiene promoted   rest/sleep promoted   single patient room provided   visitors restricted/screened  Taken 8/13/2021 2100 by Doreen Escobedo, RN  Infection Prevention:   environmental surveillance performed   hand hygiene promoted   rest/sleep promoted   single patient room provided   visitors restricted/screened  Goal: Optimal Comfort and Wellbeing  Outcome: Ongoing, Progressing  Intervention: Provide Person-Centered Care  Recent Flowsheet Documentation  Taken 8/13/2021 2100 by Doreen Escobedo, RN  Trust Relationship/Rapport:   care explained   choices provided   emotional support provided   empathic listening provided   questions answered   questions encouraged   reassurance provided   thoughts/feelings acknowledged  Goal: Readiness for Transition of Care  Outcome: Ongoing, Progressing   Goal Outcome Evaluation:  Plan of Care Reviewed With: patient        Progress: no change

## 2021-08-14 NOTE — PROGRESS NOTES
[facility] Medicine Services  PROGRESS NOTE    Patient Name: Abe Phillips Jr.  : 1955  MRN: [id number]    Date of Admission: 2021  Primary Care Physician: Anibal Maria MD    Subjective   Subjective     CC:  Follow-up weakness    HPI:  Still feels generalized weak, maybe slightly improved.  Wife was just admitted to this hospital last night.  Waiting to work with PT, yesterday was seen by OT recommended IPR which he is potentially agreeable to depending on what his wife needs    ROS:  Gen- No fevers, chills  CV- No chest pain, palpitations  Resp- No cough, dyspnea  GI- No N/V/D, abd pain     Objective   Objective     Vital Signs:   Temp:  [97.7 °F (36.5 °C)-98 °F (36.7 °C)] 97.7 °F (36.5 °C)  Heart Rate:  [63-78] 67  Resp:  [16-18] 18  BP: (111-128)/(72-84) 123/73  Flow (L/min):  [2] 2     Physical Exam:  Constitutional: Awake, lethargic, sitting up in bedside chair  HENT: NCAT, mucous membranes moist  Respiratory: Clear to auscultation bilaterally, respiratory effort normal   Cardiovascular: RRR, no murmurs, rubs, or gallops, palpable radial pulses  Gastrointestinal: Positive bowel sounds, soft, nontender, nondistended  Musculoskeletal: Bilateral ankle edema with compression wrapping  Psychiatric: Slow affect, cooperative  Neurologic: Speech clear, moving extremities symmetrically    Results Reviewed:  LAB RESULTS:      Lab 21  2225   WBC 5.85 6.41 8.15   HEMOGLOBIN 10.9* 10.5* 11.0*   HEMATOCRIT 34.6* 32.6* 34.1*   PLATELETS 130* 134* 147   NEUTROS ABS 4.00  --  6.79   IMMATURE GRANS (ABS) 0.17*  --  0.17*   LYMPHS ABS 0.98  --  0.70   MONOS ABS 0.51  --  0.43   EOS ABS 0.13  --  0.03   MCV 93.0 91.6 91.4   SED RATE  --   --  65*   PROTIME 14.9*  --   --          Lab 2121  2225 21  1306   SODIUM 135* 136 134* 135*   POTASSIUM 3.7 3.2* 3.2* 3.2*   CHLORIDE 95* 96* 93* 93*   CO2 28.0 28.0 28.0 26.6    ANION GAP 12.0 12.0 13.0 15.4*   BUN 60* 61* 59* 54*   CREATININE 1.37* 1.35* 1.36* 1.29*   GLUCOSE 155* 106* 68 97   CALCIUM 9.4 9.0 9.1 9.8   MAGNESIUM  --   --  2.4 2.2   HEMOGLOBIN A1C  --  6.60*  --   --    TSH  --   --  1.640 2.200         Lab 08/14/21  0422 08/12/21  2225   TOTAL PROTEIN 7.0 6.8   ALBUMIN 3.60 3.50   GLOBULIN 3.4 3.3   ALT (SGPT) 20 23   AST (SGOT) 29 39   BILIRUBIN 0.8 0.6   ALK PHOS 122* 123*         Lab 08/14/21  0423 08/13/21  0615 08/13/21  0050 08/12/21  2225   TROPONIN T  --  0.057* 0.063* 0.064*   PROTIME 14.9*  --   --   --    INR 1.21*  --   --   --                  Brief Urine Lab Results  (Last result in the past 365 days)      Color   Clarity   Blood   Leuk Est   Nitrite   Protein   CREAT   Urine HCG        08/13/21 0718 Yellow Clear Negative Negative Negative Negative               Microbiology Results Abnormal     None          XR Chest 1 View    Result Date: 8/12/2021  CR Chest 1 Vw INDICATION: Weakness. Dizziness. COMPARISON:  7/29/2021 FINDINGS: 2 portable AP view(s) of the chest.  The heart and mediastinal contours are normal. The lungs are clear. No pneumothorax or pleural effusion.     Impression: No acute cardiopulmonary findings. Signer Name: Mathew Mcdaniel MD  Signed: 8/12/2021 11:07 PM  Workstation Name: [facility]  [facility]    EMG & Nerve Conduction Test Request    Result Date: 8/13/2021  Indication: Generalized weakness, peripheral neuropathy, myopathy Clinical: 66 y.o.male with a history of diabetes, liver dysfunction due to Melo, chronic lymphedema.  He has had recent difficulties with significant hypokalemia.  He has been having increasing weakness in both legs as well as tremors in his hands with a tendency to drop things.  He denies numbness or paresthesias.  Peripheral neuropathy, myopathy are considerations.      NCS/EMG TECHNICAL DATA: All studies are performed at a skin temperature of 34°C or greater. Distal sensory latencies  are calculated to waveform peak.  Sensory amplitudes are measured peak to peak Distal motor latencies are calculated to waveform onset.  Motor amplitudes are calculated baseline to peak Nerve Conduction Studies Ortho Sensory Summary Table  Stim Site NR Peak (ms) Norm Peak (ms) P-T Amp (µV) Norm P-T Amp Site1 Site2 Delta-P (ms) Dist (cm) Devante (m/s) Norm Devante (m/s) Left Median Ortho Sensory (Wrist) 2nd Digit    4.0  11.1  2nd Digit Wrist 4.0 8.0 20  Palm    4.0  14.8  Palm Wrist 4.0 0.0   Left Ulnar Ortho Sensory (Wrist) 5th Digit    3.8  4.6  5th Digit Wrist 3.8 8.0 21  Palm    3.8  8.0  Palm Wrist 3.8 0.0   Motor Summary Table  Stim Site NR Onset (ms) Norm Onset (ms) O-P Amp (mV) Norm O-P Amp Site1 Site2 Delta-0 (ms) Dist (cm) Devante (m/s) Norm Devante (m/s) Left Fibular Motor (Ext Dig Brev) Ankle NR  <6.1  >2.5 B Fib Ankle  0.0  >40 B Fib NR     Poplt B Fib  0.0  >40 Poplt NR           Left Median Motor (Abd Poll Brev) Wrist    5.1 <4.2 6.2 >5 Elbow Wrist 6.2 27.0 44 >50 Elbow    11.3  4.5        Left Tibial Motor (Abd Doherty Brev) Ankle NR  <6.1  >3.0 Knee Ankle  0.0  >40 Knee NR           Left Ulnar Motor (Abd Dig Minimi) Wrist    5.3 <4.2 5.1 >3 B Elbow Wrist 6.3 27.0 43 >53 B Elbow    11.6  2.0  A Elbow B Elbow 1.4 10.0 71 >53 A Elbow    13.0  5.0        F Wave Studies  NR F-Lat (ms) Lat Norm (ms) L-R F-Lat (ms) L-R Lat Norm M-Lat (ms) FLat-MLat (ms) Left Fibular (Mrkrs) (EDB)    64.67 <60  <5.1 5.33 59.34 Left Median (Mrkrs) (Abd Poll Brev)    38.64 <33  <2.2 6.17 32.47 Left Tibial (Mrkrs) (Abd Hallucis)    63.33 <61  <5.7 6.00 57.33 Left Ulnar (Mrkrs) (Abd Dig Min)    26.50 <36  <2.5 6.50 20.00 H Reflex Studies  NR H-Lat (ms) L-R H-Lat (ms) L-R Lat Norm Left Tibial (Mrkrs) (Gastroc) NR   <2.0 EMG  Side Muscle Nerve Root Ins Act Fibs Psw Amp Dur Poly Recrt Int Pat Comment Left Abd Poll Brev Median C8-T1 Nml Nml Nml Nml Nml 0 Nml Nml  Left 1stDorInt Ulnar C8-T1 Nml Nml Nml Nml Nml 0 Nml Nml  Left Biceps Musculocut C5-6  Nml Nml Nml Nml Nml 0 Nml Nml  Left Triceps Radial C6-7-8 Nml Nml Nml Nml Nml 0 Nml Nml  Left Deltoid Axillary C5-6 Nml Nml Nml Nml Nml 0 Nml Nml  Left AntTibialis Dp Br Fibular L4-5 Nml Nml Nml Nml Nml 0 Nml Nml  Left Fibularis Long Sup Br Fibular L5-S1 Nml Nml Nml Nml Nml 0 Nml Nml  Left Gastroc Tibial S1-2 Nml Nml Nml Nml Nml 0 Nml Nml  Left VastusLat Femoral L2-4 Nml Nml Nml Nml Nml 0 Nml Nml  Left BicepsFemS Sciatic L5-S1 Nml Nml Nml Nml Nml 0 Nml Nml  Left L4 Parasp Rami L4 Nml Nml Nml Nml Nml 0 Nml Nml  Left L5 Parasp Rami L5 Nml Nml Nml Nml Nml 0 Nml Nml  Left S1 Parasp Rami S1 Nml Nml Nml Nml Nml 0 Nml Nml  Waveforms:                   FINDINGS: Nerve Conduction Studies: Following stimulation of the left peroneal motor nerve, no response is seen from the EDB Following stimulation of the left posterior tibial motor nerve, no response is seen Motor F waves are absent in the left peroneal and posterior tibial motor nerves H reflex on the left is absent Many of the above findings may be technical due to substantial edema in the leg Median sensory latency on the left is prolonged at 4.0 ms Comparison ulnar sensory latency on the left is prolonged at 3.8 ms Distal latency of the left median motor nerve is prolonged at 5.1 ms, amplitude is preserved and velocity is slowed, and waveform is mildly dispersed Distal latency of the left ulnar motor nerve is prolonged at 5.3 ms, amplitude is preserved and velocity is slowed between elbow and wrist, and waveforms are mildly dispersed Left median F waves are prolonged and left ulnar motor F waves are relatively normal The findings in the arm suggest a generalized sensorimotor neuropathy at least moderate in degree Electromyogram: Needle examination of multiple muscles in the left arm and cervical paraspinous muscles are normal Needle examination of the left leg and lumbar paraspinous muscles is unremarkable     Impression: Generalized sensorimotor neuropathy, likely mixed  axonal/demyelinative, moderate This report is transcribed using the Dragon dictation system.       Results for orders placed during the hospital encounter of 01/04/19    Adult Transthoracic Echo Complete W/ Cont if Necessary Per Protocol    Interpretation Summary  · Left ventricular systolic function is hyperdynamic (EF > 70).      I have reviewed the medications:  Scheduled Meds:amphetamine-dextroamphetamine, 30 mg, Oral, Daily  amphetamine-dextroamphetamine, 60 mg, Oral, Daily  aspirin, 81 mg, Oral, Daily  cetirizine, 10 mg, Oral, Daily  gabapentin, 600 mg, Oral, BID  heparin (porcine), 5,000 Units, Subcutaneous, Q12H  levothyroxine, 75 mcg, Oral, Daily  montelukast, 10 mg, Oral, Nightly  OXcarbazepine, 300 mg, Oral, BID  pramipexole, 0.75 mg, Oral, BID  pravastatin, 40 mg, Oral, Nightly  sodium chloride, 10 mL, Intravenous, Q12H  traZODone, 50 mg, Oral, Nightly  venlafaxine XR, 150 mg, Oral, Nightly      Continuous Infusions:   PRN Meds:.dextrose  •  dextrose  •  glucagon (human recombinant)  •  magnesium sulfate **OR** magnesium sulfate **OR** magnesium sulfate  •  melatonin  •  potassium chloride **OR** potassium chloride **OR** potassium chloride  •  sodium chloride  •  sodium chloride    Assessment/Plan   Assessment & Plan     Active Hospital Problems    Diagnosis  POA   • **Bilateral leg weakness [R29.898]  Yes   • Hypoglycemia [E16.2]  Yes   • Elevated troponin [R77.8]  Yes   • Hypokalemia [E87.6]  Yes   • Anemia [D64.9]  Yes   • Uncontrolled type 2 diabetes mellitus with hyperglycemia (CMS/HCC) [E11.65]  Yes   • OMAR (acute kidney injury) (CMS/HCC) [N17.9]  Yes   • Other hyperlipidemia [E78.49]  Yes   • Severe JAIME on CPAP [G47.33, Z99.89]  Not Applicable   • ADD (attention deficit disorder) [F98.8]  Yes   • Obesity [E66.9]  Yes   • Essential hypertension [I10]  Yes   • Restless legs syndrome (RLS) [G25.81]  Yes   • PVD (peripheral vascular disease) (CMS/HCC) [I73.9]  Yes   • Cirrhosis of liver (CMS/HCC)  [K74.60]  Yes   • Chronic congestive heart failure (CMS/HCC) [I50.9]  Yes   • A-fib (CMS/HCC) [I48.91]  Yes      Resolved Hospital Problems   No resolved problems to display.        Brief Hospital Course to date:  Abe Phillips Jr. is a 66 y.o. male with obesity, [place] cirrhosis, narcolepsy, diastolic CHF, other comorbidities who presents to the hospital with 1 week of generalized weakness, worse in the legs preventing effective mobility     Assessment/plan     Acute generalized weakness  Pronounced bilateral leg weakness  -Electrolytes replaced per protocol  -EMG with generalized sensorimotor neuropathy  -Neurology following  -PT/OT, possible IPR at discharge     Diabetes mellitus, A1c 6.6%, with long-term use of insulin (insulin pump), with hyperglycemia  -Patient controlling BG with his insulin pump     Hx MCCAULEY cirrhosis  -Prior imaging suggesting cirrhotic appearance, however labs not completely consistent with the same; neurology planning GI consult for evaluation of possible future bariatric surgery     CKD stage 3  -Baseline creatinine 1.1-1.3; EGFR 40s-50s  -Slightly worse than baseline but stable     Chronic diastolic CHF, compensated  Chronic lymphedema  -Wound care for pressure wrapping  -Diuretics on hold     Narcolepsy  -Patient supplied Adderall     JAIME  -CPAP     Obesity  -BMI 40.62 kg/M2    DVT prophylaxis:  Medical DVT prophylaxis orders are present.       AM-PAC 6 Clicks Score (PT): 19 (08/13/21 2100)    Disposition: OT recommending IPR, PT evaluation pending, patient tentatively agreeable to the same however his wife is admitted to hospital and if she needs him home he will likely go instead    CODE STATUS:   Code Status and Medical Interventions:   Ordered at: 08/13/21 0114     Level Of Support Discussed With:    Patient     Code Status:    CPR     Medical Interventions (Level of Support Prior to Arrest):    Full       Nirav Fregoso,   08/14/21

## 2021-08-14 NOTE — CONSULTS
"[facility] Consult    Referring Provider: No ref. provider found    PCP: Anibal Maria MD    Reason for Consultation: Melo cirrhosis; is he a candidate for gastric bypass?    Chief complaint: Weakness    History of present illness:    Abe Phillips Jr. is a 66 y.o. male who is admitted with worsening weakness and fatigue.  Gastroenterology has been consulted on case given patient's history of Melo cirrhosis and for inquiries regarding bariatric surgical candidacy given underlying liver dysfunction.  Patient reports being diagnosed with liver cirrhosis following a liver biopsy approximately 30 years ago as well as being diagnosed with hepatitis B but that he was unaware of this as he never became symptomatic.  Patient also reports being told he had a \"sick liver\" following a laparoscopic cholecystectomy that was converted to open cholecystectomy.  Patient does not endorse any GI related symptoms with no nausea, vomiting, diarrhea, abdominal pain, abdominal distention, shortness of breath, chest pain, fever/chills, or sick contacts.  Recent admissions to hospital for electrolyte dyscrasia with hypokalemia, hypomagnesemia and edema.  Past medical, surgical, social, and family history reviewed with patient for accuracy.  No documented alleviating or exacerbating factors.  Patient notes he would like to figure out what is going on with his liver and establish whether or not he would be a candidate for bariatric surgery.  Does endorse pain at time of exam.    Allergies:  Patient has no known allergies.    Scheduled Meds:  amphetamine-dextroamphetamine, 30 mg, Oral, Daily  amphetamine-dextroamphetamine, 60 mg, Oral, Daily  aspirin, 81 mg, Oral, Daily  cetirizine, 10 mg, Oral, Daily  gabapentin, 600 mg, Oral, BID  heparin (porcine), 5,000 Units, Subcutaneous, Q12H  levothyroxine, 75 mcg, Oral, Daily  montelukast, 10 mg, Oral, Nightly  OXcarbazepine, 300 mg, Oral, BID  pramipexole, 0.75 mg, Oral, " BID  pravastatin, 40 mg, Oral, Nightly  sodium chloride, 10 mL, Intravenous, Q12H  traZODone, 50 mg, Oral, Nightly  venlafaxine XR, 150 mg, Oral, Nightly         Infusions:       PRN Meds:  dextrose  •  dextrose  •  glucagon (human recombinant)  •  magnesium sulfate **OR** magnesium sulfate **OR** magnesium sulfate  •  melatonin  •  potassium chloride **OR** potassium chloride **OR** potassium chloride  •  sodium chloride  •  sodium chloride    Home Meds:  Medications Prior to Admission   Medication Sig Dispense Refill Last Dose   • amphetamine-dextroamphetamine (ADDERALL) 15 MG tablet Take 2 tablets by mouth Daily. 120 tablet 0 8/12/2021 at Unknown time   • amphetamine-dextroamphetamine (ADDERALL) 30 MG tablet Take 2 tablets by mouth Every Morning. 120 tablet 0 8/12/2021 at Unknown time   • Blood Glucose Monitoring Suppl (Contour Next Monitor) w/Device kit 1 kit Every 3 (Three) Months. Use to check bg 1 kit 0 8/12/2021 at Unknown time   • Continuous Blood Gluc Sensor (Dexcom G6 Sensor) Every 10 (Ten) Days. 3 each 11 8/12/2021 at Unknown time   • Continuous Blood Gluc Transmit (Dexcom G6 Transmitter) misc 1 kit Every 3 (Three) Months. Use to check blood sugar 1 each 3 8/12/2021 at Unknown time   • Insulin Disposable Pump (OmniPod Dash 5 Pack Pods) misc CHANGE POD EVERY 3 DAYS   8/12/2021 at Unknown time   • aspirin 81 MG EC tablet TAKE 1 TABLET BY MOUTH EVERY DAY 90 tablet 1    • bumetanide (BUMEX) 2 MG tablet Take 2 tablets by mouth 2 (Two) Times a Day. 120 tablet 5    • erythromycin (ROMYCIN) 5 MG/GM ophthalmic ointment       • gabapentin (NEURONTIN) 300 MG capsule TAKE THREE CAPSULES BY MOUTH TWICE A  capsule 2    • levocetirizine (XYZAL) 5 MG tablet Take 1 tablet by mouth Every Evening. 90 tablet 1    • levothyroxine (SYNTHROID, LEVOTHROID) 75 MCG tablet TAKE ONE TABLET BY MOUTH DAILY 90 tablet 3    • loratadine (CLARITIN) 10 MG tablet Take 1 tablet by mouth Daily. Patient instructed to d/c Xyzal while  taking 30 tablet 0    • metOLazone (ZAROXOLYN) 5 MG tablet Take 2 tablets by mouth Daily As Needed (edema). (Patient taking differently: Take 5 mg by mouth 2 (Two) Times a Day.) 180 tablet 3    • montelukast (Singulair) 10 MG tablet Take 1 tablet by mouth Every Night. 30 tablet 5    • nystatin (MYCOSTATIN) 172293 UNIT/GM ointment Apply 1 application topically to the appropriate area as directed As Needed.      • ofloxacin (OCUFLOX) 0.3 % ophthalmic solution       • olopatadine (PATANOL) 0.1 % ophthalmic solution       • OXcarbazepine (TRILEPTAL) 300 MG tablet Take 1 tablet by mouth 2 (Two) Times a Day. 180 tablet 3    • potassium chloride 10 MEQ CR tablet Take 5 tablets by mouth 2 (Two) Times a Day. 900 tablet 3    • pramipexole (MIRAPEX) 1 MG tablet Take 1 mg by mouth 2 (Two) Times a Day.      • pravastatin (PRAVACHOL) 40 MG tablet Take 1 tablet by mouth every night at bedtime. 90 tablet 3    • spironolactone (ALDACTONE) 100 MG tablet Take 0.5 tablets by mouth Daily. (Patient taking differently: Take 100 mg by mouth Daily.) 30 tablet 6    • tobramycin-dexamethasone (TOBRADEX) 0.3-0.1 % ophthalmic suspension       • traZODone (DESYREL) 50 MG tablet Take 1 tablet by mouth At Night As Needed for Sleep. 90 tablet 2    • triamcinolone (KENALOG) 0.1 % cream Apply  topically to the appropriate area as directed 2 (Two) Times a Day As Needed for Rash. 80 g 2    • venlafaxine XR (EFFEXOR-XR) 150 MG 24 hr capsule Take 1 capsule by mouth Every Night. 90 capsule 2        ROS: Review of Systems   Constitutional: Positive for activity change and fatigue. Negative for appetite change, chills, diaphoresis, fever and unexpected weight change.   HENT: Negative for sore throat, trouble swallowing and voice change.    Eyes: Negative.    Respiratory: Negative for apnea, cough, choking, chest tightness, shortness of breath, wheezing and stridor.    Cardiovascular: Positive for leg swelling. Negative for chest pain and palpitations.    Gastrointestinal: Negative for abdominal distention, abdominal pain, anal bleeding, blood in stool, constipation, diarrhea, nausea, rectal pain and vomiting.   Endocrine: Negative.    Genitourinary: Negative.    Musculoskeletal: Negative.    Skin: Negative.    Allergic/Immunologic: Negative.    Neurological: Negative.    Psychiatric/Behavioral: Negative.    All other systems reviewed and are negative.      PAST MED HX:  Past Medical History:   Diagnosis Date   • A-fib (CMS/HCC)    • ADD (attention deficit disorder)    • Atrial flutter (CMS/HCC)    • Cellulitis    • CHF (congestive heart failure) (CMS/HCC)    • Cirrhosis of liver (CMS/HCC)    • Constipation    • Depression    • Diabetes mellitus (CMS/HCC)    • Diabetic ulcer of right foot (CMS/HCC)    • Disease of thyroid gland    • Edema    • Fatty liver    • Hepatitis B    • Hyperlipidemia    • Hypokalemia    • Insomnia    • Lymphedema    • Narcolepsy    • Neuropathy    • Obesity    • JAIME on CPAP    • PVD (peripheral vascular disease) (CMS/HCC)    • RLS (restless legs syndrome)    • Skin cancer    • Tardive dyskinesia    • Tremor of both hands    • Type 2 diabetes mellitus (CMS/HCC)    • Yeast infection        PAST SURG HX:  Past Surgical History:   Procedure Laterality Date   • ACHILLES TENDON REPAIR     • CARDIAC ABLATION     • CHOLECYSTECTOMY     • LASER ABLATION      VEIN RLE       FAM HX:  Family History   Problem Relation Age of Onset   • Heart failure Mother         CHF   • Osteoarthritis Mother    • Clotting disorder Father    • Diabetes Father    • Hypertension Father    • No Known Problems Sister    • No Known Problems Brother    • No Known Problems Brother    • No Known Problems Daughter        SOC HX:  Social History     Socioeconomic History   • Marital status:      Spouse name: Not on file   • Number of children: Not on file   • Years of education: Not on file   • Highest education level: Not on file   Tobacco Use   • Smoking status: Never  "Smoker   • Smokeless tobacco: Never Used   Vaping Use   • Vaping Use: Never used   Substance and Sexual Activity   • Alcohol use: Yes     Comment: reports 1-2 drinks a week    • Drug use: No   • Sexual activity: Defer       PHYSICAL EXAM  /88 (BP Location: Right arm, Patient Position: Sitting)   Pulse 84   Temp 97.7 °F (36.5 °C) (Oral)   Resp 16   Ht 190.5 cm (75\")   Wt (!) 147 kg (325 lb)   SpO2 97%   BMI 40.62 kg/m²   Wt Readings from Last 3 Encounters:   08/12/21 (!) 147 kg (325 lb)   08/05/21 (!) 149 kg (329 lb)   08/03/21 (!) 148 kg (326 lb)   ,body mass index is 40.62 kg/m².  Physical Exam  Vitals and nursing note reviewed.   Constitutional:       General: He is not in acute distress.     Appearance: Normal appearance. He is obese. He is not ill-appearing or toxic-appearing.   HENT:      Head: Normocephalic and atraumatic.      Mouth/Throat:      Mouth: Mucous membranes are moist.      Pharynx: Oropharynx is clear. No oropharyngeal exudate.   Eyes:      General: No scleral icterus.     Extraocular Movements: Extraocular movements intact.      Conjunctiva/sclera: Conjunctivae normal.      Pupils: Pupils are equal, round, and reactive to light.   Cardiovascular:      Rate and Rhythm: Normal rate and regular rhythm.      Pulses: Normal pulses.      Heart sounds: Normal heart sounds.   Pulmonary:      Effort: Pulmonary effort is normal. No respiratory distress.      Breath sounds: Normal breath sounds.   Abdominal:      General: Abdomen is flat. A surgical scar is present. Bowel sounds are normal. There is no distension.      Palpations: Abdomen is soft. There is no mass.      Tenderness: There is no abdominal tenderness. There is no guarding or rebound.      Hernia: No hernia is present.   Genitourinary:     Comments: defer  Musculoskeletal:         General: Normal range of motion.      Cervical back: Normal range of motion and neck supple. No rigidity or tenderness.      Right lower leg: Edema " present.      Left lower leg: Edema present.   Lymphadenopathy:      Cervical: No cervical adenopathy.   Skin:     General: Skin is warm and dry.      Capillary Refill: Capillary refill takes less than 2 seconds.      Coloration: Skin is pale. Skin is not jaundiced.   Neurological:      General: No focal deficit present.      Mental Status: He is alert and oriented to person, place, and time.   Psychiatric:         Mood and Affect: Mood normal.         Behavior: Behavior normal.         Thought Content: Thought content normal.         Judgment: Judgment normal.         Results Review:   I reviewed the patient's new clinical results.  I reviewed the patient's new imaging results and agree with the interpretation.  I personally viewed and interpreted the patient's EKG/Telemetry data    Lab Results   Component Value Date    WBC 5.85 08/14/2021    HGB 10.9 (L) 08/14/2021    HGB 10.5 (L) 08/13/2021    HGB 11.0 (L) 08/12/2021    HCT 34.6 (L) 08/14/2021    MCV 93.0 08/14/2021     (L) 08/14/2021       Lab Results   Component Value Date    INR 1.21 (H) 08/14/2021    INR 1.14 07/29/2021    INR 1.31 (H) 03/03/2019       Lab Results   Component Value Date    GLUCOSE 155 (H) 08/14/2021    BUN 60 (H) 08/14/2021    CREATININE 1.37 (H) 08/14/2021    EGFRIFNONA 52 (L) 08/14/2021    BCR 43.8 (H) 08/14/2021     (L) 08/14/2021    K 3.7 08/14/2021    CO2 28.0 08/14/2021    CALCIUM 9.4 08/14/2021    PROTENTOTREF 7.0 07/26/2019    ALBUMIN 3.60 08/14/2021    ALKPHOS 122 (H) 08/14/2021    BILITOT 0.8 08/14/2021    ALT 20 08/14/2021    AST 29 08/14/2021       XR Chest 1 View    Result Date: 8/12/2021  CR Chest 1 Vw INDICATION: Weakness. Dizziness. COMPARISON:  7/29/2021 FINDINGS: 2 portable AP view(s) of the chest.  The heart and mediastinal contours are normal. The lungs are clear. No pneumothorax or pleural effusion.     No acute cardiopulmonary findings. Signer Name: Mathew Mcdaniel MD  Signed: 8/12/2021 11:07 PM  Workstation  Name: [facility]  [facility]    XR Chest 1 View    Result Date: 8/2/2021  EXAMINATION: XR CHEST 1 VW-07/29/2021:  INDICATION: LE edema.  COMPARISON: Chest x-ray 05/12/2020.  FINDINGS:  AP view of the chest reveals cardiac size within normal limits. Pulmonary vascularity within normal limits. No focal opacification or consolidation. No pneumothorax or effusion.      No overt edema or effusion.  D:  07/29/2021 E:  07/29/2021  This report was finalized on 8/2/2021 8:07 AM by Dr. Dylon Tan.      Duplex Venous Lower Extremity - Bilateral    Result Date: 7/30/2021  · Normal bilateral lower extremity venous duplex scan.      EMG & Nerve Conduction Test Request    Result Date: 8/13/2021  Indication: Generalized weakness, peripheral neuropathy, myopathy Clinical: 66 y.o.male with a history of diabetes, liver dysfunction due to Melo, chronic lymphedema.  He has had recent difficulties with significant hypokalemia.  He has been having increasing weakness in both legs as well as tremors in his hands with a tendency to drop things.  He denies numbness or paresthesias.  Peripheral neuropathy, myopathy are considerations.      NCS/EMG TECHNICAL DATA: All studies are performed at a skin temperature of 34°C or greater. Distal sensory latencies are calculated to waveform peak.  Sensory amplitudes are measured peak to peak Distal motor latencies are calculated to waveform onset.  Motor amplitudes are calculated baseline to peak Nerve Conduction Studies Ortho Sensory Summary Table  Stim Site NR Peak (ms) Norm Peak (ms) P-T Amp (µV) Norm P-T Amp Site1 Site2 Delta-P (ms) Dist (cm) Devante (m/s) Norm Devante (m/s) Left Median Ortho Sensory (Wrist) 2nd Digit    4.0  11.1  2nd Digit Wrist 4.0 8.0 20  Palm    4.0  14.8  Palm Wrist 4.0 0.0   Left Ulnar Ortho Sensory (Wrist) 5th Digit    3.8  4.6  5th Digit Wrist 3.8 8.0 21  Palm    3.8  8.0  Palm Wrist 3.8 0.0   Motor Summary Table  Stim Site NR Onset (ms) Norm Onset (ms)  O-P Amp (mV) Norm O-P Amp Site1 Site2 Delta-0 (ms) Dist (cm) Devante (m/s) Norm Devante (m/s) Left Fibular Motor (Ext Dig Brev) Ankle NR  <6.1  >2.5 B Fib Ankle  0.0  >40 B Fib NR     Poplt B Fib  0.0  >40 Poplt NR           Left Median Motor (Abd Poll Brev) Wrist    5.1 <4.2 6.2 >5 Elbow Wrist 6.2 27.0 44 >50 Elbow    11.3  4.5        Left Tibial Motor (Abd Doherty Brev) Ankle NR  <6.1  >3.0 Knee Ankle  0.0  >40 Knee NR           Left Ulnar Motor (Abd Dig Minimi) Wrist    5.3 <4.2 5.1 >3 B Elbow Wrist 6.3 27.0 43 >53 B Elbow    11.6  2.0  A Elbow B Elbow 1.4 10.0 71 >53 A Elbow    13.0  5.0        F Wave Studies  NR F-Lat (ms) Lat Norm (ms) L-R F-Lat (ms) L-R Lat Norm M-Lat (ms) FLat-MLat (ms) Left Fibular (Mrkrs) (EDB)    64.67 <60  <5.1 5.33 59.34 Left Median (Mrkrs) (Abd Poll Brev)    38.64 <33  <2.2 6.17 32.47 Left Tibial (Mrkrs) (Abd Hallucis)    63.33 <61  <5.7 6.00 57.33 Left Ulnar (Mrkrs) (Abd Dig Min)    26.50 <36  <2.5 6.50 20.00 H Reflex Studies  NR H-Lat (ms) L-R H-Lat (ms) L-R Lat Norm Left Tibial (Mrkrs) (Gastroc) NR   <2.0 EMG  Side Muscle Nerve Root Ins Act Fibs Psw Amp Dur Poly Recrt Int Pat Comment Left Abd Poll Brev Median C8-T1 Nml Nml Nml Nml Nml 0 Nml Nml  Left 1stDorInt Ulnar C8-T1 Nml Nml Nml Nml Nml 0 Nml Nml  Left Biceps Musculocut C5-6 Nml Nml Nml Nml Nml 0 Nml Nml  Left Triceps Radial C6-7-8 Nml Nml Nml Nml Nml 0 Nml Nml  Left Deltoid Axillary C5-6 Nml Nml Nml Nml Nml 0 Nml Nml  Left AntTibialis Dp Br Fibular L4-5 Nml Nml Nml Nml Nml 0 Nml Nml  Left Fibularis Long Sup Br Fibular L5-S1 Nml Nml Nml Nml Nml 0 Nml Nml  Left Gastroc Tibial S1-2 Nml Nml Nml Nml Nml 0 Nml Nml  Left VastusLat Femoral L2-4 Nml Nml Nml Nml Nml 0 Nml Nml  Left BicepsFemS Sciatic L5-S1 Nml Nml Nml Nml Nml 0 Nml Nml  Left L4 Parasp Rami L4 Nml Nml Nml Nml Nml 0 Nml Nml  Left L5 Parasp Rami L5 Nml Nml Nml Nml Nml 0 Nml Nml  Left S1 Parasp Rami S1 Nml Nml Nml Nml Nml 0 Nml Nml  Waveforms:                   FINDINGS: Nerve  Conduction Studies: Following stimulation of the left peroneal motor nerve, no response is seen from the EDB Following stimulation of the left posterior tibial motor nerve, no response is seen Motor F waves are absent in the left peroneal and posterior tibial motor nerves H reflex on the left is absent Many of the above findings may be technical due to substantial edema in the leg Median sensory latency on the left is prolonged at 4.0 ms Comparison ulnar sensory latency on the left is prolonged at 3.8 ms Distal latency of the left median motor nerve is prolonged at 5.1 ms, amplitude is preserved and velocity is slowed, and waveform is mildly dispersed Distal latency of the left ulnar motor nerve is prolonged at 5.3 ms, amplitude is preserved and velocity is slowed between elbow and wrist, and waveforms are mildly dispersed Left median F waves are prolonged and left ulnar motor F waves are relatively normal The findings in the arm suggest a generalized sensorimotor neuropathy at least moderate in degree Electromyogram: Needle examination of multiple muscles in the left arm and cervical paraspinous muscles are normal Needle examination of the left leg and lumbar paraspinous muscles is unremarkable     Generalized sensorimotor neuropathy, 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       SARS-CoV-2 ANAND   Date Value Ref Range Status   07/29/2021 Not Detected Not Detected Final       ASSESSMENTS/PLANS  1.  MCCAULEY cirrhosis of liver   -Meld-Na: 9 points   -Child-Macdonald: A  2.  History of hepatitis B  3.  Type 2 diabetes mellitus, A1c 6.6 with long-term insulin use  4.  Obesity, BMI 40.62    Abe Phillips Jr. is a 66 y.o. male admitted to hospital with worsening weakness and fatigue.  Patient has a longstanding history of Mccauley cirrhosis which he believes was initially diagnosed 30 years ago following liver biopsy.  In terms of preop clearance for bariatric surgery,  will need updated CT imaging with liver mass protocol and EGD to evaluate liver function and evidence of varices prior to making recommendations.  Imaging from 2019 reviewed which shows evidence of cirrhosis with venous collaterals and splenomegaly.  Lengthy conversation with patient in regards to liver cirrhosis and progression from fatty liver disease to MCCAULEY; we discussed the role cirrhosis will play in determining if risk/benefit of bariatric surgery.     >>> Recommend CT liver mass protocol for evaluation of cirrhosis and hepatoma screening.  Will additionally look for evidence of abdominal varices.  >>> Recommend EGD tomorrow to evaluate for esophageal varices   -N.p.o. at midnight   -Obtain informed consent for esophagogastroduodenoscopy  >>> Will check AFP, immunity status to hepatitis A, and vitamin D level    I discussed the patient's findings and my recommendations with patient, family and nursing staff    ELMA Orosco  08/14/21  15:40 EDT

## 2021-08-14 NOTE — PLAN OF CARE
Goal Outcome Evaluation:  Plan of Care Reviewed With: patient           Outcome Summary: PT Wound care eval complete. Pt with small, shallow, diabetic ulcer to the R heel as well as moderate BLE swelling. Pt is a good candidate for edema management with multilayer wrapping to increase venous return and maintain limb girth reduction. Anticipate change in 2-3 days.

## 2021-08-15 NOTE — PROGRESS NOTES
"GI Daily Progress Note  Subjective:    Chief Complaint: Follow-up liver cirrhosis    Patient seen in recovery room; unfortunately, EGD unable to be completed due to lack of venous access. Patient does not have any new or worsening GI symptoms. Does not endorse any pain.    Objective:    /92 (BP Location: Right arm, Patient Position: Lying)   Pulse 70   Temp 98.4 °F (36.9 °C) (Oral)   Resp 19   Ht 190.5 cm (75\")   Wt (!) 147 kg (325 lb)   SpO2 100%   BMI 40.62 kg/m²     Physical Exam  Vitals and nursing note reviewed.   Constitutional:       General: He is not in acute distress.     Appearance: Normal appearance. He is obese. He is ill-appearing. He is not toxic-appearing.      Comments: Pleasantly conversant, no acute distress   HENT:      Head: Normocephalic and atraumatic.   Eyes:      Conjunctiva/sclera: Conjunctivae normal.      Pupils: Pupils are equal, round, and reactive to light.   Cardiovascular:      Rate and Rhythm: Normal rate and regular rhythm.      Pulses: Normal pulses.      Heart sounds: Normal heart sounds.   Pulmonary:      Effort: Pulmonary effort is normal. No respiratory distress.      Breath sounds: Normal breath sounds.   Abdominal:      General: Abdomen is flat. Bowel sounds are normal. There is no distension.      Palpations: Abdomen is soft. There is no mass.      Tenderness: There is no abdominal tenderness. There is no guarding or rebound.      Hernia: No hernia is present.   Musculoskeletal:      Comments: Edematous in all extremities   Skin:     General: Skin is warm and dry.      Capillary Refill: Capillary refill takes less than 2 seconds.   Neurological:      General: No focal deficit present.      Mental Status: He is alert and oriented to person, place, and time.   Psychiatric:         Mood and Affect: Mood normal.         Behavior: Behavior normal.         Thought Content: Thought content normal.         Judgment: Judgment normal.         Lab  I have personally " reviewed most recent cardiac tracings, lab results and radiology images and interpretations and agree with findings.     Lab Results   Component Value Date    WBC 5.85 08/14/2021    HGB 10.9 (L) 08/14/2021    HGB 10.5 (L) 08/13/2021    HGB 11.0 (L) 08/12/2021    MCV 93.0 08/14/2021     (L) 08/14/2021    INR 1.21 (H) 08/14/2021    INR 1.14 07/29/2021    INR 1.31 (H) 03/03/2019       Lab Results   Component Value Date    GLUCOSE 125 (H) 08/15/2021    BUN 57 (H) 08/15/2021    CREATININE 1.13 08/15/2021    EGFRIFNONA 65 08/15/2021    BCR 50.4 (H) 08/15/2021     (L) 08/15/2021    K 3.4 (L) 08/15/2021    CO2 27.0 08/15/2021    CALCIUM 9.5 08/15/2021    PROTENTOTREF 7.0 07/26/2019    ALBUMIN 3.60 08/14/2021    ALKPHOS 122 (H) 08/14/2021    BILITOT 0.8 08/14/2021    ALT 20 08/14/2021    AST 29 08/14/2021     Results for MIRTHA BARRIOS JR. (MRN [id number]) as of 8/15/2021 13:01   Ref. Range 8/14/2021 17:47   CT ABDOMEN PELVIS W WO CONTRAST Unknown IMPRESSION:  Cirrhotic hepatic morphology with mild prominence of the  spleen borderline splenomegaly with perigastric varices identified on  the lesser curvature series 5 image 21 through 26 without significant  involvement of a recanalized umbilical vein or ventral abdominal wall  caput medusa varices formation with trace right perihepatic ascites     Assessment:      Bilateral leg weakness    PVD (peripheral vascular disease) (CMS/HCC)    A-fib (CMS/HCC)    Cirrhosis of liver (CMS/HCC)    Chronic congestive heart failure (CMS/HCC)    Essential hypertension    Restless legs syndrome (RLS)    ADD (attention deficit disorder)    MCCAULEY (nonalcoholic steatohepatitis)    Obesity    Severe JAIME on CPAP    Other hyperlipidemia    Uncontrolled type 2 diabetes mellitus with hyperglycemia (CMS/HCC)    OMAR (acute kidney injury) (CMS/HCC)    Hypoglycemia    Elevated troponin    Hypokalemia    Anemia    1.  MCCAULEY cirrhosis of liver              -Meld-Na: 15 points               -Child-Macdonald: A  2.  History of hepatitis B  3.  Type 2 diabetes mellitus, A1c 6.6 with long-term insulin use  4.  Obesity, BMI 40.62    Plan:  Unfortunately, EGD unable to be completed today due to lack of venous access.  >>> Will need PICC line access  >>> We will plan for screening EGD tomorrow  >>> N.p.o. at midnight  >>> Labs pending (AFP, Vitamin D, immunity to Hepatitis A)  >>> CT reviewed; no noted hepatoma but clear evidence of perigastric varices.     Discussion: I had a lengthy discussion with patient in regards to his liver cirrhosis and CT imaging findings. CT images indicative of cirrhotic hepatic morphology with perigastric varices on the lesser curvature of stomach. Given gastric involvement of varices, I do not recommend bariatric surgery given risk of bleeding as well as risk of further cirrhotic decompensation.    Junior Niño, APRN  08/15/21  12:54 EDT

## 2021-08-15 NOTE — PROGRESS NOTES
[facility] Medicine Services  PROGRESS NOTE    Patient Name: Abe Phillips Jr.  : 1955  MRN: [id number]    Date of Admission: 2021  Primary Care Physician: Anibal Maria MD    Subjective   Subjective     CC:  Follow-up weakness    HPI:  As a mild headache this morning.  IV in his left forearm is sore especially with movement.  Did not sleep well last night so he feels overall weak.  Has been n.p.o. and is awaiting EGD this morning    ROS:  Gen- No fevers, chills  CV- No chest pain, palpitations  Resp- No cough, dyspnea  GI- No N/V/D, abd pain    Objective   Objective     Vital Signs:   Temp:  [97 °F (36.1 °C)-98 °F (36.7 °C)] 97 °F (36.1 °C)  Heart Rate:  [64-84] 68  Resp:  [14-16] 16  BP: (111-147)/(75-88) 111/81  Flow (L/min):  [2] 2     Physical Exam:  Constitutional: Awake, lethargic, laying in bed  HENT: NCAT, mucous membranes moist  Respiratory: Clear to auscultation bilaterally, respiratory effort normal   Cardiovascular: RRR, no murmurs, rubs, or gallops, palpable radial pulses  Gastrointestinal: Positive bowel sounds, soft, nontender, nondistended  Musculoskeletal: Bilateral ankle edema with compression wrapping  Psychiatric: Slow affect, cooperative  Neurologic: Speech clear, moving extremities symmetrically    Results Reviewed:  LAB RESULTS:      Lab 21  2225   WBC 5.85 6.41 8.15   HEMOGLOBIN 10.9* 10.5* 11.0*   HEMATOCRIT 34.6* 32.6* 34.1*   PLATELETS 130* 134* 147   NEUTROS ABS 4.00  --  6.79   IMMATURE GRANS (ABS) 0.17*  --  0.17*   LYMPHS ABS 0.98  --  0.70   MONOS ABS 0.51  --  0.43   EOS ABS 0.13  --  0.03   MCV 93.0 91.6 91.4   SED RATE  --   --  65*   PROTIME 14.9*  --   --          Lab 08/15/21  0415 21/21  2225 21  1306   SODIUM 133* 135* 136 134* 135*   POTASSIUM 3.4* 3.7 3.2* 3.2* 3.2*   CHLORIDE 95* 95* 96* 93* 93*   CO2 27.0 28.0 28.0 28.0 26.6   ANION GAP 11.0 12.0 12.0 13.0  15.4*   BUN 57* 60* 61* 59* 54*   CREATININE 1.13 1.37* 1.35* 1.36* 1.29*   GLUCOSE 125* 155* 106* 68 97   CALCIUM 9.5 9.4 9.0 9.1 9.8   MAGNESIUM 2.7*  --   --  2.4 2.2   HEMOGLOBIN A1C  --   --  6.60*  --   --    TSH  --   --   --  1.640 2.200         Lab 08/14/21  0422 08/12/21  2225   TOTAL PROTEIN 7.0 6.8   ALBUMIN 3.60 3.50   GLOBULIN 3.4 3.3   ALT (SGPT) 20 23   AST (SGOT) 29 39   BILIRUBIN 0.8 0.6   ALK PHOS 122* 123*         Lab 08/14/21  0423 08/13/21  0615 08/13/21  0050 08/12/21  2225   TROPONIN T  --  0.057* 0.063* 0.064*   PROTIME 14.9*  --   --   --    INR 1.21*  --   --   --                  Brief Urine Lab Results  (Last result in the past 365 days)      Color   Clarity   Blood   Leuk Est   Nitrite   Protein   CREAT   Urine HCG        08/13/21 0718 Yellow Clear Negative Negative Negative Negative               Microbiology Results Abnormal     None          CT Abdomen Pelvis With & Without Contrast    Result Date: 8/14/2021  EXAMINATION: CT ABDOMEN PELVIS W WO CONTRAST-  INDICATION: Hepatocellular carcinoma, monitor  TECHNIQUE: CT abdomen pelvis with and without intervenous contrast administration  The radiation dose reduction device was turned on for each scan per the ALARA (As Low as Reasonably Achievable) protocol.  COMPARISON: CT dated 01/05/2019  FINDINGS: Lung bases demonstrate trace left pleural effusion. Liver has nodular contours and heterogeneous signal of cirrhotic hepatic morphology without abnormal arterially enhancing focus to suggest hepatocellular carcinoma multi phase imaging. Gallbladder surgically absent. Pancreas and spleen reveals mild prominence of the spleen borderline splenomegaly with perigastric varices identified on the lesser curvature series 5 image 21 through 26 without significant involvement of a recanalized umbilical vein or ventral abdominal wall caput medusa varices formation with trace right perihepatic ascites. Visualized bowel unremarkable with minimal  mesenteric edema and trace ascites however no loculated collection. Pelvic viscera unremarkable. Degenerative changes of the spine and pelvis without aggressive osseous lesion. No soft tissue body wall findings of acuity other than mild to moderate diffuse body wall edema        Impression: Cirrhotic hepatic morphology with mild prominence of the spleen borderline splenomegaly with perigastric varices identified on the lesser curvature series 5 image 21 through 26 without significant involvement of a recanalized umbilical vein or ventral abdominal wall caput medusa varices formation with trace right perihepatic ascites       EMG & Nerve Conduction Test Request    Result Date: 8/13/2021  Indication: Generalized weakness, peripheral neuropathy, myopathy Clinical: 66 y.o.male with a history of diabetes, liver dysfunction due to Melo, chronic lymphedema.  He has had recent difficulties with significant hypokalemia.  He has been having increasing weakness in both legs as well as tremors in his hands with a tendency to drop things.  He denies numbness or paresthesias.  Peripheral neuropathy, myopathy are considerations.      NCS/EMG TECHNICAL DATA: All studies are performed at a skin temperature of 34°C or greater. Distal sensory latencies are calculated to waveform peak.  Sensory amplitudes are measured peak to peak Distal motor latencies are calculated to waveform onset.  Motor amplitudes are calculated baseline to peak Nerve Conduction Studies Ortho Sensory Summary Table  Stim Site NR Peak (ms) Norm Peak (ms) P-T Amp (µV) Norm P-T Amp Site1 Site2 Delta-P (ms) Dist (cm) Devante (m/s) Norm Devante (m/s) Left Median Ortho Sensory (Wrist) 2nd Digit    4.0  11.1  2nd Digit Wrist 4.0 8.0 20  Palm    4.0  14.8  Palm Wrist 4.0 0.0   Left Ulnar Ortho Sensory (Wrist) 5th Digit    3.8  4.6  5th Digit Wrist 3.8 8.0 21  Palm    3.8  8.0  Palm Wrist 3.8 0.0   Motor Summary Table  Stim Site NR Onset (ms) Norm Onset (ms) O-P Amp (mV) Norm O-P  Amp Site1 Site2 Delta-0 (ms) Dist (cm) Devante (m/s) Norm Devante (m/s) Left Fibular Motor (Ext Dig Brev) Ankle NR  <6.1  >2.5 B Fib Ankle  0.0  >40 B Fib NR     Poplt B Fib  0.0  >40 Poplt NR           Left Median Motor (Abd Poll Brev) Wrist    5.1 <4.2 6.2 >5 Elbow Wrist 6.2 27.0 44 >50 Elbow    11.3  4.5        Left Tibial Motor (Abd Doherty Brev) Ankle NR  <6.1  >3.0 Knee Ankle  0.0  >40 Knee NR           Left Ulnar Motor (Abd Dig Minimi) Wrist    5.3 <4.2 5.1 >3 B Elbow Wrist 6.3 27.0 43 >53 B Elbow    11.6  2.0  A Elbow B Elbow 1.4 10.0 71 >53 A Elbow    13.0  5.0        F Wave Studies  NR F-Lat (ms) Lat Norm (ms) L-R F-Lat (ms) L-R Lat Norm M-Lat (ms) FLat-MLat (ms) Left Fibular (Mrkrs) (EDB)    64.67 <60  <5.1 5.33 59.34 Left Median (Mrkrs) (Abd Poll Brev)    38.64 <33  <2.2 6.17 32.47 Left Tibial (Mrkrs) (Abd Hallucis)    63.33 <61  <5.7 6.00 57.33 Left Ulnar (Mrkrs) (Abd Dig Min)    26.50 <36  <2.5 6.50 20.00 H Reflex Studies  NR H-Lat (ms) L-R H-Lat (ms) L-R Lat Norm Left Tibial (Mrkrs) (Gastroc) NR   <2.0 EMG  Side Muscle Nerve Root Ins Act Fibs Psw Amp Dur Poly Recrt Int Pat Comment Left Abd Poll Brev Median C8-T1 Nml Nml Nml Nml Nml 0 Nml Nml  Left 1stDorInt Ulnar C8-T1 Nml Nml Nml Nml Nml 0 Nml Nml  Left Biceps Musculocut C5-6 Nml Nml Nml Nml Nml 0 Nml Nml  Left Triceps Radial C6-7-8 Nml Nml Nml Nml Nml 0 Nml Nml  Left Deltoid Axillary C5-6 Nml Nml Nml Nml Nml 0 Nml Nml  Left AntTibialis Dp Br Fibular L4-5 Nml Nml Nml Nml Nml 0 Nml Nml  Left Fibularis Long Sup Br Fibular L5-S1 Nml Nml Nml Nml Nml 0 Nml Nml  Left Gastroc Tibial S1-2 Nml Nml Nml Nml Nml 0 Nml Nml  Left VastusLat Femoral L2-4 Nml Nml Nml Nml Nml 0 Nml Nml  Left BicepsFemS Sciatic L5-S1 Nml Nml Nml Nml Nml 0 Nml Nml  Left L4 Parasp Rami L4 Nml Nml Nml Nml Nml 0 Nml Nml  Left L5 Parasp Rami L5 Nml Nml Nml Nml Nml 0 Nml Nml  Left S1 Parasp Rami S1 Nml Nml Nml Nml Nml 0 Nml Nml  Waveforms:                   FINDINGS: Nerve Conduction Studies: Following  stimulation of the left peroneal motor nerve, no response is seen from the EDB Following stimulation of the left posterior tibial motor nerve, no response is seen Motor F waves are absent in the left peroneal and posterior tibial motor nerves H reflex on the left is absent Many of the above findings may be technical due to substantial edema in the leg Median sensory latency on the left is prolonged at 4.0 ms Comparison ulnar sensory latency on the left is prolonged at 3.8 ms Distal latency of the left median motor nerve is prolonged at 5.1 ms, amplitude is preserved and velocity is slowed, and waveform is mildly dispersed Distal latency of the left ulnar motor nerve is prolonged at 5.3 ms, amplitude is preserved and velocity is slowed between elbow and wrist, and waveforms are mildly dispersed Left median F waves are prolonged and left ulnar motor F waves are relatively normal The findings in the arm suggest a generalized sensorimotor neuropathy at least moderate in degree Electromyogram: Needle examination of multiple muscles in the left arm and cervical paraspinous muscles are normal Needle examination of the left leg and lumbar paraspinous muscles is unremarkable     Impression: Generalized sensorimotor neuropathy, 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       Results for orders placed during the hospital encounter of 01/04/19    Adult Transthoracic Echo Complete W/ Cont if Necessary Per Protocol    Interpretation Summary  · Left ventricular systolic function is hyperdynamic (EF > 70).      I have reviewed the medications:  Scheduled Meds:amphetamine-dextroamphetamine, 30 mg, Oral, Daily  amphetamine-dextroamphetamine, 60 mg, Oral, Daily  aspirin, 81 mg, Oral, Daily  cetirizine, 10 mg, Oral, Daily  gabapentin, 600 mg, Oral, BID  heparin (porcine), 5,000 Units, Subcutaneous, Q12H  levothyroxine, 75 mcg, Oral, Daily  montelukast, 10 mg, Oral,  Nightly  OXcarbazepine, 300 mg, Oral, BID  pramipexole, 0.75 mg, Oral, BID  pravastatin, 40 mg, Oral, Nightly  sodium chloride, 10 mL, Intravenous, Q12H  traZODone, 50 mg, Oral, Nightly  venlafaxine XR, 150 mg, Oral, Nightly      Continuous Infusions:   PRN Meds:.dextrose  •  dextrose  •  glucagon (human recombinant)  •  magnesium sulfate **OR** magnesium sulfate **OR** magnesium sulfate  •  melatonin  •  potassium chloride **OR** potassium chloride **OR** potassium chloride  •  sodium chloride  •  sodium chloride    Assessment/Plan   Assessment & Plan     Active Hospital Problems    Diagnosis  POA   • **Bilateral leg weakness [R29.898]  Yes   • Hypoglycemia [E16.2]  Yes   • Elevated troponin [R77.8]  Yes   • Hypokalemia [E87.6]  Yes   • Anemia [D64.9]  Yes   • Uncontrolled type 2 diabetes mellitus with hyperglycemia (CMS/HCC) [E11.65]  Yes   • OMAR (acute kidney injury) (CMS/HCC) [N17.9]  Yes   • Other hyperlipidemia [E78.49]  Yes   • Severe JAIME on CPAP [G47.33, Z99.89]  Not Applicable   • ADD (attention deficit disorder) [F98.8]  Yes   • Obesity [E66.9]  Yes   • MCCAULEY (nonalcoholic steatohepatitis) [K75.81]  Unknown   • Essential hypertension [I10]  Yes   • Restless legs syndrome (RLS) [G25.81]  Yes   • PVD (peripheral vascular disease) (CMS/HCC) [I73.9]  Yes   • Cirrhosis of liver (CMS/HCC) [K74.60]  Yes   • Chronic congestive heart failure (CMS/HCC) [I50.9]  Yes   • A-fib (CMS/HCC) [I48.91]  Yes      Resolved Hospital Problems   No resolved problems to display.        Brief Hospital Course to date:  Abe Phillips Jr. is a 66 y.o. male with obesity, Mccauley cirrhosis, narcolepsy, diastolic CHF, other comorbidities who presents to the hospital with 1 week of generalized weakness, worse in the legs preventing effective mobility     Assessment/plan     Acute generalized weakness  Pronounced bilateral leg weakness  -Electrolytes replaced per protocol  -EMG with generalized sensorimotor neuropathy  -Neurology  following  -PT/OT, needs IPR at discharge     Hx MCCAULEY cirrhosis  -GI following for reassessment (original diagnosis/work-up around 30 years ago), planned EGD today  -Abdominal CT liver protocol without clearly defined mass    Diabetes mellitus, A1c 6.6%, with long-term use of insulin (insulin pump), with hyperglycemia  -Patient controlling BG with his insulin pump    CKD stage 3  -Baseline creatinine 1.1-1.3; EGFR 40s-50s  -Slightly worse than baseline but stable     Chronic diastolic CHF, compensated  Chronic lymphedema  -Wound care for pressure wrapping  -Diuretics on hold     Narcolepsy  -Patient supplied Adderall     JAIME  -CPAP     Obesity  -BMI 40.62 kg/M2    DVT prophylaxis:  Medical DVT prophylaxis orders are present.       AM-PAC 6 Clicks Score (PT): 19 (08/14/21 2000)    Disposition: Needs IP rehab, should start the process tomorrow when CM coverage returns    CODE STATUS:   Code Status and Medical Interventions:   Ordered at: 08/13/21 0114     Level Of Support Discussed With:    Patient     Code Status:    CPR     Medical Interventions (Level of Support Prior to Arrest):    Full       Nirav Fregoso, DO  08/15/21

## 2021-08-15 NOTE — ANESTHESIA PREPROCEDURE EVALUATION
Anesthesia Evaluation                  Airway   Mallampati: II  Dental      Pulmonary    Cardiovascular     (+) hypertension, CHF ,       Neuro/Psych  GI/Hepatic/Renal/Endo    (+) morbid obesity,  liver disease, diabetes mellitus,     Musculoskeletal     Abdominal    Substance History      OB/GYN          Other                      Anesthesia Plan    ASA 3     general     intravenous induction     Anesthetic plan, all risks, benefits, and alternatives have been provided, discussed and informed consent has been obtained with: patient.

## 2021-08-15 NOTE — PROGRESS NOTES
Daily Progress Note  Neurology     LOS: 0 days     Subjective     Chief Complaint: Bilateral leg weakness.    Interval History: No acute issues overnight.   Went down to endoscopy suite this morning for upper GI endoscopy but unfortunately could not be completed as his IV got infiltrated.  Plan is to try tomorrow again with a PICC line.    ROS: Negative for fever, chest pain or shortness of breath.    Objective     Vital signs in last 24 hours:  Temp:  [97 °F (36.1 °C)-98 °F (36.7 °C)] 97.7 °F (36.5 °C)  Heart Rate:  [64-84] 82  Resp:  [14-20] 20  BP: (111-135)/(75-90) 132/90      Physical Exam:   General: Lying in bed with eyes open. In NAD.     Respiratory: Respirations unlabored   CV: RRR       Neurologic Exam:   Mental status: Awake, alert, Follows commands. Speech fluent   CN: II-XII intact to detailed exam except for left facial weakness-history of Bell's palsy on the left.   Motor: Strength full (5-/5) throughout in BUE and BLE    Reflexes: 1+ and symmetric in both upper extremities, 1+ bilaterally and absent bilateral ankles.          Results Review:    Results from last 7 days   Lab Units 08/14/21  0423   WBC 10*3/mm3 5.85   HEMOGLOBIN g/dL 10.9*   HEMATOCRIT % 34.6*   PLATELETS 10*3/mm3 130*     Results from last 7 days   Lab Units 08/15/21  0415   SODIUM mmol/L 133*   POTASSIUM mmol/L 3.4*   CHLORIDE mmol/L 95*   CO2 mmol/L 27.0   BUN mg/dL 57*   CREATININE mg/dL 1.13   CALCIUM mg/dL 9.5       Assessment/Plan     1.  Bilateral lower extremity weakness  2.  Moderate mixed motor and sensory, diabetic polyneuropathy  3.  CKD stage III  4.  JAIME  5.  Bell's palsy, left  6.  Morbid obesity  -Possibly caused by overdiuresis versus orthostatic hypotension as well as moderate diabetic polyneuropathy.  -Electrolytes are being replaced   -GI is following for work-up for gastric bypass surgery.  Went for EGD this  morning but could not be completed as his IV got infiltrated.  Plan is to repeat this tomorrow with PICC line.  -OT PT have recommended inpatient rehab.  -Neurology will sign off.  Please call back with any questions or concerns that you may have.  Follow-up with Dr. Ahumada in outpatient neurology clinic as per schedule.    Sony Harry MD  08/15/21  10:45 EDT

## 2021-08-16 NOTE — BRIEF OP NOTE
"ESOPHAGOGASTRODUODENOSCOPY  Progress Note    Abe Phillips Jr.  8/16/2021    EGD shows moderate portal gastropathy without hemorrhage.  Two columns of diminutive esophageal varices are seen in the lower esophagus.    Discussion: \"Original\" MELD = 10.  Patient clearly has signs of mild portal hypertension on today's EGD and on recent CT scan.  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  Recommend patient investigate the possibility of bariatric surgery with [facility]ariat[facility] program, where there is availability of liver transplant and expert Hepatology opinion.     Patient is somewhat somnolent this a.m., and will check ammonia level.    Mark I. Brunner, MD     Date: 8/16/2021  Time: 10:32 EDT      "

## 2021-08-16 NOTE — PROGRESS NOTES
[facility] Medicine Services  PROGRESS NOTE    Patient Name: Abe Phillips Jr.  : 1955  MRN: [id number]    Date of Admission: 2021  Primary Care Physician: Anibal Maria MD    Subjective   Subjective     CC:  Follow-up weakness    HPI:  Unable to get EGD yesterday due to IV failure, he is about to be transported down for repeat EGD today.  No new complaints this morning.  No events overnight    ROS:  Gen- No fevers, chills  CV- No chest pain, palpitations  Resp- No cough, dyspnea  GI- No N/V/D, abd pain    Objective   Objective     Vital Signs:   Temp:  [97.7 °F (36.5 °C)-98.4 °F (36.9 °C)] 97.7 °F (36.5 °C)  Heart Rate:  [63-82] 73  Resp:  [14-20] 16  BP: (132-153)/(77-95) 134/88     Physical Exam:  Constitutional: Awake, alert, laying on transport bed  HENT: NCAT, mucous membranes moist  Respiratory: Clear to auscultation bilaterally, respiratory effort normal   Cardiovascular: RRR, no murmurs, rubs, or gallops, palpable radial pulses  Gastrointestinal: Positive bowel sounds, soft, nontender, nondistended  Musculoskeletal: Bilateral ankle edema with compression wrapping  Psychiatric: Slow affect, cooperative  Neurologic: Speech clear, moving extremities symmetrically    Results Reviewed:  LAB RESULTS:      Lab 21   WBC 5.85 6.41 8.15   HEMOGLOBIN 10.9* 10.5* 11.0*   HEMATOCRIT 34.6* 32.6* 34.1*   PLATELETS 130* 134* 147   NEUTROS ABS 4.00  --  6.79   IMMATURE GRANS (ABS) 0.17*  --  0.17*   LYMPHS ABS 0.98  --  0.70   MONOS ABS 0.51  --  0.43   EOS ABS 0.13  --  0.03   MCV 93.0 91.6 91.4   SED RATE  --   --  65*   PROTIME 14.9*  --   --          Lab 08/15/21  0415 21  1306   SODIUM 133* 135* 136 134* 135*   POTASSIUM 3.4* 3.7 3.2* 3.2* 3.2*   CHLORIDE 95* 95* 96* 93* 93*   CO2 27.0 28.0 28.0 28.0 26.6   ANION GAP 11.0 12.0 12.0 13.0 15.4*   BUN 57* 60* 61* 59* 54*   CREATININE 1.13  1.37* 1.35* 1.36* 1.29*   GLUCOSE 125* 155* 106* 68 97   CALCIUM 9.5 9.4 9.0 9.1 9.8   MAGNESIUM 2.7*  --   --  2.4 2.2   HEMOGLOBIN A1C  --   --  6.60*  --   --    TSH  --   --   --  1.640 2.200         Lab 08/14/21  0422 08/12/21  2225   TOTAL PROTEIN 7.0 6.8   ALBUMIN 3.60 3.50   GLOBULIN 3.4 3.3   ALT (SGPT) 20 23   AST (SGOT) 29 39   BILIRUBIN 0.8 0.6   ALK PHOS 122* 123*         Lab 08/14/21  0423 08/13/21  0615 08/13/21  0050 08/12/21  2225   TROPONIN T  --  0.057* 0.063* 0.064*   PROTIME 14.9*  --   --   --    INR 1.21*  --   --   --                  Brief Urine Lab Results  (Last result in the past 365 days)      Color   Clarity   Blood   Leuk Est   Nitrite   Protein   CREAT   Urine HCG        08/13/21 0718 Yellow Clear Negative Negative Negative Negative               Microbiology Results Abnormal     Procedure Component Value - Date/Time    COVID PRE-OP / PRE-PROCEDURE SCREENING ORDER (NO ISOLATION) - Swab, Nasopharynx [[id number]]  (Normal) Collected: 08/15/21 0807    Lab Status: Final result Specimen: Swab from Nasopharynx Updated: 08/15/21 0901    Narrative:      The following orders were created for panel order COVID PRE-OP / PRE-PROCEDURE SCREENING ORDER (NO ISOLATION) - Swab, Nasopharynx.  Procedure                               Abnormality         Status                     ---------                               -----------         ------                     COVID-19, ABBOTT IN-HOUS...[[id number]]  Normal              Final result                 Please view results for these tests on the individual orders.    COVID-19, ABBOTT IN-HOUSE,NASAL Swab (NO TRANSPORT MEDIA) 2 HR TAT - Swab, Nasopharynx [[id number]]  (Normal) Collected: 08/15/21 0807    Lab Status: Final result Specimen: Swab from Nasopharynx Updated: 08/15/21 0901     COVID19 Presumptive Negative    Narrative:      Fact sheet for providers: https://www.fda.gov/media/385387/download     Fact sheet for patients:  https://www.fda.gov/media/803872/download    Test performed by PCR.  If inconsistent with clinical signs and symptoms patient should be tested with different authorized molecular test.          CT Abdomen Pelvis With & Without Contrast    Result Date: 8/15/2021  EXAMINATION: CT ABDOMEN PELVIS WWO CONTRAST - 08/14/2021  INDICATION: Hepatocellular carcinoma  TECHNIQUE: CT abdomen and pelvis with and without intervenous contrast administration  The radiation dose reduction device was turned on for each scan per the ALARA (As Low as Reasonably Achievable) protocol.  COMPARISON: CT dated 01/05/2019  FINDINGS: Lung bases demonstrate trace left pleural effusion. Liver has nodular contours and heterogeneous signal of cirrhotic hepatic morphology without abnormal arterially enhancing focus to suggest hepatocellular carcinoma multiphase imaging. Gallbladder surgically absent. Pancreas and spleen reveals mild prominence of the spleen with borderline splenomegaly with perigastric varices identified on the lesser curvature series 5 images 21 through 26 without significant involvement of a recanalized umbilical vein or ventral abdominal wall caput medusa varices formation with trace right perihepatic ascites. Visualized bowel unremarkable with minimal mesenteric edema and trace ascites however no loculated collection. Pelvic viscera unremarkable. Degenerative changes of the spine and pelvis without aggressive osseous lesion. No soft tissue body wall findings of acuity other than mild to moderate diffuse body wall edema      Impression: Cirrhotic hepatic morphology with mild prominence of the spleen with borderline splenomegaly with perigastric varices identified on the lesser curvature series 5 images 21 through 26 without significant involvement of a recanalized umbilical vein or ventral abdominal wall caput medusa varices formation with trace right perihepatic ascites.  DICTATED:   08/14/2021 EDITED/ls :   08/15/2021          Results for orders placed during the hospital encounter of 01/04/19    Adult Transthoracic Echo Complete W/ Cont if Necessary Per Protocol    Interpretation Summary  · Left ventricular systolic function is hyperdynamic (EF > 70).      I have reviewed the medications:  Scheduled Meds:amphetamine-dextroamphetamine, 30 mg, Oral, Daily  amphetamine-dextroamphetamine, 60 mg, Oral, Daily  aspirin, 81 mg, Oral, Daily  cetirizine, 10 mg, Oral, Daily  gabapentin, 600 mg, Oral, BID  heparin (porcine), 5,000 Units, Subcutaneous, Q12H  levothyroxine, 75 mcg, Oral, Daily  montelukast, 10 mg, Oral, Nightly  OXcarbazepine, 300 mg, Oral, BID  pramipexole, 0.75 mg, Oral, BID  pravastatin, 40 mg, Oral, Nightly  sodium chloride, 10 mL, Intravenous, Q12H  traZODone, 50 mg, Oral, Nightly  venlafaxine XR, 150 mg, Oral, Nightly      Continuous Infusions:lactated ringers, 30 mL/hr      PRN Meds:.•  acetaminophen  •  dextrose  •  dextrose  •  glucagon (human recombinant)  •  lactated ringers  •  magnesium sulfate **OR** magnesium sulfate **OR** magnesium sulfate  •  melatonin  •  potassium chloride **OR** potassium chloride **OR** potassium chloride  •  sodium chloride  •  sodium chloride    Assessment/Plan   Assessment & Plan     Active Hospital Problems    Diagnosis  POA   • **Bilateral leg weakness [R29.898]  Yes   • Hypoglycemia [E16.2]  Yes   • Elevated troponin [R77.8]  Yes   • Hypokalemia [E87.6]  Yes   • Anemia [D64.9]  Yes   • Uncontrolled type 2 diabetes mellitus with hyperglycemia (CMS/HCC) [E11.65]  Yes   • OMAR (acute kidney injury) (CMS/[facility]) [N17.9]  Yes   • Other hyperlipidemia [E78.49]  Yes   • Severe JAIME on CPAP [G47.33, Z99.89]  Not Applicable   • ADD (attention deficit disorder) [F98.8]  Yes   • Obesity [E66.9]  Yes   • MCCAULEY (nonalcoholic steatohepatitis) [K75.81]  Unknown   • Essential hypertension [I10]  Yes   • Restless legs syndrome (RLS) [G25.81]  Yes   • PVD (peripheral vascular disease) (CMS/[facility]) [I73.9]  Yes    • Cirrhosis of liver (CMS/HCC) [K74.60]  Yes   • Chronic congestive heart failure (CMS/HCC) [I50.9]  Yes   • A-fib (CMS/HCC) [I48.91]  Yes      Resolved Hospital Problems   No resolved problems to display.        Brief Hospital Course to date:  Abe Phillips Jr. is a 66 y.o. male with obesity, Mccauley cirrhosis, narcolepsy, diastolic CHF, other comorbidities who presents to the hospital with 1 week of generalized weakness, worse in the legs preventing effective mobility; PT/OT following and recommending inpatient rehab, CM following; GI consulted and following for evaluation of candidacy for future bariatric surgery     Assessment/plan     Acute generalized weakness  Pronounced bilateral leg weakness  -Electrolytes replaced  -EMG with generalized sensorimotor neuropathy  -Neurology following  -PT/OT, needs IPR at discharge, CM to follow-up     Hx MCCAULEY cirrhosis  -GI following for reassessment (original diagnosis/work-up around 30 years ago) and candidacy for bariatric procedure  -Abdominal CT liver protocol without clearly defined mass  -Unable to have EGD yesterday 2/2 IV failure, repeat attempt today    Diabetes mellitus, A1c 6.6%, with long-term use of insulin (insulin pump), with hyperglycemia  -Patient controlling BG with his insulin pump    CKD stage 3  -Baseline creatinine 1.1-1.3; EGFR 40s-50s  -Slightly worse than baseline but stable     Chronic diastolic CHF, compensated  Chronic lymphedema  -Wound care for pressure wrapping  -Diuretics on hold     Narcolepsy  -Patient supplied Adderall     JAIME  -CPAP     Obesity  -BMI 40.62 kg/M2    DVT prophylaxis:  Medical DVT prophylaxis orders are present.       AM-PAC 6 Clicks Score (PT): 17 (08/15/21 2020)    Disposition: EGD today, okay to start looking for inpatient rehab    CODE STATUS:   Code Status and Medical Interventions:   Ordered at: 08/13/21 0114     Level Of Support Discussed With:    Patient     Code Status:    CPR     Medical Interventions (Level of  Support Prior to Arrest):    Full       Nirav Fregoso, DO  08/16/21

## 2021-08-16 NOTE — THERAPY EVALUATION
Patient Name: Abe Phillips Jr.  : 1955    MRN: [id number]                              Today's Date: 2021       Admit Date: 2021    Visit Dx:     ICD-10-CM ICD-9-CM   1. Generalized weakness  R53.1 780.79   2. Hypokalemia  E87.6 276.8   3. Hypoglycemia  E16.2 251.2   4. Anemia, unspecified type  D64.9 285.9   5. Bilateral lower extremity edema  R60.0 782.3   6. MCCAULEY (nonalcoholic steatohepatitis)  K75.81 571.8     Patient Active Problem List   Diagnosis   • Type 2 diabetes mellitus with hyperglycemia (CMS/HCC)   • Thrombocytopenia (CMS/HCC)   • PVD (peripheral vascular disease) (CMS/HCC)   • A-fib (CMS/HCC)   • Cirrhosis of liver (CMS/HCC)   • Chronic congestive heart failure (CMS/HCC)   • Diabetic ulcer of right heel (CMS/HCC)   • Primary narcolepsy without cataplexy   • Essential hypertension   • Morbidly obese (CMS/HCC)   • Restless legs syndrome (RLS)   • ADD (attention deficit disorder)   • Depression   • Diabetes mellitus type 2, uncontrolled, with complications (CMS/HCC)   • Edema   • MCCAULEY (nonalcoholic steatohepatitis)   • Hepatitis B   • Insomnia   • Lymphedema   • Obesity   • Severe JAIME on CPAP   • RLS (restless legs syndrome)   • Tremor of both hands   • Restrictive cardiomyopathy (CMS/HCC)   • Other hyperlipidemia   • Uncontrolled type 2 diabetes mellitus with hyperglycemia (CMS/HCC)   • OMAR (acute kidney injury) (CMS/HCC)   • Dyslipidemia   • Hyperglycemia   • Diabetic ulcer of right heel associated with type 2 diabetes mellitus (CMS/HCC)   • Facial paralysis/[place] palsy   • Diabetic hypoglycemia (CMS/HCC)   • Hypogonadism in male   • Atypical facial pain   • Bilateral leg weakness   • Hypoglycemia   • Elevated troponin   • Hypokalemia   • Anemia     Past Medical History:   Diagnosis Date   • A-fib (CMS/HCC)    • ADD (attention deficit disorder)    • Atrial flutter (CMS/HCC)    • Cellulitis    • CHF (congestive heart failure) (CMS/HCC)    • Cirrhosis of liver (CMS/HCC)    • Constipation  "   • Depression    • Diabetes mellitus (CMS/HCC)    • Diabetic ulcer of right foot (CMS/HCC)    • Disease of thyroid gland    • Edema    • Fatty liver    • Hepatitis B    • Hyperlipidemia    • Hypokalemia    • Insomnia    • Lymphedema    • Narcolepsy    • Neuropathy    • Obesity    • JAIME on CPAP    • PVD (peripheral vascular disease) (CMS/HCC)    • RLS (restless legs syndrome)    • Skin cancer    • Tardive dyskinesia    • Tremor of both hands    • Type 2 diabetes mellitus (CMS/HCC)    • Yeast infection      Past Surgical History:   Procedure Laterality Date   • ACHILLES TENDON REPAIR     • CARDIAC ABLATION     • CHOLECYSTECTOMY     • LASER ABLATION      VEIN RLE     General Information     Row Name 08/16/21 08          Physical Therapy Time and Intention    Document Type  evaluation  -LM     Mode of Treatment  individual therapy;physical therapy  -LM     Row Name 08/16/21 08          General Information    Patient Profile Reviewed  yes  -LM     Prior Level of Function  independent:;all household mobility;gait;ADL's  -LM     Existing Precautions/Restrictions  fall  -LM     Barriers to Rehab  medically complex  -LM     Row Name 08/16/21 08          Living Environment    Lives With  child(joni), adult;grandchild(joni);spouse Dtr, son in law, 3 grandchildren, and wife (who is currently in the hospital as well)  -LM     Row Name 08/16/21 08          Home Main Entrance    Number of Stairs, Main Entrance  four  -LM     Stair Railings, Main Entrance  railing on left side (ascending)  -LM     Row Name 08/16/21 0837          Stairs Within Home, Primary    Stairs, Within Home, Primary  States he has a 2nd story and a basement, which he only \"occasionally\" uses.  Able to stay on the first level as needed.  -LM     Number of Stairs, Within Home, Primary  other (see comments) 12  -LM     Row Name 08/16/21 08          Cognition    Orientation Status (Cognition)  oriented to;person;place;verbal cues/prompts needed for " orientation;time Knew the current year, but did not know the month  -LM     Row Name 08/16/21 0837          Safety Issues, Functional Mobility    Safety Issues Affecting Function (Mobility)  safety precaution awareness;safety precautions follow-through/compliance;sequencing abilities  -LM     Impairments Affecting Function (Mobility)  balance;cognition;endurance/activity tolerance;strength;pain  -LM     Comment, Safety Issues/Impairments (Mobility)  Pt states he feels mildly confused.  Thinks it is nighttime.  -LM       User Key  (r) = Recorded By, (t) = Taken By, (c) = Cosigned By    Initials Name Provider Type    LM Chelsie Grande, PT Physical Therapist        Mobility     Row Name 08/16/21 0837          Bed Mobility    Bed Mobility  supine-sit;sit-supine  -LM     Supine-Sit [place] (Bed Mobility)  standby assist  -LM     Sit-Supine [place] (Bed Mobility)  minimum assist (75% patient effort);1 person assist;verbal cues  -LM     Assistive Device (Bed Mobility)  bed rails;head of bed elevated  -LM     Comment (Bed Mobility)  Michelle needed to lift LE's onto stretcher.  -LM     Row Name 08/16/21 0837          Transfers    Comment (Transfers)  Attempted to stand x 2 from EOB.  First stand unsuccessful.  Second stand required MinAx1 with cues for proper hand placement.  Pt then stood a third time from recliner when MT arrived to take pt down for EGD.  -LM     Row Name 08/16/21 0837          Sit-Stand Transfer    Sit-Stand [place] (Transfers)  minimum assist (75% patient effort);1 person assist;verbal cues  -LM     Assistive Device (Sit-Stand Transfers)  walker, front-wheeled  -LM     Row Name 08/16/21 0837          Gait/Stairs (Locomotion)    [place]ris Level (Gait)  minimum assist (75% patient effort);1 person assist;verbal cues  -LM     Assistive Device (Gait)  walker, front-wheeled  -LM     Distance in Feet (Gait)  12 feet + 12 feet  -LM     Deviations/Abnormal Patterns (Gait)  kamla decreased;gait  speed decreased;stride length decreased  -LM     Bilateral Gait Deviations  heel strike decreased  -LM     Comment (Gait/Stairs)  Vc's for increased step length and heel strike.  Pt fatigues very quickly.  After pt settled in recliner, MT arrived to take pt down for EGD.  PT assisted with ambulating pt to stretcher.  -LM       User Key  (r) = Recorded By, (t) = Taken By, (c) = Cosigned By    Initials Name Provider Type    LM Chelsie Grande PT Physical Therapist        Obj/Interventions     Row Name 08/16/21 08          Range of Motion Comprehensive    General Range of Motion  bilateral lower extremity ROM WFL  -LM     Row Name 08/16/21 08          Strength Comprehensive (MMT)    General Manual Muscle Testing (MMT) Assessment  lower extremity strength deficits identified  -LM     Comment, General Manual Muscle Testing (MMT) Assessment  BLEs - Hip flex - 4-/5; Knee flex/ext - 4/5; Ankle DF - 4/5  -LM     Row Name 08/16/21 08          Balance    Balance Assessment  sitting static balance;standing static balance;standing dynamic balance  -LM     Static Sitting Balance  WFL;unsupported;sitting, edge of bed  -LM     Static Standing Balance  mild impairment;supported  -LM     Dynamic Standing Balance  mild impairment;supported  -LM     Row Name 08/16/21 0837          Sensory Assessment (Somatosensory)    Sensory Assessment (Somatosensory)  LE sensation intact  -LM       User Key  (r) = Recorded By, (t) = Taken By, (c) = Cosigned By    Initials Name Provider Type    Chelsie Flaherty PT Physical Therapist        Goals/Plan     Row Name 08/16/21 08          Bed Mobility Goal 1 (PT)    Activity/Assistive Device (Bed Mobility Goal 1, PT)  sit to supine/supine to sit  -LM     [place] Level/Cues Needed (Bed Mobility Goal 1, PT)  independent  -LM     Time Frame (Bed Mobility Goal 1, PT)  long term goal (LTG);2 weeks  -LM     Row Name 08/16/21 08          Transfer Goal 1 (PT)    Activity/Assistive Device (Transfer  "Goal 1, PT)  bed-to-chair/chair-to-bed  -LM     [place] Level/Cues Needed (Transfer Goal 1, PT)  modified independence  -LM     Time Frame (Transfer Goal 1, PT)  long term goal (LTG);2 weeks  -LM     Row Name 08/16/21 0837          Gait Training Goal 1 (PT)    Activity/Assistive Device (Gait Training Goal 1, PT)  gait (walking locomotion);assistive device use  -LM     [place] Level (Gait Training Goal 1, PT)  modified independence  -LM     Distance (Gait Training Goal 1, PT)  150 feet  -LM     Time Frame (Gait Training Goal 1, PT)  long term goal (LTG);2 weeks  -LM       User Key  (r) = Recorded By, (t) = Taken By, (c) = Cosigned By    Initials Name Provider Type    LM Chelsie Grande, PT Physical Therapist        Clinical Impression     Row Name 08/16/21 0837          Pain    Additional Documentation  Pain Scale: Numbers Pre/Post-Treatment (Group)  -LM     Row Name 08/16/21 0837          Pain Scale: Numbers Pre/Post-Treatment    Pretreatment Pain Rating  7/10  -LM     Posttreatment Pain Rating  5/10  -LM     Pain Location - Orientation  generalized  -LM     Pain Location  other (see comments) \"all over\"  -LM     Pain Intervention(s)  Repositioned;Ambulation/increased activity;Nursing Notified  -     Row Name 08/16/21 0837          Plan of Care Review    Plan of Care Reviewed With  patient  -LM     Outcome Summary  PT evaluation completed.  Pt is very pleasant and motivated to work with PT.  Pt transferred supine-->sit with SBA, stood with MinAx1, ambulated 12 feet + 12 feet using rw with MinAx1, and transferred sit-->supine with MinAx1 for LEs.  Pt very fatigued post activity.  Skilled PT services warranted to improve mobility and safety.  Recommend inpt rehab at d/c.  -LM     Row Name 08/16/21 0837          Therapy Assessment/Plan (PT)    Rehab Potential (PT)  good, to achieve stated therapy goals  -LM     Criteria for Skilled Interventions Met (PT)  yes;meets criteria;skilled treatment is necessary  -LM "     Row Name 08/16/21 0837          Vital Signs    Pre Systolic BP Rehab  138  -LM     Pre Treatment Diastolic BP  80  -LM     Pretreatment Heart Rate (beats/min)  73  -LM     Posttreatment Heart Rate (beats/min)  72  -LM     Pre SpO2 (%)  100  -LM     O2 Delivery Pre Treatment  room air  -LM     Post SpO2 (%)  99  -LM     O2 Delivery Post Treatment  room air  -LM     Pre Patient Position  Supine  -LM     Post Patient Position  Sitting  -LM     Row Name 08/16/21 0837          Positioning and Restraints    Pre-Treatment Position  in bed  -LM     Post Treatment Position  chair  -LM     In Chair  reclined;call light within reach;encouraged to call for assist;waffle cushion;notified nsg  -LM       User Key  (r) = Recorded By, (t) = Taken By, (c) = Cosigned By    Initials Name Provider Type    Chelsie Flaherty PT Physical Therapist        Outcome Measures     Row Name 08/16/21 0837          How much help from another person do you currently need...    Turning from your back to your side while in flat bed without using bedrails?  3  -LM     Moving from lying on back to sitting on the side of a flat bed without bedrails?  3  -LM     Moving to and from a bed to a chair (including a wheelchair)?  3  -LM     Standing up from a chair using your arms (e.g., wheelchair, bedside chair)?  3  -LM     Climbing 3-5 steps with a railing?  2  -LM     To walk in hospital room?  3  -LM     AM-PAC 6 Clicks Score (PT)  17  -LM     Row Name 08/16/21 0837          Functional Assessment    Outcome Measure Options  AM-PAC 6 Clicks Basic Mobility (PT)  -LM       User Key  (r) = Recorded By, (t) = Taken By, (c) = Cosigned By    Initials Name Provider Type    Chelsie Flaherty PT Physical Therapist                       Physical Therapy Education                 Title: PT OT SLP Therapies (In Progress)     Topic: Physical Therapy (In Progress)     Point: Mobility training (Done)     Learning Progress Summary           Patient Acceptance, E, VU  by MELISSA at 8/16/2021 0930                   Point: Home exercise program (Not Started)     Learner Progress:  Not documented in this visit.          Point: Body mechanics (Not Started)     Learner Progress:  Not documented in this visit.          Point: Precautions (Done)     Learning Progress Summary           Patient Acceptance, E, VU by MELISSA at 8/16/2021 0930                               User Key     Initials Effective Dates Name Provider Type Discipline     06/16/21 -  Chelsie Grande, MIAH Physical Therapist PT              PT Recommendation and Plan  Planned Therapy Interventions (PT): balance training, bed mobility training, gait training, home exercise program, motor coordination training, neuromuscular re-education, patient/family education, postural re-education, ROM (range of motion), stair training, strengthening, stretching, transfer training  Plan of Care Reviewed With: patient  Outcome Summary: PT evaluation completed.  Pt is very pleasant and motivated to work with PT.  Pt transferred supine-->sit with SBA, stood with MinAx1, ambulated 12 feet + 12 feet using rw with MinAx1, and transferred sit-->supine with MinAx1 for LEs.  Pt very fatigued post activity.  Skilled PT services warranted to improve mobility and safety.  Recommend inpt rehab at d/c.     Time Calculation:   PT Charges     Row Name 08/16/21 0837             Time Calculation    Start Time  0837  -LM      PT Received On  08/16/21  -LM      PT Goal Re-Cert Due Date  08/26/21  -LM         Timed Charges    61148 - Gait Training Minutes   23  -LM         Untimed Charges    PT Eval/Re-eval Minutes  35  -LM         Total Minutes    Timed Charges Total Minutes  23  -LM      Untimed Charges Total Minutes  35  -LM       Total Minutes  58  -LM        User Key  (r) = Recorded By, (t) = Taken By, (c) = Cosigned By    Initials Name Provider Type    LM Chelsie Grande, PT Physical Therapist        Therapy Charges for Today     Code Description Service Date  Service Provider Modifiers Qty    [id number]  GAIT TRAINING EA 15 MIN 8/16/2021 Chelsie Grande, PT GP 2    [id number] HC PT RE-EVAL ESTABLISHED PLAN 2 8/16/2021 Chelsie Grande, PT GP 1          PT G-Codes  Outcome Measure Options: AM-PAC 6 Clicks Basic Mobility (PT)  AM-PAC 6 Clicks Score (PT): 17  AM-PAC 6 Clicks Score (OT): 19    Chelsie Grande PT  8/16/2021

## 2021-08-16 NOTE — PROGRESS NOTES
Neurology       Patient Care Team:  Anibal Maria MD as PCP - General (Internal Medicine)  Dustin Hendricks MD as Consulting Physician (Endocrinology)    Chief complaint: Weakness    History: Patient was able to walk 12 feet using rolling walker with minimal assistance of 1.  Transferred with met minimum assistance.  Fatigued easily.    Upper endoscopy was done today which showed mild portal hyper per tension.    Dr. Garcia felt that although he would be high risk for bariatric surgery felt that he might be a candidate for bariatric surgery at [facility] where referral will be made.  Past Medical History:   Diagnosis Date   • A-fib (CMS/HCC)    • ADD (attention deficit disorder)    • Atrial flutter (CMS/HCC)    • Bell's palsy    • Cellulitis    • CHF (congestive heart failure) (CMS/HCC)    • Cirrhosis of liver (CMS/HCC)    • Constipation    • Depression    • Diabetes mellitus (CMS/HCC)    • Diabetic ulcer of right foot (CMS/HCC)    • Disease of thyroid gland    • Edema    • Fatty liver    • Hepatitis B    • Hyperlipidemia    • Hypokalemia    • Insomnia    • Lymphedema    • Narcolepsy    • Neuropathy    • Obesity    • JAIME on CPAP    • PVD (peripheral vascular disease) (CMS/HCC)    • RLS (restless legs syndrome)    • Skin cancer    • Tardive dyskinesia    • Tremor of both hands    • Type 2 diabetes mellitus (CMS/HCC)    • Yeast infection        Vital Signs   Vitals:    08/16/21 1050 08/16/21 1055 08/16/21 1100 08/16/21 1105   BP: 128/75 131/71 135/73    BP Location:       Patient Position:       Pulse: 71 71 68 73   Resp:  20 20 20   Temp:       TempSrc:       SpO2: 95% 97% 98% 94%   Weight:       Height:           Physical Exam:   General: Pleasant and alert              Neuro: Little sleepy today after the anesthesia.    Speech is articulate.    Feeding himself.    Results Review:  Reviewed  Results from last 7 days   Lab Units 08/14/21  0423   WBC 10*3/mm3 5.85   HEMOGLOBIN g/dL 10.9*   HEMATOCRIT %  34.6*   PLATELETS 10*3/mm3 130*     Results from last 7 days   Lab Units 08/15/21  0415 08/14/21  0422 08/13/21  0615 08/12/21  2225 08/12/21 2225   SODIUM mmol/L 133* 135* 136   < > 134*   POTASSIUM mmol/L 3.4* 3.7 3.2*   < > 3.2*   CHLORIDE mmol/L 95* 95* 96*   < > 93*   CO2 mmol/L 27.0 28.0 28.0   < > 28.0   BUN mg/dL 57* 60* 61*   < > 59*   CREATININE mg/dL 1.13 1.37* 1.35*   < > 1.36*   CALCIUM mg/dL 9.5 9.4 9.0   < > 9.1   BILIRUBIN mg/dL  --  0.8  --   --  0.6   ALK PHOS U/L  --  122*  --   --  123*   ALT (SGPT) U/L  --  20  --   --  23   AST (SGOT) U/L  --  29  --   --  39   GLUCOSE mg/dL 125* 155* 106*   < > 68    < > = values in this interval not displayed.       Imaging Results (Last 24 Hours)     ** No results found for the last 24 hours. **          Assessment:  Weakness, multifactorial.    Diabetic neuropathy.    Obstructive sleep apnea.    Narcolepsy.    Trigeminal neuralgia.    Bell's palsy.    Melo with portal hypertension.    Morbid obesity    Plan:  Okay for rehab anytime.  Neurologic issues are well treated prior to coming in.    Comment:  Weight is his major problem and he is enthusiastic about the possibility of pursuing bariatric surgery in spite of the high risk.         I discussed the patients findings and my recommendations with patient and family    Jesu Zavala MD  08/16/21  13:41 EDT

## 2021-08-16 NOTE — PLAN OF CARE
Goal Outcome Evaluation:  Plan of Care Reviewed With: patient           Outcome Summary: PT evaluation completed.  Pt is very pleasant and motivated to work with PT.  Pt transferred supine-->sit with SBA, stood with MinAx1, ambulated 12 feet + 12 feet using rw with MinAx1, and transferred sit-->supine with MinAx1 for LEs.  Pt very fatigued post activity.  Skilled PT services warranted to improve mobility and safety.  Recommend inpt rehab at d/c.

## 2021-08-16 NOTE — ANESTHESIA POSTPROCEDURE EVALUATION
Patient: Abe Phillips Jr.    Procedure Summary     Date: 08/16/21 Room / Location:  [facility]    Anesthesia Start: 1012 Anesthesia Stop:     Procedure: ESOPHAGOGASTRODUODENOSCOPY (N/A ) Diagnosis:     Surgeons: Brunner, Mark I, MD Provider: Marile Boateng MD    Anesthesia Type: general ASA Status: 3          Anesthesia Type: general    Vitals  No vitals data found for the desired time range.          Post Anesthesia Care and Evaluation    Patient location during evaluation: PACU  Patient participation: complete - patient participated  Level of consciousness: awake and alert  Pain management: adequate  Airway patency: patent  Anesthetic complications: No anesthetic complications  PONV Status: none  Cardiovascular status: hemodynamically stable and acceptable  Respiratory status: nonlabored ventilation, acceptable and nasal cannula  Hydration status: acceptable    Comments: 93%, rr12, 128/89, 72 hr. 97

## 2021-08-16 NOTE — ANESTHESIA PREPROCEDURE EVALUATION
Anesthesia Evaluation     Patient summary reviewed and Nursing notes reviewed   no history of anesthetic complications:  NPO Solid Status: > 8 hours  NPO Liquid Status: > 8 hours           Airway   Mallampati: II  TM distance: >3 FB  Neck ROM: full  Possible difficult intubation  Dental    (+) poor dentition    Pulmonary - normal exam   (+) sleep apnea,   (-) asthma, not a smoker  Cardiovascular - normal exam    ECG reviewed    (+) hypertension, dysrhythmias (paroxsymal Afib) Atrial Flutter, Atrial Fib, CHF , PVD, hyperlipidemia,     ROS comment: 2019 Echo  Normal EF; EF 70%, trace MR LVH    Neuro/Psych  (+) tremors, numbness (bells palsy), psychiatric history Anxiety and ADD,     (-) seizures, neuromuscular disease, TIA  GI/Hepatic/Renal/Endo    (+) morbid obesity,  hepatitis (MCCAULEY) B, liver disease, renal disease, diabetes mellitus, thyroid problem hypothyroidism  (-) GERD    Musculoskeletal     Abdominal    Substance History      OB/GYN          Other   blood dyscrasia anemia,   history of cancer (skin cancer)                      Anesthesia Plan    ASA 3     general     intravenous induction     Anesthetic plan, all risks, benefits, and alternatives have been provided, discussed and informed consent has been obtained with: patient.    Plan discussed with CRNA.

## 2021-08-16 NOTE — CASE MANAGEMENT/SOCIAL WORK
Continued Stay Note  [facility]     Patient Name: Abe Phillips Jr.  MRN: [id number]  Today's Date: 8/16/2021    Admit Date: 8/12/2021    Discharge Plan     Row Name 08/16/21 1623       Plan    Plan  discharge plan    Plan Comments  Pt wanting short term rehab. CM also spoke with pt's spouse and daughter.  Per request, referrals made to [facility] and [facility]. Inpatient qualifying stay being waived due to covid. CM will cont to follow.    Final Discharge Disposition Code  30 - still a patient        Discharge Codes    No documentation.       Expected Discharge Date and Time     Expected Discharge Date Expected Discharge Time    Aug 18, 2021             Jana Francisco RN

## 2021-08-17 NOTE — CASE MANAGEMENT/SOCIAL WORK
Continued Stay Note  [facility]     Patient Name: Abe Phillips Jr.  MRN: [id number]  Today's Date: 8/17/2021    Admit Date: 8/12/2021    Discharge Plan     Row Name 08/17/21 1442       Plan    Plan  discharge plan    Plan Comments  [facility] not [facility] any beds.  CM spoke with Fidelina at [facility] and [facility] will review referral.  Daughter/pt mainly interested in [facility]. CM will follow up tomorrow.    Final Discharge Disposition Code  30 - still a patient        Discharge Codes    No documentation.       Expected Discharge Date and Time     Expected Discharge Date Expected Discharge Time    Aug 18, 2021             Jana Francisco RN

## 2021-08-17 NOTE — PROGRESS NOTES
Neurology       Patient Care Team:  Anibal Maria MD as PCP - General (Internal Medicine)  Dustin Hendricks MD as Consulting Physician (Endocrinology)    Chief complaint: Weakness    History: Patient is making progress with physical therapy.    GI has referred him to the  bariatric program as an outpatient at  hepatology in 6 months.  Past Medical History:   Diagnosis Date   • A-fib (CMS/HCC)    • ADD (attention deficit disorder)    • Atrial flutter (CMS/HCC)    • Bell's palsy    • Cellulitis    • CHF (congestive heart failure) (CMS/HCC)    • Cirrhosis of liver (CMS/HCC)    • Constipation    • Depression    • Diabetes mellitus (CMS/HCC)    • Diabetic ulcer of right foot (CMS/HCC)    • Disease of thyroid gland    • Edema    • Fatty liver    • Hepatitis B    • Hyperlipidemia    • Hypokalemia    • Insomnia    • Lymphedema    • Narcolepsy    • Neuropathy    • Obesity    • JAIME on CPAP    • PVD (peripheral vascular disease) (CMS/HCC)    • RLS (restless legs syndrome)    • Skin cancer    • Tardive dyskinesia    • Tremor of both hands    • Type 2 diabetes mellitus (CMS/HCC)    • Yeast infection        Vital Signs   Vitals:    08/16/21 2047 08/16/21 2335 08/17/21 0259 08/17/21 0816   BP: 143/85 114/92 137/83 127/88   BP Location: Left arm Left arm Left arm Left arm   Patient Position: Lying Lying Lying Lying   Pulse: 80 79 85 75   Resp: 20 20 22 20   Temp: 97.7 °F (36.5 °C) 97.7 °F (36.5 °C) 97 °F (36.1 °C) 98.4 °F (36.9 °C)   TempSrc: Oral Oral Axillary Oral   SpO2:       Weight:       Height:           Physical Exam:   General: Awake and alert              Neuro: Patient is falls asleep during the middle of the interview and wakes back up again.        Results Review:  Reviewed  Results from last 7 days   Lab Units 08/14/21  0423   WBC 10*3/mm3 5.85   HEMOGLOBIN g/dL 10.9*   HEMATOCRIT % 34.6*   PLATELETS 10*3/mm3 130*     Results from last 7 days   Lab Units 08/17/21  0521 08/15/21  0415 08/14/21  0422  08/13/21  0615 08/12/21  2225   SODIUM mmol/L 132* 133* 135*   < > 134*   POTASSIUM mmol/L 3.6 3.4* 3.7   < > 3.2*   CHLORIDE mmol/L 95* 95* 95*   < > 93*   CO2 mmol/L 27.0 27.0 28.0   < > 28.0   BUN mg/dL 45* 57* 60*   < > 59*   CREATININE mg/dL 1.03 1.13 1.37*   < > 1.36*   CALCIUM mg/dL 9.5 9.5 9.4   < > 9.1   BILIRUBIN mg/dL  --   --  0.8  --  0.6   ALK PHOS U/L  --   --  122*  --  123*   ALT (SGPT) U/L  --   --  20  --  23   AST (SGOT) U/L  --   --  29  --  39   GLUCOSE mg/dL 133* 125* 155*   < > 68    < > = values in this interval not displayed.       Imaging Results (Last 24 Hours)     ** No results found for the last 24 hours. **          Assessment:  Weakness secondary to inactivity and hypokalemia.    Morbid obesity.    Melo with portal hypertension.    Lymphedema.        Plan:  Hopefully [facility] for further rehabilitation    Outpatient appointment to  bariatric.    Outpatient clinic [facility] in 6 months.    Continue follow-up with Dr. Ahumada for neurology issues.    Comment:  Okay to transfer anytime.         I discussed the patients findings and my recommendations with patient, family and nursing staff    Jesu Zavala MD  08/17/21  11:33 EDT

## 2021-08-17 NOTE — THERAPY WOUND CARE TREATMENT
Acute Care - Wound/Debridement Treatment Note  [facility]     Patient Name: Abe Phillips Jr.  : 1955  MRN: [id number]  Today's Date: 2021                Admit Date: 2021    Visit Dx:    ICD-10-CM ICD-9-CM   1. Generalized weakness  R53.1 780.79   2. Hypokalemia  E87.6 276.8   3. Hypoglycemia  E16.2 251.2   4. Anemia, unspecified type  D64.9 285.9   5. Bilateral lower extremity edema  R60.0 782.3   6. MCCAULEY (nonalcoholic steatohepatitis)  K75.81 571.8       Patient Active Problem List   Diagnosis   • Type 2 diabetes mellitus with hyperglycemia (CMS/HCC)   • Thrombocytopenia (CMS/HCC)   • PVD (peripheral vascular disease) (CMS/HCC)   • A-fib (CMS/HCC)   • Cirrhosis of liver (CMS/HCC)   • Chronic congestive heart failure (CMS/HCC)   • Diabetic ulcer of right heel (CMS/HCC)   • Primary narcolepsy without cataplexy   • Essential hypertension   • Morbidly obese (CMS/HCC)   • Restless legs syndrome (RLS)   • ADD (attention deficit disorder)   • Depression   • Diabetes mellitus type 2, uncontrolled, with complications (CMS/HCC)   • Edema   • MCCAULEY (nonalcoholic steatohepatitis)   • Hepatitis B   • Insomnia   • Lymphedema   • Obesity   • Severe JAIME on CPAP   • RLS (restless legs syndrome)   • Tremor of both hands   • Restrictive cardiomyopathy (CMS/HCC)   • Other hyperlipidemia   • Uncontrolled type 2 diabetes mellitus with hyperglycemia (CMS/HCC)   • OMAR (acute kidney injury) (CMS/HCC)   • Dyslipidemia   • Hyperglycemia   • Diabetic ulcer of right heel associated with type 2 diabetes mellitus (CMS/HCC)   • Facial paralysis/[place] palsy   • Diabetic hypoglycemia (CMS/HCC)   • Hypogonadism in male   • Atypical facial pain   • Bilateral leg weakness   • Hypoglycemia   • Elevated troponin   • Hypokalemia   • Anemia        Past Medical History:   Diagnosis Date   • A-fib (CMS/HCC)    • ADD (attention deficit disorder)    • Atrial flutter (CMS/HCC)    • Bell's palsy    • Cellulitis    • CHF (congestive heart  failure) (CMS/HCC)    • Cirrhosis of liver (CMS/HCC)    • Constipation    • Depression    • Diabetes mellitus (CMS/HCC)    • Diabetic ulcer of right foot (CMS/HCC)    • Disease of thyroid gland    • Edema    • Fatty liver    • Hepatitis B    • Hyperlipidemia    • Hypokalemia    • Insomnia    • Lymphedema    • Narcolepsy    • Neuropathy    • Obesity    • JAIME on CPAP    • PVD (peripheral vascular disease) (CMS/HCC)    • RLS (restless legs syndrome)    • Skin cancer    • Tardive dyskinesia    • Tremor of both hands    • Type 2 diabetes mellitus (CMS/HCC)    • Yeast infection         Past Surgical History:   Procedure Laterality Date   • ACHILLES TENDON REPAIR     • CARDIAC ABLATION     • CHOLECYSTECTOMY     • LASER ABLATION      VEIN RLE           Wound 12/27/19 2334 Right heel Diabetic Ulcer (Active)   Dressing Appearance intact;moist drainage 08/17/21 0800   Closure RYLAND 08/16/21 2000   Base moist;pink;yellow;white 08/17/21 0800   Periwound intact;dry 08/17/21 0800   Periwound Temperature cool 08/17/21 0800   Periwound Skin Turgor firm 08/17/21 0800   Edges callused;open 08/17/21 0800   Drainage Characteristics/Odor serosanguineous 08/17/21 0800   Drainage Amount scant 08/17/21 0800   Care, Wound cleansed with;soap and water;debrided 08/17/21 0800   Dressing Care foam;low-adherent 08/17/21 0800     Lymphedema     Row Name 08/17/21 0800             Lymphedema Edema Assessment    Ptting Edema Category  By severity  -      Pitting Edema  Mild;Moderate  -         Compression/Skin Care    Compression/Skin Care  skin care;wrapping location;bandaging  -      Skin Care  washed/dried;lotion applied  -      Wrapping Location  lower extremity  -      Wrapping Location LE  bilateral:;foot to knee  -      Wrapping Comments  size 4/6 compressogrip doubled and overlapping for gradient light compression.   -        User Key  (r) = Recorded By, (t) = Taken By, (c) = Cosigned By    Initials Name Provider Type      Delta Bhandari, PT Physical Therapist          WOUND DEBRIDEMENT  Total area of Debridement: ~2cm2  Debridement Site 1  Location- Site 1: R heel   Selective Debridement- Site 1: Wound Surface <20cmsq  Instruments- Site 1: #15, scapel, tweezers  Excised Tissue Description- Site 1: minimum, other (comment) (dry callused, nonviable skin )  Bleeding- Site 1: none                  PT Assessment (last 12 hours)      PT Evaluation and Treatment     Row Name 08/17/21 0800          Physical Therapy Time and Intention    Subjective Information  complains of;weakness;fatigue  -MF     Document Type  therapy note (daily note);wound care  -MF     Mode of Treatment  individual therapy;physical therapy  -     Row Name 08/17/21 0800          Pain    Additional Documentation  Pain Scale: FACES Pre/Post-Treatment (Group)  -     Row Name 08/17/21 0800          Pain Scale: FACES Pre/Post-Treatment    Pain: FACES Scale, Pretreatment  0-->no hurt  -MF     Posttreatment Pain Rating  0-->no hurt  -MF     Pain Location - Orientation  generalized  -     Row Name 08/17/21 0800          Wound 12/27/19 2334 Right heel Diabetic Ulcer    Wound - Properties Group Placement Date: 12/27/19  - Placement Time: 2334  -AYLIN Present on Hospital Admission: Y  -AYLIN Side: Right  -AYLIN Location: heel  -AYLIN Primary Wound Type: Diabetic ulc  -AYLIN    Dressing Appearance  intact;moist drainage  -MF     Base  moist;pink;yellow;white  -MF     Periwound  intact;dry  -MF     Periwound Temperature  cool  -MF     Periwound Skin Turgor  firm  -MF     Edges  callused;open  -MF     Drainage Characteristics/Odor  serosanguineous  -MF     Drainage Amount  scant  -MF     Care, Wound  cleansed with;soap and water;debrided  -MF     Dressing Care  foam;low-adherent  -MF     Retired Wound - Properties Group Date first assessed: 12/27/19  -AYLIN Time first assessed: 2334  -AYLIN Present on Hospital Admission: Y  -AYLIN Side: Right  -AYLIN Location: heel  -AYLIN Primary Wound Type: Diabetic  ulc  -AYLIN    Row Name 08/17/21 0800          Coping    Observed Emotional State  calm;cooperative  -     Verbalized Emotional State  acceptance  -     Trust Relationship/Rapport  care explained  -     Row Name 08/17/21 0800          Plan of Care Review    Plan of Care Reviewed With  patient  -MF     Outcome Summary  PT able to lightly debride a minimal amount of crusted, callused skin to help improve healing potential.    -MF     Row Name 08/17/21 0800          Positioning and Restraints    Pre-Treatment Position  in bed  -     Post Treatment Position  bed  -MF     In Bed  supine;call light within reach  -       User Key  (r) = Recorded By, (t) = Taken By, (c) = Cosigned By    Initials Name Provider Type    MF Delta Bhandari, PT Physical Therapist    Lewis Cadena, RN Registered Nurse        Physical Therapy Education                 Title: PT OT SLP Therapies (In Progress)     Topic: Physical Therapy (In Progress)     Point: Mobility training (Done)     Learning Progress Summary           Patient Acceptance, E, VU by  at 8/16/2021 0930                   Point: Home exercise program (Not Started)     Learner Progress:  Not documented in this visit.          Point: Body mechanics (Not Started)     Learner Progress:  Not documented in this visit.          Point: Precautions (Done)     Learning Progress Summary           Patient Acceptance, E, VU by  at 8/16/2021 0930                               User Key     Initials Effective Dates Name Provider Type Discipline     06/16/21 -  Chelsie Grande, PT Physical Therapist PT                Recommendation and Plan  Anticipated Discharge Disposition (PT): inpatient rehabilitation facility  Planned Therapy Interventions (PT): balance training, bed mobility training, gait training, home exercise program, motor coordination training, neuromuscular re-education, patient/family education, postural re-education, ROM (range of motion), stair training,  strengthening, stretching, transfer training  Therapy Frequency (PT): daily  Plan of Care Reviewed With: patient           Outcome Summary: PT able to lightly debride a minimal amount of crusted, callused skin to help improve healing potential.    Plan of Care Reviewed With: patient            Time Calculation  PT Charges     Row Name 08/17/21 0800             Time Calculation    Start Time  0800  -MF      PT Goal Re-Cert Due Date  08/26/21  -MF         Untimed Charges    42608-Dmvwtsmmje comp below knee  15  -MF      06351-Feutbfsnq debridement  10  -MF         Total Minutes    Untimed Charges Total Minutes  25  -MF       Total Minutes  25  -MF        User Key  (r) = Recorded By, (t) = Taken By, (c) = Cosigned By    Initials Name Provider Type     Delta Bhandari, PT Physical Therapist            Therapy Charges for Today     Code Description Service Date Service Provider Modifiers Qty    60890932185 HC AL DEBRIDE OPEN WOUND UP TO 20CM 8/17/2021 Delta Bhandari, PT GP 1    92085471385 HC PT MULTI LAYER COMP SYS BELOW KNEE 8/17/2021 Delta Bhandari, PT GP 1            PT G-Codes  Outcome Measure Options: AM-PAC 6 Clicks Basic Mobility (PT)  AM-PAC 6 Clicks Score (PT): 17  AM-PAC 6 Clicks Score (OT): 19       Delta Bhandari PT  8/17/2021

## 2021-08-17 NOTE — PROGRESS NOTES
[facility]  PROGRESS NOTE    Patient Name: Abe Phillips Jr.  : 1955  MRN: [id number]    Date of Admission: 2021  Primary Care Physician: Anibal Maria MD    Subjective   Subjective     CC:  Follow-up weakness    HPI:  Still feeling quite weak/debilitated.  Tired this morning.  No events overnight.  Reviewed GI recommendations with the patient and he expressed understanding    ROS:  Gen- No fevers, chills  CV- No chest pain, palpitations  Resp- No cough, dyspnea  GI- No N/V/D, abd pain    Objective   Objective     Vital Signs:   Temp:  [97 °F (36.1 °C)-98.4 °F (36.9 °C)] 98.4 °F (36.9 °C)  Heart Rate:  [68-93] 75  Resp:  [16-22] 20  BP: (105-143)/(60-92) 127/88  Flow (L/min):  [2] 2     Physical Exam:  Constitutional: Lethargic, laying in bed, no acute distress, intermittently falling asleep  HENT: NCAT, mucous membranes moist  Respiratory: Clear to auscultation bilaterally, respiratory effort normal   Cardiovascular: RRR, no murmurs, rubs, or gallops, palpable radial pulses  Gastrointestinal: Positive bowel sounds, soft, nontender, nondistended  Musculoskeletal: Bilateral ankle edema with compression wrapping  Psychiatric: Slow affect, cooperative  Neurologic: Speech clear, moving extremities symmetrically    Results Reviewed:  LAB RESULTS:      Lab 21  0423 08/13/21  0615 08/12/21  2225   WBC 5.85 6.41 8.15   HEMOGLOBIN 10.9* 10.5* 11.0*   HEMATOCRIT 34.6* 32.6* 34.1*   PLATELETS 130* 134* 147   NEUTROS ABS 4.00  --  6.79   IMMATURE GRANS (ABS) 0.17*  --  0.17*   LYMPHS ABS 0.98  --  0.70   MONOS ABS 0.51  --  0.43   EOS ABS 0.13  --  0.03   MCV 93.0 91.6 91.4   SED RATE  --   --  65*   PROTIME 14.9*  --   --          Lab 21  0521 08/15/21  0415 21  0615 21  2225 21  1306 21  1306   SODIUM 132* 133* 135* 136 134*   < > 135*   POTASSIUM 3.6 3.4* 3.7 3.2* 3.2*   < > 3.2*   CHLORIDE 95* 95* 95* 96* 93*   <  > 93*   CO2 27.0 27.0 28.0 28.0 28.0   < > 26.6   ANION GAP 10.0 11.0 12.0 12.0 13.0   < > 15.4*   BUN 45* 57* 60* 61* 59*   < > 54*   CREATININE 1.03 1.13 1.37* 1.35* 1.36*   < > 1.29*   GLUCOSE 133* 125* 155* 106* 68   < > 97   CALCIUM 9.5 9.5 9.4 9.0 9.1   < > 9.8   MAGNESIUM 2.6* 2.7*  --   --  2.4  --  2.2   HEMOGLOBIN A1C  --   --   --  6.60*  --   --   --    TSH  --   --   --   --  1.640  --  2.200    < > = values in this interval not displayed.         Lab 08/14/21  0422 08/12/21  2225   TOTAL PROTEIN 7.0 6.8   ALBUMIN 3.60 3.50   GLOBULIN 3.4 3.3   ALT (SGPT) 20 23   AST (SGOT) 29 39   BILIRUBIN 0.8 0.6   ALK PHOS 122* 123*         Lab 08/14/21  0423 08/13/21  0615 08/13/21  0050 08/12/21  2225   TROPONIN T  --  0.057* 0.063* 0.064*   PROTIME 14.9*  --   --   --    INR 1.21*  --   --   --                  Brief Urine Lab Results  (Last result in the past 365 days)      Color   Clarity   Blood   Leuk Est   Nitrite   Protein   CREAT   Urine HCG        08/13/21 0718 Yellow Clear Negative Negative Negative Negative               Microbiology Results Abnormal     Procedure Component Value - Date/Time    COVID PRE-OP / PRE-PROCEDURE SCREENING ORDER (NO ISOLATION) - Swab, Nasopharynx [[id number]]  (Normal) Collected: 08/15/21 0807    Lab Status: Final result Specimen: Swab from Nasopharynx Updated: 08/15/21 0901    Narrative:      The following orders were created for panel order COVID PRE-OP / PRE-PROCEDURE SCREENING ORDER (NO ISOLATION) - Swab, Nasopharynx.  Procedure                               Abnormality         Status                     ---------                               -----------         ------                     COVID-19, ABBOTT IN-HOUS...[[id number]]  Normal              Final result                 Please view results for these tests on the individual orders.    COVID-19, ABBOTT IN-HOUSE,NASAL Swab (NO TRANSPORT MEDIA) 2 HR TAT - Swab, Nasopharynx [[id number]]  (Normal) Collected: 08/15/21 0807     Lab Status: Final result Specimen: Swab from Nasopharynx Updated: 08/15/21 0901     COVID19 Presumptive Negative    Narrative:      Fact sheet for providers: https://www.fda.gov/media/794900/download     Fact sheet for patients: https://www.fda.gov/media/904554/download    Test performed by PCR.  If inconsistent with clinical signs and symptoms patient should be tested with different authorized molecular test.          No radiology results from the last 24 hrs    Results for orders placed during the hospital encounter of 01/04/19    Adult Transthoracic Echo Complete W/ Cont if Necessary Per Protocol    Interpretation Summary  · Left ventricular systolic function is hyperdynamic (EF > 70).      I have reviewed the medications:  Scheduled Meds:amphetamine-dextroamphetamine, 30 mg, Oral, Daily  amphetamine-dextroamphetamine, 60 mg, Oral, Daily  aspirin, 81 mg, Oral, Daily  cetirizine, 10 mg, Oral, Daily  gabapentin, 600 mg, Oral, BID  heparin (porcine), 5,000 Units, Subcutaneous, Q12H  levothyroxine, 75 mcg, Oral, Daily  montelukast, 10 mg, Oral, Nightly  OXcarbazepine, 300 mg, Oral, BID  pramipexole, 0.75 mg, Oral, BID  pravastatin, 40 mg, Oral, Nightly  sodium chloride, 10 mL, Intravenous, Q12H  traZODone, 50 mg, Oral, Nightly  venlafaxine XR, 150 mg, Oral, Nightly      Continuous Infusions:lactated ringers, 30 mL/hr      PRN Meds:.•  acetaminophen  •  dextrose  •  dextrose  •  glucagon (human recombinant)  •  lactated ringers  •  magnesium sulfate **OR** magnesium sulfate **OR** magnesium sulfate  •  melatonin  •  potassium chloride **OR** potassium chloride **OR** potassium chloride  •  sodium chloride  •  sodium chloride    Assessment/Plan   Assessment & Plan     Active Hospital Problems    Diagnosis  POA   • **Bilateral leg weakness [R29.898]  Yes   • Hypoglycemia [E16.2]  Yes   • Elevated troponin [R77.8]  Yes   • Hypokalemia [E87.6]  Yes   • Anemia [D64.9]  Yes   • Uncontrolled type 2 diabetes mellitus with  hyperglycemia (CMS/HCC) [E11.65]  Yes   • OMAR (acute kidney injury) (CMS/HCC) [N17.9]  Yes   • Other hyperlipidemia [E78.49]  Yes   • Severe JAIME on CPAP [G47.33, Z99.89]  Not Applicable   • ADD (attention deficit disorder) [F98.8]  Yes   • Obesity [E66.9]  Yes   • MCCAULEY (nonalcoholic steatohepatitis) [K75.81]  Unknown   • Essential hypertension [I10]  Yes   • Restless legs syndrome (RLS) [G25.81]  Yes   • PVD (peripheral vascular disease) (CMS/HCC) [I73.9]  Yes   • Cirrhosis of liver (CMS/HCC) [K74.60]  Yes   • Chronic congestive heart failure (CMS/HCC) [I50.9]  Yes   • A-fib (CMS/HCC) [I48.91]  Yes      Resolved Hospital Problems   No resolved problems to display.        Brief Hospital Course to date:  Abe Phillips Jr. is a 66 y.o. male with obesity, Mccauley cirrhosis, narcolepsy, diastolic CHF, other comorbidities who presents to the hospital with 1 week of generalized weakness, worse in the legs preventing effective mobility; PT/OT following and recommending inpatient rehab, CM following; GI consulted and following for evaluation of candidacy for future bariatric surgery     Assessment/plan     Acute generalized weakness  Pronounced bilateral leg weakness  -Electrolytes replaced  -EMG with generalized sensorimotor neuropathy  -Neurology following  -PT/OT, needs IPR at discharge, CM following     Hx MCCAULEY cirrhosis  -Abdominal CT liver protocol without clearly defined mass  -GI following, s/p EGD 8/16/2021 with portal gastropathy and lower esophageal varices; per GI high risk for bariatric surgery but not entirely excluded, recommended [facility] referral and additionally hepatology    Diabetes mellitus, A1c 6.6%, with long-term use of insulin (insulin pump), with hyperglycemia  -Patient controlling BG with his insulin pump    CKD stage 3  -Baseline creatinine 1.1-1.3; EGFR 40s-50s  -Slightly worse than baseline but stable     Chronic diastolic CHF, compensated  Chronic lymphedema  -Wound care for pressure  wrapping  -Diuretics on hold     Narcolepsy  -Patient supplied Adderall     JAIME  -CPAP     Obesity  -BMI 40.62 kg/M2    DVT prophylaxis:  Medical DVT prophylaxis orders are present.       AM-PAC 6 Clicks Score (PT): 17 (08/16/21 2000)    Disposition: Needs extensive rehab, CM following for referrals/placement    CODE STATUS:   Code Status and Medical Interventions:   Ordered at: 08/13/21 0114     Level Of Support Discussed With:    Patient     Code Status:    CPR     Medical Interventions (Level of Support Prior to Arrest):    Full       Nirav Fregoso,   08/17/21

## 2021-08-17 NOTE — PROGRESS NOTES
"GI Daily Progress Note  Subjective:    Chief Complaint:  Follow up MCCAULEY cirrhosis     Mr. Phillips is sitting up in bed, eating breakfast and denies any complaints.      Objective:    /88 (BP Location: Left arm, Patient Position: Lying)   Pulse 75   Temp 98.4 °F (36.9 °C) (Oral)   Resp 20   Ht 190.5 cm (75\")   Wt (!) 147 kg (325 lb)   SpO2 94%   BMI 40.62 kg/m²     Physical Exam  Constitutional:       General: He is not in acute distress.     Appearance: He is obese.      Comments: Chronically ill appearing    Cardiovascular:      Rate and Rhythm: Normal rate and regular rhythm.   Pulmonary:      Effort: Pulmonary effort is normal.   Abdominal:      General: Bowel sounds are normal.      Palpations: Abdomen is soft.      Tenderness: There is no abdominal tenderness.      Comments: Obese abdomen   Neurological:      Mental Status: He is alert.      Comments: Left facial weakness   Cognition is slow  No asterixis         Lab  Lab Results   Component Value Date    WBC 5.85 08/14/2021    HGB 10.9 (L) 08/14/2021    HGB 10.5 (L) 08/13/2021    HGB 11.0 (L) 08/12/2021    MCV 93.0 08/14/2021     (L) 08/14/2021    INR 1.21 (H) 08/14/2021    INR 1.14 07/29/2021    INR 1.31 (H) 03/03/2019       Lab Results   Component Value Date    GLUCOSE 133 (H) 08/17/2021    BUN 45 (H) 08/17/2021    CREATININE 1.03 08/17/2021    EGFRIFNONA 72 08/17/2021    BCR 43.7 (H) 08/17/2021     (L) 08/17/2021    K 3.6 08/17/2021    CO2 27.0 08/17/2021    CALCIUM 9.5 08/17/2021    PROTENTOTREF 7.0 07/26/2019    ALBUMIN 3.60 08/14/2021    ALKPHOS 122 (H) 08/14/2021    BILITOT 0.8 08/14/2021    ALT 20 08/14/2021    AST 29 08/14/2021      Ref. Range 8/16/2021 12:33   Ammonia Latest Ref Range: 16 - 60 umol/L 40      Ref. Range 8/14/2021 18:04   25 Hydroxy, Vitamin D Latest Ref Range: 30.0 - 100.0 ng/ml 18.7 (L)       Assessment:    MCCAULEY Cirrhosis.   Child Macdonald Class A.  MELD is 10.    Morbid obesity, BMI is 40.   History of hepatitis " B   Vitamin D deficiency    Plan:    EGD showed moderate portal gastropathy without hemorrhage.  Two columns of diminutive esophageal varices are seen in the lower esophagus.    >>> Follow up outpatient with [facility] in 6 months.   Recommend repeat surveillance EGD in 1 year  >>> Patient is not immune to hepatitis A.  Recommend vaccination for Hepatitis A and will give first dose of series today.   Needs to receive second dose in 6 months outpatient.    >>> Begin daily Vitamin D supplementation     We will sign off.  Patient to be referred to [facility] outpatient       ELVER Wiley  08/17/21  10:02 EDT

## 2021-08-18 PROBLEM — R77.8 ELEVATED TROPONIN: Status: RESOLVED | Noted: 2021-01-01 | Resolved: 2021-01-01

## 2021-08-18 PROBLEM — E87.6 HYPOKALEMIA: Status: RESOLVED | Noted: 2021-01-01 | Resolved: 2021-01-01

## 2021-08-18 PROBLEM — R79.89 ELEVATED TROPONIN: Status: RESOLVED | Noted: 2021-01-01 | Resolved: 2021-01-01

## 2021-08-18 PROBLEM — E16.2 HYPOGLYCEMIA: Status: RESOLVED | Noted: 2021-01-01 | Resolved: 2021-01-01

## 2021-08-18 PROBLEM — N17.9 AKI (ACUTE KIDNEY INJURY) (HCC): Status: RESOLVED | Noted: 2019-12-27 | Resolved: 2021-01-01

## 2021-08-18 NOTE — DISCHARGE INSTR - APPOINTMENTS
Per Madelaine in office patient to please call   [facility] [place] [address]  Call [phone]  Monday - Friday: 8 a.m. - 4:30 p.m.

## 2021-08-18 NOTE — CASE MANAGEMENT/SOCIAL WORK
Case Management Discharge Note      Final Note: Per Fidelina, [facility] can offer pt a bed on acute, general rehab unit today. Pt accepted bed offer. Discharge summary faxed to [facility](GRU) at [phone].  Nurse can call report to [facility] a [phone] and send a copy of discharge summary with pt. Transportation arranged with [facility] to transport pt today at 230;m. Pt will need to be in the lobby of the maternity entrance by 2;15 pm today.  CM spoke with pt and then called spouse on cell phone. Both pt and spouse agreeable to discharge plan.    Provided Post Acute Provider List?: N/A  N/A Provider List Comment: denies need for outpt services    Selected Continued Care - Admitted Since 8/12/2021     Destination Coordination complete.    Service Provider Selected Services Address Phone Fax Patient Preferred    [facility]  Inpatient Rehabilitation [address] [address] [phone] --          Durable Medical Equipment    No services have been selected for the patient.              Dialysis/Infusion    No services have been selected for the patient.              Home Medical Care    No services have been selected for the patient.              Therapy    No services have been selected for the patient.              Community Resources    No services have been selected for the patient.              Community & DME    No services have been selected for the patient.                  Transportation Services  Other: Other ([facility])    Final Discharge Disposition Code: 62 - inpatient rehab facility

## 2021-08-18 NOTE — OUTREACH NOTE
Prep Survey      Responses   [facility] facility patient discharged from?  [place]   Is LACE score < 7 ?  No   Emergency Room discharge w/ pulse ox?  No   Eligibility  Not Eligible   What are the reasons patient is not eligible?  [facility]   Does the patient have one of the following disease processes/diagnoses(primary or secondary)?  Other   Prep survey completed?  Yes          Natasha Rader RN

## 2021-08-18 NOTE — DISCHARGE PLACEMENT REQUEST
"Abe Barrios Jr. (66 y.o. Male)     To [facility](acute, general rehab)   From [facility]) at [facility] [phone]    Date of Birth Social Security Number Address Home Phone MRN    1955  [id number]  [place] [id number] [phone] [id number]    Sikh Marital Status          Mormon        Admission Date Admission Type Admitting Provider Attending Provider Department, Room/Bed    8/12/21 Emergency Allie Delgado DO Roesch, Lyndsey, DO [facility] 5H, S571/1    Discharge Date Discharge Disposition Discharge Destination         Home or Self Care              Attending Provider: Allie Delgado DO    Allergies: No Known Allergies    Isolation: None   Infection: None   Code Status: CPR    Ht: 190.5 cm (75\")   Wt: 147 kg (325 lb)    Admission Cmt: None   Principal Problem: Bilateral leg weakness [R29.898]                 Active Insurance as of 8/12/2021     Primary Coverage     Payor Plan Insurance Group Employer/Plan Group    MEDICARE MEDICARE A & B      Payor Plan Address Payor Plan Phone Number Payor Plan Fax Number Effective Dates    [address] [phone]  6/1/2018 - None Entered    [place] [id number]       Subscriber Name Subscriber Birth Date Member ID       ABE BARRIOS JR. 1955 [id number]           Secondary Coverage     Payor Plan Insurance Group Employer/Plan Group    AETNA COMMERCIAL AETNA HEALTH AND LIFE  SUP             Payor Plan Address Payor Plan Phone Number Payor Plan Fax Number Effective Dates    [address] [address]   12/7/2019 - None Entered    [facility] [id number]       Subscriber Name Subscriber Birth Date Member ID       ABE BARRIOS JR. 1955 [id number]                 Emergency Contacts      (Rel.) Home Phone Work Phone Mobile Phone    Edilberto Rothley (Daughter) [phone] -- --    Caryn Barrios (Spouse) -- -- [phone]               Discharge Summary      Allie Delgado DO at 08/18/21 1001              [facility] " [facility] Medicine Services  DISCHARGE SUMMARY    Patient Name: Abe Phillips Jr.  : 1955  MRN: [id number]    Date of Admission: 2021 10:34 PM  Date of Discharge:  21  Primary Care Physician: Anibal Maria MD    Consults     Date and Time Order Name Status Description    2021  2:24 PM Inpatient Gastroenterology Consult Completed     2021  1:26 PM Inpatient Neurology Consult General Completed     2021 10:49 PM Inpatient Cardiology Consult Completed           Hospital Course     Presenting Problem:   Bilateral leg weakness [R29.898]    Active Hospital Problems    Diagnosis  POA   • **Bilateral leg weakness [R29.898]  Yes   • Anemia [D64.9]  Yes   • Uncontrolled type 2 diabetes mellitus with hyperglycemia (CMS/HCC) [E11.65]  Yes   • Other hyperlipidemia [E78.49]  Yes   • Severe JAIME on CPAP [G47.33, Z99.89]  Not Applicable   • ADD (attention deficit disorder) [F98.8]  Yes   • Obesity [E66.9]  Yes   • MCCAULEY (nonalcoholic steatohepatitis) [K75.81]  Yes   • Essential hypertension [I10]  Yes   • Restless legs syndrome (RLS) [G25.81]  Yes   • PVD (peripheral vascular disease) (CMS/HCC) [I73.9]  Yes   • Cirrhosis of liver (CMS/HCC) [K74.60]  Yes   • Chronic congestive heart failure (CMS/HCC) [I50.9]  Yes   • A-fib (CMS/HCC) [I48.91]  Yes      Resolved Hospital Problems    Diagnosis Date Resolved POA   • Hypoglycemia [E16.2] 2021 Yes   • Elevated troponin [R77.8] 2021 Yes   • Hypokalemia [E87.6] 2021 Yes   • OMAR (acute kidney injury) (CMS/HCC) [N17.9] 2021 Yes          Hospital Course:  Abe Phillips Jr. is a 66 y.o. male with obesity, Mccauley cirrhosis, narcolepsy, diastolic CHF, other comorbidities who presents to the hospital with 1 week of generalized weakness, worse in the legs preventing effective mobility; PT/OT following and recommending inpatient rehab, CM following; GI consulted and following for evaluation of candidacy for future bariatric  surgery     Assessment/plan     Acute generalized weakness  Pronounced bilateral leg weakness  -Electrolytes replaced  -EMG with generalized sensorimotor neuropathy  -Neurology consulted -- felt to be related to inactivity and hypokalemia   -PT/OT, rehab recs      Hx MCCAULEY cirrhosis  -Abdominal CT liver protocol without clearly defined mass  -GI following, s/p EGD 8/16/2021 with portal gastropathy and lower esophageal varices; per GI high risk for bariatric surgery but not entirely excluded, recommended  bariatric surgery referral and additionally hepatology  - Hep A vaccination started; repeat in 6 months    - Follow up with [facility] bariatric and hepatology      Diabetes mellitus, A1c 6.6%, with long-term use of insulin (insulin pump), with hyperglycemia  -Patient controlling BG with his insulin pump     CKD stage 3  -Baseline creatinine 1.1-1.3; EGFR 40s-50s  -Cr stable during admission;  recommend repeating BMP in 3-5 days      Chronic diastolic CHF, compensated  Chronic lymphedema  -Wound care for pressure wrapping  - Restart bumex 2 mg daily (2 mg BID at home) and home aldactone  - Recommend repeat BMP in 3-5 days     Narcolepsy  -Patient supplied Adderall     JAIME  -CPAP     Obesity  -BMI 40.62 kg/M2    Discharge Follow Up Recommendations for outpatient labs/diagnostics:  PCP Dr. Maria 1 week after DC from rehab    hepatology and  bariatric [facility]     Day of Discharge     HPI:   No acute events. States he is feeling ok today. Denies CP or SOA. Reviewed plans for discharge to rehab and he is agreeable. Answered all questions to the best of my ability.     Review of Systems  Gen- No fevers, chills  CV- No chest pain, palpitations  Resp- No cough, dyspnea  GI- No N/V/D, abd pain    Vital Signs:   Temp:  [97.6 °F (36.4 °C)-98.4 °F (36.9 °C)] 97.7 °F (36.5 °C)  Heart Rate:  [71-76] 71  Resp:  [16-18] 16  BP: (118-145)/() 138/85     Physical Exam:  Constitutional: No acute distress, awake, alert; chronically  ill appearing; obese    HENT: NCAT, mucous membranes moist  Respiratory: Clear to auscultation bilaterally, respiratory effort normal   Cardiovascular: RRR, no murmurs, rubs, or gallops  Gastrointestinal: Positive bowel sounds, soft, nontender, nondistended  Musculoskeletal: +2 lower ext edema   Psychiatric: Appropriate affect, cooperative  Neurologic: Oriented x 3, strength symmetric in all extremities, Cranial Nerves grossly intact to confrontation, speech clear  Skin: No rashes    Pertinent  and/or Most Recent Results     LAB RESULTS:      Lab 08/14/21  0423 08/13/21  0615 08/12/21 2225   WBC 5.85 6.41 8.15   HEMOGLOBIN 10.9* 10.5* 11.0*   HEMATOCRIT 34.6* 32.6* 34.1*   PLATELETS 130* 134* 147   NEUTROS ABS 4.00  --  6.79   IMMATURE GRANS (ABS) 0.17*  --  0.17*   LYMPHS ABS 0.98  --  0.70   MONOS ABS 0.51  --  0.43   EOS ABS 0.13  --  0.03   MCV 93.0 91.6 91.4   SED RATE  --   --  65*   PROTIME 14.9*  --   --          Lab 08/17/21  0521 08/15/21  0415 08/14/21  0422 08/13/21  0615 08/12/21  2225 08/11/21  1306 08/11/21  1306   SODIUM 132* 133* 135* 136 134*   < > 135*   POTASSIUM 3.6 3.4* 3.7 3.2* 3.2*   < > 3.2*   CHLORIDE 95* 95* 95* 96* 93*   < > 93*   CO2 27.0 27.0 28.0 28.0 28.0   < > 26.6   ANION GAP 10.0 11.0 12.0 12.0 13.0   < > 15.4*   BUN 45* 57* 60* 61* 59*   < > 54*   CREATININE 1.03 1.13 1.37* 1.35* 1.36*   < > 1.29*   GLUCOSE 133* 125* 155* 106* 68   < > 97   CALCIUM 9.5 9.5 9.4 9.0 9.1   < > 9.8   MAGNESIUM 2.6* 2.7*  --   --  2.4  --  2.2   HEMOGLOBIN A1C  --   --   --  6.60*  --   --   --    TSH  --   --   --   --  1.640  --  2.200    < > = values in this interval not displayed.         Lab 08/14/21 0422 08/12/21  2225   TOTAL PROTEIN 7.0 6.8   ALBUMIN 3.60 3.50   GLOBULIN 3.4 3.3   ALT (SGPT) 20 23   AST (SGOT) 29 39   BILIRUBIN 0.8 0.6   ALK PHOS 122* 123*         Lab 08/14/21  0423 08/13/21  0615 08/13/21  0050 08/12/21  2225   TROPONIN T  --  0.057* 0.063* 0.064*   PROTIME 14.9*  --   --    --    INR 1.21*  --   --   --                  Brief Urine Lab Results  (Last result in the past 365 days)      Color   Clarity   Blood   Leuk Est   Nitrite   Protein   CREAT   Urine HCG        08/13/21 0718 Yellow Clear Negative Negative Negative Negative             Microbiology Results (last 10 days)     Procedure Component Value - Date/Time    COVID PRE-OP / PRE-PROCEDURE SCREENING ORDER (NO ISOLATION) - Swab, Nasopharynx [[id number]]  (Normal) Collected: 08/15/21 0807    Lab Status: Final result Specimen: Swab from Nasopharynx Updated: 08/15/21 0901    Narrative:      The following orders were created for panel order COVID PRE-OP / PRE-PROCEDURE SCREENING ORDER (NO ISOLATION) - Swab, Nasopharynx.  Procedure                               Abnormality         Status                     ---------                               -----------         ------                     COVID-19, ABBOTT IN-HOUS...[[id number]]  Normal              Final result                 Please view results for these tests on the individual orders.    COVID-19, ABBOTT IN-HOUSE,NASAL Swab (NO TRANSPORT MEDIA) 2 HR TAT - Swab, Nasopharynx [[id number]]  (Normal) Collected: 08/15/21 0807    Lab Status: Final result Specimen: Swab from Nasopharynx Updated: 08/15/21 0901     COVID19 Presumptive Negative    Narrative:      Fact sheet for providers: https://www.fda.gov/media/370645/download     Fact sheet for patients: https://www.fda.gov/media/542227/download    Test performed by PCR.  If inconsistent with clinical signs and symptoms patient should be tested with different authorized molecular test.          CT Abdomen Pelvis With & Without Contrast    Result Date: 8/16/2021  EXAMINATION: CT ABDOMEN PELVIS WWO CONTRAST - 08/14/2021  INDICATION: Hepatocellular carcinoma  TECHNIQUE: CT abdomen and pelvis with and without intervenous contrast administration  The radiation dose reduction device was turned on for each scan per the ALARA (As Low as  Reasonably Achievable) protocol.  COMPARISON: CT dated 01/05/2019  FINDINGS: Lung bases demonstrate trace left pleural effusion. Liver has nodular contours and heterogeneous signal of cirrhotic hepatic morphology without abnormal arterially enhancing focus to suggest hepatocellular carcinoma multiphase imaging. Gallbladder surgically absent. Pancreas and spleen reveals mild prominence of the spleen with borderline splenomegaly with perigastric varices identified on the lesser curvature series 5 images 21 through 26 without significant involvement of a recanalized umbilical vein or ventral abdominal wall caput medusa varices formation with trace right perihepatic ascites. Visualized bowel unremarkable with minimal mesenteric edema and trace ascites however no loculated collection. Pelvic viscera unremarkable. Degenerative changes of the spine and pelvis without aggressive osseous lesion. No soft tissue body wall findings of acuity other than mild to moderate diffuse body wall edema      Cirrhotic hepatic morphology with mild prominence of the spleen with borderline splenomegaly with perigastric varices identified on the lesser curvature series 5 images 21 through 26 without significant involvement of a recanalized umbilical vein or ventral abdominal wall caput medusa varices formation with trace right perihepatic ascites.  DICTATED:   08/14/2021 EDITED/ls :   08/15/2021   This report was finalized on 8/16/2021 3:00 PM by Dr. Dylon Tan.      XR Chest 1 View    Result Date: 8/12/2021  CR Chest 1 Vw INDICATION: Weakness. Dizziness. COMPARISON:  7/29/2021 FINDINGS: 2 portable AP view(s) of the chest.  The heart and mediastinal contours are normal. The lungs are clear. No pneumothorax or pleural effusion.     No acute cardiopulmonary findings. Signer Name: Mathew Mcdaniel MD  Signed: 8/12/2021 11:07 PM  Workstation [facility]: [facility]  [facility]    EMG & Nerve Conduction Test Request    Result  Date: 8/13/2021  Indication: Generalized weakness, peripheral neuropathy, myopathy Clinical: 66 y.o.male with a history of diabetes, liver dysfunction due to Melo, chronic lymphedema.  He has had recent difficulties with significant hypokalemia.  He has been having increasing weakness in both legs as well as tremors in his hands with a tendency to drop things.  He denies numbness or paresthesias.  Peripheral neuropathy, myopathy are considerations.      NCS/EMG TECHNICAL DATA: All studies are performed at a skin temperature of 34°C or greater. Distal sensory latencies are calculated to waveform peak.  Sensory amplitudes are measured peak to peak Distal motor latencies are calculated to waveform onset.  Motor amplitudes are calculated baseline to peak Nerve Conduction Studies Ortho Sensory Summary Table  Stim Site NR Peak (ms) Norm Peak (ms) P-T Amp (µV) Norm P-T Amp Site1 Site2 Delta-P (ms) Dist (cm) Devante (m/s) Norm Devante (m/s) Left Median Ortho Sensory (Wrist) 2nd Digit    4.0  11.1  2nd Digit Wrist 4.0 8.0 20  Palm    4.0  14.8  Palm Wrist 4.0 0.0   Left Ulnar Ortho Sensory (Wrist) 5th Digit    3.8  4.6  5th Digit Wrist 3.8 8.0 21  Palm    3.8  8.0  Palm Wrist 3.8 0.0   Motor Summary Table  Stim Site NR Onset (ms) Norm Onset (ms) O-P Amp (mV) Norm O-P Amp Site1 Site2 Delta-0 (ms) Dist (cm) Devante (m/s) Norm Devante (m/s) Left Fibular Motor (Ext Dig Brev) Ankle NR  <6.1  >2.5 B Fib Ankle  0.0  >40 B Fib NR     Poplt B Fib  0.0  >40 Poplt NR           Left Median Motor (Abd Poll Brev) Wrist    5.1 <4.2 6.2 >5 Elbow Wrist 6.2 27.0 44 >50 Elbow    11.3  4.5        Left Tibial Motor (Abd Doherty Brev) Ankle NR  <6.1  >3.0 Knee Ankle  0.0  >40 Knee NR           Left Ulnar Motor (Abd Dig Minimi) Wrist    5.3 <4.2 5.1 >3 B Elbow Wrist 6.3 27.0 43 >53 B Elbow    11.6  2.0  A Elbow B Elbow 1.4 10.0 71 >53 A Elbow    13.0  5.0        F Wave Studies  NR F-Lat (ms) Lat Norm (ms) L-R F-Lat (ms) L-R Lat Norm M-Lat (ms) FLat-MLat (ms) Left  Fibular (Mrkrs) (EDB)    64.67 <60  <5.1 5.33 59.34 Left Median (Mrkrs) (Abd Poll Brev)    38.64 <33  <2.2 6.17 32.47 Left Tibial (Mrkrs) (Abd Hallucis)    63.33 <61  <5.7 6.00 57.33 Left Ulnar (Mrkrs) (Abd Dig Min)    26.50 <36  <2.5 6.50 20.00 H Reflex Studies  NR H-Lat (ms) L-R H-Lat (ms) L-R Lat Norm Left Tibial (Mrkrs) (Gastroc) NR   <2.0 EMG  Side Muscle Nerve Root Ins Act Fibs Psw Amp Dur Poly Recrt Int Pat Comment Left Abd Poll Brev Median C8-T1 Nml Nml Nml Nml Nml 0 Nml Nml  Left 1stDorInt Ulnar C8-T1 Nml Nml Nml Nml Nml 0 Nml Nml  Left Biceps Musculocut C5-6 Nml Nml Nml Nml Nml 0 Nml Nml  Left Triceps Radial C6-7-8 Nml Nml Nml Nml Nml 0 Nml Nml  Left Deltoid Axillary C5-6 Nml Nml Nml Nml Nml 0 Nml Nml  Left AntTibialis Dp Br Fibular L4-5 Nml Nml Nml Nml Nml 0 Nml Nml  Left Fibularis Long Sup Br Fibular L5-S1 Nml Nml Nml Nml Nml 0 Nml Nml  Left Gastroc Tibial S1-2 Nml Nml Nml Nml Nml 0 Nml Nml  Left VastusLat Femoral L2-4 Nml Nml Nml Nml Nml 0 Nml Nml  Left BicepsFemS Sciatic L5-S1 Nml Nml Nml Nml Nml 0 Nml Nml  Left L4 Parasp Rami L4 Nml Nml Nml Nml Nml 0 Nml Nml  Left L5 Parasp Rami L5 Nml Nml Nml Nml Nml 0 Nml Nml  Left S1 Parasp Rami S1 Nml Nml Nml Nml Nml 0 Nml Nml  Waveforms:                   FINDINGS: Nerve Conduction Studies: Following stimulation of the left peroneal motor nerve, no response is seen from the EDB Following stimulation of the left posterior tibial motor nerve, no response is seen Motor F waves are absent in the left peroneal and posterior tibial motor nerves H reflex on the left is absent Many of the above findings may be technical due to substantial edema in the leg Median sensory latency on the left is prolonged at 4.0 ms Comparison ulnar sensory latency on the left is prolonged at 3.8 ms Distal latency of the left median motor nerve is prolonged at 5.1 ms, amplitude is preserved and velocity is slowed, and waveform is mildly dispersed Distal latency of the left ulnar motor nerve  is prolonged at 5.3 ms, amplitude is preserved and velocity is slowed between elbow and wrist, and waveforms are mildly dispersed Left median F waves are prolonged and left ulnar motor F waves are relatively normal The findings in the arm suggest a generalized sensorimotor neuropathy at least moderate in degree Electromyogram: Needle examination of multiple muscles in the left arm and cervical paraspinous muscles are normal Needle examination of the left leg and lumbar paraspinous muscles is unremarkable     Generalized sensorimotor neuropathy, 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       Results for orders placed during the hospital encounter of 07/29/21    Duplex Venous Lower Extremity - Bilateral    Interpretation Summary  · Normal bilateral lower extremity venous duplex scan.      Results for orders placed during the hospital encounter of 07/29/21    Duplex Venous Lower Extremity - Bilateral    Interpretation Summary  · Normal bilateral lower extremity venous duplex scan.      Results for orders placed during the hospital encounter of 01/04/19    Adult Transthoracic Echo Complete W/ Cont if Necessary Per Protocol    Interpretation Summary  · Left ventricular systolic function is hyperdynamic (EF > 70).      Plan for Follow-up of Pending Labs/Results:   Pending Labs     Order Current Status    Hepatitis B E Antibody In process        Discharge Details        Discharge Medications      Changes to Medications      Instructions Start Date   bumetanide 2 MG tablet  Commonly known as: BUMEX  What changed: when to take this   4 mg, Oral, Daily      metOLazone 5 MG tablet  Commonly known as: ZAROXOLYN  What changed:   · how much to take  · when to take this   10 mg, Oral, Daily PRN      pramipexole 0.75 MG tablet  Commonly known as: MIRAPEX  What changed:   · medication strength  · how much to take   0.75 mg, Oral, 2 Times Daily      spironolactone 100  MG tablet  Commonly known as: ALDACTONE  What changed: how much to take   50 mg, Oral, Daily         Continue These Medications      Instructions Start Date   amphetamine-dextroamphetamine 15 MG tablet  Commonly known as: ADDERALL   30 mg, Oral, Daily      amphetamine-dextroamphetamine 30 MG tablet  Commonly known as: ADDERALL   60 mg, Oral, Every Morning      aspirin 81 MG EC tablet   TAKE 1 TABLET BY MOUTH EVERY DAY      Contour Next Monitor w/Device kit   1 kit, Does not apply, Every 3 Months, Use to check bg      Dexcom G6 Sensor   Does not apply, Every 10 Days      Dexcom G6 Transmitter misc   1 kit, Does not apply, Every 3 Months, Use to check blood sugar      erythromycin 5 MG/GM ophthalmic ointment  Commonly known as: ROMYCIN   No dose, route, or frequency recorded.      gabapentin 300 MG capsule  Commonly known as: NEURONTIN   TAKE THREE CAPSULES BY MOUTH TWICE A DAY      levocetirizine 5 MG tablet  Commonly known as: XYZAL   5 mg, Oral, Every Evening      levothyroxine 75 MCG tablet  Commonly known as: SYNTHROID, LEVOTHROID   TAKE ONE TABLET BY MOUTH DAILY      loratadine 10 MG tablet  Commonly known as: CLARITIN   10 mg, Oral, Daily, Patient instructed to d/c Xyzal while taking      montelukast 10 MG tablet  Commonly known as: Singulair   10 mg, Oral, Nightly      nystatin 411005 UNIT/GM ointment  Commonly known as: MYCOSTATIN   1 application, Topical, As Needed      ofloxacin 0.3 % ophthalmic solution  Commonly known as: OCUFLOX   No dose, route, or frequency recorded.      olopatadine 0.1 % ophthalmic solution  Commonly known as: PATANOL   No dose, route, or frequency recorded.      OmniPod Dash 5 Pack Pods misc   CHANGE POD EVERY 3 DAYS      OXcarbazepine 300 MG tablet  Commonly known as: TRILEPTAL   300 mg, Oral, 2 Times Daily      pravastatin 40 MG tablet  Commonly known as: PRAVACHOL   40 mg, Oral, Every Night at Bedtime      tobramycin-dexamethasone 0.3-0.1 % ophthalmic suspension  Commonly known  as: TOBRADEX   No dose, route, or frequency recorded.      traZODone 50 MG tablet  Commonly known as: DESYREL   50 mg, Oral, Nightly PRN      triamcinolone 0.1 % cream  Commonly known as: KENALOG   Topical, 2 Times Daily PRN      venlafaxine  MG 24 hr capsule  Commonly known as: EFFEXOR-XR   150 mg, Oral, Nightly         Stop These Medications    potassium chloride 10 MEQ CR tablet            No Known Allergies      Discharge Disposition:  Home or Self Care    Diet:  Hospital:  Diet Order   Procedures   • Diet Regular; Consistent Carbohydrate       Activity:  Activity Instructions     Activity as Tolerated            Restrictions or Other Recommendations:         CODE STATUS:    Code Status and Medical Interventions:   Ordered at: 08/13/21 0114     Level Of Support Discussed With:    Patient     Code Status:    CPR     Medical Interventions (Level of Support Prior to Arrest):    Full       Future Appointments   Date Time Provider Department Center   8/23/2021 10:00 AM Anneliese Caban LCSW [facility]   8/24/2021  1:30 PM Anibal Maria MD [facility]   9/7/2021 10:00 AM Anneliese Caban LCSW [facility]   9/29/2021  2:45 PM Ld Orozco MD [facility]   2/4/2022  1:30 PM Sushil Ahumada MD [facility]   2/7/2022  2:30 PM Delta Miller MD [facility]       Additional Instructions for the Follow-ups that You Need to Schedule     Discharge Follow-up with PCP   As directed       Currently Documented PCP:    Anibal Maria MD    PCP Phone Number:    [phone]     Follow Up Details: PCP Dr. Maria 1 week after DC from rehab         Discharge Follow-up with Specified Provider: [facility] 6 months   As directed      To: [facility] 6 months         Discharge Follow-up with Specified Provider: [facility] surgery next available   As directed      To: [facility] next available                     Allie Delgado DO  08/18/21      Time Spent on Discharge:  I spent   35  minutes on this discharge activity which included: face-to-face encounter with the patient, reviewing the data in the system, coordination of the care with the nursing staff as well as consultants, documentation, and entering orders.            Electronically signed by Allie Delgado DO at 08/18/21 1007

## 2021-08-18 NOTE — THERAPY TREATMENT NOTE
Patient Name: Abe Phillips Jr.  : 1955    MRN: [id number]                              Today's Date: 2021       Admit Date: 2021    Visit Dx:     ICD-10-CM ICD-9-CM   1. Generalized weakness  R53.1 780.79   2. Hypokalemia  E87.6 276.8   3. Hypoglycemia  E16.2 251.2   4. Anemia, unspecified type  D64.9 285.9   5. Bilateral lower extremity edema  R60.0 782.3   6. MCCAULEY (nonalcoholic steatohepatitis)  K75.81 571.8     Patient Active Problem List   Diagnosis   • Type 2 diabetes mellitus with hyperglycemia (CMS/HCC)   • Thrombocytopenia (CMS/HCC)   • PVD (peripheral vascular disease) (CMS/HCC)   • A-fib (CMS/HCC)   • Cirrhosis of liver (CMS/HCC)   • Chronic congestive heart failure (CMS/HCC)   • Diabetic ulcer of right heel (CMS/HCC)   • Primary narcolepsy without cataplexy   • Essential hypertension   • Morbidly obese (CMS/HCC)   • Restless legs syndrome (RLS)   • ADD (attention deficit disorder)   • Depression   • Diabetes mellitus type 2, uncontrolled, with complications (CMS/HCC)   • Edema   • MCCAULEY (nonalcoholic steatohepatitis)   • Hepatitis B   • Insomnia   • Lymphedema   • Obesity   • Severe JAIME on CPAP   • RLS (restless legs syndrome)   • Tremor of both hands   • Restrictive cardiomyopathy (CMS/HCC)   • Other hyperlipidemia   • Uncontrolled type 2 diabetes mellitus with hyperglycemia (CMS/HCC)   • OMAR (acute kidney injury) (CMS/HCC)   • Dyslipidemia   • Hyperglycemia   • Diabetic ulcer of right heel associated with type 2 diabetes mellitus (CMS/HCC)   • Facial paralysis/[place] palsy   • Diabetic hypoglycemia (CMS/HCC)   • Hypogonadism in male   • Atypical facial pain   • Bilateral leg weakness   • Hypoglycemia   • Elevated troponin   • Hypokalemia   • Anemia     Past Medical History:   Diagnosis Date   • A-fib (CMS/HCC)    • ADD (attention deficit disorder)    • Atrial flutter (CMS/HCC)    • Bell's palsy    • Cellulitis    • CHF (congestive heart failure) (CMS/HCC)    • Cirrhosis of liver (CMS/HCC)     • Constipation    • Depression    • Diabetes mellitus (CMS/HCC)    • Diabetic ulcer of right foot (CMS/HCC)    • Disease of thyroid gland    • Edema    • Fatty liver    • Hepatitis B    • Hyperlipidemia    • Hypokalemia    • Insomnia    • Lymphedema    • Narcolepsy    • Neuropathy    • Obesity    • JAIME on CPAP    • PVD (peripheral vascular disease) (CMS/HCC)    • RLS (restless legs syndrome)    • Skin cancer    • Tardive dyskinesia    • Tremor of both hands    • Type 2 diabetes mellitus (CMS/HCC)    • Yeast infection      Past Surgical History:   Procedure Laterality Date   • ACHILLES TENDON REPAIR     • CARDIAC ABLATION     • CHOLECYSTECTOMY     • ENDOSCOPY N/A 8/16/2021    Procedure: ESOPHAGOGASTRODUODENOSCOPY;  Surgeon: Brunner, Mark I, MD;  Location: [facility] ENDOSCOPY;  Service: Gastroenterology;  Laterality: N/A;   • LASER ABLATION      VEIN RLE     General Information     Row Name 08/18/21 0740          OT Time and Intention    Document Type  therapy note (daily note)  -     Mode of Treatment  occupational therapy  -     Row Name 08/18/21 0740          General Information    Patient Profile Reviewed  yes  -     Existing Precautions/Restrictions  fall  -     Row Name 08/18/21 0740          Cognition    Orientation Status (Cognition)  oriented to;person;place  -     Row Name 08/18/21 0740          Safety Issues, Functional Mobility    Safety Issues Affecting Function (Mobility)  safety precaution awareness;safety precautions follow-through/compliance  -     Impairments Affecting Function (Mobility)  balance;endurance/activity tolerance;strength  -       User Key  (r) = Recorded By, (t) = Taken By, (c) = Cosigned By    Initials Name Provider Type    AC Kayleigh Sam OT Occupational Therapist        Lymphedema     Row Name 08/17/21 0800             Lymphedema Edema Assessment    Ptting Edema Category  By severity  -MF      Pitting Edema  Mild;Moderate  -MF      Recorded by [MF] Delta Bhandari  R, PT              Compression/Skin Care    Compression/Skin Care  skin care;wrapping location;bandaging  -      Skin Care  washed/dried;lotion applied  -      Wrapping Location  lower extremity  -MF      Wrapping Location LE  bilateral:;foot to knee  -      Wrapping Comments  size 4/6 compressogrip doubled and overlapping for gradient light compression.   -MF      Recorded by [MF] Delta Bhandari, PT        User Key  (r) = Recorded By, (t) = Taken By, (c) = Cosigned By    Initials Name Effective Dates     Delta Bhandari, PT 06/16/21 -         Mobility/ADL's     Row Name 08/18/21 0740          Bed Mobility    Bed Mobility  supine-sit  -AC     Supine-Sit [place] (Bed Mobility)  standby assist  -     Assistive Device (Bed Mobility)  bed rails;head of bed elevated  -     Row Name 08/18/21 0740          Transfers    Transfers  sit-stand transfer  -     Comment (Transfers)  increased time and effort STS  -     Sit-Stand [place] (Transfers)  verbal cues;contact guard  -     Row Name 08/18/21 0740          Sit-Stand Transfer    Assistive Device (Sit-Stand Transfers)  walker, front-wheeled  -     Row Name 08/18/21 0740          Functional Mobility    Functional Mobility- Ind. Level  contact guard assist  -     Functional Mobility- Device  rolling walker  -     Functional Mobility-Distance (Feet)  30  -AC     Row Name 08/18/21 0740          Activities of Daily Living    BADL Assessment/Intervention  other (see comments) pt declined all self care  -     Row Name 08/18/21 0740          Lower Body Dressing Assessment/Training    [place] Level (Lower Body Dressing)  don;socks;dependent (less than 25% patient effort)  -     Position (Lower Body Dressing)  supine  -     Row Name 08/18/21 0740          Grooming Assessment/Training    Comment (Grooming)  declined  -       User Key  (r) = Recorded By, (t) = Taken By, (c) = Cosigned By    Initials Name Provider Type    ABRAHAM Sam  Kayleigh FINNEY, ARRON Occupational Therapist        Obj/Interventions     Row Name 08/18/21 0740          Shoulder (Therapeutic Exercise)    Shoulder (Therapeutic Exercise)  AROM (active range of motion)  -     Shoulder AROM (Therapeutic Exercise)  bilateral;flexion;extension;horizontal aBduction/aDduction 15 reps  -     Row Name 08/18/21 0740          Elbow/Forearm (Therapeutic Exercise)    Elbow/Forearm (Therapeutic Exercise)  AROM (active range of motion)  -     Elbow/Forearm AROM (Therapeutic Exercise)  bilateral;flexion;extension;other (see comments) 15 reps  -     Row Name 08/18/21 07          Therapeutic Exercise    Therapeutic Exercise  shoulder;elbow/forearm  -       User Key  (r) = Recorded By, (t) = Taken By, (c) = Cosigned By    Initials Name Provider Type    Kayleigh Blake, OT Occupational Therapist        Goals/Plan    No documentation.       Clinical Impression     [facility] Name 08/18/21 0740          Pain Scale: Numbers Pre/Post-Treatment    Pretreatment Pain Rating  0/10 - no pain  -     Posttreatment Pain Rating  0/10 - no pain  -[facility] Name 08/18/21 [facility]          Plan of Care Review    Outcome Summary  Pt declined grooming tasks, dependent to don socks.  Pt SBA supine to sit,  CGA STS,  CGA to ambulate 30 ft with RW.  Pt completed 15 reps BUE given VCs and 2 restbreaks. Pt is progressing, but limited by weakness and decreased activity tolerance.  Continue OT POC.  Recommend IP rehab upon d/c.  -     Row Name 08/18/21 0740          Therapy Plan Review/Discharge Plan (OT)    Anticipated Discharge Disposition (OT)  inpatient rehabilitation facility  -[facility] Name 08/18/21 0767          Vital Signs    Pre Systolic BP Rehab  138  -AC     Pre Treatment Diastolic BP  85  -AC     Pretreatment Heart Rate (beats/min)  76  -AC     Posttreatment Heart Rate (beats/min)  79  -AC     O2 Delivery Pre Treatment  room air  -AC     O2 Delivery Intra Treatment  room air  -AC     O2 Delivery Post Treatment   room air  -AC     Pre Patient Position  Supine  -AC     Intra Patient Position  Standing  -AC     Post Patient Position  Sitting  -AC     Row Name 08/18/21 0740          Positioning and Restraints    Pre-Treatment Position  in bed  -AC     Post Treatment Position  chair  -AC     In Chair  reclined;call light within reach;encouraged to call for assist;exit alarm on;heels elevated  -AC       User Key  (r) = Recorded By, (t) = Taken By, (c) = Cosigned By    Initials Name Provider Type    Kayleigh Blake, OT Occupational Therapist        Outcome Measures     Row Name 08/18/21 0740          How much help from another is currently needed...    Putting on and taking off regular lower body clothing?  1  -AC     Bathing (including washing, rinsing, and drying)  3  -AC     Toileting (which includes using toilet bed pan or urinal)  3  -AC     Putting on and taking off regular upper body clothing  4  -AC     Taking care of personal grooming (such as brushing teeth)  4  -AC     Eating meals  4  -AC     AM-PAC 6 Clicks Score (OT)  19  -AC     Row Name 08/18/21 0740          Functional Assessment    Outcome Measure Options  AM-PAC 6 Clicks Daily Activity (OT)  -       User Key  (r) = Recorded By, (t) = Taken By, (c) = Cosigned By    Initials Name Provider Type    Kayleigh Blake, OT Occupational Therapist          Occupational Therapy Education                 Title: PT OT SLP Therapies (In Progress)     Topic: Occupational Therapy (In Progress)     Point: ADL training (Done)     Description:   Instruct learner(s) on proper safety adaptation and remediation techniques during self care or transfers.   Instruct in proper use of assistive devices.              Learning Progress Summary           Patient Acceptance, E, VU by ABRAHAM at 8/18/2021 0740    Acceptance, E, VU by NISHANT at 8/13/2021 1518                   Point: Home exercise program (Not Started)     Description:   Instruct learner(s) on appropriate technique for monitoring,  assisting and/or progressing therapeutic exercises/activities.              Learner Progress:  Not documented in this visit.          Point: Precautions (Done)     Description:   Instruct learner(s) on prescribed precautions during self-care and functional transfers.              Learning Progress Summary           Patient Acceptance, GIOVANNI, VU by  at 8/13/2021 1518                   Point: Body mechanics (Not Started)     Description:   Instruct learner(s) on proper positioning and spine alignment during self-care, functional mobility activities and/or exercises.              Learner Progress:  Not documented in this visit.                      User Key     Initials Effective Dates Name Provider Type Discipline     06/16/21 -  Kayleigh Sam, OT Occupational Therapist OT    NISHANT 06/16/21 -  Mari Mehta OT Occupational Therapist OT              OT Recommendation and Plan     Plan of Care Review  Outcome Summary: Pt declined grooming tasks, dependent to don socks.  Pt SBA supine to sit,  CGA STS,  CGA to ambulate 30 ft with RW.  Pt completed 15 reps BUE given VCs and 2 restbreaks. Pt is progressing, but limited by weakness and decreased activity tolerance.  Continue OT POC.  Recommend IP rehab upon d/c.     Time Calculation:   Time Calculation- OT     Row Name 08/18/21 0740             Time Calculation- OT    OT Start Time  0740  -AC      OT Received On  08/18/21  -      OT Goal Re-Cert Due Date  08/23/21  -         Timed Charges    86774 - OT Therapeutic Exercise Minutes  8  -AC      08441 - OT Therapeutic Activity Minutes  15  -AC         Total Minutes    Timed Charges Total Minutes  23  -AC       Total Minutes  23  -AC        User Key  (r) = Recorded By, (t) = Taken By, (c) = Cosigned By    Initials Name Provider Type    AC Kayleigh Sam, OT Occupational Therapist        Therapy Charges for Today     Code Description Service Date Service Provider Modifiers Qty    [id number]  OT THER PROC EA 15 MIN 8/18/2021  Kayleigh Sma, OT GO 1    [id number]  OT THERAPEUTIC ACT EA 15 MIN 8/18/2021 Kayleigh Sam, OT GO 1               Kayleigh FINNEY. ARRON Sam  8/18/2021

## 2021-08-18 NOTE — DISCHARGE SUMMARY
[facility] Medicine Services  DISCHARGE SUMMARY    Patient Name: Abe Phillips Jr.  : 1955  MRN: [id number]    Date of Admission: 2021 10:34 PM  Date of Discharge:  21  Primary Care Physician: Anibal Maria MD    Consults     Date and Time Order Name Status Description    2021  2:24 PM Inpatient Gastroenterology Consult Completed     2021  1:26 PM Inpatient Neurology Consult General Completed     2021 10:49 PM Inpatient Cardiology Consult Completed           Hospital Course     Presenting Problem:   Bilateral leg weakness [R29.898]    Active Hospital Problems    Diagnosis  POA   • **Bilateral leg weakness [R29.898]  Yes   • Anemia [D64.9]  Yes   • Uncontrolled type 2 diabetes mellitus with hyperglycemia (CMS/HCC) [E11.65]  Yes   • Other hyperlipidemia [E78.49]  Yes   • Severe JAIME on CPAP [G47.33, Z99.89]  Not Applicable   • ADD (attention deficit disorder) [F98.8]  Yes   • Obesity [E66.9]  Yes   • MCCAULEY (nonalcoholic steatohepatitis) [K75.81]  Yes   • Essential hypertension [I10]  Yes   • Restless legs syndrome (RLS) [G25.81]  Yes   • PVD (peripheral vascular disease) (CMS/HCC) [I73.9]  Yes   • Cirrhosis of liver (CMS/HCC) [K74.60]  Yes   • Chronic congestive heart failure (CMS/HCC) [I50.9]  Yes   • A-fib (CMS/HCC) [I48.91]  Yes      Resolved Hospital Problems    Diagnosis Date Resolved POA   • Hypoglycemia [E16.2] 2021 Yes   • Elevated troponin [R77.8] 2021 Yes   • Hypokalemia [E87.6] 2021 Yes   • OMAR (acute kidney injury) (CMS/HCC) [N17.9] 2021 Yes          Hospital Course:  Abe Phillips Jr. is a 66 y.o. male with obesity, Mccauley cirrhosis, narcolepsy, diastolic CHF, other comorbidities who presents to the hospital with 1 week of generalized weakness, worse in the legs preventing effective mobility; PT/OT following and recommending inpatient rehab, CM following; GI consulted and following for evaluation of candidacy for future  bariatric surgery     Assessment/plan     Acute generalized weakness  Pronounced bilateral leg weakness  -Electrolytes replaced  -EMG with generalized sensorimotor neuropathy  -Neurology consulted -- felt to be related to inactivity and hypokalemia   -PT/OT, rehab recs      Hx MCCAULEY cirrhosis  -Abdominal CT liver protocol without clearly defined mass  -GI following, s/p EGD 8/16/2021 with portal gastropathy and lower esophageal varices; per GI high risk for bariatric surgery but not entirely excluded, recommended  bariatric surgery referral and additionally hepatology  - Hep A vaccination started; repeat in 6 months    - Follow up with [facility]ariatric and hepatology      Diabetes mellitus, A1c 6.6%, with long-term use of insulin (insulin pump), with hyperglycemia  -Patient controlling BG with his insulin pump     CKD stage 3  -Baseline creatinine 1.1-1.3; EGFR 40s-50s  -Cr stable during admission;  recommend repeating BMP in 3-5 days      Chronic diastolic CHF, compensated  Chronic lymphedema  -Wound care for pressure wrapping  - Restart bumex 2 mg daily (2 mg BID at home) and home aldactone  - Recommend repeat BMP in 3-5 days     Narcolepsy  -Patient supplied Adderall     JAIME  -CPAP     Obesity  -BMI 40.62 kg/M2    Discharge Follow Up Recommendations for outpatient labs/diagnostics:  PCP Dr. Maria 1 week after DC from rehab    hepatology and  bariatric [facility]     Day of Discharge     HPI:   No acute events. States he is feeling ok today. Denies CP or SOA. Reviewed plans for discharge to rehab and he is agreeable. Answered all questions to the best of my ability.     Review of Systems  Gen- No fevers, chills  CV- No chest pain, palpitations  Resp- No cough, dyspnea  GI- No N/V/D, abd pain    Vital Signs:   Temp:  [97.6 °F (36.4 °C)-98.4 °F (36.9 °C)] 97.7 °F (36.5 °C)  Heart Rate:  [71-76] 71  Resp:  [16-18] 16  BP: (118-145)/() 138/85     Physical Exam:  Constitutional: No acute distress, awake, alert;  chronically ill appearing; obese    HENT: NCAT, mucous membranes moist  Respiratory: Clear to auscultation bilaterally, respiratory effort normal   Cardiovascular: RRR, no murmurs, rubs, or gallops  Gastrointestinal: Positive bowel sounds, soft, nontender, nondistended  Musculoskeletal: +2 lower ext edema   Psychiatric: Appropriate affect, cooperative  Neurologic: Oriented x 3, strength symmetric in all extremities, Cranial Nerves grossly intact to confrontation, speech clear  Skin: No rashes    Pertinent  and/or Most Recent Results     LAB RESULTS:      Lab 08/14/21  0423 08/13/21  0615 08/12/21 2225   WBC 5.85 6.41 8.15   HEMOGLOBIN 10.9* 10.5* 11.0*   HEMATOCRIT 34.6* 32.6* 34.1*   PLATELETS 130* 134* 147   NEUTROS ABS 4.00  --  6.79   IMMATURE GRANS (ABS) 0.17*  --  0.17*   LYMPHS ABS 0.98  --  0.70   MONOS ABS 0.51  --  0.43   EOS ABS 0.13  --  0.03   MCV 93.0 91.6 91.4   SED RATE  --   --  65*   PROTIME 14.9*  --   --          Lab 08/17/21  0521 08/15/21  0415 08/14/21  0422 08/13/21  0615 08/12/21  2225 08/11/21  1306 08/11/21  1306   SODIUM 132* 133* 135* 136 134*   < > 135*   POTASSIUM 3.6 3.4* 3.7 3.2* 3.2*   < > 3.2*   CHLORIDE 95* 95* 95* 96* 93*   < > 93*   CO2 27.0 27.0 28.0 28.0 28.0   < > 26.6   ANION GAP 10.0 11.0 12.0 12.0 13.0   < > 15.4*   BUN 45* 57* 60* 61* 59*   < > 54*   CREATININE 1.03 1.13 1.37* 1.35* 1.36*   < > 1.29*   GLUCOSE 133* 125* 155* 106* 68   < > 97   CALCIUM 9.5 9.5 9.4 9.0 9.1   < > 9.8   MAGNESIUM 2.6* 2.7*  --   --  2.4  --  2.2   HEMOGLOBIN A1C  --   --   --  6.60*  --   --   --    TSH  --   --   --   --  1.640  --  2.200    < > = values in this interval not displayed.         Lab 08/14/21  0422 08/12/21  2225   TOTAL PROTEIN 7.0 6.8   ALBUMIN 3.60 3.50   GLOBULIN 3.4 3.3   ALT (SGPT) 20 23   AST (SGOT) 29 39   BILIRUBIN 0.8 0.6   ALK PHOS 122* 123*         Lab 08/14/21  0423 08/13/21  0615 08/13/21  0050 08/12/21  2225   TROPONIN T  --  0.057* 0.063* 0.064*   PROTIME 14.9*   --   --   --    INR 1.21*  --   --   --                  Brief Urine Lab Results  (Last result in the past 365 days)      Color   Clarity   Blood   Leuk Est   Nitrite   Protein   CREAT   Urine HCG        08/13/21 0718 Yellow Clear Negative Negative Negative Negative             Microbiology Results (last 10 days)     Procedure Component Value - Date/Time    COVID PRE-OP / PRE-PROCEDURE SCREENING ORDER (NO ISOLATION) - Swab, Nasopharynx [[id number]]  (Normal) Collected: 08/15/21 0807    Lab Status: Final result Specimen: Swab from Nasopharynx Updated: 08/15/21 0901    Narrative:      The following orders were created for panel order COVID PRE-OP / PRE-PROCEDURE SCREENING ORDER (NO ISOLATION) - Swab, Nasopharynx.  Procedure                               Abnormality         Status                     ---------                               -----------         ------                     COVID-19, ABBOTT IN-HOUS...[[id number]]  Normal              Final result                 Please view results for these tests on the individual orders.    COVID-19, ABBOTT IN-HOUSE,NASAL Swab (NO TRANSPORT MEDIA) 2 HR TAT - Swab, Nasopharynx [[id number]]  (Normal) Collected: 08/15/21 0807    Lab Status: Final result Specimen: Swab from Nasopharynx Updated: 08/15/21 0901     COVID19 Presumptive Negative    Narrative:      Fact sheet for providers: https://www.fda.gov/media/945976/download     Fact sheet for patients: https://www.fda.gov/media/474770/download    Test performed by PCR.  If inconsistent with clinical signs and symptoms patient should be tested with different authorized molecular test.          CT Abdomen Pelvis With & Without Contrast    Result Date: 8/16/2021  EXAMINATION: CT ABDOMEN PELVIS WWO CONTRAST - 08/14/2021  INDICATION: Hepatocellular carcinoma  TECHNIQUE: CT abdomen and pelvis with and without intervenous contrast administration  The radiation dose reduction device was turned on for each scan per the ALARA (As Low  as Reasonably Achievable) protocol.  COMPARISON: CT dated 01/05/2019  FINDINGS: Lung bases demonstrate trace left pleural effusion. Liver has nodular contours and heterogeneous signal of cirrhotic hepatic morphology without abnormal arterially enhancing focus to suggest hepatocellular carcinoma multiphase imaging. Gallbladder surgically absent. Pancreas and spleen reveals mild prominence of the spleen with borderline splenomegaly with perigastric varices identified on the lesser curvature series 5 images 21 through 26 without significant involvement of a recanalized umbilical vein or ventral abdominal wall caput medusa varices formation with trace right perihepatic ascites. Visualized bowel unremarkable with minimal mesenteric edema and trace ascites however no loculated collection. Pelvic viscera unremarkable. Degenerative changes of the spine and pelvis without aggressive osseous lesion. No soft tissue body wall findings of acuity other than mild to moderate diffuse body wall edema      Cirrhotic hepatic morphology with mild prominence of the spleen with borderline splenomegaly with perigastric varices identified on the lesser curvature series 5 images 21 through 26 without significant involvement of a recanalized umbilical vein or ventral abdominal wall caput medusa varices formation with trace right perihepatic ascites.  DICTATED:   08/14/2021 EDITED/ls :   08/15/2021   This report was finalized on 8/16/2021 3:00 PM by Dr. Dylon Tan.      XR Chest 1 View    Result Date: 8/12/2021  CR Chest 1 Vw INDICATION: Weakness. Dizziness. COMPARISON:  7/29/2021 FINDINGS: 2 portable AP view(s) of the chest.  The heart and mediastinal contours are normal. The lungs are clear. No pneumothorax or pleural effusion.     No acute cardiopulmonary findings. Signer Name: Mathew Mcdaniel MD  Signed: 8/12/2021 11:07 PM  Workstation Name: [facility]  [facility]    EMG & Nerve Conduction Test Request    Result  Date: 8/13/2021  Indication: Generalized weakness, peripheral neuropathy, myopathy Clinical: 66 y.o.male with a history of diabetes, liver dysfunction due to Melo, chronic lymphedema.  He has had recent difficulties with significant hypokalemia.  He has been having increasing weakness in both legs as well as tremors in his hands with a tendency to drop things.  He denies numbness or paresthesias.  Peripheral neuropathy, myopathy are considerations.      NCS/EMG TECHNICAL DATA: All studies are performed at a skin temperature of 34°C or greater. Distal sensory latencies are calculated to waveform peak.  Sensory amplitudes are measured peak to peak Distal motor latencies are calculated to waveform onset.  Motor amplitudes are calculated baseline to peak Nerve Conduction Studies Ortho Sensory Summary Table  Stim Site NR Peak (ms) Norm Peak (ms) P-T Amp (µV) Norm P-T Amp Site1 Site2 Delta-P (ms) Dist (cm) Devante (m/s) Norm Devante (m/s) Left Median Ortho Sensory (Wrist) 2nd Digit    4.0  11.1  2nd Digit Wrist 4.0 8.0 20  Palm    4.0  14.8  Palm Wrist 4.0 0.0   Left Ulnar Ortho Sensory (Wrist) 5th Digit    3.8  4.6  5th Digit Wrist 3.8 8.0 21  Palm    3.8  8.0  Palm Wrist 3.8 0.0   Motor Summary Table  Stim Site NR Onset (ms) Norm Onset (ms) O-P Amp (mV) Norm O-P Amp Site1 Site2 Delta-0 (ms) Dist (cm) Devante (m/s) Norm Devante (m/s) Left Fibular Motor (Ext Dig Brev) Ankle NR  <6.1  >2.5 B Fib Ankle  0.0  >40 B Fib NR     Poplt B Fib  0.0  >40 Poplt NR           Left Median Motor (Abd Poll Brev) Wrist    5.1 <4.2 6.2 >5 Elbow Wrist 6.2 27.0 44 >50 Elbow    11.3  4.5        Left Tibial Motor (Abd Doherty Brev) Ankle NR  <6.1  >3.0 Knee Ankle  0.0  >40 Knee NR           Left Ulnar Motor (Abd Dig Minimi) Wrist    5.3 <4.2 5.1 >3 B Elbow Wrist 6.3 27.0 43 >53 B Elbow    11.6  2.0  A Elbow B Elbow 1.4 10.0 71 >53 A Elbow    13.0  5.0        F Wave Studies  NR F-Lat (ms) Lat Norm (ms) L-R F-Lat (ms) L-R Lat Norm M-Lat (ms) FLat-MLat (ms) Left  Fibular (Mrkrs) (EDB)    64.67 <60  <5.1 5.33 59.34 Left Median (Mrkrs) (Abd Poll Brev)    38.64 <33  <2.2 6.17 32.47 Left Tibial (Mrkrs) (Abd Hallucis)    63.33 <61  <5.7 6.00 57.33 Left Ulnar (Mrkrs) (Abd Dig Min)    26.50 <36  <2.5 6.50 20.00 H Reflex Studies  NR H-Lat (ms) L-R H-Lat (ms) L-R Lat Norm Left Tibial (Mrkrs) (Gastroc) NR   <2.0 EMG  Side Muscle Nerve Root Ins Act Fibs Psw Amp Dur Poly Recrt Int Pat Comment Left Abd Poll Brev Median C8-T1 Nml Nml Nml Nml Nml 0 Nml Nml  Left 1stDorInt Ulnar C8-T1 Nml Nml Nml Nml Nml 0 Nml Nml  Left Biceps Musculocut C5-6 Nml Nml Nml Nml Nml 0 Nml Nml  Left Triceps Radial C6-7-8 Nml Nml Nml Nml Nml 0 Nml Nml  Left Deltoid Axillary C5-6 Nml Nml Nml Nml Nml 0 Nml Nml  Left AntTibialis Dp Br Fibular L4-5 Nml Nml Nml Nml Nml 0 Nml Nml  Left Fibularis Long Sup Br Fibular L5-S1 Nml Nml Nml Nml Nml 0 Nml Nml  Left Gastroc Tibial S1-2 Nml Nml Nml Nml Nml 0 Nml Nml  Left VastusLat Femoral L2-4 Nml Nml Nml Nml Nml 0 Nml Nml  Left BicepsFemS Sciatic L5-S1 Nml Nml Nml Nml Nml 0 Nml Nml  Left L4 Parasp Rami L4 Nml Nml Nml Nml Nml 0 Nml Nml  Left L5 Parasp Rami L5 Nml Nml Nml Nml Nml 0 Nml Nml  Left S1 Parasp Rami S1 Nml Nml Nml Nml Nml 0 Nml Nml  Waveforms:                   FINDINGS: Nerve Conduction Studies: Following stimulation of the left peroneal motor nerve, no response is seen from the EDB Following stimulation of the left posterior tibial motor nerve, no response is seen Motor F waves are absent in the left peroneal and posterior tibial motor nerves H reflex on the left is absent Many of the above findings may be technical due to substantial edema in the leg Median sensory latency on the left is prolonged at 4.0 ms Comparison ulnar sensory latency on the left is prolonged at 3.8 ms Distal latency of the left median motor nerve is prolonged at 5.1 ms, amplitude is preserved and velocity is slowed, and waveform is mildly dispersed Distal latency of the left ulnar motor nerve  is prolonged at 5.3 ms, amplitude is preserved and velocity is slowed between elbow and wrist, and waveforms are mildly dispersed Left median F waves are prolonged and left ulnar motor F waves are relatively normal The findings in the arm suggest a generalized sensorimotor neuropathy at least moderate in degree Electromyogram: Needle examination of multiple muscles in the left arm and cervical paraspinous muscles are normal Needle examination of the left leg and lumbar paraspinous muscles is unremarkable     Generalized sensorimotor neuropathy, 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       Results for orders placed during the hospital encounter of 07/29/21    Duplex Venous Lower Extremity - Bilateral    Interpretation Summary  · Normal bilateral lower extremity venous duplex scan.      Results for orders placed during the hospital encounter of 07/29/21    Duplex Venous Lower Extremity - Bilateral    Interpretation Summary  · Normal bilateral lower extremity venous duplex scan.      Results for orders placed during the hospital encounter of 01/04/19    Adult Transthoracic Echo Complete W/ Cont if Necessary Per Protocol    Interpretation Summary  · Left ventricular systolic function is hyperdynamic (EF > 70).      Plan for Follow-up of Pending Labs/Results:   Pending Labs     Order Current Status    Hepatitis B E Antibody In process        Discharge Details        Discharge Medications      Changes to Medications      Instructions Start Date   bumetanide 2 MG tablet  Commonly known as: BUMEX  What changed: when to take this   4 mg, Oral, Daily      metOLazone 5 MG tablet  Commonly known as: ZAROXOLYN  What changed:   · how much to take  · when to take this   10 mg, Oral, Daily PRN      pramipexole 0.75 MG tablet  Commonly known as: MIRAPEX  What changed:   · medication strength  · how much to take   0.75 mg, Oral, 2 Times Daily      spironolactone 100  MG tablet  Commonly known as: ALDACTONE  What changed: how much to take   50 mg, Oral, Daily         Continue These Medications      Instructions Start Date   amphetamine-dextroamphetamine 15 MG tablet  Commonly known as: ADDERALL   30 mg, Oral, Daily      amphetamine-dextroamphetamine 30 MG tablet  Commonly known as: ADDERALL   60 mg, Oral, Every Morning      aspirin 81 MG EC tablet   TAKE 1 TABLET BY MOUTH EVERY DAY      Contour Next Monitor w/Device kit   1 kit, Does not apply, Every 3 Months, Use to check bg      Dexcom G6 Sensor   Does not apply, Every 10 Days      Dexcom G6 Transmitter misc   1 kit, Does not apply, Every 3 Months, Use to check blood sugar      erythromycin 5 MG/GM ophthalmic ointment  Commonly known as: ROMYCIN   No dose, route, or frequency recorded.      gabapentin 300 MG capsule  Commonly known as: NEURONTIN   TAKE THREE CAPSULES BY MOUTH TWICE A DAY      levocetirizine 5 MG tablet  Commonly known as: XYZAL   5 mg, Oral, Every Evening      levothyroxine 75 MCG tablet  Commonly known as: SYNTHROID, LEVOTHROID   TAKE ONE TABLET BY MOUTH DAILY      loratadine 10 MG tablet  Commonly known as: CLARITIN   10 mg, Oral, Daily, Patient instructed to d/c Xyzal while taking      montelukast 10 MG tablet  Commonly known as: Singulair   10 mg, Oral, Nightly      nystatin 661433 UNIT/GM ointment  Commonly known as: MYCOSTATIN   1 application, Topical, As Needed      ofloxacin 0.3 % ophthalmic solution  Commonly known as: OCUFLOX   No dose, route, or frequency recorded.      olopatadine 0.1 % ophthalmic solution  Commonly known as: PATANOL   No dose, route, or frequency recorded.      OmniPod Dash 5 Pack Pods misc   CHANGE POD EVERY 3 DAYS      OXcarbazepine 300 MG tablet  Commonly known as: TRILEPTAL   300 mg, Oral, 2 Times Daily      pravastatin 40 MG tablet  Commonly known as: PRAVACHOL   40 mg, Oral, Every Night at Bedtime      tobramycin-dexamethasone 0.3-0.1 % ophthalmic suspension  Commonly known  as: TOBRADEX   No dose, route, or frequency recorded.      traZODone 50 MG tablet  Commonly known as: DESYREL   50 mg, Oral, Nightly PRN      triamcinolone 0.1 % cream  Commonly known as: KENALOG   Topical, 2 Times Daily PRN      venlafaxine  MG 24 hr capsule  Commonly known as: EFFEXOR-XR   150 mg, Oral, Nightly         Stop These Medications    potassium chloride 10 MEQ CR tablet            No Known Allergies      Discharge Disposition:  Home or Self Care    Diet:  Hospital:  Diet Order   Procedures   • Diet Regular; Consistent Carbohydrate       Activity:  Activity Instructions     Activity as Tolerated            Restrictions or Other Recommendations:         CODE STATUS:    Code Status and Medical Interventions:   Ordered at: 08/13/21 0114     Level Of Support Discussed With:    Patient     Code Status:    CPR     Medical Interventions (Level of Support Prior to Arrest):    Full       Future Appointments   Date Time Provider Department Center   8/23/2021 10:00 AM Anneliese Caban LCSW [facility]   8/24/2021  1:30 PM Anibal Maria MD [facility]   9/7/2021 10:00 AM Anneliese Caban LCSW [facility]   9/29/2021  2:45 PM Ld Orozco MD [facility]   2/4/2022  1:30 PM Sushil Ahumada MD [facility]   2/7/2022  2:30 PM Delta Miller MD [facility]       Additional Instructions for the Follow-ups that You Need to Schedule     Discharge Follow-up with PCP   As directed       Currently Documented PCP:    Anibal Maria MD    PCP Phone Number:    [phone]     Follow Up Details: PCP Dr. Maria 1 week after DC from rehab         Discharge Follow-up with Specified Provider: [facility] 6 months   As directed      To: [facility] 6 months         Discharge Follow-up with Specified Provider: [facility] bariatric surgery next available   As directed      To: [facility] surgery next available                     Allie Delgado DO  08/18/21      Time Spent on Discharge:  I spent   35  minutes on this discharge activity which included: face-to-face encounter with the patient, reviewing the data in the system, coordination of the care with the nursing staff as well as consultants, documentation, and entering orders.

## 2021-09-02 NOTE — TELEPHONE ENCOUNTER
Helen from [facility] called and stated pt IS being discharged from [facility] tomorrow 9/3 and will need a hosp fu with , please advise.     demetra phone #[phone]

## 2021-09-04 NOTE — OUTREACH NOTE
Prep Survey      Responses   [facility] facility patient discharged from?  Non-[facility]   Is LACE score < 7 ?  Non-BH Discharge   Emergency Room discharge w/ pulse ox?  No   Eligibility  [facility]   Date of Admission  08/18/21   Date of Discharge  09/03/21   Discharge diagnosis  bilateral leg weakness   Does the patient have one of the following disease processes/diagnoses(primary or secondary)?  Other   Prep survey completed?  Yes          Yecenia Gomes RN

## 2021-09-05 PROBLEM — R79.89 ELEVATED TROPONIN: Status: ACTIVE | Noted: 2021-01-01

## 2021-09-05 PROBLEM — E87.1 HYPONATREMIA: Status: ACTIVE | Noted: 2021-01-01

## 2021-09-05 PROBLEM — K76.82 HEPATIC ENCEPHALOPATHY (HCC): Status: ACTIVE | Noted: 2021-01-01

## 2021-09-05 PROBLEM — K76.82 ENCEPHALOPATHY, HEPATIC (HCC): Status: ACTIVE | Noted: 2021-01-01

## 2021-09-05 PROBLEM — R77.8 ELEVATED TROPONIN: Status: ACTIVE | Noted: 2021-01-01

## 2021-09-05 PROBLEM — J90 BILATERAL PLEURAL EFFUSION: Status: ACTIVE | Noted: 2021-01-01

## 2021-09-05 PROBLEM — N17.9 AKI (ACUTE KIDNEY INJURY) (HCC): Status: ACTIVE | Noted: 2021-01-01

## 2021-09-05 NOTE — PLAN OF CARE
Problem: Adult Inpatient Plan of Care  Goal: Plan of Care Review  Flowsheets (Taken 9/5/2021 0618)  Progress: no change  Plan of Care Reviewed With: patient  Outcome Summary: Pt admitted to floor. Alert and oriented. VSS. RA. Denied pain. Lactulosed administered. Pt resting well. Plan of care discussed. Verbalized understanding.  Goal: Absence of Hospital-Acquired Illness or Injury  Intervention: Identify and Manage Fall Risk  Recent Flowsheet Documentation  Taken 9/5/2021 0600 by Catrachito Jacob RN  Safety Promotion/Fall Prevention:   activity supervised   assistive device/personal items within reach   clutter free environment maintained   fall prevention program maintained   gait belt   lighting adjusted   nonskid shoes/slippers when out of bed   room organization consistent   safety round/check completed   toileting scheduled  Taken 9/5/2021 0448 by Catrachito Jacob RN  Safety Promotion/Fall Prevention:   activity supervised   assistive device/personal items within reach   clutter free environment maintained   fall prevention program maintained   gait belt   lighting adjusted   nonskid shoes/slippers when out of bed   room organization consistent   safety round/check completed   toileting scheduled  Intervention: Prevent Skin Injury  Recent Flowsheet Documentation  Taken 9/5/2021 0600 by Catrachito Jacob RN  Body Position: neutral body alignment  Skin Protection:   adhesive use limited   incontinence pads utilized   tubing/devices free from skin contact  Taken 9/5/2021 0448 by Catrachito Jacob RN  Body Position: neutral body alignment  Skin Protection:   adhesive use limited   incontinence pads utilized   pulse oximeter probe site changed   tubing/devices free from skin contact   skin sealant/moisture barrier applied  Intervention: Prevent and Manage VTE (venous thromboembolism) Risk  Recent Flowsheet Documentation  Taken 9/5/2021 0448 by Catrachito Jacob RN  VTE Prevention/Management:   bilateral    dorsiflexion/plantar flexion performed  Intervention: Prevent Infection  Recent Flowsheet Documentation  Taken 9/5/2021 0600 by Catrachito Jacob RN  Infection Prevention:   environmental surveillance performed   rest/sleep promoted   single patient room provided  Taken 9/5/2021 0448 by Catrachito Jacob RN  Infection Prevention:   environmental surveillance performed   rest/sleep promoted   single patient room provided  Goal: Optimal Comfort and Wellbeing  Intervention: Provide Person-Centered Care  Recent Flowsheet Documentation  Taken 9/5/2021 0448 by Catrachito Jacob RN  Trust Relationship/Rapport:   care explained   choices provided   questions answered   questions encouraged  Goal: Readiness for Transition of Care  Intervention: Mutually Develop Transition Plan  Recent Flowsheet Documentation  Taken 9/5/2021 0453 by Catrachito Jacob RN  Transportation Anticipated: family or friend will provide  Patient/Family Anticipated Services at Transition:   Patient/Family Anticipates Transition to:   home with family   inpatient rehabilitation facility  Taken 9/5/2021 0446 by Catrachito Jacob, RN  Equipment Currently Used at Home:   walker, rolling   cpap   commode   Goal Outcome Evaluation:  Plan of Care Reviewed With: patient        Progress: no change  Outcome Summary: Pt admitted to floor. Alert and oriented. VSS. RA. Denied pain. Lactulosed administered. Pt resting well. Plan of care discussed. Verbalized understanding.

## 2021-09-05 NOTE — PLAN OF CARE
Goal Outcome Evaluation:              Outcome Summary: VSS, no complaints of pain. AxO, RA, NSR. Pt. very sleepy throughout day. Diuretics given. PT Wound to see patient about BLE. Continue to monitor.

## 2021-09-05 NOTE — H&P
[facility] Medicine Services  HISTORY AND PHYSICAL    Patient Name: Abe Phillips Jr.  : 1955  MRN: [id number]  Primary Care Physician: Anibal Maria MD  Date of admission: 2021    Subjective   Subjective     Chief Complaint:  SOB    HPI:  Abe Phillips Jr. is a 66 y.o. male with a past medical history significant for morbid obesity, MCCAULEY cirrhosis, narcolepsy, chronic diastolic heart failure, hypertension, HLD, severe JAIME, chronic lymphedema, anemia, and ADD. He presents today from home with complaints of progressively worsening SOB going on for the past week. Dyspnea worse with exertion and lying flat. There is associated pedal edema which has progressed to abdomen. Patient approximates a 30 pound weight gain in the past month. States the rehab facility he was discharged from decreased his medications causing an exacerbation of heart failure. States he was concerned and presented to ED for further evaluation and treatment.  Currently there are no complaints of fever, cough, congestion, chest pain, or syncope. No headache or focal weakness/parathesias. No dysuria or flank pain.   Records reviewed indicate patient was admitted to this service  to  with electrolyte abnormalities and hypoglycemia. He improved with electrolyte replacement.  Patient arrived in ED somnolent but oriented X3 and conversant. No new complaints. Will admit to inpatient.      COVID Details:    Symptoms:    [] NONE [] Fever []  Cough [x] Shortness of breath [] Change in taste/smell      The patient has a COVID HM Topic on their chart, and they are fully vaccinated.      Review of Systems   Constitutional: Positive for fatigue and unexpected weight change. Negative for chills and fever.   HENT: Negative for congestion and trouble swallowing.    Eyes: Negative for photophobia and visual disturbance.   Respiratory: Positive for shortness of breath. Negative for cough.    Cardiovascular: Positive for  leg swelling. Negative for chest pain.   Gastrointestinal: Negative for abdominal pain, diarrhea, nausea and vomiting.   Endocrine: Negative for cold intolerance and heat intolerance.   Genitourinary: Negative for dysuria, flank pain and hematuria.   Musculoskeletal: Negative for back pain, gait problem and joint swelling.   Skin: Negative for pallor and rash.   Allergic/Immunologic: Positive for immunocompromised state.   Neurological: Positive for weakness. Negative for dizziness and headaches.   Hematological: Negative for adenopathy.   Psychiatric/Behavioral: Negative for agitation and confusion.        All other systems reviewed and are negative.     Personal History     Past Medical History:   Diagnosis Date   • A-fib (CMS/HCC)    • ADD (attention deficit disorder)    • Atrial flutter (CMS/HCC)    • Bell's palsy    • Cellulitis    • CHF (congestive heart failure) (CMS/HCC)    • Cirrhosis of liver (CMS/HCC)    • Constipation    • Depression    • Diabetes mellitus (CMS/HCC)    • Diabetic ulcer of right foot (CMS/HCC)    • Disease of thyroid gland    • Edema    • Fatty liver    • Hepatitis B    • Hyperlipidemia    • Hypokalemia    • Insomnia    • Lymphedema    • Narcolepsy    • Neuropathy    • Obesity    • JAIME on CPAP    • PVD (peripheral vascular disease) (CMS/HCC)    • RLS (restless legs syndrome)    • Skin cancer    • Tardive dyskinesia    • Tremor of both hands    • Type 2 diabetes mellitus (CMS/HCC)    • Yeast infection        Past Surgical History:   Procedure Laterality Date   • ACHILLES TENDON REPAIR     • CARDIAC ABLATION     • CHOLECYSTECTOMY     • ENDOSCOPY N/A 8/16/2021    Procedure: ESOPHAGOGASTRODUODENOSCOPY;  Surgeon: Brunner, Mark I, MD;  Location: [facility] ENDOSCOPY;  Service: Gastroenterology;  Laterality: N/A;   • LASER ABLATION      VEIN RLE       Family History: family history includes Clotting disorder in his father; Diabetes in his father; Heart failure in his mother; Hypertension in his  father; No Known Problems in his brother, brother, daughter, and sister; Osteoarthritis in his mother. Otherwise pertinent FHx was reviewed and unremarkable.     Social History:  reports that he has never smoked. He has never used smokeless tobacco. He reports current alcohol use. He reports that he does not use drugs.  Social History     Social History Narrative    .  Lives with wife        Medications:  Contour Next Monitor, Dexcom G6 Sensor, Dexcom G6 Transmitter, OXcarbazepine, OmniPod Dash 5 Pack Pods, amphetamine-dextroamphetamine, aspirin, bumetanide, erythromycin, gabapentin, lactulose, levocetirizine, levothyroxine, loratadine, metOLazone, montelukast, nystatin, ofloxacin, olopatadine, potassium chloride, pramipexole, pravastatin, riFAXIMin, spironolactone, tobramycin-dexamethasone, traZODone, triamcinolone, and venlafaxine XR    No Known Allergies    Objective   Objective     Vital Signs:   Temp:  [97.2 °F (36.2 °C)] 97.2 °F (36.2 °C)  Heart Rate:  [72-80] 72  Resp:  [22] 22  BP: (134)/(83) 134/83  Flow (L/min):  [5] 5    Physical Exam   Constitutional: Awake, alert  Eyes: PERRLA, sclerae anicteric, no conjunctival injection  HENT: NCAT, mucous membranes moist  Neck: Supple, no thyromegaly, no lymphadenopathy, trachea midline  Respiratory: Clear to auscultation bilaterally, nonlabored respirations   Cardiovascular: RRR, no murmurs, rubs, or gallops, palpable pedal pulses bilaterally  Gastrointestinal: Positive bowel sounds, soft, nontender  Musculoskeletal: BLE edema, pitting. Abdominal distension and edema no clubbing or cyanosis to extremities  Psychiatric: Appropriate affect, cooperative  Neurologic: Oriented x 3, strength symmetric in all extremities, Cranial Nerves grossly intact to confrontation, speech clear  Skin: venous stasis changes in lower extremities      Results Reviewed:  I have personally reviewed most recent indicated data and agree with findings including:  []  Laboratory  []   Radiology  []  EKG/Telemetry  []  Pathology  []  Cardiac/Vascular Studies  []  Old records  []  Other:  Most pertinent findings include:      LAB RESULTS:      Lab 09/04/21 2300   WBC 6.11   HEMOGLOBIN 10.1*   HEMATOCRIT 32.6*   PLATELETS 117*   NEUTROS ABS 4.45   IMMATURE GRANS (ABS) 0.14*   LYMPHS ABS 0.79   MONOS ABS 0.51   EOS ABS 0.17   MCV 96.7   D DIMER QUANT 1.99*         Lab 09/04/21 2300   SODIUM 132*   POTASSIUM 4.0   CHLORIDE 93*   CO2 25.0   ANION GAP 14.0   BUN 50*   CREATININE 1.66*   GLUCOSE 162*   CALCIUM 9.8         Lab 09/04/21 2300   TOTAL PROTEIN 7.3   ALBUMIN 3.80   GLOBULIN 3.5   ALT (SGPT) 15   AST (SGOT) 26   BILIRUBIN 0.8   ALK PHOS 150*         Lab 09/04/21 2300   PROBNP 656.8   TROPONIN T 0.035*                 Brief Urine Lab Results  (Last result in the past 365 days)      Color   Clarity   Blood   Leuk Est   Nitrite   Protein   CREAT   Urine HCG        08/13/21 0718 Yellow Clear Negative Negative Negative Negative             Microbiology Results (last 10 days)     Procedure Component Value - Date/Time    COVID PRE-OP / PRE-PROCEDURE SCREENING ORDER (NO ISOLATION) - Swab, Nasopharynx [[id number]]  (Normal) Collected: 09/04/21 2258    Lab Status: Final result Specimen: Swab from Nasopharynx Updated: 09/04/21 2350    Narrative:      The following orders were created for panel order COVID PRE-OP / PRE-PROCEDURE SCREENING ORDER (NO ISOLATION) - Swab, Nasopharynx.  Procedure                               Abnormality         Status                     ---------                               -----------         ------                     COVID-19 and FLU A/B PCR...[[id number]]  Normal              Final result                 Please view results for these tests on the individual orders.    COVID-19 and FLU A/B PCR - Swab, Nasopharynx [[id number]]  (Normal) Collected: 09/04/21 2258    Lab Status: Final result Specimen: Swab from Nasopharynx Updated: 09/04/21 2350     COVID19 Not Detected      Influenza A PCR Not Detected     Influenza B PCR Not Detected    Narrative:      Fact sheet for providers: https://www.fda.gov/media/749672/download    Fact sheet for patients: https://www.fda.gov/media/321562/download    Test performed by PCR.          XR Chest 1 View    Result Date: 9/4/2021  CR Chest 1 Vw INDICATION: Shortness of breath COMPARISON:  8/12/2021 FINDINGS: Portable AP view(s) of the chest.  The heart and mediastinal contours are normal. The lungs are clear. No pneumothorax or pleural effusion.     Impression: No definite acute cardiopulmonary findings. Signer Name: Jovana Levy MD  Signed: 9/4/2021 11:27 PM  Workstation Name: [id number]  [facility]    CT Angiogram Chest    Result Date: 9/5/2021  CTA Chest INDICATION: Shortness of air with elevated d-dimer. TECHNIQUE: CT angiogram of the chest with IV contrast. 3-D reconstructions were obtained and reviewed.   Radiation dose reduction techniques included automated exposure control or exposure modulation based on body size. Count of known CT and cardiac nuc med studies performed in previous 12 months: 1. COMPARISON: 1/5/2019 FINDINGS: No pulmonary embolism or aortic dissection is seen. Again seen are pericardial calcifications which may be the result of prior pericarditis. This may be causing some constriction. There is no pericardial effusion. There are moderate bilateral pleural effusions. There is no adenopathy. Bilateral gynecomastia is present. Upper abdominal images show a cirrhotic appearance of the liver with a trace amount of ascites. There is anasarca. Gallbladder is surgically absent. Lung windows demonstrate compressive atelectasis in the lower lobes associated with the pleural effusions. Minimal groundglass opacities are present in the upper lobes which may reflect mild pneumonitis or possibly mild edema. Imaging features are atypical or uncommonly reported for COVID-19 pneumonia. Alternative diagnoses should be  considered.     Impression: 1. No PE or aortic dissection. 2. Chronic pericardial calcifications may be causing some constriction. 3. Moderate bilateral pleural effusions with bilateral lower lobe atelectasis. 4. Minimal groundglass opacities in the upper lobes may reflect mild pneumonitis or possibly edema. 5. Cirrhosis and ascites in the upper abdomen with anasarca. Signer Name: Mathew Mcdaniel MD  Signed: 9/5/2021 1:07 AM  Workstation Name: [email or web address]  [facility]      Results for orders placed during the hospital encounter of 01/04/19    Adult Transthoracic Echo Complete W/ Cont if Necessary Per Protocol    Interpretation Summary  · Left ventricular systolic function is hyperdynamic (EF > 70).      Assessment/Plan   Assessment & Plan       Encephalopathy, hepatic (CMS/HCC)    Bilateral pleural effusion    A-fib (CMS/HCC)    Cirrhosis of liver (CMS/HCC)    Chronic congestive heart failure (CMS/HCC)    Essential hypertension    Diabetes mellitus type 2, uncontrolled, with complications (CMS/HCC)    Severe JAIME on CPAP    Uncontrolled type 2 diabetes mellitus with hyperglycemia (CMS/HCC)    Anemia    Elevated troponin    Hyponatremia    OMAR (acute kidney injury) (CMS/HCC)    PVD (peripheral vascular disease) (CMS/HCC)    Primary narcolepsy without cataplexy    Restless legs syndrome (RLS)    ADD (attention deficit disorder)    Depression    Lymphedema    Other hyperlipidemia      1. Bilateral Pleural Effusion:  - acute on chronic diastolic heart failure  - administered 1 mg bumex in ED. Continue home Bumex. Also on spironolactone with as needed metolazone  - obtain echocardiogram  - daily weights  - strict I&O  - heart failure navigator  - am labs    2. Possible Hepatic Encephalopathy:  - hx MCCAULEY cirrhosis  - administered lactulose in ED. Ammonia pending.  - low threshold for CT head and/or ABG  - check PT/INR  - resume home lactulose, xifaxin, aldactone  - s/p /21 with portal  gastropathy and lower esophageal varices    3. OMAR:  - on CKD III. Baseline creatinine 1.1 to 1.3   - anticipated improvement with diuresis  - avoid nephrotoxins and monitor    4. Hyponatremia:  - appears chronic. Monitor with diuresis    5. DM2:  - start SSI with scheduled accu checks  - A1c 6.6.   - was on recently on insulin pump    6. Narcolepsy:  - on Adderall    7. JAIME:  - on CPAP    8. Debility/ failure to thrive:  Case management  - PT/PT    DVT prophylaxis:  MARIANO    CODE STATUS:  Full code  Level Of Support Discussed With: Patient  Code Status: CPR  Medical Interventions (Level of Support Prior to Arrest): Full      This note has been completed as part of a split-shared workflow.     Electronically signed by Bk Bhatt PA-C, 09/05/21, 3:29 AM EDT.        Brief Attending Admission Attestation     I have seen and examined the patient, performing an independent face-to-face diagnostic evaluation with plan of care reviewed and developed with the advanced practice clinician (APC).    Old records reviewed and summarized in PM hx.    Brief Summary Statement:  66-year-old male presented to ED secondary to chief complaint of generalized weakness, increasing dyspnea-over past 4 days.  Also patient was found to have asterixis in ED-serum ammonia still pending,  Does not complain of overt lower extremity. Or tenderness does complain of significant swelling.  Do not complain of or chest pain or abdominal pain, last BM yesterday.  He did not likely get his diuretics and lactulose as prescribed while he was at the rehab. Denies any complaint of fever or chills, do not complain of any coughing.  Also found to be in OMAR-with BUN/creatinine 50/1.6      Remainder of detailed HPI is as noted above and has been reviewed and/or edited by me for completeness.    PM HX:  Patient admitted from 8/12-8/18 secondary to lower extremity weakness felt secondary to deconditioning, hypokalemia,    CAD-aspirin 81  mg  CHF-diastolic/chronic lymphedema-Bumex 2 mg every 12, metolazone 5 mg as needed, spironolactone 50 mg daily  Mood disorder-Trileptal 300 mg daily  RLS-Mirapex 0.75 mg every 12  Hyperlipidemia-Pravachol 40 mg p.o. daily  Neuropathy-Neurontin 900 mg every 8  Hypothyroid-Synthroid 75 mcg  Mood disorder-trazodone 50 mg daily, Effexor 150 mg p.o. daily,  MCCAULEY cirrhosis-s/p EGD 8/21-shows portal gastropathy plus lower esophageal varices-lactulose 45 mL every 8, Xifaxan 550 mg every 12.  Narcolepsy-Adderall  Obstructive sleep apnea-CPAP      Vitals:    09/05/21 0149   BP:  134/83   Pulse: 75   Resp:  22-98% on room   Temp:  Afebrile.   SpO2: 98%       Attending Physical Exam:  RS-  decreased anteriorly, poor effort,  CVS- s1s2 regular, no murmur.  ABD-distended, generalized tenderness, soft, bowel sounds decreased  EXT-nonpitting edema lower extremity with chronic venous static changes, slightly erythematous.  NEURO-patient is lethargic, will wake up to verbal command, otherwise falls back to sleep immediately, moving all 4 extremities spontaneously, difficult keeping his legs up likely secondary to his edema.      LABS:  Troponin-0.035, proBNP 656 EKG-sinus rhythm 81 bpm, , poor R wave progression similar to old EKG,  Blood glucose-162,  BUN/creatinine 50/1.6 (45/1.03-August)  Bicarb-25  LFTs WNL  Albumin 3.8  D-dimer 1.  9  WBC 6, hemoglobin 10, platelets 117, neutrophils of 72,  Covid 19 -  CT angio chest-no acute PE, chronic pericardial calcifications, moderate bilateral effusions, minimal groundglass opacities in upper lobes-pneumonitis versus edema, cirrhosis and ascites in the upper abdomen with anasarca.  Echo-2019-EF of 70%,    Brief Assessment/Plan  Volume overload-combination of CHF/decompensated liver cirrhosis-we'll start IV Bumex, CHF navigator consult in a.m.  Lactulose-for likely hepatic encephalopathy.  Antibiotics not started-monitor his lower extremity for any worsening cellulitic changes, or  fever.  May consider cardiology input if patient does not diurese well given some findings of pericardial calcifications.        See above for further detailed assessment and plan developed with APC which I have reviewed and/or edited for completeness.    Admission Status: I believe that this patient meets inpatient criteria secondary to acute CHF

## 2021-09-05 NOTE — ED NOTES
PT O2 TITRATED DOWN-PT TOLERATED WELL.  PT NOW ON ROOM AIR %     Brooklyn Charles RN  09/04/21 [id number]

## 2021-09-05 NOTE — CONSULTS
[facility] at   CARDIOLOGY CONSULTATION NOTE    Abe Phillips Jr.  : 1955  MRN:[id number]  Home Phone:[phone]    Date of Admission:2021  Date of Consultation: 21    PCP: Anibal Maria MD    IDENTIFICATION: A 66 y.o. male resident of [place], [place]     Chief Complaint   Patient presents with   • Shortness of Breath     PROBLEM LIST:   1. Paroxysmal atrial fibrillation:  a. Diagnosed , CHADsVasc 4, anticoagulated with Eliquis  b. ECV, 2016  c. Atrial fibrillation and flutter ablation, 2016 ([place])  d. CHADsVasc 4, anticoagulated with ASA  e. NSR on ECG, 2019, 2021  2. Chronic congestive heart failure (HFpEF):  a. Echocardiogram, 2018: Mild concentric LVH, EF 55-60 percent, trivial TR  b. Echocardiogram, 2019: EF greater than 0.70, LV wall thickness is consistent with posterior asymmetric hypertrophy, trace MR, physiologic TR, no pericardial effusion  3. Hypertensive cardiovascular disease:  a. Remote stress test and negative LHC, approximately 2016, in [place]-data deficit  4. Hypertension  5. Hyperlipidemia; not on statin therapy, patient wishes to not take statins  6. Type 2 diabetes mellitus  7. Hypothyroidism on replacement therapy  8. Peripheral vascular disease  9. Obstructive sleep apnea with CPAP with sleep study 2019: Negative for significant JAIME, but had very little REM sleep  10. Restless leg syndrome  11. Narcolepsy  12. Anxiety and depression  13. Chronic bilateral lower extremity venous insufficiency/edema  14. Moderate obesity: BMI 39.68  15. Hepatic cirrhosis and portal hypertension with splenomegaly    A. Hepatic encephalopathy 2021  16. History of hepatitis B antibody 30 years ago  17. History of duodenal ulcer  18.  Bell's palsy spring 2021  19. Surgical history:  a. Cholecystectomy  b. LHC  c. Atrial fibrillation/flutter ablation  d. Hemorrhoid surgery  e. Liver biopsy    ALLERGIES: No Known  Allergies    HOME MEDICINES:   Current Outpatient Medications   Medication Instructions   • amphetamine-dextroamphetamine (ADDERALL) 15 MG tablet 30 mg, Oral, Daily   • amphetamine-dextroamphetamine (ADDERALL) 30 MG tablet 60 mg, Oral, Every Morning   • aspirin 81 MG EC tablet TAKE 1 TABLET BY MOUTH EVERY DAY   • bumetanide (BUMEX) 4 mg, Oral, Daily   • erythromycin (ROMYCIN) 5 MG/GM ophthalmic ointment No dose, route, or frequency recorded.   • gabapentin (NEURONTIN) 300 MG capsule TAKE THREE CAPSULES BY MOUTH TWICE A DAY   • lactulose (CHRONULAC) 10 GM/15ML solution 45 mL, Oral, 3 Times Daily   • levocetirizine (XYZAL) 5 mg, Oral, Every Evening   • levothyroxine (SYNTHROID, LEVOTHROID) 75 MCG tablet TAKE ONE TABLET BY MOUTH DAILY   • loratadine (CLARITIN) 10 mg, Oral, Daily, Patient instructed to d/c Xyzal while taking   • metOLazone (ZAROXOLYN) 10 mg, Oral, Daily PRN   • montelukast (SINGULAIR) 10 mg, Oral, Nightly   • nystatin (MYCOSTATIN) 402535 UNIT/GM ointment 1 application, Topical, As Needed   • ofloxacin (OCUFLOX) 0.3 % ophthalmic solution No dose, route, or frequency recorded.   • olopatadine (PATANOL) 0.1 % ophthalmic solution No dose, route, or frequency recorded.   • OXcarbazepine (TRILEPTAL) 300 mg, Oral, 2 Times Daily   • potassium chloride 10 MEQ CR tablet 10 mEq, Oral, 2 Times Daily   • pramipexole (MIRAPEX) 0.75 mg, Oral, 2 Times Daily   • pravastatin (PRAVACHOL) 40 mg, Oral, Every Night at Bedtime   • riFAXIMin (XIFAXAN) 550 mg, Oral, Every 12 Hours Scheduled   • spironolactone (ALDACTONE) 50 mg, Oral, Daily   • tobramycin-dexamethasone (TOBRADEX) 0.3-0.1 % ophthalmic suspension No dose, route, or frequency recorded.   • traZODone (DESYREL) 50 mg, Oral, Nightly PRN   • triamcinolone (KENALOG) 0.1 % cream Topical, 2 Times Daily PRN   • venlafaxine XR (EFFEXOR-XR) 150 mg, Oral, Nightly       HPI: Mr. Phillips is a 66 y.o WM with above noted history and MCCAULEY cirrhosis. He was recently admitted to this  "hospital for LE weakness and discharged to [facility]. He presents back due to shortness of breath with exertion,weight gain of >20lbs, orthopnea with peripheral edema, as well as hepatic encephalopathy. He states his diuretic doses were recently cut in half as he was started on lactulose. His proBNP on admission was normal at 656, and CTA of the chest showed moderate bilateral pleural effusions as well as chronic pericardial calcifications, and raised the question of possible constriction. Cardiology has been consulted in this regard.       ROS: All systems have been reviewed and are negative with the exception of those mentioned in the HPI and problem list above.    Surgical History:   Past Surgical History:   Procedure Laterality Date   • ACHILLES TENDON REPAIR     • CARDIAC ABLATION     • CHOLECYSTECTOMY     • ENDOSCOPY N/A 8/16/2021    Procedure: ESOPHAGOGASTRODUODENOSCOPY;  Surgeon: Brunner, Mark I, MD;  Location: [facility] ENDOSCOPY;  Service: Gastroenterology;  Laterality: N/A;   • LASER ABLATION      VEIN RLE       Social History:   Social History     Socioeconomic History   • Marital status:    Tobacco Use   • Smoking status: Never Smoker   • Smokeless tobacco: Never Used   Vaping Use   • Vaping Use: Never used   Substance and Sexual Activity   • Alcohol use: Yes     Comment: reports 1-2 drinks a week    • Drug use: No   • Sexual activity: Defer     Family History:   Family History   Problem Relation Age of Onset   • Heart failure Mother         CHF   • Osteoarthritis Mother    • Clotting disorder Father    • Diabetes Father    • Hypertension Father    • No Known Problems Sister    • No Known Problems Brother    • No Known Problems Brother    • No Known Problems Daughter        Objective     /84 (BP Location: Right arm, Patient Position: Lying)   Pulse 80   Temp 98.7 °F (37.1 °C) (Oral)   Resp 20   Ht 190.5 cm (75\")   Wt (!) 163 kg (359 lb 9.6 oz)   SpO2 93%   BMI 44.95 kg/m² "     Intake/Output Summary (Last 24 hours) at 9/5/2021 1228  Last data filed at 9/5/2021 0900  Gross per 24 hour   Intake 400 ml   Output --   Net 400 ml       PHYSICAL EXAM:  CONSTITUTIONAL: Morbidly obese, cooperative, in no acute distress; sleepy   HEENT: Normocephalic, atraumatic, obese neck  CARDIOVASCULAR:  Regular rhythm and normal rate, no murmur, gallop, rub.   RESPIRATORY: Crackles bilateral bases, diminished breath sounds, normal respiratory effort  GI: Soft, nontender, normal bowel sounds  EXTREMITIES: No gross deformities, + anasarca.   SKIN: Warm, dry. No bleeding, bruising or rash  NEUROLOGICAL: No focal deficits    Labs/Diagnostic Data  Results from last 7 days   Lab Units 09/04/21  2300   SODIUM mmol/L 132*   POTASSIUM mmol/L 4.0   CHLORIDE mmol/L 93*   CO2 mmol/L 25.0   BUN mg/dL 50*   CREATININE mg/dL 1.66*   GLUCOSE mg/dL 162*   CALCIUM mg/dL 9.8     Results from last 7 days   Lab Units 09/05/21  0255 09/04/21  2300   TROPONIN T ng/mL 0.036* 0.035*     Results from last 7 days   Lab Units 09/04/21  2300   WBC 10*3/mm3 6.11   HEMOGLOBIN g/dL 10.1*   HEMATOCRIT % 32.6*   PLATELETS 10*3/mm3 117*     Results from last 7 days   Lab Units 09/04/21  2300   PROBNP pg/mL 656.8     I personally reviewed the patient's EKG/Telemetry data    Radiology Data:   CXR 9/5/21:  IMPRESSION:  No definite acute cardiopulmonary findings.    CTA Chest 9/5/21:  IMPRESSION:     1. No PE or aortic dissection.  2. Chronic pericardial calcifications may be causing some constriction.  3. Moderate bilateral pleural effusions with bilateral lower lobe atelectasis.  4. Minimal groundglass opacities in the upper lobes may reflect mild pneumonitis or possibly edema.  5. Cirrhosis and ascites in the upper abdomen with anasarca.    Current Medications:    aspirin, 81 mg, Oral, Daily  bumetanide, 4 mg, Oral, Daily  cefTRIAXone, 2 g, Intravenous, Q24H  cetirizine, 10 mg, Oral, Daily  gabapentin, 900 mg, Oral, BID  heparin (porcine),  5,000 Units, Subcutaneous, Q12H  insulin lispro, 0-7 Units, Subcutaneous, TID AC  lactulose, 45 mL, Oral, TID  levothyroxine, 75 mcg, Oral, Daily  montelukast, 10 mg, Oral, Nightly  nystatin, , Topical, Q12H  OXcarbazepine, 300 mg, Oral, BID  pramipexole, 0.75 mg, Oral, BID  pravastatin, 40 mg, Oral, Daily  riFAXIMin, 550 mg, Oral, Q12H  sodium chloride, 10 mL, Intravenous, Q12H  spironolactone, 100 mg, Oral, Daily  venlafaxine XR, 150 mg, Oral, Nightly           Assessment and Plan:     1. Anasarca  - likely related to cirrhosis/liver failure and HFpEF  - with pericardial calcifications noted on CT, need to rule out constrictive pericarditis - this can be done after initial diuresis.  - constriction is not an acute process but needs to be carefully evaluated; patient will likely need R/L heart cath with pericardial constriction protocol.  - continue diuresis with IV Bumex with close attention to renal function, etc.  - He MUST have strict I/O.    2. Chronic HFpEF; need to exclude constriction in view of pericardial calcifications.  - see above    3. MCCAULEY cirrhosis with ascites (historically).        Scribed for Delta Duarte MD by Ariadne Shelton PA-C. 9/5/2021  14:40 EDT

## 2021-09-05 NOTE — ED PROVIDER NOTES
[facility]    EMERGENCY DEPARTMENT ENCOUNTER      Pt Name: Abe Phillips Jr.  MRN: [id number]  YOB: 1955  Date of evaluation: 9/4/2021  Provider: Roger Jacob MD    CHIEF COMPLAINT       Chief Complaint   Patient presents with   • Shortness of Breath         HISTORY OF PRESENT ILLNESS  (Location/Symptom, Timing/Onset, Context/Setting, Quality, Duration, Modifying Factors, Severity.)   Abe Phillips Jr. is a 66 y.o. male who presents to the emergency department with moderate severity shortness of breath is worse with lying flat over the course the past 3 days and is also worsened with exertion without any associated chest pain, abdominal pain, vomiting, or diarrhea.  Patient has a history of Melo cirrhosis and CHF and has been at [facility] for rehab when he developed the symptoms.  He has not had any associated cough, fever, or chills.  He does note that they had recently started him on lactulose which she has not yet started taking and he has felt more sleepy over the past couple of days.      Nursing notes were reviewed.    REVIEW OF SYSTEMS    (2-9 systems for level 4, 10 or more for level 5)   ROS:  General:  No fevers, no chills, no weakness  Cardiovascular:  No chest pain, no palpitations  Respiratory: Shortness of breath  Gastrointestinal:  No pain, no nausea, no vomiting, no diarrhea  Musculoskeletal:  No muscle pain, no joint pain  Skin:  No rash  Neurologic:  No speech problems, no headache, no extremity numbness, no extremity tingling, no extremity weakness  Psychiatric:  No anxiety  Genitourinary:  No dysuria, no hematuria    Except as noted above the remainder of the review of systems was reviewed and negative.       PAST MEDICAL HISTORY     Past Medical History:   Diagnosis Date   • A-fib (CMS/HCC)    • ADD (attention deficit disorder)    • Atrial flutter (CMS/HCC)    • Bell's palsy    • Cellulitis    • CHF (congestive heart failure) (CMS/HCC)    • Cirrhosis of liver (CMS/HCC)    •  Constipation    • Depression    • Diabetes mellitus (CMS/HCC)    • Diabetic ulcer of right foot (CMS/HCC)    • Disease of thyroid gland    • Edema    • Fatty liver    • Hepatitis B    • Hyperlipidemia    • Hypokalemia    • Insomnia    • Lymphedema    • Narcolepsy    • Neuropathy    • Obesity    • JAIME on CPAP    • PVD (peripheral vascular disease) (CMS/HCC)    • RLS (restless legs syndrome)    • Skin cancer    • Tardive dyskinesia    • Tremor of both hands    • Type 2 diabetes mellitus (CMS/HCC)    • Yeast infection          SURGICAL HISTORY       Past Surgical History:   Procedure Laterality Date   • ACHILLES TENDON REPAIR     • CARDIAC ABLATION     • CHOLECYSTECTOMY     • ENDOSCOPY N/A 8/16/2021    Procedure: ESOPHAGOGASTRODUODENOSCOPY;  Surgeon: Brunner, Mark I, MD;  Location: [facility] ENDOSCOPY;  Service: Gastroenterology;  Laterality: N/A;   • LASER ABLATION      VEIN RLE         CURRENT MEDICATIONS       Current Facility-Administered Medications:   •  lactulose (CHRONULAC) 10 GM/15ML solution 20 g, 20 g, Oral, Once, Roger Jacob MD  •  sodium chloride 0.9 % flush 10 mL, 10 mL, Intravenous, PRN, Roger Jacob MD    ALLERGIES     Patient has no known allergies.    FAMILY HISTORY       Family History   Problem Relation Age of Onset   • Heart failure Mother         CHF   • Osteoarthritis Mother    • Clotting disorder Father    • Diabetes Father    • Hypertension Father    • No Known Problems Sister    • No Known Problems Brother    • No Known Problems Brother    • No Known Problems Daughter           SOCIAL HISTORY       Social History     Socioeconomic History   • Marital status:      Spouse name: Not on file   • Number of children: Not on file   • Years of education: Not on file   • Highest education level: Not on file   Tobacco Use   • Smoking status: Never Smoker   • Smokeless tobacco: Never Used   Vaping Use   • Vaping Use: Never used   Substance and Sexual Activity   • Alcohol use: Yes      Comment: reports 1-2 drinks a week    • Drug use: No   • Sexual activity: Defer         PHYSICAL EXAM    (up to 7 for level 4, 8 or more for level 5)     Vitals:    09/05/21 0149 09/05/21 0230 09/05/21 0400 09/05/21 0413   BP:    137/89   BP Location:       Patient Position:       Pulse: 75 72 72 72   Resp:       Temp:       TempSrc:       SpO2: 98% 98% 97%    Weight:       Height:           Physical Exam  General: Awake, alert, somnolent  HEENT: Conjunctivae normal.  Neck: Trachea midline.  Cardiac: Heart regular rate, rhythm, no murmurs, rubs, or gallops  Lungs: Diffusely coarse  Chest wall: There is no tenderness to palpation over the chest wall or over ribs  Abdomen: Abdomen is soft, nontender, nondistended. There are no firm or pulsatile masses, no rebound rigidity or guarding.   Musculoskeletal: No deformity.  Asterixis  Neuro: Alert and oriented x 4.  Dermatology: Skin is warm and dry  Psych: Mentation is grossly normal, cognition is grossly normal. Affect is appropriate.        DIAGNOSTIC RESULTS     EKG: All EKG's are interpreted by the Emergency Department Physician who either signs or Co-signs this chart in the absence of a cardiologist.    ECG 12 Lead   Final Result   Test Reason : SOA Protocol   Blood Pressure :   */*   mmHG   Vent. Rate :  81 BPM     Atrial Rate :  81 BPM      P-R Int : 268 ms          QRS Dur :  96 ms       QT Int : 374 ms       P-R-T Axes :  56 -48  23 degrees      QTc Int : 434 ms      Sinus rhythm with 1st degree AV block with premature atrial complexes with    aberrant conduction   Left axis deviation   Pulmonary disease pattern   Septal infarct , age undetermined   Abnormal ECG   When compared with ECG of 12-AUG-2021 23:08,   premature ventricular complexes are no longer present   aberrant conduction is now present   Septal infarct is now present   Confirmed by CALI FLETCHER ([id number]) on 9/5/2021 4:50:10 AM      Referred By: ADELA           Confirmed By: CALI FLETCHER           RADIOLOGY:   Non-plain film images such as CT, Ultrasound and MRI are read by the radiologist. Plain radiographic images are visualized and preliminarily interpreted by the emergency physician with the below findings:      [x] Radiologist's Report Reviewed:  CT Angiogram Chest   Final Result      1. No PE or aortic dissection.   2. Chronic pericardial calcifications may be causing some constriction.   3. Moderate bilateral pleural effusions with bilateral lower lobe atelectasis.   4. Minimal groundglass opacities in the upper lobes may reflect mild pneumonitis or possibly edema.   5. Cirrhosis and ascites in the upper abdomen with anasarca.      Signer Name: Mathew Mcdaniel MD    Signed: 9/5/2021 1:07 AM    Workstation Name: [email or web address]     [facility]      XR Chest 1 View   Final Result   No definite acute cardiopulmonary findings.      Signer Name: Jovana Levy MD    Signed: 9/4/2021 11:27 PM    Workstation Name: [id number]     [facility]            ED BEDSIDE ULTRASOUND:   Performed by ED Physician - none    LABS:    I have reviewed and interpreted all of the currently available lab results from this visit (if applicable):  Results for orders placed or performed during the hospital encounter of 09/04/21   COVID-19 and FLU A/B PCR - Swab, Nasopharynx    Specimen: Nasopharynx; Swab   Result Value Ref Range    COVID19 Not Detected Not Detected - Ref. Range    Influenza A PCR Not Detected Not Detected    Influenza B PCR Not Detected Not Detected   Comprehensive Metabolic Panel    Specimen: Blood   Result Value Ref Range    Glucose 162 (H) 65 - 99 mg/dL    BUN 50 (H) 8 - 23 mg/dL    Creatinine 1.66 (H) 0.76 - 1.27 mg/dL    Sodium 132 (L) 136 - 145 mmol/L    Potassium 4.0 3.5 - 5.2 mmol/L    Chloride 93 (L) 98 - 107 mmol/L    CO2 25.0 22.0 - 29.0 mmol/L    Calcium 9.8 8.6 - 10.5 mg/dL    Total Protein 7.3 6.0 - 8.5 g/dL    Albumin 3.80 3.50 - 5.20 g/dL    ALT (SGPT)  15 1 - 41 U/L    AST (SGOT) 26 1 - 40 U/L    Alkaline Phosphatase 150 (H) 39 - 117 U/L    Total Bilirubin 0.8 0.0 - 1.2 mg/dL    eGFR Non African Amer 42 (L) >60 mL/min/1.73    Globulin 3.5 gm/dL    A/G Ratio 1.1 g/dL    BUN/Creatinine Ratio 30.1 (H) 7.0 - 25.0    Anion Gap 14.0 5.0 - 15.0 mmol/L   BNP    Specimen: Blood   Result Value Ref Range    proBNP 656.8 0.0 - 900.0 pg/mL   Troponin    Specimen: Blood   Result Value Ref Range    Troponin T 0.035 (C) 0.000 - 0.030 ng/mL   CBC Auto Differential    Specimen: Blood   Result Value Ref Range    WBC 6.11 3.40 - 10.80 10*3/mm3    RBC 3.37 (L) 4.14 - 5.80 10*6/mm3    Hemoglobin 10.1 (L) 13.0 - 17.7 g/dL    Hematocrit 32.6 (L) 37.5 - 51.0 %    MCV 96.7 79.0 - 97.0 fL    MCH 30.0 26.6 - 33.0 pg    MCHC 31.0 (L) 31.5 - 35.7 g/dL    RDW 17.1 (H) 12.3 - 15.4 %    RDW-SD 59.8 (H) 37.0 - 54.0 fl    MPV 10.3 6.0 - 12.0 fL    Platelets 117 (L) 140 - 450 10*3/mm3    Neutrophil % 72.9 42.7 - 76.0 %    Lymphocyte % 12.9 (L) 19.6 - 45.3 %    Monocyte % 8.3 5.0 - 12.0 %    Eosinophil % 2.8 0.3 - 6.2 %    Basophil % 0.8 0.0 - 1.5 %    Immature Grans % 2.3 (H) 0.0 - 0.5 %    Neutrophils, Absolute 4.45 1.70 - 7.00 10*3/mm3    Lymphocytes, Absolute 0.79 0.70 - 3.10 10*3/mm3    Monocytes, Absolute 0.51 0.10 - 0.90 10*3/mm3    Eosinophils, Absolute 0.17 0.00 - 0.40 10*3/mm3    Basophils, Absolute 0.05 0.00 - 0.20 10*3/mm3    Immature Grans, Absolute 0.14 (H) 0.00 - 0.05 10*3/mm3    nRBC 0.0 0.0 - 0.2 /100 WBC   D-dimer, Quantitative    Specimen: Blood   Result Value Ref Range    D-Dimer, Quantitative 1.99 (H) 0.00 - 0.56 MCGFEU/mL   Troponin    Specimen: Blood   Result Value Ref Range    Troponin T 0.036 (C) 0.000 - 0.030 ng/mL   Ammonia    Specimen: Blood   Result Value Ref Range    Ammonia 28 16 - 60 umol/L   POC Glucose Once    Specimen: Blood   Result Value Ref Range    Glucose 121 70 - 130 mg/dL   ECG 12 Lead   Result Value Ref Range    QT Interval 374 ms    QTC Interval 434 ms    Green Top (Gel)   Result Value Ref Range    Extra Tube Hold for add-ons.    Lavender Top   Result Value Ref Range    Extra Tube hold for add-on    Gold Top - SST   Result Value Ref Range    Extra Tube Hold for add-ons.    Gray Top   Result Value Ref Range    Extra Tube Hold for add-ons.    Light Blue Top   Result Value Ref Range    Extra Tube hold for add-on         All other labs were within normal range or not returned as of this dictation.      EMERGENCY DEPARTMENT COURSE and DIFFERENTIAL DIAGNOSIS/MDM:   Vitals:    Vitals:    09/05/21 0149 09/05/21 0230 09/05/21 0400 09/05/21 0413   BP:    137/89   BP Location:       Patient Position:       Pulse: 75 72 72 72   Resp:       Temp:       TempSrc:       SpO2: 98% 98% 97%    Weight:       Height:           ED Course as of Sep 05 0450   Sun Sep 05, 2021   0233 Spoke with Dr. Ríos who accepts the patient for admission.  Patient with multiple things going on today.  I suspect he has a mild degree of hepatic encephalopathy as he does have some somnolence on exam as well as some asterixis.  I have ordered some lactulose for him.  His CTA chest does demonstrate some pulmonary edema and bilateral pleural effusions consistent with volume overload, however there is no focal pneumonia, PE, pneumothorax, or pericardial effusion and his Covid swab was also negative.  He also appears to have some prerenal OMAR.  He will be admitted to the hospitalist service for further evaluation.    [NS]      ED Course User Index  [NS] Roger Jacob MD       I had a discussion with the patient/family regarding diagnosis, diagnostic results, treatment plan, and medications.  The patient/family indicated understanding of these instructions.  I spent adequate time at the bedside preceding discharge necessary to personally discuss the aftercare instructions, giving patient education, providing explanations of the results of our evaluations/findings, and my decision making to assure that the  patient/family understand the plan of care.  Time was allotted to answer questions at that time and throughout the ED course.  Emphasis was placed on timely follow-up after discharge.  I also discussed the potential for the development of an acute emergent condition requiring further evaluation, admission, or even surgical intervention. I discussed that we found nothing during the visit today indicating the need for further workup, admission, or the presence of an unstable medical condition.  I encouraged the patient to return to the emergency department immediately for ANY concerns, worsening, new complaints, or if symptoms persist and unable to seek follow-up in a timely fashion.  The patient/family expressed understanding and agreement with this plan.  The patient will follow-up with their PCP in 1-2 days for reevaluation.       MEDICATIONS ADMINISTERED IN ED:  Medications   sodium chloride 0.9 % flush 10 mL (has no administration in time range)   lactulose (CHRONULAC) 10 GM/15ML solution 20 g (has no administration in time range)   iopamidol (ISOVUE-370) 76 % injection 100 mL (100 mL Intravenous Given 9/5/21 0053)   bumetanide (BUMEX) injection 1 mg (1 mg Intravenous Given 9/5/21 4323)           FINAL IMPRESSION      1. Hepatic encephalopathy (CMS/HCC)    2. Acute pulmonary edema (CMS/HCC)    3. Pleural effusion    4. Acute kidney injury (CMS/HCC)    5. History of diabetes mellitus          DISPOSITION/PLAN     ED Disposition     ED Disposition Condition Comment    Decision to Admit  Level of Care: Telemetry [5]   Diagnosis: Hepatic encephalopathy (CMS/HCC) [572.2.ICD-9-CM]   Admitting Physician: PELON RAY [[id number]]   Attending Physician: PELON RAY [[id number]]   Certification: I Certify That Inpatient Hospital Services Are Medically Necessary For Greater Than 2 Midnights              Roger Jacob MD  Attending Emergency Physician               Roger Jacob MD  09/05/21 3960

## 2021-09-05 NOTE — PROGRESS NOTES
[facility] Medicine Services  PROGRESS NOTE    Patient Name: Abe Phillips Jr.  : 1955  MRN: [id number]    Date of Admission: 2021  Primary Care Physician: Anibal Maria MD    Subjective   Subjective     CC:  Confusion and shortness of breath    HPI:  Patient is a 66-year-old who has been admitted for suspected acute on chronic diastolic heart failure exacerbation as well as hepatic encephalopathy.  He does have a history of Melo cirrhosis.  His ammonia level was elevated in the ER and he was given lactulose.  Chest imaging revealed bilateral pleural effusions and he was given 1 mg of Bumex in the ED.  Cardiology and GI consults are pending.    ROS:  General: No fever, chills, onset of fatigue  ENT: No sore throat, trouble swallowing or changes in vision  Respiratory: pos shortness of breath, cough, denies wheezing or fast breathing  Cardiovascular: No chest pain, palpitations, positive dyspnea with exertion  Gastrointestinal: No nausea vomiting abdominal pain  Musculoskeletal: Positive difficulty walking, pos weakness  Vascular: No cyanosis or clubbing  Lymphatic: pos peripheral edema, no lymphadenopathy  Neurologic: No headache, positive confusion, pos dizziness  Psychiatric: No changes in mood.  No depression or anxiety.       Objective   Objective     Vital Signs:   Temp:  [97.2 °F (36.2 °C)-97.9 °F (36.6 °C)] 97.9 °F (36.6 °C)  Heart Rate:  [72-80] 77  Resp:  [20-22] 20  BP: (134-146)/(79-89) 146/79  Flow (L/min):  [5] 5     Physical Exam:  Constitutional: No acute distress, awake, alert, nontoxic, obese body habitus  Eyes: Pupils equal, sclerae anicteric, no conjunctival injection  HENT: NCAT, mucous membranes moist  Neck: Supple, no thyromegaly, no lymphadenopathy  Respiratory: Decreased breath sounds bilaterally, good effort, mildly labored respirations   Cardiovascular: RRR, distant  Gastrointestinal: Positive bowel sounds, soft, nontender,  nondistended  Musculoskeletal: 2+ peripheral edema, generally weak  Psychiatric: Appropriate affect, good insight and judgement, cooperative  Neurologic: Oriented to person and place, movements symmetric BUE and BLE, Cranial Nerves grossly intact, speech clear and fluent  Skin: No rashes, no jaundice, no petechiae, no mottling      Results Reviewed:  LAB RESULTS:      Lab 09/04/21  2300   WBC 6.11   HEMOGLOBIN 10.1*   HEMATOCRIT 32.6*   PLATELETS 117*   NEUTROS ABS 4.45   IMMATURE GRANS (ABS) 0.14*   LYMPHS ABS 0.79   MONOS ABS 0.51   EOS ABS 0.17   MCV 96.7   D DIMER QUANT 1.99*         Lab 09/04/21  2300   SODIUM 132*   POTASSIUM 4.0   CHLORIDE 93*   CO2 25.0   ANION GAP 14.0   BUN 50*   CREATININE 1.66*   GLUCOSE 162*   CALCIUM 9.8         Lab 09/04/21  2300   TOTAL PROTEIN 7.3   ALBUMIN 3.80   GLOBULIN 3.5   ALT (SGPT) 15   AST (SGOT) 26   BILIRUBIN 0.8   ALK PHOS 150*         Lab 09/05/21  0255 09/04/21  2300   PROBNP  --  656.8   TROPONIN T 0.036* 0.035*                 Brief Urine Lab Results  (Last result in the past 365 days)      Color   Clarity   Blood   Leuk Est   Nitrite   Protein   CREAT   Urine HCG        08/13/21 0718 Yellow Clear Negative Negative Negative Negative               Microbiology Results Abnormal     Procedure Component Value - Date/Time    COVID PRE-OP / PRE-PROCEDURE SCREENING ORDER (NO ISOLATION) - Swab, Nasopharynx [[id number]]  (Normal) Collected: 09/04/21 2258    Lab Status: Final result Specimen: Swab from Nasopharynx Updated: 09/04/21 2350    Narrative:      The following orders were created for panel order COVID PRE-OP / PRE-PROCEDURE SCREENING ORDER (NO ISOLATION) - Swab, Nasopharynx.  Procedure                               Abnormality         Status                     ---------                               -----------         ------                     COVID-19 and FLU A/B PCR...[[id number]]  Normal              Final result                 Please view results for these tests  on the individual orders.    COVID-19 and FLU A/B PCR - Swab, Nasopharynx [[id number]]  (Normal) Collected: 09/04/21 2255    Lab Status: Final result Specimen: Swab from Nasopharynx Updated: 09/04/21 4191     COVID19 Not Detected     Influenza A PCR Not Detected     Influenza B PCR Not Detected    Narrative:      Fact sheet for providers: https://www.fda.gov/media/779254/download    Fact sheet for patients: https://www.fda.gov/media/132092/download    Test performed by PCR.          XR Chest 1 View    Result Date: 9/4/2021  CR Chest 1 Vw INDICATION: Shortness of breath COMPARISON:  8/12/2021 FINDINGS: Portable AP view(s) of the chest.  The heart and mediastinal contours are normal. The lungs are clear. No pneumothorax or pleural effusion.     Impression: No definite acute cardiopulmonary findings. Signer Name: Jovana Levy MD  Signed: 9/4/2021 11:27 PM  Workstation Name: [id number]  [facility]    CT Angiogram Chest    Result Date: 9/5/2021  CTA Chest INDICATION: Shortness of air with elevated d-dimer. TECHNIQUE: CT angiogram of the chest with IV contrast. 3-D reconstructions were obtained and reviewed.   Radiation dose reduction techniques included automated exposure control or exposure modulation based on body size. Count of known CT and cardiac nuc med studies performed in previous 12 months: 1. COMPARISON: 1/5/2019 FINDINGS: No pulmonary embolism or aortic dissection is seen. Again seen are pericardial calcifications which may be the result of prior pericarditis. This may be causing some constriction. There is no pericardial effusion. There are moderate bilateral pleural effusions. There is no adenopathy. Bilateral gynecomastia is present. Upper abdominal images show a cirrhotic appearance of the liver with a trace amount of ascites. There is anasarca. Gallbladder is surgically absent. Lung windows demonstrate compressive atelectasis in the lower lobes associated with the pleural effusions.  Minimal groundglass opacities are present in the upper lobes which may reflect mild pneumonitis or possibly mild edema. Imaging features are atypical or uncommonly reported for COVID-19 pneumonia. Alternative diagnoses should be considered.     Impression: 1. No PE or aortic dissection. 2. Chronic pericardial calcifications may be causing some constriction. 3. Moderate bilateral pleural effusions with bilateral lower lobe atelectasis. 4. Minimal groundglass opacities in the upper lobes may reflect mild pneumonitis or possibly edema. 5. Cirrhosis and ascites in the upper abdomen with anasarca. Signer Name: Mathew Mcdaniel MD  Signed: 9/5/2021 1:07 AM  Workstation Name: [facility]  [facility]      Results for orders placed during the hospital encounter of 01/04/19    Adult Transthoracic Echo Complete W/ Cont if Necessary Per Protocol    Interpretation Summary  · Left ventricular systolic function is hyperdynamic (EF > 70).      I have reviewed the medications:  Scheduled Meds:aspirin, 81 mg, Oral, Daily  bumetanide, 4 mg, Oral, Daily  cefTRIAXone, 2 g, Intravenous, Q24H  cetirizine, 10 mg, Oral, Daily  gabapentin, 900 mg, Oral, BID  heparin (porcine), 5,000 Units, Subcutaneous, Q12H  insulin lispro, 0-7 Units, Subcutaneous, TID AC  lactulose, 45 mL, Oral, TID  levothyroxine, 75 mcg, Oral, Daily  montelukast, 10 mg, Oral, Nightly  OXcarbazepine, 300 mg, Oral, BID  pramipexole, 0.75 mg, Oral, BID  pravastatin, 40 mg, Oral, Daily  riFAXIMin, 550 mg, Oral, Q12H  sodium chloride, 10 mL, Intravenous, Q12H  spironolactone, 100 mg, Oral, Daily  venlafaxine XR, 150 mg, Oral, Nightly      Continuous Infusions:   PRN Meds:.•  acetaminophen  •  dextrose  •  dextrose  •  glucagon (human recombinant)  •  influenza vaccine  •  melatonin  •  ondansetron  •  sodium chloride  •  sodium chloride    Assessment/Plan   Assessment & Plan     Active Hospital Problems    Diagnosis  POA   • **Encephalopathy, hepatic  (CMS/HCC) [K72.90]  Yes   • Elevated troponin [R77.8]  Yes   • Hyponatremia [E87.1]  Yes   • OMAR (acute kidney injury) (CMS/HCC) [N17.9]  Yes   • Bilateral pleural effusion [J90]  Yes   • Hepatic encephalopathy (CMS/HCC) [K72.90]  Yes   • Anemia [D64.9]  Yes   • Uncontrolled type 2 diabetes mellitus with hyperglycemia (CMS/HCC) [E11.65]  Yes   • Other hyperlipidemia [E78.49]  Yes   • ADD (attention deficit disorder) [F98.8]  Yes   • Lymphedema [I89.0]  Yes   • Severe JAIME on CPAP [G47.33, Z99.89]  Not Applicable   • Diabetes mellitus type 2, uncontrolled, with complications (CMS/HCC) [E11.8, E11.65]  Yes   • Depression [F32.9]  Yes   • Essential hypertension [I10]  Yes   • Restless legs syndrome (RLS) [G25.81]  Yes   • A-fib (CMS/HCC) [I48.91]  Yes   • Primary narcolepsy without cataplexy [G47.419]  Yes   • Chronic congestive heart failure (CMS/HCC) [I50.9]  Yes   • PVD (peripheral vascular disease) (CMS/HCC) [I73.9]  Yes   • Cirrhosis of liver (CMS/HCC) [K74.60]  Yes      Resolved Hospital Problems   No resolved problems to display.        Brief Hospital Course to date:  Abe Phillips Jr. is a 66 y.o. male  admitted for suspected acute on chronic diastolic heart failure exacerbation as well as hepatic encephalopathy.  He is also being treated for healthcare associated pneumonia.    Acute on chronic diastolic heart failure exacerbation  -Post Bumex 1 mg in the ER  -Cardiology consulted  -Heart failure navigator consult  -Echo pending    Bilateral pleural effusions  Bilateral upper lobe opacities concerning for healthcare associated pneumonia versus edema  -Started Rocephin, plan 5-day course    Hepatic encephalopathy  MCCAULEY cirrhosis  Ascites and pleural effusions  -Continue lactulose  -GI consult pending  -Status post EGD on 8/16/2021 with portal gastropathy and lower esophageal varices noted    Acute kidney injury  -Baseline creatinine 1.1-1.3  -Creatinine on 9/4/2021 1.66  -Monitor with periodic labs  -Avoid  nephrotoxic agents    Hyponatremia  -Mild at 132, will monitor    Diabetes mellitus type 2  -Reviewed home med list, no scheduled insulin listed  -Continue sliding scale insulin  -Hemoglobin A1c was 6.60 on 8/13/2021    History of narcolepsy  -Continue home meds    Obstructive sleep apnea    Generalized weakness/debility/failure to thrive  -PT and OT consults    DVT prophylaxis:  Medical DVT prophylaxis orders are present.       AM-PAC 6 Clicks Score (PT): 12 (09/05/21 7681)    Disposition: I expect the patient to be discharged pending cardiology and GI consults as well as PT and OT eval, will likely need rehab at discharge.    CODE STATUS:   Code Status and Medical Interventions:   Ordered at: 09/05/21 0324     Level Of Support Discussed With:    Patient     Code Status:    CPR     Medical Interventions (Level of Support Prior to Arrest):    Full       Carmelina Jurado MD  09/05/21

## 2021-09-05 NOTE — PLAN OF CARE
Problem: Adult Inpatient Plan of Care  Goal: Plan of Care Review  Recent Flowsheet Documentation  Taken 9/5/2021 1305 by Anusha Birmingham OT  Progress: (OT IE) no change  Plan of Care Reviewed With: patient  Outcome Summary: OT IE completed post chart review. Pt A & O x 4, reports abdominal and LE pain, presents with edema at LEs, abdomen. Pt presents with decreased I in ADLs, related t/f compared to PLOF impacted by decreased fxl endurance with increased SOB and exertion with more dynamic tasks, muscle weakness, decreased ability to reach distally. Pt completed supine > sitting with mod A x2, sitting > supine with max A x 2, dep A x 2 for scooting to EOB, req'd mod to max A for UBD d/d gown, dep A for d/d socks and SUA for face/hand washing. Pt reported dizziness, SOB and exertion upon sitting and when attempting more dynamic tasks thus requiring skilled cues for PLB and rest to recover. Pt just recently d/c'd from [facility] however would benefit from IPOT POC and IRF at d/c to address underlying impairments impacting fxn at current time.

## 2021-09-05 NOTE — THERAPY EVALUATION
Patient Name: Abe Phillips Jr.  : 1955    MRN: [id number]                              Today's Date: 2021       Admit Date: 2021    Visit Dx:     ICD-10-CM ICD-9-CM   1. Hepatic encephalopathy (CMS/HCC)  K72.90 572.2   2. Acute pulmonary edema (CMS/HCC)  J81.0 518.4   3. Pleural effusion  J90 511.9   4. Acute kidney injury (CMS/HCC)  N17.9 584.9   5. History of diabetes mellitus  Z86.39 V12.29     Patient Active Problem List   Diagnosis   • Type 2 diabetes mellitus with hyperglycemia (CMS/HCC)   • Thrombocytopenia (CMS/HCC)   • PVD (peripheral vascular disease) (CMS/HCC)   • A-fib (CMS/HCC)   • Cirrhosis of liver (CMS/HCC)   • Chronic congestive heart failure (CMS/HCC)   • Diabetic ulcer of right heel (CMS/HCC)   • Primary narcolepsy without cataplexy   • Essential hypertension   • Morbidly obese (CMS/HCC)   • Restless legs syndrome (RLS)   • ADD (attention deficit disorder)   • Depression   • Diabetes mellitus type 2, uncontrolled, with complications (CMS/HCC)   • Edema   • MCCAULEY (nonalcoholic steatohepatitis)   • Hepatitis B   • Insomnia   • Lymphedema   • Obesity   • Severe JAIME on CPAP   • RLS (restless legs syndrome)   • Tremor of both hands   • Restrictive cardiomyopathy (CMS/HCC)   • Other hyperlipidemia   • Uncontrolled type 2 diabetes mellitus with hyperglycemia (CMS/HCC)   • Dyslipidemia   • Hyperglycemia   • Diabetic ulcer of right heel associated with type 2 diabetes mellitus (CMS/HCC)   • Facial paralysis/Dryden palsy   • Diabetic hypoglycemia (CMS/HCC)   • Hypogonadism in male   • Atypical facial pain   • Bilateral leg weakness   • Anemia   • Elevated troponin   • Hyponatremia   • OMAR (acute kidney injury) (CMS/HCC)   • Bilateral pleural effusion   • Encephalopathy, hepatic (CMS/HCC)   • Hepatic encephalopathy (CMS/HCC)     Past Medical History:   Diagnosis Date   • A-fib (CMS/HCC)    • ADD (attention deficit disorder)    • Atrial flutter (CMS/HCC)    • Bell's palsy    • Cellulitis    • CHF  (congestive heart failure) (CMS/HCC)    • Cirrhosis of liver (CMS/HCC)    • Constipation    • Depression    • Diabetes mellitus (CMS/HCC)    • Diabetic ulcer of right foot (CMS/HCC)    • Disease of thyroid gland    • Edema    • Fatty liver    • Hepatitis B    • Hyperlipidemia    • Hypokalemia    • Insomnia    • Lymphedema    • Narcolepsy    • Neuropathy    • Obesity    • JAIME on CPAP    • PVD (peripheral vascular disease) (CMS/HCC)    • RLS (restless legs syndrome)    • Skin cancer    • Tardive dyskinesia    • Tremor of both hands    • Type 2 diabetes mellitus (CMS/HCC)    • Yeast infection      Past Surgical History:   Procedure Laterality Date   • ACHILLES TENDON REPAIR     • CARDIAC ABLATION     • CHOLECYSTECTOMY     • ENDOSCOPY N/A 8/16/2021    Procedure: ESOPHAGOGASTRODUODENOSCOPY;  Surgeon: Brunner, Mark I, MD;  Location: [facility];  Service: Gastroenterology;  Laterality: N/A;   • LASER ABLATION      VEIN RLE     General Information     Row Name 09/05/21 1250          Physical Therapy Time and Intention    Document Type  evaluation  -KG     Mode of Treatment  physical therapy  -KG     Row Name 09/05/21 1250          General Information    Patient Profile Reviewed  yes  -KG     Prior Level of Function  independent:;all household mobility;ADL's  -KG     Existing Precautions/Restrictions  fall;other (see comments) increased edema throughout  -KG     Barriers to Rehab  medically complex;previous functional deficit  -KG     Row Name 09/05/21 1250          Living Environment    Lives With  spouse;child(joni), adult  -KG     Row Name 09/05/21 1250          Home Main Entrance    Number of Stairs, Main Entrance  four  -KG     Stair Railings, Main Entrance  railing on left side (ascending)  -KG     Row Name 09/05/21 1250          Stairs Within Home, Primary    Stairs, Within Home, Primary  patient stays on main level  -KG     Row Name 09/05/21 1250          Cognition    Orientation Status (Cognition)  oriented x 4   -KG     Row Name 09/05/21 1250          Safety Issues, Functional Mobility    Safety Issues Affecting Function (Mobility)  safety precaution awareness;safety precautions follow-through/compliance  -KG     Impairments Affecting Function (Mobility)  balance;endurance/activity tolerance;pain;shortness of breath;strength  -KG       User Key  (r) = Recorded By, (t) = Taken By, (c) = Cosigned By    Initials Name Provider Type    JET Ivon Franklin Physical Therapist        Mobility     Row Name 09/05/21 1251          Bed Mobility    Bed Mobility  supine-sit;sit-supine;scooting/bridging  -KG     Scooting/Bridging [place] (Bed Mobility)  dependent (less than 25% patient effort);2 person assist;verbal cues  -KG     Supine-Sit [place] (Bed Mobility)  moderate assist (50% patient effort);2 person assist;verbal cues  -KG     Sit-Supine [place] (Bed Mobility)  maximum assist (25% patient effort);2 person assist;verbal cues  -KG     Assistive Device (Bed Mobility)  bed rails;draw sheet;head of bed elevated  -KG     Comment (Bed Mobility)  required HOB elevated, edema makes mobility difficult  -KG     Row Name 09/05/21 1251          Bed-Chair Transfer    Bed-Chair [place] (Transfers)  unable to assess  -KG     Row Name 09/05/21 1251          Sit-Stand Transfer    Sit-Stand [place] (Transfers)  unable to assess  -KG     [facility] Name 09/05/21 1251          Gait/Stairs (Locomotion)    [place] Level (Gait)  unable to assess  -KG       User Key  (r) = Recorded By, (t) = Taken By, (c) = Cosigned By    Initials Name Provider Type    JET Ivon Franklin Physical Therapist        Obj/Interventions     Row Name 09/05/21 1253          Range of Motion Comprehensive    General Range of Motion  lower extremity range of motion deficits identified  -KG     Comment, General Range of Motion  edema limiting ROM  -KG     Row Name 09/05/21 1253          Strength Comprehensive (MMT)    General Manual Muscle Testing (MMT)  Assessment  lower extremity strength deficits identified  -KG     Comment, General Manual Muscle Testing (MMT) Assessment  4-/5 BLE  -KG     Row Name 09/05/21 1253          Motor Skills    Motor Skills  therapeutic exercise  -KG     Therapeutic Exercise  knee;ankle;hip  -KG     Row Name 09/05/21 1253          Hip (Therapeutic Exercise)    Hip (Therapeutic Exercise)  AROM (active range of motion)  -KG     Hip AROM (Therapeutic Exercise)  bilateral;aBduction;aDduction;10 repetitions  -KG     Row Name 09/05/21 1253          Knee (Therapeutic Exercise)    Knee (Therapeutic Exercise)  AROM (active range of motion)  -KG     Knee AROM (Therapeutic Exercise)  bilateral;SLR (straight leg raise);LAQ (long arc quad);2 sets;10 repetitions  -KG     Row Name 09/05/21 1253          Ankle (Therapeutic Exercise)    Ankle (Therapeutic Exercise)  AROM (active range of motion)  -KG     Ankle AROM (Therapeutic Exercise)  bilateral;dorsiflexion;plantarflexion;2 sets;10 repetitions  -KG     Row Name 09/05/21 1253          Balance    Balance Assessment  sitting static balance  -KG     Static Sitting Balance  WFL;supported;sitting, edge of bed  -KG       User Key  (r) = Recorded By, (t) = Taken By, (c) = Cosigned By    Initials Name Provider Type    KG Ivon Franklin Physical Therapist        Goals/Plan     Row Name 09/05/21 1310          Bed Mobility Goal 1 (PT)    Activity/Assistive Device (Bed Mobility Goal 1, PT)  sit to supine/supine to sit  -KG     [place] Level/Cues Needed (Bed Mobility Goal 1, PT)  contact guard assist  -KG     Time Frame (Bed Mobility Goal 1, PT)  short term goal (STG);5 days  -KG     Progress/Outcomes (Bed Mobility Goal 1, PT)  continuing progress toward goal  -KG     Row Name 09/05/21 1310          Transfer Goal 1 (PT)    Activity/Assistive Device (Transfer Goal 1, PT)  sit-to-stand/stand-to-sit;bed-to-chair/chair-to-bed;toilet  -KG     [place] Level/Cues Needed (Transfer Goal 1, PT)  contact guard  assist  -KG     Time Frame (Transfer Goal 1, PT)  short term goal (STG);5 days  -KG     Progress/Outcome (Transfer Goal 1, PT)  continuing progress toward goal  -KG     Row Name 09/05/21 1310          Gait Training Goal 1 (PT)    Activity/Assistive Device (Gait Training Goal 1, PT)  gait (walking locomotion);walker, rolling  -KG     [place] Level (Gait Training Goal 1, PT)  minimum assist (75% or more patient effort)  -KG     Distance (Gait Training Goal 1, PT)  50  -KG     Time Frame (Gait Training Goal 1, PT)  long term goal (LTG);10 days  -KG     Progress/Outcome (Gait Training Goal 1, PT)  continuing progress toward goal  -KG       User Key  (r) = Recorded By, (t) = Taken By, (c) = Cosigned By    Initials Name Provider Type    JET Ivon Franklin Physical Therapist        Clinical Impression     Row Name 09/05/21 1256          Pain    Additional Documentation  Pain Scale: Numbers Pre/Post-Treatment (Group)  -KG     Row Name 09/05/21 1256          Pain Scale: Numbers Pre/Post-Treatment    Pretreatment Pain Rating  2/10  -KG     Posttreatment Pain Rating  4/10  -KG     Pain Location - Orientation  generalized  -KG     Pain Location  abdomen  -KG     Pain Intervention(s)  Repositioned;Ambulation/increased activity  -KG     Row Name 09/05/21 1256          Plan of Care Review    Plan of Care Reviewed With  patient  -KG     Progress  no change  -KG     Outcome Summary  PT eval performed: Patient presenting with significant generalized edema, poor stamina, decline in function.  Mod-A x2 for supine to sitting EOB.  Pt unable to tolerate attempt at transfers or standing due to poor activity tolerance and discomfort from edema.  Pt recently d/c from [facility].  Recommend continued IPPT and IPR pending progress.  Pt interested in different rehab facility.  -KG     Row Name 09/05/21 125          Therapy Assessment/Plan (PT)    Rehab Potential (PT)  good, to achieve stated therapy goals  -KG     Criteria for Skilled  Interventions Met (PT)  yes;meets criteria;skilled treatment is necessary  -KG     Row Name 09/05/21 1256          Vital Signs    Pre Systolic BP Rehab  148  -KG     Pre Treatment Diastolic BP  84  -KG     Post Systolic BP Rehab  130  -KG     Post Treatment Diastolic BP  71  -KG     Row Name 09/05/21 1256          Positioning and Restraints    Pre-Treatment Position  in bed  -KG     Post Treatment Position  bed  -KG     In Bed  fowlers;call light within reach;encouraged to call for assist;exit alarm on  -KG       User Key  (r) = Recorded By, (t) = Taken By, (c) = Cosigned By    Initials Name Provider Type    Ivon Carr Physical Therapist        Outcome Measures     Row Name 09/05/21 1311 09/05/21 0448       How much help from another person do you currently need...    Turning from your back to your side while in flat bed without using bedrails?  2  -KG  2  -JS    Moving from lying on back to sitting on the side of a flat bed without bedrails?  2  -KG  2  -JS    Moving to and from a bed to a chair (including a wheelchair)?  1  -KG  2  -JS    Standing up from a chair using your arms (e.g., wheelchair, bedside chair)?  1  -KG  2  -JS    Climbing 3-5 steps with a railing?  1  -KG  2  -JS    To walk in hospital room?  1  -KG  2  -JS    AM-PAC 6 Clicks Score (PT)  8  -KG  12  -JS    Row Name 09/05/21 1319 09/05/21 1311       Functional Assessment    Outcome Measure Options  AM-PAC 6 Clicks Daily Activity (OT)  -JY  AM-PAC 6 Clicks Basic Mobility (PT)  -KG      User Key  (r) = Recorded By, (t) = Taken By, (c) = Cosigned By    Initials Name Provider Type    Catrachito Kincaid RN Registered Nurse    Ivon Carr Physical Therapist    Anusha Rodriguez OT Occupational Therapist                       Physical Therapy Education                 Title: PT OT SLP Therapies (In Progress)     Topic: Physical Therapy (Done)     Point: Mobility training (Done)     Learning Progress Summary           Patient  Acceptance, E,TB, VU by KG at 9/5/2021 1312                   Point: Home exercise program (Done)     Learning Progress Summary           Patient Acceptance, E,TB, VU by KG at 9/5/2021 1312                   Point: Body mechanics (Done)     Learning Progress Summary           Patient Acceptance, E,TB, VU by KG at 9/5/2021 1312                   Point: Precautions (Done)     Learning Progress Summary           Patient Acceptance, E,TB, VU by KG at 9/5/2021 1312                               User Key     Initials Effective Dates Name Provider Type Discipline    KG 06/16/21 -  Ivon Franklin Physical Therapist PT              PT Recommendation and Plan     Plan of Care Reviewed With: patient  Progress: no change  Outcome Summary: PT eval performed: Patient presenting with significant generalized edema, poor stamina, decline in function.  Mod-A x2 for supine to sitting EOB.  Pt unable to tolerate attempt at transfers or standing due to poor activity tolerance and discomfort from edema.  Pt recently d/c from [facility].  Recommend continued IPPT and IPR pending progress.  Pt interested in different rehab facility.     Time Calculation:   PT Charges     Row Name 09/05/21 1422             Time Calculation    Start Time  1115  -KG      PT Received On  09/05/21  -KG      PT Goal Re-Cert Due Date  09/15/21  -KG         Time Calculation- PT    Total Timed Code Minutes- PT  10 minute(s)  -KG         Timed Charges    08605 - PT Therapeutic Exercise Minutes  10  -KG         Untimed Charges    PT Eval/Re-eval Minutes  45  -KG         Total Minutes    Timed Charges Total Minutes  10  -KG      Untimed Charges Total Minutes  45  -KG       Total Minutes  55  -KG        User Key  (r) = Recorded By, (t) = Taken By, (c) = Cosigned By    Initials Name Provider Type    KG Ivon Franklin Physical Therapist        Therapy Charges for Today     Code Description Service Date Service Provider Modifiers Qty    [id number]  PT THER PROC EA 15  MIN 9/5/2021 Ivon Franklin GP 1    [id number] HC PT EVAL LOW COMPLEXITY 3 9/5/2021 Ivon Franklin GP 1          PT G-Codes  Outcome Measure Options: AM-PAC 6 Clicks Daily Activity (OT)  AM-PAC 6 Clicks Score (PT): 8  AM-PAC 6 Clicks Score (OT): 12    Ivon Franklin  9/5/2021

## 2021-09-05 NOTE — OUTREACH NOTE
Prep Survey      Responses   [facility] patient discharged from?  [place]   Is LACE score < 7 ?  No   Emergency Room discharge w/ pulse ox?  No   Eligibility  Not Eligible   What are the reasons patient is not eligible?  Readmitted   Does the patient have one of the following disease processes/diagnoses(primary or secondary)?  Other   Prep survey completed?  Yes          Brenda Coronado RN

## 2021-09-05 NOTE — PLAN OF CARE
Goal Outcome Evaluation:  Plan of Care Reviewed With: patient        Progress: no change  Outcome Summary: PT eval performed: Patient presenting with significant generalized edema, poor stamina, decline in function.  Mod-A x2 for supine to sitting EOB.  Pt unable to tolerate attempt at transfers or standing due to poor activity tolerance and discomfort from edema.  Pt recently d/c from [facility].  Recommend continued IPPT and IPR pending progress.  Pt interested in different rehab facility.

## 2021-09-05 NOTE — THERAPY EVALUATION
Patient Name: Abe Phillips Jr.  : 1955    MRN: [id number]                              Today's Date: 2021       Admit Date: 2021    Visit Dx:     ICD-10-CM ICD-9-CM   1. Hepatic encephalopathy (CMS/HCC)  K72.90 572.2   2. Acute pulmonary edema (CMS/HCC)  J81.0 518.4   3. Pleural effusion  J90 511.9   4. Acute kidney injury (CMS/HCC)  N17.9 584.9   5. History of diabetes mellitus  Z86.39 V12.29     Patient Active Problem List   Diagnosis   • Type 2 diabetes mellitus with hyperglycemia (CMS/HCC)   • Thrombocytopenia (CMS/HCC)   • PVD (peripheral vascular disease) (CMS/HCC)   • A-fib (CMS/HCC)   • Cirrhosis of liver (CMS/HCC)   • Chronic congestive heart failure (CMS/HCC)   • Diabetic ulcer of right heel (CMS/HCC)   • Primary narcolepsy without cataplexy   • Essential hypertension   • Morbidly obese (CMS/HCC)   • Restless legs syndrome (RLS)   • ADD (attention deficit disorder)   • Depression   • Diabetes mellitus type 2, uncontrolled, with complications (CMS/HCC)   • Edema   • MCCAULEY (nonalcoholic steatohepatitis)   • Hepatitis B   • Insomnia   • Lymphedema   • Obesity   • Severe JAIME on CPAP   • RLS (restless legs syndrome)   • Tremor of both hands   • Restrictive cardiomyopathy (CMS/HCC)   • Other hyperlipidemia   • Uncontrolled type 2 diabetes mellitus with hyperglycemia (CMS/HCC)   • Dyslipidemia   • Hyperglycemia   • Diabetic ulcer of right heel associated with type 2 diabetes mellitus (CMS/HCC)   • Facial paralysis/Coram palsy   • Diabetic hypoglycemia (CMS/HCC)   • Hypogonadism in male   • Atypical facial pain   • Bilateral leg weakness   • Anemia   • Elevated troponin   • Hyponatremia   • OMAR (acute kidney injury) (CMS/HCC)   • Bilateral pleural effusion   • Encephalopathy, hepatic (CMS/HCC)   • Hepatic encephalopathy (CMS/HCC)     Past Medical History:   Diagnosis Date   • A-fib (CMS/HCC)    • ADD (attention deficit disorder)    • Atrial flutter (CMS/HCC)    • Bell's palsy    • Cellulitis    • CHF  (congestive heart failure) (CMS/HCC)    • Cirrhosis of liver (CMS/HCC)    • Constipation    • Depression    • Diabetes mellitus (CMS/HCC)    • Diabetic ulcer of right foot (CMS/HCC)    • Disease of thyroid gland    • Edema    • Fatty liver    • Hepatitis B    • Hyperlipidemia    • Hypokalemia    • Insomnia    • Lymphedema    • Narcolepsy    • Neuropathy    • Obesity    • JAIME on CPAP    • PVD (peripheral vascular disease) (CMS/HCC)    • RLS (restless legs syndrome)    • Skin cancer    • Tardive dyskinesia    • Tremor of both hands    • Type 2 diabetes mellitus (CMS/HCC)    • Yeast infection      Past Surgical History:   Procedure Laterality Date   • ACHILLES TENDON REPAIR     • CARDIAC ABLATION     • CHOLECYSTECTOMY     • ENDOSCOPY N/A 8/16/2021    Procedure: ESOPHAGOGASTRODUODENOSCOPY;  Surgeon: Brunner, Mark I, MD;  Location: [facility] ENDOSCOPY;  Service: Gastroenterology;  Laterality: N/A;   • LASER ABLATION      VEIN RLE     General Information     Row Name 09/05/21 1156          OT Time and Intention    Document Type  evaluation  -JY     Mode of Treatment  occupational therapy  -JY     Row Name 09/05/21 8377          General Information    Patient Profile Reviewed  yes  -JY     Prior Level of Function  min assist:;dressing;bathing;independent:;all household mobility;transfer;bed mobility;feeding;grooming difficult to fully obtain pt PLOF thus will need further f/u yet per chart review recently at [facility] for rehab (home 1.5 days before return to hospital), was using FWW, utilized  AE for LBD, able to complete simple g/h  -JY     Existing Precautions/Restrictions  fall;other (see comments) increased edema throughout  -JY     Barriers to Rehab  medically complex;previous functional deficit  -JY     Row Name 09/05/21 0514          Living Environment    Lives With  spouse;child(joni), adult;grandchild(joni) spouse, dtg, HANNA, grandchildren most of who can assist as able when not at work (less A available during the day)   -JY     Row Name 09/05/21 1156          Home Main Entrance    Number of Stairs, Main Entrance  four  -JY     Stair Railings, Main Entrance  railing on left side (ascending)  -ALENAY     Row Name 09/05/21 1156          Stairs Within Home, Primary    Stairs, Within Home, Primary  has flight to basement and upstairs however pt does not have to use at baseline with bed, bath and kitchen all on main level  -JY     Number of Stairs, Within Home, Primary  other (see comments) see comment related to stairs  -JY     Row Name 09/05/21 1156          Cognition    Orientation Status (Cognition)  oriented x 4  -JY     Row Name 09/05/21 1156          Safety Issues, Functional Mobility    Safety Issues Affecting Function (Mobility)  safety precaution awareness;safety precautions follow-through/compliance;sequencing abilities  -JY     Impairments Affecting Function (Mobility)  balance;endurance/activity tolerance;pain;shortness of breath;strength  -JY     Comment, Safety Issues/Impairments (Mobility)  pt alert and able to follow commands, progressive mobility limited d/t pt report of pain, exertion/SOB with increased activity and plan for wound care to address LEs thus ultimately returned to bed level  -JY       User Key  (r) = Recorded By, (t) = Taken By, (c) = Cosigned By    Initials Name Provider Type    Anusha Rodriguez OT Occupational Therapist          Mobility/ADL's     Row Name 09/05/21 1246 09/05/21 1208       Bed Mobility    Bed Mobility  --  supine-sit;sit-supine;scooting/bridging  -JSERGIO    Scooting/Bridging [place] (Bed Mobility)  dependent (less than 25% patient effort);2 person assist;verbal cues for scooting to HOB  -JY  --    Supine-Sit [place] (Bed Mobility)  moderate assist (50% patient effort);2 person assist;verbal cues  -JY  --    Sit-Supine [place] (Bed Mobility)  maximum assist (25% patient effort);2 person assist;verbal cues  -JY  --    Bed Mobility, Safety Issues  decreased use of arms for  "pushing/pulling;decreased use of legs for bridging/pushing  -JY  --    Assistive Device (Bed Mobility)  bed rails;draw sheet;head of bed elevated  -JY  --    Comment (Bed Mobility)  pt req'd HOB elevated to come from more semi sitting position to full sitting and reverse upon return to supine; pt presented with dizziness, SOB and increased exertion with sitting EOB, resolved with PLB and rest; skilled cues provided for hand placement, seq and weight shifting; increased A for return to supine d/t need for LEs to be elevated to bed height  -  --    [facility] Name 09/05/21 1246          Transfers    Comment (Transfers)  pt deferred STS attempt at EOB indicating \"too much\" for today and further progressive t/f, mobility limited d/t pt need to return to bed for wound mgmt at LEs  -[facility] Name 09/05/21 1246          Functional Mobility    Functional Mobility- Comment  defer to PT for specifics  -JY     Row Name 09/05/21 1246          Activities of Daily Living    BADL Assessment/Intervention  upper body dressing;lower body dressing;grooming  -[facility] Name 09/05/21 1246          Upper Body Dressing Assessment/Training    [place] Level (Upper Body Dressing)  doff;don;pajama/robe;moderate assist (50% patient effort);maximum assist (25% patient effort);verbal cues  -Y     Position (Upper Body Dressing)  sitting up in bed  -JY     Comment (Upper Body Dressing)  pt able to assist in elevation of UEs for threading and unthreading purposes however presents with increased exertion and SOB with more dynamic tasks, completed grossly from semi reclined to conserve energy  -[facility] Name 09/05/21 1246          Lower Body Dressing Assessment/Training    [place] Level (Lower Body Dressing)  doff;don;socks;dependent (less than 25% patient effort)  -JY     Position (Lower Body Dressing)  edge of bed sitting  -JY     Comment (Lower Body Dressing)  pt u/a to safely reach LEs without increased exertion, SOB and dizziness with " increased edema at B feet, LEs, abdomen limiting access; pt reports having LH AE at baseline that may need to be reviewed, utilized again  -     Row Name 09/05/21 1246          Grooming Assessment/Training    [place] Level (Grooming)  wash face, hands;set up  -     Position (Grooming)  edge of bed sitting  -     Comment (Grooming)  pt able to complete face/hand washing without physical A once set up with supplies  -       User Key  (r) = Recorded By, (t) = Taken By, (c) = Cosigned By    Initials Name Provider Type    Anusha Rodriguez, OT Occupational Therapist        Obj/Interventions     Row Name 09/05/21 1300          Sensory Assessment (Somatosensory)    Sensory Assessment (Somatosensory)  bilateral UE;sensation intact  -     Bilateral UE Sensory Assessment  general sensation;light touch awareness;light touch localization;intact  -[facility] Name 09/05/21 1300          Sensory Interventions    Comment, Sensory Intervention  pt denies any numbness or tingling and reports sensation provided to BUEs as intact and equal  -[facility] Name 09/05/21 1300          Vision Assessment/Intervention    Visual Impairment/Limitations  corrective lenses full-time  -     Vision Assessment Comment  pt reports mild blurred vision if not wearing eyewear as indicated full time; no new acute vision change  -[facility] Name 09/05/21 1300          Range of Motion Comprehensive    General Range of Motion  bilateral upper extremity ROM WFL  -     Comment, General Range of Motion  BUE AROM WFL  -[facility] Name 09/05/21 1300          Strength Comprehensive (MMT)    Comment, General Manual Muscle Testing (MMT) Assessment  gross BUE MMS 4/5 per MMT  -[facility] Name 09/05/21 1300          Motor Skills    Motor Skills  functional endurance  -     Functional Endurance  pt presents with decreased activity tolerance kandis toward more dynamic tasks with demo'd exertion, SOB, requires rest break and cued PLB  -     Row Name  09/05/21 1300          Balance    Balance Assessment  sitting static balance;sitting dynamic balance  -JY     Static Sitting Balance  WFL;supported;sitting, edge of bed  -JY     Dynamic Sitting Balance  WFL;supported;sitting, edge of bed;other (see comments) ADL tasks with UEs integrated while pt EOB  -JY     Static Standing Balance  not tested  -JY     Dynamic Standing Balance  not tested  -JY     Balance Interventions  sitting;static;dynamic;occupation based/functional task  -JY     Comment, Balance  pt did not present with any significant LOB during seated tasks at EOB once B feet in contact with floor; u/a to assess standing this date for respective balance assessment  -JY       User Key  (r) = Recorded By, (t) = Taken By, (c) = Cosigned By    Initials Name Provider Type    Anusha Rodriguez OT Occupational Therapist        Goals/Plan     Row Name 09/05/21 1315          Bed Mobility Goal 1 (OT)    Activity/Assistive Device (Bed Mobility Goal 1, OT)  bed mobility activities, all  -JY     [place] Level/Cues Needed (Bed Mobility Goal 1, OT)  minimum assist (75% or more patient effort);verbal cues required  -JY     Time Frame (Bed Mobility Goal 1, OT)  long term goal (LTG);by discharge  -JY     Progress/Outcomes (Bed Mobility Goal 1, OT)  goal ongoing  -VALOREMY     Row Name 09/05/21 1315          Transfer Goal 1 (OT)    Activity/Assistive Device (Transfer Goal 1, OT)  sit-to-stand/stand-to-sit;bed-to-chair/chair-to-bed;commode, bedside without drop arms;other (see comments) w/ recommended AD, progress to toilet as able  -JY     [place] Level/Cues Needed (Transfer Goal 1, OT)  moderate assist (50-74% patient effort);verbal cues required  -JY     Time Frame (Transfer Goal 1, OT)  long term goal (LTG);by discharge  -JY     Progress/Outcome (Transfer Goal 1, OT)  goal ongoing  -VALOREMY     Row Name 09/05/21 1315          Dressing Goal 1 (OT)    Activity/Device (Dressing Goal 1, OT)  lower body dressing;other (see  comments) pt will demo competency using  AE PRN to assist with LBD  -JY     [place]/Cues Needed (Dressing Goal 1, OT)  minimum assist (75% or more patient effort);verbal cues required  -JY     Time Frame (Dressing Goal 1, OT)  long term goal (LTG);by discharge  -JY     Progress/Outcome (Dressing Goal 1, OT)  goal ongoing  -JY     Row Name 09/05/21 1315          Grooming Goal 1 (OT)    Activity/Device (Grooming Goal 1, OT)  hair care;oral care;wash face, hands  -JY     [place] (Grooming Goal 1, OT)  supervision required;verbal cues required  -JY     Time Frame (Grooming Goal 1, OT)  long term goal (LTG);by discharge  -JY     Strategies/Barriers (Grooming Goal 1, OT)  pt will demo improved standing activity tolerance upward of 3 mins with recommended AD for support in order to complete simple ADLs from graded position  -JY     Progress/Outcome (Grooming Goal 1, OT)  goal ongoing  -     Row Name 09/05/21 1315          Strength Goal 1 (OT)    Strength Goal 1 (OT)  Pt to complete HEP encompassing BUEs x 10 reps, tolerable resistance w/ necessary rest breaks in order to strengthen, improve endurance for integration in ADLs, related t/f  -JY     Time Frame (Strength Goal 1, OT)  long term goal (LTG);by discharge  -JY       User Key  (r) = Recorded By, (t) = Taken By, (c) = Cosigned By    Initials Name Provider Type    Anusha Rodriguez OT Occupational Therapist        Clinical Impression     Row Name 09/05/21 1303          Pain Assessment    Additional Documentation  Pain Scale: Numbers Pre/Post-Treatment (Group)  -KRISTINA     Row Name 09/05/21 1305          Pain Scale: Numbers Pre/Post-Treatment    Pretreatment Pain Rating  2/10  -JY     Posttreatment Pain Rating  4/10  -JY     Pain Location - Orientation  generalized  -JY     Pain Location  abdomen  -JY     Pre/Posttreatment Pain Comment  pt initially reported 2/10 pain at abdomen, increased to 4/10 toward end of session with more vague localization of pain  i.e. abdomen, LEs  -JY     Pain Intervention(s)  Repositioned;Ambulation/increased activity  -JSERGIO     Row Name 09/05/21 1305          Plan of Care Review    Plan of Care Reviewed With  patient  -KRISTINA     Progress  no change OT IE  -JY     Outcome Summary  OT IE completed post chart review. Pt A & O x 4, reports abdominal and LE pain, presents with edema at LEs, abdomen. Pt presents with decreased I in ADLs, related t/f compared to PLOF impacted by decreased fxl endurance with increased SOB and exertion with more dynamic tasks, muscle weakness, decreased ability to reach distally. Pt completed supine > sitting with mod A x2, sitting > supine with max A x 2, dep A x 2 for scooting to EOB, req'd mod to max A for UBD d/d gown, dep A for d/d socks and SUA for face/hand washing. Pt reported dizziness, SOB and exertion upon sitting and when attempting more dynamic tasks thus requiring skilled cues for PLB and rest to recover. Pt just recently d/c'd from [facility] however would benefit from IPOT POC and IRF at d/c to address underlying impairments impacting fxn at current time.  -JY     Row Name 09/05/21 1308          Therapy Assessment/Plan (OT)    Patient/Family Therapy Goal Statement (OT)  to increase I in ADLs, related t/f, return to PLOF  -JY     Rehab Potential (OT)  good, to achieve stated therapy goals  -JY     Criteria for Skilled Therapeutic Interventions Met (OT)  yes;skilled treatment is necessary  -JY     Therapy Frequency (OT)  daily  -JSERGIO     Row Name 09/05/21 1305          Therapy Plan Review/Discharge Plan (OT)    Anticipated Discharge Disposition (OT)  inpatient rehabilitation facility  -JY     Row Name 09/05/21 1305          Vital Signs    Pre Systolic BP Rehab  148  -JY     Pre Treatment Diastolic BP  84  -JY     Intra Systolic BP Rehab  140  -JY     Intra Treatment Diastolic BP  107  -JY     Post Systolic BP Rehab  131  -JY     Post Treatment Diastolic BP  71  -JY     Pretreatment Heart Rate (beats/min)  81  -JY      Intratreatment Heart Rate (beats/min)  81  -JY     Posttreatment Heart Rate (beats/min)  77  -JY     Pre SpO2 (%)  99  -JY     O2 Delivery Pre Treatment  room air  -JY     O2 Delivery Intra Treatment  room air  -JY     Post SpO2 (%)  96  -JY     O2 Delivery Post Treatment  room air  -JY     Pre Patient Position  Supine  -JY     Intra Patient Position  Sitting  -JY     Post Patient Position  Supine  -JY     Row Name 09/05/21 1305          Positioning and Restraints    Pre-Treatment Position  in bed  -JY     Post Treatment Position  bed  -JY     In Bed  notified nsg;fowlers;call light within reach;encouraged to call for assist;exit alarm on;side rails up x3;legs elevated;heels elevated  -JY       User Key  (r) = Recorded By, (t) = Taken By, (c) = Cosigned By    Initials Name Provider Type    Anusha Rodriguez, ARRON Occupational Therapist        Outcome Measures     Row Name 09/05/21 1319          How much help from another is currently needed...    Putting on and taking off regular lower body clothing?  1  -JY     Bathing (including washing, rinsing, and drying)  2  -JY     Toileting (which includes using toilet bed pan or urinal)  1  -JY     Putting on and taking off regular upper body clothing  2  -JY     Taking care of personal grooming (such as brushing teeth)  3  -JY     Eating meals  3  -JY     AM-PAC 6 Clicks Score (OT)  12  -JY     Row Name 09/05/21 1311 09/05/21 3198       How much help from another person do you currently need...    Turning from your back to your side while in flat bed without using bedrails?  2  -KG  2  -JS    Moving from lying on back to sitting on the side of a flat bed without bedrails?  2  -KG  2  -JS    Moving to and from a bed to a chair (including a wheelchair)?  1  -KG  2  -JS    Standing up from a chair using your arms (e.g., wheelchair, bedside chair)?  1  -KG  2  -JS    Climbing 3-5 steps with a railing?  1  -KG  2  -JS    To walk in hospital room?  1  -KG  2  -JS    AM-PAC 6  Clicks Score (PT)  8  -KG  12  -JS    Row Name 09/05/21 1319 09/05/21 1311       Functional Assessment    Outcome Measure Options  AM-PAC 6 Clicks Daily Activity (OT)  -JY  AM-PAC 6 Clicks Basic Mobility (PT)  -KG      User Key  (r) = Recorded By, (t) = Taken By, (c) = Cosigned By    Initials Name Provider Type     Catrachito Jacob, RN Registered Nurse    Ivon Carr Physical Therapist    Anusha Rodriguez OT Occupational Therapist          Occupational Therapy Education                 Title: PT OT SLP Therapies (In Progress)     Topic: Occupational Therapy (In Progress)     Point: ADL training (In Progress)     Description:   Instruct learner(s) on proper safety adaptation and remediation techniques during self care or transfers.   Instruct in proper use of assistive devices.              Learning Progress Summary           Patient Acceptance, E,D, NR by KRISTINA at 9/5/2021 1113                   Point: Home exercise program (Not Started)     Description:   Instruct learner(s) on appropriate technique for monitoring, assisting and/or progressing therapeutic exercises/activities.              Learner Progress:  Not documented in this visit.          Point: Precautions (In Progress)     Description:   Instruct learner(s) on prescribed precautions during self-care and functional transfers.              Learning Progress Summary           Patient Acceptance, E,D, NR by KRISTINA at 9/5/2021 1113                   Point: Body mechanics (In Progress)     Description:   Instruct learner(s) on proper positioning and spine alignment during self-care, functional mobility activities and/or exercises.              Learning Progress Summary           Patient Acceptance, E,D, NR by KRISTINA at 9/5/2021 1113                               User Key     Initials Effective Dates Name Provider Type Discipline    KRISTINA 06/16/21 -  Anusha [place] OT Occupational Therapist OT              OT Recommendation and Plan  Therapy Frequency (OT):  daily  Plan of Care Review  Plan of Care Reviewed With: patient  Progress: no change (OT IE)  Outcome Summary: OT IE completed post chart review. Pt A & O x 4, reports abdominal and LE pain, presents with edema at LEs, abdomen. Pt presents with decreased I in ADLs, related t/f compared to PLOF impacted by decreased fxl endurance with increased SOB and exertion with more dynamic tasks, muscle weakness, decreased ability to reach distally. Pt completed supine > sitting with mod A x2, sitting > supine with max A x 2, dep A x 2 for scooting to EOB, req'd mod to max A for UBD d/d gown, dep A for d/d socks and SUA for face/hand washing. Pt reported dizziness, SOB and exertion upon sitting and when attempting more dynamic tasks thus requiring skilled cues for PLB and rest to recover. Pt just recently d/c'd from [facility] however would benefit from IPOT POC and IRF at d/c to address underlying impairments impacting fxn at current time.     Time Calculation:   Time Calculation- OT     Row Name 09/05/21 1320             Time Calculation- OT    OT Start Time  1113  -JY      OT Received On  09/05/21  -JY      OT Goal Re-Cert Due Date  09/15/21  -JY         Timed Charges    81242 - OT Therapeutic Activity Minutes  9  -JY         Untimed Charges    OT Eval/Re-eval Minutes  49  -JY         Total Minutes    Timed Charges Total Minutes  9  -JY      Untimed Charges Total Minutes  49  -JY       Total Minutes  58  -JY        User Key  (r) = Recorded By, (t) = Taken By, (c) = Cosigned By    Initials Name Provider Type    Anusha Rodriguez OT Occupational Therapist        Therapy Charges for Today     Code Description Service Date Service Provider Modifiers Qty    59716645129 HC OT THERAPEUTIC ACT EA 15 MIN 9/5/2021 Anusha [place] OT GO 1    06918843473 HC OT EVAL MOD COMPLEXITY 4 9/5/2021 Anusha [place] OT GO 1               Anusha [place] OT  9/5/2021

## 2021-09-06 PROBLEM — I50.32 CHRONIC DIASTOLIC CONGESTIVE HEART FAILURE (HCC): Status: ACTIVE | Noted: 2019-01-04

## 2021-09-06 PROBLEM — I42.5 RESTRICTIVE CARDIOMYOPATHY (HCC): Status: RESOLVED | Noted: 2019-01-30 | Resolved: 2021-01-01

## 2021-09-06 PROBLEM — G50.1 ATYPICAL FACIAL PAIN: Chronic | Status: RESOLVED | Noted: 2021-01-01 | Resolved: 2021-01-01

## 2021-09-06 NOTE — THERAPY WOUND CARE TREATMENT
Acute Care - Wound/Debridement Initial Evaluation  [facility]     Patient Name: Abe Phillips Jr.  : 1955  MRN: [id number]  Today's Date: 2021                Admit Date: 2021    Visit Dx:    ICD-10-CM ICD-9-CM   1. Hepatic encephalopathy (CMS/HCC)  K72.90 572.2   2. Acute pulmonary edema (CMS/HCC)  J81.0 518.4   3. Pleural effusion  J90 511.9   4. Acute kidney injury (CMS/HCC)  N17.9 584.9   5. History of diabetes mellitus  Z86.39 V12.29       Patient Active Problem List   Diagnosis   • Type 2 diabetes mellitus with hyperglycemia (CMS/HCC)   • Thrombocytopenia (CMS/HCC)   • PVD (peripheral vascular disease) (CMS/HCC)   • A-fib (CMS/HCC)   • Cirrhosis of liver (CMS/HCC)   • Chronic diastolic congestive heart failure (CMS/HCC)   • Diabetic ulcer of right heel (CMS/HCC)   • Primary narcolepsy without cataplexy   • Essential hypertension   • Morbidly obese (CMS/HCC)   • Restless legs syndrome (RLS)   • ADD (attention deficit disorder)   • Depression   • Diabetes mellitus type 2, uncontrolled, with complications (CMS/HCC)   • Edema   • MCCAULEY (nonalcoholic steatohepatitis)   • Hepatitis B   • Insomnia   • Lymphedema   • Obesity   • Severe JAIME on CPAP   • RLS (restless legs syndrome)   • Tremor of both hands   • Other hyperlipidemia   • Uncontrolled type 2 diabetes mellitus with hyperglycemia (CMS/HCC)   • Dyslipidemia   • Hyperglycemia   • Diabetic ulcer of right heel associated with type 2 diabetes mellitus (CMS/HCC)   • Facial paralysis/[place] palsy   • Diabetic hypoglycemia (CMS/HCC)   • Hypogonadism in male   • Bilateral leg weakness   • Anemia   • Elevated troponin   • Hyponatremia   • OMAR (acute kidney injury) (CMS/HCC)   • Bilateral pleural effusion   • Encephalopathy, hepatic (CMS/HCC)   • Hepatic encephalopathy (CMS/HCC)        Past Medical History:   Diagnosis Date   • A-fib (CMS/HCC)    • ADD (attention deficit disorder)    • Atrial flutter (CMS/HCC)    • Bell's palsy    • Cellulitis    • CHF  (congestive heart failure) (CMS/HCC)    • Cirrhosis of liver (CMS/HCC)    • Constipation    • Depression    • Diabetes mellitus (CMS/HCC)    • Diabetic ulcer of right foot (CMS/HCC)    • Disease of thyroid gland    • Edema    • Fatty liver    • Hepatitis B    • Hyperlipidemia    • Hypokalemia    • Insomnia    • Lymphedema    • Narcolepsy    • Neuropathy    • Obesity    • JAIME on CPAP    • PVD (peripheral vascular disease) (CMS/HCC)    • RLS (restless legs syndrome)    • Skin cancer    • Tardive dyskinesia    • Tremor of both hands    • Type 2 diabetes mellitus (CMS/HCC)    • Yeast infection         Past Surgical History:   Procedure Laterality Date   • ACHILLES TENDON REPAIR     • CARDIAC ABLATION     • CHOLECYSTECTOMY     • ENDOSCOPY N/A 8/16/2021    Procedure: ESOPHAGOGASTRODUODENOSCOPY;  Surgeon: Brunner, Mark I, MD;  Location: [facility] ENDOSCOPY;  Service: Gastroenterology;  Laterality: N/A;   • LASER ABLATION      VEIN RLE           Wound 12/27/19 2334 Right heel Diabetic Ulcer (Active)   Dressing Appearance open to air 09/06/21 1335   Base moist;pink;red 09/06/21 1335   Periwound intact;dry 09/06/21 1335   Periwound Temperature cool 09/06/21 1335   Periwound Skin Turgor firm 09/06/21 1335   Edges callused;open 09/06/21 1335   Wound Length (cm) 0.3 cm 09/06/21 1335   Wound Width (cm) 0.2 cm 09/06/21 1335   Wound Depth (cm) 0.1 cm 09/06/21 1335   Drainage Characteristics/Odor serous 09/06/21 1335   Drainage Amount scant 09/06/21 1335   Care, Wound cleansed with;wound cleanser;debrided 09/06/21 1335   Dressing Care dressing applied;silver impregnated;collagen;low-adherent;foam;multi-layer wrap 09/06/21 1335   Periwound Care cleansed with pH balanced cleanser;dry periwound area maintained 09/06/21 1335     Lymphedema     Row Name 09/06/21 1335             Lymphedema Edema Assessment    Ptting Edema Category  By severity  -MC      Pitting Edema  Moderate  -MC         Skin Changes/Observations    Location/Assessment   Lower Extremity  -      Lower Extremity Conditions  bilateral:;intact;clean;dry;shiny;crust  -      Lower Extremity Color/Pigment  bilateral:;purple;red;hyperpigmented;brawny  -         Lymphedema Pulses/Capillary Refill    Lymphedema Pulses/Capillary Refill  lower extremity pulses;capillary refill  -      Dorsalis Pedis Pulse  right:;left:;+1 diminished  -      Posterior Tibialis Pulse  right:;left:;+1 diminished  -      Capillary Refill  lower extremity capillary refill  -      Lower Extremity Capillary Refill  right:;left:;less than 3 seconds  -         Lymphedema Measurements    Measurement Type(s)  Quick Girth  -      Quick Girth Areas  Lower extremities  -         LLE Quick Girth (cm)    Met-heads  26.8 cm  -MC      Mid foot  27.8 cm  -MC      Smallest ankle  30.8 cm  -MC      Largest calf  52 cm  -      Tib tuberosity  46 cm  -MC         RLE Quick Girth (cm)    Met-heads  26.4 cm  -MC      Mid foot  27.2 cm  -MC      Smallest ankle  31.1 cm  -MC      Largest calf  52.8 cm  -MC      Tib tuberosity  48.2 cm  -      RLE Quick Girth Total  185.7  -         Compression/Skin Care    Compression/Skin Care  skin care;wrapping location;bandaging  -      Skin Care  washed/dried;lotion applied  -      Wrapping Location  lower extremity  -      Wrapping Location LE  bilateral:;foot to knee  -      Wrapping Comments  wound dressings R heel. BLE: size 4/6 compressogrip doubled and overlapping to create gradient compression.  -      Bandage Layers  cotton elastic stocking- double layer (comment size)  -        User Key  (r) = Recorded By, (t) = Taken By, (c) = Cosigned By    Initials Name Provider Type    Nickie Monreal, PT Physical Therapist          WOUND DEBRIDEMENT  Total area of Debridement: 2 cm2  Debridement Site 1  Location- Site 1: R heel  Selective Debridement- Site 1: Wound Surface <20cmsq  Instruments- Site 1: tweezers, scissors  Excised Tissue Description- Site 1:  scant, slough, minimum, other (comment) (periwound callus)  Bleeding- Site 1: none                  PT Assessment (last 12 hours)      PT Evaluation and Treatment     Row Name 09/06/21 1331          Physical Therapy Time and Intention    Subjective Information  complains of;swelling  -     Document Type  wound care;evaluation  -     Mode of Treatment  physical therapy;individual therapy  -     Row Name 09/06/21 3651          General Information    Patient Profile Reviewed  yes  -     Patient Observations  alert;cooperative;agree to therapy  -     Pertinent History of Current Functional Problem  BLE edema, R heel wound known from previous admission  -     Risks Reviewed  patient:;increased discomfort  -     Benefits Reviewed  patient:;improve skin integrity  -     Barriers to Rehab  medically complex;previous functional deficit  -     Row Name 09/06/21 7100          Cognition    Orientation Status (Cognition)  oriented x 4  -     Row Name 09/06/21 5914          Pain Scale: Numbers Pre/Post-Treatment    Pretreatment Pain Rating  0/10 - no pain  -     Posttreatment Pain Rating  0/10 - no pain  -     Row Name 09/06/21 1335          Wound 12/27/19 2334 Right heel Diabetic Ulcer    Wound - Properties Group Placement Date: 12/27/19  -AYLIN Placement Time: 2334  -AYLIN Present on Hospital Admission: Y  -AYLIN Side: Right  -AYLIN Location: heel  -AYLIN Primary Wound Type: Diabetic ulc  -AYLIN    Dressing Appearance  open to air  -     Base  moist;pink;red  -     Periwound  intact;dry  -     Periwound Temperature  cool  -     Periwound Skin Turgor  firm  -     Edges  callused;open  -     Wound Length (cm)  0.3 cm  -     Wound Width (cm)  0.2 cm  -     Wound Depth (cm)  0.1 cm  -     Drainage Characteristics/Odor  serous  -     Drainage Amount  scant  -     Care, Wound  cleansed with;wound cleanser;debrided  -     Dressing Care  dressing applied;silver  impregnated;collagen;low-adherent;foam;multi-layer wrap jeannette, optifoam Ag, primafix tape, MLW  -     Periwound Care  cleansed with pH balanced cleanser;dry periwound area maintained  -     Retired Wound - Properties Group Date first assessed: 12/27/19  - Time first assessed: 2334  -AYLIN Present on Hospital Admission: Y  -AYLIN Side: Right  -AYLIN Location: heel  -AYLIN Primary Wound Type: Diabetic ulc  -    Row Name 09/06/21 4929          Coping    Observed Emotional State  calm;cooperative;pleasant  -     Verbalized Emotional State  acceptance  -     Row Name 09/06/21 7057          Plan of Care Review    Plan of Care Reviewed With  patient  -     Outcome Summary  PT Wound Care eval complete. Pt with small diabetic ulcer to the R heel, chronic, but much improved since his last admission. Pt with moderate BLE edema with chronic venous stasis changes to the skin. Pt will benefit from MLW to improve venous return and maintain limb girth reduction. Anticipate change in 2-3 days.  -     Row Name 09/06/21 8432          Physical Therapy Goals    Wound Care Goal Selection (PT)  wound care, PT goal 1  -     Row Name 09/06/21 1334          Wound Care Goal 1 (PT)    Wound Care Goal 1 (PT)  Pt will demonstrate only mild BLE edema to indicate appropriate edema management.  -     Time Frame (Wound Care Goal 1, PT)  10 days  -     Row Name 09/06/21 1338          Positioning and Restraints    Pre-Treatment Position  in bed  -     Post Treatment Position  bed  -     In Bed  fowlers;call light within reach;encouraged to call for assist  -     Row Name 09/06/21 0119          Therapy Assessment/Plan (PT)    Patient/Family Therapy Goals Statement (PT)  Go home  -     Rehab Potential (PT)  good, to achieve stated therapy goals  -     Criteria for Skilled Interventions Met (PT)  yes;meets criteria;skilled treatment is necessary  -       User Key  (r) = Recorded By, (t) = Taken By, (c) = Cosigned By    Initials  Name Provider Type     Nickie Beverly, PT Physical Therapist    Lewis Cadena RN Registered Nurse        Physical Therapy Education                 Title: PT OT SLP Therapies (In Progress)     Topic: Physical Therapy (Done)     Point: Mobility training (Done)     Learning Progress Summary           Patient Acceptance, E,TB, VU by KG at 9/5/2021 1312                   Point: Home exercise program (Done)     Learning Progress Summary           Patient Acceptance, E,TB, VU by KG at 9/5/2021 1312                   Point: Body mechanics (Done)     Learning Progress Summary           Patient Acceptance, E,TB, VU by KG at 9/5/2021 1312                   Point: Precautions (Done)     Learning Progress Summary           Patient Acceptance, E,TB, VU by KG at 9/5/2021 1312                               User Key     Initials Effective Dates Name Provider Type Discipline     06/16/21 -  Ivon Franklin Physical Therapist PT                Recommendation and Plan  Anticipated Discharge Disposition (PT): inpatient rehabilitation facility (defer to PT mobility)  Planned Therapy Interventions (PT): wound care  Therapy Frequency (PT): daily  Plan of Care Reviewed With: patient           Outcome Summary: PT Wound Care eval complete. Pt with small diabetic ulcer to the R heel, chronic, but much improved since his last admission. Pt with moderate BLE edema with chronic venous stasis changes to the skin. Pt will benefit from MLW to improve venous return and maintain limb girth reduction. Anticipate change in 2-3 days.  Plan of Care Reviewed With: patient            Time Calculation  PT Charges     Row Name 09/06/21 1335             Time Calculation    Start Time  1335  -      PT Goal Re-Cert Due Date  09/16/21  -         Untimed Charges    PT Eval/Re-eval Minutes  25  -MC      Wound Care  81276 Multilayer comp below knee;56463 Selective debridement  -      99138-Vvpmshsqvd comp below knee  15  -MC       42395-[place] debridement  5  -MC         Total Minutes    Untimed Charges Total Minutes  45  -MC       Total Minutes  45  -MC        User Key  (r) = Recorded By, (t) = Taken By, (c) = Cosigned By    Initials Name Provider Type    Nickie Monreal, PT Physical Therapist            Therapy Charges for Today     Code Description Service Date Service Provider Modifiers Qty    81717494379 HC PT RE-EVAL ESTABLISHED PLAN 2 9/6/2021 Nickie Beverly, PT GP 1    38425425460 HC AL DEBRIDE OPEN WOUND UP TO 20CM 9/6/2021 Nickie Beverly, PT GP 1    86223521950 HC PT MULTI LAYER COMP SYS BELOW KNEE 9/6/2021 Nickie Beverly, PT GP 1            PT G-Codes  Outcome Measure Options: AM-PAC 6 Clicks Daily Activity (OT)  AM-PAC 6 Clicks Score (PT): 12  AM-PAC 6 Clicks Score (OT): 12       Nickie Beverly PT  9/6/2021

## 2021-09-06 NOTE — OUTREACH NOTE
Medical Week 1 Survey      Responses   [facility] patient discharged from?  [facility]   Does the patient have one of the following disease processes/diagnoses(primary or secondary)?  Other          Kait Jacob RN

## 2021-09-06 NOTE — CASE MANAGEMENT/SOCIAL WORK
Discharge Planning Assessment  [facility]     Patient Name: Abe Phillips Jr.  MRN: [id number]  Today's Date: 9/6/2021    Admit Date: 9/4/2021    Discharge Needs Assessment     Row Name 09/06/21 1154       Living Environment    Lives With  child(joni), adult;spouse    Current Living Arrangements  home/apartment/condo    Primary Care Provided by  self;child(joni);spouse/significant other    Provides Primary Care For  no one    Family Caregiver if Needed  child(joni), adult;spouse    Quality of Family Relationships  supportive;involved;helpful    Able to Return to Prior Arrangements  yes       Resource/Environmental Concerns    Transportation Concerns  car, none       Transition Planning    Patient/Family Anticipates Transition to  home with family    Patient/Family Anticipated Services at Transition  ;home health care    Transportation Anticipated  family or friend will provide       Discharge Needs Assessment    Readmission Within the Last 30 Days  no previous admission in last 30 days    Equipment Currently Used at Home  walker, rolling;cpap    Concerns to be Addressed  discharge planning    Anticipated Changes Related to Illness  none    Equipment Needed After Discharge  none    Outpatient/Agency/Support Group Needs  homecare agency    Discharge Facility/Level of Care Needs  home with home health    Current Discharge Risk  chronically ill        Discharge Plan     Row Name 09/06/21 1156       Plan    Plan  HOME with     Patient/Family in Agreement with Plan  yes    Plan Comments  Met with pt at bedside.  He resides with his wife, daughter/HANNA, in [facility].  He is independent with ADLs.  Occasionally uses a RW with ambulation.  Has a CPAP.  He is current with [facility].  Confirmed he has Medicare and [employer] insurance with Rx coverage.  Goal is to return home and resume  services upon DC.  Family to transport.  CM will cont to follow.    Final Discharge Disposition Code  06 - home with [place] [facility]  care        Continued Care and Services - Admitted Since 9/4/2021     Home Medical Care     Service Provider Request Status Selected Services Address Phone Fax Patient Preferred    [facility]  Pending - No Request Sent N/A [address] [facility] [address] [phone] --            Selected Continued Care - Prior Encounters Includes selections from prior encounters from 6/6/2021 to 9/6/2021    Discharged on 8/18/2021 Admission date: 8/12/2021 - Discharge disposition: Home or Self Care    Destination     Service Provider Selected Services Address Phone Fax Patient Preferred    [facility]  Inpatient Rehabilitation [address] [facility] [address] [phone] --                      Demographic Summary     Row Name 09/06/21 1153       General Information    Admission Type  inpatient    Referral Source  admission list    Reason for Consult  discharge planning    Preferred Language  English       Contact Information    Permission Granted to Share Info With      Contact Information Obtained for          Functional Status     Row Name 09/06/21 1154       Functional Status    Usual Activity Tolerance  moderate       Functional Status, IADL    Medications  independent    Meal Preparation  independent    Housekeeping  assistive person    Laundry  assistive person    Shopping  assistive person        Psychosocial    No documentation.       Abuse/Neglect    No documentation.       Legal    No documentation.       Substance Abuse    No documentation.       Patient Forms    No documentation.           Odalis Cervantes RN

## 2021-09-06 NOTE — PROGRESS NOTES
[facility] Medicine Services  PROGRESS NOTE    Patient Name: Abe Phillips Jr.  : 1955  MRN: [id number]    Date of Admission: 2021  Primary Care Physician: Anibal Maria MD    Subjective   Subjective     CC:  Confusion and shortness of breath    HPI:   - Patient is a 66-year-old who has been admitted for suspected acute on chronic diastolic heart failure exacerbation as well as hepatic encephalopathy.  He does have a history of Melo cirrhosis.  His ammonia level was elevated in the ER and he was given lactulose.  Chest imaging revealed bilateral pleural effusions and he was given 1 mg of Bumex in the ED.  Cardiology and GI consults are pending.     -patient continues to be somnolent and confused today.  He closes his eyes during conversation.  He had 2 documented bowel movements yesterday.  He is tolerating p.o. well.  He has not been up out of bed.    ROS:  General: No fever, chills, onset of fatigue  ENT: No sore throat, trouble swallowing or changes in vision  Respiratory: pos shortness of breath, cough, denies wheezing or fast breathing  Cardiovascular: No chest pain, palpitations, positive dyspnea with exertion  Gastrointestinal: No nausea vomiting abdominal pain  Musculoskeletal: Positive difficulty walking, pos weakness  Vascular: No cyanosis or clubbing  Lymphatic: pos peripheral edema, no lymphadenopathy  Neurologic: No headache, positive confusion, pos dizziness  Psychiatric: No changes in mood.  No depression or anxiety.       Objective   Objective     Vital Signs:   Temp:  [97.8 °F (36.6 °C)-98.5 °F (36.9 °C)] 98 °F (36.7 °C)  Heart Rate:  [76-84] 76  Resp:  [18-20] 18  BP: (127-162)/(72-85) 130/79     Physical Exam:  Constitutional: No acute distress, awake, alert, nontoxic, obese body habitus  Eyes: Pupils equal, sclerae anicteric, no conjunctival injection  HENT: NCAT, mucous membranes moist  Neck: Supple, no thyromegaly, no lymphadenopathy  Respiratory:  Decreased breath sounds bilaterally, good effort, mildly labored respirations   Cardiovascular: RRR, distant  Gastrointestinal: Positive bowel sounds, soft, nontender, nondistended  Musculoskeletal: 2+ peripheral edema, generally weak  Psychiatric: Appropriate affect, good insight and judgement, cooperative  Neurologic: Oriented to person and place, movements symmetric BUE and BLE, Cranial Nerves grossly intact, speech clear and fluent  Skin: No rashes, no jaundice, no petechiae, no mottling      Results Reviewed:  LAB RESULTS:      Lab 09/06/21  0735 09/05/21  1302 09/04/21  2300   WBC 5.68 4.47 6.11   HEMOGLOBIN 9.9* 10.0* 10.1*   HEMATOCRIT 31.4* 31.7* 32.6*   PLATELETS 122* 117* 117*   NEUTROS ABS 4.40  --  4.45   IMMATURE GRANS (ABS) 0.12*  --  0.14*   LYMPHS ABS 0.37*  --  0.79   MONOS ABS 0.54  --  0.51   EOS ABS 0.22  --  0.17   MCV 94.6 95.8 96.7   PROTIME  --  15.9*  --    D DIMER QUANT  --   --  1.99*         Lab 09/06/21  0735 09/05/21  1302 09/04/21  2300   SODIUM 134* 133* 132*   POTASSIUM 3.7 3.4* 4.0   CHLORIDE 95* 95* 93*   CO2 25.0 23.0 25.0   ANION GAP 14.0 15.0 14.0   BUN 49* 47* 50*   CREATININE 1.63* 1.49* 1.66*   GLUCOSE 80 104* 162*   CALCIUM 9.6 9.7 9.8         Lab 09/04/21  2300   TOTAL PROTEIN 7.3   ALBUMIN 3.80   GLOBULIN 3.5   ALT (SGPT) 15   AST (SGOT) 26   BILIRUBIN 0.8   ALK PHOS 150*         Lab 09/05/21  1302 09/05/21  0255 09/04/21  2300   PROBNP  --   --  656.8   TROPONIN T 0.026 0.036* 0.035*   PROTIME 15.9*  --   --    INR 1.31*  --   --                  Brief Urine Lab Results  (Last result in the past 365 days)      Color   Clarity   Blood   Leuk Est   Nitrite   Protein   CREAT   Urine HCG        08/13/21 0718 Yellow Clear Negative Negative Negative Negative               Microbiology Results Abnormal     Procedure Component Value - Date/Time    COVID PRE-OP / PRE-PROCEDURE SCREENING ORDER (NO ISOLATION) - Swab, Nasopharynx [[id number]]  (Normal) Collected: 09/04/21 8283    Lab  Status: Final result Specimen: Swab from Nasopharynx Updated: 09/04/21 2350    Narrative:      The following orders were created for panel order COVID PRE-OP / PRE-PROCEDURE SCREENING ORDER (NO ISOLATION) - Swab, Nasopharynx.  Procedure                               Abnormality         Status                     ---------                               -----------         ------                     COVID-19 and FLU A/B PCR...[[id number]]  Normal              Final result                 Please view results for these tests on the individual orders.    COVID-19 and FLU A/B PCR - Swab, Nasopharynx [[id number]]  (Normal) Collected: 09/04/21 1498    Lab Status: Final result Specimen: Swab from Nasopharynx Updated: 09/04/21 2350     COVID19 Not Detected     Influenza A PCR Not Detected     Influenza B PCR Not Detected    Narrative:      Fact sheet for providers: https://www.fda.gov/media/781448/download    Fact sheet for patients: https://www.fda.gov/media/200148/download    Test performed by PCR.          XR Chest 1 View    Result Date: 9/4/2021  CR Chest 1 Vw INDICATION: Shortness of breath COMPARISON:  8/12/2021 FINDINGS: Portable AP view(s) of the chest.  The heart and mediastinal contours are normal. The lungs are clear. No pneumothorax or pleural effusion.     Impression: No definite acute cardiopulmonary findings. Signer Name: Jovana Levy MD  Signed: 9/4/2021 11:27 PM  Workstation Name: [id number]  [facility]    CT Angiogram Chest    Result Date: 9/5/2021  CTA Chest INDICATION: Shortness of air with elevated d-dimer. TECHNIQUE: CT angiogram of the chest with IV contrast. 3-D reconstructions were obtained and reviewed.   Radiation dose reduction techniques included automated exposure control or exposure modulation based on body size. Count of known CT and cardiac nuc med studies performed in previous 12 months: 1. COMPARISON: 1/5/2019 FINDINGS: No pulmonary embolism or aortic dissection is seen.  Again seen are pericardial calcifications which may be the result of prior pericarditis. This may be causing some constriction. There is no pericardial effusion. There are moderate bilateral pleural effusions. There is no adenopathy. Bilateral gynecomastia is present. Upper abdominal images show a cirrhotic appearance of the liver with a trace amount of ascites. There is anasarca. Gallbladder is surgically absent. Lung windows demonstrate compressive atelectasis in the lower lobes associated with the pleural effusions. Minimal groundglass opacities are present in the upper lobes which may reflect mild pneumonitis or possibly mild edema. Imaging features are atypical or uncommonly reported for COVID-19 pneumonia. Alternative diagnoses should be considered.     Impression: 1. No PE or aortic dissection. 2. Chronic pericardial calcifications may be causing some constriction. 3. Moderate bilateral pleural effusions with bilateral lower lobe atelectasis. 4. Minimal groundglass opacities in the upper lobes may reflect mild pneumonitis or possibly edema. 5. Cirrhosis and ascites in the upper abdomen with anasarca. Signer Name: Mathew Mcdaniel MD  Signed: 9/5/2021 1:07 AM  Workstation Name: [email or web address]  [facility]      Results for orders placed during the hospital encounter of 01/04/19    Adult Transthoracic Echo Complete W/ Cont if Necessary Per Protocol    Interpretation Summary  · Left ventricular systolic function is hyperdynamic (EF > 70).      I have reviewed the medications:  Scheduled Meds:aspirin, 81 mg, Oral, Daily  bumetanide, 4 mg, Oral, Daily  cefTRIAXone, 2 g, Intravenous, Q24H  cetirizine, 10 mg, Oral, Daily  gabapentin, 900 mg, Oral, BID  heparin (porcine), 5,000 Units, Subcutaneous, Q12H  insulin lispro, 0-7 Units, Subcutaneous, TID AC  lactulose, 45 mL, Oral, TID  levothyroxine, 75 mcg, Oral, Daily  montelukast, 10 mg, Oral, Nightly  nystatin, , Topical, Q12H  OXcarbazepine, 300 mg,  Oral, BID  pramipexole, 0.75 mg, Oral, BID  pravastatin, 40 mg, Oral, Daily  riFAXIMin, 550 mg, Oral, Q12H  sodium chloride, 10 mL, Intravenous, Q12H  spironolactone, 100 mg, Oral, Daily  venlafaxine XR, 150 mg, Oral, Nightly      Continuous Infusions:   PRN Meds:.•  acetaminophen  •  dextrose  •  dextrose  •  glucagon (human recombinant)  •  influenza vaccine  •  magnesium sulfate **OR** magnesium sulfate **OR** magnesium sulfate  •  melatonin  •  ondansetron  •  potassium chloride  •  potassium chloride  •  sodium chloride  •  sodium chloride    Assessment/Plan   Assessment & Plan     Active Hospital Problems    Diagnosis  POA   • **Encephalopathy, hepatic (CMS/HCC) [K72.90]  Yes   • Elevated troponin [R77.8]  Yes   • Hyponatremia [E87.1]  Yes   • OMAR (acute kidney injury) (CMS/HCC) [N17.9]  Yes   • Bilateral pleural effusion [J90]  Yes   • Hepatic encephalopathy (CMS/HCC) [K72.90]  Yes   • Anemia [D64.9]  Yes   • Uncontrolled type 2 diabetes mellitus with hyperglycemia (CMS/HCC) [E11.65]  Yes   • Other hyperlipidemia [E78.49]  Yes   • ADD (attention deficit disorder) [F98.8]  Yes   • Lymphedema [I89.0]  Yes   • Severe JAIME on CPAP [G47.33, Z99.89]  Not Applicable   • Diabetes mellitus type 2, uncontrolled, with complications (CMS/HCC) [E11.8, E11.65]  Yes   • Depression [F32.9]  Yes   • Essential hypertension [I10]  Yes   • Restless legs syndrome (RLS) [G25.81]  Yes   • A-fib (CMS/HCC) [I48.91]  Yes   • Primary narcolepsy without cataplexy [G47.419]  Yes   • Chronic congestive heart failure (CMS/HCC) [I50.9]  Yes   • PVD (peripheral vascular disease) (CMS/HCC) [I73.9]  Yes   • Cirrhosis of liver (CMS/HCC) [K74.60]  Yes      Resolved Hospital Problems   No resolved problems to display.        Brief Hospital Course to date:  Abe Phillips Jr. is a 66 y.o. male  admitted for suspected acute on chronic diastolic heart failure exacerbation as well as hepatic encephalopathy.  He is also being treated for healthcare  associated pneumonia.    Acute on chronic diastolic heart failure exacerbation  -Post Bumex 1 mg in the ER  -Cardiology consulted  -Heart failure navigator consult  -Echo still pending    Bilateral pleural effusions  Bilateral upper lobe opacities concerning for healthcare associated pneumonia versus edema  -Started Rocephin, plan 5-day course    Hepatic encephalopathy  MCCAULEY cirrhosis  Ascites and pleural effusions  -Continue lactulose  -GI consulted  -Status post EGD on 8/16/2021 with portal gastropathy and lower esophageal varices noted    Acute kidney injury  -Baseline creatinine 1.1-1.3  -Creatinine trend 1.66-1.49-1.63  -Monitor with periodic labs  -Avoid nephrotoxic agents    Hyponatremia  -Mild at 132, will monitor  -Trend 132-133-134    Diabetes mellitus type 2  -Reviewed home med list, no scheduled insulin listed  -Continue sliding scale insulin  -Hemoglobin A1c was 6.60 on 8/13/2021    History of narcolepsy  -Continue home meds    Obstructive sleep apnea    Generalized weakness/debility/failure to thrive  -PT and OT consulted    DVT prophylaxis:  Medical DVT prophylaxis orders are present.       AM-PAC 6 Clicks Score (PT): 12 (09/06/21 0800)    Disposition: I expect the patient to be discharged pending cardiology and GI consults as well as PT and OT eval, will likely need rehab at discharge.    CODE STATUS:   Code Status and Medical Interventions:   Ordered at: 09/05/21 0324     Level Of Support Discussed With:    Patient     Code Status:    CPR     Medical Interventions (Level of Support Prior to Arrest):    Full       Carmelina Jurado MD  09/06/21

## 2021-09-06 NOTE — PLAN OF CARE
Problem: Adult Inpatient Plan of Care  Goal: Plan of Care Review  Outcome: Ongoing, Progressing  Goal: Patient-Specific Goal (Individualized)  Outcome: Ongoing, Progressing  Goal: Absence of Hospital-Acquired Illness or Injury  Outcome: Ongoing, Progressing  Intervention: Identify and Manage Fall Risk  Recent Flowsheet Documentation  Taken 9/6/2021 1600 by Sushil Cervantes RN  Safety Promotion/Fall Prevention: activity supervised  Taken 9/6/2021 1200 by Sushil Cervanets RN  Safety Promotion/Fall Prevention: activity supervised  Taken 9/6/2021 0800 by Sushil Cervantes RN  Safety Promotion/Fall Prevention: activity supervised  Intervention: Prevent Skin Injury  Recent Flowsheet Documentation  Taken 9/6/2021 1600 by Sushil Cervantes RN  Body Position: position changed independently  Skin Protection: adhesive use limited  Taken 9/6/2021 1200 by Sushil Cervantes RN  Body Position: turned  Skin Protection: adhesive use limited  Taken 9/6/2021 0800 by Sushil Cervantes RN  Body Position: position changed independently  Skin Protection: adhesive use limited  Intervention: Prevent and Manage VTE (venous thromboembolism) Risk  Recent Flowsheet Documentation  Taken 9/6/2021 1600 by Sushil Cervantes RN  VTE Prevention/Management: (sq heparin) other (see comments)  Taken 9/6/2021 1200 by Sushil Cervantes RN  VTE Prevention/Management: (sq heparin) other (see comments)  Taken 9/6/2021 0800 by Sushil Cervantes RN  VTE Prevention/Management: (sq heparin) other (see comments)  Intervention: Prevent Infection  Recent Flowsheet Documentation  Taken 9/6/2021 1600 by Sushil Cervantes RN  Infection Prevention: rest/sleep promoted  Taken 9/6/2021 1200 by Sushil Cervantes RN  Infection Prevention: single patient room provided  Taken 9/6/2021 0800 by Sushil Cervantes RN  Infection Prevention:   rest/sleep promoted   single patient room provided  Goal: Optimal Comfort and Wellbeing  Outcome: Ongoing, Progressing  Intervention: Provide  Person-Centered Care  Recent Flowsheet Documentation  Taken 9/6/2021 0800 by Sushil Cervantes, RN  Trust Relationship/Rapport:   care explained   choices provided   emotional support provided   empathic listening provided   questions answered   questions encouraged   reassurance provided   thoughts/feelings acknowledged  Goal: Readiness for Transition of Care  Outcome: Ongoing, Progressing   Goal Outcome Evaluation:      Pt A&O x4, times of lethargy but easily aroused.  Diuretics given with good output, edema moderate in lower extremities.  VSS, pt has own insulin monitor and pump.  No complaints of pain, will continue to monitor.

## 2021-09-06 NOTE — PLAN OF CARE
Goal Outcome Evaluation:           Progress: no change  Outcome Summary: VSS. Pt slept for the entire shift. Pt still very edematous. PT wound care still yet to see pt for bilat shin redness. No complaints at this time.

## 2021-09-06 NOTE — PLAN OF CARE
Goal Outcome Evaluation:  Plan of Care Reviewed With: patient           Outcome Summary: PT Wound Care eval complete. Pt with small diabetic ulcer to the R heel, chronic, but much improved since his last admission. Pt with moderate BLE edema with chronic venous stasis changes to the skin. Pt will benefit from MLW to improve venous return and maintain limb girth reduction. Anticipate change in 2-3 days.

## 2021-09-07 NOTE — PROGRESS NOTES
Abdominal Ultrasound limited.    Clinical Diagnosis: Ascites    Comparison: None    Technique: Multiple transaxial and longitudinal images were obtained through the abdomen with real time ultrasonography. A low-frequency curvilinear transducer was utilized.  Multiple images were submitted for interpretation.    Report:  Please see impression.    Impression:  Sonographic evaluation different quadrants of the abdomen revealed no ascites. Paracentesis was therefore not performed.

## 2021-09-07 NOTE — PROGRESS NOTES
[facility] Medicine Services  PROGRESS NOTE    Patient Name: Abe Phillips Jr.  : 1955  MRN: [id number]    Date of Admission: 2021  Primary Care Physician: Anibal Maria MD    Subjective   Subjective     CC:  Confusion and shortness of breath    HPI:  Daughter at bedside.  Main complaint is breathing poorly.  Hasn't diuresed much.  Remains somewhat lethargic.  Drinking a lot of water per RN.  Tolerating lactulose, not having significant diarrhea at this time.    ROS:  Gen- No fevers, chills  CV- No chest pain, palpitations  Resp- No cough, + dyspnea  GI- No N/V/D, abd pain      Objective   Objective     Vital Signs:   Temp:  [97.5 °F (36.4 °C)-98.1 °F (36.7 °C)] 97.5 °F (36.4 °C)  Heart Rate:  [59-77] 70  Resp:  [16-18] 18  BP: (102-137)/(77-94) 129/77     Physical Exam:  Constitutional: awake, alert, sitting up in chair, chronically ill appearing  HENT: NCAT, mucous membranes moist  Respiratory: diminished breath sounds bilaterally, respiratory effort normal on2-3L  Cardiovascular: RRR, no murmurs, rubs, or gallops  Gastrointestinal: obese, difficult to determine if any ascites or not, Positive bowel sounds, soft, nontender   Musculoskeletal: 2+ bilateral ankle edema, legs wrapped  Psychiatric: Appropriate affect, cooperative  Neurologic: Oriented x 3, speech slow, no focal deficits  Skin: No rashes        Results Reviewed:  LAB RESULTS:      Lab 21  0735 21  1302 21  2300   WBC 5.68 4.47 6.11   HEMOGLOBIN 9.9* 10.0* 10.1*   HEMATOCRIT 31.4* 31.7* 32.6*   PLATELETS 122* 117* 117*   NEUTROS ABS 4.40  --  4.45   IMMATURE GRANS (ABS) 0.12*  --  0.14*   LYMPHS ABS 0.37*  --  0.79   MONOS ABS 0.54  --  0.51   EOS ABS 0.22  --  0.17   MCV 94.6 95.8 96.7   PROTIME  --  15.9*  --    D DIMER QUANT  --   --  1.99*         Lab 21  1004 21  0735 21  1302 21  2300   SODIUM 131* 134* 133* 132*   POTASSIUM 3.4* 3.7 3.4* 4.0   CHLORIDE 92* 95* 95*  93*   CO2 24.0 25.0 23.0 25.0   ANION GAP 15.0 14.0 15.0 14.0   BUN 52* 49* 47* 50*   CREATININE 1.71* 1.63* 1.49* 1.66*   GLUCOSE 114* 80 104* 162*   CALCIUM 9.4 9.6 9.7 9.8         Lab 09/04/21  2300   TOTAL PROTEIN 7.3   ALBUMIN 3.80   GLOBULIN 3.5   ALT (SGPT) 15   AST (SGOT) 26   BILIRUBIN 0.8   ALK PHOS 150*         Lab 09/05/21  1302 09/05/21  0255 09/04/21  2300   PROBNP  --   --  656.8   TROPONIN T 0.026 0.036* 0.035*   PROTIME 15.9*  --   --    INR 1.31*  --   --                  Brief Urine Lab Results  (Last result in the past 365 days)      Color   Clarity   Blood   Leuk Est   Nitrite   Protein   CREAT   Urine HCG        08/13/21 0718 Yellow Clear Negative Negative Negative Negative               Microbiology Results Abnormal     Procedure Component Value - Date/Time    COVID PRE-OP / PRE-PROCEDURE SCREENING ORDER (NO ISOLATION) - Swab, Nasopharynx [[id number]]  (Normal) Collected: 09/04/21 2258    Lab Status: Final result Specimen: Swab from Nasopharynx Updated: 09/04/21 2350    Narrative:      The following orders were created for panel order COVID PRE-OP / PRE-PROCEDURE SCREENING ORDER (NO ISOLATION) - Swab, Nasopharynx.  Procedure                               Abnormality         Status                     ---------                               -----------         ------                     COVID-19 and FLU A/B PCR...[[id number]]  Normal              Final result                 Please view results for these tests on the individual orders.    COVID-19 and FLU A/B PCR - Swab, Nasopharynx [[id number]]  (Normal) Collected: 09/04/21 2258    Lab Status: Final result Specimen: Swab from Nasopharynx Updated: 09/04/21 2350     COVID19 Not Detected     Influenza A PCR Not Detected     Influenza B PCR Not Detected    Narrative:      Fact sheet for providers: https://www.fda.gov/media/416020/download    Fact sheet for patients: https://www.fda.gov/media/089386/download    Test performed by PCR.          US  Abdomen Limited    Result Date: 9/7/2021  Abdominal Ultrasound limited.  Clinical Diagnosis: Ascites  Comparison: None  Technique: Multiple transaxial and longitudinal images were obtained through the abdomen with real time ultrasonography. A low-frequency curvilinear transducer was utilized.  Multiple images were submitted for interpretation.  Report: Please see impression.      Impression: Impression: Sonographic evaluation different quadrants of the abdomen revealed no ascites. Paracentesis was therefore not performed.  This report was finalized on 9/7/2021 4:10 PM by Haider Jenkins MD.        Results for orders placed during the hospital encounter of 09/04/21    Adult Transthoracic Echo Complete W/ Cont if Necessary Per Protocol    Interpretation Summary  · Technically difficult study  · Left ventricular systolic function is normal. Estimated left ventricular EF = 65%.  · Left ventricular diastolic function was normal.  · The cardiac valves are anatomically and functionally normal.  · Estimated right ventricular systolic pressure from tricuspid regurgitation is normal (<35 mmHg).      I have reviewed the medications:  Scheduled Meds:aspirin, 81 mg, Oral, Daily  bumetanide, 2 mg, Intravenous, BID  cetirizine, 10 mg, Oral, Daily  gabapentin, 900 mg, Oral, BID  heparin (porcine), 5,000 Units, Subcutaneous, Q12H  insulin lispro, 0-7 Units, Subcutaneous, TID AC  lactulose, 45 mL, Oral, TID  levothyroxine, 75 mcg, Oral, Daily  montelukast, 10 mg, Oral, Nightly  nystatin, , Topical, Q12H  OXcarbazepine, 300 mg, Oral, BID  pramipexole, 0.75 mg, Oral, BID  pravastatin, 40 mg, Oral, Daily  riFAXIMin, 550 mg, Oral, Q12H  sodium chloride, 10 mL, Intravenous, Q12H  spironolactone, 100 mg, Oral, Daily  venlafaxine XR, 150 mg, Oral, Nightly      Continuous Infusions:   PRN Meds:.•  acetaminophen  •  dextrose  •  dextrose  •  glucagon (human recombinant)  •  influenza vaccine  •  magnesium sulfate **OR** magnesium sulfate  **OR** magnesium sulfate  •  melatonin  •  ondansetron  •  potassium chloride  •  potassium chloride  •  sodium chloride    Assessment/Plan   Assessment & Plan     Active Hospital Problems    Diagnosis  POA   • **Encephalopathy, hepatic (CMS/HCC) [K72.90]  Yes   • Elevated troponin [R77.8]  Yes   • Hyponatremia [E87.1]  Yes   • OMAR (acute kidney injury) (CMS/HCC) [N17.9]  Yes   • Bilateral pleural effusion [J90]  Yes   • Hepatic encephalopathy (CMS/HCC) [K72.90]  Yes   • Anemia [D64.9]  Yes   • Uncontrolled type 2 diabetes mellitus with hyperglycemia (CMS/HCC) [E11.65]  Yes   • Other hyperlipidemia [E78.49]  Yes   • ADD (attention deficit disorder) [F98.8]  Yes   • Lymphedema [I89.0]  Yes   • Severe JAIME on CPAP [G47.33, Z99.89]  Not Applicable   • Diabetes mellitus type 2, uncontrolled, with complications (CMS/HCC) [E11.8, E11.65]  Yes   • Depression [F32.9]  Yes   • Essential hypertension [I10]  Yes   • Restless legs syndrome (RLS) [G25.81]  Yes   • A-fib (CMS/HCC) [I48.91]  Yes   • Primary narcolepsy without cataplexy [G47.419]  Yes   • Chronic diastolic congestive heart failure (CMS/HCC) [I50.32]  Yes   • PVD (peripheral vascular disease) (CMS/HCC) [I73.9]  Yes   • Cirrhosis of liver (CMS/HCC) [K74.60]  Yes      Resolved Hospital Problems   No resolved problems to display.        Brief Hospital Course to date:  Abe Phillips Jr. is a 66 y.o. male  admitted for suspected acute on chronic diastolic heart failure exacerbation as well as hepatic encephalopathy.  He is also being treated for healthcare associated pneumonia.    Acute on chronic diastolic heart failure exacerbation  - agree with more aggressive diuerisis  - continue IV bumex 2 mg q12  - watch renal function, but discussed with patient/dtr and may be will to trade off volume for renal function    Bilateral pleural effusions  Bilateral edema  - personally reviewed imaging, I don't feel this is consistent with PNA.  Will stop rocehing.    Hepatic  encephalopathy  MCCAULEY cirrhosis  Ascites    -Continue lactulose, decrease if excessive diarrhea  -Status post EGD on 8/16/2021 with portal gastropathy and lower esophageal varices noted  - continue xifaxamin  - ordered paracentesis, but not enough asictes to tap at this time.  Continue to monitor clinically    ARF/CKD3   -Baseline creatinine 1.1-1.3  -currently trending up, watch with diuresis  - check daily BMP for now    Hyponatremia  - stable around 131-134    Diabetes mellitus type 2  - continue current insulin regimen  -Hemoglobin A1c was 6.60 on 8/13/2021    History of narcolepsy  -Continue home meds    Obstructive sleep apnea    Generalized weakness/debility/failure to thrive  - continue PT/OT    DVT prophylaxis:  Medical DVT prophylaxis orders are present.       AM-PAC 6 Clicks Score (PT): 12 (09/07/21 0800)    Disposition: I expect the patient to be discharged to rehab when medically appropriate.  Suspect will be several more days    CODE STATUS:   Code Status and Medical Interventions:   Ordered at: 09/05/21 0324     Level Of Support Discussed With:    Patient     Code Status:    CPR     Medical Interventions (Level of Support Prior to Arrest):    Full       Song Bryant MD  09/07/21

## 2021-09-07 NOTE — CASE MANAGEMENT/SOCIAL WORK
Continued Stay Note  [facility]     Patient Name: Abe Phillips Jr.  MRN: [id number]  Today's Date: 9/7/2021    Admit Date: 9/4/2021    Discharge Plan     Row Name 09/07/21 1202       Plan    Plan  Intermediate care facility    Plan Comments     I spoke with Mr. Phillips's daughter Anisa today over the phone to discuss discharge planning. I do not anticipate he will be medically ready for discharge until later in the week.     Anisa is interested in Mr. Phillips to have skilled nursing with the plan for a long term Medicaid bed if no improvement occurs. I have sent referrals to agreed upon facilities for Mr. Phillips today.     Case management will continue to follow Mr. Phillips's progress and provide for him any anticipated discharge needs.        Final Discharge Disposition Code  04 - intermediate care facility        Discharge Codes    No documentation.       Expected Discharge Date and Time     Expected Discharge Date Expected Discharge Time    Sep 10, 2021             Chelsie Ibarra RN

## 2021-09-07 NOTE — DISCHARGE PLACEMENT REQUEST
"Chelsie MCKENZIE, RN,   [phone]    Abe Phillips Jr. (66 y.o. Male)     Date of Birth Social Security Number Address Home Phone MRN    1955  [address]  [place] [id number] [phone] [id number]    Jain Marital Status          Mu-ism        Admission Date Admission Type Admitting Provider Attending Provider Department, Room/Bed    9/4/21 Emergency Song Bryant MD Dossett, Lee M, MD [facility] 3F, S321/1    Discharge Date Discharge Disposition Discharge Destination                       Attending Provider: Song Bryant MD    Allergies: No Known Allergies    Isolation: None   Infection: None   Code Status: CPR    Ht: 190.5 cm (75\")   Wt: 163 kg (359 lb 8 oz)    Admission Cmt: None   Principal Problem: Encephalopathy, hepatic (CMS/HCC) [K72.90]                 Active Insurance as of 9/4/2021     Primary Coverage     Payor Plan Insurance Group Employer/Plan Group    MEDICARE MEDICARE A & B      Payor Plan Address Payor Plan Phone Number Payor Plan Fax Number Effective Dates    [address] [phone]  6/1/2018 - None Entered    [place] [id number]       Subscriber Name Subscriber Birth Date Member ID       ABE PHILLIPS JR. 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 1955 [id number]                 Emergency Contacts      (Rel.) Home Phone Work Phone Mobile Phone    Anisa Roth (Daughter) [phone] -- --    Caryn Phillips (Spouse) -- -- [phone]               History & Physical      Luis Antonio Ríos MD at 09/05/21 0328              [facility] Medicine " Services  HISTORY AND PHYSICAL    Patient Name: Abe Phillips Jr.  : 1955  MRN: [id number]  Primary Care Physician: Anibal Maria MD  Date of admission: 2021    Subjective   Subjective     Chief Complaint:  SOB    HPI:  Abe Phillips Jr. is a 66 y.o. male with a past medical history significant for morbid obesity, MCCAULEY cirrhosis, narcolepsy, chronic diastolic heart failure, hypertension, HLD, severe JAIME, chronic lymphedema, anemia, and ADD. He presents today from home with complaints of progressively worsening SOB going on for the past week. Dyspnea worse with exertion and lying flat. There is associated pedal edema which has progressed to abdomen. Patient approximates a 30 pound weight gain in the past month. States the rehab facility he was discharged from decreased his medications causing an exacerbation of heart failure. States he was concerned and presented to ED for further evaluation and treatment.  Currently there are no complaints of fever, cough, congestion, chest pain, or syncope. No headache or focal weakness/parathesias. No dysuria or flank pain.   Records reviewed indicate patient was admitted to this service  to  with electrolyte abnormalities and hypoglycemia. He improved with electrolyte replacement.  Patient arrived in ED somnolent but oriented X3 and conversant. No new complaints. Will admit to inpatient.      COVID Details:    Symptoms:    [] NONE [] Fever []  Cough [x] Shortness of breath [] Change in taste/smell      The patient has a COVID HM Topic on their chart, and they are fully vaccinated.      Review of Systems   Constitutional: Positive for fatigue and unexpected weight change. Negative for chills and fever.   HENT: Negative for congestion and trouble swallowing.    Eyes: Negative for photophobia and visual disturbance.   Respiratory: Positive for shortness of breath. Negative for cough.    Cardiovascular: Positive for leg swelling. Negative for chest pain.    Gastrointestinal: Negative for abdominal pain, diarrhea, nausea and vomiting.   Endocrine: Negative for cold intolerance and heat intolerance.   Genitourinary: Negative for dysuria, flank pain and hematuria.   Musculoskeletal: Negative for back pain, gait problem and joint swelling.   Skin: Negative for pallor and rash.   Allergic/Immunologic: Positive for immunocompromised state.   Neurological: Positive for weakness. Negative for dizziness and headaches.   Hematological: Negative for adenopathy.   Psychiatric/Behavioral: Negative for agitation and confusion.        All other systems reviewed and are negative.     Personal History     Past Medical History:   Diagnosis Date   • A-fib (CMS/HCC)    • ADD (attention deficit disorder)    • Atrial flutter (CMS/HCC)    • Bell's palsy    • Cellulitis    • CHF (congestive heart failure) (CMS/HCC)    • Cirrhosis of liver (CMS/HCC)    • Constipation    • Depression    • Diabetes mellitus (CMS/HCC)    • Diabetic ulcer of right foot (CMS/HCC)    • Disease of thyroid gland    • Edema    • Fatty liver    • Hepatitis B    • Hyperlipidemia    • Hypokalemia    • Insomnia    • Lymphedema    • Narcolepsy    • Neuropathy    • Obesity    • JAIME on CPAP    • PVD (peripheral vascular disease) (CMS/HCC)    • RLS (restless legs syndrome)    • Skin cancer    • Tardive dyskinesia    • Tremor of both hands    • Type 2 diabetes mellitus (CMS/HCC)    • Yeast infection        Past Surgical History:   Procedure Laterality Date   • ACHILLES TENDON REPAIR     • CARDIAC ABLATION     • CHOLECYSTECTOMY     • ENDOSCOPY N/A 8/16/2021    Procedure: ESOPHAGOGASTRODUODENOSCOPY;  Surgeon: Brunner, Mark I, MD;  Location: [facility] ENDOSCOPY;  Service: Gastroenterology;  Laterality: N/A;   • LASER ABLATION      VEIN RLE       Family History: family history includes Clotting disorder in his father; Diabetes in his father; Heart failure in his mother; Hypertension in his father; No Known Problems in his brother,  brother, daughter, and sister; Osteoarthritis in his mother. Otherwise pertinent FHx was reviewed and unremarkable.     Social History:  reports that he has never smoked. He has never used smokeless tobacco. He reports current alcohol use. He reports that he does not use drugs.  Social History     Social History Narrative    .  Lives with wife        Medications:  Contour Next Monitor, Dexcom G6 Sensor, Dexcom G6 Transmitter, OXcarbazepine, OmniPod Dash 5 Pack Pods, amphetamine-dextroamphetamine, aspirin, bumetanide, erythromycin, gabapentin, lactulose, levocetirizine, levothyroxine, loratadine, metOLazone, montelukast, nystatin, ofloxacin, olopatadine, potassium chloride, pramipexole, pravastatin, riFAXIMin, spironolactone, tobramycin-dexamethasone, traZODone, triamcinolone, and venlafaxine XR    No Known Allergies    Objective   Objective     Vital Signs:   Temp:  [97.2 °F (36.2 °C)] 97.2 °F (36.2 °C)  Heart Rate:  [72-80] 72  Resp:  [22] 22  BP: (134)/(83) 134/83  Flow (L/min):  [5] 5    Physical Exam   Constitutional: Awake, alert  Eyes: PERRLA, sclerae anicteric, no conjunctival injection  HENT: NCAT, mucous membranes moist  Neck: Supple, no thyromegaly, no lymphadenopathy, trachea midline  Respiratory: Clear to auscultation bilaterally, nonlabored respirations   Cardiovascular: RRR, no murmurs, rubs, or gallops, palpable pedal pulses bilaterally  Gastrointestinal: Positive bowel sounds, soft, nontender  Musculoskeletal: BLE edema, pitting. Abdominal distension and edema no clubbing or cyanosis to extremities  Psychiatric: Appropriate affect, cooperative  Neurologic: Oriented x 3, strength symmetric in all extremities, Cranial Nerves grossly intact to confrontation, speech clear  Skin: venous stasis changes in lower extremities      Results Reviewed:  I have personally reviewed most recent indicated data and agree with findings including:  []  Laboratory  []  Radiology  []  EKG/Telemetry  []   Pathology  []  Cardiac/Vascular Studies  []  Old records  []  Other:  Most pertinent findings include:      LAB RESULTS:      Lab 09/04/21 2300   WBC 6.11   HEMOGLOBIN 10.1*   HEMATOCRIT 32.6*   PLATELETS 117*   NEUTROS ABS 4.45   IMMATURE GRANS (ABS) 0.14*   LYMPHS ABS 0.79   MONOS ABS 0.51   EOS ABS 0.17   MCV 96.7   D DIMER QUANT 1.99*         Lab 09/04/21 2300   SODIUM 132*   POTASSIUM 4.0   CHLORIDE 93*   CO2 25.0   ANION GAP 14.0   BUN 50*   CREATININE 1.66*   GLUCOSE 162*   CALCIUM 9.8         Lab 09/04/21 2300   TOTAL PROTEIN 7.3   ALBUMIN 3.80   GLOBULIN 3.5   ALT (SGPT) 15   AST (SGOT) 26   BILIRUBIN 0.8   ALK PHOS 150*         Lab 09/04/21 2300   PROBNP 656.8   TROPONIN T 0.035*                 Brief Urine Lab Results  (Last result in the past 365 days)      Color   Clarity   Blood   Leuk Est   Nitrite   Protein   CREAT   Urine HCG        08/13/21 0718 Yellow Clear Negative Negative Negative Negative             Microbiology Results (last 10 days)     Procedure Component Value - Date/Time    COVID PRE-OP / PRE-PROCEDURE SCREENING ORDER (NO ISOLATION) - Swab, Nasopharynx [[id number]]  (Normal) Collected: 09/04/21 2258    Lab Status: Final result Specimen: Swab from Nasopharynx Updated: 09/04/21 2350    Narrative:      The following orders were created for panel order COVID PRE-OP / PRE-PROCEDURE SCREENING ORDER (NO ISOLATION) - Swab, Nasopharynx.  Procedure                               Abnormality         Status                     ---------                               -----------         ------                     COVID-19 and FLU A/B PCR...[[id number]]  Normal              Final result                 Please view results for these tests on the individual orders.    COVID-19 and FLU A/B PCR - Swab, Nasopharynx [[id number]]  (Normal) Collected: 09/04/21 2258    Lab Status: Final result Specimen: Swab from Nasopharynx Updated: 09/04/21 2350     COVID19 Not Detected     Influenza A PCR Not Detected      Influenza B PCR Not Detected    Narrative:      Fact sheet for providers: https://www.fda.gov/media/624810/download    Fact sheet for patients: https://www.fda.gov/media/645402/download    Test performed by PCR.          XR Chest 1 View    Result Date: 9/4/2021  CR Chest 1 Vw INDICATION: Shortness of breath COMPARISON:  8/12/2021 FINDINGS: Portable AP view(s) of the chest.  The heart and mediastinal contours are normal. The lungs are clear. No pneumothorax or pleural effusion.     Impression: No definite acute cardiopulmonary findings. Signer Name: Jovana Levy MD  Signed: 9/4/2021 11:27 PM  Workstation Name: [id number]  [facility]    CT Angiogram Chest    Result Date: 9/5/2021  CTA Chest INDICATION: Shortness of air with elevated d-dimer. TECHNIQUE: CT angiogram of the chest with IV contrast. 3-D reconstructions were obtained and reviewed.   Radiation dose reduction techniques included automated exposure control or exposure modulation based on body size. Count of known CT and cardiac nuc med studies performed in previous 12 months: 1. COMPARISON: 1/5/2019 FINDINGS: No pulmonary embolism or aortic dissection is seen. Again seen are pericardial calcifications which may be the result of prior pericarditis. This may be causing some constriction. There is no pericardial effusion. There are moderate bilateral pleural effusions. There is no adenopathy. Bilateral gynecomastia is present. Upper abdominal images show a cirrhotic appearance of the liver with a trace amount of ascites. There is anasarca. Gallbladder is surgically absent. Lung windows demonstrate compressive atelectasis in the lower lobes associated with the pleural effusions. Minimal groundglass opacities are present in the upper lobes which may reflect mild pneumonitis or possibly mild edema. Imaging features are atypical or uncommonly reported for COVID-19 pneumonia. Alternative diagnoses should be considered.     Impression: 1. No PE  or aortic dissection. 2. Chronic pericardial calcifications may be causing some constriction. 3. Moderate bilateral pleural effusions with bilateral lower lobe atelectasis. 4. Minimal groundglass opacities in the upper lobes may reflect mild pneumonitis or possibly edema. 5. Cirrhosis and ascites in the upper abdomen with anasarca. Signer Name: Mathew Mcdaniel MD  Signed: 9/5/2021 1:07 AM  Workstation Name: [facility]  [facility]      Results for orders placed during the hospital encounter of 01/04/19    Adult Transthoracic Echo Complete W/ Cont if Necessary Per Protocol    Interpretation Summary  · Left ventricular systolic function is hyperdynamic (EF > 70).      Assessment/Plan   Assessment & Plan       Encephalopathy, hepatic (CMS/HCC)    Bilateral pleural effusion    A-fib (CMS/HCC)    Cirrhosis of liver (CMS/HCC)    Chronic congestive heart failure (CMS/HCC)    Essential hypertension    Diabetes mellitus type 2, uncontrolled, with complications (CMS/HCC)    Severe JAIME on CPAP    Uncontrolled type 2 diabetes mellitus with hyperglycemia (CMS/HCC)    Anemia    Elevated troponin    Hyponatremia    OMAR (acute kidney injury) (CMS/HCC)    PVD (peripheral vascular disease) (CMS/HCC)    Primary narcolepsy without cataplexy    Restless legs syndrome (RLS)    ADD (attention deficit disorder)    Depression    Lymphedema    Other hyperlipidemia      1. Bilateral Pleural Effusion:  - acute on chronic diastolic heart failure  - administered 1 mg bumex in ED. Continue home Bumex. Also on spironolactone with as needed metolazone  - obtain echocardiogram  - daily weights  - strict I&O  - heart failure navigator  - am labs    2. Possible Hepatic Encephalopathy:  - hx MCCAULEY cirrhosis  - administered lactulose in ED. Ammonia pending.  - low threshold for CT head and/or ABG  - check PT/INR  - resume home lactulose, xifaxin, aldactone  - s/p /21 with portal gastropathy and lower esophageal  varices    3. OMAR:  - on CKD III. Baseline creatinine 1.1 to 1.3   - anticipated improvement with diuresis  - avoid nephrotoxins and monitor    4. Hyponatremia:  - appears chronic. Monitor with diuresis    5. DM2:  - start SSI with scheduled accu checks  - A1c 6.6.   - was on recently on insulin pump    6. Narcolepsy:  - on Adderall    7. JAIME:  - on CPAP    8. Debility/ failure to thrive:  Case management  - PT/PT    DVT prophylaxis:  MARIANO    CODE STATUS:  Full code  Level Of Support Discussed With: Patient  Code Status: CPR  Medical Interventions (Level of Support Prior to Arrest): Full      This note has been completed as part of a split-shared workflow.     Electronically signed by Bk Bhatt PA-C, 09/05/21, 3:29 AM EDT.        Brief Attending Admission Attestation     I have seen and examined the patient, performing an independent face-to-face diagnostic evaluation with plan of care reviewed and developed with the advanced practice clinician (APC).    Old records reviewed and summarized in PM hx.    Brief Summary Statement:  66-year-old male presented to ED secondary to chief complaint of generalized weakness, increasing dyspnea-over past 4 days.  Also patient was found to have asterixis in ED-serum ammonia still pending,  Does not complain of overt lower extremity. Or tenderness does complain of significant swelling.  Do not complain of or chest pain or abdominal pain, last BM yesterday.  He did not likely get his diuretics and lactulose as prescribed while he was at the rehab. Denies any complaint of fever or chills, do not complain of any coughing.  Also found to be in OMAR-with BUN/creatinine 50/1.6      Remainder of detailed HPI is as noted above and has been reviewed and/or edited by me for completeness.    PM HX:  Patient admitted from 8/12-8/18 secondary to lower extremity weakness felt secondary to deconditioning, hypokalemia,    CAD-aspirin 81 mg  CHF-diastolic/chronic lymphedema-Bumex 2 mg every  12, metolazone 5 mg as needed, spironolactone 50 mg daily  Mood disorder-Trileptal 300 mg daily  RLS-Mirapex 0.75 mg every 12  Hyperlipidemia-Pravachol 40 mg p.o. daily  Neuropathy-Neurontin 900 mg every 8  Hypothyroid-Synthroid 75 mcg  Mood disorder-trazodone 50 mg daily, Effexor 150 mg p.o. daily,  MCCAULEY cirrhosis-s/p EGD 8/21-shows portal gastropathy plus lower esophageal varices-lactulose 45 mL every 8, Xifaxan 550 mg every 12.  Narcolepsy-Adderall  Obstructive sleep apnea-CPAP      Vitals:    09/05/21 0149   BP:  134/83   Pulse: 75   Resp:  22-98% on room   Temp:  Afebrile.   SpO2: 98%       Attending Physical Exam:  RS-  decreased anteriorly, poor effort,  CVS- s1s2 regular, no murmur.  ABD-distended, generalized tenderness, soft, bowel sounds decreased  EXT-nonpitting edema lower extremity with chronic venous static changes, slightly erythematous.  NEURO-patient is lethargic, will wake up to verbal command, otherwise falls back to sleep immediately, moving all 4 extremities spontaneously, difficult keeping his legs up likely secondary to his edema.      LABS:  Troponin-0.035, proBNP 656 EKG-sinus rhythm 81 bpm, , poor R wave progression similar to old EKG,  Blood glucose-162,  BUN/creatinine 50/1.6 (45/1.03-August)  Bicarb-25  LFTs WNL  Albumin 3.8  D-dimer 1.  9  WBC 6, hemoglobin 10, platelets 117, neutrophils of 72,  Covid 19 -  CT angio chest-no acute PE, chronic pericardial calcifications, moderate bilateral effusions, minimal groundglass opacities in upper lobes-pneumonitis versus edema, cirrhosis and ascites in the upper abdomen with anasarca.  Echo-2019-EF of 70%,    Brief Assessment/Plan  Volume overload-combination of CHF/decompensated liver cirrhosis-we'll start IV Bumex, CHF navigator consult in a.m.  Lactulose-for likely hepatic encephalopathy.  Antibiotics not started-monitor his lower extremity for any worsening cellulitic changes, or fever.  May consider cardiology input if patient does  not diurese well given some findings of pericardial calcifications.        See above for further detailed assessment and plan developed with [facility] which I have reviewed and/or edited for completeness.    Admission Status: I believe that this patient meets inpatient criteria secondary to acute CHF            Electronically signed by Luis Antonio Ríos MD at 21 [phone]          Physical Therapy Notes (most recent note)      Nickie Beverly PT at 21 1335  Version 1 of 1         Acute Care - Wound/Debridement Initial Evaluation  [facility]     Patient Name: Abe Phillips Jr.  : 1955  MRN: [id number]  Today's Date: 2021                Admit Date: 2021    Visit Dx:    ICD-10-CM ICD-9-CM   1. Hepatic encephalopathy (CMS/HCC)  K72.90 572.2   2. Acute pulmonary edema (CMS/HCC)  J81.0 518.4   3. Pleural effusion  J90 511.9   4. Acute kidney injury (CMS/HCC)  N17.9 584.9   5. History of diabetes mellitus  Z86.39 V12.29       Patient Active Problem List   Diagnosis   • Type 2 diabetes mellitus with hyperglycemia (CMS/HCC)   • Thrombocytopenia (CMS/HCC)   • PVD (peripheral vascular disease) (CMS/HCC)   • A-fib (CMS/HCC)   • Cirrhosis of liver (CMS/HCC)   • Chronic diastolic congestive heart failure (CMS/HCC)   • Diabetic ulcer of right heel (CMS/HCC)   • Primary narcolepsy without cataplexy   • Essential hypertension   • Morbidly obese (CMS/HCC)   • Restless legs syndrome (RLS)   • ADD (attention deficit disorder)   • Depression   • Diabetes mellitus type 2, uncontrolled, with complications (CMS/HCC)   • Edema   • MCCAULEY (nonalcoholic steatohepatitis)   • Hepatitis B   • Insomnia   • Lymphedema   • Obesity   • Severe JAIME on CPAP   • RLS (restless legs syndrome)   • Tremor of both hands   • Other hyperlipidemia   • Uncontrolled type 2 diabetes mellitus with hyperglycemia (CMS/HCC)   • Dyslipidemia   • Hyperglycemia   • Diabetic ulcer of right heel associated with type 2 diabetes mellitus (CMS/HCC)   •  Facial paralysis/[place] palsy   • Diabetic hypoglycemia (CMS/HCC)   • Hypogonadism in male   • Bilateral leg weakness   • Anemia   • Elevated troponin   • Hyponatremia   • OMAR (acute kidney injury) (CMS/HCC)   • Bilateral pleural effusion   • Encephalopathy, hepatic (CMS/HCC)   • Hepatic encephalopathy (CMS/HCC)        Past Medical History:   Diagnosis Date   • A-fib (CMS/HCC)    • ADD (attention deficit disorder)    • Atrial flutter (CMS/HCC)    • Bell's palsy    • Cellulitis    • CHF (congestive heart failure) (CMS/HCC)    • Cirrhosis of liver (CMS/HCC)    • Constipation    • Depression    • Diabetes mellitus (CMS/HCC)    • Diabetic ulcer of right foot (CMS/HCC)    • Disease of thyroid gland    • Edema    • Fatty liver    • Hepatitis B    • Hyperlipidemia    • Hypokalemia    • Insomnia    • Lymphedema    • Narcolepsy    • Neuropathy    • Obesity    • JAIME on CPAP    • PVD (peripheral vascular disease) (CMS/HCC)    • RLS (restless legs syndrome)    • Skin cancer    • Tardive dyskinesia    • Tremor of both hands    • Type 2 diabetes mellitus (CMS/HCC)    • Yeast infection         Past Surgical History:   Procedure Laterality Date   • ACHILLES TENDON REPAIR     • CARDIAC ABLATION     • CHOLECYSTECTOMY     • ENDOSCOPY N/A 8/16/2021    Procedure: ESOPHAGOGASTRODUODENOSCOPY;  Surgeon: Brunner, Mark I, MD;  Location: [facility] ENDOSCOPY;  Service: Gastroenterology;  Laterality: N/A;   • LASER ABLATION      VEIN RLE           Wound 12/27/19 2334 Right heel Diabetic Ulcer (Active)   Dressing Appearance open to air 09/06/21 1335   Base moist;pink;red 09/06/21 1335   Periwound intact;dry 09/06/21 1335   Periwound Temperature cool 09/06/21 1335   Periwound Skin Turgor firm 09/06/21 1335   Edges callused;open 09/06/21 1335   Wound Length (cm) 0.3 cm 09/06/21 1335   Wound Width (cm) 0.2 cm 09/06/21 1335   Wound Depth (cm) 0.1 cm 09/06/21 1335   Drainage Characteristics/Odor serous 09/06/21 1335   Drainage Amount scant 09/06/21 1335    Care, Wound cleansed with;wound cleanser;debrided 09/06/21 1335   Dressing Care dressing applied;silver impregnated;collagen;low-adherent;foam;multi-layer wrap 09/06/21 1335   Periwound Care cleansed with pH balanced cleanser;dry periwound area maintained 09/06/21 1335     Lymphedema     Row Name 09/06/21 1335             Lymphedema Edema Assessment    Ptting Edema Category  By severity  -      Pitting Edema  Moderate  -         Skin Changes/Observations    Location/Assessment  Lower Extremity  -      Lower Extremity Conditions  bilateral:;intact;clean;dry;shiny;crust  -      Lower Extremity Color/Pigment  bilateral:;purple;red;hyperpigmented;brawny  -         Lymphedema Pulses/Capillary Refill    Lymphedema Pulses/Capillary Refill  lower extremity pulses;capillary refill  -      Dorsalis Pedis Pulse  right:;left:;+1 diminished  -      Posterior Tibialis Pulse  right:;left:;+1 diminished  -      Capillary Refill  lower extremity capillary refill  -      Lower Extremity Capillary Refill  right:;left:;less than 3 seconds  -         Lymphedema Measurements    Measurement Type(s)  Quick Girth  -      Quick Girth Areas  Lower extremities  -         LLE Quick Girth (cm)    Met-heads  26.8 cm  -MC      Mid foot  27.8 cm  -MC      Smallest ankle  30.8 cm  -MC      Largest calf  52 cm  -MC      Tib tuberosity  46 cm  -MC         RLE Quick Girth (cm)    Met-heads  26.4 cm  -MC      Mid foot  27.2 cm  -MC      Smallest ankle  31.1 cm  -MC      Largest calf  52.8 cm  -MC      Tib tuberosity  48.2 cm  -MC      RLE Quick Girth Total  185.7  -MC         Compression/Skin Care    Compression/Skin Care  skin care;wrapping location;bandaging  -      Skin Care  washed/dried;lotion applied  -      Wrapping Location  lower extremity  -      Wrapping Location LE  bilateral:;foot to knee  -      Wrapping Comments  wound dressings R heel. BLE: size 4/6 compressogrip doubled and overlapping to create  gradient compression.  -      Bandage Layers  cotton elastic stocking- double layer (comment size)  -        User Key  (r) = Recorded By, (t) = Taken By, (c) = Cosigned By    Initials Name Provider Type     Nickie Beverly, PT Physical Therapist          WOUND DEBRIDEMENT  Total area of Debridement: 2 cm2  Debridement Site 1  Location- Site 1: R heel  Selective Debridement- Site 1: Wound Surface <20cmsq  Instruments- Site 1: tweezers, scissors  Excised Tissue Description- Site 1: scant, slough, minimum, other (comment) (periwound callus)  Bleeding- Site 1: none                  PT Assessment (last 12 hours)      PT Evaluation and Treatment     Row Name 09/06/21 9963          Physical Therapy Time and Intention    Subjective Information  complains of;swelling  -     Document Type  wound care;evaluation  -     Mode of Treatment  physical therapy;individual therapy  -     Row Name 09/06/21 7839          General Information    Patient Profile Reviewed  yes  -     Patient Observations  alert;cooperative;agree to therapy  -     Pertinent History of Current Functional Problem  BLE edema, R heel wound known from previous admission  -     Risks Reviewed  patient:;increased discomfort  -     Benefits Reviewed  patient:;improve skin integrity  -     Barriers to Rehab  medically complex;previous functional deficit  -     Row Name 09/06/21 2100          Cognition    Orientation Status (Cognition)  oriented x 4  -     Row Name 09/06/21 5228          Pain Scale: Numbers Pre/Post-Treatment    Pretreatment Pain Rating  0/10 - no pain  -     Posttreatment Pain Rating  0/10 - no pain  -     Row Name 09/06/21 1335          Wound 12/27/19 2334 Right heel Diabetic Ulcer    Wound - Properties Group Placement Date: 12/27/19  - Placement Time: 2334  -AYLIN Present on Hospital Admission: Y  -AYLIN Side: Right  -AYLIN Location: heel  -AYLIN Primary Wound Type: Diabetic ulc  -    Dressing Appearance  open to air  -      Base  moist;pink;red  -     Periwound  intact;dry  -     Periwound Temperature  cool  -     Periwound Skin Turgor  firm  -     Edges  callused;open  -     Wound Length (cm)  0.3 cm  -     Wound Width (cm)  0.2 cm  -     Wound Depth (cm)  0.1 cm  -     Drainage Characteristics/Odor  serous  -     Drainage Amount  scant  -     Care, Wound  cleansed with;wound cleanser;debrided  -     Dressing Care  dressing applied;silver impregnated;collagen;low-adherent;foam;multi-layer wrap jeannette, optifoam Ag, primafix tape, MLW  -     Periwound Care  cleansed with pH balanced cleanser;dry periwound area maintained  -     Retired Wound - Properties Group Date first assessed: 12/27/19  - Time first assessed: 2334  -AYLIN Present on Hospital Admission: Y  -AYLIN Side: Right  -AYLIN Location: heel  -AYLIN Primary Wound Type: Diabetic ulc  -    Row Name 09/06/21 133          Coping    Observed Emotional State  calm;cooperative;pleasant  -     Verbalized Emotional State  acceptance  -     Row Name 09/06/21 3803          Plan of Care Review    Plan of Care Reviewed With  patient  -MC     Outcome Summary  PT Wound Care eval complete. Pt with small diabetic ulcer to the R heel, chronic, but much improved since his last admission. Pt with moderate BLE edema with chronic venous stasis changes to the skin. Pt will benefit from MLW to improve venous return and maintain limb girth reduction. Anticipate change in 2-3 days.  -     Row Name 09/06/21 8539          Physical Therapy Goals    Wound Care Goal Selection (PT)  wound care, PT goal 1  -     Row Name 09/06/21 1336          Wound Care Goal 1 (PT)    Wound Care Goal 1 (PT)  Pt will demonstrate only mild BLE edema to indicate appropriate edema management.  -     Time Frame (Wound Care Goal 1, PT)  10 days  -     Row Name 09/06/21 4014          Positioning and Restraints    Pre-Treatment Position  in bed  -     Post Treatment Position  bed  -     In Bed   fowlers;call light within reach;encouraged to call for assist  -     Row Name 09/06/21 0450          Therapy Assessment/Plan (PT)    Patient/Family Therapy Goals Statement (PT)  Go home  -     Rehab Potential (PT)  good, to achieve stated therapy goals  -     Criteria for Skilled Interventions Met (PT)  yes;meets criteria;skilled treatment is necessary  -       User Key  (r) = Recorded By, (t) = Taken By, (c) = Cosigned By    Initials Name Provider Type     Nickie Beverly, PT Physical Therapist    Lewis Cadena RN Registered Nurse        Physical Therapy Education                 Title: PT OT SLP Therapies (In Progress)     Topic: Physical Therapy (Done)     Point: Mobility training (Done)     Learning Progress Summary           Patient Acceptance, E,TB, VU by KG at 9/5/2021 1312                   Point: Home exercise program (Done)     Learning Progress Summary           Patient Acceptance, E,TB, VU by KG at 9/5/2021 1312                   Point: Body mechanics (Done)     Learning Progress Summary           Patient Acceptance, E,TB, VU by KG at 9/5/2021 1312                   Point: Precautions (Done)     Learning Progress Summary           Patient Acceptance, E,TB, VU by KG at 9/5/2021 1312                               User Key     Initials Effective Dates Name Provider Type Discipline    KG 06/16/21 -  Ivon Franklin Physical Therapist PT                Recommendation and Plan  Anticipated Discharge Disposition (PT): inpatient rehabilitation facility (defer to PT mobility)  Planned Therapy Interventions (PT): wound care  Therapy Frequency (PT): daily  Plan of Care Reviewed With: patient           Outcome Summary: PT Wound Care eval complete. Pt with small diabetic ulcer to the R heel, chronic, but much improved since his last admission. Pt with moderate BLE edema with chronic venous stasis changes to the skin. Pt will benefit from MLW to improve venous return and maintain limb girth  reduction. Anticipate change in 2-3 days.  Plan of Care Reviewed With: patient            Time Calculation  PT Charges     Row Name 21 1335             Time Calculation    Start Time  1335  -MC      PT Goal Re-Cert Due Date  21  -MC         Untimed Charges    PT Eval/Re-eval Minutes  25  -MC      Wound Care  93927 Multilayer comp below knee;22316 Selective debridement  -MC      34187-Fxynfidcpd comp below knee  15  -MC      81429-Ejmrsqpuf debridement  5  -MC         Total Minutes    Untimed Charges Total Minutes  45  -MC       Total Minutes  45  -MC        User Key  (r) = Recorded By, (t) = Taken By, (c) = Cosigned By    Initials Name Provider Type     Nickie Beverly, PT Physical Therapist            Therapy Charges for Today     Code Description Service Date Service Provider Modifiers Qty    53836208388 HC PT RE-EVAL ESTABLISHED PLAN 2 2021 Nickie Beverly, PT GP 1    [id number] HC AL DEBRIDE OPEN WOUND UP TO 20CM 2021 Nickie Beverly, PT GP 1    [id number] HC PT MULTI LAYER COMP SYS BELOW KNEE 2021 Nickie Beverly, PT GP 1            PT G-Codes  Outcome Measure Options: AM-PAC 6 Clicks Daily Activity (OT)  AM-PAC 6 Clicks Score (PT): 12  AM-PAC 6 Clicks Score (OT): 12       Nickie Beverly PT  2021      Electronically signed by Nickie Beverly PT at 21 1437          Occupational Therapy Notes (most recent note)      Anusha [place], OT at 21 1113          Patient Name: Abe Phillips Jr.  : 1955    MRN: [id number]                              Today's Date: 2021       Admit Date: 2021    Visit Dx:     ICD-10-CM ICD-9-CM   1. Hepatic encephalopathy (CMS/HCC)  K72.90 572.2   2. Acute pulmonary edema (CMS/HCC)  J81.0 518.4   3. Pleural effusion  J90 511.9   4. Acute kidney injury (CMS/HCC)  N17.9 584.9   5. History of diabetes mellitus  Z86.39 V12.29     Patient Active Problem List   Diagnosis   • Type 2 diabetes mellitus with hyperglycemia  (CMS/HCC)   • Thrombocytopenia (CMS/HCC)   • PVD (peripheral vascular disease) (CMS/HCC)   • A-fib (CMS/HCC)   • Cirrhosis of liver (CMS/HCC)   • Chronic congestive heart failure (CMS/HCC)   • Diabetic ulcer of right heel (CMS/HCC)   • Primary narcolepsy without cataplexy   • Essential hypertension   • Morbidly obese (CMS/HCC)   • Restless legs syndrome (RLS)   • ADD (attention deficit disorder)   • Depression   • Diabetes mellitus type 2, uncontrolled, with complications (CMS/HCC)   • Edema   • MCCAULEY (nonalcoholic steatohepatitis)   • Hepatitis B   • Insomnia   • Lymphedema   • Obesity   • Severe JAIME on CPAP   • RLS (restless legs syndrome)   • Tremor of both hands   • Restrictive cardiomyopathy (CMS/HCC)   • Other hyperlipidemia   • Uncontrolled type 2 diabetes mellitus with hyperglycemia (CMS/HCC)   • Dyslipidemia   • Hyperglycemia   • Diabetic ulcer of right heel associated with type 2 diabetes mellitus (CMS/HCC)   • Facial paralysis/Widen palsy   • Diabetic hypoglycemia (CMS/HCC)   • Hypogonadism in male   • Atypical facial pain   • Bilateral leg weakness   • Anemia   • Elevated troponin   • Hyponatremia   • OMAR (acute kidney injury) (CMS/HCC)   • Bilateral pleural effusion   • Encephalopathy, hepatic (CMS/HCC)   • Hepatic encephalopathy (CMS/HCC)     Past Medical History:   Diagnosis Date   • A-fib (CMS/HCC)    • ADD (attention deficit disorder)    • Atrial flutter (CMS/HCC)    • Bell's palsy    • Cellulitis    • CHF (congestive heart failure) (CMS/HCC)    • Cirrhosis of liver (CMS/HCC)    • Constipation    • Depression    • Diabetes mellitus (CMS/HCC)    • Diabetic ulcer of right foot (CMS/HCC)    • Disease of thyroid gland    • Edema    • Fatty liver    • Hepatitis B    • Hyperlipidemia    • Hypokalemia    • Insomnia    • Lymphedema    • Narcolepsy    • Neuropathy    • Obesity    • JAIME on CPAP    • PVD (peripheral vascular disease) (CMS/HCC)    • RLS (restless legs syndrome)    • Skin cancer    • Tardive  dyskinesia    • Tremor of both hands    • Type 2 diabetes mellitus (CMS/HCC)    • Yeast infection      Past Surgical History:   Procedure Laterality Date   • ACHILLES TENDON REPAIR     • CARDIAC ABLATION     • CHOLECYSTECTOMY     • ENDOSCOPY N/A 8/16/2021    Procedure: ESOPHAGOGASTRODUODENOSCOPY;  Surgeon: Brunner, Mark I, MD;  Location: [facility] ENDOSCOPY;  Service: Gastroenterology;  Laterality: N/A;   • LASER ABLATION      VEIN RLE     General Information     Row Name 09/05/21 1156          OT Time and Intention    Document Type  evaluation  -JY     Mode of Treatment  occupational therapy  -JY     Row Name 09/05/21 1156          General Information    Patient Profile Reviewed  yes  -JY     Prior Level of Function  min assist:;dressing;bathing;independent:;all household mobility;transfer;bed mobility;feeding;grooming difficult to fully obtain pt PLOF thus will need further f/u yet per chart review recently at [facility] for rehab (home 1.5 days before return to hospital), was using FWW, utilized  AE for LBD, able to complete simple g/h  -JY     Existing Precautions/Restrictions  fall;other (see comments) increased edema throughout  -JY     Barriers to Rehab  medically complex;previous functional deficit  -JY     Row Name 09/05/21 1156          Living Environment    Lives With  spouse;child(joni), adult;grandchild(joni) spouse, dtg, HANNA, grandchildren most of who can assist as able when not at work (less A available during the day)  -     Row Name 09/05/21 1156          Home Main Entrance    Number of Stairs, Main Entrance  four  -JY     Stair Railings, Main Entrance  railing on left side (ascending)  -     Row Name 09/05/21 1156          Stairs Within Home, Primary    Stairs, Within Home, Primary  has flight to basement and upstairs however pt does not have to use at baseline with bed, bath and kitchen all on main level  -JY     Number of Stairs, Within Home, Primary  other (see comments) see comment related to stairs   -JY     Row Name 09/05/21 1156          Cognition    Orientation Status (Cognition)  oriented x 4  -JY     Row Name 09/05/21 1156          Safety Issues, Functional Mobility    Safety Issues Affecting Function (Mobility)  safety precaution awareness;safety precautions follow-through/compliance;sequencing abilities  -JY     Impairments Affecting Function (Mobility)  balance;endurance/activity tolerance;pain;shortness of breath;strength  -JY     Comment, Safety Issues/Impairments (Mobility)  pt alert and able to follow commands, progressive mobility limited d/t pt report of pain, exertion/SOB with increased activity and plan for wound care to address LEs thus ultimately returned to bed level  -JY       User Key  (r) = Recorded By, (t) = Taken By, (c) = Cosigned By    Initials Name Provider Type    Anusha Rodriguez OT Occupational Therapist          Mobility/ADL's     Row Name 09/05/21 1246 09/05/21 1208       Bed Mobility    Bed Mobility  --  supine-sit;sit-supine;scooting/bridging  -JY    Scooting/Bridging [place] (Bed Mobility)  dependent (less than 25% patient effort);2 person assist;verbal cues for scooting to HOB  -JY  --    Supine-Sit [place] (Bed Mobility)  moderate assist (50% patient effort);2 person assist;verbal cues  -JY  --    Sit-Supine [place] (Bed Mobility)  maximum assist (25% patient effort);2 person assist;verbal cues  -JY  --    Bed Mobility, Safety Issues  decreased use of arms for pushing/pulling;decreased use of legs for bridging/pushing  -JY  --    Assistive Device (Bed Mobility)  bed rails;draw sheet;head of bed elevated  -JY  --    Comment (Bed Mobility)  pt req'd HOB elevated to come from more semi sitting position to full sitting and reverse upon return to supine; pt presented with dizziness, SOB and increased exertion with sitting EOB, resolved with PLB and rest; skilled cues provided for hand placement, seq and weight shifting; increased A for return to supine d/t need  "for LEs to be elevated to bed height  -JY  --    [facility] Name 09/05/21 1246          Transfers    Comment (Transfers)  pt deferred STS attempt at EOB indicating \"too much\" for today and further progressive t/f, mobility limited d/t pt need to return to bed for wound mgmt at LEs  -[facility] 09/05/21 1246          Functional Mobility    Functional Mobility- Comment  defer to PT for specifics  -JY     Row Name 09/05/21 1246          Activities of Daily Living    BADL Assessment/Intervention  upper body dressing;lower body dressing;grooming  -JY     Row Name 09/05/21 1246          Upper Body Dressing Assessment/Training    [place] Level (Upper Body Dressing)  doff;don;pajama/robe;moderate assist (50% patient effort);maximum assist (25% patient effort);verbal cues  -JY     Position (Upper Body Dressing)  sitting up in bed  -JY     Comment (Upper Body Dressing)  pt able to assist in elevation of UEs for threading and unthreading purposes however presents with increased exertion and SOB with more dynamic tasks, completed grossly from semi reclined to conserve energy  -JY     Row Name 09/05/21 1246          Lower Body Dressing Assessment/Training    [place] Level (Lower Body Dressing)  doff;don;socks;dependent (less than 25% patient effort)  -JY     Position (Lower Body Dressing)  edge of bed sitting  -JY     Comment (Lower Body Dressing)  pt u/a to safely reach LEs without increased exertion, SOB and dizziness with increased edema at B feet, LEs, abdomen limiting access; pt reports having LH AE at baseline that may need to be reviewed, utilized again  -JY     Row Name 09/05/21 1246          Grooming Assessment/Training    [place] Level (Grooming)  wash face, hands;set up  -JY     Position (Grooming)  edge of bed sitting  -JY     Comment (Grooming)  pt able to complete face/hand washing without physical A once set up with supplies  -JY       User Key  (r) = Recorded By, (t) = Taken By, (c) = Cosigned By    " Initials Name Provider Type    Anusha Rodriguez, OT Occupational Therapist        Obj/Interventions     Row Name 09/05/21 1300          Sensory Assessment (Somatosensory)    Sensory Assessment (Somatosensory)  bilateral UE;sensation intact  -     Bilateral UE Sensory Assessment  general sensation;light touch awareness;light touch localization;intact  -     Row Name 09/05/21 1300          Sensory Interventions    Comment, Sensory Intervention  pt denies any numbness or tingling and reports sensation provided to BUEs as intact and equal  -     Row Name 09/05/21 1300          Vision Assessment/Intervention    Visual Impairment/Limitations  corrective lenses full-time  -     Vision Assessment Comment  pt reports mild blurred vision if not wearing eyewear as indicated full time; no new acute vision change  -     Row Name 09/05/21 1300          Range of Motion Comprehensive    General Range of Motion  bilateral upper extremity ROM WFL  -     Comment, General Range of Motion  BUE AROM WFL  -     Row Name 09/05/21 1300          Strength Comprehensive (MMT)    Comment, General Manual Muscle Testing (MMT) Assessment  gross BUE MMS 4/5 per MMT  -     Row Name 09/05/21 1300          Motor Skills    Motor Skills  functional endurance  -J     Functional Endurance  pt presents with decreased activity tolerance kandis toward more dynamic tasks with demo'd exertion, SOB, requires rest break and cued PLB  -     Row Name 09/05/21 1300          Balance    Balance Assessment  sitting static balance;sitting dynamic balance  -JY     Static Sitting Balance  WFL;supported;sitting, edge of bed  -JY     Dynamic Sitting Balance  WFL;supported;sitting, edge of bed;other (see comments) ADL tasks with UEs integrated while pt EOB  -JY     Static Standing Balance  not tested  -JY     Dynamic Standing Balance  not tested  -JY     Balance Interventions  sitting;static;dynamic;occupation based/functional task  -JY     Comment, Balance   pt did not present with any significant LOB during seated tasks at EOB once B feet in contact with floor; u/a to assess standing this date for respective balance assessment  -JY       User Key  (r) = Recorded By, (t) = Taken By, (c) = Cosigned By    Initials Name Provider Type    Anusha Rodriguez OT Occupational Therapist        Goals/Plan     Row Name 09/05/21 1315          Bed Mobility Goal 1 (OT)    Activity/Assistive Device (Bed Mobility Goal 1, OT)  bed mobility activities, all  -JY     [place] Level/Cues Needed (Bed Mobility Goal 1, OT)  minimum assist (75% or more patient effort);verbal cues required  -JY     Time Frame (Bed Mobility Goal 1, OT)  long term goal (LTG);by discharge  -JY     Progress/Outcomes (Bed Mobility Goal 1, OT)  goal ongoing  -JY     Row Name 09/05/21 1315          Transfer Goal 1 (OT)    Activity/Assistive Device (Transfer Goal 1, OT)  sit-to-stand/stand-to-sit;bed-to-chair/chair-to-bed;commode, bedside without drop arms;other (see comments) w/ recommended AD, progress to toilet as able  -JY     [place] Level/Cues Needed (Transfer Goal 1, OT)  moderate assist (50-74% patient effort);verbal cues required  -JY     Time Frame (Transfer Goal 1, OT)  long term goal (LTG);by discharge  -JY     Progress/Outcome (Transfer Goal 1, OT)  goal ongoing  -JY     Row Name 09/05/21 1315          Dressing Goal 1 (OT)    Activity/Device (Dressing Goal 1, OT)  lower body dressing;other (see comments) pt will demo competency using  AE PRN to assist with LBD  -JY     [place]/Cues Needed (Dressing Goal 1, OT)  minimum assist (75% or more patient effort);verbal cues required  -JY     Time Frame (Dressing Goal 1, OT)  long term goal (LTG);by discharge  -JY     Progress/Outcome (Dressing Goal 1, OT)  goal ongoing  -JY     Row Name 09/05/21 1315          Grooming Goal 1 (OT)    Activity/Device (Grooming Goal 1, OT)  hair care;oral care;wash face, hands  -JY     [place] (Grooming Goal 1,  OT)  supervision required;verbal cues required  -JY     Time Frame (Grooming Goal 1, OT)  long term goal (LTG);by discharge  -JY     Strategies/Barriers (Grooming Goal 1, OT)  pt will demo improved standing activity tolerance upward of 3 mins with recommended AD for support in order to complete simple ADLs from graded position  -JY     Progress/Outcome (Grooming Goal 1, OT)  goal ongoing  -JY     Row Name 09/05/21 1315          Strength Goal 1 (OT)    Strength Goal 1 (OT)  Pt to complete HEP encompassing BUEs x 10 reps, tolerable resistance w/ necessary rest breaks in order to strengthen, improve endurance for integration in ADLs, related t/f  -JY     Time Frame (Strength Goal 1, OT)  long term goal (LTG);by discharge  -JY       User Key  (r) = Recorded By, (t) = Taken By, (c) = Cosigned By    Initials Name Provider Type    Anusha Rodriguez OT Occupational Therapist        Clinical Impression     Row Name 09/05/21 1305          Pain Assessment    Additional Documentation  Pain Scale: Numbers Pre/Post-Treatment (Group)  -J     Row Name 09/05/21 1305          Pain Scale: Numbers Pre/Post-Treatment    Pretreatment Pain Rating  2/10  -JY     Posttreatment Pain Rating  4/10  -JY     Pain Location - Orientation  generalized  -JY     Pain Location  abdomen  -JY     Pre/Posttreatment Pain Comment  pt initially reported 2/10 pain at abdomen, increased to 4/10 toward end of session with more vague localization of pain i.e. abdomen, LEs  -JY     Pain Intervention(s)  Repositioned;Ambulation/increased activity  -JY     Row Name 09/05/21 1305          Plan of Care Review    Plan of Care Reviewed With  patient  -JY     Progress  no change OT IE  -JY     Outcome Summary  OT IE completed post chart review. Pt A & O x 4, reports abdominal and LE pain, presents with edema at LEs, abdomen. Pt presents with decreased I in ADLs, related t/f compared to PLOF impacted by decreased fxl endurance with increased SOB and exertion with more  dynamic tasks, muscle weakness, decreased ability to reach distally. Pt completed supine > sitting with mod A x2, sitting > supine with max A x 2, dep A x 2 for scooting to EOB, req'd mod to max A for UBD d/d gown, dep A for d/d socks and SUA for face/hand washing. Pt reported dizziness, SOB and exertion upon sitting and when attempting more dynamic tasks thus requiring skilled cues for PLB and rest to recover. Pt just recently d/c'd from [facility] however would benefit from IPOT POC and IRF at d/c to address underlying impairments impacting fxn at current time.  -JY     Row Name 09/05/21 1305          Therapy Assessment/Plan (OT)    Patient/Family Therapy Goal Statement (OT)  to increase I in ADLs, related t/f, return to PLOF  -JY     Rehab Potential (OT)  good, to achieve stated therapy goals  -JY     Criteria for Skilled Therapeutic Interventions Met (OT)  yes;skilled treatment is necessary  -JY     Therapy Frequency (OT)  daily  -JY     Row Name 09/05/21 1305          Therapy Plan Review/Discharge Plan (OT)    Anticipated Discharge Disposition (OT)  inpatient rehabilitation facility  -J     Row Name 09/05/21 1305          Vital Signs    Pre Systolic BP Rehab  148  -JY     Pre Treatment Diastolic BP  84  -JY     Intra Systolic BP Rehab  140  -JY     Intra Treatment Diastolic BP  107  -JY     Post Systolic BP Rehab  131  -JY     Post Treatment Diastolic BP  71  -JY     Pretreatment Heart Rate (beats/min)  81  -JY     Intratreatment Heart Rate (beats/min)  81  -JY     Posttreatment Heart Rate (beats/min)  77  -JY     Pre SpO2 (%)  99  -JY     O2 Delivery Pre Treatment  room air  -JY     O2 Delivery Intra Treatment  room air  -JY     Post SpO2 (%)  96  -JY     O2 Delivery Post Treatment  room air  -JY     Pre Patient Position  Supine  -JY     Intra Patient Position  Sitting  -JY     Post Patient Position  Supine  -JY     Row Name 09/05/21 1305          Positioning and Restraints    Pre-Treatment Position  in bed  -JY      Post Treatment Position  bed  -JY     In Bed  notified nsg;fowlers;call light within reach;encouraged to call for assist;exit alarm on;side rails up x3;legs elevated;heels elevated  -JY       User Key  (r) = Recorded By, (t) = Taken By, (c) = Cosigned By    Initials Name Provider Type    Anusha Rodriguez OT Occupational Therapist        Outcome Measures     Row Name 09/05/21 1319          How much help from another is currently needed...    Putting on and taking off regular lower body clothing?  1  -JY     Bathing (including washing, rinsing, and drying)  2  -JY     Toileting (which includes using toilet bed pan or urinal)  1  -JY     Putting on and taking off regular upper body clothing  2  -JY     Taking care of personal grooming (such as brushing teeth)  3  -JY     Eating meals  3  -JY     AM-PAC 6 Clicks Score (OT)  12  -JY     Row Name 09/05/21 1311 09/05/21 0448       How much help from another person do you currently need...    Turning from your back to your side while in flat bed without using bedrails?  2  -KG  2  -JS    Moving from lying on back to sitting on the side of a flat bed without bedrails?  2  -KG  2  -JS    Moving to and from a bed to a chair (including a wheelchair)?  1  -KG  2  -JS    Standing up from a chair using your arms (e.g., wheelchair, bedside chair)?  1  -KG  2  -JS    Climbing 3-5 steps with a railing?  1  -KG  2  -JS    To walk in hospital room?  1  -KG  2  -JS    AM-PAC 6 Clicks Score (PT)  8  -KG  12  -JS    Row Name 09/05/21 1319 09/05/21 1311       Functional Assessment    Outcome Measure Options  AM-PAC 6 Clicks Daily Activity (OT)  -JY  AM-PAC 6 Clicks Basic Mobility (PT)  -KG      User Key  (r) = Recorded By, (t) = Taken By, (c) = Cosigned By    Initials Name Provider Type    Catrachito Kincaid RN Registered Nurse    Ivon Carr Physical Therapist    Anusha Rodriguez OT Occupational Therapist          Occupational Therapy Education                 Title: PT  OT SLP Therapies (In Progress)     Topic: Occupational Therapy (In Progress)     Point: ADL training (In Progress)     Description:   Instruct learner(s) on proper safety adaptation and remediation techniques during self care or transfers.   Instruct in proper use of assistive devices.              Learning Progress Summary           Patient Acceptance, E,D, NR by KRISTINA at 9/5/2021 1113                   Point: Home exercise program (Not Started)     Description:   Instruct learner(s) on appropriate technique for monitoring, assisting and/or progressing therapeutic exercises/activities.              Learner Progress:  Not documented in this visit.          Point: Precautions (In Progress)     Description:   Instruct learner(s) on prescribed precautions during self-care and functional transfers.              Learning Progress Summary           Patient Acceptance, E,D, NR by KRISTINA at 9/5/2021 1113                   Point: Body mechanics (In Progress)     Description:   Instruct learner(s) on proper positioning and spine alignment during self-care, functional mobility activities and/or exercises.              Learning Progress Summary           Patient Acceptance, E,D, NR by KRISTINA at 9/5/2021 1113                               User Key     Initials Effective Dates Name Provider Type Discipline    KRISTINA 06/16/21 -  Anusha Birmingham, [facility]RON Occupational Therapist OT              OT Recommendation and Plan  Therapy Frequency (OT): daily  Plan of Care Review  Plan of Care Reviewed With: patient  Progress: no change (OT IE)  Outcome Summary: OT IE completed post chart review. Pt A & O x 4, reports abdominal and LE pain, presents with edema at LEs, abdomen. Pt presents with decreased I in ADLs, related t/f compared to PLOF impacted by decreased fxl endurance with increased SOB and exertion with more dynamic tasks, muscle weakness, decreased ability to reach distally. Pt completed supine > sitting with mod A x2, sitting > supine with max A x  2, dep A x 2 for scooting to EOB, req'd mod to max A for UBD d/d gown, dep A for d/d socks and SUA for face/hand washing. Pt reported dizziness, SOB and exertion upon sitting and when attempting more dynamic tasks thus requiring skilled cues for PLB and rest to recover. Pt just recently d/c'd from [facility] however would benefit from IPOT POC and IRF at d/c to address underlying impairments impacting fxn at current time.     Time Calculation:   Time Calculation- OT     Row Name 09/05/21 1320             Time Calculation- OT    OT Start Time  1113  -JY      OT Received On  09/05/21  -JY      OT Goal Re-Cert Due Date  09/15/21  -JY         Timed Charges    83557 - OT Therapeutic Activity Minutes  9  -JY         Untimed Charges    OT Eval/Re-eval Minutes  49  -JY         Total Minutes    Timed Charges Total Minutes  9  -JY      Untimed Charges Total Minutes  49  -JY       Total Minutes  58  -JY        User Key  (r) = Recorded By, (t) = Taken By, (c) = Cosigned By    Initials Name Provider Type    Anusha Rodriguez OT Occupational Therapist        Therapy Charges for Today     Code Description Service Date Service Provider Modifiers Qty    59205062534 HC OT THERAPEUTIC ACT EA 15 MIN 9/5/2021 Anusha Birmingham OT GO 1    14261914967 HC OT EVAL MOD COMPLEXITY 4 9/5/2021 Anusha Birmingham OT GO 1               Anusha Birmingham OT  9/5/2021    Electronically signed by Anusha Birmingham OT at 09/05/21 1321

## 2021-09-07 NOTE — PLAN OF CARE
Problem: Adult Inpatient Plan of Care  Goal: Plan of Care Review  Outcome: Ongoing, Progressing  Goal: Patient-Specific Goal (Individualized)  Outcome: Ongoing, Progressing  Goal: Absence of Hospital-Acquired Illness or Injury  Outcome: Ongoing, Progressing  Intervention: Identify and Manage Fall Risk  Recent Flowsheet Documentation  Taken 9/7/2021 1600 by Sushil Cervantes RN  Safety Promotion/Fall Prevention: activity supervised  Taken 9/7/2021 1200 by Sushil Cervantes RN  Safety Promotion/Fall Prevention: activity supervised  Taken 9/7/2021 0800 by Sushil Cervantes RN  Safety Promotion/Fall Prevention: activity supervised  Intervention: Prevent Skin Injury  Recent Flowsheet Documentation  Taken 9/7/2021 1600 by Sushil Cervantes RN  Body Position: supine, legs elevated  Skin Protection:   adhesive use limited   electrode sites changed  Taken 9/7/2021 1200 by Sushil Cervantes RN  Body Position: turned  Skin Protection: adhesive use limited  Taken 9/7/2021 0800 by Sushil Cervantes RN  Body Position: position changed independently  Skin Protection: adhesive use limited  Intervention: Prevent and Manage VTE (venous thromboembolism) Risk  Recent Flowsheet Documentation  Taken 9/7/2021 1600 by Sushil Cervantes RN  VTE Prevention/Management: (sq heparin) other (see comments)  Taken 9/7/2021 1200 by Sushil Cervantes RN  VTE Prevention/Management: (sq heparin) other (see comments)  Taken 9/7/2021 0800 by Sushil Cervantes RN  VTE Prevention/Management: (sq heparin) other (see comments)  Intervention: Prevent Infection  Recent Flowsheet Documentation  Taken 9/7/2021 1600 by Sushil Cervantes RN  Infection Prevention: single patient room provided  Taken 9/7/2021 1200 by Sushil Cervantes RN  Infection Prevention: single patient room provided  Taken 9/7/2021 0800 by Sushil Cervantes RN  Infection Prevention: rest/sleep promoted  Goal: Optimal Comfort and Wellbeing  Outcome: Ongoing, Progressing  Intervention: Provide  Person-Centered Care  Recent Flowsheet Documentation  Taken 9/7/2021 0800 by Sushil Cervantes, RN  Trust Relationship/Rapport:   care explained   choices provided   emotional support provided   empathic listening provided   questions answered   questions encouraged   reassurance provided   thoughts/feelings acknowledged  Goal: Readiness for Transition of Care  Outcome: Ongoing, Progressing   Goal Outcome Evaluation:      Pt A&O, lethargic at times with complaints intermittently of shortness of air.  Pt educated on use of IS, and put on fluid restriction.  Went to IR for paracentesis, but CT showed no fluid to drain.  No complaints of pain, spoke with daughter at bedside concerning pt.  VSS, will continue to monitor.

## 2021-09-07 NOTE — PROGRESS NOTES
Abe Phillips Jr.  [id number]  1955   LOS: 2 days   Patient Care Team:  Anibal Maria MD as PCP - General (Internal Medicine)  Dustin Hendricks MD as Consulting Physician (Endocrinology)  Ld Orozco MD as Cardiologist (Cardiology)    Chief Complaint: Follow-up on hepatic encephalopathy, diastolic heart failure exacerbation/pneumonia    Subjective      The patient has some shortness of breath and occasionally will be able to cough up sputum.  He denies any chest pain, refractory uncontrolled shortness of breath, or dizziness.  His legs are now wrapped for his edema.  He is currently not on oxygen therapy.    Objective     Vital Sign Min/Max for last 24 hours  Temp  Min: 97.5 °F (36.4 °C)  Max: 98.1 °F (36.7 °C)   BP  Min: 102/88  Max: 137/94   Pulse  Min: 70  Max: 89   Resp  Min: 16  Max: 18   SpO2  Min: 94 %  Max: 98 %      Weight  Min: 163 kg (359 lb 8 oz)  Max: 163 kg (359 lb 8 oz)         09/06/21  0500 09/07/21  0555   Weight: (!) 165 kg (362 lb 14.4 oz) (!) 163 kg (359 lb 8 oz)         Intake/Output Summary (Last 24 hours) at 9/7/2021 0812  Last data filed at 9/7/2021 0555  Gross per 24 hour   Intake 525 ml   Output 1500 ml   Net -975 ml       Physical Exam:     General Appearance:    Alert, cooperative, in no acute distress, R eyelid swollen   Lungs:    Diminished breath sounds to auscultation,respirations regular, even and unlabored    Heart:    Regular and normal rate, normal S1 and S2, no            murmur, no gallop, no rub, no click   Abdomen:  Extremities:   Distended, Soft, non-tender, bowel sounds audible x4    2+  Upper and lower extremity edema, normal range of motion, legs wrapped   Pulses:   Pulses palpable and equal bilaterally      Results Review:   Results from last 7 days   Lab Units 09/06/21  0735 09/05/21  1302 09/05/21  1302 09/04/21  2300 09/04/21  2300   SODIUM mmol/L 134*  --  133*  --  132*   POTASSIUM mmol/L 3.7  --  3.4*  --  4.0   CHLORIDE mmol/L 95*  --  95*  --   93*   CO2 mmol/L 25.0  --  23.0  --  25.0   BUN mg/dL 49*  --  47*  --  50*   CREATININE mg/dL 1.63*  --  1.49*  --  1.66*   GLUCOSE mg/dL 80   < > 104*   < > 162*   CALCIUM mg/dL 9.6  --  9.7  --  9.8    < > = values in this interval not displayed.     Results from last 7 days   Lab Units 09/06/21  0735 09/05/21  1302 09/04/21  2300   WBC 10*3/mm3 5.68 4.47 6.11   HEMOGLOBIN g/dL 9.9* 10.0* 10.1*   HEMATOCRIT % 31.4* 31.7* 32.6*   PLATELETS 10*3/mm3 122* 117* 117*     Results from last 7 days   Lab Units 09/05/21  1302 09/05/21  0255 09/04/21  2300   TROPONIN T ng/mL 0.026 0.036* 0.035*     · No new ECG or chest x-ray to review    · Echocardiogram 9/5/2021:  · Technically difficult study  · Left ventricular systolic function is normal. Estimated left ventricular EF = 65%.  · Left ventricular diastolic function was normal.  · The cardiac valves are anatomically and functionally normal.  · Estimated right ventricular systolic pressure from tricuspid regurgitation is normal (<35 mmHg).    · CTA chest 9/5/2021:  1. No PE or aortic dissection.  2. Chronic pericardial calcifications may be causing some constriction.  3. Moderate bilateral pleural effusions with bilateral lower lobe atelectasis.  4. Minimal groundglass opacities in the upper lobes may reflect mild pneumonitis or possibly edema.  5. Cirrhosis and ascites in the upper abdomen with anasarca.    Medication Review: REVIEWED; NO DRIPS.    Assessment/Plan      Patient with hepatic encephalopathy, cirrhosis/ascites, moderate bilateral pleural effusions in the setting of marginal renal function. Would switch po bumex to IV bumex 2mg bid.  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 Duarte that diagnostic catheterization studies should be completed when more stable..  He has had longstanding esophageal varices and apparent chronic liver disease of uncertain etiology.  Would continue current cardiac medications  and defer diagnostic catheterization studies at this time until diuresis is completed and patient is more stable.      Encephalopathy, hepatic (CMS/HCC)    PVD (peripheral vascular disease) (CMS/HCC)    A-fib (CMS/HCC)    Cirrhosis of liver (CMS/HCC)    Chronic diastolic congestive heart failure (CMS/HCC)    Primary narcolepsy without cataplexy    Essential hypertension    Restless legs syndrome (RLS)    ADD (attention deficit disorder)    Depression    Diabetes mellitus type 2, uncontrolled, with complications (CMS/HCC)    Lymphedema    Severe JAIME on CPAP    Other hyperlipidemia    Uncontrolled type 2 diabetes mellitus with hyperglycemia (CMS/HCC)    Anemia    Elevated troponin    Hyponatremia    OMAR (acute kidney injury) (CMS/HCC)    Bilateral pleural effusion    Hepatic encephalopathy (CMS/HCC)    Electronically signed by ELMA Bradley, 09/07/21, 8:24 AM EDT.    I have seen and examined the patient, case was discussed with the physician extender, reviewed the above note, necessary changes were made and I agree with the final note.     Ld Orozco MD [facility]    09/07/21  08:12 EDT

## 2021-09-07 NOTE — CONSULTS
" Discussed and taught patient about type 2 diabetes self-management, risk factors, and importance of blood glucose control to reduce complications. Target blood glucose readings and A1c goals per ADA were reviewed. Reviewed with patient current A1c 6.6 as of august of this year  and discussed its significance. Signs, symptoms, and treatment of hyperglycemia and hypoglycemia were discussed. Lifestyle changes such as physical activity with MD approval and healthy eating were encouraged. Stressed the importance of strict blood sugar control after surgery to prevent complications such as infection and to promote healing of incision. Encouraged pt to monitor blood sugar at home 3+  times per day and to call PCP if blood sugar is trending dorota. Encouraged to keep record of blood glucose readings to take to follow up appointment with PCP. Provided patient with copy of [email or web address]'s \"What is Diabetes\" handout, \"Blood Glucose Goals\" handout, and \"What is A1c\" handout.   Patient currently wearing an omnipod located LUE. Patient states he last changed out the site on 9/6. Patient reports , and confirms with omnipod reader, that he uses the omnipod for basal rate only and adjusts the basal rate depending on blood sugars. [place] does show the last bolus given was 8/14/2021. Current basal pattern set for 0.7 u/hr 12a-12a and he has adjusted this to run at 0.6 u/hr. Uses u500 insulin. Insulin pump contract signed and placed in chart. Noted patient does show significant drowsiness , reports he has narcolepsy, but still demosntrates ability to operate his omnipod. Communicated to bedside RN via [employer] message that patient will need order to use the pump in hospital as well as orders on the MAR to document any changes to basal rates . Thank you for this referral, we will continue to follow while inpatient daily.   "

## 2021-09-07 NOTE — NURSING NOTE
Patient brought down for possible paracentesis. Images of abdomen completed via ultrasound in the suite. No accessible fluid. RN called on 3F. Orders for paracentesis cancelled.

## 2021-09-08 NOTE — THERAPY TREATMENT NOTE
Patient Name: Abe Phillips Jr.  : 1955    MRN: [id number]                              Today's Date: 2021       Admit Date: 2021    Visit Dx:     ICD-10-CM ICD-9-CM   1. Hepatic encephalopathy (CMS/HCC)  K72.90 572.2   2. Acute pulmonary edema (CMS/HCC)  J81.0 518.4   3. Pleural effusion  J90 511.9   4. Acute kidney injury (CMS/HCC)  N17.9 584.9   5. History of diabetes mellitus  Z86.39 V12.29     Patient Active Problem List   Diagnosis   • Type 2 diabetes mellitus with hyperglycemia (CMS/HCC)   • Thrombocytopenia (CMS/HCC)   • PVD (peripheral vascular disease) (CMS/HCC)   • A-fib (CMS/HCC)   • Cirrhosis of liver (CMS/HCC)   • Chronic diastolic congestive heart failure (CMS/HCC)   • Diabetic ulcer of right heel (CMS/HCC)   • Primary narcolepsy without cataplexy   • Essential hypertension   • Morbidly obese (CMS/HCC)   • Restless legs syndrome (RLS)   • ADD (attention deficit disorder)   • Depression   • Diabetes mellitus type 2, uncontrolled, with complications (CMS/HCC)   • Edema   • MCCAULEY (nonalcoholic steatohepatitis)   • Hepatitis B   • Insomnia   • Lymphedema   • Obesity   • Severe JAIME on CPAP   • RLS (restless legs syndrome)   • Tremor of both hands   • Other hyperlipidemia   • Uncontrolled type 2 diabetes mellitus with hyperglycemia (CMS/HCC)   • Dyslipidemia   • Hyperglycemia   • Diabetic ulcer of right heel associated with type 2 diabetes mellitus (CMS/HCC)   • Facial paralysis/Brunswick palsy   • Diabetic hypoglycemia (CMS/HCC)   • Hypogonadism in male   • Bilateral leg weakness   • Anemia   • Elevated troponin   • Hyponatremia   • OMAR (acute kidney injury) (CMS/HCC)   • Bilateral pleural effusion   • Encephalopathy, hepatic (CMS/HCC)   • Hepatic encephalopathy (CMS/HCC)     Past Medical History:   Diagnosis Date   • A-fib (CMS/HCC)    • ADD (attention deficit disorder)    • Atrial flutter (CMS/HCC)    • Bell's palsy    • Cellulitis    • CHF (congestive heart failure) (CMS/HCC)    • Cirrhosis of  liver (CMS/HCC)    • Constipation    • Depression    • Diabetes mellitus (CMS/HCC)    • Diabetic ulcer of right foot (CMS/HCC)    • Disease of thyroid gland    • Edema    • Fatty liver    • Hepatitis B    • Hyperlipidemia    • Hypokalemia    • Insomnia    • Lymphedema    • Narcolepsy    • Neuropathy    • Obesity    • JAIME on CPAP    • PVD (peripheral vascular disease) (CMS/HCC)    • RLS (restless legs syndrome)    • Skin cancer    • Tardive dyskinesia    • Tremor of both hands    • Type 2 diabetes mellitus (CMS/HCC)    • Yeast infection      Past Surgical History:   Procedure Laterality Date   • ACHILLES TENDON REPAIR     • CARDIAC ABLATION     • CHOLECYSTECTOMY     • ENDOSCOPY N/A 8/16/2021    Procedure: ESOPHAGOGASTRODUODENOSCOPY;  Surgeon: Brunner, Mark I, MD;  Location: [facility] ENDOSCOPY;  Service: Gastroenterology;  Laterality: N/A;   • LASER ABLATION      VEIN RLE     General Information     Row Name 09/08/21 0932          Physical Therapy Time and Intention    Document Type  therapy note (daily note)  -CD     Mode of Treatment  physical therapy  -CD     Row Name 09/08/21 0932          General Information    Patient Profile Reviewed  yes  -CD     Existing Precautions/Restrictions  fall edematous LE's - elevate in recliner.  -CD     Barriers to Rehab  medically complex;previous functional deficit  -CD     Row Name 09/08/21 0932          Cognition    Orientation Status (Cognition)  oriented x 4  -CD     Row Name 09/08/21 0932          Safety Issues, Functional Mobility    Safety Issues Affecting Function (Mobility)  insight into deficits/self-awareness;safety precaution awareness;safety precautions follow-through/compliance;sequencing abilities  -CD     Impairments Affecting Function (Mobility)  balance;endurance/activity tolerance;strength;pain;shortness of breath  -CD     Comment, Safety Issues/Impairments (Mobility)  PT IS SLOW TO RESPOND BUT FOLLOWS ALL COMMANDS.  -CD       User Key  (r) = Recorded By, (t) =  Taken By, (c) = Cosigned By    Initials Name Provider Type    Dominique Alcantara PT Physical Therapist        Mobility     Row Name 09/08/21 0935          Bed Mobility    Bed Mobility  supine-sit  -CD     Supine-Sit Gaines (Bed Mobility)  moderate assist (50% patient effort);2 person assist;verbal cues  -CD     Assistive Device (Bed Mobility)  bed rails;head of bed elevated  -CD     Comment (Bed Mobility)  INCREASED TIME AND EFFORT. CUES FOR SEQUENCING AND TO USE BEDRAIL TO ASSIST.  -CD     Row Name 09/08/21 0935          Transfers    Comment (Transfers)  CUES FOR HAND PLACEMENT. SLOW TO COME TO FULL STANDING WITH WT OVER COG. HAD INITIAL POSTERIOR LEAN. STAND STEP PIVOT TRANSFER RELYING HEAVILY ON R WALKER.  -CD     Row Name 09/08/21 0935          Bed-Chair Transfer    Bed-Chair Gaines (Transfers)  moderate assist (50% patient effort);2 person assist;verbal cues  -CD     Assistive Device (Bed-Chair Transfers)  walker, front-wheeled  -CD     Row Name 09/08/21 0935          Sit-Stand Transfer    Sit-Stand Gaines (Transfers)  2 person assist;verbal cues;maximum assist (25% patient effort)  -CD     Assistive Device (Sit-Stand Transfers)  walker, front-wheeled  -CD     Row Name 09/08/21 0935          Gait/Stairs (Locomotion)    Comment (Gait/Stairs)  DEFERRED GAIT. OPTED TO FOCUS ON STANDING TOLERANCE WITH UPRIGHT POSTURE AND PREGAIT ACTIVITY WITH WT SHIFT R/L. PT STOOD X APPROX 2 MINUTES. LIMITED BY FATIGUE.  -CD       User Key  (r) = Recorded By, (t) = Taken By, (c) = Cosigned By    Initials Name Provider Type    Dominique Alcantara PT Physical Therapist        Obj/Interventions     Row Name 09/08/21 0967          Motor Skills    Motor Skills  functional endurance  -CD     Functional Endurance  PT MILDLY SOA AND MILDLY TREMULOUS AS FATIGUED IN STANDING.  -CD     Therapeutic Exercise  -- COMPLETED SEATED B LE THER EX AT EOB: LAQ, HIP FLEX AND AP'S - 1 SET OF 10 REPS EACH.  -CD     Row Name 09/08/21  0938          Balance    Balance Assessment  sitting static balance;sitting dynamic balance;standing static balance;standing dynamic balance  -CD     Static Sitting Balance  WFL;supported;sitting, edge of bed  -CD     Dynamic Sitting Balance  mild impairment;supported;sitting, edge of bed MILD POSTERIOR LEAN DURING THER EX B LE'S AT EOB.  -CD     Static Standing Balance  mild impairment;supported;standing  -CD     Dynamic Standing Balance  mild impairment;supported;standing  -CD       User Key  (r) = Recorded By, (t) = Taken By, (c) = Cosigned By    Initials Name Provider Type    CD Dominique Woods, PT Physical Therapist        Goals/Plan    No documentation.       Clinical Impression     Row Name 09/08/21 0940          Pain Scale: Numbers Pre/Post-Treatment    Pretreatment Pain Rating  2/10  -CD     Posttreatment Pain Rating  2/10  -CD     Pain Location - Orientation  lower  -CD     Pain Location  back  -CD     Pain Intervention(s)  Repositioned;Ambulation/increased activity  -CD     Row Name 09/08/21 0940          Plan of Care Review    Plan of Care Reviewed With  patient  -CD     Progress  improving  -CD     Outcome Summary  PT ABLE TO INITIATE STANDING ACTIVITY AND TRANSFERS TODAY WITH CUES FOR SEQUENCING AND SAFETY. LIMITED BY FATIGUE, WEAKNESS BUT GIVES GOOD EFFORT. COMPLETED SUPINE TO SIT WITH MOD ASSIST OF 2, STS WITH MAX ASSIST OF 2 AND STAND STEP PIVOT TRANSFER WITH MOD ASSIT OF 2 USING R WALKER. PT TOLERATED STANDING AT WALKER FOR APPROX 2 MINUTES. RECOMMEND IRF AT D/C.  -CD     Row Name 09/08/21 0940          Therapy Assessment/Plan (PT)    Patient/Family Therapy Goals Statement (PT)  TO RETURN TO PLOF.  -CD     Rehab Potential (PT)  good, to achieve stated therapy goals  -CD     Criteria for Skilled Interventions Met (PT)  skilled treatment is necessary;meets criteria  -CD     Row Name 09/08/21 0940          Vital Signs    Pre Systolic BP Rehab  117  -CD     Pre Treatment Diastolic BP  80  -CD     Post  Systolic BP Rehab  114  -CD     Post Treatment Diastolic BP  70  -CD     Posttreatment Heart Rate (beats/min)  74  -CD     Pre SpO2 (%)  95  -CD     O2 Delivery Pre Treatment  room air  -CD     Intra SpO2 (%)  89  -CD     O2 Delivery Intra Treatment  room air  -CD     Post SpO2 (%)  92  -CD     O2 Delivery Post Treatment  room air  -CD     Pre Patient Position  Supine  -CD     Post Patient Position  Sitting  -CD     Row Name 09/08/21 0940          Positioning and Restraints    Pre-Treatment Position  in bed  -CD     Post Treatment Position  chair  -CD     In Chair  reclined;call light within reach;encouraged to call for assist;exit alarm on;with family/caregiver;legs elevated;notified nsg;with other staff STAFF IN TO DRAW BLOOD.  -CD       User Key  (r) = Recorded By, (t) = Taken By, (c) = Cosigned By    Initials Name Provider Type    Dominique Alcantara, PT Physical Therapist        Outcome Measures     Row Name 09/08/21 0945          How much help from another person do you currently need...    Turning from your back to your side while in flat bed without using bedrails?  2  -CD     Moving from lying on back to sitting on the side of a flat bed without bedrails?  2  -CD     Moving to and from a bed to a chair (including a wheelchair)?  2  -CD     Standing up from a chair using your arms (e.g., wheelchair, bedside chair)?  2  -CD     Climbing 3-5 steps with a railing?  1  -CD     To walk in hospital room?  2  -CD     AM-PAC 6 Clicks Score (PT)  11  -CD     Row Name 09/08/21 1145          Functional Assessment    Outcome Measure Options  AM-PAC 6 Clicks Daily Activity (OT)  -       User Key  (r) = Recorded By, (t) = Taken By, (c) = Cosigned By    Initials Name Provider Type    Dominique Alcantara, PT Physical Therapist    Patricia Merritt OT Occupational Therapist                       Physical Therapy Education                 Title: PT OT SLP Therapies (In Progress)     Topic: Physical Therapy (Done)     Point:  Mobility training (Done)     Learning Progress Summary           Patient Acceptance, E, VU,NR by CD at 9/8/2021 0946    Comment: SEE FLOWSHEET.    Acceptance, E,TB, VU by KG at 9/5/2021 1312                   Point: Home exercise program (Done)     Learning Progress Summary           Patient Acceptance, E, VU,NR by CD at 9/8/2021 0946    Comment: SEE FLOWSHEET.    Acceptance, E,TB, VU by KG at 9/5/2021 1312                   Point: Body mechanics (Done)     Learning Progress Summary           Patient Acceptance, E, VU,NR by CD at 9/8/2021 0946    Comment: SEE FLOWSHEET.    Acceptance, E,TB, VU by KG at 9/5/2021 1312                   Point: Precautions (Done)     Learning Progress Summary           Patient Acceptance, E, VU,NR by CD at 9/8/2021 0946    Comment: SEE FLOWSHEET.    Acceptance, E,TB, VU by KG at 9/5/2021 1312                               User Key     Initials Effective Dates Name Provider Type Discipline    CD 06/16/21 -  Dominique Woods PT Physical Therapist PT    KG 06/16/21 -  Ivon Franklin Physical Therapist PT              PT Recommendation and Plan     Plan of Care Reviewed With: patient  Progress: improving  Outcome Summary: PT ABLE TO INITIATE STANDING ACTIVITY AND TRANSFERS TODAY WITH CUES FOR SEQUENCING AND SAFETY. LIMITED BY FATIGUE, WEAKNESS BUT GIVES GOOD EFFORT. COMPLETED SUPINE TO SIT WITH MOD ASSIST OF 2, STS WITH MAX ASSIST OF 2 AND STAND STEP PIVOT TRANSFER WITH MOD ASSIT OF 2 USING R WALKER. PT TOLERATED STANDING AT WALKER FOR APPROX 2 MINUTES. RECOMMEND IRF AT D/C.     Time Calculation:   PT Charges     Row Name 09/08/21 0946             Time Calculation    Start Time  0854  -CD      PT Received On  09/08/21  -      PT Goal Re-Cert Due Date  09/16/21  -CD         Time Calculation- PT    Total Timed Code Minutes- PT  34 minute(s)  -CD         Timed Charges    66174 - PT Therapeutic Exercise Minutes  14  -CD      25333 - PT Therapeutic Activity Minutes  20  -CD         Total  Minutes    Timed Charges Total Minutes  34  -CD       Total Minutes  34  -CD        User Key  (r) = Recorded By, (t) = Taken By, (c) = Cosigned By    Initials Name Provider Type    CD Dominique Woods, PT Physical Therapist        Therapy Charges for Today     Code Description Service Date Service Provider Modifiers Qty    77038870129  PT THER PROC EA 15 MIN 9/8/2021 Dominique Woods, PT GP 1    [id number]  PT THERAPEUTIC ACT EA 15 MIN 9/8/2021 Dominique Woods, PT GP 1    [id number]  PT THER SUPP EA 15 MIN 9/8/2021 Dominique Woods, PT GP 2          PT G-Codes  Outcome Measure Options: AM-PAC 6 Clicks Daily Activity (OT)  AM-PAC 6 Clicks Score (PT): 11  AM-PAC 6 Clicks Score (OT): 13    Dominique Woods, PT  9/8/2021

## 2021-09-08 NOTE — CASE MANAGEMENT/SOCIAL WORK
Case Management Discharge Note    Final Note:     Mr. Phillips is not medically ready for discharge today. I spoke to Sarai in admissions at [facility] who advised they can offer Mr. Phillips an intermediate care bed when medically ready, no insurance approval is not required.    I spoke with Mr. Phillips's daughter Anisa today over the phone and she is agreeable to this discharge plan. I anticipate Mr. Phillips could be medically ready by Friday afternoon.    I have arranged for Mr. Phillips to have [facility] ambulance transport him to [facility] Friday at 1330.     Nurse to call report: [phone].    Case management to fax discharge summary to [phone].     PCS is on the chart but requires the date and time of transport to be filled in at the time of discharge.         Selected Continued Care - Admitted Since 9/4/2021     Destination Coordination complete.    Service Provider Selected Services Address Phone Fax Patient Preferred    [facility] [address] [address] [phone] --              Home Medical Care Coordination complete.    Service Provider Selected Services Address Phone Fax Patient Preferred    [facility] [address] [address] [phone] --       Internal Comment last updated by Chelsie Ibarra RN 9/7/2021 0921    Resume therapy order needs faxed                           Selected Continued Care - Prior Encounters Includes selections from prior encounters from 6/6/2021 to 9/8/2021    Discharged on 8/18/2021 Admission date: 8/12/2021 - Discharge disposition: Home or Self Care    Destination     Service Provider Selected Services Address Phone Fax Patient Preferred    [facility]  Inpatient Rehabilitation [address] [address] [phone] --                    Transportation Services  Ambulance: [facility]  Ambulance Service    Final Discharge Disposition Code: 04 - intermediate care facility

## 2021-09-08 NOTE — PLAN OF CARE
Goal Outcome Evaluation:      Pt rested during the night. VSS. NSR. RA-2L NC. No acute events overnight.   Problem: Adult Inpatient Plan of Care  Goal: Plan of Care Review  Outcome: Ongoing, Progressing  Flowsheets  Taken 9/8/2021 0534 by Eli Cortez RN  Progress: no change  Taken 9/6/2021 1335 by Nickie Beverly PT  Plan of Care Reviewed With: patient   Denies pain. Will continue to monitor.      Progress: no change

## 2021-09-08 NOTE — PLAN OF CARE
Goal Outcome Evaluation:  Plan of Care Reviewed With: patient        Progress: improving  Outcome Summary: PT ABLE TO INITIATE STANDING ACTIVITY AND TRANSFERS TODAY WITH CUES FOR SEQUENCING AND SAFETY. LIMITED BY FATIGUE, WEAKNESS BUT GIVES GOOD EFFORT. COMPLETED SUPINE TO SIT WITH MOD ASSIST OF 2, STS WITH MAX ASSIST OF 2 AND STAND STEP PIVOT TRANSFER WITH MOD ASSIT OF 2 USING R WALKER. PT TOLERATED STANDING AT WALKER FOR APPROX 2 MINUTES. RECOMMEND IRF AT D/C.

## 2021-09-08 NOTE — CONSULTS
"Chart reviewed for diabetes education, continued follow up while in hospital on insulin pump.  Glucose reviewed over 24 hours, appears to be WNL, slightly hypoglycemic with morning labs. Patient has not be charting changes to his basal rate as he has not been making any. Remind patient the importance of notifying RN when making changes to his pump if at all. Will continue to follow up daily while on pump ion hospital. Notified primary RN of need to get orders for \" ok to use insulin pump \" from MD as well as MAR orders to document. Thank you for this referral .   "

## 2021-09-08 NOTE — PROGRESS NOTES
[facility] Medicine Services  PROGRESS NOTE    Patient Name: Abe Phillips Jr.  : 1955  MRN: [id number]    Date of Admission: 2021  Primary Care Physician: Anibal Maria MD    Subjective   Subjective     CC:  Confusion and shortness of breath    HPI:  Daughter at bedside.  More alert today, but dtr concerned about confusion.  Not dieruesing much yet.    ROS:  Gen- No fevers, chills  CV- No chest pain, palpitations  Resp- No cough, + dyspnea  GI- No N/V/D, abd pain      Objective   Objective     Vital Signs:   Temp:  [97.6 °F (36.4 °C)] 97.6 °F (36.4 °C)  Heart Rate:  [64-75] 64  Resp:  [16-18] 18  BP: (114-156)/(70-99) 140/85  Flow (L/min):  [2] 2     Physical Exam:  Constitutional: awake, alert, sitting up in chair, chronically ill appearing, slowly feeding himself.  HENT: NCAT, mucous membranes moist  Respiratory: diminished breath sounds bilaterally, respiratory effort normal on2-3L  Cardiovascular: RRR, no murmurs, rubs, or gallops  Gastrointestinal: obese  Positive bowel sounds, soft, nontender   Musculoskeletal: 2+ bilateral ankle edema, legs wrapped, unchanged from yesterday  Psychiatric: Appropriate affect, cooperative  Neurologic: Oriented x 3, speech slow, no focal deficits.  I would say more alert and interactive than yesterday.  Skin: No rashes        Results Reviewed:  LAB RESULTS:      Lab 21  0735 21  1302 21  2300   WBC 5.68 4.47 6.11   HEMOGLOBIN 9.9* 10.0* 10.1*   HEMATOCRIT 31.4* 31.7* 32.6*   PLATELETS 122* 117* 117*   NEUTROS ABS 4.40  --  4.45   IMMATURE GRANS (ABS) 0.12*  --  0.14*   LYMPHS ABS 0.37*  --  0.79   MONOS ABS 0.54  --  0.51   EOS ABS 0.22  --  0.17   MCV 94.6 95.8 96.7   PROTIME  --  15.9*  --    D DIMER QUANT  --   --  1.99*         Lab 21  0914 21  2157 21  1004 21  0735 21  1302 21  2300 21  2300   SODIUM 132*  --  131* 134* 133*  --  132*   POTASSIUM 3.9 3.7 3.4* 3.7 3.4*   <  > 4.0   CHLORIDE 94*  --  92* 95* 95*  --  93*   CO2 23.0  --  24.0 25.0 23.0  --  25.0   ANION GAP 15.0  --  15.0 14.0 15.0  --  14.0   BUN 51*  --  52* 49* 47*  --  50*   CREATININE 1.60*  --  1.71* 1.63* 1.49*  --  1.66*   GLUCOSE 67  --  114* 80 104*  --  162*   CALCIUM 9.5  --  9.4 9.6 9.7  --  9.8   MAGNESIUM 2.2  --   --   --   --   --   --     < > = values in this interval not displayed.         Lab 09/04/21  2300   TOTAL PROTEIN 7.3   ALBUMIN 3.80   GLOBULIN 3.5   ALT (SGPT) 15   AST (SGOT) 26   BILIRUBIN 0.8   ALK PHOS 150*         Lab 09/05/21  1302 09/05/21  0255 09/04/21  2300   PROBNP  --   --  656.8   TROPONIN T 0.026 0.036* 0.035*   PROTIME 15.9*  --   --    INR 1.31*  --   --                  Brief Urine Lab Results  (Last result in the past 365 days)      Color   Clarity   Blood   Leuk Est   Nitrite   Protein   CREAT   Urine HCG        08/13/21 0718 Yellow Clear Negative Negative Negative Negative               Microbiology Results Abnormal     Procedure Component Value - Date/Time    COVID PRE-OP / PRE-PROCEDURE SCREENING ORDER (NO ISOLATION) - Swab, Nasopharynx [[id number]]  (Normal) Collected: 09/04/21 2258    Lab Status: Final result Specimen: Swab from Nasopharynx Updated: 09/04/21 2350    Narrative:      The following orders were created for panel order COVID PRE-OP / PRE-PROCEDURE SCREENING ORDER (NO ISOLATION) - Swab, Nasopharynx.  Procedure                               Abnormality         Status                     ---------                               -----------         ------                     COVID-19 and FLU A/B PCR...[[id number]]  Normal              Final result                 Please view results for these tests on the individual orders.    COVID-19 and FLU A/B PCR - Swab, Nasopharynx [[id number]]  (Normal) Collected: 09/04/21 2258    Lab Status: Final result Specimen: Swab from Nasopharynx Updated: 09/04/21 6937     COVID19 Not Detected     Influenza A PCR Not Detected      Influenza B PCR Not Detected    Narrative:      Fact sheet for providers: https://www.fda.gov/media/423974/download    Fact sheet for patients: https://www.fda.gov/media/291588/download    Test performed by PCR.          US Abdomen Limited    Result Date: 9/7/2021  Abdominal Ultrasound limited.  Clinical Diagnosis: Ascites  Comparison: None  Technique: Multiple transaxial and longitudinal images were obtained through the abdomen with real time ultrasonography. A low-frequency curvilinear transducer was utilized.  Multiple images were submitted for interpretation.  Report: Please see impression.      Impression: Impression: Sonographic evaluation different quadrants of the abdomen revealed no ascites. Paracentesis was therefore not performed.  This report was finalized on 9/7/2021 4:10 PM by Haider Jenkins MD.        Results for orders placed during the hospital encounter of 09/04/21    Adult Transthoracic Echo Complete W/ Cont if Necessary Per Protocol    Interpretation Summary  · Technically difficult study  · Left ventricular systolic function is normal. Estimated left ventricular EF = 65%.  · Left ventricular diastolic function was normal.  · The cardiac valves are anatomically and functionally normal.  · Estimated right ventricular systolic pressure from tricuspid regurgitation is normal (<35 mmHg).      I have reviewed the medications:  Scheduled Meds:aspirin, 81 mg, Oral, Daily  bumetanide, 2 mg, Intravenous, BID  cetirizine, 10 mg, Oral, Daily  gabapentin, 600 mg, Oral, BID  heparin (porcine), 5,000 Units, Subcutaneous, Q12H  insulin lispro, 0-7 Units, Subcutaneous, TID AC  lactulose, 45 mL, Oral, TID  levothyroxine, 75 mcg, Oral, Daily  montelukast, 10 mg, Oral, Nightly  nystatin, , Topical, Q12H  OXcarbazepine, 300 mg, Oral, BID  PATIENT SUPPLIED MEDICATION, 15 mg, Oral, Daily  pramipexole, 0.75 mg, Oral, BID  pravastatin, 40 mg, Oral, Daily  riFAXIMin, 550 mg, Oral, Q12H  sodium chloride, 10 mL,  Intravenous, Q12H  spironolactone, 100 mg, Oral, Daily  venlafaxine XR, 150 mg, Oral, Nightly      Continuous Infusions:   PRN Meds:.•  acetaminophen  •  dextrose  •  dextrose  •  glucagon (human recombinant)  •  influenza vaccine  •  magnesium sulfate **OR** magnesium sulfate **OR** magnesium sulfate  •  melatonin  •  ondansetron  •  potassium chloride  •  potassium chloride  •  sodium chloride    Assessment/Plan   Assessment & Plan     Active Hospital Problems    Diagnosis  POA   • **Encephalopathy, hepatic (CMS/HCC) [K72.90]  Yes   • Elevated troponin [R77.8]  Yes   • Hyponatremia [E87.1]  Yes   • OMAR (acute kidney injury) (CMS/HCC) [N17.9]  Yes   • Bilateral pleural effusion [J90]  Yes   • Hepatic encephalopathy (CMS/HCC) [K72.90]  Yes   • Anemia [D64.9]  Yes   • Uncontrolled type 2 diabetes mellitus with hyperglycemia (CMS/HCC) [E11.65]  Yes   • Other hyperlipidemia [E78.49]  Yes   • ADD (attention deficit disorder) [F98.8]  Yes   • Lymphedema [I89.0]  Yes   • Severe JAIME on CPAP [G47.33, Z99.89]  Not Applicable   • Diabetes mellitus type 2, uncontrolled, with complications (CMS/HCC) [E11.8, E11.65]  Yes   • Depression [F32.9]  Yes   • Essential hypertension [I10]  Yes   • Restless legs syndrome (RLS) [G25.81]  Yes   • A-fib (CMS/HCC) [I48.91]  Yes   • Primary narcolepsy without cataplexy [G47.419]  Yes   • Chronic diastolic congestive heart failure (CMS/HCC) [I50.32]  Yes   • PVD (peripheral vascular disease) (CMS/HCC) [I73.9]  Yes   • Cirrhosis of liver (CMS/HCC) [K74.60]  Yes      Resolved Hospital Problems   No resolved problems to display.        Brief Hospital Course to date:  Abe Phillips Jr. is a 66 y.o. male  admitted for suspected acute on chronic diastolic heart failure exacerbation as well as hepatic encephalopathy.  He is also being treated for healthcare associated pneumonia.    Acute on chronic diastolic heart failure exacerbation  - agree with more aggressive diuerisis  - continue IV bumex 2 mg  q12, not good response yet.  RN is going to bladder scan.  - watch renal function, but discussed with patient/dtr and may be will to trade off volume for renal function.  Stable today.  If good tomorrow, may need to get even more aggressive with bumex.    Bilateral pleural effusions  Bilateral edema  - personally reviewed imaging, I don't feel this is consistent with PNA.  Abx stopped 9/7.      Hepatic encephalopathy  MCCAULEY cirrhosis  Ascites    -Continue lactulose, decrease if excessive diarrhea  -Status post EGD on 8/16/2021 with portal gastropathy and lower esophageal varices noted  - continue xifaxamin  - ordered paracentesis, but not enough asictes to tap at this time.  Continue to monitor clinically  - still drowsy/confused.  Has been getting higher dose of neurontin than at home, will decrease back to 600mg bid.  Also start home adderall, family to supply mediciation.    ARF/CKD3   -Baseline creatinine 1.1-1.3  -improved to 1.6 this AM despite diuerisis  - check daily BMP for now    Hyponatremia  - stable around 131-134    Diabetes mellitus type 2  - continue current insulin regimen  -Hemoglobin A1c was 6.60 on 8/13/2021    Obstructive sleep apnea    Generalized weakness/debility/failure to thrive  - continue PT/OT    DVT prophylaxis:  Medical DVT prophylaxis orders are present.       AM-PAC 6 Clicks Score (PT): 11 (09/08/21 0945)    Disposition: I expect the patient to be discharged to rehab when medically appropriate.  Suspect will be several more days    CODE STATUS:   Code Status and Medical Interventions:   Ordered at: 09/05/21 0324     Level Of Support Discussed With:    Patient     Code Status:    CPR     Medical Interventions (Level of Support Prior to Arrest):    Full       Song Bryant MD  09/08/21

## 2021-09-08 NOTE — PLAN OF CARE
Goal Outcome Evaluation:  Plan of Care Reviewed With: patient        Progress: improving  Outcome Summary: Pt. completed STS x 3 from recliner with Max A x 2. Pt. with flexed posture upon standing requiring cues to correct upright posture to improve alignment for increased stability. Min A of 2 in standing. Pt. completed grooming tasks with SUA and increased time. Will continue to monitor progress.

## 2021-09-08 NOTE — PROGRESS NOTES
Abe Phillips Jr.  [id number]  1955   LOS: 3 days   Patient Care Team:  Anibal Maria MD as PCP - General (Internal Medicine)  Dustin Hendricks MD as Consulting Physician (Endocrinology)  Ld Orozco MD as Cardiologist (Cardiology)    Chief Complaint:  Follow-up on hepatic encephalopathy, diastolic heart failure exacerbation/pneumonia, anasarca    Subjective      The patient was brought down for paracentesis yesterday but when the images of the abdomen were completed via ultrasound there was no accessible fluid so the procedure was canceled.  The patient feels like he still has fluid in his abdomen.  He states that he continues to have some shortness of breath but no sputum production, palpitations, or chest pain.  He did not rest well last night.    Objective     Vital Sign Min/Max for last 24 hours  Temp  Min: 97.5 °F (36.4 °C)  Max: 97.5 °F (36.4 °C)   BP  Min: 117/80  Max: 156/99   Pulse  Min: 59  Max: 75   Resp  Min: 16  Max: 18   SpO2  Min: 90 %  Max: 100 %               09/06/21  0500 09/07/21  0555   Weight: (!) 165 kg (362 lb 14.4 oz) (!) 163 kg (359 lb 8 oz)         Intake/Output Summary (Last 24 hours) at 9/8/2021 0704  Last data filed at 9/7/2021 0800  Gross per 24 hour   Intake --   Output 400 ml   Net -400 ml       Physical Exam:     General Appearance:    Alert, cooperative, in no acute distress, right eyelid swollen   Lungs:    Diminished breath sounds to auscultation,respirations regular, even and unlabored, 2 L O2 NC    Heart:    Regular and normal rate, normal S1 and S2, grade 1/6 systolic  murmur, no gallop, no rub, no click   Abdomen:  Extremities:  Distended, soft, non-tender, bowel sounds audible x4    2+ edema, normal range of motion, legs wrapped   Pulses:   Pulses palpable and equal bilaterally      Results Review:   Results from last 7 days   Lab Units 09/07/21  2157 09/07/21  1004 09/06/21  0735 09/06/21  0735 09/05/21  1302 09/05/21  1302   SODIUM mmol/L  --  131*   --  134*  --  133*   POTASSIUM mmol/L 3.7 3.4*  --  3.7   < > 3.4*   CHLORIDE mmol/L  --  92*  --  95*  --  95*   CO2 mmol/L  --  24.0  --  25.0  --  23.0   BUN mg/dL  --  52*  --  49*  --  47*   CREATININE mg/dL  --  1.71*  --  1.63*  --  1.49*   GLUCOSE mg/dL  --  114*   < > 80   < > 104*   CALCIUM mg/dL  --  9.4  --  9.6  --  9.7    < > = values in this interval not displayed.     Results from last 7 days   Lab Units 09/06/21  0735 09/05/21  1302 09/04/21  2300   WBC 10*3/mm3 5.68 4.47 6.11   HEMOGLOBIN g/dL 9.9* 10.0* 10.1*   HEMATOCRIT % 31.4* 31.7* 32.6*   PLATELETS 10*3/mm3 122* 117* 117*     Results from last 7 days   Lab Units 09/05/21  1302 09/05/21  0255 09/04/21  2300   TROPONIN T ng/mL 0.026 0.036* 0.035*     · No new chest x-ray or ECG or new laboratory results to review    · Abdominal ultrasound 9/7/2021:  Sonographic evaluation different quadrants of the abdomen revealed no  ascites. Paracentesis was therefore not performed.    Medication Review: Reviewed    Assessment/Plan      Patient with hepatic encephalopathy, cirrhosis/ascites, moderate bilateral pleural effusions in the setting of marginal renal function. No new labs to review yet this morning.  When more stable, would consider a right and left heart catheterization.  The patient will go to rehab after discharge.  We will continue current cardiac medications.  Will order a chest x-ray for this morning.  Would continue current cardiac medications and reassess GFR in a.m.      Encephalopathy, hepatic (CMS/HCC)    PVD (peripheral vascular disease) (CMS/HCC)    A-fib (CMS/HCC)    Cirrhosis of liver (CMS/HCC)    Chronic diastolic congestive heart failure (CMS/HCC)    Primary narcolepsy without cataplexy    Essential hypertension    Restless legs syndrome (RLS)    ADD (attention deficit disorder)    Depression    Diabetes mellitus type 2, uncontrolled, with complications (CMS/HCC)    Lymphedema    Severe JAIME on CPAP    Other hyperlipidemia     Uncontrolled type 2 diabetes mellitus with hyperglycemia (CMS/HCC)    Anemia    Elevated troponin    Hyponatremia    OMAR (acute kidney injury) (CMS/HCC)    Bilateral pleural effusion    Hepatic encephalopathy (CMS/HCC)    Electronically signed by ELMA Bradley, 09/08/21, 7:16 AM EDT.    I have seen and examined the patient, case was discussed with the physician extender, reviewed the above note, necessary changes were made and I agree with the final note.     Ld Orozco MD [facility]    09/08/21  07:04 EDT

## 2021-09-09 NOTE — PROGRESS NOTES
"    [facility] Medicine Services  PROGRESS NOTE    Patient Name: Abe Phillips Jr.  : 1955  MRN: [id number]    Date of Admission: 2021  Primary Care Physician: Anibal Maria MD    Subjective   Subjective     CC:  Confusion and shortness of breath    HPI:  No family present.  He is talking to DM educator.  No significant changes overnight.  Main complaint is \"tremor\" in his hands when trying to use screens.    ROS:  Gen- No fevers, chills  CV- No chest pain, palpitations  Resp- No cough, + dyspnea  GI- No N/V/D, abd pain      Objective   Objective     Vital Signs:   Temp:  [97.2 °F (36.2 °C)-97.6 °F (36.4 °C)] 97.5 °F (36.4 °C)  Heart Rate:  [64-80] 64  Resp:  [18-20] 20  BP: (118-146)/(72-93) 130/88  Flow (L/min):  [2] 2     Physical Exam:  Constitutional: awake, alert, sitting up in chair, chronically ill appearing  HENT: NCAT, mucous membranes moist  Respiratory: diminished breath sounds bilaterally, respiratory effort normal on2-3L  Cardiovascular: RRR, no murmurs, rubs, or gallops  Gastrointestinal: obese  Positive bowel sounds, soft, nontender   Musculoskeletal: 2+ bilateral ankle edema, legs wrapped, unchanged from yesterday  Psychiatric: Appropriate affect, cooperative  Neurologic: Oriented x 3, speech slow, no focal deficits.    Skin: No rashes  Overall exam is unchanged compared to yesterday      Results Reviewed:  LAB RESULTS:      Lab 21  0735 21  1302 21  2300   WBC 5.68 4.47 6.11   HEMOGLOBIN 9.9* 10.0* 10.1*   HEMATOCRIT 31.4* 31.7* 32.6*   PLATELETS 122* 117* 117*   NEUTROS ABS 4.40  --  4.45   IMMATURE GRANS (ABS) 0.12*  --  0.14*   LYMPHS ABS 0.37*  --  0.79   MONOS ABS 0.54  --  0.51   EOS ABS 0.22  --  0.17   MCV 94.6 95.8 96.7   PROTIME  --  15.9*  --    D DIMER QUANT  --   --  1.99*         Lab 21  1012 21  0914 21  2157 21  1004 21  0735 21  1302 21  1302   SODIUM 132* 132*  --  131* 134*  --  133* "   POTASSIUM 4.0 3.9 3.7 3.4* 3.7   < > 3.4*   CHLORIDE 93* 94*  --  92* 95*  --  95*   CO2 22.0 23.0  --  24.0 25.0  --  23.0   ANION GAP 17.0* 15.0  --  15.0 14.0  --  15.0   BUN 54* 51*  --  52* 49*  --  47*   CREATININE 1.53* 1.60*  --  1.71* 1.63*  --  1.49*   GLUCOSE 96 67  --  114* 80  --  104*   CALCIUM 9.5 9.5  --  9.4 9.6  --  9.7   MAGNESIUM  --  2.2  --   --   --   --   --     < > = values in this interval not displayed.         Lab 09/04/21  2300   TOTAL PROTEIN 7.3   ALBUMIN 3.80   GLOBULIN 3.5   ALT (SGPT) 15   AST (SGOT) 26   BILIRUBIN 0.8   ALK PHOS 150*         Lab 09/05/21  1302 09/05/21  0255 09/04/21  2300   PROBNP  --   --  656.8   TROPONIN T 0.026 0.036* 0.035*   PROTIME 15.9*  --   --    INR 1.31*  --   --                  Brief Urine Lab Results  (Last result in the past 365 days)      Color   Clarity   Blood   Leuk Est   Nitrite   Protein   CREAT   Urine HCG        08/13/21 0718 Yellow Clear Negative Negative Negative Negative               Microbiology Results Abnormal     Procedure Component Value - Date/Time    COVID PRE-OP / PRE-PROCEDURE SCREENING ORDER (NO ISOLATION) - Swab, Nasopharynx [[id number]]  (Normal) Collected: 09/04/21 2258    Lab Status: Final result Specimen: Swab from Nasopharynx Updated: 09/04/21 4093    Narrative:      The following orders were created for panel order COVID PRE-OP / PRE-PROCEDURE SCREENING ORDER (NO ISOLATION) - Swab, Nasopharynx.  Procedure                               Abnormality         Status                     ---------                               -----------         ------                     COVID-19 and FLU A/B PCR...[[id number]]  Normal              Final result                 Please view results for these tests on the individual orders.    COVID-19 and FLU A/B PCR - Swab, Nasopharynx [[id number]]  (Normal) Collected: 09/04/21 9412    Lab Status: Final result Specimen: Swab from Nasopharynx Updated: 09/04/21 7089     COVID19 Not Detected      Influenza A PCR Not Detected     Influenza B PCR Not Detected    Narrative:      Fact sheet for providers: https://www.fda.gov/media/010775/download    Fact sheet for patients: https://www.fda.gov/media/958469/download    Test performed by PCR.          US Abdomen Limited    Result Date: 9/7/2021  Abdominal Ultrasound limited.  Clinical Diagnosis: Ascites  Comparison: None  Technique: Multiple transaxial and longitudinal images were obtained through the abdomen with real time ultrasonography. A low-frequency curvilinear transducer was utilized.  Multiple images were submitted for interpretation.  Report: Please see impression.      Impression: Impression: Sonographic evaluation different quadrants of the abdomen revealed no ascites. Paracentesis was therefore not performed.  This report was finalized on 9/7/2021 4:10 PM by Haider Jenkins MD.      XR Chest PA & Lateral    Result Date: 9/9/2021  EXAMINATION: XR CHEST PA AND LATERAL-  INDICATION: sob; K72.90-Hepatic failure, unspecified without coma; J81.0-Acute pulmonary edema; Y49-Zupygdm effusion, not elsewhere classified; N17.9-Acute kidney failure, unspecified; Z86.39-Personal history of other endocrine, nutritional and metabolic disease  COMPARISON: 9/4/2021  FINDINGS: Unchanged mild cardiac enlargement and bilateral interstitial opacities. Suspect small left and trace right pleural effusions. No new focal lobar consolidation. No distinct pneumothorax.      Impression: Unchanged appearance suggesting cardiogenic pulmonary edema.  This report was finalized on 9/9/2021 8:09 AM by Dhruv Jackson.        Results for orders placed during the hospital encounter of 09/04/21    Adult Transthoracic Echo Complete W/ Cont if Necessary Per Protocol    Interpretation Summary  · Technically difficult study  · Left ventricular systolic function is normal. Estimated left ventricular EF = 65%.  · Left ventricular diastolic function was normal.  · The cardiac valves are  anatomically and functionally normal.  · Estimated right ventricular systolic pressure from tricuspid regurgitation is normal (<35 mmHg).      I have reviewed the medications:  Scheduled Meds:amphetamine-dextroamphetamine, 30 mg, Oral, Daily  amphetamine-dextroamphetamine, 60 mg, Oral, Daily  aspirin, 81 mg, Oral, Daily  bumetanide, 2 mg, Intravenous, BID  cetirizine, 10 mg, Oral, Daily  gabapentin, 400 mg, Oral, BID  heparin (porcine), 5,000 Units, Subcutaneous, Q12H  insulin lispro, 0-7 Units, Subcutaneous, TID AC  lactulose, 45 mL, Oral, TID  levothyroxine, 75 mcg, Oral, Daily  montelukast, 10 mg, Oral, Nightly  nystatin, , Topical, Q12H  OXcarbazepine, 300 mg, Oral, BID  pramipexole, 0.75 mg, Oral, BID  pravastatin, 40 mg, Oral, Daily  riFAXIMin, 550 mg, Oral, Q12H  sodium chloride, 10 mL, Intravenous, Q12H  spironolactone, 100 mg, Oral, Daily  venlafaxine XR, 150 mg, Oral, Nightly      Continuous Infusions:   PRN Meds:.•  acetaminophen  •  influenza vaccine  •  magnesium sulfate **OR** magnesium sulfate **OR** magnesium sulfate  •  melatonin  •  ondansetron  •  potassium chloride  •  potassium chloride  •  sodium chloride    Assessment/Plan   Assessment & Plan     Active Hospital Problems    Diagnosis  POA   • **Encephalopathy, hepatic (CMS/HCC) [K72.90]  Yes   • Elevated troponin [R77.8]  Yes   • Hyponatremia [E87.1]  Yes   • OMAR (acute kidney injury) (CMS/HCC) [N17.9]  Yes   • Bilateral pleural effusion [J90]  Yes   • Hepatic encephalopathy (CMS/HCC) [K72.90]  Yes   • Anemia [D64.9]  Yes   • Uncontrolled type 2 diabetes mellitus with hyperglycemia (CMS/HCC) [E11.65]  Yes   • Other hyperlipidemia [E78.49]  Yes   • ADD (attention deficit disorder) [F98.8]  Yes   • Lymphedema [I89.0]  Yes   • Severe JAIME on CPAP [G47.33, Z99.89]  Not Applicable   • Diabetes mellitus type 2, uncontrolled, with complications (CMS/HCC) [E11.8, E11.65]  Yes   • Depression [F32.9]  Yes   • Essential hypertension [I10]  Yes   •  Restless legs syndrome (RLS) [G25.81]  Yes   • A-fib (CMS/HCC) [I48.91]  Yes   • Primary narcolepsy without cataplexy [G47.419]  Yes   • Chronic diastolic congestive heart failure (CMS/HCC) [I50.32]  Yes   • PVD (peripheral vascular disease) (CMS/HCC) [I73.9]  Yes   • Cirrhosis of liver (CMS/HCC) [K74.60]  Yes      Resolved Hospital Problems   No resolved problems to display.        Brief Hospital Course to date:  Abe Phillips Jr. is a 66 y.o. male  admitted for suspected acute on chronic diastolic heart failure exacerbation as well as hepatic encephalopathy.  He is also being treated for healthcare associated pneumonia.    Acute on chronic diastolic heart failure exacerbation  - agree with more aggressive diuerisis  - continue IV bumex 2 mg q12, not good response yet.  Awaiting BMP, if stable will add IV diuril or metolazone per cards recs    Bilateral pleural effusions  Bilateral edema  - personally reviewed imaging, I don't feel this is consistent with PNA.  Abx stopped 9/7.    Hepatic encephalopathy  MCCAULEY cirrhosis  Ascites    -Continue lactulose, decrease if excessive diarrhea  - will recheck ammonia in AM  -Status post EGD on 8/16/2021 with portal gastropathy and lower esophageal varices noted  - continue xifaxamin  - ordered paracentesis on 9/7, but not enough asictes to tap at this time.  Continue to monitor clinically  - still drowsy/confused.  Has been getting higher dose of neurontin than at home, will further decrease to 400mg bid.  Also restaredt home adderall, family to supply. mediciation.    ARF/CKD3   -Baseline creatinine 1.1-1.3  -improved to 1.53 today  - check daily BMP for now    Hyponatremia  - stable around 131-134    Diabetes mellitus type 2  - continue current insulin regimen, unable to operate pump, order removed  -Hemoglobin A1c was 6.60 on 8/13/2021    Obstructive sleep apnea    Generalized weakness/debility/failure to thrive  - continue PT/OT    Gave update to daughter, Anisa (neuro  APRN).    DVT prophylaxis:  Medical DVT prophylaxis orders are present.       AM-PAC 6 Clicks Score (PT): 10 (09/08/21 2000)    Disposition: I expect the patient to be discharged to rehab when medically appropriate.  Suspect will be several more days    CODE STATUS:   Code Status and Medical Interventions:   Ordered at: 09/05/21 0324     Level Of Support Discussed With:    Patient     Code Status:    CPR     Medical Interventions (Level of Support Prior to Arrest):    Full       Song Bryant MD  09/09/21

## 2021-09-09 NOTE — CONSULTS
"Diabetes Education    Patient Name:  Abe Phillips Jr.  YOB: 1955  MRN: [id number]  Admit Date:  2021        Daily follow up visit for insulin pump use while inpatient.   Received call this morning from pt's TRUDI Urban stating that pt's pump \"\" over night, family has brought in additional supplies, and pt needs assistance with restarting. Explained that per policy pt should be able to restart and manage pump on his own without assistance. Went to see pt at bedside, TRUDI Urban at bedside and pt is attempting to fill new omnipod and restart. Pt confirms that PDM  overnight. Noted that pt has logs at bedside where he is to document insulin delivery with pump, they are blank. Pt filled pod with insulin. Pt attempted to unlock PDM screen--approximately 15 attempts before pt was able to unlock PDM. Dexterity was an issue due to swollen fingers, tremor in hands. Pt also nodded off frequently while trying to unlock PDM screen. Pt was able to verbalize his code to unlock screen, and he did answer orientation questions appropriately. Pt asked me to press buttons on screen to start new pod. Explained that I could not, he would need to be able to set up pod and use PDM independently. Pt was able to press \"start pod\" on screen. He struggled with holding pod and peeling backing off of adhesive in order to place pod on his arm. He attempted to place pod onto arm prior to removing backing from adhesive. Once pod was on his arm, he attempted to start insulin delivery using PDM--PDM gave him message that it was unable to locate pod. He then chose \"deactivate pod\" option, and was not sure what to do next. I explained that he had deactivated the pod and would need to start the start up process over. We discussed briefly that it may not be appropriate for him to use omnipod at this time r/t dexterity issues, drowsiness, etc; at this time Dr. Bryant entered room to see pt. Communicated w/ Dr. Bryant pt's " struggles with attempting to start new pod; pt was told that insulin injections will be ordered and administered by nursing. Assisted pt in removing omnipod from his arm (note that insulin delivery was never started by pt so he did not receive any insulin from the pod) and disposed. Pt states his family is visiting later, explained that they should take home pdm and supplies for now. Placed pdm with other pump supplies. Spoke w/ RN Rjani and updated. We will continue to follow. Thank you.      Electronically signed by:  Anisa Urbano RN, [facility]  09/09/21 11:12 EDT

## 2021-09-09 NOTE — PROGRESS NOTES
"Abe Phillips Jr.  [id number]  1955   LOS: 4 days   Patient Care Team:  Anibal Maria MD as PCP - General (Internal Medicine)  Dustin Hendricks MD as Consulting Physician (Endocrinology)  Ld Orozco MD as Cardiologist (Cardiology)    Chief Complaint:  MCCAULEY / CIRRHOSIS / VOLUME OVERLOAD /  POSSIBLE HFpEF / DM / CKD / ENCEPHALOPATHY    Subjective     He states he is \"miserable.\"  He specifically complains of inability to move in the bed and having an uncomfortable bed. Denies anginal type chest discomfort but continues to relate tachypnea and dyspnea with acceptable room air oximetry (97%) off supplemental oxygen.  Declines oxygen therapy.  Denies cough, sputum production, pleurisy, hemoptysis, anginal type chest discomfort, or abdominal pain.    Objective     Vital Sign Min/Max for last 24 hours  Temp  Min: 97.2 °F (36.2 °C)  Max: 97.6 °F (36.4 °C)   BP  Min: 118/93  Max: 146/86   Pulse  Min: 64  Max: 80   Resp  Min: 18  Max: 20   SpO2  Min: 94 %  Max: 99 %               09/06/21  0500 09/07/21  0555   Weight: (!) 165 kg (362 lb 14.4 oz) (!) 163 kg (359 lb 8 oz)         Intake/Output Summary (Last 24 hours) at 9/9/2021 0740  Last data filed at 9/9/2021 0654  Gross per 24 hour   Intake 525 ml   Output --   Net 525 ml       Physical Exam:     General Appearance:    Alert, cooperative, in no acute distress   Lungs:     Clear to auscultation,respirations regular, even and                   unlabored    Heart:    Regular and normal rate, normal S1 and S2, grade 1/6 systolic murmur, no gallop, no rub, no click   Abdomen:  Extremities:   Soft, non-tender, bowel sounds audible x4  2+ edema, normal range of motion   Pulses:   Pulses palpable and equal bilaterally      Results Review:   Results from last 7 days   Lab Units 09/08/21  0914 09/07/21  2157 09/07/21  1004 09/07/21  1004 09/06/21  0735 09/06/21  0735   SODIUM mmol/L 132*  --   --  131*  --  134*   POTASSIUM mmol/L 3.9 3.7  --  3.4*   < > 3.7 "   CHLORIDE mmol/L 94*  --   --  92*  --  95*   CO2 mmol/L 23.0  --   --  24.0  --  25.0   BUN mg/dL 51*  --   --  52*  --  49*   CREATININE mg/dL 1.60*  --   --  1.71*  --  1.63*   GLUCOSE mg/dL 67  --    < > 114*   < > 80   CALCIUM mg/dL 9.5  --   --  9.4  --  9.6    < > = values in this interval not displayed.     Results from last 7 days   Lab Units 09/06/21  0735 09/05/21  1302 09/04/21  2300   WBC 10*3/mm3 5.68 4.47 6.11   HEMOGLOBIN g/dL 9.9* 10.0* 10.1*   HEMATOCRIT % 31.4* 31.7* 32.6*   PLATELETS 10*3/mm3 122* 117* 117*     Results from last 7 days   Lab Units 09/05/21  1302 09/05/21  0255 09/04/21  2300   TROPONIN T ng/mL 0.026 0.036* 0.035*     · NO NEW LAB / CXR / EKG.    · ACCU-CHEKS: 18-- mg/DL.    Medication Review: REVIEWED; NO DRIPS.    Assessment/Plan     No recorded intake and output available to review.  No new laboratory studies to reassess GFR currently available.  We will continue current medications and would add either IV Diuril 500 milligrams twice daily or metolazone 5-10 mg daily if serum creatinine remains stable.  The nursing staff will move him to the chair and attempt to mobilize him some more.  We will defer cardiac catheterization studies at this time; he would be a suboptimal candidate to pursue pericardiectomy if he truly has constrictive pericarditis at this time.      Encephalopathy, hepatic (CMS/HCC)    PVD (peripheral vascular disease) (CMS/HCC)    A-fib (CMS/HCC)    Cirrhosis of liver (CMS/HCC)    Chronic diastolic congestive heart failure (CMS/HCC)    Primary narcolepsy without cataplexy    Essential hypertension    Restless legs syndrome (RLS)    ADD (attention deficit disorder)    Depression    Diabetes mellitus type 2, uncontrolled, with complications (CMS/HCC)    Lymphedema    Severe JAIME on CPAP    Other hyperlipidemia    Uncontrolled type 2 diabetes mellitus with hyperglycemia (CMS/HCC)    Anemia    Elevated troponin    Hyponatremia    OMAR (acute kidney  injury) (CMS/HCC)    Bilateral pleural effusion    Hepatic encephalopathy (CMS/HCC)    09/09/21  07:40 EDT

## 2021-09-10 NOTE — PROGRESS NOTES
Clinical Nutrition     Patient Name: Abe Phillips Jr.  YOB: 1955  MRN: [id number]  Date of Encounter: 09/10/21 13:57 EDT  Admission date: 9/4/2021    Reason for Visit   LOS    EMR  Reviewed   Yes    Admission Problem List:    Primary narcolepsy without cataplexy    Elevated troponin    Hyponatremia    OMAR (acute kidney injury) (CMS/HCC)    Bilateral pleural effusion    Hepatic encephalopathy (CMS/HCC)     Applicable medical tests/procedures since admission:  - Cardiology following    PMH: He  has a past medical history of A-fib (CMS/HCC), ADD (attention deficit disorder), Atrial flutter (CMS/HCC), Bell's palsy, Cellulitis, CHF (congestive heart failure) (CMS/HCC), Cirrhosis of liver (CMS/HCC), Constipation, Depression, Diabetes mellitus (CMS/HCC), Diabetic ulcer of right foot (CMS/HCC), Disease of thyroid gland, Edema, Fatty liver, Hepatitis B, Hyperlipidemia, Hypokalemia, Insomnia, Lymphedema, Narcolepsy, Neuropathy, Obesity, JAIME on CPAP, PVD (peripheral vascular disease) (CMS/HCC), RLS (restless legs syndrome), Skin cancer, Tardive dyskinesia, Tremor of both hands, Type 2 diabetes mellitus (CMS/HCC), and Yeast infection.   PSxH: He  has a past surgical history that includes Cholecystectomy; Achilles tendon repair; Laser ablation; Cardiac Ablation; and Esophagogastroduodenoscopy (N/A, 8/16/2021).     Reported/Observed/Food/Nutrition Related - Comments     Pt resting in bed at attempted RD visit; pt appeared lethargic. Pt receiving care from RN at time of visit and unable to participate in interview with this provider. RD will f/u as able.    Anthropometrics   Height: 75 in  Weight: 359 lbs (bed scale on 9/7)  BMI: 44.93  BMI classification: Obese Class III extreme obesity: > or equal to 40kg/m2   IBW: 196 lbs  UBW: 315-320 lbs per EMR  Weight change: Wt gain of ~30 lbs in 1 month - likely fluid related. Wt relatively stable ~315-320 lbs over 12 months.    Weight Weight (kg) Weight  (lbs) Weight Method   9/7/2021 163.068 kg 359 lb 8 oz -   9/6/2021 164.61 kg 362 lb 14.4 oz Bed scale   9/5/2021 162.841 kg 359 lb -   9/5/2021 163.113 kg 359 lb 9.6 oz Bed scale   9/4/2021 159.666 kg 352 lb -   8/12/2021 147.419 kg 325 lb Stated   8/5/2021 149.233 kg 329 lb -   8/3/2021 147.873 kg 326 lb -   8/2/2021 148 kg 326 lb 4.5 oz -   8/1/2021 147.873 kg 326 lb Bed scale   7/31/2021 140.887 kg 310 lb 9.6 oz -   7/30/2021 145.151 kg 320 lb -   7/30/2021 145.332 kg 320 lb 6.4 oz Standing scale   7/29/2021 141.976 kg 313 lb Stated   7/1/2021 141.976 kg 313 lb -   1/13/2021: 321 lbs  10/14/2020: 313 lbs    Nutrition Focused Physical Exam     No subcutaneous fat or muscle loss observed.    Labs reviewed   Labs reviewed: Yes  Noted hyponatremia - likely related to volume overload.    Results from last 7 days   Lab Units 09/10/21  0644 09/09/21  1012 09/08/21  0914   SODIUM mmol/L 132* 132* 132*   POTASSIUM mmol/L 3.4* 4.0 3.9   CHLORIDE mmol/L 93* 93* 94*   CO2 mmol/L 25.0 22.0 23.0   BUN mg/dL 51* 54* 51*   CREATININE mg/dL 1.37* 1.53* 1.60*   GLUCOSE mg/dL 150* 96 67   CALCIUM mg/dL 9.5 9.5 9.5   MAGNESIUM mg/dL  --   --  2.2     Results from last 7 days   Lab Units 09/06/21  0735 09/05/21  1302 09/04/21  2300   WBC 10*3/mm3 5.68 4.47 6.11   ALBUMIN g/dL  --   --  3.80     Results from last 7 days   Lab Units 09/10/21  1145 09/10/21  0749 09/09/21  2019 09/09/21  1735 09/09/21  1129 09/09/21  0758   GLUCOSE mg/dL 162* 147* 217* 156* 193* 106     Lab Results   Lab Value Date/Time    HGBA1C 6.60 (H) 08/13/2021 0615    HGBA1C 7.20 (H) 07/30/2021 0504     Medications reviewed   Medications reviewed: Yes  Pertinent: bumex, heparin, insulin, spironolactone    Current Nutrition Prescription   PO: Diet Regular; Cardiac, Consistent Carbohydrate  Oral Nutrition Supplement:      Average PO intake: 62.5% / 2 meals    Nutrition Diagnosis   9/10  Problem Decreased nutrient needs (sodium)   Etiology Volume overload    Signs/Symptoms Need for diuretics; 2+ edema present     Intervention   Intervention: Follow treatment progress, Care plan reviewed     - RD will monitor need for ONS.    Goal:   General: Nutrition support treatment  PO: Increase intake     Additional goals:     Monitoring/Evaluation:   Monitoring/Evaluation: Per protocol, PO intake, Pertinent labs, POC/GOC    Alberto Kelly RD  Time Spent: 20 min

## 2021-09-10 NOTE — PROGRESS NOTES
Abe Phillips Jr.  [id number]  1955   LOS: 5 days   Patient Care Team:  Anibal Maria MD as PCP - General (Internal Medicine)  Dustin Hendricks MD as Consulting Physician (Endocrinology)  Ld Orozco MD as Cardiologist (Cardiology)    Chief Complaint:  DM / CKD / MCCAULEY & CIRRHOSIS / AFIB / HFpEF / DEBILITY / PERICARDIAL CA++ / HTN    Subjective     Comfortable in bed this morning off supplemental oxygen with acceptable room air oximetry (96%).  Mildly lethargic and mildly confused.  No complaints of anginal type chest discomfort, increased tachypnea/dyspnea, nausea, emesis, abdominal pain, headache, fever, chills, or diarrhea.  Fair appetite.  Requests additional water in his diet.    Objective     Vital Sign Min/Max for last 24 hours  Temp  Min: 97.4 °F (36.3 °C)  Max: 97.9 °F (36.6 °C)   BP  Min: 105/87  Max: 153/78   Pulse  Min: 68  Max: 80   Resp  Min: 18  Max: 20   SpO2  Min: 95 %  Max: 98 %               09/06/21  0500 09/07/21  0555   Weight: (!) 165 kg (362 lb 14.4 oz) (!) 163 kg (359 lb 8 oz)         Intake/Output Summary (Last 24 hours) at 9/10/2021 0745  Last data filed at 9/10/2021 0300  Gross per 24 hour   Intake 750 ml   Output 2550 ml   Net -1800 ml       Physical Exam:     General Appearance:    Alert, cooperative, in no acute distress   Lungs:    Scattered bibasilar crackles more notable on the right,respirations regular, even and unlabored off supplemental oxygen    Heart:    Regular and normal rate, normal S1 and S2, grade 1/6 systolic murmur, no gallop, no rub, no click   Abdomen:  Extremities:   Soft, non-tender, bowel sounds audible x4    2+ edema, normal range of motion   Pulses:   Pulses palpable and equal bilaterally      Results Review:   Results from last 7 days   Lab Units 09/09/21  1012 09/08/21  0914 09/08/21  0914 09/07/21  2157 09/07/21  1004 09/07/21  1004   SODIUM mmol/L 132*  --  132*  --   --  131*   POTASSIUM mmol/L 4.0  --  3.9 3.7   < > 3.4*   CHLORIDE  mmol/L 93*  --  94*  --   --  92*   CO2 mmol/L 22.0  --  23.0  --   --  24.0   BUN mg/dL 54*  --  51*  --   --  52*   CREATININE mg/dL 1.53*  --  1.60*  --   --  1.71*   GLUCOSE mg/dL 96   < > 67  --    < > 114*   CALCIUM mg/dL 9.5  --  9.5  --   --  9.4    < > = values in this interval not displayed.     Results from last 7 days   Lab Units 09/06/21  0735 09/05/21  1302 09/04/21  2300   WBC 10*3/mm3 5.68 4.47 6.11   HEMOGLOBIN g/dL 9.9* 10.0* 10.1*   HEMATOCRIT % 31.4* 31.7* 32.6*   PLATELETS 10*3/mm3 122* 117* 117*     Results from last 7 days   Lab Units 09/05/21  1302 09/05/21  0255 09/04/21  2300   TROPONIN T ng/mL 0.026 0.036* 0.035*     · ACCU-CHEKS: 193-156-217 mg/DL.    · NO NEW EKG / CXR / LAB RESULTS.    Medication Review: REVIEWED; NO DRIPS.    Assessment/Plan     Acceptable hemodynamics and afebrile without overt progressive hepatic encephalopathy.  No new laboratory studies to review this morning including reassessment of GFR.  Would continue current cardiac medications.  I do not feel at this time is a candidate to undergo right and left heart catheterization studies to assess for pericardial constriction; doubtful at this time he could tolerate extensive cardiac surgery.  Discussed with his daughter Anisa (APRN neurology).      Encephalopathy, hepatic (CMS/HCC)    PVD (peripheral vascular disease) (CMS/HCC)    A-fib (CMS/HCC)    Cirrhosis of liver (CMS/HCC)    Chronic diastolic congestive heart failure (CMS/HCC)    Primary narcolepsy without cataplexy    Essential hypertension    Restless legs syndrome (RLS)    ADD (attention deficit disorder)    Depression    Diabetes mellitus type 2, uncontrolled, with complications (CMS/HCC)    Lymphedema    Severe JAIME on CPAP    Other hyperlipidemia    Uncontrolled type 2 diabetes mellitus with hyperglycemia (CMS/HCC)    Anemia    Elevated troponin    Hyponatremia    OMAR (acute kidney injury) (CMS/HCC)    Bilateral pleural effusion    Hepatic encephalopathy  (CMS/[facility])    09/10/21  07:45 EDT

## 2021-09-10 NOTE — THERAPY TREATMENT NOTE
Patient Name: Abe Phillips Jr.  : 1955    MRN: [id number]                              Today's Date: 9/10/2021       Admit Date: 2021    Visit Dx:     ICD-10-CM ICD-9-CM   1. Hepatic encephalopathy (CMS/HCC)  K72.90 572.2   2. Acute pulmonary edema (CMS/HCC)  J81.0 518.4   3. Pleural effusion  J90 511.9   4. Acute kidney injury (CMS/HCC)  N17.9 584.9   5. History of diabetes mellitus  Z86.39 V12.29     Patient Active Problem List   Diagnosis   • Type 2 diabetes mellitus with hyperglycemia (CMS/HCC)   • Thrombocytopenia (CMS/HCC)   • PVD (peripheral vascular disease) (CMS/HCC)   • A-fib (CMS/HCC)   • Cirrhosis of liver (CMS/HCC)   • Chronic diastolic congestive heart failure (CMS/HCC)   • Diabetic ulcer of right heel (CMS/HCC)   • Primary narcolepsy without cataplexy   • Essential hypertension   • Morbidly obese (CMS/HCC)   • Restless legs syndrome (RLS)   • ADD (attention deficit disorder)   • Depression   • Diabetes mellitus type 2, uncontrolled, with complications (CMS/HCC)   • Edema   • MCCAULEY (nonalcoholic steatohepatitis)   • Hepatitis B   • Insomnia   • Lymphedema   • Obesity   • Severe JAIME on CPAP   • RLS (restless legs syndrome)   • Tremor of both hands   • Other hyperlipidemia   • Uncontrolled type 2 diabetes mellitus with hyperglycemia (CMS/HCC)   • Dyslipidemia   • Hyperglycemia   • Diabetic ulcer of right heel associated with type 2 diabetes mellitus (CMS/HCC)   • Facial paralysis/[place] palsy   • Diabetic hypoglycemia (CMS/HCC)   • Hypogonadism in male   • Bilateral leg weakness   • Anemia   • Elevated troponin   • Hyponatremia   • OMAR (acute kidney injury) (CMS/HCC)   • Bilateral pleural effusion   • Encephalopathy, hepatic (CMS/HCC)   • Hepatic encephalopathy (CMS/HCC)     Past Medical History:   Diagnosis Date   • A-fib (CMS/HCC)    • ADD (attention deficit disorder)    • Atrial flutter (CMS/HCC)    • Bell's palsy    • Cellulitis    • CHF (congestive heart failure) (CMS/HCC)    • Cirrhosis of  liver (CMS/HCC)    • Constipation    • Depression    • Diabetes mellitus (CMS/HCC)    • Diabetic ulcer of right foot (CMS/HCC)    • Disease of thyroid gland    • Edema    • Fatty liver    • Hepatitis B    • Hyperlipidemia    • Hypokalemia    • Insomnia    • Lymphedema    • Narcolepsy    • Neuropathy    • Obesity    • JAIME on CPAP    • PVD (peripheral vascular disease) (CMS/HCC)    • RLS (restless legs syndrome)    • Skin cancer    • Tardive dyskinesia    • Tremor of both hands    • Type 2 diabetes mellitus (CMS/HCC)    • Yeast infection      Past Surgical History:   Procedure Laterality Date   • ACHILLES TENDON REPAIR     • CARDIAC ABLATION     • CHOLECYSTECTOMY     • ENDOSCOPY N/A 8/16/2021    Procedure: ESOPHAGOGASTRODUODENOSCOPY;  Surgeon: Brunner, Mark I, MD;  [facility] END[facility];  Service: Gastroenterology;  Laterality: N/A;   • LASER ABLATION      VEIN RLE     General Information     Row Name 09/10/21 0835          OT Time and Intention    Document Type  therapy note (daily note)  -CS     Mode of Treatment  occupational therapy  -CS     Row Name 09/10/21 0835          General Information    Patient Profile Reviewed  yes  -CS     Existing Precautions/Restrictions  fall  -CS     Barriers to Rehab  medically complex;previous functional deficit  -CS     Row Name 09/10/21 0835          Cognition    Orientation Status (Cognition)  oriented x 3  -CS     Row Name 09/10/21 0835          Safety Issues, Functional Mobility    Safety Issues Affecting Function (Mobility)  insight into deficits/self-awareness;awareness of need for assistance;safety precaution awareness;safety precautions follow-through/compliance  -CS     Impairments Affecting Function (Mobility)  balance;endurance/activity tolerance;strength;pain;shortness of breath  -CS       User Key  (r) = Recorded By, (t) = Taken By, (c) = Cosigned By    Initials Name Provider Type    CS Shady Rojas OT Occupational Therapist          Mobility/ADL's     Row  Name 09/10/21 0837          Bed Mobility    Comment (Bed Mobility)  Feeding session this date  -     Row Name 09/10/21 0837          Transfers    Comment (Transfers)  --  -CS     Row Name 09/10/21 0837          Activities of Daily Living    BADL Assessment/Intervention  feeding  -CS     Row Name 09/10/21 08          Self-Feeding Assessment/Training    [place] Level (Feeding)  liquids to mouth;scoop food and bring to mouth;modified independence;prepare tray/open items;moderate assist (50% patient effort)  -CS     Assistive Devices (Feeding)  adapted cup;weighted utensils  -CS     Position (Self-Feeding)  sitting up in bed  -CS     Comment (Feeding)  Nsg reports spillage and feeding difficulties, AE provided, R grasp WFL for weight utensil use, demo'd improved food/liquid to mouth skills w/ reported ease of use, continues to require ModA for tray prep  -CS       User Key  (r) = Recorded By, (t) = Taken By, (c) = Cosigned By    Initials Name Provider Type    Shady Terrazas, OT Occupational Therapist        Obj/Interventions    No documentation.       Goals/Plan    No documentation.       Clinical Impression     Row Name 09/10/21 0840          Pain Assessment    Additional Documentation  Pain Scale: FACES Pre/Post-Treatment (Group)  -     Row Name 09/10/21 0840          Pain Scale: FACES Pre/Post-Treatment    Pain: FACES Scale, Pretreatment  0-->no hurt  -CS     Posttreatment Pain Rating  0-->no hurt  -CS     Pre/Posttreatment Pain Comment  no visual/verbal indications of pain during tx  -CS     Row Name 09/10/21 0840          Plan of Care Review    Plan of Care Reviewed With  patient  -CS     Progress  improving  -CS     Outcome Summary  Breakfast feeding session this date following Nsg reports of increased spillage d/t distal BUE tremors. B grasp WFL for weighted utensil use and Pt agreeable to adapted cup trial. Pt progressed to Mod Ind for liquid/food to mouth skills w/ no spillage and reported  increase in ease of use, continues to require ModA for tray prep. Plan is [place] later today.  -CS     Row Name 09/10/21 0840          Vital Signs    Pre Systolic BP Rehab  -- RN cleared for tx, VSS on RA  -CS     O2 Delivery Pre Treatment  room air  -CS     O2 Delivery Intra Treatment  room air  -CS     O2 Delivery Post Treatment  room air  -CS     Pre Patient Position  Supine  -CS     Intra Patient Position  Supine  -CS     Post Patient Position  Supine  -CS     Row Name 09/10/21 0840          Positioning and Restraints    Pre-Treatment Position  in bed  -CS     Post Treatment Position  bed  -CS     In Bed  notified nsg;fowlers;call light within reach;encouraged to call for assist;exit alarm on  -CS       User Key  (r) = Recorded By, (t) = Taken By, (c) = Cosigned By    Initials Name Provider Type    Shady Terrazas OT Occupational Therapist        Outcome Measures     Row Name 09/10/21 0849          How much help from another is currently needed...    Putting on and taking off regular lower body clothing?  1  -CS     Bathing (including washing, rinsing, and drying)  2  -CS     Toileting (which includes using toilet bed pan or urinal)  2  -CS     Putting on and taking off regular upper body clothing  2  -CS     Taking care of personal grooming (such as brushing teeth)  3  -CS     Eating meals  3  -CS     AM-PAC 6 Clicks Score (OT)  13  -CS     Row Name 09/10/21 0849          Functional Assessment    Outcome Measure Options  AM-PAC 6 Clicks Daily Activity (OT)  -CS       User Key  (r) = Recorded By, (t) = Taken By, (c) = Cosigned By    Initials Name Provider Type    Shady Terrazas OT Occupational Therapist          Occupational Therapy Education                 Title: PT OT SLP Therapies (In Progress)     Topic: Occupational Therapy (In Progress)     Point: ADL training (Done)     Description:   Instruct learner(s) on proper safety adaptation and remediation techniques during self care or transfers.    Instruct in proper use of assistive devices.              Learning Progress Summary           Patient Acceptance, E,D, VU,DU by  at 9/10/2021 0849    Comment: AE for feeding    Acceptance, E, NR by  at 9/8/2021 1045    Acceptance, E,D, NR by KRISTINA at 9/5/2021 1113                   Point: Home exercise program (Not Started)     Description:   Instruct learner(s) on appropriate technique for monitoring, assisting and/or progressing therapeutic exercises/activities.              Learner Progress:  Not documented in this visit.          Point: Precautions (Done)     Description:   Instruct learner(s) on prescribed precautions during self-care and functional transfers.              Learning Progress Summary           Patient Acceptance, E,D, VU,DU by  at 9/10/2021 0849    Comment: AE for feeding    Acceptance, E, NR by  at 9/8/2021 1045    Acceptance, E,D, NR by KRISTINA at 9/5/2021 1113                   Point: Body mechanics (Done)     Description:   Instruct learner(s) on proper positioning and spine alignment during self-care, functional mobility activities and/or exercises.              Learning Progress Summary           Patient Acceptance, E,D, VU,DU by  at 9/10/2021 0849    Comment: AE for feeding    Acceptance, E, NR by  at 9/8/2021 1045    Acceptance, E,D, NR by KRISTINA at 9/5/2021 1113                               User Key     Initials Effective Dates Name Provider Type Discipline     06/16/21 -  Patricia Randle, OT Occupational Therapist OT    J 06/16/21 -  Anusha [place] OT Occupational Therapist OT     06/16/21 -  Shady Rojas OT Occupational Therapist OT              OT Recommendation and Plan     Plan of Care Review  Plan of Care Reviewed With: patient  Progress: improving  Outcome Summary: Breakfast feeding session this date following Nsg reports of increased spillage d/t distal BUE tremors. B grasp WFL for weighted utensil use and Pt agreeable to adapted cup trial. Pt progressed to Mod Ind for  liquid/food to mouth skills w/ no spillage and reported increase in ease of use, continues to require ModA for tray prep. Plan is [place] later today.     Time Calculation:   Time Calculation- OT     Row Name 09/10/21 0850             Time Calculation- OT    OT Start Time  0816  -CS      OT Received On  09/10/21  -CS      OT Goal Re-Cert Due Date  09/15/21  -CS         Timed Charges    17301 - OT Self Care/Mgmt Minutes  10  -CS         Total Minutes    Timed Charges Total Minutes  10  -CS       Total Minutes  10  -CS        User Key  (r) = Recorded By, (t) = Taken By, (c) = Cosigned By    Initials Name Provider Type    CS Shady Rojas, OT Occupational Therapist        Therapy Charges for Today     Code Description Service Date Service Provider Modifiers Qty    [id number] HC OT SELF CARE/MGMT/TRAIN EA 15 MIN 9/10/2021 Shady Rojas OT GO 1               Shady Rojas OT  9/10/2021

## 2021-09-10 NOTE — PLAN OF CARE
Goal Outcome Evaluation:  Plan of Care Reviewed With: patient        Progress: improving  Outcome Summary: PT able to debride hypertrophic callus from R heel revealing only small open wound. Lianne intact so PT did not replace. Pt with area of fragile skin to L lateral LE; 4'' optifoam applied over area. B LE edema appears well controlled with use of light compression from MLW. Patient would continue to benefit from skilled PT wound care for wound healing.

## 2021-09-10 NOTE — PLAN OF CARE
Goal Outcome Evaluation:      Patient's only complaint this shift is that he is not getting enough water; patient met 2000 mL daily fluid allowance at the start of the shift. Education provided on why he is on a fluid restriction and why it is important to space out the amount of fluid intake throughout the day AND night.  Patient verbalizes understanding,however, continues to call out for water.  No significant changes this shift. VSS     Progress: no change

## 2021-09-10 NOTE — PLAN OF CARE
Problem: Adult Inpatient Plan of Care  Goal: Plan of Care Review  Recent Flowsheet Documentation  Taken 9/10/2021 0840 by Shady Rojas OT  Progress: improving  Plan of Care Reviewed With: patient  Outcome Summary: Breakfast feeding session this date following Nsg reports of increased spillage d/t distal BUE tremors. B grasp WFL for weighted utensil use and Pt agreeable to adapted cup trial. Pt progressed to Mod Ind for liquid/food to mouth skills w/ no spillage and reported increase in ease of use, continues to require ModA for tray prep. Plan is [place] later today.

## 2021-09-10 NOTE — CASE MANAGEMENT/SOCIAL WORK
Continued Stay Note  [facility]     Patient Name: Abe Phillips Jr.  MRN: [id number]  Today's Date: 9/10/2021    Admit Date: 9/4/2021    Discharge Plan     Row Name 09/10/21 1008       Plan    Plan  [facility]    Patient/Family in Agreement with Plan  yes    Plan Comments     Mr. Phillips is not medically ready for discharge today. I have changed ambulance transport with [facility] to Monday September 13 at 1215. This can be changed if needed.     Case management will continue to follow Mr. Phillips's progress and provide for him any further discharge needs. Discharge plan is [facility] for intermediate care.     Mr. Phillips's daughter Anisa has been updated and continues to be agreeable to this discharge plan.        Final Discharge Disposition Code  04 - intermediate care facility        Discharge Codes    No documentation.       Expected Discharge Date and Time     Expected Discharge Date Expected Discharge Time    Sep 13, 2021 12:15 PM            Chelsie Ibarra RN

## 2021-09-10 NOTE — THERAPY WOUND CARE TREATMENT
Acute Care - Wound/Debridement Treatment Note  [facility]     Patient Name: Abe Phillips Jr.  : 1955  MRN: [id number]  Today's Date: 9/10/2021                Admit Date: 2021    Visit Dx:    ICD-10-CM ICD-9-CM   1. Hepatic encephalopathy (CMS/HCC)  K72.90 572.2   2. Acute pulmonary edema (CMS/HCC)  J81.0 518.4   3. Pleural effusion  J90 511.9   4. Acute kidney injury (CMS/HCC)  N17.9 584.9   5. History of diabetes mellitus  Z86.39 V12.29       Patient Active Problem List   Diagnosis   • Type 2 diabetes mellitus with hyperglycemia (CMS/HCC)   • Thrombocytopenia (CMS/HCC)   • PVD (peripheral vascular disease) (CMS/HCC)   • A-fib (CMS/HCC)   • Cirrhosis of liver (CMS/HCC)   • Chronic diastolic congestive heart failure (CMS/HCC)   • Diabetic ulcer of right heel (CMS/HCC)   • Primary narcolepsy without cataplexy   • Essential hypertension   • Morbidly obese (CMS/HCC)   • Restless legs syndrome (RLS)   • ADD (attention deficit disorder)   • Depression   • Diabetes mellitus type 2, uncontrolled, with complications (CMS/HCC)   • Edema   • MCCAULEY (nonalcoholic steatohepatitis)   • Hepatitis B   • Insomnia   • Lymphedema   • Obesity   • Severe JAIME on CPAP   • RLS (restless legs syndrome)   • Tremor of both hands   • Other hyperlipidemia   • Uncontrolled type 2 diabetes mellitus with hyperglycemia (CMS/HCC)   • Dyslipidemia   • Hyperglycemia   • Diabetic ulcer of right heel associated with type 2 diabetes mellitus (CMS/HCC)   • Facial paralysis/Champion palsy   • Diabetic hypoglycemia (CMS/HCC)   • Hypogonadism in male   • Bilateral leg weakness   • Anemia   • Elevated troponin   • Hyponatremia   • OMAR (acute kidney injury) (CMS/HCC)   • Bilateral pleural effusion   • Encephalopathy, hepatic (CMS/HCC)   • Hepatic encephalopathy (CMS/HCC)        Past Medical History:   Diagnosis Date   • A-fib (CMS/HCC)    • ADD (attention deficit disorder)    • Atrial flutter (CMS/HCC)    • Bell's palsy    • Cellulitis    • CHF  (congestive heart failure) (CMS/HCC)    • Cirrhosis of liver (CMS/HCC)    • Constipation    • Depression    • Diabetes mellitus (CMS/HCC)    • Diabetic ulcer of right foot (CMS/HCC)    • Disease of thyroid gland    • Edema    • Fatty liver    • Hepatitis B    • Hyperlipidemia    • Hypokalemia    • Insomnia    • Lymphedema    • Narcolepsy    • Neuropathy    • Obesity    • JAIME on CPAP    • PVD (peripheral vascular disease) (CMS/HCC)    • RLS (restless legs syndrome)    • Skin cancer    • Tardive dyskinesia    • Tremor of both hands    • Type 2 diabetes mellitus (CMS/HCC)    • Yeast infection         Past Surgical History:   Procedure Laterality Date   • ACHILLES TENDON REPAIR     • CARDIAC ABLATION     • CHOLECYSTECTOMY     • ENDOSCOPY N/A 8/16/2021    Procedure: ESOPHAGOGASTRODUODENOSCOPY;  Surgeon: Brunner, Mark I, MD;  Location: Pending sale to [facility] ENDOSCOPY;  Service: Gastroenterology;  Laterality: N/A;   • LASER ABLATION      VEIN RLE           Wound 12/27/19 2334 Right heel Diabetic Ulcer (Active)   Dressing Appearance dry;intact;other (see comments) 09/10/21 0910   Closure RYLAND 09/10/21 0400   Base dry;other (see comments) 09/10/21 0910   Periwound intact;dry 09/10/21 0910   Periwound Temperature cool 09/10/21 0910   Periwound Skin Turgor firm 09/10/21 0910   Edges callused;open 09/10/21 0910   Drainage Amount none 09/10/21 0910   Care, Wound cleansed with;wound cleanser;debrided 09/10/21 0910   Dressing Care dressing applied;silver impregnated;foam;multi-layer wrap 09/10/21 0910   Periwound Care cleansed with pH balanced cleanser;dry periwound area maintained 09/10/21 0910       Wound 09/07/21 Right lower arm Skin Tear (Active)   Dressing Appearance moist drainage 09/10/21 0400   Closure Approximated 09/10/21 0400   Base moist;yellow;scab 09/10/21 0400   Periwound ecchymotic;gray 09/10/21 0400   Periwound Temperature warm 09/10/21 0400   Periwound Skin Turgor soft 09/10/21 0400   Edges rolled/closed 09/10/21 0400   Drainage  Characteristics/Odor serous 09/10/21 0400   Drainage Amount scant 09/10/21 0400   Dressing Care open to air 09/10/21 0400   Periwound Care dry periwound area maintained 09/10/21 0400     Lymphedema     Row Name 09/10/21 0910             Lymphedema Edema Assessment    Ptting Edema Category  By severity  -MP      Pitting Edema  Moderate  -MP         Skin Changes/Observations    Location/Assessment  Lower Extremity  -MP      Lower Extremity Conditions  bilateral:;intact;clean;dry;shiny;crust  -MP      Lower Extremity Color/Pigment  bilateral:;purple;red;hyperpigmented;brawny  -MP         Lymphedema Pulses/Capillary Refill    Lymphedema Pulses/Capillary Refill  lower extremity pulses;capillary refill  -MP      Dorsalis Pedis Pulse  right:;left:;+1 diminished  -MP      Posterior Tibialis Pulse  right:;left:;+1 diminished  -MP      Capillary Refill  lower extremity capillary refill  -MP      Lower Extremity Capillary Refill  right:;left:;less than 3 seconds  -MP         Compression/Skin Care    Compression/Skin Care  skin care;wrapping location;bandaging  -MP      Skin Care  washed/dried;lotion applied  -MP      Wrapping Location  lower extremity  -MP      Wrapping Location LE  bilateral:;foot to knee  -MP      Wrapping Comments  wound dressings to R heel, 4'' optifoam to L lateral LE, size 4/6 compressogrip doubled and overlapping in gradient compression  -MP      Bandage Layers  cotton elastic stocking- double layer (comment size)  -MP        User Key  (r) = Recorded By, (t) = Taken By, (c) = Cosigned By    Initials Name Provider Type    Sunny Hassan, PT Physical Therapist          WOUND DEBRIDEMENT  Total area of Debridement: 2 cm2  Debridement Site 1  Location- Site 1: R heel  Selective Debridement- Site 1: Wound Surface <20cmsq  Instruments- Site 1: #15, scapel  Excised Tissue Description- Site 1: minimum, other (comment) (periwound callus)  Bleeding- Site 1: none                  PT Assessment (last 12 hours)       PT Evaluation and Treatment     Row Name 09/10/21 0910          Physical Therapy Time and Intention    Subjective Information  complains of;pain;weakness  -MP     Document Type  therapy note (daily note);wound care  -MP     Mode of Treatment  physical therapy  -MP     Row Name 09/10/21 0910          Cognition    Orientation Status (Cognition)  oriented x 3  -MP     Row Name 09/10/21 0910          Pain    Additional Documentation  Pain Scale: FACES Pre/Post-Treatment (Group)  -MP     Row Name 09/10/21 0910          Pain Scale: FACES Pre/Post-Treatment    Pain: FACES Scale, Pretreatment  2-->hurts little bit  -MP     Posttreatment Pain Rating  2-->hurts little bit  -MP     Pain Location - Orientation  generalized  -MP     Row Name 09/10/21 0910          Wound 12/27/19 2334 Right heel Diabetic Ulcer    Wound - Properties Group Placement Date: 12/27/19  -AYLIN Placement Time: 2334 -AYLIN Present on Hospital Admission: Y  -AYLIN Side: Right  -AYLIN Location: heel  -AYLIN Primary Wound Type: Diabetic ulc  -AYLIN    Dressing Appearance  dry;intact;other (see comments) jeannette intact  -MP     Base  dry;other (see comments) dry, hypertrophic callus  -MP     Periwound  intact;dry  -MP     Periwound Temperature  cool  -MP     Periwound Skin Turgor  firm  -MP     Edges  callused;open  -MP     Drainage Amount  none  -MP     Care, Wound  cleansed with;wound cleanser;debrided  -MP     Dressing Care  dressing applied;silver impregnated;foam;multi-layer wrap Optifoam Ag, primafix tape, MLW  -MP     Periwound Care  cleansed with pH balanced cleanser;dry periwound area maintained  -MP     Retired Wound - Properties Group Date first assessed: 12/27/19  -AYLIN Time first assessed: 2334 -AYLIN Present on Hospital Admission: Y  -AYLIN Side: Right  -AYLIN Location: heel  -AYLIN Primary Wound Type: Diabetic ulc  -AYLIN    Row Name             Wound 09/07/21 Right lower arm Skin Tear    Wound - Properties Group Placement Date: 09/07/21  - Present on Hospital Admission:  Y  -CG Side: Right  -CG Orientation: lower  -CG Location: arm  -CG Primary Wound Type: Skin tear  -CG    Retired Wound - Properties Group Date first assessed: 09/07/21  -CG Present on Hospital Admission: Y  -CG Side: Right  -CG Location: arm  -CG Primary Wound Type: Skin tear  -CG    Row Name 09/10/21 0910          Coping    Observed Emotional State  calm;cooperative;flat  -MP     Row Name 09/10/21 0910          Plan of Care Review    Plan of Care Reviewed With  patient  -MP     Progress  improving  -MP     Outcome Summary  PT able to debride hypertrophic callus from R heel revealing only small open wound. Lianne intact so PT did not replace. Pt with area of fragile skin to L lateral LE; 4'' optifoam applied over area. B LE edema appears well controlled with use of light compression from MLW. Patient would continue to benefit from skilled PT wound care for wound healing.  -MP     Row Name 09/10/21 0910          Positioning and Restraints    Pre-Treatment Position  in bed  -MP     Post Treatment Position  bed  -MP     In Bed  notified nsg;fowlers;call light within reach;encouraged to call for assist;exit alarm on;side rails up x3;heels elevated  -MP       User Key  (r) = Recorded By, (t) = Taken By, (c) = Cosigned By    Initials Name Provider Type    Lewis Cadena RN Registered Nurse    Sunny Hassan, MIAH Physical Therapist    CG Saad Castorena, RN Registered Nurse        Physical Therapy Education                 Title: PT OT SLP Therapies (In Progress)     Topic: Physical Therapy (Done)     Point: Mobility training (Done)     Learning Progress Summary           Patient Acceptance, E, VU,NR by CD at 9/8/2021 0946    Comment: SEE FLOWSHEET.    Acceptance, E,TB, VU by KG at 9/5/2021 1312                   Point: Home exercise program (Done)     Learning Progress Summary           Patient Acceptance, E, VU,NR by CD at 9/8/2021 0946    Comment: SEE FLOWSHEET.    Acceptance, E,TB, VU by KG at 9/5/2021 1312                    Point: Body mechanics (Done)     Learning Progress Summary           Patient Acceptance, E, VU,NR by CD at 9/8/2021 0946    Comment: SEE FLOWSHEET.    Acceptance, E,TB, VU by KG at 9/5/2021 1312                   Point: Precautions (Done)     Learning Progress Summary           Patient Acceptance, E, VU,NR by CD at 9/8/2021 0946    Comment: SEE FLOWSHEET.    Acceptance, E,TB, VU by KG at 9/5/2021 1312                               User Key     Initials Effective Dates Name Provider Type Discipline     06/16/21 -  Dominique Woods PT Physical Therapist PT     06/16/21 -  Ivon Franklin Physical Therapist PT                Recommendation and Plan  Anticipated Discharge Disposition (PT): inpatient rehabilitation facility  Planned Therapy Interventions (PT): wound care  Therapy Frequency (PT): daily  Plan of Care Reviewed With: patient   Progress: improving       Progress: improving  Outcome Summary: PT able to debride hypertrophic callus from R heel revealing only small open wound. Lianne intact so PT did not replace. Pt with area of fragile skin to L lateral LE; 4'' optifoam applied over area. B LE edema appears well controlled with use of light compression from MLW. Patient would continue to benefit from skilled PT wound care for wound healing.  Plan of Care Reviewed With: patient            Time Calculation  PT Charges     Row Name 09/10/21 1023             Time Calculation    Start Time  0910  -MP      PT Goal Re-Cert Due Date  09/16/21  -MP         Untimed Charges    00144-Hbttydedpo comp below knee  15  -MP      13683-Uegjkypzy debridement  5  -MP         Total Minutes    Untimed Charges Total Minutes  20  -MP       Total Minutes  20  -MP        User Key  (r) = Recorded By, (t) = Taken By, (c) = Cosigned By    Initials Name Provider Type    Sunny Hassan, PT Physical Therapist            Therapy Charges for Today     Code Description Service Date Service Provider Modifiers Qty     77128733195 HC AL DEBRIDE OPEN WOUND UP TO 20CM 9/10/2021 Sunny Caban, PT GP 1    73643073014 HC PT MULTI LAYER COMP SYS BELOW KNEE 9/10/2021 Sunny Caban, PT GP 1            PT G-Codes  Outcome Measure Options: AM-PAC 6 Clicks Daily Activity (OT)  AM-PAC 6 Clicks Score (PT): 11  AM-PAC 6 Clicks Score (OT): 13       Sunny Caban PT  9/10/2021

## 2021-09-10 NOTE — PROGRESS NOTES
[facility] Medicine Services  PROGRESS NOTE    Patient Name: Abe Phillips Jr.  : 1955  MRN: [id number]    Date of Admission: 2021  Primary Care Physician: Anibal Maria MD    Subjective   Subjective     CC:  Confusion and shortness of breath    HPI:  No new issues overnight.  No family present at the bedside.  Says he started to urinate more.  No other new complaints.    ROS:  Gen- No fevers, chills  CV- No chest pain, palpitations  Resp- No cough, + dyspnea  GI- No N/V/D, abd pain      Objective   Objective     Vital Signs:   Temp:  [96.5 °F (35.8 °C)-97.8 °F (36.6 °C)] 96.5 °F (35.8 °C)  Heart Rate:  [71-80] 71  Resp:  [18-20] 18  BP: (105-153)/(75-88) 153/88  Flow (L/min):  [2] 2     Physical Exam:  Constitutional: awake, alert, sitting up in bed, somewhat chronically ill appearing  HENT: NCAT, mucous membranes moist  Respiratory: diminished breath sounds bilaterally, respiratory effort normal on2-3L  Cardiovascular: RRR, no murmurs, rubs, or gallops  Gastrointestinal: obese  Positive bowel sounds, soft, nontender   Musculoskeletal: 2+ bilateral ankle edema, legs wrapped, unchanged from yesterday  Psychiatric: Appropriate affect, cooperative  Neurologic: Oriented x 3, speech slow, no focal deficits, no asterixis.    Skin: No rashes      Results Reviewed:  LAB RESULTS:      Lab 21  0735 21  1302 21  2300   WBC 5.68 4.47 6.11   HEMOGLOBIN 9.9* 10.0* 10.1*   HEMATOCRIT 31.4* 31.7* 32.6*   PLATELETS 122* 117* 117*   NEUTROS ABS 4.40  --  4.45   IMMATURE GRANS (ABS) 0.12*  --  0.14*   LYMPHS ABS 0.37*  --  0.79   MONOS ABS 0.54  --  0.51   EOS ABS 0.22  --  0.17   MCV 94.6 95.8 96.7   PROTIME  --  15.9*  --    D DIMER QUANT  --   --  1.99*         Lab 09/10/21  0644 21  1012 21  0914 21  2157 21  1004 21  0735 21  0735   SODIUM 132* 132* 132*  --  131*  --  134*   POTASSIUM 3.4* 4.0 3.9 3.7 3.4*   < > 3.7   CHLORIDE 93*  93* 94*  --  92*  --  95*   CO2 25.0 22.0 23.0  --  24.0  --  25.0   ANION GAP 14.0 17.0* 15.0  --  15.0  --  14.0   BUN 51* 54* 51*  --  52*  --  49*   CREATININE 1.37* 1.53* 1.60*  --  1.71*  --  1.63*   GLUCOSE 150* 96 67  --  114*  --  80   CALCIUM 9.5 9.5 9.5  --  9.4  --  9.6   MAGNESIUM  --   --  2.2  --   --   --   --     < > = values in this interval not displayed.         Lab 09/04/21  2300   TOTAL PROTEIN 7.3   ALBUMIN 3.80   GLOBULIN 3.5   ALT (SGPT) 15   AST (SGOT) 26   BILIRUBIN 0.8   ALK PHOS 150*         Lab 09/05/21  1302 09/05/21  0255 09/04/21  2300   PROBNP  --   --  656.8   TROPONIN T 0.026 0.036* 0.035*   PROTIME 15.9*  --   --    INR 1.31*  --   --                  Brief Urine Lab Results  (Last result in the past 365 days)      Color   Clarity   Blood   Leuk Est   Nitrite   Protein   CREAT   Urine HCG        08/13/21 0718 Yellow Clear Negative Negative Negative Negative               Microbiology Results Abnormal     Procedure Component Value - Date/Time    COVID PRE-OP / PRE-PROCEDURE SCREENING ORDER (NO ISOLATION) - Swab, Nasopharynx [[id number]]  (Normal) Collected: 09/09/21 1620    Lab Status: Final result Specimen: Swab from Nasopharynx Updated: 09/09/21 1655    Narrative:      The following orders were created for panel order COVID PRE-OP / PRE-PROCEDURE SCREENING ORDER (NO ISOLATION) - Swab, Nasopharynx.  Procedure                               Abnormality         Status                     ---------                               -----------         ------                     COVID-19, ABBOTT IN-HOUS...[[id number]]  Normal              Final result                 Please view results for these tests on the individual orders.    COVID-19, ABBOTT IN-HOUSE,NASAL Swab (NO TRANSPORT MEDIA) 2 HR TAT - Swab, Nasopharynx [[id number]]  (Normal) Collected: 09/09/21 1620    Lab Status: Final result Specimen: Swab from Nasopharynx Updated: 09/09/21 1655     COVID19 Presumptive Negative    Narrative:       Fact sheet for providers: https://www.fda.gov/media/707585/download     Fact sheet for patients: https://www.fda.gov/media/612443/download    Test performed by PCR.  Fact sheet for providers: https://www.fda.gov/media/156059/download     Fact sheet for patients: https://www.fda.gov/media/605699/download    Test performed by PCR.  If inconsistent with clinical signs and symptoms patient should be tested with different authorized molecular test.    COVID PRE-OP / PRE-PROCEDURE SCREENING ORDER (NO ISOLATION) - Swab, Nasopharynx [[id number]]  (Normal) Collected: 09/04/21 2258    Lab Status: Final result Specimen: Swab from Nasopharynx Updated: 09/04/21 2350    Narrative:      The following orders were created for panel order COVID PRE-OP / PRE-PROCEDURE SCREENING ORDER (NO ISOLATION) - Swab, Nasopharynx.  Procedure                               Abnormality         Status                     ---------                               -----------         ------                     COVID-19 and FLU A/B PCR...[[id number]]  Normal              Final result                 Please view results for these tests on the individual orders.    COVID-19 and FLU A/B PCR - Swab, Nasopharynx [[id number]]  (Normal) Collected: 09/04/21 2258    Lab Status: Final result Specimen: Swab from Nasopharynx Updated: 09/04/21 2350     COVID19 Not Detected     Influenza A PCR Not Detected     Influenza B PCR Not Detected    Narrative:      Fact sheet for providers: https://www.fda.gov/media/448616/download    Fact sheet for patients: https://www.fda.gov/media/489171/download    Test performed by PCR.          No radiology results from the last 24 hrs    Results for orders placed during the hospital encounter of 09/04/21    Adult Transthoracic Echo Complete W/ Cont if Necessary Per Protocol    Interpretation Summary  · Technically difficult study  · Left ventricular systolic function is normal. Estimated left ventricular EF = 65%.  · Left ventricular  diastolic function was normal.  · The cardiac valves are anatomically and functionally normal.  · Estimated right ventricular systolic pressure from tricuspid regurgitation is normal (<35 mmHg).      I have reviewed the medications:  Scheduled Meds:amphetamine-dextroamphetamine, 30 mg, Oral, Daily  amphetamine-dextroamphetamine, 60 mg, Oral, Daily  aspirin, 81 mg, Oral, Daily  bumetanide, 2 mg, Intravenous, BID  cetirizine, 10 mg, Oral, Daily  chlorothiazide sodium (DIURIL) injection, 500 mg, Intravenous, BID  gabapentin, 400 mg, Oral, BID  heparin (porcine), 5,000 Units, Subcutaneous, Q12H  insulin lispro, 0-7 Units, Subcutaneous, TID AC  lactulose, 45 mL, Oral, TID  levothyroxine, 75 mcg, Oral, Daily  montelukast, 10 mg, Oral, Nightly  nystatin, , Topical, Q12H  OXcarbazepine, 300 mg, Oral, BID  pramipexole, 0.75 mg, Oral, BID  pravastatin, 40 mg, Oral, Daily  riFAXIMin, 550 mg, Oral, Q12H  sodium chloride, 10 mL, Intravenous, Q12H  spironolactone, 100 mg, Oral, Daily  venlafaxine XR, 150 mg, Oral, Nightly      Continuous Infusions:   PRN Meds:.•  acetaminophen  •  influenza vaccine  •  magnesium sulfate **OR** magnesium sulfate **OR** magnesium sulfate  •  melatonin  •  ondansetron  •  potassium chloride  •  potassium chloride  •  sodium chloride    Assessment/Plan   Assessment & Plan     Active Hospital Problems    Diagnosis  POA   • **Encephalopathy, hepatic (CMS/HCC) [K72.90]  Yes   • Elevated troponin [R77.8]  Yes   • Hyponatremia [E87.1]  Yes   • OMAR (acute kidney injury) (CMS/HCC) [N17.9]  Yes   • Bilateral pleural effusion [J90]  Yes   • Hepatic encephalopathy (CMS/HCC) [K72.90]  Yes   • Anemia [D64.9]  Yes   • Uncontrolled type 2 diabetes mellitus with hyperglycemia (CMS/HCC) [E11.65]  Yes   • Other hyperlipidemia [E78.49]  Yes   • ADD (attention deficit disorder) [F98.8]  Yes   • Lymphedema [I89.0]  Yes   • Severe JAIME on CPAP [G47.33, Z99.89]  Not Applicable   • Diabetes mellitus type 2, uncontrolled,  with complications (CMS/HCC) [E11.8, E11.65]  Yes   • Depression [F32.9]  Yes   • Essential hypertension [I10]  Yes   • Restless legs syndrome (RLS) [G25.81]  Yes   • A-fib (CMS/HCC) [I48.91]  Yes   • Primary narcolepsy without cataplexy [G47.419]  Yes   • Chronic diastolic congestive heart failure (CMS/HCC) [I50.32]  Yes   • PVD (peripheral vascular disease) (CMS/HCC) [I73.9]  Yes   • Cirrhosis of liver (CMS/HCC) [K74.60]  Yes      Resolved Hospital Problems   No resolved problems to display.        Brief Hospital Course to date:  Abe Phillips Jr. is a 66 y.o. male  admitted for suspected acute on chronic diastolic heart failure exacerbation as well as hepatic encephalopathy.  He is also being treated for healthcare associated pneumonia.    Acute on chronic diastolic heart failure exacerbation  - agree with more aggressive diuerisis  -Have gotten better response over the last 24 hours after the addition of Diuril.  Kidney function is tolerating, press on with diuresis through the weekend    Bilateral pleural effusions  Bilateral edema  - personally reviewed imaging, I don't feel this is consistent with PNA.  Abx stopped 9/7.    Hepatic encephalopathy  MCCAULEY cirrhosis  Ascites    -Continue lactulose, decrease if excessive diarrhea  -Recheck ammonia this morning and is 51 which is stable  -Status post EGD on 8/16/2021 with portal gastropathy and lower esophageal varices noted  - continue xifaxamin  - ordered paracentesis on 9/7, but not enough asictes to tap at this time.  Continue to monitor clinically  -Mental status may be slightly improved today, continue on decreased dose of Neurontin.  Also back on home Monurol dose    ARF/CKD3   -Baseline creatinine 1.1-1.3  -improved to 1.3 today despite diuresis, continue to follow during diuresis    Hyponatremia  - stable around 131-134    Diabetes mellitus type 2  - continue current insulin regimen, unable to operate pump, order removed  -Hemoglobin A1c was 6.60 on  8/13/2021    Obstructive sleep apnea    Generalized weakness/debility/failure to thrive  - continue PT/OT    Gave update to daughterAnisa (neuro APRN).  Discussed with case management, tentative plan for rehab on Monday.    DVT prophylaxis:  Medical DVT prophylaxis orders are present.       AM-PAC 6 Clicks Score (PT): 11 (09/09/21 2000)    Disposition: I expect the patient to be discharged to rehab when medically appropriate, likely Monday.      CODE STATUS:   Code Status and Medical Interventions:   Ordered at: 09/05/21 0324     Level Of Support Discussed With:    Patient     Code Status:    CPR     Medical Interventions (Level of Support Prior to Arrest):    Full       Song Bryant MD  09/10/21

## 2021-09-10 NOTE — CONSULTS
Follow up on Mr. Phillips.  He is familiar with our service. Insulin pump remains off at this time.  Blood sugars stable. We will follow. Thank you.

## 2021-09-11 NOTE — PROGRESS NOTES
[facility] Medicine Services  PROGRESS NOTE    Patient Name: Abe Phillips Jr.  : 1955  MRN: [id number]    Date of Admission: 2021  Primary Care Physician: Anibal Maria MD    Subjective   Subjective     CC:  Confusion and shortness of breath    HPI:  No family present.  Thinks he is less swelling.  No new issues.    ROS:  Gen- No fevers, chills  CV- No chest pain, palpitations  Resp- No cough, + dyspnea  GI- No N/V/D, abd pain      Objective   Objective     Vital Signs:   Temp:  [96.2 °F (35.7 °C)-97.6 °F (36.4 °C)] 97.6 °F (36.4 °C)  Heart Rate:  [68-78] 78  Resp:  [18-20] 18  BP: (123-175)/(77-99) 123/77     Physical Exam:  Constitutional: awake, alert, sitting up in bed, somewhat chronically ill appearing  HENT: NCAT, mucous membranes moist  Respiratory: diminished breath sounds bilaterally, respiratory effort normal on2-3L  Cardiovascular: RRR, no murmurs, rubs, or gallops  Gastrointestinal: obese  Positive bowel sounds, soft, nontender   Musculoskeletal: 1+ bilateral ankle edema, legs wrapped,some moderate improved compared to yesterday  Psychiatric: Appropriate affect, cooperative  Neurologic: Oriented x 3, speech slow, no focal deficits, no asterixis.    Skin: No rashes      Results Reviewed:  LAB RESULTS:      Lab 21  0735 21  1302 21  2300   WBC 5.68 4.47 6.11   HEMOGLOBIN 9.9* 10.0* 10.1*   HEMATOCRIT 31.4* 31.7* 32.6*   PLATELETS 122* 117* 117*   NEUTROS ABS 4.40  --  4.45   IMMATURE GRANS (ABS) 0.12*  --  0.14*   LYMPHS ABS 0.37*  --  0.79   MONOS ABS 0.54  --  0.51   EOS ABS 0.22  --  0.17   MCV 94.6 95.8 96.7   PROTIME  --  15.9*  --    D DIMER QUANT  --   --  1.99*         Lab 21  0451 09/10/21  0644 21  1012 21  0914 21  2157 21  1004 21  1004   SODIUM 133* 132* 132* 132*  --   --  131*   POTASSIUM 3.2* 3.4* 4.0 3.9 3.7   < > 3.4*   CHLORIDE 93* 93* 93* 94*  --   --  92*   CO2 26.0 25.0 22.0 23.0  --   --   24.0   ANION GAP 14.0 14.0 17.0* 15.0  --   --  15.0   BUN 47* 51* 54* 51*  --   --  52*   CREATININE 1.38* 1.37* 1.53* 1.60*  --   --  1.71*   GLUCOSE 133* 150* 96 67  --   --  114*   CALCIUM 9.6 9.5 9.5 9.5  --   --  9.4   MAGNESIUM  --   --   --  2.2  --   --   --     < > = values in this interval not displayed.         Lab 09/04/21  2300   TOTAL PROTEIN 7.3   ALBUMIN 3.80   GLOBULIN 3.5   ALT (SGPT) 15   AST (SGOT) 26   BILIRUBIN 0.8   ALK PHOS 150*         Lab 09/05/21  1302 09/05/21  0255 09/04/21  2300   PROBNP  --   --  656.8   TROPONIN T 0.026 0.036* 0.035*   PROTIME 15.9*  --   --    INR 1.31*  --   --                  Brief Urine Lab Results  (Last result in the past 365 days)      Color   Clarity   Blood   Leuk Est   Nitrite   Protein   CREAT   Urine HCG        08/13/21 0718 Yellow Clear Negative Negative Negative Negative               Microbiology Results Abnormal     Procedure Component Value - Date/Time    COVID PRE-OP / PRE-PROCEDURE SCREENING ORDER (NO ISOLATION) - Swab, Nasopharynx [[id number]]  (Normal) Collected: 09/09/21 1620    Lab Status: Final result Specimen: Swab from Nasopharynx Updated: 09/09/21 1655    Narrative:      The following orders were created for panel order COVID PRE-OP / PRE-PROCEDURE SCREENING ORDER (NO ISOLATION) - Swab, Nasopharynx.  Procedure                               Abnormality         Status                     ---------                               -----------         ------                     COVID-19, ABBOTT IN-HOUS...[[id number]]  Normal              Final result                 Please view results for these tests on the individual orders.    COVID-19, ABBOTT IN-HOUSE,NASAL Swab (NO TRANSPORT MEDIA) 2 HR TAT - Swab, Nasopharynx [[id number]]  (Normal) Collected: 09/09/21 1620    Lab Status: Final result Specimen: Swab from Nasopharynx Updated: 09/09/21 1655     COVID19 Presumptive Negative    Narrative:      Fact sheet for providers:  https://www.fda.gov/media/313088/download     Fact sheet for patients: https://www.fda.gov/media/819525/download    Test performed by PCR.  Fact sheet for providers: https://www.fda.gov/media/770651/download     Fact sheet for patients: https://www.fda.gov/media/204495/download    Test performed by PCR.  If inconsistent with clinical signs and symptoms patient should be tested with different authorized molecular test.    COVID PRE-OP / PRE-PROCEDURE SCREENING ORDER (NO ISOLATION) - Swab, Nasopharynx [[id number]]  (Normal) Collected: 09/04/21 2258    Lab Status: Final result Specimen: Swab from Nasopharynx Updated: 09/04/21 2350    Narrative:      The following orders were created for panel order COVID PRE-OP / PRE-PROCEDURE SCREENING ORDER (NO ISOLATION) - Swab, Nasopharynx.  Procedure                               Abnormality         Status                     ---------                               -----------         ------                     COVID-19 and FLU A/B PCR...[[id number]]  Normal              Final result                 Please view results for these tests on the individual orders.    COVID-19 and FLU A/B PCR - Swab, Nasopharynx [[id number]]  (Normal) Collected: 09/04/21 2258    Lab Status: Final result Specimen: Swab from Nasopharynx Updated: 09/04/21 2350     COVID19 Not Detected     Influenza A PCR Not Detected     Influenza B PCR Not Detected    Narrative:      Fact sheet for providers: https://www.fda.gov/media/487809/download    Fact sheet for patients: https://www.fda.gov/media/067860/download    Test performed by PCR.          No radiology results from the last 24 hrs    Results for orders placed during the hospital encounter of 09/04/21    Adult Transthoracic Echo Complete W/ Cont if Necessary Per Protocol    Interpretation Summary  · Technically difficult study  · Left ventricular systolic function is normal. Estimated left ventricular EF = 65%.  · Left ventricular diastolic function was  normal.  · The cardiac valves are anatomically and functionally normal.  · Estimated right ventricular systolic pressure from tricuspid regurgitation is normal (<35 mmHg).      I have reviewed the medications:  Scheduled Meds:amphetamine-dextroamphetamine, 30 mg, Oral, Daily  amphetamine-dextroamphetamine, 60 mg, Oral, Daily  aspirin, 81 mg, Oral, Daily  bumetanide, 2 mg, Intravenous, TID  cetirizine, 10 mg, Oral, Daily  chlorothiazide sodium (DIURIL) injection, 500 mg, Intravenous, BID  gabapentin, 400 mg, Oral, BID  heparin (porcine), 5,000 Units, Subcutaneous, Q12H  insulin lispro, 0-7 Units, Subcutaneous, TID AC  lactulose, 45 mL, Oral, TID  levothyroxine, 75 mcg, Oral, Daily  montelukast, 10 mg, Oral, Nightly  nystatin, , Topical, Q12H  OXcarbazepine, 300 mg, Oral, BID  pharmacy consult - MTM, , Does not apply, Daily  pramipexole, 0.75 mg, Oral, BID  pravastatin, 40 mg, Oral, Daily  riFAXIMin, 550 mg, Oral, Q12H  sodium chloride, 10 mL, Intravenous, Q12H  spironolactone, 100 mg, Oral, Daily  venlafaxine XR, 150 mg, Oral, Nightly      Continuous Infusions:   PRN Meds:.•  acetaminophen  •  influenza vaccine  •  magnesium sulfate **OR** magnesium sulfate **OR** magnesium sulfate  •  melatonin  •  ondansetron  •  potassium chloride  •  potassium chloride  •  sodium chloride    Assessment/Plan   Assessment & Plan     Active Hospital Problems    Diagnosis  POA   • **Encephalopathy, hepatic (CMS/HCC) [K72.90]  Yes   • Elevated troponin [R77.8]  Yes   • Hyponatremia [E87.1]  Yes   • OMAR (acute kidney injury) (CMS/HCC) [N17.9]  Yes   • Bilateral pleural effusion [J90]  Yes   • Hepatic encephalopathy (CMS/HCC) [K72.90]  Yes   • Anemia [D64.9]  Yes   • Uncontrolled type 2 diabetes mellitus with hyperglycemia (CMS/HCC) [E11.65]  Yes   • Other hyperlipidemia [E78.49]  Yes   • ADD (attention deficit disorder) [F98.8]  Yes   • Lymphedema [I89.0]  Yes   • Severe JAIME on CPAP [G47.33, Z99.89]  Not Applicable   • Diabetes  mellitus type 2, uncontrolled, with complications (CMS/HCC) [E11.8, E11.65]  Yes   • Depression [F32.9]  Yes   • Essential hypertension [I10]  Yes   • Restless legs syndrome (RLS) [G25.81]  Yes   • A-fib (CMS/HCC) [I48.91]  Yes   • Primary narcolepsy without cataplexy [G47.419]  Yes   • Chronic diastolic congestive heart failure (CMS/HCC) [I50.32]  Yes   • PVD (peripheral vascular disease) (CMS/HCC) [I73.9]  Yes   • Cirrhosis of liver (CMS/HCC) [K74.60]  Yes      Resolved Hospital Problems   No resolved problems to display.        Brief Hospital Course to date:  Abe Phillips Jr. is a 66 y.o. male  admitted for suspected acute on chronic diastolic heart failure exacerbation as well as hepatic encephalopathy.  He is also being treated for healthcare associated pneumonia.    Acute on chronic diastolic heart failure exacerbation  - continues to improves on volume  - will further increase bumex to 2mg tid and diuril given stable renal function at this time.    Bilateral pleural effusions  Bilateral edema  - personally reviewed imaging, I don't feel this is consistent with PNA.  Abx stopped 9/7.    Hepatic encephalopathy  MCCAULEY cirrhosis  Ascites    -Continue lactulose, decrease if excessive diarrhea  -Recheck ammonia this morning and is 51 which is stable  -Status post EGD on 8/16/2021 with portal gastropathy and lower esophageal varices noted  - continue xifaxamin  - ordered paracentesis on 9/7, but not enough asictes to tap at this time.  Continue to monitor clinically  -Mental status with slow trend towards improvement, continue on decreased dose of Neurontin (400mg bid now).  Also back on home adderal dose    ARF/CKD3   -Baseline creatinine 1.1-1.3  - stable around1.3 today despite diuresis, continue to follow during diuresis    Hyponatremia  - stable around 131-134    Diabetes mellitus type 2  - unable to operate pump, order removed  -Hemoglobin A1c was 6.60 on 8/13/2021  - will slightly increase SSI  dose    Obstructive sleep apnea    Generalized weakness/debility/failure to thrive  - continue PT/OT    Gave update to daughter, Anisa (neuro APRN) via txt.  Plan to rehab on Monday.    DVT prophylaxis:  Medical DVT prophylaxis orders are present.       AM-PAC 6 Clicks Score (PT): 8 (09/11/21 2421)    Disposition: I expect the patient to be discharged to rehab when medically appropriate, likely Monday.      CODE STATUS:   Code Status and Medical Interventions:   Ordered at: 09/05/21 0324     Level Of Support Discussed With:    Patient     Code Status:    CPR     Medical Interventions (Level of Support Prior to Arrest):    Full       Song Bryant MD  09/11/21

## 2021-09-11 NOTE — THERAPY PROGRESS REPORT/RE-CERT
Patient Name: Abe Phillips Jr.  : 1955    MRN: [id number]                              Today's Date: 2021       Admit Date: 2021    Visit Dx:     ICD-10-CM ICD-9-CM   1. Hepatic encephalopathy (CMS/HCC)  K72.90 572.2   2. Acute pulmonary edema (CMS/HCC)  J81.0 518.4   3. Pleural effusion  J90 511.9   4. Acute kidney injury (CMS/HCC)  N17.9 584.9   5. History of diabetes mellitus  Z86.39 V12.29     Patient Active Problem List   Diagnosis   • Type 2 diabetes mellitus with hyperglycemia (CMS/HCC)   • Thrombocytopenia (CMS/HCC)   • PVD (peripheral vascular disease) (CMS/HCC)   • A-fib (CMS/HCC)   • Cirrhosis of liver (CMS/HCC)   • Chronic diastolic congestive heart failure (CMS/HCC)   • Diabetic ulcer of right heel (CMS/HCC)   • Primary narcolepsy without cataplexy   • Essential hypertension   • Morbidly obese (CMS/HCC)   • Restless legs syndrome (RLS)   • ADD (attention deficit disorder)   • Depression   • Diabetes mellitus type 2, uncontrolled, with complications (CMS/HCC)   • Edema   • MCCAULEY (nonalcoholic steatohepatitis)   • Hepatitis B   • Insomnia   • Lymphedema   • Obesity   • Severe JAIME on CPAP   • RLS (restless legs syndrome)   • Tremor of both hands   • Other hyperlipidemia   • Uncontrolled type 2 diabetes mellitus with hyperglycemia (CMS/HCC)   • Dyslipidemia   • Hyperglycemia   • Diabetic ulcer of right heel associated with type 2 diabetes mellitus (CMS/HCC)   • Facial paralysis/[place] palsy   • Diabetic hypoglycemia (CMS/HCC)   • Hypogonadism in male   • Bilateral leg weakness   • Anemia   • Elevated troponin   • Hyponatremia   • OMAR (acute kidney injury) (CMS/HCC)   • Bilateral pleural effusion   • Encephalopathy, hepatic (CMS/HCC)   • Hepatic encephalopathy (CMS/HCC)     Past Medical History:   Diagnosis Date   • A-fib (CMS/HCC)    • ADD (attention deficit disorder)    • Atrial flutter (CMS/HCC)    • Bell's palsy    • Cellulitis    • CHF (congestive heart failure) (CMS/HCC)    • Cirrhosis of  liver (CMS/HCC)    • Constipation    • Depression    • Diabetes mellitus (CMS/HCC)    • Diabetic ulcer of right foot (CMS/HCC)    • Disease of thyroid gland    • Edema    • Fatty liver    • Hepatitis B    • Hyperlipidemia    • Hypokalemia    • Insomnia    • Lymphedema    • Narcolepsy    • Neuropathy    • Obesity    • JAIME on CPAP    • PVD (peripheral vascular disease) (CMS/HCC)    • RLS (restless legs syndrome)    • Skin cancer    • Tardive dyskinesia    • Tremor of both hands    • Type 2 diabetes mellitus (CMS/HCC)    • Yeast infection      Past Surgical History:   Procedure Laterality Date   • ACHILLES TENDON REPAIR     • CARDIAC ABLATION     • CHOLECYSTECTOMY     • ENDOSCOPY N/A 8/16/2021    Procedure: ESOPHAGOGASTRODUODENOSCOPY;  Surgeon: Brunner, Mark I, MD;  [facility];  Service: Gastroenterology;  Laterality: N/A;   • LASER ABLATION      VEIN RLE     General Information     Row Name 09/11/21 1520          Physical Therapy Time and Intention    Document Type  therapy note (daily note)  -CT     Mode of Treatment  physical therapy  -CT     Row Name 09/11/21 1520          General Information    Patient Profile Reviewed  yes  -CT     Existing Precautions/Restrictions  weight bearing  -CT     Barriers to Rehab  medically complex;previous functional deficit;cognitive status;physical barrier  -CT     Row Name 09/11/21 1520          Safety Issues, Functional Mobility    Safety Issues Affecting Function (Mobility)  judgment;sequencing abilities;problem-solving  -CT     Impairments Affecting Function (Mobility)  balance;coordination;endurance/activity tolerance;motor planning  -CT       User Key  (r) = Recorded By, (t) = Taken By, (c) = Cosigned By    Initials Name Provider Type    CT Zeke Rome, PT Physical Therapist        Mobility     Row Name 09/11/21 1522          Bed Mobility    Scooting/Bridging [place] (Bed Mobility)  moderate assist (50% patient effort)  -CT     Supine-Sit  [place] (Bed Mobility)  maximum assist (25% patient effort)  -CT     Row Name 09/11/21 1522          Bed-Chair Transfer    Bed-Chair [place] (Transfers)  maximum assist (25% patient effort);other (see comments) stand pivot transfer  -CT     Row Name 09/11/21 1522          Sit-Stand Transfer    Sit-Stand [place] (Transfers)  maximum assist (25% patient effort)  -CT     Row Name 09/11/21 1522          Gait/Stairs (Locomotion)    [place] Level (Gait)  unable to assess  -CT       User Key  (r) = Recorded By, (t) = Taken By, (c) = Cosigned By    Initials Name Provider Type    CT Zeke Rome, MIAH Physical Therapist        Obj/Interventions     Row Name 09/11/21 1524          Motor Skills    Motor Skills  coordination;functional endurance  -CT     Coordination  gross motor deficit  -CT     Row Name 09/11/21 1524          Balance    Balance Assessment  sitting static balance;sitting dynamic balance;standing static balance;standing dynamic balance  -CT     Static Sitting Balance  mild impairment  -CT     Dynamic Sitting Balance  moderate impairment  -CT     Static Standing Balance  severe impairment  -CT     Dynamic Standing Balance  severe impairment  -CT     Balance Interventions  sitting;standing;sit to stand;supported;weight shifting activity  -CT       User Key  (r) = Recorded By, (t) = Taken By, (c) = Cosigned By    Initials Name Provider Type    CT Zeke Rome, PT Physical Therapist        Goals/Plan    No documentation.       Clinical Impression     Row Name 09/11/21 1527          Pain    Additional Documentation  Pain Scale: Numbers Pre/Post-Treatment (Group)  -CT     [facility] Name 09/11/21 1527          Pain Scale: Numbers Pre/Post-Treatment    Pretreatment Pain Rating  0/10 - no pain  -CT     Posttreatment Pain Rating  0/10 - no pain  -CT     Row Name 09/11/21 1527          Plan of Care Review    Progress  improving  -CT     Outcome Summary  stand pivot transfer to chair max x  1 bed mobiltiy max x 1, motor plan and processing still requires greater time to execute activities.  -CT     Row Name 09/11/21 1527          Therapy Assessment/Plan (PT)    Rehab Potential (PT)  fair, will monitor progress closely  -CT     Criteria for Skilled Interventions Met (PT)  yes  -CT     Row Name 09/11/21 1527          Positioning and Restraints    Pre-Treatment Position  in bed  -CT     Post Treatment Position  chair  -CT     In Chair  notified nsg;reclined;sitting;call light within reach;exit alarm on  -CT       User Key  (r) = Recorded By, (t) = Taken By, (c) = Cosigned By    Initials Name Provider Type    CT Zeke Rome, PT Physical Therapist        Outcome Measures     Row Name 09/11/21 1531 09/11/21 0800       How much help from another person do you currently need...    Turning from your back to your side while in flat bed without using bedrails?  2  -CT  2  -LC    Moving from lying on back to sitting on the side of a flat bed without bedrails?  2  -CT  2  -LC    Moving to and from a bed to a chair (including a wheelchair)?  1  -CT  2  -LC    Standing up from a chair using your arms (e.g., wheelchair, bedside chair)?  1  -CT  2  -LC    Climbing 3-5 steps with a railing?  1  -CT  1  -LC    To walk in hospital room?  1  -CT  2  -LC    AM-PAC 6 Clicks Score (PT)  8  -CT  11  -LC      User Key  (r) = Recorded By, (t) = Taken By, (c) = Cosigned By    Initials Name Provider Type    CT Zeke Rome, MIAH Physical Therapist    Lydia Emerson RN Registered Nurse                       Physical Therapy Education                 Title: PT OT SLP Therapies (In Progress)     Topic: Physical Therapy (In Progress)     Point: Mobility training (In Progress)     Learning Progress Summary           Patient Acceptance, E,D, NR by CT at 9/11/2021 1533    Acceptance, E, VU,NR by CD at 9/8/2021 0946    Comment: SEE FLOWSHEET.    Acceptance, E,TB, VU by KG at 9/5/2021 1312                   Point:  Home exercise program (In Progress)     Learning Progress Summary           Patient Acceptance, E,D, NR by CT at 9/11/2021 1533    Acceptance, E, VU,NR by CD at 9/8/2021 0946    Comment: SEE FLOWSHEET.    Acceptance, E,TB, VU by KG at 9/5/2021 1312                   Point: Body mechanics (In Progress)     Learning Progress Summary           Patient Acceptance, E,D, NR by CT at 9/11/2021 1533    Acceptance, E, VU,NR by CD at 9/8/2021 0946    Comment: SEE FLOWSHEET.    Acceptance, E,TB, VU by KG at 9/5/2021 1312                   Point: Precautions (In Progress)     Learning Progress Summary           Patient Acceptance, E,D, NR by CT at 9/11/2021 1533    Acceptance, E, VU,NR by CD at 9/8/2021 0946    Comment: SEE FLOWSHEET.    Acceptance, E,TB, VU by KG at 9/5/2021 1312                               User Key     Initials Effective Dates Name Provider Type Discipline    CD 06/16/21 -  Dominique Woods PT Physical Therapist PT    CT 06/16/21 -  Zeke Rome, MIAH Physical Therapist PT    KG 06/16/21 -  Ivon Franklin Physical Therapist PT              PT Recommendation and Plan     Progress: improving  Outcome Summary: stand pivot transfer to chair max x 1 bed mobiltiy max x 1, motor plan and processing still requires greater time to execute activities.     Time Calculation:   PT Charges     Row Name 09/11/21 1535             Time Calculation    Start Time  1430  -CT      PT Received On  09/11/21  -CT         Time Calculation- PT    Total Timed Code Minutes- PT  35 minute(s)  -CT        User Key  (r) = Recorded By, (t) = Taken By, (c) = Cosigned By    Initials Name Provider Type    CT Zeke Rome, PT Physical Therapist        Therapy Charges for Today     Code Description Service Date Service Provider Modifiers Qty    41633612702 HC PT THER PROC EA 15 MIN 9/11/2021 Zeke Rome, PT GP 2          PT G-Codes  Outcome Measure Options: AM-PAC 6 Clicks Daily Activity (OT)  AM-PAC 6 Clicks  Score (PT): 8  AM-PAC 6 Clicks Score (OT): 13    Zeke Rome, PT  9/11/2021

## 2021-09-11 NOTE — PROGRESS NOTES
Abe Phillips Jr.  [id number]  1955   LOS: 6 days   Patient Care Team:  Anibal Maria MD as PCP - General (Internal Medicine)  Dustin Hendricks MD as Consulting Physician (Endocrinology)  Ld Orozco MD as Cardiologist (Cardiology)    Chief Complaint:  DM / CKD / MCCAULEY & CIRRHOSIS / AFIB / HFpEF / DEBILITY / PERICARDIAL CA++ / HTN    Subjective     Sitting up in bed eating breakfast.  Denies chest pain, palpitations or dyspnea.    Objective     Vital Sign Min/Max for last 24 hours  Temp  Min: 96.2 °F (35.7 °C)  Max: 97.6 °F (36.4 °C)   BP  Min: 136/75  Max: 175/99   Pulse  Min: 68  Max: 77   Resp  Min: 18  Max: 20   SpO2  Min: 92 %  Max: 95 %               09/06/21  0500 09/07/21  0555   Weight: (!) 165 kg (362 lb 14.4 oz) (!) 163 kg (359 lb 8 oz)         Intake/Output Summary (Last 24 hours) at 9/11/2021 0932  Last data filed at 9/10/2021 2030  Gross per 24 hour   Intake 840 ml   Output 1650 ml   Net -810 ml       Physical Exam:     General Appearance:    Alert, cooperative, in no acute distress   Lungs:    Scattered bibasilar crackles more notable on the right,respirations regular, even and unlabored off supplemental oxygen    Heart:    Regular and normal rate, normal S1 and S2, grade 1/6 systolic murmur, no gallop, no rub, no click   Abdomen:  Extremities:   Soft, non-tender, bowel sounds audible x4  Bilateral lower extremity compression wraps in place   Pulses:   Pulses palpable and equal bilaterally      Results Review:   Results from last 7 days   Lab Units 09/11/21  0451 09/10/21  0644 09/10/21  0644 09/09/21  1012 09/09/21  1012   SODIUM mmol/L 133*  --  132*  --  132*   POTASSIUM mmol/L 3.2*  --  3.4*  --  4.0   CHLORIDE mmol/L 93*  --  93*  --  93*   CO2 mmol/L 26.0  --  25.0  --  22.0   BUN mg/dL 47*  --  51*  --  54*   CREATININE mg/dL 1.38*  --  1.37*  --  1.53*   GLUCOSE mg/dL 133*   < > 150*   < > 96   CALCIUM mg/dL 9.6  --  9.5  --  9.5    < > = values in this interval not  displayed.     Results from last 7 days   Lab Units 09/06/21  0735 09/05/21  1302 09/04/21  2300   WBC 10*3/mm3 5.68 4.47 6.11   HEMOGLOBIN g/dL 9.9* 10.0* 10.1*   HEMATOCRIT % 31.4* 31.7* 32.6*   PLATELETS 10*3/mm3 122* 117* 117*     Results from last 7 days   Lab Units 09/05/21  1302 09/05/21  0255 09/04/21  2300   TROPONIN T ng/mL 0.026 0.036* 0.035*     · ACCU-CHEKS: 193-156-217 mg/DL.    · NO NEW EKG / CXR / LAB RESULTS.    Medication Review: REVIEWED; NO DRIPS.    Assessment/Plan     Acceptable hemodynamics and afebrile without overt progressive hepatic encephalopathy. Not a candidate to undergo right and left heart catheterization studies to assess for pericardial constriction; doubtful at this time he could tolerate extensive cardiac surgery.  -Continue diuresis  -Replacing electrolytes per protocol.      Encephalopathy, hepatic (CMS/HCC)    PVD (peripheral vascular disease) (CMS/HCC)    A-fib (CMS/HCC)    Cirrhosis of liver (CMS/HCC)    Chronic diastolic congestive heart failure (CMS/HCC)    Primary narcolepsy without cataplexy    Essential hypertension    Restless legs syndrome (RLS)    ADD (attention deficit disorder)    Depression    Diabetes mellitus type 2, uncontrolled, with complications (CMS/HCC)    Lymphedema    Severe JAIME on CPAP    Other hyperlipidemia    Uncontrolled type 2 diabetes mellitus with hyperglycemia (CMS/HCC)    Anemia    Elevated troponin    Hyponatremia    OMAR (acute kidney injury) (CMS/HCC)    Bilateral pleural effusion    Hepatic encephalopathy (CMS/HCC)    09/11/21  09:32 EDT

## 2021-09-11 NOTE — PLAN OF CARE
Alert and oriented.  Napped intermittent.  Respirations unlabored.  Monitor SR.  Fluid restriction encouraged.  Potassium replaced.  External catheter in place.  Up to recliner, mechanical lift assist.  Daughter and wife visited.

## 2021-09-11 NOTE — PLAN OF CARE
Goal Outcome Evaluation:           Progress: improving  Outcome Summary: stand pivot transfer to chair max x 1 bed mobiltiy max x 1, motor plan and processing still requires greater time to execute activities.

## 2021-09-12 NOTE — PLAN OF CARE
Alert and oriented.  Slept often.  Respirations unlabored.  Monitor SR.  Radha boots in place.  Refused Lactulose.  External catheter in place.  No visitors seen.

## 2021-09-12 NOTE — PROGRESS NOTES
Abe Phillips Jr.  [id number]  1955   LOS: 7 days   Patient Care Team:  Anibal Maria MD as PCP - General (Internal Medicine)  Dustin Hendricks MD as Consulting Physician (Endocrinology)  Ld Orozco MD as Cardiologist (Cardiology)    Chief Complaint:  DM / CKD / MCCAULEY & CIRRHOSIS / AFIB / HFpEF / DEBILITY / PERICARDIAL CA++ / HTN    Subjective     Sitting up in bed, conversant, no complaints.  Denies chest pain, palpitations or dyspnea.    Objective     Vital Sign Min/Max for last 24 hours  Temp  Min: 97.6 °F (36.4 °C)  Max: 98 °F (36.7 °C)   BP  Min: 123/71  Max: 155/90   Pulse  Min: 68  Max: 87   Resp  Min: 16  Max: 18   SpO2  Min: 92 %  Max: 97 %               09/06/21  0500 09/07/21  0555   Weight: (!) 165 kg (362 lb 14.4 oz) (!) 163 kg (359 lb 8 oz)         Intake/Output Summary (Last 24 hours) at 9/12/2021 1103  Last data filed at 9/12/2021 0313  Gross per 24 hour   Intake 660 ml   Output 2400 ml   Net -1740 ml       Physical Exam:     General Appearance:    Alert, cooperative, in no acute distress   Lungs:    Scattered bibasilar crackles more notable on the right,respirations regular, even and unlabored off supplemental oxygen    Heart:    Regular and normal rate, normal S1 and S2, grade 1/6 systolic murmur, no gallop, no rub, no click   Abdomen:  Extremities:   Soft, non-tender, bowel sounds audible x4  Bilateral lower extremity compression wraps in place, trivial 1+ bilateral pedal edema   Pulses:   Pulses palpable and equal bilaterally      Results Review:   Results from last 7 days   Lab Units 09/12/21  0852 09/11/21  0451 09/11/21  0451 09/10/21  0644 09/10/21  0644   SODIUM mmol/L 133*  --  133*  --  132*   POTASSIUM mmol/L 3.6  --  3.2*  --  3.4*   CHLORIDE mmol/L 93*  --  93*  --  93*   CO2 mmol/L 25.0  --  26.0  --  25.0   BUN mg/dL 49*  --  47*  --  51*   CREATININE mg/dL 1.32*  --  1.38*  --  1.37*   GLUCOSE mg/dL 153*   < > 133*   < > 150*   CALCIUM mg/dL 9.7  --  9.6  --  9.5     < > = values in this interval not displayed.     Results from last 7 days   Lab Units 09/06/21  0735 09/05/21  1302   WBC 10*3/mm3 5.68 4.47   HEMOGLOBIN g/dL 9.9* 10.0*   HEMATOCRIT % 31.4* 31.7*   PLATELETS 10*3/mm3 122* 117*     Results from last 7 days   Lab Units 09/05/21  1302   TROPONIN T ng/mL 0.026   ·   ·   Medication Review: REVIEWED; NO DRIPS.    Assessment/Plan     Acceptable hemodynamics and afebrile without overt progressive hepatic encephalopathy. Not a candidate to undergo right and left heart catheterization studies to assess for pericardial constriction; doubtful at this time he could tolerate extensive cardiac surgery.  -Continue diuresis  -Replacing electrolytes per protocol.      Encephalopathy, hepatic (CMS/HCC)    PVD (peripheral vascular disease) (CMS/HCC)    A-fib (CMS/HCC)    Cirrhosis of liver (CMS/HCC)    Chronic diastolic congestive heart failure (CMS/HCC)    Primary narcolepsy without cataplexy    Essential hypertension    Restless legs syndrome (RLS)    ADD (attention deficit disorder)    Depression    Diabetes mellitus type 2, uncontrolled, with complications (CMS/HCC)    Lymphedema    Severe JAIME on CPAP    Other hyperlipidemia    Uncontrolled type 2 diabetes mellitus with hyperglycemia (CMS/HCC)    Anemia    Elevated troponin    Hyponatremia    OMAR (acute kidney injury) (CMS/HCC)    Bilateral pleural effusion    Hepatic encephalopathy (CMS/HCC)    09/12/21  11:03 EDT

## 2021-09-12 NOTE — PROGRESS NOTES
[facility] Medicine Services  PROGRESS NOTE    Patient Name: Abe Phillips Jr.  : 1955  MRN: [id number]    Date of Admission: 2021  Primary Care Physician: Anibal Maria MD    Subjective   Subjective     CC:  Confusion and shortness of breath    HPI:  No family present.  No new issues, says he feels pretty good.  Feels lower extremity edema is improving.    ROS:  Gen- No fevers, chills  CV- No chest pain, palpitations  Resp- No cough, + dyspnea  GI- No N/V/D, abd pain      Objective   Objective     Vital Signs:   Temp:  [97.8 °F (36.6 °C)-98 °F (36.7 °C)] 97.8 °F (36.6 °C)  Heart Rate:  [70-87] 78  Resp:  [16-18] 18  BP: (123-155)/(69-90) 123/69     Physical Exam:  Constitutional: More alert today, sitting up in bed, somewhat chronically ill appearing  HENT: NCAT, mucous membranes moist  Respiratory: diminished breath sounds bilaterally, respiratory effort normal on2-3L  Cardiovascular: RRR, no murmurs, rubs, or gallops  Gastrointestinal: obese  Positive bowel sounds, soft, nontender   Musculoskeletal: 1+ bilateral ankle edema, legs wrapped, improved over the last few days.    Psychiatric: Appropriate affect, cooperative  Neurologic: Oriented x 3, speech slow, no focal deficits, no asterixis.    Skin: No rashes      Results Reviewed:  LAB RESULTS:      Lab 21  0735   WBC 5.68   HEMOGLOBIN 9.9*   HEMATOCRIT 31.4*   PLATELETS 122*   NEUTROS ABS 4.40   IMMATURE GRANS (ABS) 0.12*   LYMPHS ABS 0.37*   MONOS ABS 0.54   EOS ABS 0.22   MCV 94.6         Lab 21  0852 21  0451 09/10/21  0644 21  1012 21  0914   SODIUM 133* 133* 132* 132* 132*   POTASSIUM 3.6 3.2* 3.4* 4.0 3.9   CHLORIDE 93* 93* 93* 93* 94*   CO2 25.0 26.0 25.0 22.0 23.0   ANION GAP 15.0 14.0 14.0 17.0* 15.0   BUN 49* 47* 51* 54* 51*   CREATININE 1.32* 1.38* 1.37* 1.53* 1.60*   GLUCOSE 153* 133* 150* 96 67   CALCIUM 9.7 9.6 9.5 9.5 9.5   MAGNESIUM  --   --   --   --  2.2                          Brief Urine Lab Results  (Last result in the past 365 days)      Color   Clarity   Blood   Leuk Est   Nitrite   Protein   CREAT   Urine HCG        08/13/21 0718 Yellow Clear Negative Negative Negative Negative               Microbiology Results Abnormal     Procedure Component Value - Date/Time    COVID PRE-OP / PRE-PROCEDURE SCREENING ORDER (NO ISOLATION) - Swab, Nasopharynx [[id number]]  (Normal) Collected: 09/12/21 1130    Lab Status: Final result Specimen: Swab from Nasopharynx Updated: 09/12/21 1312    Narrative:      The following orders were created for panel order COVID PRE-OP / PRE-PROCEDURE SCREENING ORDER (NO ISOLATION) - Swab, Nasopharynx.  Procedure                               Abnormality         Status                     ---------                               -----------         ------                     COVID-19,CEPHEID/NARENDRA,LE...[[id number]]  Normal              Final result                 Please view results for these tests on the individual orders.    COVID-19,CEPHEID/NARENDRA,[facility] IN-HOUSE(OR EMERGENT/ADD-ON),NP SWAB IN TRANSPORT MEDIA 3-4 HR TAT - Swab, Nasopharynx [[id number]]  (Normal) Collected: 09/12/21 1130    Lab Status: Final result Specimen: Swab from Nasopharynx Updated: 09/12/21 1312     COVID19 Not Detected    Narrative:      Fact sheet for providers: https://www.fda.gov/media/544341/download     Fact sheet for patients: https://www.fda.gov/media/308745/download  Fact sheet for providers: https://www.fda.gov/media/399330/download     Fact sheet for patients: https://www.fda.gov/media/034604/download    COVID PRE-OP / PRE-PROCEDURE SCREENING ORDER (NO ISOLATION) - Swab, Nasopharynx [[id number]]  (Normal) Collected: 09/09/21 1620    Lab Status: Final result Specimen: Swab from Nasopharynx Updated: 09/09/21 1655    Narrative:      The following orders were created for panel order COVID PRE-OP / PRE-PROCEDURE SCREENING ORDER (NO ISOLATION) - Swab, Nasopharynx.  Procedure                                Abnormality         Status                     ---------                               -----------         ------                     COVID-19, [facility] IN-HOUS...[[id number]]  Normal              Final result                 Please view results for these tests on the individual orders.    COVID-19, [facility]BBOTT IN-HOUSE,NASAL Swab (NO TRANSPORT MEDIA) 2 HR TAT - Swab, Nasopharynx [[id number]]  (Normal) Collected: 09/09/21 1620    Lab Status: Final result Specimen: Swab from Nasopharynx Updated: 09/09/21 1655     COVID19 Presumptive Negative    Narrative:      Fact sheet for providers: https://www.fda.gov/media/998811/download     Fact sheet for patients: https://www.fda.gov/media/770852/download    Test performed by PCR.  Fact sheet for providers: https://www.fda.gov/media/456364/download     Fact sheet for patients: https://www.fda.gov/media/700044/download    Test performed by PCR.  If inconsistent with clinical signs and symptoms patient should be tested with different authorized molecular test.    COVID PRE-OP / PRE-PROCEDURE SCREENING ORDER (NO ISOLATION) - Swab, Nasopharynx [[id number]]  (Normal) Collected: 09/04/21 2258    Lab Status: Final result Specimen: Swab from Nasopharynx Updated: 09/04/21 2350    Narrative:      The following orders were created for panel order COVID PRE-OP / PRE-PROCEDURE SCREENING ORDER (NO ISOLATION) - Swab, Nasopharynx.  Procedure                               Abnormality         Status                     ---------                               -----------         ------                     COVID-19 and FLU A/B PCR...[[id number]]  Normal              Final result                 Please view results for these tests on the individual orders.    COVID-19 and FLU A/B PCR - Swab, Nasopharynx [[id number]]  (Normal) Collected: 09/04/21 2258    Lab Status: Final result Specimen: Swab from Nasopharynx Updated: 09/04/21 2350     COVID19 Not Detected     Influenza A PCR Not Detected      Influenza B PCR Not Detected    Narrative:      Fact sheet for providers: https://www.fda.gov/media/863739/download    Fact sheet for patients: https://www.fda.gov/media/522860/download    Test performed by PCR.          No radiology results from the last 24 hrs    Results for orders placed during the hospital encounter of 09/04/21    Adult Transthoracic Echo Complete W/ Cont if Necessary Per Protocol    Interpretation Summary  · Technically difficult study  · Left ventricular systolic function is normal. Estimated left ventricular EF = 65%.  · Left ventricular diastolic function was normal.  · The cardiac valves are anatomically and functionally normal.  · Estimated right ventricular systolic pressure from tricuspid regurgitation is normal (<35 mmHg).      I have reviewed the medications:  Scheduled Meds:amphetamine-dextroamphetamine, 30 mg, Oral, Daily  amphetamine-dextroamphetamine, 60 mg, Oral, Daily  aspirin, 81 mg, Oral, Daily  bumetanide, 2 mg, Oral, BID  cetirizine, 10 mg, Oral, Daily  gabapentin, 400 mg, Oral, BID  heparin (porcine), 5,000 Units, Subcutaneous, Q12H  insulin lispro, 0-9 Units, Subcutaneous, TID AC  lactulose, 45 mL, Oral, TID  levothyroxine, 75 mcg, Oral, Daily  montelukast, 10 mg, Oral, Nightly  nystatin, , Topical, Q12H  OXcarbazepine, 300 mg, Oral, BID  pharmacy consult - MTM, , Does not apply, Daily  pramipexole, 0.75 mg, Oral, BID  pravastatin, 40 mg, Oral, Daily  riFAXIMin, 550 mg, Oral, Q12H  sodium chloride, 10 mL, Intravenous, Q12H  spironolactone, 100 mg, Oral, Daily  venlafaxine XR, 150 mg, Oral, Nightly      Continuous Infusions:   PRN Meds:.•  acetaminophen  •  influenza vaccine  •  magnesium sulfate **OR** magnesium sulfate **OR** magnesium sulfate  •  melatonin  •  ondansetron  •  potassium chloride  •  potassium chloride  •  sodium chloride    Assessment/Plan   Assessment & Plan     Active Hospital Problems    Diagnosis  POA   • **Encephalopathy, hepatic (CMS/HCC) [K72.90]   Yes   • Elevated troponin [R77.8]  Yes   • Hyponatremia [E87.1]  Yes   • OMAR (acute kidney injury) (CMS/HCC) [N17.9]  Yes   • Bilateral pleural effusion [J90]  Yes   • Hepatic encephalopathy (CMS/HCC) [K72.90]  Yes   • Anemia [D64.9]  Yes   • Uncontrolled type 2 diabetes mellitus with hyperglycemia (CMS/HCC) [E11.65]  Yes   • Other hyperlipidemia [E78.49]  Yes   • ADD (attention deficit disorder) [F98.8]  Yes   • Lymphedema [I89.0]  Yes   • Severe JAIME on CPAP [G47.33, Z99.89]  Not Applicable   • Diabetes mellitus type 2, uncontrolled, with complications (CMS/HCC) [E11.8, E11.65]  Yes   • Depression [F32.9]  Yes   • Essential hypertension [I10]  Yes   • Restless legs syndrome (RLS) [G25.81]  Yes   • A-fib (CMS/HCC) [I48.91]  Yes   • Primary narcolepsy without cataplexy [G47.419]  Yes   • Chronic diastolic congestive heart failure (CMS/HCC) [I50.32]  Yes   • PVD (peripheral vascular disease) (CMS/HCC) [I73.9]  Yes   • Cirrhosis of liver (CMS/HCC) [K74.60]  Yes      Resolved Hospital Problems   No resolved problems to display.        Brief Hospital Course to date:  Abe Phillips Jr. is a 66 y.o. male  admitted for suspected acute on chronic diastolic heart failure exacerbation as well as hepatic encephalopathy.  He is also being treated for healthcare associated pneumonia.    Acute on chronic diastolic heart failure exacerbation  - continues to improves on volume  -Given plans for discharge tomorrow we will transition from IV to oral Bumex and DC Diuril    Bilateral pleural effusions  Bilateral edema  -Previously reviewed imaging, I don't feel this is consistent with PNA.  Abx stopped 9/7.    Hepatic encephalopathy  MCCAULEY cirrhosis  Ascites    -Continue lactulose, decrease if excessive diarrhea  -Rechecked ammonia recently and is 51 which is stable  -Status post EGD on 8/16/2021 with portal gastropathy and lower esophageal varices noted  - continue xifaxamin  - ordered paracentesis on 9/7, but not enough asictes to tap at  this time.  Continue to monitor clinically  -Mental status with slow trend towards improvement, continue on decreased dose of Neurontin (400mg bid now).  Also back on home adderal dose    ARF/CKD3   -Baseline creatinine 1.1-1.3  - stable around1.3 today despite diuresis, continue to follow during diuresis    Hyponatremia  - stable around 131-134    Diabetes mellitus type 2  - unable to operate pump, order removed  -Hemoglobin A1c was 6.60 on 8/13/2021  -Better control, no changes to insulin today    Obstructive sleep apnea    Generalized weakness/debility/failure to thrive  - continue PT/OT    Gave update to daughterAnisa (neuro APRN) via txt.  Plan to rehab on tomorrow    DVT prophylaxis:  Medical DVT prophylaxis orders are present.       AM-PAC 6 Clicks Score (PT): 8 (09/12/21 5029)    Disposition: I expect the patient to be discharged to rehab when medically appropriate, likely Monday.      CODE STATUS:   Code Status and Medical Interventions:   Ordered at: 09/05/21 0324     Level Of Support Discussed With:    Patient     Code Status:    CPR     Medical Interventions (Level of Support Prior to Arrest):    Full       Song Bryant MD  09/12/21

## 2021-09-12 NOTE — THERAPY WOUND CARE TREATMENT
Acute Care - Wound/Debridement Treatment Note  [facility]     Patient Name: Abe Phillips Jr.  : 1955  MRN: [id number]  Today's Date: 2021                Admit Date: 2021    Visit Dx:    ICD-10-CM ICD-9-CM   1. Hepatic encephalopathy (CMS/HCC)  K72.90 572.2   2. Acute pulmonary edema (CMS/HCC)  J81.0 518.4   3. Pleural effusion  J90 511.9   4. Acute kidney injury (CMS/HCC)  N17.9 584.9   5. History of diabetes mellitus  Z86.39 V12.29       Patient Active Problem List   Diagnosis   • Type 2 diabetes mellitus with hyperglycemia (CMS/HCC)   • Thrombocytopenia (CMS/HCC)   • PVD (peripheral vascular disease) (CMS/HCC)   • A-fib (CMS/HCC)   • Cirrhosis of liver (CMS/HCC)   • Chronic diastolic congestive heart failure (CMS/HCC)   • Diabetic ulcer of right heel (CMS/HCC)   • Primary narcolepsy without cataplexy   • Essential hypertension   • Morbidly obese (CMS/HCC)   • Restless legs syndrome (RLS)   • ADD (attention deficit disorder)   • Depression   • Diabetes mellitus type 2, uncontrolled, with complications (CMS/HCC)   • Edema   • MCCAULEY (nonalcoholic steatohepatitis)   • Hepatitis B   • Insomnia   • Lymphedema   • Obesity   • Severe JAIME on CPAP   • RLS (restless legs syndrome)   • Tremor of both hands   • Other hyperlipidemia   • Uncontrolled type 2 diabetes mellitus with hyperglycemia (CMS/HCC)   • Dyslipidemia   • Hyperglycemia   • Diabetic ulcer of right heel associated with type 2 diabetes mellitus (CMS/HCC)   • Facial paralysis/[place] palsy   • Diabetic hypoglycemia (CMS/HCC)   • Hypogonadism in male   • Bilateral leg weakness   • Anemia   • Elevated troponin   • Hyponatremia   • OMAR (acute kidney injury) (CMS/HCC)   • Bilateral pleural effusion   • Encephalopathy, hepatic (CMS/HCC)   • Hepatic encephalopathy (CMS/HCC)        Past Medical History:   Diagnosis Date   • A-fib (CMS/HCC)    • ADD (attention deficit disorder)    • Atrial flutter (CMS/HCC)    • Bell's palsy    • Cellulitis    • CHF  (congestive heart failure) (CMS/HCC)    • Cirrhosis of liver (CMS/HCC)    • Constipation    • Depression    • Diabetes mellitus (CMS/HCC)    • Diabetic ulcer of right foot (CMS/HCC)    • Disease of thyroid gland    • Edema    • Fatty liver    • Hepatitis B    • Hyperlipidemia    • Hypokalemia    • Insomnia    • Lymphedema    • Narcolepsy    • Neuropathy    • Obesity    • JAIME on CPAP    • PVD (peripheral vascular disease) (CMS/HCC)    • RLS (restless legs syndrome)    • Skin cancer    • Tardive dyskinesia    • Tremor of both hands    • Type 2 diabetes mellitus (CMS/HCC)    • Yeast infection         Past Surgical History:   Procedure Laterality Date   • ACHILLES TENDON REPAIR     • CARDIAC ABLATION     • CHOLECYSTECTOMY     • ENDOSCOPY N/A 8/16/2021    Procedure: ESOPHAGOGASTRODUODENOSCOPY;  Surgeon: Brunner, Mark I, MD;  Location: [facility] ENDOSCOPY;  Service: Gastroenterology;  Laterality: N/A;   • LASER ABLATION      VEIN RLE           Wound 12/27/19 2334 Right heel Diabetic Ulcer (Active)   Dressing Appearance dry;intact;no drainage 09/12/21 1040   Closure RYLAND 09/12/21 0855   Base closed/resurfaced;pink 09/12/21 1040   Periwound intact;dry 09/12/21 1040   Periwound Temperature cool 09/12/21 1040   Periwound Skin Turgor firm 09/12/21 1040   Edges callused;rolled/closed 09/12/21 1040   Drainage Amount none 09/12/21 1040   Care, Wound cleansed with;wound cleanser;debrided 09/12/21 1040   Dressing Care dressing applied;silver impregnated;low-adherent;foam;multi-layer wrap 09/12/21 1040   Periwound Care cleansed with pH balanced cleanser;dry periwound area maintained 09/12/21 1040       Wound 09/07/21 Right lower arm Skin Tear (Active)   Dressing Appearance no drainage;dry;intact 09/12/21 0600   Closure RYLAND 09/12/21 0855   Base dressing in place, unable to visualize 09/12/21 0855   Periwound redness;other (see comments) 09/12/21 0855   Periwound Temperature warm 09/12/21 0855   Periwound Skin Turgor soft 09/12/21 0855    Dressing Care foam;low-adherent 09/12/21 0855         WOUND DEBRIDEMENT  Total area of Debridement: 2 cm2  Debridement Site 1  Location- Site 1: R heel  Selective Debridement- Site 1: Wound Surface <20cmsq  Instruments- Site 1: #15, scapel  Excised Tissue Description- Site 1: moderate, other (comment) (callus)  Bleeding- Site 1: none                  PT Assessment (last 12 hours)      PT Evaluation and Treatment     Row Name 09/12/21 1040          Physical Therapy Time and Intention    Subjective Information  complains of;swelling  -     Document Type  wound care;therapy note (daily note)  -     Mode of Treatment  physical therapy;individual therapy  -     Row Name 09/12/21 1040          General Information    Patient Observations  alert;cooperative;agree to therapy  -     Row Name 09/12/21 1040          Cognition    Orientation Status (Cognition)  oriented x 4  -     Row Name 09/12/21 1040          Pain Scale: Numbers Pre/Post-Treatment    Pretreatment Pain Rating  0/10 - no pain  -     Posttreatment Pain Rating  0/10 - no pain  -     Row Name 09/12/21 1040          Wound 12/27/19 2334 Right heel Diabetic Ulcer    Wound - Properties Group Placement Date: 12/27/19  -AYLIN Placement Time: 2334  -AYLIN Present on Hospital Admission: Y  -AYLIN Side: Right  -AYLIN Location: heel  -AYLIN Primary Wound Type: Diabetic ulc  -    Dressing Appearance  dry;intact;no drainage  -     Base  closed/resurfaced;pink  -     Periwound  intact;dry  -     Periwound Temperature  cool  -     Periwound Skin Turgor  firm  -     Edges  callused;rolled/closed  -     Drainage Amount  none  -     Care, Wound  cleansed with;wound cleanser;debrided  -     Dressing Care  dressing applied;silver impregnated;low-adherent;foam;multi-layer wrap optifoam Ag, primafix tape, MLW  -     Periwound Care  cleansed with pH balanced cleanser;dry periwound area maintained  -     Retired Wound - Properties Group Date first assessed:  12/27/19  -AYLIN Time first assessed: 2334  -AYLIN Present on Hospital Admission: Y  -AYLIN Side: Right  -AYLIN Location: heel  -AYLIN Primary Wound Type: Diabetic ulc  -AYLIN    Row Name             Wound 09/07/21 Right lower arm Skin Tear    Wound - Properties Group Placement Date: 09/07/21  -CG Present on Hospital Admission: Y  -CG Side: Right  -CG Orientation: lower  -CG Location: arm  -CG Primary Wound Type: Skin tear  -CG    Retired Wound - Properties Group Date first assessed: 09/07/21  -CG Present on Hospital Admission: Y  -CG Side: Right  -CG Location: arm  -CG Primary Wound Type: Skin tear  -CG    Row Name 09/12/21 1040          Coping    Observed Emotional State  calm;cooperative;pleasant  -     Verbalized Emotional State  acceptance  -     Row Name 09/12/21 1040          Plan of Care Review    Plan of Care Reviewed With  patient  -MC     Progress  improving  -     Outcome Summary  R heel wound closed, with some callus development that required debridement today. BLE edema appears improved with minimal swelling well-controlled with MLW. Anticipate next change in 2-3 days.  -     Row Name 09/12/21 1040          Positioning and Restraints    Pre-Treatment Position  in bed  -     Post Treatment Position  bed  -MC     In Bed  supine;call light within reach;encouraged to call for assist  -       User Key  (r) = Recorded By, (t) = Taken By, (c) = Cosigned By    Initials Name Provider Type    Nickie Monreal, PT Physical Therapist    Lewis Cadena, RN Registered Nurse    Saad Henderson, RN Registered Nurse        Physical Therapy Education                 Title: PT OT SLP Therapies (In Progress)     Topic: Physical Therapy (In Progress)     Point: Mobility training (In Progress)     Learning Progress Summary           Patient Acceptance, E,D, NR by CT at 9/11/2021 1533    Acceptance, E, VU,NR by CD at 9/8/2021 0946    Comment: SEE FLOWSHEET.    Acceptance, E,TB, VU by KG at 9/5/2021 1312                    Point: Home exercise program (In Progress)     Learning Progress Summary           Patient Acceptance, E,D, NR by CT at 9/11/2021 1533    Acceptance, E, VU,NR by CD at 9/8/2021 0946    Comment: SEE FLOWSHEET.    Acceptance, E,TB, VU by KG at 9/5/2021 1312                   Point: Body mechanics (In Progress)     Learning Progress Summary           Patient Acceptance, E,D, NR by CT at 9/11/2021 1533    Acceptance, E, VU,NR by CD at 9/8/2021 0946    Comment: SEE FLOWSHEET.    Acceptance, E,TB, VU by KG at 9/5/2021 1312                   Point: Precautions (In Progress)     Learning Progress Summary           Patient Acceptance, E,D, NR by CT at 9/11/2021 1533    Acceptance, E, VU,NR by CD at 9/8/2021 0946    Comment: SEE FLOWSHEET.    Acceptance, E,TB, VU by KG at 9/5/2021 1312                               User Key     Initials Effective Dates Name Provider Type Discipline    CD 06/16/21 -  Dominique Woods, PT Physical Therapist PT    CT 06/16/21 -  Zeke Rome, PT Physical Therapist PT    KG 06/16/21 -  Ivon Franklin Physical Therapist PT                Recommendation and Plan  Anticipated Discharge Disposition (PT): inpatient rehabilitation facility  Planned Therapy Interventions (PT): wound care  Therapy Frequency (PT): daily  Plan of Care Reviewed With: patient   Progress: improving       Progress: improving  Outcome Summary: R heel wound closed, with some callus development that required debridement today. BLE edema appears improved with minimal swelling well-controlled with MLW. Anticipate next change in 2-3 days.  Plan of Care Reviewed With: patient            Time Calculation  PT Charges     Row Name 09/12/21 1040             Time Calculation    Start Time  1040  -MC      PT Goal Re-Cert Due Date  09/16/21  -         Untimed Charges    52999-Zleptqpuln comp below knee  15  -MC      70282-Qdexosqmz debridement  5  -MC         Total Minutes    Untimed Charges Total Minutes  20  -MC        Total Minutes  20  -MC        User Key  (r) = Recorded By, (t) = Taken By, (c) = Cosigned By    Initials Name Provider Type    Nickie Monreal, PT Physical Therapist            Therapy Charges for Today     Code Description Service Date Service Provider Modifiers Qty    99219434202 HC PT MULTI LAYER COMP SYS BELOW KNEE 9/12/2021 Nickie Beverly, PT GP 1    95026390600 HC AL DEBRIDE OPEN WOUND UP TO 20CM 9/12/2021 Nickie Beverly, PT GP 1            PT G-Codes  Outcome Measure Options: AM-PAC 6 Clicks Daily Activity (OT)  AM-PAC 6 Clicks Score (PT): 8  AM-PAC 6 Clicks Score (OT): 13       Nickie Beverly PT  9/12/2021

## 2021-09-12 NOTE — PLAN OF CARE
Goal Outcome Evaluation:  Plan of Care Reviewed With: patient        Progress: improving  Outcome Summary: R heel wound closed, with some callus development that required debridement today. BLE edema appears improved with minimal swelling well-controlled with MLW. Anticipate next change in 2-3 days.

## 2021-09-13 PROBLEM — N17.9 AKI (ACUTE KIDNEY INJURY) (HCC): Status: RESOLVED | Noted: 2021-01-01 | Resolved: 2021-01-01

## 2021-09-13 PROBLEM — K76.82 ENCEPHALOPATHY, HEPATIC (HCC): Status: RESOLVED | Noted: 2021-01-01 | Resolved: 2021-01-01

## 2021-09-13 PROBLEM — R79.89 ELEVATED TROPONIN: Status: RESOLVED | Noted: 2021-01-01 | Resolved: 2021-01-01

## 2021-09-13 PROBLEM — K76.82 HEPATIC ENCEPHALOPATHY (HCC): Status: RESOLVED | Noted: 2021-01-01 | Resolved: 2021-01-01

## 2021-09-13 PROBLEM — R77.8 ELEVATED TROPONIN: Status: RESOLVED | Noted: 2021-01-01 | Resolved: 2021-01-01

## 2021-09-13 NOTE — DISCHARGE SUMMARY
[facility] Medicine Services  TRANSFER SUMMARY    Patient Name: Abe Phillips Jr.  : 1955  MRN: [id number]    Date of Admission: 2021  Date of Discharge: 21  Length of Stay: 8  Primary Care Physician: Anibal Maria MD    Consults     Date and Time Order Name Status Description    2021 11:01 AM Inpatient Cardiology Consult Completed     2021  2:24 PM Inpatient Gastroenterology Consult Completed     2021  1:26 PM Inpatient Neurology Consult General Completed           Hospital Course     Presenting Problem:   Hepatic encephalopathy (CMS/HCC) [K72.90]    Active Hospital Problems    Diagnosis  POA   • Hyponatremia [E87.1]  Yes   • Bilateral pleural effusion [J90]  Yes   • Anemia [D64.9]  Yes   • Uncontrolled type 2 diabetes mellitus with hyperglycemia (CMS/HCC) [E11.65]  Yes   • Other hyperlipidemia [E78.49]  Yes   • ADD (attention deficit disorder) [F98.8]  Yes   • Lymphedema [I89.0]  Yes   • Severe JAIME on CPAP [G47.33, Z99.89]  Not Applicable   • Diabetes mellitus type 2, uncontrolled, with complications (CMS/HCC) [E11.8, E11.65]  Yes   • Depression [F32.9]  Yes   • Essential hypertension [I10]  Yes   • Restless legs syndrome (RLS) [G25.81]  Yes   • A-fib (CMS/HCC) [I48.91]  Yes   • Primary narcolepsy without cataplexy [G47.419]  Yes   • Chronic diastolic congestive heart failure (CMS/HCC) [I50.32]  Yes   • PVD (peripheral vascular disease) (CMS/HCC) [I73.9]  Yes   • Cirrhosis of liver (CMS/HCC) [K74.60]  Yes      Resolved Hospital Problems    Diagnosis Date Resolved POA   • **Encephalopathy, hepatic (CMS/HCC) [K72.90] 2021 Yes   • Elevated troponin [R77.8] 2021 Yes   • OMAR (acute kidney injury) (CMS/HCC) [N17.9] 2021 Yes   • Hepatic encephalopathy (CMS/HCC) [K72.90] 2021 Yes        Hospital Course:  Abe Phillips Jr. is a 66 y.o. male  admitted for suspected acute on chronic diastolic heart failure exacerbation as well as hepatic  encephalopathy.       Acute on chronic diastolic heart failure exacerbation  -continues to improves on volume  --continue bumex, aldactone and prn metolazone     Bilateral pleural effusions  Bilateral edema  -Previously reviewed imaging, not felt to be consistent with PNA.  Abx stopped 9/7.     Hepatic encephalopathy  MCCAULEY cirrhosis  Ascites    -Continue lactulose, decrease if excessive diarrhea  --most recent ammonia recheck stable at 51   -Status post EGD on 8/16/2021 with portal gastropathy and lower esophageal varices noted  --continue xifaxamin and lactulose  --ordered paracentesis on 9/7, but not enough asictes to tap at this time.  Continue to monitor clinically  --Mental status with slow trend towards improvement, continue on decreased dose of Neurontin (400mg bid now).  Also back on home adderal dose which he supplies     ARF/CKD3   -Baseline creatinine 1.1-1.3  -remains stable around1.3 despite diuresis, continue to follow      Hyponatremia  -stable around 131-134  --continue 2L fluid restriction     Diabetes mellitus type 2  --previously on insulin pump but unable to operate pump, so dc'd  -Hemoglobin A1c was 6.60 on 8/13/2021  --continue meal time SSI  Component      Latest Ref Rng & Units 9/12/2021 9/12/2021 9/13/2021 9/13/2021           5:29 PM  8:12 PM  7:23 AM  9:02 AM   Glucose      65 - 99 mg/dL 159 (H) 232 (H) 175 (H) 147 (H)        Obstructive sleep apnea   --continue cpap    Generalized weakness/debility/failure to thrive  - continue PT/OT        Discharge Follow Up Recommendations for outpatient labs/diagnostics:  PCP after dc from rehab  Dr. Orozco 6 weeks  CHF clinic 2 weeks    Day of Discharge     HPI:   No issues overnight, denies complaints  States he slept well    Review of Systems  Gen- No fevers, chills  CV- No chest pain, palpitations  Resp- Occ cough, nodyspnea  GI- No N/V/D, abd pain      Vital Signs:   Temp:  [97.6 °F (36.4 °C)-97.9 °F (36.6 °C)] 97.8 °F (36.6 °C)  Heart Rate:   [77-83] 79  Resp:  [18] 18  BP: (113-133)/(69-90) 133/90     Physical Exam:  Constitutional: No acute distress, sleeping but awakens easily  HENT: NCAT, mucous membranes moist  Respiratory: Decreased in bases, no rhonchi noted, respiratory effort normal.  Sats stable on RA  Cardiovascular: RRR, no murmurs, rubs, or gallops  Gastrointestinal: Positive bowel sounds, soft, nontender, nondistended, obese  Musculoskeletal: 1+ bilateral ankle edema, BLE wrapped  Psychiatric: Appropriate affect, cooperative  Neurologic: Oriented x 3, GUERRA, speech slow but clear  Skin: No rashes noted      Pertinent Results     Results from last 7 days   Lab Units 09/13/21  0902 09/12/21  0852 09/11/21  0451 09/10/21  0644 09/09/21  1012 09/08/21  0914 09/07/21  2157 09/07/21  1004 09/07/21  1004   SODIUM mmol/L 131* 133* 133* 132* 132* 132*  --   --  131*   POTASSIUM mmol/L 3.6 3.6 3.2* 3.4* 4.0 3.9 3.7   < > 3.4*   CHLORIDE mmol/L 91* 93* 93* 93* 93* 94*  --   --  92*   CO2 mmol/L 27.0 25.0 26.0 25.0 22.0 23.0  --   --  24.0   BUN mg/dL 48* 49* 47* 51* 54* 51*  --   --  52*   CREATININE mg/dL 1.34* 1.32* 1.38* 1.37* 1.53* 1.60*  --   --  1.71*   GLUCOSE mg/dL 147* 153* 133* 150* 96 67  --   --  114*   CALCIUM mg/dL 9.6 9.7 9.6 9.5 9.5 9.5  --   --  9.4    < > = values in this interval not displayed.         Brief Urine Lab Results  (Last result in the past 365 days)      Color   Clarity   Blood   Leuk Est   Nitrite   Protein   CREAT   Urine HCG        08/13/21 0718 Yellow Clear Negative Negative Negative Negative               Microbiology Results Abnormal     Procedure Component Value - Date/Time    COVID PRE-OP / PRE-PROCEDURE SCREENING ORDER (NO ISOLATION) - Swab, Nasopharynx [[id number]]  (Normal) Collected: 09/12/21 1130    Lab Status: Final result Specimen: Swab from Nasopharynx Updated: 09/12/21 1312    Narrative:      The following orders were created for panel order COVID PRE-OP / PRE-PROCEDURE SCREENING ORDER (NO ISOLATION) -  Swab, Nasopharynx.  Procedure                               Abnormality         Status                     ---------                               -----------         ------                     COVID-19,CEPHEID/NARENDRA,LE...[[id number]]  Normal              Final result                 Please view results for these tests on the individual orders.    COVID-19,CEPHEID/NARENDRA,[facility] IN-HOUSE(OR EMERGENT/ADD-ON),NP SWAB IN TRANSPORT MEDIA 3-4 HR TAT - Swab, Nasopharynx [[id number]]  (Normal) Collected: 09/12/21 1130    Lab Status: Final result Specimen: Swab from Nasopharynx Updated: 09/12/21 1312     COVID19 Not Detected    Narrative:      Fact sheet for providers: https://www.fda.gov/media/776083/download     Fact sheet for patients: https://www.fda.gov/media/842249/download  Fact sheet for providers: https://www.fda.gov/media/580579/download     Fact sheet for patients: https://www.fda.gov/media/705663/download    COVID PRE-OP / PRE-PROCEDURE SCREENING ORDER (NO ISOLATION) - Swab, Nasopharynx [[id number]]  (Normal) Collected: 09/09/21 1620    Lab Status: Final result Specimen: Swab from Nasopharynx Updated: 09/09/21 1655    Narrative:      The following orders were created for panel order COVID PRE-OP / PRE-PROCEDURE SCREENING ORDER (NO ISOLATION) - Swab, Nasopharynx.  Procedure                               Abnormality         Status                     ---------                               -----------         ------                     COVID-19, [facility] IN-HOUS...[[id number]]  Normal              Final result                 Please view results for these tests on the individual orders.    COVID-19, [facility]BBOTT IN-HOUSE,NASAL Swab (NO TRANSPORT MEDIA) 2 HR TAT - Swab, Nasopharynx [[id number]]  (Normal) Collected: 09/09/21 1620    Lab Status: Final result Specimen: Swab from Nasopharynx Updated: 09/09/21 1655     COVID19 Presumptive Negative    Narrative:      Fact sheet for providers: https://www.fda.gov/media/273726/download      Fact sheet for patients: https://www.fda.gov/media/016636/download    Test performed by PCR.  Fact sheet for providers: https://www.fda.gov/media/368064/download     Fact sheet for patients: https://www.fda.gov/media/539065/download    Test performed by PCR.  If inconsistent with clinical signs and symptoms patient should be tested with different authorized molecular test.    COVID PRE-OP / PRE-PROCEDURE SCREENING ORDER (NO ISOLATION) - Swab, Nasopharynx [[id number]]  (Normal) Collected: 09/04/21 2258    Lab Status: Final result Specimen: Swab from Nasopharynx Updated: 09/04/21 2350    Narrative:      The following orders were created for panel order COVID PRE-OP / PRE-PROCEDURE SCREENING ORDER (NO ISOLATION) - Swab, Nasopharynx.  Procedure                               Abnormality         Status                     ---------                               -----------         ------                     COVID-19 and FLU A/B PCR...[[id number]]  Normal              Final result                 Please view results for these tests on the individual orders.    COVID-19 and FLU A/B PCR - Swab, Nasopharynx [[id number]]  (Normal) Collected: 09/04/21 2258    Lab Status: Final result Specimen: Swab from Nasopharynx Updated: 09/04/21 2350     COVID19 Not Detected     Influenza A PCR Not Detected     Influenza B PCR Not Detected    Narrative:      Fact sheet for providers: https://www.fda.gov/media/644531/download    Fact sheet for patients: https://www.fda.gov/media/843860/download    Test performed by PCR.          Imaging Results (All)     Procedure Component Value Units Date/Time    XR Chest PA & Lateral [[id number]] Collected: 09/09/21 0809     Updated: 09/09/21 0813    Narrative:      EXAMINATION: XR CHEST PA AND LATERAL-      INDICATION: sob; K72.90-Hepatic failure, unspecified without coma;  J81.0-Acute pulmonary edema; U63-Qgyepoy effusion, not elsewhere  classified; N17.9-Acute kidney failure, unspecified;  Z86.39-Personal  history of other endocrine, nutritional and metabolic disease      COMPARISON: 9/4/2021     FINDINGS: Unchanged mild cardiac enlargement and bilateral interstitial  opacities. Suspect small left and trace right pleural effusions. No new  focal lobar consolidation. No distinct pneumothorax.       Impression:      Unchanged appearance suggesting cardiogenic pulmonary edema.     This report was finalized on 9/9/2021 8:09 AM by Dhruv Jackson.       US Abdomen Limited [[id number]] Collected: 09/07/21 1609     Updated: 09/07/21 1613    Narrative:      Abdominal Ultrasound limited.     Clinical Diagnosis: Ascites     Comparison: None     Technique: Multiple transaxial and longitudinal images were obtained  through the abdomen with real time ultrasonography. A low-frequency  curvilinear transducer was utilized.  Multiple images were submitted for  interpretation.     Report:  Please see impression.       Impression:      Impression:  Sonographic evaluation different quadrants of the abdomen revealed no  ascites. Paracentesis was therefore not performed.     This report was finalized on 9/7/2021 4:10 PM by Haider Jenkins MD.       CT Angiogram Chest [[id number]] Collected: 09/05/21 0107     Updated: 09/05/21 0109    Narrative:      CTA Chest    INDICATION:   Shortness of air with elevated d-dimer.    TECHNIQUE:   CT angiogram of the chest with IV contrast. 3-D reconstructions were obtained and reviewed.   Radiation dose reduction techniques included automated exposure control or exposure modulation based on body size. Count of known CT and cardiac nuc med studies  performed in previous 12 months: 1.     COMPARISON:   1/5/2019    FINDINGS:   No pulmonary embolism or aortic dissection is seen. Again seen are pericardial calcifications which may be the result of prior pericarditis. This may be causing some constriction. There is no pericardial effusion. There are moderate bilateral pleural  effusions. There is  no adenopathy. Bilateral gynecomastia is present. Upper abdominal images show a cirrhotic appearance of the liver with a trace amount of ascites. There is anasarca. Gallbladder is surgically absent.    Lung windows demonstrate compressive atelectasis in the lower lobes associated with the pleural effusions. Minimal groundglass opacities are present in the upper lobes which may reflect mild pneumonitis or possibly mild edema. Imaging features are  atypical or uncommonly reported for COVID-19 pneumonia. Alternative diagnoses should be considered.      Impression:        1. No PE or aortic dissection.  2. Chronic pericardial calcifications may be causing some constriction.  3. Moderate bilateral pleural effusions with bilateral lower lobe atelectasis.  4. Minimal groundglass opacities in the upper lobes may reflect mild pneumonitis or possibly edema.  5. Cirrhosis and ascites in the upper abdomen with anasarca.    Signer Name: Mathew Mcdaniel MD   Signed: 9/5/2021 1:07 AM   Workstation Name: [email or web address]    [facility]    XR Chest 1 View [[id number]] Collected: 09/04/21 2327     Updated: 09/04/21 2330    Narrative:      CR Chest 1 Vw    INDICATION:   Shortness of breath    COMPARISON:    8/12/2021    FINDINGS:  Portable AP view(s) of the chest.  The heart and mediastinal contours are normal. The lungs are clear. No pneumothorax or pleural effusion.      Impression:      No definite acute cardiopulmonary findings.    Signer Name: Jovana Levy MD   Signed: 9/4/2021 11:27 PM   Workstation Name: [id number]    [facility]          Results for orders placed during the hospital encounter of 09/04/21    Adult Transthoracic Echo Complete W/ Cont if Necessary Per Protocol    Interpretation Summary  · Technically difficult study  · Left ventricular systolic function is normal. Estimated left ventricular EF = 65%.  · Left ventricular diastolic function was normal.  · The cardiac valves  are anatomically and functionally normal.  · Estimated right ventricular systolic pressure from tricuspid regurgitation is normal (<35 mmHg).          Discharge Details        Discharge Medications      New Medications      Instructions Start Date   acetaminophen 325 MG tablet  Commonly known as: TYLENOL   650 mg, Oral, Every 4 Hours PRN      insulin lispro 100 UNIT/ML injection  Commonly known as: humaLOG   0-9 Units, Subcutaneous, 3 Times Daily Before Meals      melatonin 5 MG tablet tablet   5 mg, Oral, Nightly PRN         Changes to Medications      Instructions Start Date   gabapentin 400 MG capsule  Commonly known as: NEURONTIN  What changed:   · medication strength  · how much to take   400 mg, Oral, 2 Times Daily      metOLazone 5 MG tablet  Commonly known as: ZAROXOLYN  What changed:   · how much to take  · reasons to take this   2.5 mg, Oral, Daily PRN         Continue These Medications      Instructions Start Date   amphetamine-dextroamphetamine 15 MG tablet  Commonly known as: ADDERALL   30 mg, Oral, Daily      amphetamine-dextroamphetamine 30 MG tablet  Commonly known as: ADDERALL   60 mg, Oral, Every Morning      aspirin 81 MG EC tablet   TAKE 1 TABLET BY MOUTH EVERY DAY      bumetanide 2 MG tablet  Commonly known as: BUMEX   4 mg, Oral, Daily      lactulose 10 GM/15ML solution  Commonly known as: CHRONULAC   45 mL, Oral, 3 Times Daily      levothyroxine 75 MCG tablet  Commonly known as: SYNTHROID, LEVOTHROID   TAKE ONE TABLET BY MOUTH DAILY      loratadine 10 MG tablet  Commonly known as: CLARITIN   10 mg, Oral, Daily, Patient instructed to d/c Xyzal while taking      montelukast 10 MG tablet  Commonly known as: Singulair   10 mg, Oral, Nightly      nystatin [id number] UNIT/GM ointment  Commonly known as: MYCOSTATIN   1 application, Topical, As Needed      OXcarbazepine 300 MG tablet  Commonly known as: TRILEPTAL   300 mg, Oral, 2 Times Daily      potassium chloride 10 MEQ CR tablet   10 mEq, Oral, 2  Times Daily      pramipexole 0.75 MG tablet  Commonly known as: MIRAPEX   0.75 mg, Oral, 2 Times Daily      pravastatin 40 MG tablet  Commonly known as: PRAVACHOL   40 mg, Oral, Every Night at Bedtime      riFAXIMin 550 MG tablet  Commonly known as: XIFAXAN   550 mg, Oral, Every 12 Hours Scheduled      spironolactone 100 MG tablet  Commonly known as: ALDACTONE   100 mg, Oral, Daily      venlafaxine  MG 24 hr capsule  Commonly known as: EFFEXOR-XR   150 mg, Oral, Nightly         Stop These Medications    erythromycin 5 MG/GM ophthalmic ointment  Commonly known as: ROMYCIN     levocetirizine 5 MG tablet  Commonly known as: XYZAL     ofloxacin 0.3 % ophthalmic solution  Commonly known as: OCUFLOX     olopatadine 0.1 % ophthalmic solution  Commonly known as: PATANOL     tobramycin-dexamethasone 0.3-0.1 % ophthalmic suspension  Commonly known as: TOBRADEX     traZODone 50 MG tablet  Commonly known as: DESYREL     triamcinolone 0.1 % cream  Commonly known as: KENALOG            No Known Allergies      Discharge Disposition:  Skilled Nursing Facility (DC - External)    Discharge Diet:  Diet Order   Procedures   • Diet Regular; Cardiac, Consistent Carbohydrate  2L daily fluid restriction       Discharge Activity:  Activity Instructions     Activity as Tolerated              CODE STATUS:    Code Status and Medical Interventions:   Ordered at: 09/05/21 0324     Level Of Support Discussed With:    Patient     Code Status:    CPR     Medical Interventions (Level of Support Prior to Arrest):    Full         Future Appointments   Date Time Provider Department Center   9/29/2021  2:45 PM Ld Orozco MD [facility]   2/4/2022  1:30 PM Sushil Ahumada MD [facility]   2/7/2022  2:30 PM Delta Miller MD [facility]       Additional Instructions for the Follow-ups that You Need to Schedule     Discharge Follow-up with PCP   As directed       Currently Documented PCP:    Anibal Maria MD    PCP Phone  Number:    [phone]     Follow Up Details: after dc from rehab         Discharge Follow-up with Specified Provider: CHF clinic; 2 Weeks   As directed      To: CHF clinic    Follow Up: 2 Weeks         Discharge Follow-up with Specified Provider: Dr. Orozco; 6 Weeks   As directed      To: Dr. Orozco    Follow Up: 6 Weeks                 Time Spent on Discharge: I spent 40 minutes on this discharge activity which included: face-to-face encounter with the patient, reviewing the data in the system, coordination of the care with the nursing staff as well as consultants, documentation, and entering orders.

## 2021-09-13 NOTE — CASE MANAGEMENT/SOCIAL WORK
Case Management Discharge Note          Final Note:    Mr. Phillips is medically ready for discharge today. I spoke to Sarai in admissions at [facility] who confirms they can accept Mr. Alan Smith today for intermediate care. [facility] ambulance will transport him there today at 1215.    Nursing to call report: [phone].     Case management to fax discharge summary to [phone].     PCS is on the chart.      Selected Continued Care - Admitted Since 9/4/2021     Destination Coordination complete.    Service Provider Selected Services Address Phone Fax Patient Preferred    [facility] [address] [facility] [address] [phone] --                                            Selected Continued Care - Prior Encounters Includes selections from prior encounters from 6/6/2021 to 9/13/2021    Discharged on 8/18/2021 Admission date: 8/12/2021 - Discharge disposition: Home or Self Care    Destination     Service Provider Selected Services Address Phone Fax Patient Preferred    [facility]  Inpatient Rehabilitation [address] [facility] [id number] [phone] --                    Transportation Services  Ambulance: [facility]    Final Discharge Disposition Code: 04 - intermediate care facility

## 2021-09-13 NOTE — PROGRESS NOTES
Abe Phillips Jr.  [id number]  1955   LOS: 8 days   Patient Care Team:  Anibal Maria MD as PCP - General (Internal Medicine)  Dustin Hendricks MD as Consulting Physician (Endocrinology)  Ld Orozco MD as Cardiologist (Cardiology)    Chief Complaint: Follow-up on DM / CKD / MCCAULEY & CIRRHOSIS / AFIB / HFpEF / DEBILITY / PERICARDIAL CA++ / HTN    Subjective      The patient denies any shortness of breath, chest pain, palpitations, or sputum production.  He has a cough sometimes.  He is going to rehab for 20 days.  He was able to walk from his chair to the bed yesterday.    Objective     Vital Sign Min/Max for last 24 hours  Temp  Min: 97.6 °F (36.4 °C)  Max: 97.9 °F (36.6 °C)   BP  Min: 113/74  Max: 131/83   Pulse  Min: 77  Max: 83   Resp  Min: 16  Max: 18   SpO2  Min: 93 %  Max: 96 %               09/06/21  0500 09/07/21  0555   Weight: (!) 165 kg (362 lb 14.4 oz) (!) 163 kg (359 lb 8 oz)         Intake/Output Summary (Last 24 hours) at 9/13/2021 0730  Last data filed at 9/13/2021 0500  Gross per 24 hour   Intake 600 ml   Output 1400 ml   Net -800 ml       Physical Exam:     General Appearance:    Alert, cooperative, in no acute distress   Lungs:    Faint scattered crackles to auscultation,respirations regular, even and unlabored    Heart:    Regular and normal rate with occasional PVC, normal S1 and S2, grade 1/6 murmur, no gallop, no rub, no click   Abdomen:  Extremities:   Soft, non-tender, bowel sounds audible x4    1+ edema, normal range of motion, legs wrapped   Pulses:   Pulses palpable and equal bilaterally      Results Review:   Results from last 7 days   Lab Units 09/12/21  0852 09/11/21  0451 09/11/21  0451 09/10/21  0644 09/10/21  0644   SODIUM mmol/L 133*  --  133*  --  132*   POTASSIUM mmol/L 3.6  --  3.2*  --  3.4*   CHLORIDE mmol/L 93*  --  93*  --  93*   CO2 mmol/L 25.0  --  26.0  --  25.0   BUN mg/dL 49*  --  47*  --  51*   CREATININE mg/dL 1.32*  --  1.38*  --  1.37*   GLUCOSE  mg/dL 153*   < > 133*   < > 150*   CALCIUM mg/dL 9.7  --  9.6  --  9.5    < > = values in this interval not displayed.     Results from last 7 days   Lab Units 09/06/21  0735   WBC 10*3/mm3 5.68   HEMOGLOBIN g/dL 9.9*   HEMATOCRIT % 31.4*   PLATELETS 10*3/mm3 122*     · No new chest x-ray or ECG or laboratory results to review.    · ACCU - CHEKS: [phone] mg/DL.    Medication Review: REVIEWED; NO DRIPS.    Assessment/Plan      Patient with hepatic encephalopathy, currently not a candidate for right and left heart catheterization.  No new labs to review this morning.  Would continue current cardiac medications.  Patient hopefully to rehab today.  Would recommend the following cardiac medications at discharge:    · Aspirin 81 mg daily  · Bumex 2 mg twice daily  · Pravastatin 40 mg daily  · Spironolactone 100 mg daily  · Pravastatin 40 mg at bedtime daily  · Metolazone 2.5 mg daily as needed for increased edema/shortness of breath  · Follow-up with Dr. Orozco/ELMA Molina in 6 weeks      Encephalopathy, hepatic (CMS/HCC)    PVD (peripheral vascular disease) (CMS/HCC)    A-fib (CMS/HCC)    Cirrhosis of liver (CMS/HCC)    Chronic diastolic congestive heart failure (CMS/HCC)    Primary narcolepsy without cataplexy    Essential hypertension    Restless legs syndrome (RLS)    ADD (attention deficit disorder)    Depression    Diabetes mellitus type 2, uncontrolled, with complications (CMS/HCC)    Lymphedema    Severe JAIME on CPAP    Other hyperlipidemia    Uncontrolled type 2 diabetes mellitus with hyperglycemia (CMS/HCC)    Anemia    Elevated troponin    Hyponatremia    OMAR (acute kidney injury) (CMS/HCC)    Bilateral pleural effusion    Hepatic encephalopathy (CMS/HCC)    Electronically signed by ELMA Bradley, 09/13/21, 7:42 AM EDT.     I have seen and examined the patient, case was discussed with the physician extender, reviewed the above note, necessary changes were made and I agree with the final  note.     Ld Orozco MD [facility]    09/13/21  07:30 EDT

## 2021-09-17 NOTE — TELEPHONE ENCOUNTER
PATIENT IS CURRENTLY RESIDING AT [facility] AND IS NOT USING HIS DEXCOM AT THIS TIME. NURSING STAFF REPORTS THAT PATIENT IS USING SLIDING SCALE AT THIS TIME AND GLUCOSE READINGS HAVE BEEN BELOW 200. PATIENT IS WANTING TO START USING THE DEXCOM AGAIN. NURSING STAFF IS REQUESTING INFORMATION TO BE SENT ON HOW THIS PRODUCT WORKS SO THAT THEY BUSTER ASSIST IN GETTING THE PATIENT BACK USING THE DEXCOM.  [phone] MILA MOORE

## 2021-09-17 NOTE — PROGRESS NOTES
[facility], [facility]  This was an audio enabled telemedicine encounter. Pt d/cd to rehab.  Patient attempted a video visit but was unable to connect.  We completed a telephone visit with nurse Lydia present in the room.    Chief Complaint  Congestive Heart Failure and Establish Care    Subjective    History of Present Illness {CC  Problem List  Visit  Diagnosis   Encounters  Notes  Medications  Labs  Result Review Imaging  Media :23}     Abe Phillips Jr. presents to [facility] CARDIOLOG[facility] for   History of Present Illness     66-year-old male with paroxysmal atrial fibrillation, chronic heart failure with preserved EF, hypertensive cardiovascular disease, hyperlipidemia, diabetes type 2, hypothyroidism, PVD, JAIME with CPAP, restless leg syndrome, narcolepsy, anxiety and depression, venous insufficiency with chronic lower extremity edema.    Patient was admitted to [facility] on 9/4/2021 with hepatic encephalopathy, acute on chronic heart failure with preserved EF.  Patient discharged home on Bumex, Aldactone, as needed metolazone.  Patient had bilateral pleural effusions pneumonia ruled out.  Patient with ascites, Melo cirrhosis.  Paracentesis was ordered but not enough fluid for removal at that time.  Baseline creatinine 1.1-1.3.  Hyponatremia 131-134 with fluid restriction of 2 L/day.    Reported patient's weight today is 349.  His admission weight was 344.  Even though he has had weight loss he is beginning to have worsening lower extremity edema.  Dyspnea on exertion.  Patient typically wears compression stockings.  Has not worn any stockings since discharge.  Nurse tells me today that he is continued on his Bumex 4 mg daily.  Patient reports that he was on higher doses of Bumex prior to hospitalization.  Surrounding physician has initiated metolazone 5 mg twice a day for 5 days.  Then to resume 2.5 mg daily as needed for 3 pound weight  "gain.    Nurse has shared with me that patient has not been compliant with his fluid restriction of 2 L.  Patient states he is trying to follow a fluid restriction as his swelling is worsening.    Patient denies chest pain, pressure, feelings of palpitations.  Mild dizziness with change in positions, no syncope.      Patient is very concerned about his blood glucose levels.  He had been on an insulin pump.  Apparently they do not have the insulin needed for his pump.  They are working with his endocrinology to determine appropriate dosing.    Patient's daughter who is a nurse practitioner and his power of  is working very close with patient and his medical providers.    No discharge date from rehab at this time.    Objective     Vital Signs:   Vitals:    09/17/21 0853   BP: 128/82   Pulse: 82   Resp: 20   Temp: 98.2 °F (36.8 °C)   SpO2: 98%   Weight: (!) 154 kg (339 lb)   Height: 190.5 cm (75\")     Body mass index is 42.37 kg/m².  Physical Exam     Unable to connect to video.  Patient was very engaged during interview.  Nonlabored interview.  Hyper focused on his diabetes medication.    Result Review  Data Reviewed:{ Labs  Result Review  Imaging  Med Tab  Media :23}     Echocardiogram 9/6/2021: EF 65%, cardiac valves anatomically and functionally normal    EKG 9/5/2021: Sinus rhythm with first-degree AV block with PACs, 81 bpm    Lab Results   Component Value Date    WBC 5.68 09/06/2021    HGB 9.9 (L) 09/06/2021    HCT 31.4 (L) 09/06/2021    MCV 94.6 09/06/2021     (L) 09/06/2021     Lab Results   Component Value Date    GLUCOSE 147 (H) 09/13/2021    CALCIUM 9.6 09/13/2021     (L) 09/13/2021    K 3.6 09/13/2021    CO2 27.0 09/13/2021    CL 91 (L) 09/13/2021    BUN 48 (H) 09/13/2021    CREATININE 1.34 (H) 09/13/2021    EGFRIFNONA 53 (L) 09/13/2021    BCR 35.8 (H) 09/13/2021    ANIONGAP 13.0 09/13/2021     Lab Results   Component Value Date    ALT 15 09/04/2021       Assessment and Plan {CC " Problem List  Visit Diagnosis  ROS  Review (Popup)  [facility] Maintenance  Quality  BestPractice  Medications  SmartSets  SnapShot Encounters  Media :23}   1. Chronic heart failure with preserved ejection fraction (CMS/HCC)  No weight gain since discharge, but worsening lower extremity edema.    Encourage nursing to place compression stockings.    Rounding provider had initiated metolazone 5 mg twice a day, per nurse report, for 5 days.    Requested repeat BMP on Monday of next week    Encourage daily weights.  3 pound weight gain is to be reported    Reviewed signs and symptoms of heart failure worsening with patient and nurse and when to report    2. Paroxysmal atrial fibrillation (CMS/[facility])  Heart rates have been controlled    3. Essential hypertension  Blood pressure stable today    4. Stage 3a chronic kidney disease (CMS/[facility])  Requested recent labs since discharge to be reviewed    5. Lymphedema  Long history of lower extremity edema.  Compression stockings to be placed daily.    Also spoke to POA and reviewed current POC and concerns.  We will f/u telehealth in 1 week.  Offered office visit if patient has transportation.     6. Hyponatremia  Fluid restriction of 2 L per day      I spent 22 minutes caring for Abe on this date of service. This time includes time spent by me in the following activities:preparing for the visit, reviewing tests, performing a medically appropriate examination and/or evaluation , counseling and educating the patient/family/caregiver, ordering medications, tests, or procedures, documenting information in the medical record and care coordination    Follow Up {Instructions Charge Capture  Follow-up Communications :23}   Return in about 1 week (around 9/24/2021) for Telephone visit, HF.    Patient was given instructions and counseling regarding his condition or for health maintenance advice. Please see specific information pulled into the AVS if appropriate.  Patient was  instructed to call the [facility] with any questions, concerns, or worsening symptoms.    *Please note that portions of this note were completed with a voice recognition program. Efforts were made to edit the dictations, but occasionally words are mistranscribed.

## 2021-09-17 NOTE — TELEPHONE ENCOUNTER
Tried calling Lydia back at [place] but there was no answer and the mailbox is full. Will try again this afternoon.

## 2021-09-17 NOTE — TELEPHONE ENCOUNTER
Spoke with patients nurse, Lydia. I let her know that we will send the script that will tell them to change the Dexcom sensor every 10 days and the transmitter every 90 days. She understood and verbally agreed.

## 2021-09-17 NOTE — TELEPHONE ENCOUNTER
Spoke with Lydia at [place]. She said that the patient is wanting to use the dexcom but that in order for him to use it they need an order from Dr. Hendricks about how to use it. She says that his blood sugars are fine and they've been giving him shots on a sliding scale. I told her I would give her a call back this afternoon once I get more information on how to help her.

## 2021-09-22 PROBLEM — I50.9 CHF (CONGESTIVE HEART FAILURE) (HCC): Status: ACTIVE | Noted: 2021-01-01

## 2021-09-22 PROBLEM — K74.60 CIRRHOSIS OF LIVER (HCC): Status: ACTIVE | Noted: 2021-01-01

## 2021-09-22 PROBLEM — G93.41 ENCEPHALOPATHY, METABOLIC: Status: ACTIVE | Noted: 2021-01-01

## 2021-09-22 PROBLEM — N17.9 AKI (ACUTE KIDNEY INJURY) (HCC): Status: ACTIVE | Noted: 2021-01-01

## 2021-09-22 PROBLEM — R41.82 AMS (ALTERED MENTAL STATUS): Status: ACTIVE | Noted: 2021-01-01

## 2021-09-22 PROBLEM — R41.82 ALTERED MENTAL STATUS: Status: ACTIVE | Noted: 2021-01-01

## 2021-09-23 PROBLEM — R77.8 ELEVATED TROPONIN: Status: ACTIVE | Noted: 2021-01-01

## 2021-09-23 PROBLEM — R79.89 ELEVATED TROPONIN: Status: ACTIVE | Noted: 2021-01-01

## 2021-09-27 NOTE — TELEPHONE ENCOUNTER
Caller: MERCEDES     Relationship:[facility]      Best call back number: [phone]    What was the call regarding:   MERCEDES WITH [facility] WOULD LIKE A CALL BACK REGARDING IF  DR RADHA KEE WILL BE FOLLOWING [facility] FOR THE PATIENT     Do you require a callback: YES

## 2021-10-14 NOTE — PLAN OF CARE
Problem: Adult Inpatient Plan of Care  Goal: Plan of Care Review  Outcome: Ongoing, Progressing  Flowsheets (Taken 8/16/2021 8060)  Plan of Care Reviewed With: patient  Goal: Patient-Specific Goal (Individualized)  Outcome: Ongoing, Progressing  Goal: Absence of Hospital-Acquired Illness or Injury  Outcome: Ongoing, Progressing  Goal: Optimal Comfort and Wellbeing  Outcome: Ongoing, Progressing  Goal: Readiness for Transition of Care  Outcome: Ongoing, Progressing     Problem: Fall Injury Risk  Goal: Absence of Fall and Fall-Related Injury  Outcome: Ongoing, Progressing     Problem: Skin Injury Risk Increased  Goal: Skin Health and Integrity  Outcome: Ongoing, Progressing   Goal Outcome Evaluation:  Plan of Care Reviewed With: patient               Not applicable

## 2024-03-26 NOTE — PROGRESS NOTES
Received a call from his pharmacy several days ago indicating that they did not feel they could fill his Adderall prescription at the current dosage.  Their concern was that the total amount of Adderall, which is 90 mg daily, exceeds the maximum recommended daily dose of 60 mg.    I have long shared this concern.  Typically doses more than 40-60 mg daily are not more effective.  I discussed this with Mr. Phillips and he was agreeable to reducing his dosage to 30 mg twice daily.  My hope is that over time he will find this dosage is equally effective and more safe for him.  My additional hope is that with his new CPAP headgear that he will be more compliant with CPAP and will be less drowsy.  He indicates that his latest head gear is the best he has had.    I confirmed this change with his pharmacy.    Fernando Womack MD    
[phone]

## 2024-07-04 NOTE — TELEPHONE ENCOUNTER
Pt called to state he WILL NOT wear CPAP if we DO NOT lower pressure.  Explained to him that he is best treated on CPAP 13 cmh2o per titration study however he states he is not willing to leave on that setting and wants lowered.  He stated the leak is excessive and that it is bothering his wife.  Upon review of his titration portion, his AHI drops sharply after CPAP 9 cmh2o is introduced though he is best treated on 13 cmh2O.  He is refusing to wear if pressure not turned down.  I explained to him that even if the provider lowers the Min pressure that he may wake up to the find the pressure settings has gone up due to the auto-titrating feature.  He verbalized understanding and is willing to accept a higher pressure if the machine decides he needs it.  
risperiDONE 1 mg oral tablet , 1 tab(s) orally once a day (at bedtime)  aspirin 81 mg oral tablet, chewable , 1 tab(s) orally once a day  atorvastatin 40 mg oral tablet , 1 tab(s) orally once a day (at bedtime)  loratadine 10 mg oral tablet , 1 tab(s) orally once a day

## 2025-02-17 NOTE — PLAN OF CARE
Called left message due for 6 month med check, A1C.    Goal Outcome Evaluation:              Outcome Summary: Pt declined grooming tasks, dependent to don socks.  Pt SBA supine to sit,  CGA STS,  CGA to ambulate 30 ft with RW.  Pt completed 15 reps BUE given VCs and 2 restbreaks. Pt is progressing, but limited by weakness and decreased activity tolerance.  Continue OT POC.  Recommend IP rehab upon d/c.

## (undated) DEVICE — CONTN GRAD MEAS TRIANG 32OZ BLK

## (undated) DEVICE — KT ORCA ORCAPOD DISP STRL

## (undated) DEVICE — TUBING, SUCTION, 1/4" X 10', STRAIGHT: Brand: MEDLINE

## (undated) DEVICE — SYR LUERLOK 50ML

## (undated) DEVICE — SOL IRR H2O BTL 1000ML STRL

## (undated) DEVICE — SPNG ENDO BEDSIDE TUB ENZYM

## (undated) DEVICE — LUBE GEL ENDOGLIDE 1.1OZ

## (undated) DEVICE — THE BITE BLOCK MAXI, LATEX FREE STRAP IS USED TO PROTECT THE ENDOSCOPE INSERTION TUBE FROM BEING BITTEN BY THE PATIENT.

## (undated) DEVICE — HYBRID CO2 TUBING/CAP SET FOR OLYMPUS® SCOPES & CO2 SOURCE: Brand: ERBE

## (undated) DEVICE — INTRO ACCSR BLNT TP